# Patient Record
Sex: FEMALE | Race: WHITE | Employment: OTHER
[De-identification: names, ages, dates, MRNs, and addresses within clinical notes are randomized per-mention and may not be internally consistent; named-entity substitution may affect disease eponyms.]

---

## 2017-01-01 DIAGNOSIS — I10 ESSENTIAL HYPERTENSION: ICD-10-CM

## 2017-01-03 RX ORDER — METOPROLOL SUCCINATE 25 MG/1
TABLET, EXTENDED RELEASE ORAL
Qty: 30 TABLET | Refills: 3 | Status: SHIPPED | OUTPATIENT
Start: 2017-01-03 | End: 2017-05-07 | Stop reason: SDUPTHER

## 2017-01-05 ENCOUNTER — TELEPHONE (OUTPATIENT)
Facility: CLINIC | Age: 57
End: 2017-01-05

## 2017-01-14 DIAGNOSIS — M1A.0720 IDIOPATHIC CHRONIC GOUT OF LEFT FOOT WITHOUT TOPHUS: ICD-10-CM

## 2017-01-14 DIAGNOSIS — E79.0 ELEVATED URIC ACID IN BLOOD: ICD-10-CM

## 2017-01-16 RX ORDER — VENLAFAXINE HYDROCHLORIDE 37.5 MG/1
CAPSULE, EXTENDED RELEASE ORAL
Qty: 90 CAPSULE | Refills: 0 | Status: SHIPPED | OUTPATIENT
Start: 2017-01-16 | End: 2017-02-19 | Stop reason: SDUPTHER

## 2017-01-16 RX ORDER — ALLOPURINOL 100 MG/1
TABLET ORAL
Qty: 30 TABLET | Refills: 11 | Status: SHIPPED | OUTPATIENT
Start: 2017-01-16 | End: 2017-12-24 | Stop reason: SDUPTHER

## 2017-01-18 ENCOUNTER — OFFICE VISIT (OUTPATIENT)
Dept: PODIATRY | Facility: CLINIC | Age: 57
End: 2017-01-18

## 2017-01-18 VITALS
WEIGHT: 196 LBS | SYSTOLIC BLOOD PRESSURE: 128 MMHG | HEART RATE: 92 BPM | DIASTOLIC BLOOD PRESSURE: 77 MMHG | HEIGHT: 61 IN | BODY MASS INDEX: 37 KG/M2

## 2017-01-18 DIAGNOSIS — E11.42 TYPE 2 DIABETES MELLITUS WITH DIABETIC POLYNEUROPATHY, WITH LONG-TERM CURRENT USE OF INSULIN (HCC): ICD-10-CM

## 2017-01-18 DIAGNOSIS — M1A.0720 IDIOPATHIC CHRONIC GOUT OF LEFT FOOT WITHOUT TOPHUS: Primary | ICD-10-CM

## 2017-01-18 DIAGNOSIS — M79.2 NEUROPATHIC PAIN: ICD-10-CM

## 2017-01-18 DIAGNOSIS — Z79.4 TYPE 2 DIABETES MELLITUS WITH DIABETIC POLYNEUROPATHY, WITH LONG-TERM CURRENT USE OF INSULIN (HCC): ICD-10-CM

## 2017-01-18 PROCEDURE — 99213 OFFICE O/P EST LOW 20 MIN: CPT | Performed by: PODIATRIST

## 2017-01-18 ASSESSMENT — ENCOUNTER SYMPTOMS
NAUSEA: 0
VOMITING: 0
BACK PAIN: 1
DIARRHEA: 0
COLOR CHANGE: 0
CONSTIPATION: 0

## 2017-01-23 ENCOUNTER — OFFICE VISIT (OUTPATIENT)
Facility: CLINIC | Age: 57
End: 2017-01-23

## 2017-01-23 VITALS
WEIGHT: 214 LBS | BODY MASS INDEX: 40.43 KG/M2 | HEART RATE: 82 BPM | SYSTOLIC BLOOD PRESSURE: 130 MMHG | OXYGEN SATURATION: 98 % | TEMPERATURE: 98.2 F | DIASTOLIC BLOOD PRESSURE: 76 MMHG

## 2017-01-23 DIAGNOSIS — E83.42 HYPOMAGNESEMIA: ICD-10-CM

## 2017-01-23 DIAGNOSIS — D64.9 ANEMIA, UNSPECIFIED TYPE: ICD-10-CM

## 2017-01-23 DIAGNOSIS — R00.2 PALPITATIONS: ICD-10-CM

## 2017-01-23 DIAGNOSIS — R79.89 HIGH PLATELET COUNT: ICD-10-CM

## 2017-01-23 DIAGNOSIS — E79.0 ELEVATED URIC ACID IN BLOOD: ICD-10-CM

## 2017-01-23 DIAGNOSIS — E78.5 HYPERLIPIDEMIA, UNSPECIFIED HYPERLIPIDEMIA TYPE: ICD-10-CM

## 2017-01-23 DIAGNOSIS — R61 EXCESSIVE SWEATING: ICD-10-CM

## 2017-01-23 DIAGNOSIS — I10 ESSENTIAL HYPERTENSION: ICD-10-CM

## 2017-01-23 DIAGNOSIS — E11.42 TYPE 2 DIABETES MELLITUS WITH DIABETIC POLYNEUROPATHY, WITH LONG-TERM CURRENT USE OF INSULIN (HCC): Primary | ICD-10-CM

## 2017-01-23 DIAGNOSIS — Z79.4 TYPE 2 DIABETES MELLITUS WITH DIABETIC POLYNEUROPATHY, WITH LONG-TERM CURRENT USE OF INSULIN (HCC): Primary | ICD-10-CM

## 2017-01-23 DIAGNOSIS — Z12.11 COLON CANCER SCREENING: ICD-10-CM

## 2017-01-23 LAB — GLUCOSE BLD-MCNC: 186 MG/DL

## 2017-01-23 PROCEDURE — 99214 OFFICE O/P EST MOD 30 MIN: CPT | Performed by: NURSE PRACTITIONER

## 2017-01-23 PROCEDURE — 82962 GLUCOSE BLOOD TEST: CPT | Performed by: NURSE PRACTITIONER

## 2017-01-24 ENCOUNTER — TELEPHONE (OUTPATIENT)
Facility: CLINIC | Age: 57
End: 2017-01-24

## 2017-01-24 DIAGNOSIS — E11.42 TYPE 2 DIABETES MELLITUS WITH DIABETIC POLYNEUROPATHY, WITH LONG-TERM CURRENT USE OF INSULIN (HCC): ICD-10-CM

## 2017-01-24 DIAGNOSIS — Z79.4 TYPE 2 DIABETES MELLITUS WITH DIABETIC POLYNEUROPATHY, WITH LONG-TERM CURRENT USE OF INSULIN (HCC): ICD-10-CM

## 2017-01-27 DIAGNOSIS — Z12.11 COLON CANCER SCREENING: ICD-10-CM

## 2017-01-27 LAB
CONTROL: YES
HEMOCCULT STL QL: NEGATIVE

## 2017-01-27 PROCEDURE — 82274 ASSAY TEST FOR BLOOD FECAL: CPT | Performed by: NURSE PRACTITIONER

## 2017-01-30 ENCOUNTER — TELEPHONE (OUTPATIENT)
Facility: CLINIC | Age: 57
End: 2017-01-30

## 2017-01-30 DIAGNOSIS — E03.9 HYPOTHYROIDISM, UNSPECIFIED TYPE: Primary | ICD-10-CM

## 2017-02-02 ENCOUNTER — TELEPHONE (OUTPATIENT)
Facility: CLINIC | Age: 57
End: 2017-02-02

## 2017-02-07 ENCOUNTER — HOSPITAL ENCOUNTER (OUTPATIENT)
Age: 57
Discharge: HOME | End: 2017-02-07
Payer: COMMERCIAL

## 2017-02-07 DIAGNOSIS — N18.2 SECONDARY DIABETES MELLITUS WITH STAGE 2 CHRONIC KIDNEY DISEASE (HCC): ICD-10-CM

## 2017-02-07 DIAGNOSIS — N18.2 CKD (CHRONIC KIDNEY DISEASE) STAGE 2, GFR 60-89 ML/MIN: ICD-10-CM

## 2017-02-07 DIAGNOSIS — E03.9 HYPOTHYROIDISM, UNSPECIFIED TYPE: ICD-10-CM

## 2017-02-07 DIAGNOSIS — I12.9 BENIGN HYPERTENSION WITH CKD (CHRONIC KIDNEY DISEASE), STAGE II: ICD-10-CM

## 2017-02-07 DIAGNOSIS — N18.2 BENIGN HYPERTENSION WITH CKD (CHRONIC KIDNEY DISEASE), STAGE II: ICD-10-CM

## 2017-02-07 DIAGNOSIS — E13.22 SECONDARY DIABETES MELLITUS WITH STAGE 2 CHRONIC KIDNEY DISEASE (HCC): ICD-10-CM

## 2017-02-07 LAB
ALBUMIN SERPL-MCNC: 4.1 G/DL (ref 3.5–5.2)
ALBUMIN/GLOBULIN RATIO: ABNORMAL (ref 1–2.5)
ALP BLD-CCNC: 74 U/L (ref 35–104)
ALT SERPL-CCNC: 10 U/L (ref 5–33)
ANION GAP SERPL CALCULATED.3IONS-SCNC: 13 MMOL/L (ref 9–17)
AST SERPL-CCNC: 13 U/L
BILIRUB SERPL-MCNC: 0.33 MG/DL (ref 0.3–1.2)
BUN BLDV-MCNC: 14 MG/DL (ref 6–20)
BUN/CREAT BLD: ABNORMAL (ref 9–20)
CALCIUM SERPL-MCNC: 9.3 MG/DL (ref 8.6–10.4)
CHLORIDE BLD-SCNC: 99 MMOL/L (ref 98–107)
CHOLESTEROL/HDL RATIO: 2.4
CHOLESTEROL: 172 MG/DL
CO2: 25 MMOL/L (ref 20–31)
CREAT SERPL-MCNC: 0.58 MG/DL (ref 0.5–0.9)
CREATININE URINE: 161.9 MG/DL (ref 28–217)
CREATININE URINE: 164.7 MG/DL (ref 28–217)
ESTIMATED AVERAGE GLUCOSE: 180 MG/DL
ESTRADIOL LEVEL: <5 PG/ML
FOLLICLE STIMULATING HORMONE: 53.5 U/L (ref 25.8–134.8)
GFR AFRICAN AMERICAN: >60 ML/MIN
GFR NON-AFRICAN AMERICAN: >60 ML/MIN
GFR SERPL CREATININE-BSD FRML MDRD: ABNORMAL ML/MIN/{1.73_M2}
GFR SERPL CREATININE-BSD FRML MDRD: ABNORMAL ML/MIN/{1.73_M2}
GLUCOSE BLD-MCNC: 164 MG/DL (ref 70–99)
HBA1C MFR BLD: 7.9 % (ref 4–6)
HDLC SERPL-MCNC: 71 MG/DL
LDL CHOLESTEROL: 63 MG/DL (ref 0–130)
LH: 18 U/L (ref 7.7–58.5)
MAGNESIUM URINE: 30 MG/DL
MICROALBUMIN/CREAT 24H UR: <12 MG/L
MICROALBUMIN/CREAT UR-RTO: 7 MCG/MG CREAT
POTASSIUM SERPL-SCNC: 4.7 MMOL/L (ref 3.7–5.3)
PROLACTIN: 7.82 UG/L (ref 4.79–23.3)
SODIUM BLD-SCNC: 137 MMOL/L (ref 135–144)
THYROXINE, FREE: 1.37 NG/DL (ref 0.93–1.7)
TOTAL PROTEIN: 6.7 G/DL (ref 6.4–8.3)
TRIGL SERPL-MCNC: 188 MG/DL
TSH SERPL DL<=0.05 MIU/L-ACNC: 1.04 MIU/L (ref 0.3–5)
TSH SERPL DL<=0.05 MIU/L-ACNC: 1.06 MIU/L (ref 0.3–5)
VLDLC SERPL CALC-MCNC: ABNORMAL MG/DL (ref 1–30)

## 2017-02-07 PROCEDURE — 83002 ASSAY OF GONADOTROPIN (LH): CPT

## 2017-02-07 PROCEDURE — 84439 ASSAY OF FREE THYROXINE: CPT

## 2017-02-07 PROCEDURE — 82570 ASSAY OF URINE CREATININE: CPT

## 2017-02-07 PROCEDURE — 82043 UR ALBUMIN QUANTITATIVE: CPT

## 2017-02-07 PROCEDURE — 83001 ASSAY OF GONADOTROPIN (FSH): CPT

## 2017-02-07 PROCEDURE — 36415 COLL VENOUS BLD VENIPUNCTURE: CPT

## 2017-02-07 PROCEDURE — 82670 ASSAY OF TOTAL ESTRADIOL: CPT

## 2017-02-07 PROCEDURE — 84146 ASSAY OF PROLACTIN: CPT

## 2017-02-07 PROCEDURE — 80053 COMPREHEN METABOLIC PANEL: CPT

## 2017-02-07 PROCEDURE — 83036 HEMOGLOBIN GLYCOSYLATED A1C: CPT

## 2017-02-07 PROCEDURE — 84443 ASSAY THYROID STIM HORMONE: CPT

## 2017-02-07 PROCEDURE — 80061 LIPID PANEL: CPT

## 2017-02-07 PROCEDURE — 83735 ASSAY OF MAGNESIUM: CPT

## 2017-02-10 ENCOUNTER — TELEPHONE (OUTPATIENT)
Facility: CLINIC | Age: 57
End: 2017-02-10

## 2017-02-10 DIAGNOSIS — Z79.4 TYPE 2 DIABETES MELLITUS WITH DIABETIC POLYNEUROPATHY, WITH LONG-TERM CURRENT USE OF INSULIN (HCC): Primary | ICD-10-CM

## 2017-02-10 DIAGNOSIS — E11.42 TYPE 2 DIABETES MELLITUS WITH DIABETIC POLYNEUROPATHY, WITH LONG-TERM CURRENT USE OF INSULIN (HCC): Primary | ICD-10-CM

## 2017-02-15 ENCOUNTER — HOSPITAL ENCOUNTER (OUTPATIENT)
Dept: PAIN MANAGEMENT | Age: 57
Discharge: HOME | End: 2017-02-15
Attending: NURSE PRACTITIONER | Admitting: NURSE PRACTITIONER
Payer: COMMERCIAL

## 2017-02-15 DIAGNOSIS — M19.011 PRIMARY OSTEOARTHRITIS OF BOTH SHOULDERS: ICD-10-CM

## 2017-02-15 DIAGNOSIS — M51.36 DEGENERATIVE LUMBAR DISC: ICD-10-CM

## 2017-02-15 DIAGNOSIS — G89.29 ENCOUNTER FOR CHRONIC PAIN MANAGEMENT: ICD-10-CM

## 2017-02-15 DIAGNOSIS — M46.1 SACROILIITIS (HCC): ICD-10-CM

## 2017-02-15 DIAGNOSIS — Z51.81 ENCOUNTER FOR MEDICATION MONITORING: ICD-10-CM

## 2017-02-15 DIAGNOSIS — Z79.899 ENCOUNTER FOR MEDICATION MANAGEMENT: ICD-10-CM

## 2017-02-15 DIAGNOSIS — S42.251A CLOSED DISPLACED FRACTURE OF GREATER TUBEROSITY OF RIGHT HUMERUS, INITIAL ENCOUNTER: ICD-10-CM

## 2017-02-15 DIAGNOSIS — E66.01 MORBID OBESITY DUE TO EXCESS CALORIES (HCC): ICD-10-CM

## 2017-02-15 DIAGNOSIS — E11.01 UNCONTROLLED TYPE 2 DIABETES MELLITUS WITH HYPEROSMOLAR COMA, WITHOUT LONG-TERM CURRENT USE OF INSULIN (HCC): ICD-10-CM

## 2017-02-15 DIAGNOSIS — M17.0 PRIMARY OSTEOARTHRITIS OF BOTH KNEES: Primary | ICD-10-CM

## 2017-02-15 DIAGNOSIS — M19.012 PRIMARY OSTEOARTHRITIS OF BOTH SHOULDERS: ICD-10-CM

## 2017-02-15 DIAGNOSIS — M16.0 PRIMARY OSTEOARTHRITIS OF BOTH HIPS: ICD-10-CM

## 2017-02-15 PROCEDURE — 99213 OFFICE O/P EST LOW 20 MIN: CPT

## 2017-02-15 RX ORDER — OXYCODONE AND ACETAMINOPHEN 10; 325 MG/1; MG/1
1 TABLET ORAL EVERY 6 HOURS PRN
Qty: 120 TABLET | Refills: 0 | Status: SHIPPED | OUTPATIENT
Start: 2017-02-21 | End: 2017-04-03 | Stop reason: SDUPTHER

## 2017-02-19 DIAGNOSIS — E83.42 HYPOMAGNESEMIA: ICD-10-CM

## 2017-02-19 DIAGNOSIS — E78.5 HYPERLIPIDEMIA, UNSPECIFIED HYPERLIPIDEMIA TYPE: ICD-10-CM

## 2017-02-20 ENCOUNTER — HOSPITAL ENCOUNTER (OUTPATIENT)
Age: 57
Discharge: HOME | End: 2017-02-20
Payer: COMMERCIAL

## 2017-02-20 DIAGNOSIS — E13.22 SECONDARY DIABETES MELLITUS WITH STAGE 2 CHRONIC KIDNEY DISEASE (HCC): ICD-10-CM

## 2017-02-20 DIAGNOSIS — I12.9 BENIGN HYPERTENSION WITH CKD (CHRONIC KIDNEY DISEASE), STAGE II: ICD-10-CM

## 2017-02-20 DIAGNOSIS — N18.2 SECONDARY DIABETES MELLITUS WITH STAGE 2 CHRONIC KIDNEY DISEASE (HCC): ICD-10-CM

## 2017-02-20 DIAGNOSIS — N18.2 CKD (CHRONIC KIDNEY DISEASE) STAGE 2, GFR 60-89 ML/MIN: ICD-10-CM

## 2017-02-20 DIAGNOSIS — N18.2 BENIGN HYPERTENSION WITH CKD (CHRONIC KIDNEY DISEASE), STAGE II: ICD-10-CM

## 2017-02-20 LAB
ABSOLUTE EOS #: 0.3 K/UL (ref 0–0.4)
ABSOLUTE LYMPH #: 1.9 K/UL (ref 1–4.8)
ABSOLUTE MONO #: 0.6 K/UL (ref 0.1–1.3)
ANION GAP SERPL CALCULATED.3IONS-SCNC: 13 MMOL/L (ref 9–17)
BASOPHILS # BLD: 1 % (ref 0–2)
BASOPHILS ABSOLUTE: 0.1 K/UL (ref 0–0.2)
BUN BLDV-MCNC: 12 MG/DL (ref 6–20)
BUN/CREAT BLD: ABNORMAL (ref 9–20)
CALCIUM SERPL-MCNC: 9.3 MG/DL (ref 8.6–10.4)
CHLORIDE BLD-SCNC: 101 MMOL/L (ref 98–107)
CO2: 26 MMOL/L (ref 20–31)
CREAT SERPL-MCNC: 0.57 MG/DL (ref 0.5–0.9)
CREATININE URINE: 96.9 MG/DL (ref 28–217)
DIFFERENTIAL TYPE: ABNORMAL
EOSINOPHILS RELATIVE PERCENT: 4 % (ref 0–4)
GFR AFRICAN AMERICAN: >60 ML/MIN
GFR NON-AFRICAN AMERICAN: >60 ML/MIN
GFR SERPL CREATININE-BSD FRML MDRD: ABNORMAL ML/MIN/{1.73_M2}
GFR SERPL CREATININE-BSD FRML MDRD: ABNORMAL ML/MIN/{1.73_M2}
GLUCOSE BLD-MCNC: 170 MG/DL (ref 70–99)
HCT VFR BLD CALC: 39.4 % (ref 36–46)
HEMOGLOBIN: 13 G/DL (ref 12–16)
LYMPHOCYTES # BLD: 26 % (ref 24–44)
MAGNESIUM: 1.7 MG/DL (ref 1.6–2.6)
MCH RBC QN AUTO: 28.8 PG (ref 26–34)
MCHC RBC AUTO-ENTMCNC: 33 G/DL (ref 31–37)
MCV RBC AUTO: 87.3 FL (ref 80–100)
MICROALBUMIN/CREAT 24H UR: <12 MG/L
MICROALBUMIN/CREAT UR-RTO: 12 MCG/MG CREAT
MONOCYTES # BLD: 8 % (ref 1–7)
PDW BLD-RTO: 15.6 % (ref 11.5–14.9)
PHOSPHORUS: 4 MG/DL (ref 2.6–4.5)
PLATELET # BLD: 289 K/UL (ref 150–450)
PLATELET ESTIMATE: ABNORMAL
PMV BLD AUTO: 8 FL (ref 6–12)
POTASSIUM SERPL-SCNC: 4.7 MMOL/L (ref 3.7–5.3)
RBC # BLD: 4.51 M/UL (ref 4–5.2)
RBC # BLD: ABNORMAL 10*6/UL
SEG NEUTROPHILS: 61 % (ref 36–66)
SEGMENTED NEUTROPHILS ABSOLUTE COUNT: 4.3 K/UL (ref 1.3–9.1)
SODIUM BLD-SCNC: 140 MMOL/L (ref 135–144)
WBC # BLD: 7.1 K/UL (ref 3.5–11)
WBC # BLD: ABNORMAL 10*3/UL

## 2017-02-20 PROCEDURE — 83735 ASSAY OF MAGNESIUM: CPT

## 2017-02-20 PROCEDURE — 82570 ASSAY OF URINE CREATININE: CPT

## 2017-02-20 PROCEDURE — 80048 BASIC METABOLIC PNL TOTAL CA: CPT

## 2017-02-20 PROCEDURE — 84100 ASSAY OF PHOSPHORUS: CPT

## 2017-02-20 PROCEDURE — 36415 COLL VENOUS BLD VENIPUNCTURE: CPT

## 2017-02-20 PROCEDURE — 82043 UR ALBUMIN QUANTITATIVE: CPT

## 2017-02-20 PROCEDURE — 85025 COMPLETE CBC W/AUTO DIFF WBC: CPT

## 2017-02-20 RX ORDER — VENLAFAXINE HYDROCHLORIDE 37.5 MG/1
CAPSULE, EXTENDED RELEASE ORAL
Qty: 90 CAPSULE | Refills: 0 | Status: SHIPPED | OUTPATIENT
Start: 2017-02-20 | End: 2017-03-19 | Stop reason: SDUPTHER

## 2017-02-20 RX ORDER — ATORVASTATIN CALCIUM 20 MG/1
TABLET, FILM COATED ORAL
Qty: 30 TABLET | Refills: 3 | Status: SHIPPED | OUTPATIENT
Start: 2017-02-20 | End: 2017-06-19 | Stop reason: SDUPTHER

## 2017-02-20 RX ORDER — BACLOFEN 20 MG
TABLET ORAL
Qty: 60 TABLET | Refills: 3 | Status: SHIPPED | OUTPATIENT
Start: 2017-02-20 | End: 2017-06-19 | Stop reason: SDUPTHER

## 2017-02-21 RX ORDER — TIZANIDINE 2 MG/1
2 TABLET ORAL 2 TIMES DAILY PRN
Qty: 60 TABLET | Refills: 1 | Status: SHIPPED | OUTPATIENT
Start: 2017-02-21 | End: 2017-08-21 | Stop reason: ALTCHOICE

## 2017-02-24 RX ORDER — AMMONIUM LACTATE 12 G/100G
CREAM TOPICAL 2 TIMES DAILY PRN
Qty: 140 G | Refills: 6 | OUTPATIENT
Start: 2017-02-24 | End: 2018-06-28 | Stop reason: SDUPTHER

## 2017-03-01 ENCOUNTER — HOSPITAL ENCOUNTER (OUTPATIENT)
Age: 57
Discharge: HOME | End: 2017-03-01
Payer: COMMERCIAL

## 2017-03-01 DIAGNOSIS — N18.2 BENIGN HYPERTENSION WITH CKD (CHRONIC KIDNEY DISEASE), STAGE II: ICD-10-CM

## 2017-03-01 DIAGNOSIS — N18.2 SECONDARY DIABETES MELLITUS WITH STAGE 2 CHRONIC KIDNEY DISEASE (HCC): ICD-10-CM

## 2017-03-01 DIAGNOSIS — I12.9 BENIGN HYPERTENSION WITH CKD (CHRONIC KIDNEY DISEASE), STAGE II: ICD-10-CM

## 2017-03-01 DIAGNOSIS — E13.22 SECONDARY DIABETES MELLITUS WITH STAGE 2 CHRONIC KIDNEY DISEASE (HCC): ICD-10-CM

## 2017-03-01 DIAGNOSIS — N18.2 CKD (CHRONIC KIDNEY DISEASE) STAGE 2, GFR 60-89 ML/MIN: ICD-10-CM

## 2017-03-01 LAB — MAGNESIUM: 1.7 MG/DL (ref 1.6–2.6)

## 2017-03-01 PROCEDURE — 36415 COLL VENOUS BLD VENIPUNCTURE: CPT

## 2017-03-01 PROCEDURE — 83735 ASSAY OF MAGNESIUM: CPT

## 2017-03-02 ENCOUNTER — OFFICE VISIT (OUTPATIENT)
Facility: CLINIC | Age: 57
End: 2017-03-02

## 2017-03-02 VITALS
DIASTOLIC BLOOD PRESSURE: 78 MMHG | WEIGHT: 218 LBS | HEART RATE: 72 BPM | BODY MASS INDEX: 41.19 KG/M2 | OXYGEN SATURATION: 98 % | TEMPERATURE: 99 F | SYSTOLIC BLOOD PRESSURE: 120 MMHG

## 2017-03-02 DIAGNOSIS — J40 BRONCHITIS: ICD-10-CM

## 2017-03-02 DIAGNOSIS — J01.01 ACUTE RECURRENT MAXILLARY SINUSITIS: Primary | ICD-10-CM

## 2017-03-02 PROCEDURE — 99213 OFFICE O/P EST LOW 20 MIN: CPT | Performed by: NURSE PRACTITIONER

## 2017-03-02 RX ORDER — GUAIFENESIN 600 MG/1
600 TABLET, EXTENDED RELEASE ORAL 2 TIMES DAILY PRN
Qty: 60 TABLET | Refills: 0 | Status: ON HOLD | OUTPATIENT
Start: 2017-03-02 | End: 2017-05-09

## 2017-03-02 RX ORDER — DOXYCYCLINE HYCLATE 100 MG/1
100 CAPSULE ORAL 2 TIMES DAILY
Qty: 14 CAPSULE | Refills: 0 | Status: SHIPPED | OUTPATIENT
Start: 2017-03-02 | End: 2017-03-09

## 2017-03-02 RX ORDER — INSULIN ASPART 100 [IU]/ML
INJECTION, SOLUTION INTRAVENOUS; SUBCUTANEOUS
COMMUNITY
Start: 2017-02-20 | End: 2018-12-19

## 2017-03-02 RX ORDER — GUAIFENESIN 600 MG/1
600 TABLET, EXTENDED RELEASE ORAL 2 TIMES DAILY PRN
Qty: 60 TABLET | Refills: 0 | OUTPATIENT
Start: 2017-03-02

## 2017-03-02 RX ORDER — ALBUTEROL SULFATE 90 UG/1
1-2 AEROSOL, METERED RESPIRATORY (INHALATION) EVERY 4 HOURS PRN
Qty: 1 INHALER | Refills: 3 | Status: SHIPPED | OUTPATIENT
Start: 2017-03-02 | End: 2017-04-24

## 2017-03-02 ASSESSMENT — ENCOUNTER SYMPTOMS
RHINORRHEA: 1
COUGH: 1
WHEEZING: 1
SINUS PRESSURE: 1
GASTROINTESTINAL NEGATIVE: 1
SHORTNESS OF BREATH: 0
SORE THROAT: 1

## 2017-03-21 RX ORDER — VENLAFAXINE HYDROCHLORIDE 37.5 MG/1
CAPSULE, EXTENDED RELEASE ORAL
Qty: 90 CAPSULE | Refills: 0 | Status: SHIPPED | OUTPATIENT
Start: 2017-03-21 | End: 2017-04-23 | Stop reason: SDUPTHER

## 2017-03-28 ENCOUNTER — HOSPITAL ENCOUNTER (OUTPATIENT)
Facility: CLINIC | Age: 57
Discharge: HOME | End: 2017-03-28
Payer: COMMERCIAL

## 2017-03-28 ENCOUNTER — HOSPITAL ENCOUNTER (OUTPATIENT)
Age: 57
Setting detail: SPECIMEN
Discharge: HOME | End: 2017-03-28
Payer: COMMERCIAL

## 2017-03-28 ENCOUNTER — HOSPITAL ENCOUNTER (OUTPATIENT)
Dept: GENERAL RADIOLOGY | Facility: CLINIC | Age: 57
Discharge: HOME | End: 2017-03-28
Payer: COMMERCIAL

## 2017-03-28 DIAGNOSIS — Z01.818 PRE-OP TESTING: ICD-10-CM

## 2017-03-28 LAB
BILIRUBIN URINE: NEGATIVE
COLOR: YELLOW
COMMENT UA: ABNORMAL
GLUCOSE URINE: ABNORMAL
KETONES, URINE: NEGATIVE
LEUKOCYTE ESTERASE, URINE: NEGATIVE
NITRITE, URINE: NEGATIVE
PH UA: 8 (ref 5–8)
PROTEIN UA: NEGATIVE
SPECIFIC GRAVITY UA: 1.02 (ref 1–1.03)
TURBIDITY: CLEAR
URINE HGB: NEGATIVE
UROBILINOGEN, URINE: NORMAL

## 2017-03-28 PROCEDURE — 71020 XR CHEST STANDARD TWO VW: CPT

## 2017-03-29 LAB
CULTURE: NORMAL
CULTURE: NORMAL
Lab: NORMAL
SPECIMEN DESCRIPTION: NORMAL
STATUS: NORMAL

## 2017-04-03 ENCOUNTER — TELEPHONE (OUTPATIENT)
Dept: PAIN MANAGEMENT | Age: 57
End: 2017-04-03

## 2017-04-03 RX ORDER — OXYCODONE AND ACETAMINOPHEN 10; 325 MG/1; MG/1
1 TABLET ORAL EVERY 6 HOURS PRN
Qty: 120 TABLET | Refills: 0 | Status: SHIPPED | OUTPATIENT
Start: 2017-04-03 | End: 2017-04-25 | Stop reason: SDUPTHER

## 2017-04-19 ENCOUNTER — HOSPITAL ENCOUNTER (OUTPATIENT)
Dept: PREADMISSION TESTING | Age: 57
Discharge: HOME | End: 2017-04-19
Payer: COMMERCIAL

## 2017-04-19 VITALS
SYSTOLIC BLOOD PRESSURE: 158 MMHG | WEIGHT: 225.75 LBS | HEART RATE: 83 BPM | RESPIRATION RATE: 18 BRPM | OXYGEN SATURATION: 99 % | HEIGHT: 61 IN | BODY MASS INDEX: 42.62 KG/M2 | DIASTOLIC BLOOD PRESSURE: 75 MMHG

## 2017-04-19 LAB
ABO/RH: NORMAL
ANION GAP SERPL CALCULATED.3IONS-SCNC: 12 MMOL/L (ref 9–17)
ANTIBODY SCREEN: NEGATIVE
ARM BAND NUMBER: NORMAL
BILIRUBIN URINE: NEGATIVE
BUN BLDV-MCNC: 16 MG/DL (ref 6–20)
BUN/CREAT BLD: 28 (ref 9–20)
CALCIUM SERPL-MCNC: 8.9 MG/DL (ref 8.6–10.4)
CHLORIDE BLD-SCNC: 98 MMOL/L (ref 98–107)
CO2: 28 MMOL/L (ref 20–31)
COLOR: YELLOW
COMMENT UA: NORMAL
CREAT SERPL-MCNC: 0.58 MG/DL (ref 0.5–0.9)
ESTIMATED AVERAGE GLUCOSE: 166 MG/DL
EXPIRATION DATE: NORMAL
GFR AFRICAN AMERICAN: >60 ML/MIN
GFR NON-AFRICAN AMERICAN: >60 ML/MIN
GFR SERPL CREATININE-BSD FRML MDRD: ABNORMAL ML/MIN/{1.73_M2}
GFR SERPL CREATININE-BSD FRML MDRD: ABNORMAL ML/MIN/{1.73_M2}
GLUCOSE BLD-MCNC: 117 MG/DL (ref 70–99)
GLUCOSE URINE: NEGATIVE
HBA1C MFR BLD: 7.4 % (ref 4–6)
HCT VFR BLD CALC: 39.3 % (ref 36–46)
HEMOGLOBIN: 13.2 G/DL (ref 12–16)
KETONES, URINE: NEGATIVE
LEUKOCYTE ESTERASE, URINE: NEGATIVE
MCH RBC QN AUTO: 29.4 PG (ref 26–34)
MCHC RBC AUTO-ENTMCNC: 33.5 G/DL (ref 31–37)
MCV RBC AUTO: 87.7 FL (ref 80–100)
NITRITE, URINE: NEGATIVE
PDW BLD-RTO: 12.6 % (ref 11.5–14.5)
PH UA: 7.5 (ref 5–8)
PLATELET # BLD: 385 K/UL (ref 130–400)
PMV BLD AUTO: NORMAL FL (ref 6–12)
POTASSIUM SERPL-SCNC: 4.7 MMOL/L (ref 3.7–5.3)
PREALBUMIN: 28.1 MG/DL (ref 20–40)
PROTEIN UA: NEGATIVE
RBC # BLD: 4.48 M/UL (ref 4–5.2)
SODIUM BLD-SCNC: 138 MMOL/L (ref 135–144)
SPECIFIC GRAVITY UA: 1.01 (ref 1–1.03)
TURBIDITY: CLEAR
URINE HGB: NEGATIVE
UROBILINOGEN, URINE: NORMAL
WBC # BLD: 8.8 K/UL (ref 3.5–11)

## 2017-04-19 PROCEDURE — 36415 COLL VENOUS BLD VENIPUNCTURE: CPT

## 2017-04-19 PROCEDURE — 85027 COMPLETE CBC AUTOMATED: CPT

## 2017-04-19 PROCEDURE — 87641 MR-STAPH DNA AMP PROBE: CPT

## 2017-04-19 PROCEDURE — 86901 BLOOD TYPING SEROLOGIC RH(D): CPT

## 2017-04-19 PROCEDURE — 81003 URINALYSIS AUTO W/O SCOPE: CPT

## 2017-04-19 PROCEDURE — 86850 RBC ANTIBODY SCREEN: CPT

## 2017-04-19 PROCEDURE — 86900 BLOOD TYPING SEROLOGIC ABO: CPT

## 2017-04-19 PROCEDURE — 83036 HEMOGLOBIN GLYCOSYLATED A1C: CPT

## 2017-04-19 PROCEDURE — 80048 BASIC METABOLIC PNL TOTAL CA: CPT

## 2017-04-19 PROCEDURE — 84134 ASSAY OF PREALBUMIN: CPT

## 2017-04-19 RX ORDER — PHENTERMINE HYDROCHLORIDE 37.5 MG/1
37.5 CAPSULE ORAL EVERY MORNING
COMMUNITY
End: 2017-08-08

## 2017-04-19 ASSESSMENT — PAIN DESCRIPTION - FREQUENCY: FREQUENCY: CONTINUOUS

## 2017-04-19 ASSESSMENT — PAIN DESCRIPTION - LOCATION: LOCATION: SHOULDER

## 2017-04-19 ASSESSMENT — PAIN DESCRIPTION - PAIN TYPE: TYPE: CHRONIC PAIN

## 2017-04-19 ASSESSMENT — PAIN DESCRIPTION - PROGRESSION: CLINICAL_PROGRESSION: GRADUALLY WORSENING

## 2017-04-19 ASSESSMENT — PAIN DESCRIPTION - DIRECTION: RADIATING_TOWARDS: DOWN THE RIGHT ARM

## 2017-04-19 ASSESSMENT — PAIN DESCRIPTION - ORIENTATION: ORIENTATION: RIGHT

## 2017-04-19 ASSESSMENT — PAIN SCALES - GENERAL: PAINLEVEL_OUTOF10: 9

## 2017-04-19 ASSESSMENT — PAIN DESCRIPTION - DESCRIPTORS: DESCRIPTORS: ACHING;POUNDING

## 2017-04-20 LAB
DIRECT EXAM: NORMAL
Lab: NORMAL
SPECIMEN DESCRIPTION: NORMAL
STATUS: NORMAL

## 2017-04-24 ENCOUNTER — OFFICE VISIT (OUTPATIENT)
Dept: OBGYN CLINIC | Age: 57
End: 2017-04-24
Payer: COMMERCIAL

## 2017-04-24 ENCOUNTER — HOSPITAL ENCOUNTER (OUTPATIENT)
Age: 57
Setting detail: SPECIMEN
Discharge: HOME | End: 2017-04-24
Payer: COMMERCIAL

## 2017-04-24 VITALS
HEART RATE: 82 BPM | RESPIRATION RATE: 18 BRPM | HEIGHT: 61 IN | DIASTOLIC BLOOD PRESSURE: 76 MMHG | SYSTOLIC BLOOD PRESSURE: 118 MMHG | WEIGHT: 224 LBS | BODY MASS INDEX: 42.29 KG/M2

## 2017-04-24 DIAGNOSIS — N95.0 POST-MENOPAUSAL BLEEDING: ICD-10-CM

## 2017-04-24 DIAGNOSIS — Z01.411 ENCOUNTER FOR GYNECOLOGICAL EXAMINATION (GENERAL) (ROUTINE) WITH ABNORMAL FINDINGS: ICD-10-CM

## 2017-04-24 DIAGNOSIS — Z11.51 SPECIAL SCREENING EXAMINATION FOR HUMAN PAPILLOMAVIRUS (HPV): ICD-10-CM

## 2017-04-24 DIAGNOSIS — N95.9 POST MENOPAUSAL PROBLEMS: ICD-10-CM

## 2017-04-24 DIAGNOSIS — N89.8 VAGINAL DISCHARGE: ICD-10-CM

## 2017-04-24 DIAGNOSIS — Z01.419 WELL FEMALE EXAM WITH ROUTINE GYNECOLOGICAL EXAM: Primary | ICD-10-CM

## 2017-04-24 PROBLEM — D50.9 IRON DEFICIENCY ANEMIA: Status: ACTIVE | Noted: 2017-01-13

## 2017-04-24 PROCEDURE — 99214 OFFICE O/P EST MOD 30 MIN: CPT | Performed by: ADVANCED PRACTICE MIDWIFE

## 2017-04-24 PROCEDURE — 87070 CULTURE OTHR SPECIMN AEROBIC: CPT

## 2017-04-24 PROCEDURE — 87624 HPV HI-RISK TYP POOLED RSLT: CPT

## 2017-04-24 PROCEDURE — G0145 SCR C/V CYTO,THINLAYER,RESCR: HCPCS

## 2017-04-24 ASSESSMENT — PATIENT HEALTH QUESTIONNAIRE - PHQ9
SUM OF ALL RESPONSES TO PHQ QUESTIONS 1-9: 0
2. FEELING DOWN, DEPRESSED OR HOPELESS: 0
1. LITTLE INTEREST OR PLEASURE IN DOING THINGS: 0
SUM OF ALL RESPONSES TO PHQ9 QUESTIONS 1 & 2: 0

## 2017-04-25 ENCOUNTER — HOSPITAL ENCOUNTER (OUTPATIENT)
Dept: GENERAL RADIOLOGY | Facility: CLINIC | Age: 57
Discharge: HOME | End: 2017-04-25
Payer: COMMERCIAL

## 2017-04-25 ENCOUNTER — HOSPITAL ENCOUNTER (OUTPATIENT)
Dept: PAIN MANAGEMENT | Age: 57
Discharge: HOME | End: 2017-04-25
Payer: COMMERCIAL

## 2017-04-25 ENCOUNTER — HOSPITAL ENCOUNTER (OUTPATIENT)
Age: 57
Setting detail: SPECIMEN
Discharge: HOME | End: 2017-04-25
Payer: COMMERCIAL

## 2017-04-25 ENCOUNTER — HOSPITAL ENCOUNTER (OUTPATIENT)
Facility: CLINIC | Age: 57
Discharge: HOME | End: 2017-04-25
Payer: COMMERCIAL

## 2017-04-25 DIAGNOSIS — G89.29 ENCOUNTER FOR CHRONIC PAIN MANAGEMENT: ICD-10-CM

## 2017-04-25 DIAGNOSIS — M19.011 PRIMARY OSTEOARTHRITIS OF BOTH SHOULDERS: ICD-10-CM

## 2017-04-25 DIAGNOSIS — M19.012 PRIMARY OSTEOARTHRITIS OF BOTH SHOULDERS: ICD-10-CM

## 2017-04-25 DIAGNOSIS — N95.9 POST MENOPAUSAL PROBLEMS: ICD-10-CM

## 2017-04-25 DIAGNOSIS — Z51.81 ENCOUNTER FOR MEDICATION MONITORING: ICD-10-CM

## 2017-04-25 DIAGNOSIS — M16.0 PRIMARY OSTEOARTHRITIS OF BOTH HIPS: ICD-10-CM

## 2017-04-25 DIAGNOSIS — E66.01 MORBID OBESITY DUE TO EXCESS CALORIES (HCC): ICD-10-CM

## 2017-04-25 DIAGNOSIS — Z01.818 PRE-OPERATIVE EXAM: ICD-10-CM

## 2017-04-25 DIAGNOSIS — M17.0 PRIMARY OSTEOARTHRITIS OF BOTH KNEES: Primary | ICD-10-CM

## 2017-04-25 DIAGNOSIS — N95.0 POST-MENOPAUSAL BLEEDING: ICD-10-CM

## 2017-04-25 DIAGNOSIS — Z79.899 ENCOUNTER FOR MEDICATION MANAGEMENT: ICD-10-CM

## 2017-04-25 DIAGNOSIS — M51.36 DEGENERATIVE LUMBAR DISC: ICD-10-CM

## 2017-04-25 DIAGNOSIS — M46.1 SACROILIITIS (HCC): ICD-10-CM

## 2017-04-25 PROBLEM — Z79.4 TYPE 2 DIABETES MELLITUS WITH DIABETIC NEUROPATHY, WITH LONG-TERM CURRENT USE OF INSULIN (HCC): Status: ACTIVE | Noted: 2017-04-25

## 2017-04-25 PROBLEM — E11.40 TYPE 2 DIABETES MELLITUS WITH DIABETIC NEUROPATHY, WITH LONG-TERM CURRENT USE OF INSULIN (HCC): Status: ACTIVE | Noted: 2017-04-25

## 2017-04-25 PROBLEM — M54.31 SCIATICA, RIGHT SIDE: Status: ACTIVE | Noted: 2017-04-25

## 2017-04-25 LAB
ESTRADIOL LEVEL: <5 PG/ML
FOLLICLE STIMULATING HORMONE: 54.8 U/L (ref 25.8–134.8)

## 2017-04-25 PROCEDURE — 99213 OFFICE O/P EST LOW 20 MIN: CPT

## 2017-04-25 PROCEDURE — 71020 XR CHEST STANDARD TWO VW: CPT

## 2017-04-25 PROCEDURE — 99213 OFFICE O/P EST LOW 20 MIN: CPT | Performed by: NURSE PRACTITIONER

## 2017-04-25 RX ORDER — TOPIRAMATE 50 MG/1
50 TABLET, FILM COATED ORAL 2 TIMES DAILY
Qty: 60 TABLET | Refills: 1 | Status: SHIPPED | OUTPATIENT
Start: 2017-04-25 | End: 2017-05-25 | Stop reason: SINTOL

## 2017-04-25 RX ORDER — VENLAFAXINE HYDROCHLORIDE 37.5 MG/1
CAPSULE, EXTENDED RELEASE ORAL
Qty: 90 CAPSULE | Refills: 0 | Status: SHIPPED | OUTPATIENT
Start: 2017-04-25 | End: 2017-05-29 | Stop reason: SDUPTHER

## 2017-04-25 RX ORDER — OXYCODONE AND ACETAMINOPHEN 10; 325 MG/1; MG/1
1 TABLET ORAL EVERY 6 HOURS PRN
Qty: 120 TABLET | Refills: 0 | Status: ON HOLD | OUTPATIENT
Start: 2017-05-03 | End: 2017-05-09 | Stop reason: HOSPADM

## 2017-04-26 LAB
HPV SAMPLE: ABNORMAL
HPV SOURCE: ABNORMAL
HPV, GENOTYPE 16: NOT DETECTED
HPV, GENOTYPE 18: NOT DETECTED
HPV, HIGH RISK OTHER: DETECTED
HPV, INTERPRETATION: ABNORMAL

## 2017-04-27 LAB
CULTURE: ABNORMAL
Lab: ABNORMAL
SPECIMEN DESCRIPTION: ABNORMAL
SPECIMEN DESCRIPTION: ABNORMAL
STATUS: ABNORMAL

## 2017-04-27 RX ORDER — METRONIDAZOLE 500 MG/1
500 TABLET ORAL 2 TIMES DAILY
Qty: 14 TABLET | Refills: 0 | Status: SHIPPED | OUTPATIENT
Start: 2017-04-27 | End: 2017-05-04

## 2017-04-28 LAB — CYTOLOGY REPORT: NORMAL

## 2017-05-02 ENCOUNTER — TELEPHONE (OUTPATIENT)
Dept: OBGYN CLINIC | Age: 57
End: 2017-05-02

## 2017-05-02 ENCOUNTER — HOSPITAL ENCOUNTER (OUTPATIENT)
Dept: ULTRASOUND IMAGING | Age: 57
Discharge: HOME | End: 2017-05-02
Payer: COMMERCIAL

## 2017-05-02 DIAGNOSIS — N95.9 POST MENOPAUSAL PROBLEMS: ICD-10-CM

## 2017-05-02 DIAGNOSIS — N95.0 POST-MENOPAUSAL BLEEDING: ICD-10-CM

## 2017-05-02 PROCEDURE — 76856 US EXAM PELVIC COMPLETE: CPT

## 2017-05-02 PROCEDURE — 76830 TRANSVAGINAL US NON-OB: CPT

## 2017-05-05 ENCOUNTER — TELEPHONE (OUTPATIENT)
Dept: OBGYN CLINIC | Age: 57
End: 2017-05-05

## 2017-05-08 ENCOUNTER — ANESTHESIA EVENT (OUTPATIENT)
Dept: OPERATING ROOM | Age: 57
DRG: 322 | End: 2017-05-08
Payer: COMMERCIAL

## 2017-05-08 RX ORDER — SODIUM CHLORIDE 9 MG/ML
INJECTION, SOLUTION INTRAVENOUS CONTINUOUS
Status: CANCELLED | OUTPATIENT
Start: 2017-05-09

## 2017-05-09 ENCOUNTER — HOSPITAL ENCOUNTER (INPATIENT)
Age: 57
LOS: 1 days | Discharge: HOME | DRG: 322 | End: 2017-05-10
Attending: ORTHOPAEDIC SURGERY | Admitting: ORTHOPAEDIC SURGERY
Payer: COMMERCIAL

## 2017-05-09 ENCOUNTER — ANESTHESIA (OUTPATIENT)
Dept: OPERATING ROOM | Age: 57
DRG: 322 | End: 2017-05-09
Payer: COMMERCIAL

## 2017-05-09 ENCOUNTER — APPOINTMENT (OUTPATIENT)
Dept: GENERAL RADIOLOGY | Age: 57
DRG: 322 | End: 2017-05-09
Attending: ORTHOPAEDIC SURGERY
Payer: COMMERCIAL

## 2017-05-09 VITALS — TEMPERATURE: 99.1 F | SYSTOLIC BLOOD PRESSURE: 113 MMHG | DIASTOLIC BLOOD PRESSURE: 59 MMHG | OXYGEN SATURATION: 100 %

## 2017-05-09 PROBLEM — Z96.611 STATUS POST TOTAL REPLACEMENT OF RIGHT SHOULDER: Status: ACTIVE | Noted: 2017-05-09

## 2017-05-09 LAB
GLUCOSE BLD-MCNC: 145 MG/DL (ref 65–105)
GLUCOSE BLD-MCNC: 159 MG/DL (ref 65–105)
GLUCOSE BLD-MCNC: 244 MG/DL (ref 65–105)
GLUCOSE BLD-MCNC: 304 MG/DL (ref 65–105)

## 2017-05-09 PROCEDURE — G8987 SELF CARE CURRENT STATUS: HCPCS

## 2017-05-09 PROCEDURE — 2580000003 HC RX 258: Performed by: ORTHOPAEDIC SURGERY

## 2017-05-09 PROCEDURE — 1200000000 HC SEMI PRIVATE

## 2017-05-09 PROCEDURE — 6360000002 HC RX W HCPCS: Performed by: ANESTHESIOLOGY

## 2017-05-09 PROCEDURE — 6370000000 HC RX 637 (ALT 250 FOR IP): Performed by: NURSE PRACTITIONER

## 2017-05-09 PROCEDURE — G8979 MOBILITY GOAL STATUS: HCPCS

## 2017-05-09 PROCEDURE — 2500000003 HC RX 250 WO HCPCS: Performed by: NURSE ANESTHETIST, CERTIFIED REGISTERED

## 2017-05-09 PROCEDURE — 82947 ASSAY GLUCOSE BLOOD QUANT: CPT

## 2017-05-09 PROCEDURE — 6360000002 HC RX W HCPCS: Performed by: ORTHOPAEDIC SURGERY

## 2017-05-09 PROCEDURE — 2720000010 HC SURG SUPPLY STERILE: Performed by: ORTHOPAEDIC SURGERY

## 2017-05-09 PROCEDURE — 7100000001 HC PACU RECOVERY - ADDTL 15 MIN: Performed by: ORTHOPAEDIC SURGERY

## 2017-05-09 PROCEDURE — G8988 SELF CARE GOAL STATUS: HCPCS

## 2017-05-09 PROCEDURE — 6360000002 HC RX W HCPCS: Performed by: NURSE ANESTHETIST, CERTIFIED REGISTERED

## 2017-05-09 PROCEDURE — 3700000000 HC ANESTHESIA ATTENDED CARE: Performed by: ORTHOPAEDIC SURGERY

## 2017-05-09 PROCEDURE — 3600000015 HC SURGERY LEVEL 5 ADDTL 15MIN: Performed by: ORTHOPAEDIC SURGERY

## 2017-05-09 PROCEDURE — 64416 NJX AA&/STRD BRCH PL NFS IMG: CPT | Performed by: ANESTHESIOLOGY

## 2017-05-09 PROCEDURE — 2580000003 HC RX 258: Performed by: NURSE ANESTHETIST, CERTIFIED REGISTERED

## 2017-05-09 PROCEDURE — 97166 OT EVAL MOD COMPLEX 45 MIN: CPT

## 2017-05-09 PROCEDURE — 2580000003 HC RX 258: Performed by: ANESTHESIOLOGY

## 2017-05-09 PROCEDURE — C1713 ANCHOR/SCREW BN/BN,TIS/BN: HCPCS | Performed by: ORTHOPAEDIC SURGERY

## 2017-05-09 PROCEDURE — 97535 SELF CARE MNGMENT TRAINING: CPT

## 2017-05-09 PROCEDURE — 97110 THERAPEUTIC EXERCISES: CPT

## 2017-05-09 PROCEDURE — 2580000003 HC RX 258: Performed by: STUDENT IN AN ORGANIZED HEALTH CARE EDUCATION/TRAINING PROGRAM

## 2017-05-09 PROCEDURE — 6360000002 HC RX W HCPCS: Performed by: STUDENT IN AN ORGANIZED HEALTH CARE EDUCATION/TRAINING PROGRAM

## 2017-05-09 PROCEDURE — 97530 THERAPEUTIC ACTIVITIES: CPT

## 2017-05-09 PROCEDURE — 3600000005 HC SURGERY LEVEL 5 BASE: Performed by: ORTHOPAEDIC SURGERY

## 2017-05-09 PROCEDURE — G8978 MOBILITY CURRENT STATUS: HCPCS

## 2017-05-09 PROCEDURE — C1776 JOINT DEVICE (IMPLANTABLE): HCPCS | Performed by: ORTHOPAEDIC SURGERY

## 2017-05-09 PROCEDURE — 3700000001 HC ADD 15 MINUTES (ANESTHESIA): Performed by: ORTHOPAEDIC SURGERY

## 2017-05-09 PROCEDURE — 2500000003 HC RX 250 WO HCPCS: Performed by: ANESTHESIOLOGY

## 2017-05-09 PROCEDURE — 0RRJ0JZ REPLACEMENT OF RIGHT SHOULDER JOINT WITH SYNTHETIC SUBSTITUTE, OPEN APPROACH: ICD-10-PCS | Performed by: ORTHOPAEDIC SURGERY

## 2017-05-09 PROCEDURE — 73030 X-RAY EXAM OF SHOULDER: CPT

## 2017-05-09 PROCEDURE — 7100000000 HC PACU RECOVERY - FIRST 15 MIN: Performed by: ORTHOPAEDIC SURGERY

## 2017-05-09 PROCEDURE — 97163 PT EVAL HIGH COMPLEX 45 MIN: CPT

## 2017-05-09 PROCEDURE — 6370000000 HC RX 637 (ALT 250 FOR IP): Performed by: STUDENT IN AN ORGANIZED HEALTH CARE EDUCATION/TRAINING PROGRAM

## 2017-05-09 DEVICE — CEMENT BNE 40GM FULL DOSE PMMA W/ GENT HI VISC RADPQ LNG: Type: IMPLANTABLE DEVICE | Site: SHOULDER | Status: FUNCTIONAL

## 2017-05-09 DEVICE — COMPONENT GLEN FIX SM SHLDR UNIVERS VAULTLOCK: Type: IMPLANTABLE DEVICE | Site: SHOULDER | Status: FUNCTIONAL

## 2017-05-09 RX ORDER — SODIUM CHLORIDE, SODIUM LACTATE, POTASSIUM CHLORIDE, CALCIUM CHLORIDE 600; 310; 30; 20 MG/100ML; MG/100ML; MG/100ML; MG/100ML
INJECTION, SOLUTION INTRAVENOUS CONTINUOUS
Status: DISCONTINUED | OUTPATIENT
Start: 2017-05-10 | End: 2017-05-09

## 2017-05-09 RX ORDER — LORATADINE 10 MG/1
10 TABLET ORAL DAILY
Status: DISCONTINUED | OUTPATIENT
Start: 2017-05-09 | End: 2017-05-09 | Stop reason: CLARIF

## 2017-05-09 RX ORDER — MORPHINE SULFATE 2 MG/ML
2 INJECTION, SOLUTION INTRAMUSCULAR; INTRAVENOUS
Status: CANCELLED | OUTPATIENT
Start: 2017-05-09

## 2017-05-09 RX ORDER — NICOTINE POLACRILEX 4 MG
15 LOZENGE BUCCAL PRN
Status: DISCONTINUED | OUTPATIENT
Start: 2017-05-09 | End: 2017-05-09 | Stop reason: SDUPTHER

## 2017-05-09 RX ORDER — FENTANYL CITRATE 50 UG/ML
50 INJECTION, SOLUTION INTRAMUSCULAR; INTRAVENOUS
Status: DISCONTINUED | OUTPATIENT
Start: 2017-05-09 | End: 2017-05-10 | Stop reason: HOSPADM

## 2017-05-09 RX ORDER — NICOTINE POLACRILEX 4 MG
15 LOZENGE BUCCAL PRN
Status: DISCONTINUED | OUTPATIENT
Start: 2017-05-09 | End: 2017-05-10 | Stop reason: HOSPADM

## 2017-05-09 RX ORDER — OXYCODONE HYDROCHLORIDE AND ACETAMINOPHEN 5; 325 MG/1; MG/1
1 TABLET ORAL EVERY 4 HOURS PRN
Status: DISCONTINUED | OUTPATIENT
Start: 2017-05-09 | End: 2017-05-10 | Stop reason: HOSPADM

## 2017-05-09 RX ORDER — SODIUM CHLORIDE 0.9 % (FLUSH) 0.9 %
10 SYRINGE (ML) INJECTION PRN
Status: DISCONTINUED | OUTPATIENT
Start: 2017-05-09 | End: 2017-05-09 | Stop reason: HOSPADM

## 2017-05-09 RX ORDER — PHENTERMINE HYDROCHLORIDE 37.5 MG/1
37.5 CAPSULE ORAL EVERY MORNING
Status: DISCONTINUED | OUTPATIENT
Start: 2017-05-10 | End: 2017-05-10 | Stop reason: HOSPADM

## 2017-05-09 RX ORDER — SODIUM CHLORIDE 9 MG/ML
INJECTION, SOLUTION INTRAVENOUS CONTINUOUS PRN
Status: DISCONTINUED | OUTPATIENT
Start: 2017-05-09 | End: 2017-05-09 | Stop reason: SDUPTHER

## 2017-05-09 RX ORDER — LIDOCAINE HYDROCHLORIDE 20 MG/ML
INJECTION, SOLUTION INFILTRATION; PERINEURAL PRN
Status: DISCONTINUED | OUTPATIENT
Start: 2017-05-09 | End: 2017-05-09 | Stop reason: SDUPTHER

## 2017-05-09 RX ORDER — ROCURONIUM BROMIDE 10 MG/ML
INJECTION, SOLUTION INTRAVENOUS PRN
Status: DISCONTINUED | OUTPATIENT
Start: 2017-05-09 | End: 2017-05-09 | Stop reason: SDUPTHER

## 2017-05-09 RX ORDER — DEXTROSE MONOHYDRATE 50 MG/ML
100 INJECTION, SOLUTION INTRAVENOUS PRN
Status: DISCONTINUED | OUTPATIENT
Start: 2017-05-09 | End: 2017-05-09 | Stop reason: SDUPTHER

## 2017-05-09 RX ORDER — ASPIRIN 81 MG/1
81 TABLET ORAL 2 TIMES DAILY
Status: DISCONTINUED | OUTPATIENT
Start: 2017-05-09 | End: 2017-05-10 | Stop reason: HOSPADM

## 2017-05-09 RX ORDER — TIZANIDINE 2 MG/1
2 TABLET ORAL 2 TIMES DAILY PRN
Status: DISCONTINUED | OUTPATIENT
Start: 2017-05-09 | End: 2017-05-10 | Stop reason: HOSPADM

## 2017-05-09 RX ORDER — ONDANSETRON 2 MG/ML
INJECTION INTRAMUSCULAR; INTRAVENOUS PRN
Status: DISCONTINUED | OUTPATIENT
Start: 2017-05-09 | End: 2017-05-09 | Stop reason: SDUPTHER

## 2017-05-09 RX ORDER — HYDROMORPHONE HCL 110MG/55ML
0.25 PATIENT CONTROLLED ANALGESIA SYRINGE INTRAVENOUS EVERY 5 MIN PRN
Status: DISCONTINUED | OUTPATIENT
Start: 2017-05-09 | End: 2017-05-09 | Stop reason: HOSPADM

## 2017-05-09 RX ORDER — ASPIRIN 81 MG/1
81 TABLET ORAL DAILY
Qty: 30 TABLET | Refills: 0 | Status: SHIPPED | OUTPATIENT
Start: 2017-05-09 | End: 2017-05-10

## 2017-05-09 RX ORDER — PROPOFOL 10 MG/ML
INJECTION, EMULSION INTRAVENOUS PRN
Status: DISCONTINUED | OUTPATIENT
Start: 2017-05-09 | End: 2017-05-09 | Stop reason: SDUPTHER

## 2017-05-09 RX ORDER — FENTANYL CITRATE 50 UG/ML
25 INJECTION, SOLUTION INTRAMUSCULAR; INTRAVENOUS EVERY 5 MIN PRN
Status: DISCONTINUED | OUTPATIENT
Start: 2017-05-09 | End: 2017-05-09 | Stop reason: HOSPADM

## 2017-05-09 RX ORDER — VENLAFAXINE HYDROCHLORIDE 37.5 MG/1
37.5 CAPSULE, EXTENDED RELEASE ORAL
Status: DISCONTINUED | OUTPATIENT
Start: 2017-05-10 | End: 2017-05-10 | Stop reason: HOSPADM

## 2017-05-09 RX ORDER — INSULIN GLARGINE 100 [IU]/ML
45 INJECTION, SOLUTION SUBCUTANEOUS NIGHTLY
Status: DISCONTINUED | OUTPATIENT
Start: 2017-05-09 | End: 2017-05-10 | Stop reason: HOSPADM

## 2017-05-09 RX ORDER — TRANEXAMIC ACID 100 MG/ML
INJECTION, SOLUTION INTRAVENOUS PRN
Status: DISCONTINUED | OUTPATIENT
Start: 2017-05-09 | End: 2017-05-09 | Stop reason: SDUPTHER

## 2017-05-09 RX ORDER — VANCOMYCIN HYDROCHLORIDE 1 G/200ML
1000 INJECTION, SOLUTION INTRAVENOUS EVERY 12 HOURS
Status: COMPLETED | OUTPATIENT
Start: 2017-05-09 | End: 2017-05-10

## 2017-05-09 RX ORDER — ONDANSETRON 4 MG/1
4 TABLET, FILM COATED ORAL EVERY 8 HOURS PRN
Status: DISCONTINUED | OUTPATIENT
Start: 2017-05-09 | End: 2017-05-10 | Stop reason: HOSPADM

## 2017-05-09 RX ORDER — DEXAMETHASONE SODIUM PHOSPHATE 10 MG/ML
INJECTION INTRAMUSCULAR; INTRAVENOUS PRN
Status: DISCONTINUED | OUTPATIENT
Start: 2017-05-09 | End: 2017-05-09 | Stop reason: SDUPTHER

## 2017-05-09 RX ORDER — FENTANYL CITRATE 50 UG/ML
INJECTION, SOLUTION INTRAMUSCULAR; INTRAVENOUS PRN
Status: DISCONTINUED | OUTPATIENT
Start: 2017-05-09 | End: 2017-05-09 | Stop reason: SDUPTHER

## 2017-05-09 RX ORDER — METOPROLOL SUCCINATE 25 MG/1
25 TABLET, EXTENDED RELEASE ORAL DAILY
Status: DISCONTINUED | OUTPATIENT
Start: 2017-05-10 | End: 2017-05-10 | Stop reason: HOSPADM

## 2017-05-09 RX ORDER — SODIUM CHLORIDE 0.9 % (FLUSH) 0.9 %
10 SYRINGE (ML) INJECTION EVERY 12 HOURS SCHEDULED
Status: DISCONTINUED | OUTPATIENT
Start: 2017-05-09 | End: 2017-05-10 | Stop reason: HOSPADM

## 2017-05-09 RX ORDER — DEXTROSE MONOHYDRATE 25 G/50ML
12.5 INJECTION, SOLUTION INTRAVENOUS PRN
Status: DISCONTINUED | OUTPATIENT
Start: 2017-05-09 | End: 2017-05-10 | Stop reason: HOSPADM

## 2017-05-09 RX ORDER — SODIUM CHLORIDE 9 MG/ML
INJECTION, SOLUTION INTRAVENOUS CONTINUOUS
Status: DISCONTINUED | OUTPATIENT
Start: 2017-05-09 | End: 2017-05-10 | Stop reason: HOSPADM

## 2017-05-09 RX ORDER — TOPIRAMATE 25 MG/1
50 TABLET ORAL 2 TIMES DAILY
Status: DISCONTINUED | OUTPATIENT
Start: 2017-05-09 | End: 2017-05-10 | Stop reason: HOSPADM

## 2017-05-09 RX ORDER — LISINOPRIL 10 MG/1
10 TABLET ORAL DAILY
Status: DISCONTINUED | OUTPATIENT
Start: 2017-05-09 | End: 2017-05-10 | Stop reason: HOSPADM

## 2017-05-09 RX ORDER — LIDOCAINE HYDROCHLORIDE 20 MG/ML
INJECTION, SOLUTION EPIDURAL; INFILTRATION; INTRACAUDAL; PERINEURAL PRN
Status: DISCONTINUED | OUTPATIENT
Start: 2017-05-09 | End: 2017-05-09 | Stop reason: SDUPTHER

## 2017-05-09 RX ORDER — MORPHINE SULFATE 4 MG/ML
4 INJECTION, SOLUTION INTRAMUSCULAR; INTRAVENOUS
Status: CANCELLED | OUTPATIENT
Start: 2017-05-09

## 2017-05-09 RX ORDER — ONDANSETRON 2 MG/ML
4 INJECTION INTRAMUSCULAR; INTRAVENOUS EVERY 6 HOURS PRN
Status: DISCONTINUED | OUTPATIENT
Start: 2017-05-09 | End: 2017-05-10 | Stop reason: HOSPADM

## 2017-05-09 RX ORDER — SODIUM CHLORIDE 0.9 % (FLUSH) 0.9 %
10 SYRINGE (ML) INJECTION EVERY 12 HOURS SCHEDULED
Status: DISCONTINUED | OUTPATIENT
Start: 2017-05-09 | End: 2017-05-09 | Stop reason: HOSPADM

## 2017-05-09 RX ORDER — LEVOTHYROXINE SODIUM 0.1 MG/1
100 TABLET ORAL DAILY
Status: DISCONTINUED | OUTPATIENT
Start: 2017-05-09 | End: 2017-05-10 | Stop reason: HOSPADM

## 2017-05-09 RX ORDER — DOCUSATE SODIUM 100 MG/1
100 CAPSULE, LIQUID FILLED ORAL 2 TIMES DAILY
Status: DISCONTINUED | OUTPATIENT
Start: 2017-05-09 | End: 2017-05-10 | Stop reason: HOSPADM

## 2017-05-09 RX ORDER — DEXTROSE MONOHYDRATE 50 MG/ML
100 INJECTION, SOLUTION INTRAVENOUS PRN
Status: DISCONTINUED | OUTPATIENT
Start: 2017-05-09 | End: 2017-05-10 | Stop reason: HOSPADM

## 2017-05-09 RX ORDER — ROPIVACAINE HYDROCHLORIDE 5 MG/ML
INJECTION, SOLUTION EPIDURAL; INFILTRATION; PERINEURAL PRN
Status: DISCONTINUED | OUTPATIENT
Start: 2017-05-09 | End: 2017-05-09 | Stop reason: SDUPTHER

## 2017-05-09 RX ORDER — NITROGLYCERIN 0.4 MG/1
0.4 TABLET SUBLINGUAL EVERY 5 MIN PRN
Status: DISCONTINUED | OUTPATIENT
Start: 2017-05-09 | End: 2017-05-10 | Stop reason: HOSPADM

## 2017-05-09 RX ORDER — DEXTROSE MONOHYDRATE 25 G/50ML
12.5 INJECTION, SOLUTION INTRAVENOUS PRN
Status: DISCONTINUED | OUTPATIENT
Start: 2017-05-09 | End: 2017-05-09 | Stop reason: SDUPTHER

## 2017-05-09 RX ORDER — LIDOCAINE HYDROCHLORIDE 10 MG/ML
1 INJECTION, SOLUTION EPIDURAL; INFILTRATION; INTRACAUDAL; PERINEURAL
Status: DISCONTINUED | OUTPATIENT
Start: 2017-05-10 | End: 2017-05-09 | Stop reason: HOSPADM

## 2017-05-09 RX ORDER — ONDANSETRON 2 MG/ML
4 INJECTION INTRAMUSCULAR; INTRAVENOUS
Status: DISCONTINUED | OUTPATIENT
Start: 2017-05-09 | End: 2017-05-09 | Stop reason: HOSPADM

## 2017-05-09 RX ORDER — ACETAMINOPHEN 325 MG/1
650 TABLET ORAL EVERY 4 HOURS PRN
Status: DISCONTINUED | OUTPATIENT
Start: 2017-05-09 | End: 2017-05-10 | Stop reason: HOSPADM

## 2017-05-09 RX ORDER — MIDAZOLAM HYDROCHLORIDE 1 MG/ML
3 INJECTION INTRAMUSCULAR; INTRAVENOUS ONCE
Status: COMPLETED | OUTPATIENT
Start: 2017-05-09 | End: 2017-05-09

## 2017-05-09 RX ORDER — SODIUM CHLORIDE 0.9 % (FLUSH) 0.9 %
10 SYRINGE (ML) INJECTION PRN
Status: DISCONTINUED | OUTPATIENT
Start: 2017-05-09 | End: 2017-05-10 | Stop reason: HOSPADM

## 2017-05-09 RX ORDER — DEXTROSE MONOHYDRATE 50 MG/ML
INJECTION, SOLUTION INTRAVENOUS CONTINUOUS PRN
Status: DISCONTINUED | OUTPATIENT
Start: 2017-05-09 | End: 2017-05-09 | Stop reason: SDUPTHER

## 2017-05-09 RX ORDER — MEPERIDINE HYDROCHLORIDE 25 MG/ML
12.5 INJECTION INTRAMUSCULAR; INTRAVENOUS; SUBCUTANEOUS EVERY 5 MIN PRN
Status: DISCONTINUED | OUTPATIENT
Start: 2017-05-09 | End: 2017-05-09 | Stop reason: HOSPADM

## 2017-05-09 RX ORDER — OXYCODONE HYDROCHLORIDE AND ACETAMINOPHEN 5; 325 MG/1; MG/1
2 TABLET ORAL EVERY 4 HOURS PRN
Status: DISCONTINUED | OUTPATIENT
Start: 2017-05-09 | End: 2017-05-10 | Stop reason: HOSPADM

## 2017-05-09 RX ORDER — MECLIZINE HCL 12.5 MG/1
25 TABLET ORAL DAILY PRN
Status: DISCONTINUED | OUTPATIENT
Start: 2017-05-09 | End: 2017-05-10 | Stop reason: HOSPADM

## 2017-05-09 RX ORDER — SUCCINYLCHOLINE CHLORIDE 20 MG/ML
INJECTION INTRAMUSCULAR; INTRAVENOUS PRN
Status: DISCONTINUED | OUTPATIENT
Start: 2017-05-09 | End: 2017-05-09 | Stop reason: SDUPTHER

## 2017-05-09 RX ORDER — TROSPIUM CHLORIDE ER 60 MG/1
60 CAPSULE ORAL DAILY
Status: DISCONTINUED | OUTPATIENT
Start: 2017-05-09 | End: 2017-05-09 | Stop reason: CLARIF

## 2017-05-09 RX ORDER — ACETAMINOPHEN 10 MG/ML
INJECTION, SOLUTION INTRAVENOUS PRN
Status: DISCONTINUED | OUTPATIENT
Start: 2017-05-09 | End: 2017-05-09 | Stop reason: SDUPTHER

## 2017-05-09 RX ORDER — BISACODYL 10 MG
10 SUPPOSITORY, RECTAL RECTAL DAILY PRN
Status: DISCONTINUED | OUTPATIENT
Start: 2017-05-09 | End: 2017-05-10 | Stop reason: HOSPADM

## 2017-05-09 RX ORDER — MIDAZOLAM HYDROCHLORIDE 1 MG/ML
1 INJECTION INTRAMUSCULAR; INTRAVENOUS ONCE
Status: COMPLETED | OUTPATIENT
Start: 2017-05-09 | End: 2017-05-09

## 2017-05-09 RX ORDER — INSULIN GLARGINE 100 [IU]/ML
8 INJECTION, SOLUTION SUBCUTANEOUS EVERY MORNING
Status: DISCONTINUED | OUTPATIENT
Start: 2017-05-10 | End: 2017-05-10 | Stop reason: HOSPADM

## 2017-05-09 RX ORDER — ALLOPURINOL 100 MG/1
100 TABLET ORAL DAILY
Status: DISCONTINUED | OUTPATIENT
Start: 2017-05-09 | End: 2017-05-10 | Stop reason: HOSPADM

## 2017-05-09 RX ORDER — ATORVASTATIN CALCIUM 20 MG/1
20 TABLET, FILM COATED ORAL DAILY
Status: DISCONTINUED | OUTPATIENT
Start: 2017-05-10 | End: 2017-05-10 | Stop reason: HOSPADM

## 2017-05-09 RX ORDER — FENTANYL CITRATE 50 UG/ML
25 INJECTION, SOLUTION INTRAMUSCULAR; INTRAVENOUS
Status: DISCONTINUED | OUTPATIENT
Start: 2017-05-09 | End: 2017-05-10 | Stop reason: HOSPADM

## 2017-05-09 RX ORDER — OXYBUTYNIN CHLORIDE 10 MG/1
10 TABLET, EXTENDED RELEASE ORAL NIGHTLY
Status: DISCONTINUED | OUTPATIENT
Start: 2017-05-09 | End: 2017-05-10 | Stop reason: HOSPADM

## 2017-05-09 RX ORDER — HYDROMORPHONE HCL 110MG/55ML
0.5 PATIENT CONTROLLED ANALGESIA SYRINGE INTRAVENOUS EVERY 5 MIN PRN
Status: DISCONTINUED | OUTPATIENT
Start: 2017-05-09 | End: 2017-05-09 | Stop reason: HOSPADM

## 2017-05-09 RX ORDER — FLUTICASONE PROPIONATE 50 MCG
1 SPRAY, SUSPENSION (ML) NASAL DAILY PRN
Status: DISCONTINUED | OUTPATIENT
Start: 2017-05-09 | End: 2017-05-10 | Stop reason: HOSPADM

## 2017-05-09 RX ORDER — CETIRIZINE HYDROCHLORIDE 10 MG/1
10 TABLET ORAL DAILY
Status: DISCONTINUED | OUTPATIENT
Start: 2017-05-09 | End: 2017-05-10 | Stop reason: HOSPADM

## 2017-05-09 RX ORDER — LABETALOL HYDROCHLORIDE 5 MG/ML
5 INJECTION, SOLUTION INTRAVENOUS EVERY 10 MIN PRN
Status: DISCONTINUED | OUTPATIENT
Start: 2017-05-09 | End: 2017-05-09 | Stop reason: HOSPADM

## 2017-05-09 RX ORDER — PROMETHAZINE HYDROCHLORIDE 25 MG/ML
6.25 INJECTION, SOLUTION INTRAMUSCULAR; INTRAVENOUS
Status: DISCONTINUED | OUTPATIENT
Start: 2017-05-09 | End: 2017-05-09 | Stop reason: HOSPADM

## 2017-05-09 RX ADMIN — DOCUSATE SODIUM 100 MG: 100 CAPSULE, LIQUID FILLED ORAL at 21:44

## 2017-05-09 RX ADMIN — ONDANSETRON 4 MG: 2 INJECTION INTRAMUSCULAR; INTRAVENOUS at 08:24

## 2017-05-09 RX ADMIN — DEXAMETHASONE SODIUM PHOSPHATE 20 MG: 10 INJECTION INTRAMUSCULAR; INTRAVENOUS at 08:22

## 2017-05-09 RX ADMIN — FENTANYL CITRATE 50 MCG: 50 INJECTION, SOLUTION INTRAMUSCULAR; INTRAVENOUS at 07:39

## 2017-05-09 RX ADMIN — SODIUM CHLORIDE, POTASSIUM CHLORIDE, SODIUM LACTATE AND CALCIUM CHLORIDE: 600; 310; 30; 20 INJECTION, SOLUTION INTRAVENOUS at 06:50

## 2017-05-09 RX ADMIN — ASPIRIN 81 MG: 81 TABLET, COATED ORAL at 21:44

## 2017-05-09 RX ADMIN — VANCOMYCIN HYDROCHLORIDE 1000 MG: 1 INJECTION, SOLUTION INTRAVENOUS at 21:44

## 2017-05-09 RX ADMIN — INSULIN LISPRO 4 UNITS: 100 INJECTION, SOLUTION INTRAVENOUS; SUBCUTANEOUS at 04:00

## 2017-05-09 RX ADMIN — ROPIVACAINE HYDROCHLORIDE 30 ML: 5 INJECTION, SOLUTION EPIDURAL; INFILTRATION; PERINEURAL at 07:13

## 2017-05-09 RX ADMIN — PHENYLEPHRINE HYDROCHLORIDE 100 MCG: 10 INJECTION INTRAVENOUS at 08:08

## 2017-05-09 RX ADMIN — Medication 400 MG: at 21:44

## 2017-05-09 RX ADMIN — INSULIN GLARGINE 45 UNITS: 100 INJECTION, SOLUTION SUBCUTANEOUS at 21:46

## 2017-05-09 RX ADMIN — LIDOCAINE HYDROCHLORIDE 100 MG: 20 INJECTION, SOLUTION INFILTRATION; PERINEURAL at 07:39

## 2017-05-09 RX ADMIN — ACETAMINOPHEN 1000 MG: 10 INJECTION, SOLUTION INTRAVENOUS at 10:28

## 2017-05-09 RX ADMIN — TRANEXAMIC ACID 1500 MG: 100 INJECTION, SOLUTION INTRAVENOUS at 08:16

## 2017-05-09 RX ADMIN — TOPIRAMATE 50 MG: 25 TABLET, FILM COATED ORAL at 21:44

## 2017-05-09 RX ADMIN — LIDOCAINE HYDROCHLORIDE 5 ML: 20 INJECTION, SOLUTION EPIDURAL; INFILTRATION; INTRACAUDAL; PERINEURAL at 07:13

## 2017-05-09 RX ADMIN — SODIUM CHLORIDE: 9 INJECTION, SOLUTION INTRAVENOUS at 15:15

## 2017-05-09 RX ADMIN — SODIUM CHLORIDE, POTASSIUM CHLORIDE, SODIUM LACTATE AND CALCIUM CHLORIDE: 600; 310; 30; 20 INJECTION, SOLUTION INTRAVENOUS at 09:49

## 2017-05-09 RX ADMIN — PHENYLEPHRINE HYDROCHLORIDE 100 MCG: 10 INJECTION INTRAVENOUS at 08:02

## 2017-05-09 RX ADMIN — ROCURONIUM BROMIDE 20 MG: 10 INJECTION, SOLUTION INTRAVENOUS at 08:53

## 2017-05-09 RX ADMIN — PHENYLEPHRINE HYDROCHLORIDE 100 MCG: 10 INJECTION INTRAVENOUS at 08:04

## 2017-05-09 RX ADMIN — FENTANYL CITRATE 50 MCG: 50 INJECTION, SOLUTION INTRAMUSCULAR; INTRAVENOUS at 10:57

## 2017-05-09 RX ADMIN — ONDANSETRON 4 MG: 2 INJECTION INTRAMUSCULAR; INTRAVENOUS at 15:15

## 2017-05-09 RX ADMIN — ROCURONIUM BROMIDE 30 MG: 10 INJECTION, SOLUTION INTRAVENOUS at 07:51

## 2017-05-09 RX ADMIN — FENTANYL CITRATE 50 MCG: 50 INJECTION, SOLUTION INTRAMUSCULAR; INTRAVENOUS at 11:01

## 2017-05-09 RX ADMIN — SUGAMMADEX 200 MG: 100 INJECTION, SOLUTION INTRAVENOUS at 10:47

## 2017-05-09 RX ADMIN — FENTANYL CITRATE 50 MCG: 50 INJECTION, SOLUTION INTRAMUSCULAR; INTRAVENOUS at 08:42

## 2017-05-09 RX ADMIN — PROPOFOL 150 MG: 10 INJECTION, EMULSION INTRAVENOUS at 07:39

## 2017-05-09 RX ADMIN — OXYBUTYNIN CHLORIDE 10 MG: 10 TABLET, FILM COATED, EXTENDED RELEASE ORAL at 21:44

## 2017-05-09 RX ADMIN — PHENYLEPHRINE HYDROCHLORIDE 100 MCG: 10 INJECTION INTRAVENOUS at 08:15

## 2017-05-09 RX ADMIN — DEXTROSE MONOHYDRATE: 50 INJECTION, SOLUTION INTRAVENOUS at 07:34

## 2017-05-09 RX ADMIN — MIDAZOLAM 3 MG: 1 INJECTION INTRAMUSCULAR; INTRAVENOUS at 06:59

## 2017-05-09 RX ADMIN — PHENYLEPHRINE HYDROCHLORIDE 100 MCG: 10 INJECTION INTRAVENOUS at 08:59

## 2017-05-09 RX ADMIN — OXYCODONE HYDROCHLORIDE AND ACETAMINOPHEN 1 TABLET: 5; 325 TABLET ORAL at 21:45

## 2017-05-09 RX ADMIN — VANCOMYCIN HYDROCHLORIDE 1500 MG: 1 INJECTION, POWDER, LYOPHILIZED, FOR SOLUTION INTRAVENOUS at 07:34

## 2017-05-09 RX ADMIN — FENTANYL CITRATE 50 MCG: 50 INJECTION, SOLUTION INTRAMUSCULAR; INTRAVENOUS at 11:03

## 2017-05-09 RX ADMIN — MIDAZOLAM 1 MG: 1 INJECTION INTRAMUSCULAR; INTRAVENOUS at 07:05

## 2017-05-09 RX ADMIN — SODIUM CHLORIDE: 9 INJECTION, SOLUTION INTRAVENOUS at 10:35

## 2017-05-09 RX ADMIN — Medication 750 ML: at 11:19

## 2017-05-09 RX ADMIN — OXYCODONE HYDROCHLORIDE AND ACETAMINOPHEN 2 TABLET: 5; 325 TABLET ORAL at 15:15

## 2017-05-09 RX ADMIN — SUCCINYLCHOLINE CHLORIDE 140 MG: 20 INJECTION, SOLUTION INTRAMUSCULAR; INTRAVENOUS at 07:39

## 2017-05-09 RX ADMIN — SODIUM CHLORIDE: 9 INJECTION, SOLUTION INTRAVENOUS at 21:54

## 2017-05-09 RX ADMIN — Medication 10 ML: at 21:45

## 2017-05-09 RX ADMIN — METFORMIN HYDROCHLORIDE 1000 MG: 500 TABLET, FILM COATED ORAL at 21:44

## 2017-05-09 ASSESSMENT — PAIN SCALES - GENERAL
PAINLEVEL_OUTOF10: 6
PAINLEVEL_OUTOF10: 4
PAINLEVEL_OUTOF10: 4
PAINLEVEL_OUTOF10: 0
PAINLEVEL_OUTOF10: 1
PAINLEVEL_OUTOF10: 8
PAINLEVEL_OUTOF10: 8

## 2017-05-09 ASSESSMENT — PAIN DESCRIPTION - PROGRESSION
CLINICAL_PROGRESSION: NOT CHANGED
CLINICAL_PROGRESSION: GRADUALLY WORSENING
CLINICAL_PROGRESSION: NOT CHANGED

## 2017-05-09 ASSESSMENT — PAIN DESCRIPTION - FREQUENCY
FREQUENCY: CONTINUOUS

## 2017-05-09 ASSESSMENT — PAIN DESCRIPTION - ORIENTATION
ORIENTATION: RIGHT

## 2017-05-09 ASSESSMENT — PAIN DESCRIPTION - ONSET
ONSET: ON-GOING

## 2017-05-09 ASSESSMENT — PAIN DESCRIPTION - PAIN TYPE
TYPE: SURGICAL PAIN
TYPE: SURGICAL PAIN;ACUTE PAIN

## 2017-05-09 ASSESSMENT — PAIN DESCRIPTION - LOCATION
LOCATION: SHOULDER

## 2017-05-09 ASSESSMENT — PAIN DESCRIPTION - DESCRIPTORS
DESCRIPTORS: ACHING;DISCOMFORT
DESCRIPTORS: ACHING;DISCOMFORT
DESCRIPTORS: ACHING

## 2017-05-09 ASSESSMENT — PAIN DESCRIPTION - DIRECTION: RADIATING_TOWARDS: RT ARM

## 2017-05-09 ASSESSMENT — PAIN - FUNCTIONAL ASSESSMENT: PAIN_FUNCTIONAL_ASSESSMENT: 0-10

## 2017-05-10 VITALS
TEMPERATURE: 97.7 F | RESPIRATION RATE: 20 BRPM | HEIGHT: 61 IN | SYSTOLIC BLOOD PRESSURE: 135 MMHG | OXYGEN SATURATION: 97 % | WEIGHT: 225 LBS | HEART RATE: 84 BPM | DIASTOLIC BLOOD PRESSURE: 43 MMHG | BODY MASS INDEX: 42.48 KG/M2

## 2017-05-10 PROBLEM — M19.011 PRIMARY OSTEOARTHRITIS OF RIGHT SHOULDER: Status: ACTIVE | Noted: 2017-05-10

## 2017-05-10 PROBLEM — Z79.4 TYPE 2 DIABETES MELLITUS WITH DIABETIC NEUROPATHY, WITH LONG-TERM CURRENT USE OF INSULIN (HCC): Chronic | Status: ACTIVE | Noted: 2017-04-25

## 2017-05-10 PROBLEM — R73.9 HYPERGLYCEMIA: Status: ACTIVE | Noted: 2017-05-10

## 2017-05-10 PROBLEM — E11.40 TYPE 2 DIABETES MELLITUS WITH DIABETIC NEUROPATHY, WITH LONG-TERM CURRENT USE OF INSULIN (HCC): Chronic | Status: ACTIVE | Noted: 2017-04-25

## 2017-05-10 PROBLEM — M19.011 PRIMARY OSTEOARTHRITIS OF RIGHT SHOULDER: Chronic | Status: ACTIVE | Noted: 2017-05-10

## 2017-05-10 LAB
ABSOLUTE EOS #: 0 K/UL (ref 0–0.4)
ABSOLUTE LYMPH #: 1.3 K/UL (ref 1–4.8)
ABSOLUTE MONO #: 1.2 K/UL (ref 0.2–0.8)
ANION GAP SERPL CALCULATED.3IONS-SCNC: 13 MMOL/L (ref 9–17)
BASOPHILS # BLD: 0 %
BASOPHILS ABSOLUTE: 0.1 K/UL (ref 0–0.2)
BUN BLDV-MCNC: 15 MG/DL (ref 6–20)
BUN/CREAT BLD: 28 (ref 9–20)
CALCIUM SERPL-MCNC: 8.1 MG/DL (ref 8.6–10.4)
CHLORIDE BLD-SCNC: 101 MMOL/L (ref 98–107)
CO2: 25 MMOL/L (ref 20–31)
CREAT SERPL-MCNC: 0.54 MG/DL (ref 0.5–0.9)
DIFFERENTIAL TYPE: ABNORMAL
EOSINOPHILS RELATIVE PERCENT: 0 %
GFR AFRICAN AMERICAN: >60 ML/MIN
GFR NON-AFRICAN AMERICAN: >60 ML/MIN
GFR SERPL CREATININE-BSD FRML MDRD: ABNORMAL ML/MIN/{1.73_M2}
GFR SERPL CREATININE-BSD FRML MDRD: ABNORMAL ML/MIN/{1.73_M2}
GLUCOSE BLD-MCNC: 272 MG/DL (ref 70–99)
GLUCOSE BLD-MCNC: 286 MG/DL (ref 65–105)
GLUCOSE BLD-MCNC: 320 MG/DL (ref 65–105)
HCT VFR BLD CALC: 32.6 % (ref 36–46)
HEMOGLOBIN: 11 G/DL (ref 12–16)
LYMPHOCYTES # BLD: 8 %
MCH RBC QN AUTO: 30.1 PG (ref 26–34)
MCHC RBC AUTO-ENTMCNC: 33.8 G/DL (ref 31–37)
MCV RBC AUTO: 88.9 FL (ref 80–100)
MONOCYTES # BLD: 8 %
PDW BLD-RTO: 13.1 % (ref 11.5–14.5)
PLATELET # BLD: 304 K/UL (ref 130–400)
PLATELET ESTIMATE: ABNORMAL
PMV BLD AUTO: 7.8 FL (ref 6–12)
POTASSIUM SERPL-SCNC: 4.4 MMOL/L (ref 3.7–5.3)
RBC # BLD: 3.67 M/UL (ref 4–5.2)
RBC # BLD: ABNORMAL 10*6/UL
SEG NEUTROPHILS: 84 %
SEGMENTED NEUTROPHILS ABSOLUTE COUNT: 13.1 K/UL (ref 1.8–7.7)
SODIUM BLD-SCNC: 139 MMOL/L (ref 135–144)
WBC # BLD: 15.7 K/UL (ref 3.5–11)
WBC # BLD: ABNORMAL 10*3/UL

## 2017-05-10 PROCEDURE — 82947 ASSAY GLUCOSE BLOOD QUANT: CPT

## 2017-05-10 PROCEDURE — 97535 SELF CARE MNGMENT TRAINING: CPT

## 2017-05-10 PROCEDURE — 6360000002 HC RX W HCPCS: Performed by: STUDENT IN AN ORGANIZED HEALTH CARE EDUCATION/TRAINING PROGRAM

## 2017-05-10 PROCEDURE — 97116 GAIT TRAINING THERAPY: CPT

## 2017-05-10 PROCEDURE — 85025 COMPLETE CBC W/AUTO DIFF WBC: CPT

## 2017-05-10 PROCEDURE — 97110 THERAPEUTIC EXERCISES: CPT

## 2017-05-10 PROCEDURE — 36415 COLL VENOUS BLD VENIPUNCTURE: CPT

## 2017-05-10 PROCEDURE — 6370000000 HC RX 637 (ALT 250 FOR IP): Performed by: STUDENT IN AN ORGANIZED HEALTH CARE EDUCATION/TRAINING PROGRAM

## 2017-05-10 PROCEDURE — 97530 THERAPEUTIC ACTIVITIES: CPT

## 2017-05-10 PROCEDURE — 6370000000 HC RX 637 (ALT 250 FOR IP): Performed by: NURSE PRACTITIONER

## 2017-05-10 PROCEDURE — 2580000003 HC RX 258: Performed by: STUDENT IN AN ORGANIZED HEALTH CARE EDUCATION/TRAINING PROGRAM

## 2017-05-10 PROCEDURE — 80048 BASIC METABOLIC PNL TOTAL CA: CPT

## 2017-05-10 PROCEDURE — 99253 IP/OBS CNSLTJ NEW/EST LOW 45: CPT | Performed by: INTERNAL MEDICINE

## 2017-05-10 RX ORDER — ASPIRIN 81 MG/1
81 TABLET ORAL 2 TIMES DAILY
Qty: 60 TABLET | Refills: 0 | Status: SHIPPED | OUTPATIENT
Start: 2017-05-10 | End: 2017-05-10

## 2017-05-10 RX ORDER — ASPIRIN 81 MG/1
81 TABLET ORAL 2 TIMES DAILY
Qty: 60 TABLET | Refills: 0 | Status: SHIPPED | OUTPATIENT
Start: 2017-05-10 | End: 2017-08-08 | Stop reason: ALTCHOICE

## 2017-05-10 RX ADMIN — OXYCODONE HYDROCHLORIDE AND ACETAMINOPHEN 2 TABLET: 5; 325 TABLET ORAL at 13:30

## 2017-05-10 RX ADMIN — INSULIN LISPRO 6 UNITS: 100 INJECTION, SOLUTION INTRAVENOUS; SUBCUTANEOUS at 08:54

## 2017-05-10 RX ADMIN — SODIUM CHLORIDE: 9 INJECTION, SOLUTION INTRAVENOUS at 05:36

## 2017-05-10 RX ADMIN — DOCUSATE SODIUM 100 MG: 100 CAPSULE, LIQUID FILLED ORAL at 08:42

## 2017-05-10 RX ADMIN — INSULIN LISPRO 8 UNITS: 100 INJECTION, SOLUTION INTRAVENOUS; SUBCUTANEOUS at 12:39

## 2017-05-10 RX ADMIN — VENLAFAXINE HYDROCHLORIDE 37.5 MG: 37.5 CAPSULE, EXTENDED RELEASE ORAL at 08:44

## 2017-05-10 RX ADMIN — LEVOTHYROXINE SODIUM 100 MCG: 100 TABLET ORAL at 08:41

## 2017-05-10 RX ADMIN — OXYCODONE HYDROCHLORIDE AND ACETAMINOPHEN 2 TABLET: 5; 325 TABLET ORAL at 01:49

## 2017-05-10 RX ADMIN — ATORVASTATIN CALCIUM 20 MG: 20 TABLET, FILM COATED ORAL at 08:42

## 2017-05-10 RX ADMIN — CETIRIZINE HYDROCHLORIDE 10 MG: 10 TABLET, FILM COATED ORAL at 08:42

## 2017-05-10 RX ADMIN — METFORMIN HYDROCHLORIDE 1000 MG: 500 TABLET, FILM COATED ORAL at 08:53

## 2017-05-10 RX ADMIN — ASPIRIN 81 MG: 81 TABLET, COATED ORAL at 08:41

## 2017-05-10 RX ADMIN — Medication 400 MG: at 08:41

## 2017-05-10 RX ADMIN — ALLOPURINOL 100 MG: 100 TABLET ORAL at 08:55

## 2017-05-10 RX ADMIN — METOPROLOL SUCCINATE 25 MG: 25 TABLET, FILM COATED, EXTENDED RELEASE ORAL at 08:41

## 2017-05-10 RX ADMIN — VANCOMYCIN HYDROCHLORIDE 1000 MG: 1 INJECTION, SOLUTION INTRAVENOUS at 09:04

## 2017-05-10 RX ADMIN — INSULIN GLARGINE 8 UNITS: 100 INJECTION, SOLUTION SUBCUTANEOUS at 08:54

## 2017-05-10 RX ADMIN — TOPIRAMATE 50 MG: 25 TABLET, FILM COATED ORAL at 08:44

## 2017-05-10 RX ADMIN — OXYCODONE HYDROCHLORIDE AND ACETAMINOPHEN 2 TABLET: 5; 325 TABLET ORAL at 10:12

## 2017-05-10 RX ADMIN — OXYCODONE HYDROCHLORIDE AND ACETAMINOPHEN 2 TABLET: 5; 325 TABLET ORAL at 05:57

## 2017-05-10 RX ADMIN — LISINOPRIL 10 MG: 10 TABLET ORAL at 08:41

## 2017-05-10 ASSESSMENT — PAIN DESCRIPTION - FREQUENCY
FREQUENCY: CONTINUOUS

## 2017-05-10 ASSESSMENT — PAIN SCALES - GENERAL
PAINLEVEL_OUTOF10: 7
PAINLEVEL_OUTOF10: 6
PAINLEVEL_OUTOF10: 5
PAINLEVEL_OUTOF10: 7
PAINLEVEL_OUTOF10: 5
PAINLEVEL_OUTOF10: 10
PAINLEVEL_OUTOF10: 6
PAINLEVEL_OUTOF10: 5
PAINLEVEL_OUTOF10: 9
PAINLEVEL_OUTOF10: 8
PAINLEVEL_OUTOF10: 7
PAINLEVEL_OUTOF10: 8

## 2017-05-10 ASSESSMENT — PAIN DESCRIPTION - ORIENTATION
ORIENTATION: RIGHT

## 2017-05-10 ASSESSMENT — PAIN DESCRIPTION - ONSET
ONSET: ON-GOING

## 2017-05-10 ASSESSMENT — PAIN DESCRIPTION - PROGRESSION
CLINICAL_PROGRESSION: NOT CHANGED

## 2017-05-10 ASSESSMENT — PAIN DESCRIPTION - DESCRIPTORS
DESCRIPTORS: ACHING
DESCRIPTORS: ACHING
DESCRIPTORS: ACHING;DISCOMFORT

## 2017-05-10 ASSESSMENT — PAIN DESCRIPTION - LOCATION
LOCATION: SHOULDER

## 2017-05-10 ASSESSMENT — PAIN DESCRIPTION - PAIN TYPE
TYPE: SURGICAL PAIN;ACUTE PAIN
TYPE: ACUTE PAIN;SURGICAL PAIN
TYPE: ACUTE PAIN;SURGICAL PAIN
TYPE: SURGICAL PAIN;ACUTE PAIN

## 2017-05-10 ASSESSMENT — PAIN DESCRIPTION - DIRECTION
RADIATING_TOWARDS: RT ARM
RADIATING_TOWARDS: RT ARM

## 2017-05-16 DIAGNOSIS — M1A.0720 IDIOPATHIC CHRONIC GOUT OF LEFT FOOT WITHOUT TOPHUS: ICD-10-CM

## 2017-05-17 RX ORDER — IBUPROFEN 800 MG/1
TABLET ORAL
Qty: 90 TABLET | Refills: 0 | Status: SHIPPED | OUTPATIENT
Start: 2017-05-17 | End: 2017-07-03 | Stop reason: SDUPTHER

## 2017-05-23 ENCOUNTER — HOSPITAL ENCOUNTER (OUTPATIENT)
Dept: GENERAL RADIOLOGY | Age: 57
Discharge: HOME | End: 2017-05-23
Payer: COMMERCIAL

## 2017-05-23 ENCOUNTER — HOSPITAL ENCOUNTER (OUTPATIENT)
Age: 57
Discharge: HOME | End: 2017-05-23
Payer: COMMERCIAL

## 2017-05-23 DIAGNOSIS — R52 PAIN: ICD-10-CM

## 2017-05-23 PROCEDURE — 73030 X-RAY EXAM OF SHOULDER: CPT

## 2017-05-25 ENCOUNTER — HOSPITAL ENCOUNTER (OUTPATIENT)
Dept: PAIN MANAGEMENT | Age: 57
Discharge: HOME | End: 2017-05-25
Payer: COMMERCIAL

## 2017-05-25 PROCEDURE — 99213 OFFICE O/P EST LOW 20 MIN: CPT

## 2017-05-25 PROCEDURE — G0463 HOSPITAL OUTPT CLINIC VISIT: HCPCS

## 2017-05-26 PROBLEM — E83.51 HYPOCALCEMIA: Status: ACTIVE | Noted: 2017-05-26

## 2017-05-26 PROBLEM — Z96.619 STATUS POST SHOULDER REPLACEMENT: Status: ACTIVE | Noted: 2017-05-26

## 2017-05-31 RX ORDER — VENLAFAXINE HYDROCHLORIDE 37.5 MG/1
CAPSULE, EXTENDED RELEASE ORAL
Qty: 90 CAPSULE | Refills: 0 | Status: SHIPPED | OUTPATIENT
Start: 2017-05-31 | End: 2017-06-26 | Stop reason: SDUPTHER

## 2017-06-12 ENCOUNTER — HOSPITAL ENCOUNTER (OUTPATIENT)
Age: 57
Discharge: HOME | End: 2017-06-12
Payer: COMMERCIAL

## 2017-06-12 DIAGNOSIS — E11.42 TYPE 2 DIABETES MELLITUS WITH DIABETIC POLYNEUROPATHY, WITH LONG-TERM CURRENT USE OF INSULIN (HCC): ICD-10-CM

## 2017-06-12 DIAGNOSIS — E83.42 HYPOMAGNESEMIA: ICD-10-CM

## 2017-06-12 DIAGNOSIS — Z79.4 TYPE 2 DIABETES MELLITUS WITH DIABETIC POLYNEUROPATHY, WITH LONG-TERM CURRENT USE OF INSULIN (HCC): ICD-10-CM

## 2017-06-12 DIAGNOSIS — I10 ESSENTIAL HYPERTENSION: ICD-10-CM

## 2017-06-12 DIAGNOSIS — E55.9 VITAMIN D DEFICIENCY: ICD-10-CM

## 2017-06-12 LAB
ABSOLUTE EOS #: 0.2 K/UL (ref 0–0.4)
ABSOLUTE LYMPH #: 2.1 K/UL (ref 1–4.8)
ABSOLUTE MONO #: 0.6 K/UL (ref 0.1–1.3)
ANION GAP SERPL CALCULATED.3IONS-SCNC: 11 MMOL/L (ref 9–17)
BASOPHILS # BLD: 1 %
BASOPHILS ABSOLUTE: 0.1 K/UL (ref 0–0.2)
BUN BLDV-MCNC: 15 MG/DL (ref 6–20)
BUN/CREAT BLD: ABNORMAL (ref 9–20)
CALCIUM SERPL-MCNC: 9.1 MG/DL (ref 8.6–10.4)
CHLORIDE BLD-SCNC: 100 MMOL/L (ref 98–107)
CO2: 27 MMOL/L (ref 20–31)
CREAT SERPL-MCNC: 0.77 MG/DL (ref 0.5–0.9)
DIFFERENTIAL TYPE: NORMAL
EOSINOPHILS RELATIVE PERCENT: 2 %
GFR AFRICAN AMERICAN: >60 ML/MIN
GFR NON-AFRICAN AMERICAN: >60 ML/MIN
GFR SERPL CREATININE-BSD FRML MDRD: ABNORMAL ML/MIN/{1.73_M2}
GFR SERPL CREATININE-BSD FRML MDRD: ABNORMAL ML/MIN/{1.73_M2}
GLUCOSE BLD-MCNC: 184 MG/DL (ref 70–99)
HCT VFR BLD CALC: 36.5 % (ref 36–46)
HEMOGLOBIN: 12.1 G/DL (ref 12–16)
LYMPHOCYTES # BLD: 24 %
MAGNESIUM: 1.8 MG/DL (ref 1.6–2.6)
MCH RBC QN AUTO: 29.7 PG (ref 26–34)
MCHC RBC AUTO-ENTMCNC: 33.2 G/DL (ref 31–37)
MCV RBC AUTO: 89.4 FL (ref 80–100)
MONOCYTES # BLD: 7 %
PDW BLD-RTO: 13.5 % (ref 11.5–14.9)
PHOSPHORUS: 3.7 MG/DL (ref 2.6–4.5)
PLATELET # BLD: 339 K/UL (ref 150–450)
PLATELET ESTIMATE: NORMAL
PMV BLD AUTO: 7.8 FL (ref 6–12)
POTASSIUM SERPL-SCNC: 4.3 MMOL/L (ref 3.7–5.3)
RBC # BLD: 4.08 M/UL (ref 4–5.2)
RBC # BLD: NORMAL 10*6/UL
SEG NEUTROPHILS: 66 %
SEGMENTED NEUTROPHILS ABSOLUTE COUNT: 6 K/UL (ref 1.3–9.1)
SODIUM BLD-SCNC: 138 MMOL/L (ref 135–144)
WBC # BLD: 9.1 K/UL (ref 3.5–11)
WBC # BLD: NORMAL 10*3/UL

## 2017-06-12 PROCEDURE — 36415 COLL VENOUS BLD VENIPUNCTURE: CPT

## 2017-06-12 PROCEDURE — 85025 COMPLETE CBC W/AUTO DIFF WBC: CPT

## 2017-06-12 PROCEDURE — 80048 BASIC METABOLIC PNL TOTAL CA: CPT

## 2017-06-12 PROCEDURE — 84100 ASSAY OF PHOSPHORUS: CPT

## 2017-06-12 PROCEDURE — 83735 ASSAY OF MAGNESIUM: CPT

## 2017-06-26 RX ORDER — VENLAFAXINE HYDROCHLORIDE 37.5 MG/1
CAPSULE, EXTENDED RELEASE ORAL
Qty: 90 CAPSULE | Refills: 0 | Status: SHIPPED | OUTPATIENT
Start: 2017-06-26 | End: 2017-07-29 | Stop reason: SDUPTHER

## 2017-06-27 DIAGNOSIS — E03.9 HYPOTHYROIDISM, UNSPECIFIED TYPE: ICD-10-CM

## 2017-06-27 RX ORDER — LEVOTHYROXINE SODIUM 0.1 MG/1
TABLET ORAL
Qty: 90 TABLET | Refills: 0 | Status: SHIPPED | OUTPATIENT
Start: 2017-06-27 | End: 2017-09-23 | Stop reason: SDUPTHER

## 2017-06-28 ENCOUNTER — HOSPITAL ENCOUNTER (OUTPATIENT)
Dept: PHYSICAL THERAPY | Age: 57
Setting detail: THERAPIES SERIES
Discharge: HOME | End: 2017-06-28
Payer: COMMERCIAL

## 2017-06-28 PROCEDURE — 97162 PT EVAL MOD COMPLEX 30 MIN: CPT

## 2017-06-28 PROCEDURE — 97110 THERAPEUTIC EXERCISES: CPT

## 2017-06-28 ASSESSMENT — PAIN DESCRIPTION - PROGRESSION
CLINICAL_PROGRESSION: GRADUALLY IMPROVING
CLINICAL_PROGRESSION_2: GRADUALLY WORSENING

## 2017-06-28 ASSESSMENT — PAIN DESCRIPTION - DESCRIPTORS
DESCRIPTORS_2: CONSTANT;SHOOTING;ACHING
DESCRIPTORS: ACHING;SHARP;CONSTANT

## 2017-06-28 ASSESSMENT — PAIN SCALES - GENERAL: PAINLEVEL_OUTOF10: 6

## 2017-06-28 ASSESSMENT — PAIN DESCRIPTION - ORIENTATION
ORIENTATION: RIGHT
ORIENTATION_2: LOWER

## 2017-06-28 ASSESSMENT — PAIN DESCRIPTION - ONSET: ONSET_2: ON-GOING

## 2017-06-28 ASSESSMENT — PAIN DESCRIPTION - PAIN TYPE
TYPE_2: CHRONIC PAIN
TYPE: SURGICAL PAIN

## 2017-06-28 ASSESSMENT — PAIN DESCRIPTION - DURATION: DURATION_2: CONTINUOUS

## 2017-06-28 ASSESSMENT — PAIN DESCRIPTION - INTENSITY: RATING_2: 7

## 2017-06-28 ASSESSMENT — PAIN DESCRIPTION - LOCATION
LOCATION: SHOULDER
LOCATION_2: KNEE

## 2017-07-03 DIAGNOSIS — M1A.0720 IDIOPATHIC CHRONIC GOUT OF LEFT FOOT WITHOUT TOPHUS: ICD-10-CM

## 2017-07-06 ENCOUNTER — HOSPITAL ENCOUNTER (OUTPATIENT)
Dept: PHYSICAL THERAPY | Age: 57
Setting detail: THERAPIES SERIES
Discharge: HOME | End: 2017-07-06
Payer: COMMERCIAL

## 2017-07-06 RX ORDER — IBUPROFEN 800 MG/1
TABLET ORAL
Qty: 90 TABLET | Refills: 0 | Status: SHIPPED | OUTPATIENT
Start: 2017-07-06 | End: 2017-09-18 | Stop reason: SDUPTHER

## 2017-07-10 ENCOUNTER — HOSPITAL ENCOUNTER (OUTPATIENT)
Dept: PHYSICAL THERAPY | Age: 57
Setting detail: THERAPIES SERIES
Discharge: HOME | End: 2017-07-10
Payer: COMMERCIAL

## 2017-07-12 ENCOUNTER — HOSPITAL ENCOUNTER (OUTPATIENT)
Dept: PHYSICAL THERAPY | Age: 57
Setting detail: THERAPIES SERIES
Discharge: HOME | End: 2017-07-12
Payer: COMMERCIAL

## 2017-07-12 PROCEDURE — G0283 ELEC STIM OTHER THAN WOUND: HCPCS

## 2017-07-12 PROCEDURE — 97110 THERAPEUTIC EXERCISES: CPT

## 2017-07-12 ASSESSMENT — PAIN DESCRIPTION - LOCATION
LOCATION: SHOULDER
LOCATION_2: BACK

## 2017-07-12 ASSESSMENT — PAIN DESCRIPTION - PAIN TYPE
TYPE: SURGICAL PAIN
TYPE_2: CHRONIC PAIN

## 2017-07-12 ASSESSMENT — PAIN DESCRIPTION - INTENSITY: RATING_2: 8

## 2017-07-12 ASSESSMENT — PAIN DESCRIPTION - ORIENTATION
ORIENTATION: RIGHT
ORIENTATION_2: LOWER

## 2017-07-12 ASSESSMENT — PAIN SCALES - GENERAL: PAINLEVEL_OUTOF10: 6

## 2017-07-12 ASSESSMENT — PAIN DESCRIPTION - PROGRESSION
CLINICAL_PROGRESSION_2: NOT CHANGED
CLINICAL_PROGRESSION: GRADUALLY IMPROVING

## 2017-07-17 ENCOUNTER — HOSPITAL ENCOUNTER (OUTPATIENT)
Dept: PHYSICAL THERAPY | Age: 57
Setting detail: THERAPIES SERIES
Discharge: HOME | End: 2017-07-17
Payer: COMMERCIAL

## 2017-07-17 PROCEDURE — 97110 THERAPEUTIC EXERCISES: CPT

## 2017-07-17 PROCEDURE — G0283 ELEC STIM OTHER THAN WOUND: HCPCS

## 2017-07-17 ASSESSMENT — PAIN DESCRIPTION - INTENSITY
RATING_2: 8
RATING_2: 8

## 2017-07-17 ASSESSMENT — PAIN SCALES - GENERAL
PAINLEVEL_OUTOF10: 3
PAINLEVEL_OUTOF10: 3

## 2017-07-17 ASSESSMENT — PAIN DESCRIPTION - PAIN TYPE
TYPE_2: CHRONIC PAIN
TYPE_2: CHRONIC PAIN
TYPE: SURGICAL PAIN
TYPE: SURGICAL PAIN

## 2017-07-17 ASSESSMENT — PAIN DESCRIPTION - ORIENTATION
ORIENTATION: RIGHT
ORIENTATION: RIGHT
ORIENTATION_2: LOWER
ORIENTATION_2: LOWER

## 2017-07-17 ASSESSMENT — PAIN DESCRIPTION - LOCATION
LOCATION_2: BACK
LOCATION: SHOULDER
LOCATION_2: BACK
LOCATION: SHOULDER

## 2017-07-17 ASSESSMENT — PAIN DESCRIPTION - PROGRESSION: CLINICAL_PROGRESSION: GRADUALLY IMPROVING

## 2017-07-19 ENCOUNTER — HOSPITAL ENCOUNTER (OUTPATIENT)
Dept: PHYSICAL THERAPY | Age: 57
Setting detail: THERAPIES SERIES
Discharge: HOME | End: 2017-07-19
Payer: COMMERCIAL

## 2017-08-01 ENCOUNTER — PROCEDURE VISIT (OUTPATIENT)
Dept: OBGYN CLINIC | Age: 57
End: 2017-08-01
Payer: COMMERCIAL

## 2017-08-01 ENCOUNTER — TELEPHONE (OUTPATIENT)
Dept: PODIATRY | Age: 57
End: 2017-08-01

## 2017-08-01 VITALS
SYSTOLIC BLOOD PRESSURE: 118 MMHG | DIASTOLIC BLOOD PRESSURE: 70 MMHG | BODY MASS INDEX: 42.67 KG/M2 | HEART RATE: 88 BPM | HEIGHT: 61 IN | WEIGHT: 226 LBS

## 2017-08-01 DIAGNOSIS — B97.7 HIGH RISK HPV INFECTION: Primary | ICD-10-CM

## 2017-08-01 DIAGNOSIS — Z32.02 NEGATIVE PREGNANCY TEST: ICD-10-CM

## 2017-08-01 LAB
CONTROL: NORMAL
PREGNANCY TEST URINE, POC: NORMAL

## 2017-08-01 PROCEDURE — 57452 EXAM OF CERVIX W/SCOPE: CPT | Performed by: OBSTETRICS & GYNECOLOGY

## 2017-08-01 RX ORDER — VENLAFAXINE HYDROCHLORIDE 37.5 MG/1
CAPSULE, EXTENDED RELEASE ORAL
Qty: 90 CAPSULE | Refills: 0 | Status: SHIPPED | OUTPATIENT
Start: 2017-08-01 | End: 2017-09-26 | Stop reason: SDUPTHER

## 2017-08-08 ENCOUNTER — APPOINTMENT (OUTPATIENT)
Dept: CT IMAGING | Age: 57
End: 2017-08-08
Payer: COMMERCIAL

## 2017-08-08 ENCOUNTER — HOSPITAL ENCOUNTER (EMERGENCY)
Age: 57
Discharge: HOME | End: 2017-08-08
Attending: EMERGENCY MEDICINE
Payer: COMMERCIAL

## 2017-08-08 VITALS
OXYGEN SATURATION: 98 % | HEIGHT: 61 IN | TEMPERATURE: 97.9 F | HEART RATE: 87 BPM | SYSTOLIC BLOOD PRESSURE: 93 MMHG | BODY MASS INDEX: 42.48 KG/M2 | DIASTOLIC BLOOD PRESSURE: 61 MMHG | WEIGHT: 225 LBS | RESPIRATION RATE: 16 BRPM

## 2017-08-08 DIAGNOSIS — R10.9 ABDOMINAL PAIN, UNSPECIFIED LOCATION: Primary | ICD-10-CM

## 2017-08-08 DIAGNOSIS — R11.0 NAUSEA: ICD-10-CM

## 2017-08-08 DIAGNOSIS — K57.30 DIVERTICULOSIS OF LARGE INTESTINE WITHOUT HEMORRHAGE: ICD-10-CM

## 2017-08-08 DIAGNOSIS — D21.9 LEIOMYOMA: ICD-10-CM

## 2017-08-08 LAB
ABSOLUTE EOS #: 0.2 K/UL (ref 0–0.4)
ABSOLUTE LYMPH #: 1.7 K/UL (ref 1–4.8)
ABSOLUTE MONO #: 0.5 K/UL (ref 0.1–1.3)
ALBUMIN SERPL-MCNC: 3.9 G/DL (ref 3.5–5.2)
ALBUMIN/GLOBULIN RATIO: NORMAL (ref 1–2.5)
ALP BLD-CCNC: 74 U/L (ref 35–104)
ALT SERPL-CCNC: 14 U/L (ref 5–33)
ANION GAP SERPL CALCULATED.3IONS-SCNC: 12 MMOL/L (ref 9–17)
AST SERPL-CCNC: 15 U/L
BASOPHILS # BLD: 1 %
BASOPHILS ABSOLUTE: 0.1 K/UL (ref 0–0.2)
BILIRUB SERPL-MCNC: 0.3 MG/DL (ref 0.3–1.2)
BILIRUBIN DIRECT: 0.08 MG/DL
BILIRUBIN, INDIRECT: 0.22 MG/DL (ref 0–1)
BUN BLDV-MCNC: 16 MG/DL (ref 6–20)
BUN/CREAT BLD: ABNORMAL (ref 9–20)
CALCIUM SERPL-MCNC: 8.9 MG/DL (ref 8.6–10.4)
CHLORIDE BLD-SCNC: 100 MMOL/L (ref 98–107)
CO2: 26 MMOL/L (ref 20–31)
CREAT SERPL-MCNC: 0.54 MG/DL (ref 0.5–0.9)
DIFFERENTIAL TYPE: NORMAL
EOSINOPHILS RELATIVE PERCENT: 2 %
GFR AFRICAN AMERICAN: >60 ML/MIN
GFR NON-AFRICAN AMERICAN: >60 ML/MIN
GFR SERPL CREATININE-BSD FRML MDRD: ABNORMAL ML/MIN/{1.73_M2}
GFR SERPL CREATININE-BSD FRML MDRD: ABNORMAL ML/MIN/{1.73_M2}
GLOBULIN: NORMAL G/DL (ref 1.5–3.8)
GLUCOSE BLD-MCNC: 117 MG/DL (ref 70–99)
HCT VFR BLD CALC: 37.5 % (ref 36–46)
HEMOGLOBIN: 12.5 G/DL (ref 12–16)
LACTIC ACID: 1.5 MMOL/L (ref 0.5–2.2)
LIPASE: 18 U/L (ref 13–60)
LYMPHOCYTES # BLD: 20 %
MCH RBC QN AUTO: 29.9 PG (ref 26–34)
MCHC RBC AUTO-ENTMCNC: 33.4 G/DL (ref 31–37)
MCV RBC AUTO: 89.5 FL (ref 80–100)
MONOCYTES # BLD: 6 %
PDW BLD-RTO: 13.5 % (ref 11.5–14.9)
PLATELET # BLD: 348 K/UL (ref 150–450)
PLATELET ESTIMATE: NORMAL
PMV BLD AUTO: 8.1 FL (ref 6–12)
POTASSIUM SERPL-SCNC: 4.4 MMOL/L (ref 3.7–5.3)
RBC # BLD: 4.19 M/UL (ref 4–5.2)
RBC # BLD: NORMAL 10*6/UL
SEG NEUTROPHILS: 71 %
SEGMENTED NEUTROPHILS ABSOLUTE COUNT: 6 K/UL (ref 1.3–9.1)
SODIUM BLD-SCNC: 138 MMOL/L (ref 135–144)
TOTAL PROTEIN: 6.5 G/DL (ref 6.4–8.3)
TROPONIN INTERP: NORMAL
TROPONIN T: <0.03 NG/ML
WBC # BLD: 8.5 K/UL (ref 3.5–11)
WBC # BLD: NORMAL 10*3/UL

## 2017-08-08 PROCEDURE — 6360000004 HC RX CONTRAST MEDICATION: Performed by: EMERGENCY MEDICINE

## 2017-08-08 PROCEDURE — 6360000002 HC RX W HCPCS: Performed by: EMERGENCY MEDICINE

## 2017-08-08 PROCEDURE — 96374 THER/PROPH/DIAG INJ IV PUSH: CPT

## 2017-08-08 PROCEDURE — 80076 HEPATIC FUNCTION PANEL: CPT

## 2017-08-08 PROCEDURE — 93005 ELECTROCARDIOGRAM TRACING: CPT

## 2017-08-08 PROCEDURE — 85025 COMPLETE CBC W/AUTO DIFF WBC: CPT

## 2017-08-08 PROCEDURE — 80048 BASIC METABOLIC PNL TOTAL CA: CPT

## 2017-08-08 PROCEDURE — 99284 EMERGENCY DEPT VISIT MOD MDM: CPT

## 2017-08-08 PROCEDURE — 36415 COLL VENOUS BLD VENIPUNCTURE: CPT

## 2017-08-08 PROCEDURE — 84484 ASSAY OF TROPONIN QUANT: CPT

## 2017-08-08 PROCEDURE — 74177 CT ABD & PELVIS W/CONTRAST: CPT

## 2017-08-08 PROCEDURE — 2580000003 HC RX 258: Performed by: EMERGENCY MEDICINE

## 2017-08-08 PROCEDURE — 83690 ASSAY OF LIPASE: CPT

## 2017-08-08 PROCEDURE — 83605 ASSAY OF LACTIC ACID: CPT

## 2017-08-08 PROCEDURE — 96375 TX/PRO/DX INJ NEW DRUG ADDON: CPT

## 2017-08-08 RX ORDER — MORPHINE SULFATE 4 MG/ML
4 INJECTION, SOLUTION INTRAMUSCULAR; INTRAVENOUS ONCE
Status: COMPLETED | OUTPATIENT
Start: 2017-08-08 | End: 2017-08-08

## 2017-08-08 RX ORDER — ONDANSETRON 2 MG/ML
4 INJECTION INTRAMUSCULAR; INTRAVENOUS ONCE
Status: COMPLETED | OUTPATIENT
Start: 2017-08-08 | End: 2017-08-08

## 2017-08-08 RX ORDER — FENTANYL CITRATE 50 UG/ML
50 INJECTION, SOLUTION INTRAMUSCULAR; INTRAVENOUS ONCE
Status: DISCONTINUED | OUTPATIENT
Start: 2017-08-08 | End: 2017-08-08

## 2017-08-08 RX ORDER — 0.9 % SODIUM CHLORIDE 0.9 %
1000 INTRAVENOUS SOLUTION INTRAVENOUS ONCE
Status: COMPLETED | OUTPATIENT
Start: 2017-08-08 | End: 2017-08-08

## 2017-08-08 RX ORDER — 0.9 % SODIUM CHLORIDE 0.9 %
100 INTRAVENOUS SOLUTION INTRAVENOUS ONCE
Status: COMPLETED | OUTPATIENT
Start: 2017-08-08 | End: 2017-08-08

## 2017-08-08 RX ORDER — SODIUM CHLORIDE 0.9 % (FLUSH) 0.9 %
10 SYRINGE (ML) INJECTION PRN
Status: DISCONTINUED | OUTPATIENT
Start: 2017-08-08 | End: 2017-08-08 | Stop reason: HOSPADM

## 2017-08-08 RX ADMIN — IOVERSOL 130 ML: 741 INJECTION INTRA-ARTERIAL; INTRAVENOUS at 16:45

## 2017-08-08 RX ADMIN — SODIUM CHLORIDE 1000 ML: 9 INJECTION, SOLUTION INTRAVENOUS at 16:17

## 2017-08-08 RX ADMIN — SODIUM CHLORIDE 100 ML: 9 INJECTION, SOLUTION INTRAVENOUS at 16:45

## 2017-08-08 RX ADMIN — MORPHINE SULFATE 4 MG: 4 INJECTION, SOLUTION INTRAMUSCULAR; INTRAVENOUS at 15:56

## 2017-08-08 RX ADMIN — ONDANSETRON 4 MG: 2 INJECTION INTRAMUSCULAR; INTRAVENOUS at 15:57

## 2017-08-08 RX ADMIN — Medication 10 ML: at 16:45

## 2017-08-08 ASSESSMENT — ENCOUNTER SYMPTOMS
SHORTNESS OF BREATH: 1
DIARRHEA: 1
CHEST TIGHTNESS: 0
ABDOMINAL PAIN: 1
RHINORRHEA: 0
NAUSEA: 1
SORE THROAT: 0
BLOOD IN STOOL: 0
BACK PAIN: 1
VOMITING: 0
CONSTIPATION: 0

## 2017-08-08 ASSESSMENT — PAIN DESCRIPTION - ORIENTATION: ORIENTATION: LEFT

## 2017-08-08 ASSESSMENT — PAIN DESCRIPTION - DIRECTION: RADIATING_TOWARDS: LEFT FLANK

## 2017-08-08 ASSESSMENT — PAIN DESCRIPTION - DESCRIPTORS: DESCRIPTORS: SHARP

## 2017-08-08 ASSESSMENT — PAIN DESCRIPTION - LOCATION: LOCATION: ABDOMEN

## 2017-08-08 ASSESSMENT — PAIN DESCRIPTION - PAIN TYPE: TYPE: ACUTE PAIN

## 2017-08-08 ASSESSMENT — PAIN SCALES - GENERAL
PAINLEVEL_OUTOF10: 5
PAINLEVEL_OUTOF10: 8

## 2017-08-09 PROBLEM — D25.9 LEIOMYOMA OF UTERUS: Status: ACTIVE | Noted: 2017-08-09

## 2017-08-09 PROBLEM — K57.90 DIVERTICULOSIS: Status: ACTIVE | Noted: 2017-08-09

## 2017-08-09 PROBLEM — K42.9 UMBILICAL HERNIA: Status: ACTIVE | Noted: 2017-08-09

## 2017-08-15 ENCOUNTER — HOSPITAL ENCOUNTER (OUTPATIENT)
Dept: PHYSICAL THERAPY | Age: 57
Setting detail: THERAPIES SERIES
Discharge: HOME | End: 2017-08-15
Payer: COMMERCIAL

## 2017-08-15 PROCEDURE — G0283 ELEC STIM OTHER THAN WOUND: HCPCS

## 2017-08-15 PROCEDURE — 97164 PT RE-EVAL EST PLAN CARE: CPT

## 2017-08-15 PROCEDURE — 97110 THERAPEUTIC EXERCISES: CPT

## 2017-08-15 ASSESSMENT — PAIN DESCRIPTION - LOCATION
LOCATION: SHOULDER
LOCATION_2: BACK

## 2017-08-15 ASSESSMENT — PAIN DESCRIPTION - PROGRESSION
CLINICAL_PROGRESSION: GRADUALLY IMPROVING
CLINICAL_PROGRESSION_2: NOT CHANGED

## 2017-08-15 ASSESSMENT — PAIN SCALES - GENERAL: PAINLEVEL_OUTOF10: 3

## 2017-08-15 ASSESSMENT — PAIN DESCRIPTION - PAIN TYPE
TYPE_2: CHRONIC PAIN
TYPE: SURGICAL PAIN

## 2017-08-15 ASSESSMENT — PAIN DESCRIPTION - ORIENTATION
ORIENTATION_2: LOWER
ORIENTATION: RIGHT

## 2017-08-15 ASSESSMENT — PAIN DESCRIPTION - INTENSITY: RATING_2: 8

## 2017-08-21 PROBLEM — E83.51 HYPOCALCEMIA: Status: RESOLVED | Noted: 2017-05-26 | Resolved: 2017-08-21

## 2017-08-21 PROBLEM — R19.8 IRREGULAR BOWEL HABITS: Status: ACTIVE | Noted: 2017-08-21

## 2017-08-21 PROBLEM — R14.0 ABDOMINAL BLOATING: Status: ACTIVE | Noted: 2017-08-21

## 2017-08-21 PROBLEM — L98.9 SKIN LESION OF LEFT LEG: Status: ACTIVE | Noted: 2017-08-21

## 2017-08-23 LAB
EKG ATRIAL RATE: 84 BPM
EKG P AXIS: 37 DEGREES
EKG P-R INTERVAL: 168 MS
EKG Q-T INTERVAL: 374 MS
EKG QRS DURATION: 92 MS
EKG QTC CALCULATION (BAZETT): 441 MS
EKG R AXIS: -15 DEGREES
EKG T AXIS: -11 DEGREES
EKG VENTRICULAR RATE: 84 BPM

## 2017-08-28 ENCOUNTER — HOSPITAL ENCOUNTER (OUTPATIENT)
Dept: PHYSICAL THERAPY | Age: 57
Setting detail: THERAPIES SERIES
Discharge: HOME | End: 2017-08-28
Payer: COMMERCIAL

## 2017-08-28 PROCEDURE — G0283 ELEC STIM OTHER THAN WOUND: HCPCS

## 2017-08-28 PROCEDURE — 97110 THERAPEUTIC EXERCISES: CPT

## 2017-08-28 ASSESSMENT — PAIN DESCRIPTION - LOCATION
LOCATION_2: BACK
LOCATION: SHOULDER

## 2017-08-28 ASSESSMENT — PAIN DESCRIPTION - INTENSITY: RATING_2: 9

## 2017-08-28 ASSESSMENT — PAIN DESCRIPTION - PAIN TYPE
TYPE: SURGICAL PAIN
TYPE_2: CHRONIC PAIN

## 2017-08-28 ASSESSMENT — PAIN DESCRIPTION - PROGRESSION: CLINICAL_PROGRESSION: GRADUALLY WORSENING

## 2017-08-28 ASSESSMENT — PAIN SCALES - GENERAL: PAINLEVEL_OUTOF10: 3

## 2017-08-28 ASSESSMENT — PAIN DESCRIPTION - ORIENTATION
ORIENTATION_2: LOWER
ORIENTATION: RIGHT

## 2017-08-30 ENCOUNTER — TELEPHONE (OUTPATIENT)
Dept: PODIATRY | Age: 57
End: 2017-08-30

## 2017-08-30 RX ORDER — VENLAFAXINE HYDROCHLORIDE 37.5 MG/1
CAPSULE, EXTENDED RELEASE ORAL
Qty: 90 CAPSULE | Refills: 0 | OUTPATIENT
Start: 2017-08-30

## 2017-08-31 ENCOUNTER — INITIAL CONSULT (OUTPATIENT)
Dept: PAIN MANAGEMENT | Age: 57
End: 2017-08-31
Payer: COMMERCIAL

## 2017-08-31 VITALS
HEART RATE: 86 BPM | HEIGHT: 61 IN | SYSTOLIC BLOOD PRESSURE: 123 MMHG | WEIGHT: 226.4 LBS | RESPIRATION RATE: 16 BRPM | DIASTOLIC BLOOD PRESSURE: 70 MMHG | BODY MASS INDEX: 42.74 KG/M2 | OXYGEN SATURATION: 98 %

## 2017-08-31 DIAGNOSIS — Z79.891 CHRONIC PRESCRIPTION OPIATE USE: ICD-10-CM

## 2017-08-31 DIAGNOSIS — G89.29 CHRONIC BILATERAL LOW BACK PAIN WITH RIGHT-SIDED SCIATICA: Primary | ICD-10-CM

## 2017-08-31 DIAGNOSIS — E66.01 MORBID OBESITY WITH BMI OF 40.0-44.9, ADULT (HCC): ICD-10-CM

## 2017-08-31 DIAGNOSIS — M54.41 CHRONIC BILATERAL LOW BACK PAIN WITH RIGHT-SIDED SCIATICA: Primary | ICD-10-CM

## 2017-08-31 DIAGNOSIS — G47.33 OSA (OBSTRUCTIVE SLEEP APNEA): ICD-10-CM

## 2017-08-31 PROCEDURE — 99244 OFF/OP CNSLTJ NEW/EST MOD 40: CPT | Performed by: ANESTHESIOLOGY

## 2017-08-31 ASSESSMENT — ENCOUNTER SYMPTOMS
SHORTNESS OF BREATH: 1
STRIDOR: 0
CHOKING: 0
GASTROINTESTINAL NEGATIVE: 1
WHEEZING: 0
EYES NEGATIVE: 1
APNEA: 0
ALLERGIC/IMMUNOLOGIC NEGATIVE: 1
BACK PAIN: 1
COUGH: 0
CHEST TIGHTNESS: 0

## 2017-09-06 ENCOUNTER — HOSPITAL ENCOUNTER (OUTPATIENT)
Dept: PHYSICAL THERAPY | Age: 57
Setting detail: THERAPIES SERIES
Discharge: HOME | End: 2017-09-06
Payer: COMMERCIAL

## 2017-09-06 ENCOUNTER — OFFICE VISIT (OUTPATIENT)
Dept: FAMILY MEDICINE CLINIC | Age: 57
End: 2017-09-06
Payer: COMMERCIAL

## 2017-09-06 VITALS
OXYGEN SATURATION: 97 % | RESPIRATION RATE: 18 BRPM | BODY MASS INDEX: 41.76 KG/M2 | HEART RATE: 85 BPM | TEMPERATURE: 97.9 F | SYSTOLIC BLOOD PRESSURE: 109 MMHG | DIASTOLIC BLOOD PRESSURE: 63 MMHG | WEIGHT: 221 LBS

## 2017-09-06 DIAGNOSIS — J34.0 CELLULITIS OF NASAL TIP: Primary | ICD-10-CM

## 2017-09-06 PROCEDURE — 99213 OFFICE O/P EST LOW 20 MIN: CPT | Performed by: FAMILY MEDICINE

## 2017-09-06 RX ORDER — DOXYCYCLINE HYCLATE 100 MG/1
100 CAPSULE ORAL 2 TIMES DAILY
Qty: 20 CAPSULE | Refills: 0 | Status: SHIPPED | OUTPATIENT
Start: 2017-09-06 | End: 2017-09-16

## 2017-09-06 RX ORDER — MUPIROCIN CALCIUM 20 MG/G
CREAM TOPICAL
Qty: 15 G | Refills: 1 | Status: SHIPPED | OUTPATIENT
Start: 2017-09-06 | End: 2017-10-05 | Stop reason: ALTCHOICE

## 2017-09-06 ASSESSMENT — ENCOUNTER SYMPTOMS
RESPIRATORY NEGATIVE: 1
EYES NEGATIVE: 1
ALLERGIC/IMMUNOLOGIC NEGATIVE: 1
GASTROINTESTINAL NEGATIVE: 1

## 2017-09-07 ENCOUNTER — TELEPHONE (OUTPATIENT)
Dept: PAIN MANAGEMENT | Age: 57
End: 2017-09-07

## 2017-09-08 ENCOUNTER — HOSPITAL ENCOUNTER (OUTPATIENT)
Dept: PHYSICAL THERAPY | Age: 57
Setting detail: THERAPIES SERIES
Discharge: HOME | End: 2017-09-08
Payer: COMMERCIAL

## 2017-09-11 ENCOUNTER — HOSPITAL ENCOUNTER (OUTPATIENT)
Dept: MRI IMAGING | Age: 57
Discharge: HOME | End: 2017-09-11
Payer: COMMERCIAL

## 2017-09-11 ENCOUNTER — HOSPITAL ENCOUNTER (OUTPATIENT)
Age: 57
Discharge: HOME | End: 2017-09-11
Payer: COMMERCIAL

## 2017-09-11 ENCOUNTER — HOSPITAL ENCOUNTER (OUTPATIENT)
Dept: GENERAL RADIOLOGY | Age: 57
Discharge: HOME | End: 2017-09-11
Payer: COMMERCIAL

## 2017-09-11 DIAGNOSIS — E13.22 SECONDARY DIABETES MELLITUS WITH STAGE 1 CHRONIC KIDNEY DISEASE (HCC): ICD-10-CM

## 2017-09-11 DIAGNOSIS — S22.31XA CLOSED FRACTURE OF ONE RIB OF RIGHT SIDE, INITIAL ENCOUNTER: ICD-10-CM

## 2017-09-11 DIAGNOSIS — K57.90 DIVERTICULOSIS OF INTESTINE WITHOUT BLEEDING, UNSPECIFIED INTESTINAL TRACT LOCATION: ICD-10-CM

## 2017-09-11 DIAGNOSIS — N18.1 BENIGN HYPERTENSION WITH CKD (CHRONIC KIDNEY DISEASE) STAGE I: ICD-10-CM

## 2017-09-11 DIAGNOSIS — N18.1 CKD (CHRONIC KIDNEY DISEASE) STAGE 1, GFR 90 ML/MIN OR GREATER: ICD-10-CM

## 2017-09-11 DIAGNOSIS — R14.0 ABDOMINAL BLOATING: ICD-10-CM

## 2017-09-11 DIAGNOSIS — I12.9 BENIGN HYPERTENSION WITH CKD (CHRONIC KIDNEY DISEASE) STAGE I: ICD-10-CM

## 2017-09-11 DIAGNOSIS — N18.1 SECONDARY DIABETES MELLITUS WITH STAGE 1 CHRONIC KIDNEY DISEASE (HCC): ICD-10-CM

## 2017-09-11 DIAGNOSIS — R19.8 IRREGULAR BOWEL HABITS: ICD-10-CM

## 2017-09-11 DIAGNOSIS — M54.41 CHRONIC BILATERAL LOW BACK PAIN WITH RIGHT-SIDED SCIATICA: ICD-10-CM

## 2017-09-11 DIAGNOSIS — G89.29 CHRONIC BILATERAL LOW BACK PAIN WITH RIGHT-SIDED SCIATICA: ICD-10-CM

## 2017-09-11 PROBLEM — H93.19 TINNITUS: Status: ACTIVE | Noted: 2017-09-11

## 2017-09-11 PROBLEM — R51.9 GENERALIZED HEADACHES: Status: ACTIVE | Noted: 2017-09-11

## 2017-09-11 LAB
ABSOLUTE EOS #: 0.1 K/UL (ref 0–0.4)
ABSOLUTE LYMPH #: 2.5 K/UL (ref 1–4.8)
ABSOLUTE MONO #: 0.5 K/UL (ref 0.1–1.3)
ANION GAP SERPL CALCULATED.3IONS-SCNC: 17 MMOL/L (ref 9–17)
BASOPHILS # BLD: 1 %
BASOPHILS ABSOLUTE: 0.1 K/UL (ref 0–0.2)
BUN BLDV-MCNC: 13 MG/DL (ref 6–20)
BUN/CREAT BLD: ABNORMAL (ref 9–20)
CALCIUM SERPL-MCNC: 9 MG/DL (ref 8.6–10.4)
CHLORIDE BLD-SCNC: 101 MMOL/L (ref 98–107)
CO2: 23 MMOL/L (ref 20–31)
CREAT SERPL-MCNC: 0.63 MG/DL (ref 0.5–0.9)
DIFFERENTIAL TYPE: ABNORMAL
EOSINOPHILS RELATIVE PERCENT: 1 %
GFR AFRICAN AMERICAN: >60 ML/MIN
GFR NON-AFRICAN AMERICAN: >60 ML/MIN
GFR SERPL CREATININE-BSD FRML MDRD: ABNORMAL ML/MIN/{1.73_M2}
GFR SERPL CREATININE-BSD FRML MDRD: ABNORMAL ML/MIN/{1.73_M2}
GLUCOSE BLD-MCNC: 126 MG/DL (ref 70–99)
HCT VFR BLD CALC: 38.1 % (ref 36–46)
HEMOGLOBIN: 12.6 G/DL (ref 12–16)
LYMPHOCYTES # BLD: 22 %
MAGNESIUM: 1.8 MG/DL (ref 1.6–2.6)
MCH RBC QN AUTO: 29.3 PG (ref 26–34)
MCHC RBC AUTO-ENTMCNC: 33 G/DL (ref 31–37)
MCV RBC AUTO: 88.8 FL (ref 80–100)
MONOCYTES # BLD: 5 %
PDW BLD-RTO: 13.1 % (ref 11.5–14.9)
PHOSPHORUS: 3.4 MG/DL (ref 2.6–4.5)
PLATELET # BLD: 410 K/UL (ref 150–450)
PLATELET ESTIMATE: ABNORMAL
PMV BLD AUTO: 8.5 FL (ref 6–12)
POTASSIUM SERPL-SCNC: 4.7 MMOL/L (ref 3.7–5.3)
RBC # BLD: 4.29 M/UL (ref 4–5.2)
RBC # BLD: ABNORMAL 10*6/UL
SEG NEUTROPHILS: 71 %
SEGMENTED NEUTROPHILS ABSOLUTE COUNT: 7.9 K/UL (ref 1.3–9.1)
SODIUM BLD-SCNC: 141 MMOL/L (ref 135–144)
WBC # BLD: 11.2 K/UL (ref 3.5–11)
WBC # BLD: ABNORMAL 10*3/UL

## 2017-09-11 PROCEDURE — 80048 BASIC METABOLIC PNL TOTAL CA: CPT

## 2017-09-11 PROCEDURE — 83735 ASSAY OF MAGNESIUM: CPT

## 2017-09-11 PROCEDURE — 72148 MRI LUMBAR SPINE W/O DYE: CPT

## 2017-09-11 PROCEDURE — 85025 COMPLETE CBC W/AUTO DIFF WBC: CPT

## 2017-09-11 PROCEDURE — 74000 XR ABDOMEN LIMITED (KUB): CPT

## 2017-09-11 PROCEDURE — 84100 ASSAY OF PHOSPHORUS: CPT

## 2017-09-11 PROCEDURE — 36415 COLL VENOUS BLD VENIPUNCTURE: CPT

## 2017-09-11 PROCEDURE — 71110 X-RAY EXAM RIBS BIL 3 VIEWS: CPT

## 2017-09-12 PROBLEM — I87.8 PHLEBOLITH: Status: ACTIVE | Noted: 2017-09-12

## 2017-09-15 ENCOUNTER — APPOINTMENT (OUTPATIENT)
Dept: GENERAL RADIOLOGY | Age: 57
End: 2017-09-15
Payer: COMMERCIAL

## 2017-09-15 ENCOUNTER — HOSPITAL ENCOUNTER (EMERGENCY)
Age: 57
Discharge: HOME | End: 2017-09-15
Attending: EMERGENCY MEDICINE
Payer: COMMERCIAL

## 2017-09-15 VITALS
DIASTOLIC BLOOD PRESSURE: 75 MMHG | HEIGHT: 61 IN | HEART RATE: 91 BPM | BODY MASS INDEX: 41.54 KG/M2 | RESPIRATION RATE: 19 BRPM | OXYGEN SATURATION: 99 % | TEMPERATURE: 98.1 F | SYSTOLIC BLOOD PRESSURE: 134 MMHG | WEIGHT: 220 LBS

## 2017-09-15 DIAGNOSIS — R07.9 CHEST PAIN, UNSPECIFIED TYPE: Primary | ICD-10-CM

## 2017-09-15 LAB
ABSOLUTE EOS #: 0.2 K/UL (ref 0–0.4)
ABSOLUTE LYMPH #: 2 K/UL (ref 1–4.8)
ABSOLUTE MONO #: 0.4 K/UL (ref 0.1–1.3)
ANION GAP SERPL CALCULATED.3IONS-SCNC: 13 MMOL/L (ref 9–17)
BASOPHILS # BLD: 1 %
BASOPHILS ABSOLUTE: 0.1 K/UL (ref 0–0.2)
BNP INTERPRETATION: NORMAL
BUN BLDV-MCNC: 13 MG/DL (ref 6–20)
BUN/CREAT BLD: ABNORMAL (ref 9–20)
CALCIUM SERPL-MCNC: 8.9 MG/DL (ref 8.6–10.4)
CHLORIDE BLD-SCNC: 102 MMOL/L (ref 98–107)
CO2: 25 MMOL/L (ref 20–31)
CREAT SERPL-MCNC: 0.62 MG/DL (ref 0.5–0.9)
DIFFERENTIAL TYPE: NORMAL
EOSINOPHILS RELATIVE PERCENT: 2 %
GFR AFRICAN AMERICAN: >60 ML/MIN
GFR NON-AFRICAN AMERICAN: >60 ML/MIN
GFR SERPL CREATININE-BSD FRML MDRD: ABNORMAL ML/MIN/{1.73_M2}
GFR SERPL CREATININE-BSD FRML MDRD: ABNORMAL ML/MIN/{1.73_M2}
GLUCOSE BLD-MCNC: 127 MG/DL (ref 70–99)
HCT VFR BLD CALC: 36.1 % (ref 36–46)
HEMOGLOBIN: 12.3 G/DL (ref 12–16)
LYMPHOCYTES # BLD: 24 %
MCH RBC QN AUTO: 30 PG (ref 26–34)
MCHC RBC AUTO-ENTMCNC: 34 G/DL (ref 31–37)
MCV RBC AUTO: 88.3 FL (ref 80–100)
MONOCYTES # BLD: 5 %
PDW BLD-RTO: 13.3 % (ref 11.5–14.9)
PLATELET # BLD: 350 K/UL (ref 150–450)
PLATELET ESTIMATE: NORMAL
PMV BLD AUTO: 8.1 FL (ref 6–12)
POTASSIUM SERPL-SCNC: 4.3 MMOL/L (ref 3.7–5.3)
PRO-BNP: 41 PG/ML
RBC # BLD: 4.09 M/UL (ref 4–5.2)
RBC # BLD: NORMAL 10*6/UL
SEG NEUTROPHILS: 68 %
SEGMENTED NEUTROPHILS ABSOLUTE COUNT: 5.5 K/UL (ref 1.3–9.1)
SODIUM BLD-SCNC: 140 MMOL/L (ref 135–144)
TROPONIN INTERP: NORMAL
TROPONIN INTERP: NORMAL
TROPONIN T: <0.03 NG/ML
TROPONIN T: <0.03 NG/ML
WBC # BLD: 8.1 K/UL (ref 3.5–11)
WBC # BLD: NORMAL 10*3/UL

## 2017-09-15 PROCEDURE — 85025 COMPLETE CBC W/AUTO DIFF WBC: CPT

## 2017-09-15 PROCEDURE — 71020 XR CHEST STANDARD TWO VW: CPT

## 2017-09-15 PROCEDURE — 6370000000 HC RX 637 (ALT 250 FOR IP): Performed by: EMERGENCY MEDICINE

## 2017-09-15 PROCEDURE — 36415 COLL VENOUS BLD VENIPUNCTURE: CPT

## 2017-09-15 PROCEDURE — 84484 ASSAY OF TROPONIN QUANT: CPT

## 2017-09-15 PROCEDURE — 80048 BASIC METABOLIC PNL TOTAL CA: CPT

## 2017-09-15 PROCEDURE — 99285 EMERGENCY DEPT VISIT HI MDM: CPT

## 2017-09-15 PROCEDURE — 93005 ELECTROCARDIOGRAM TRACING: CPT

## 2017-09-15 PROCEDURE — 83880 ASSAY OF NATRIURETIC PEPTIDE: CPT

## 2017-09-15 RX ORDER — ACETAMINOPHEN 325 MG/1
650 TABLET ORAL ONCE
Status: COMPLETED | OUTPATIENT
Start: 2017-09-15 | End: 2017-09-15

## 2017-09-15 RX ORDER — NITROGLYCERIN 0.4 MG/1
0.4 TABLET SUBLINGUAL ONCE
Status: DISCONTINUED | OUTPATIENT
Start: 2017-09-15 | End: 2017-09-15 | Stop reason: HOSPADM

## 2017-09-15 RX ADMIN — ACETAMINOPHEN 650 MG: 325 TABLET ORAL at 17:36

## 2017-09-15 ASSESSMENT — PAIN DESCRIPTION - DURATION: DURATION_2: CONTINUOUS

## 2017-09-15 ASSESSMENT — PAIN DESCRIPTION - LOCATION
LOCATION_2: BACK
LOCATION: CHEST
LOCATION_2: BACK
LOCATION: CHEST

## 2017-09-15 ASSESSMENT — PAIN DESCRIPTION - INTENSITY
RATING_2: 8
RATING_2: 6

## 2017-09-15 ASSESSMENT — PAIN DESCRIPTION - DESCRIPTORS
DESCRIPTORS_2: ACHING
DESCRIPTORS: PRESSURE

## 2017-09-15 ASSESSMENT — PAIN SCALES - GENERAL
PAINLEVEL_OUTOF10: 2
PAINLEVEL_OUTOF10: 4

## 2017-09-15 ASSESSMENT — PAIN DESCRIPTION - PAIN TYPE: TYPE: ACUTE PAIN

## 2017-09-15 ASSESSMENT — PAIN DESCRIPTION - ORIENTATION
ORIENTATION_2: LOWER
ORIENTATION: MID

## 2017-09-15 ASSESSMENT — PAIN DESCRIPTION - FREQUENCY: FREQUENCY: INTERMITTENT

## 2017-09-16 ASSESSMENT — ENCOUNTER SYMPTOMS
SHORTNESS OF BREATH: 1
ABDOMINAL PAIN: 0
DIARRHEA: 0
NAUSEA: 1
COUGH: 1
BACK PAIN: 0
VOMITING: 0
CONSTIPATION: 0

## 2017-09-18 ENCOUNTER — HOSPITAL ENCOUNTER (OUTPATIENT)
Dept: VASCULAR LAB | Age: 57
Discharge: HOME | End: 2017-09-18
Payer: COMMERCIAL

## 2017-09-18 ENCOUNTER — TELEPHONE (OUTPATIENT)
Dept: PAIN MANAGEMENT | Age: 57
End: 2017-09-18

## 2017-09-18 DIAGNOSIS — I10 ESSENTIAL HYPERTENSION: Chronic | ICD-10-CM

## 2017-09-18 DIAGNOSIS — M1A.0720 IDIOPATHIC CHRONIC GOUT OF LEFT FOOT WITHOUT TOPHUS: ICD-10-CM

## 2017-09-18 DIAGNOSIS — R42 VERTIGO: ICD-10-CM

## 2017-09-18 PROCEDURE — 93880 EXTRACRANIAL BILAT STUDY: CPT

## 2017-09-19 PROBLEM — I65.23 STENOSIS OF BOTH INTERNAL CAROTID ARTERIES: Status: ACTIVE | Noted: 2017-09-19

## 2017-09-20 LAB
EKG ATRIAL RATE: 88 BPM
EKG P AXIS: 41 DEGREES
EKG P-R INTERVAL: 170 MS
EKG Q-T INTERVAL: 374 MS
EKG QRS DURATION: 96 MS
EKG QTC CALCULATION (BAZETT): 452 MS
EKG R AXIS: -32 DEGREES
EKG T AXIS: 13 DEGREES
EKG VENTRICULAR RATE: 88 BPM

## 2017-09-21 ENCOUNTER — TELEPHONE (OUTPATIENT)
Dept: PAIN MANAGEMENT | Age: 57
End: 2017-09-21

## 2017-09-21 ENCOUNTER — OFFICE VISIT (OUTPATIENT)
Dept: GASTROENTEROLOGY | Age: 57
End: 2017-09-21
Payer: COMMERCIAL

## 2017-09-21 VITALS
OXYGEN SATURATION: 100 % | SYSTOLIC BLOOD PRESSURE: 134 MMHG | BODY MASS INDEX: 42.86 KG/M2 | HEART RATE: 88 BPM | RESPIRATION RATE: 14 BRPM | DIASTOLIC BLOOD PRESSURE: 87 MMHG | WEIGHT: 227 LBS | TEMPERATURE: 97.3 F | HEIGHT: 61 IN

## 2017-09-21 DIAGNOSIS — R14.0 BLOATING: ICD-10-CM

## 2017-09-21 DIAGNOSIS — R19.4 CHANGE IN BOWEL HABITS: ICD-10-CM

## 2017-09-21 DIAGNOSIS — K57.90 DIVERTICULOSIS OF INTESTINE WITHOUT BLEEDING, UNSPECIFIED INTESTINAL TRACT LOCATION: ICD-10-CM

## 2017-09-21 DIAGNOSIS — Z12.11 COLON CANCER SCREENING: ICD-10-CM

## 2017-09-21 DIAGNOSIS — K21.9 GASTROESOPHAGEAL REFLUX DISEASE WITHOUT ESOPHAGITIS: Primary | ICD-10-CM

## 2017-09-21 PROCEDURE — 99244 OFF/OP CNSLTJ NEW/EST MOD 40: CPT | Performed by: INTERNAL MEDICINE

## 2017-09-21 RX ORDER — DICYCLOMINE HYDROCHLORIDE 10 MG/1
10 CAPSULE ORAL 4 TIMES DAILY
Qty: 120 CAPSULE | Refills: 3 | Status: SHIPPED | OUTPATIENT
Start: 2017-09-21 | End: 2018-02-21 | Stop reason: SDUPTHER

## 2017-09-21 ASSESSMENT — ENCOUNTER SYMPTOMS
RECTAL PAIN: 0
NAUSEA: 1
DIARRHEA: 1
RESPIRATORY NEGATIVE: 1
ABDOMINAL PAIN: 1
ANAL BLEEDING: 0
VOMITING: 0
ABDOMINAL DISTENTION: 1
BLOOD IN STOOL: 0
BACK PAIN: 1
CONSTIPATION: 1

## 2017-09-23 DIAGNOSIS — E03.9 HYPOTHYROIDISM, UNSPECIFIED TYPE: ICD-10-CM

## 2017-09-25 RX ORDER — LEVOTHYROXINE SODIUM 0.1 MG/1
TABLET ORAL
Qty: 90 TABLET | Refills: 0 | Status: SHIPPED | OUTPATIENT
Start: 2017-09-25 | End: 2017-12-26 | Stop reason: SDUPTHER

## 2017-09-25 RX ORDER — IBUPROFEN 800 MG/1
TABLET ORAL
Qty: 90 TABLET | Refills: 0 | Status: SHIPPED | OUTPATIENT
Start: 2017-09-25 | End: 2017-09-26 | Stop reason: SDUPTHER

## 2017-09-26 ENCOUNTER — HOSPITAL ENCOUNTER (OUTPATIENT)
Age: 57
Discharge: HOME | End: 2017-09-26
Payer: COMMERCIAL

## 2017-09-26 ENCOUNTER — OFFICE VISIT (OUTPATIENT)
Dept: PODIATRY | Age: 57
End: 2017-09-26
Payer: COMMERCIAL

## 2017-09-26 VITALS
HEIGHT: 61 IN | BODY MASS INDEX: 42.48 KG/M2 | DIASTOLIC BLOOD PRESSURE: 72 MMHG | WEIGHT: 225 LBS | SYSTOLIC BLOOD PRESSURE: 122 MMHG | HEART RATE: 89 BPM

## 2017-09-26 DIAGNOSIS — M21.962 FOOT DEFORMITY, BILATERAL: ICD-10-CM

## 2017-09-26 DIAGNOSIS — Z79.4 TYPE 2 DIABETES MELLITUS WITH DIABETIC POLYNEUROPATHY, WITH LONG-TERM CURRENT USE OF INSULIN (HCC): ICD-10-CM

## 2017-09-26 DIAGNOSIS — E11.42 TYPE 2 DIABETES MELLITUS WITH DIABETIC POLYNEUROPATHY, WITH LONG-TERM CURRENT USE OF INSULIN (HCC): ICD-10-CM

## 2017-09-26 DIAGNOSIS — M79.2 NEUROPATHIC PAIN: Primary | ICD-10-CM

## 2017-09-26 DIAGNOSIS — M21.961 FOOT DEFORMITY, BILATERAL: ICD-10-CM

## 2017-09-26 LAB
ABSOLUTE EOS #: 0.2 K/UL (ref 0–0.4)
ABSOLUTE LYMPH #: 1.9 K/UL (ref 1–4.8)
ABSOLUTE MONO #: 0.4 K/UL (ref 0.1–1.3)
ANION GAP SERPL CALCULATED.3IONS-SCNC: 12 MMOL/L (ref 9–17)
BASOPHILS # BLD: 1 %
BASOPHILS ABSOLUTE: 0.1 K/UL (ref 0–0.2)
BUN BLDV-MCNC: 13 MG/DL (ref 6–20)
BUN/CREAT BLD: ABNORMAL (ref 9–20)
CALCIUM SERPL-MCNC: 9.1 MG/DL (ref 8.6–10.4)
CHLORIDE BLD-SCNC: 103 MMOL/L (ref 98–107)
CO2: 26 MMOL/L (ref 20–31)
CREAT SERPL-MCNC: 0.6 MG/DL (ref 0.5–0.9)
CREATININE URINE: 282.7 MG/DL (ref 28–217)
DIFFERENTIAL TYPE: NORMAL
EOSINOPHILS RELATIVE PERCENT: 3 %
GFR AFRICAN AMERICAN: >60 ML/MIN
GFR NON-AFRICAN AMERICAN: >60 ML/MIN
GFR SERPL CREATININE-BSD FRML MDRD: ABNORMAL ML/MIN/{1.73_M2}
GFR SERPL CREATININE-BSD FRML MDRD: ABNORMAL ML/MIN/{1.73_M2}
GLUCOSE BLD-MCNC: 147 MG/DL (ref 70–99)
HCT VFR BLD CALC: 36.7 % (ref 36–46)
HEMOGLOBIN: 12.4 G/DL (ref 12–16)
LYMPHOCYTES # BLD: 30 %
MAGNESIUM: 1.9 MG/DL (ref 1.6–2.6)
MCH RBC QN AUTO: 29.6 PG (ref 26–34)
MCHC RBC AUTO-ENTMCNC: 33.8 G/DL (ref 31–37)
MCV RBC AUTO: 87.6 FL (ref 80–100)
MICROALBUMIN/CREAT 24H UR: <12 MG/L
MICROALBUMIN/CREAT UR-RTO: 4 MCG/MG CREAT
MONOCYTES # BLD: 6 %
PDW BLD-RTO: 13.4 % (ref 11.5–14.9)
PHOSPHORUS: 3.2 MG/DL (ref 2.6–4.5)
PLATELET # BLD: 344 K/UL (ref 150–450)
PLATELET ESTIMATE: NORMAL
PMV BLD AUTO: 8.2 FL (ref 6–12)
POTASSIUM SERPL-SCNC: 4.2 MMOL/L (ref 3.7–5.3)
RBC # BLD: 4.19 M/UL (ref 4–5.2)
RBC # BLD: NORMAL 10*6/UL
SEG NEUTROPHILS: 60 %
SEGMENTED NEUTROPHILS ABSOLUTE COUNT: 3.8 K/UL (ref 1.3–9.1)
SODIUM BLD-SCNC: 141 MMOL/L (ref 135–144)
WBC # BLD: 6.4 K/UL (ref 3.5–11)
WBC # BLD: NORMAL 10*3/UL

## 2017-09-26 PROCEDURE — 99213 OFFICE O/P EST LOW 20 MIN: CPT | Performed by: PODIATRIST

## 2017-09-26 PROCEDURE — 82570 ASSAY OF URINE CREATININE: CPT

## 2017-09-26 PROCEDURE — 36415 COLL VENOUS BLD VENIPUNCTURE: CPT

## 2017-09-26 PROCEDURE — 83735 ASSAY OF MAGNESIUM: CPT

## 2017-09-26 PROCEDURE — 82043 UR ALBUMIN QUANTITATIVE: CPT

## 2017-09-26 PROCEDURE — 84100 ASSAY OF PHOSPHORUS: CPT

## 2017-09-26 PROCEDURE — 80048 BASIC METABOLIC PNL TOTAL CA: CPT

## 2017-09-26 PROCEDURE — 85025 COMPLETE CBC W/AUTO DIFF WBC: CPT

## 2017-09-26 RX ORDER — VENLAFAXINE HYDROCHLORIDE 37.5 MG/1
CAPSULE, EXTENDED RELEASE ORAL
Qty: 90 CAPSULE | Refills: 5 | Status: SHIPPED | OUTPATIENT
Start: 2017-09-26 | End: 2018-03-15 | Stop reason: SDUPTHER

## 2017-09-26 RX ORDER — IBUPROFEN 800 MG/1
800 TABLET ORAL EVERY 8 HOURS PRN
Qty: 90 TABLET | Refills: 5 | Status: SHIPPED | OUTPATIENT
Start: 2017-09-26 | End: 2018-03-27 | Stop reason: ALTCHOICE

## 2017-09-26 ASSESSMENT — ENCOUNTER SYMPTOMS
DIARRHEA: 0
VOMITING: 0
CONSTIPATION: 0
COLOR CHANGE: 0
BACK PAIN: 1
NAUSEA: 0

## 2017-10-04 ENCOUNTER — NURSE ONLY (OUTPATIENT)
Dept: PODIATRY | Age: 57
End: 2017-10-04

## 2017-10-04 DIAGNOSIS — E11.42 TYPE 2 DIABETES MELLITUS WITH DIABETIC POLYNEUROPATHY, WITH LONG-TERM CURRENT USE OF INSULIN (HCC): Primary | ICD-10-CM

## 2017-10-04 DIAGNOSIS — M21.962 FOOT DEFORMITY, BILATERAL: ICD-10-CM

## 2017-10-04 DIAGNOSIS — M21.961 FOOT DEFORMITY, BILATERAL: ICD-10-CM

## 2017-10-04 DIAGNOSIS — Z79.4 TYPE 2 DIABETES MELLITUS WITH DIABETIC POLYNEUROPATHY, WITH LONG-TERM CURRENT USE OF INSULIN (HCC): Primary | ICD-10-CM

## 2017-10-05 ENCOUNTER — OFFICE VISIT (OUTPATIENT)
Dept: PAIN MANAGEMENT | Age: 57
End: 2017-10-05
Payer: COMMERCIAL

## 2017-10-05 VITALS
RESPIRATION RATE: 16 BRPM | HEIGHT: 61 IN | HEART RATE: 92 BPM | WEIGHT: 223 LBS | OXYGEN SATURATION: 97 % | BODY MASS INDEX: 42.1 KG/M2 | TEMPERATURE: 97.6 F | SYSTOLIC BLOOD PRESSURE: 113 MMHG | DIASTOLIC BLOOD PRESSURE: 74 MMHG

## 2017-10-05 DIAGNOSIS — E66.01 MORBID OBESITY DUE TO EXCESS CALORIES (HCC): Chronic | ICD-10-CM

## 2017-10-05 DIAGNOSIS — Z96.611 STATUS POST TOTAL REPLACEMENT OF RIGHT SHOULDER: ICD-10-CM

## 2017-10-05 DIAGNOSIS — M54.16 RIGHT LUMBAR RADICULITIS: ICD-10-CM

## 2017-10-05 DIAGNOSIS — G47.33 OBSTRUCTIVE SLEEP APNEA SYNDROME: Chronic | ICD-10-CM

## 2017-10-05 DIAGNOSIS — M51.26 LUMBAR DISC HERNIATION: Primary | ICD-10-CM

## 2017-10-05 DIAGNOSIS — Z96.612 STATUS POST REVERSE TOTAL ARTHROPLASTY OF LEFT SHOULDER: ICD-10-CM

## 2017-10-05 DIAGNOSIS — Z79.891 CHRONIC PRESCRIPTION OPIATE USE: ICD-10-CM

## 2017-10-05 PROCEDURE — 99214 OFFICE O/P EST MOD 30 MIN: CPT | Performed by: ANESTHESIOLOGY

## 2017-10-05 RX ORDER — LIDOCAINE 50 MG/G
OINTMENT TOPICAL
COMMUNITY
Start: 2016-01-13 | End: 2018-12-19

## 2017-10-05 RX ORDER — ASPIRIN 81 MG/1
TABLET, CHEWABLE ORAL
COMMUNITY
End: 2017-10-05 | Stop reason: DRUGHIGH

## 2017-10-05 ASSESSMENT — ENCOUNTER SYMPTOMS
BACK PAIN: 1
RESPIRATORY NEGATIVE: 1
DIARRHEA: 1

## 2017-10-05 NOTE — MR AVS SNAPSHOT
After Visit Summary             Leida Minaya   10/5/2017 3:00 PM   Office Visit    Description:  Female : 1960   Provider:  Douglas Ann MD   Department:  [facility]              Your Follow-Up and Future Appointments         Below is a list of your follow-up and future appointments. This may not be a complete list as you may have made appointments directly with providers that we are not aware of or your providers may have made some for you. Please call your providers to confirm appointments. It is important to keep your appointments. Please bring your current insurance card, photo ID, co-pay, and all medication bottles to your appointment. If self-pay, payment is expected at the time of service. Your To-Do List     Future Appointments Provider Department Dept Phone    2017 3:00 PM Douglas Ann MD [facility] [phone]    Please arrive 15 minutes prior to appointment time, bring insurance card and photo ID.     2017 1:00 PM Gulshan Pittman [place] [address] [phone]    2017 3:00 PM Isaak Rod, [address] [facility] [place] [address] [phone]    Please arrive 15 minutes prior to appointment, bring photo ID and insurance card. 2017 2:30 PM Hunter Camargo MD [facility] [phone]    Please arrive 15 minutes prior to appointment, bring photo ID and insurance card. 10/2/2018 10:15 AM Yanci Seo MD [facility] [place] [address]    Please arrive 15 minutes prior to appointment time, bring insurance card and photo ID.           Information from Your Visit        Department     Name Address Phone Fax    [facility] [address] [address] [phone]      You Were Seen for:         Comments    Lumbar disc herniation   [140424]         Vital Signs dicyclomine (BENTYL) 10 MG capsule Take 1 capsule by mouth 4 times daily    Cholecalciferol (VITAMIN D3) 2000 units TABS TAKE ONE TABLET BY MOUTH ONCE DAILY    nystatin (MYCOSTATIN) 253191 UNIT/GM powder APPLY TOPICALLY TO ABDONMINAL FOLDS THREE TIMES DAILY AS NEEDED    meclizine (ANTIVERT) 25 MG tablet Take 1 tablet by mouth daily as needed for Dizziness    Elastic Bandages & Supports (MEDICAL COMPRESSION STOCKINGS) MISC Will need fitted- for vertigo. Tens Unit MISC by Does not apply route    Probiotic Product (ACIDOPHILUS PROBIOTIC) CAPS capsule Take 1 capsule by mouth daily    VICTOZA 18 MG/3ML SOPN SC injection Inject 18 mg into the skin every morning     oxyCODONE-acetaminophen (PERCOCET) 5-325 MG per tablet Take by mouth daily as needed  . metFORMIN (GLUCOPHAGE) 1000 MG tablet TAKE ONE TABLET BY MOUTH TWICE DAILY    atorvastatin (LIPITOR) 20 MG tablet TAKE ONE TABLET BY MOUTH ONCE DAILY AT NIGHT    Magnesium Oxide 500 MG TABS TAKE ONE TABLET BY MOUTH TWICE DAILY    metoprolol succinate (TOPROL XL) 25 MG extended release tablet TAKE ONE TABLET BY MOUTH ONCE DAILY    Insulin Pen Needle (B-D ULTRAFINE III SHORT PEN) 31G X 8 MM MISC Use as directed for insulin administration. insulin detemir (LEVEMIR FLEXPEN) 100 UNIT/ML injection pen Inject 35 Units into the skin nightly     NOVOLOG FLEXPEN 100 UNIT/ML injection pen Inject 10 -20 units SQ units before breakfast and dinner per sliding scale.     ammonium lactate (AMLACTIN) 12 % cream Apply topically 2 times daily as needed for Dry Skin (to feet)    allopurinol (ZYLOPRIM) 100 MG tablet TAKE ONE TABLET BY MOUTH ONCE DAILY    Biotin 5000 MCG CAPS Take 1 capsule by mouth daily    aspirin 81 MG tablet Take 81 mg by mouth daily    Flaxseed MISC Take 1 capsule by mouth daily     trospium chloride (SANCTURA XR) 60 MG CP24 Take 60 mg by mouth daily    Menthol (BIOFREEZE) 10 % AERO Apply 1 applicator topically 4 times daily as needed      Allergies Actos [Pioglitazone]     Amitriptyline Hives    Celebrex [Celecoxib] Hives, Itching, Dermatitis    Burnt red blister on ashlee    Fenofibrate Other (See Comments)    Muscle cramps    Gemfibrozil Other (See Comments)    Muscle pain, spasms, weakness and HA  Muscle pain, spasms, weakness and HA    Lovastatin Other (See Comments)    Muscle cramps  Muscle cramps    Lyrica [Pregabalin]     Prempro [Conj Estrog-medroxyprogest Ace] Other (See Comments)    Breast tenderness, pain in vagina, cramping    Statins Other (See Comments)    Muscle cramps  Lipitor ok    Tricor [Fenofibrate] Other (See Comments)    Muscle cramps    Zetia [Ezetimibe] Other (See Comments)    Does not remember reaction  Does not remember reaction  Does not remember reaction    Zocor [Simvastatin] Other (See Comments)    Muscle cramps    Ampicillin Rash    Gabapentin Rash    Niacin And Related Rash    Novolin [Insulin Isophane & Reg, Human] Rash    Omeprazole Nausea And Vomiting    Pcn [Penicillins] Rash    Pregabalin Nausea And Vomiting, Rash    Vesicare [Solifenacin] Rash      We Ordered/Performed the following           NM INJECT ANES/STEROID FORAMEN LUMBAR/SACRAL W IMG GUIDE ,1 LEVEL     Comments:    Right L5 and S1 NEVIN         Additional Information        Basic Information     Date Of Birth Sex Race Ethnicity Preferred Language Preferred Written Language    1960 Female White Non-/Non  English English      Problem List as of 10/5/2017  Date Reviewed: 9/11/2017                Diverticulosis of intestine without bleeding    Change in bowel habits    Bloating    Stenosis of both internal carotid arteries- Mild 16-49%    Phlebolith- pelvic    Generalized headaches    Tinnitus    Skin lesion of left leg    Abdominal bloating    Irregular bowel habits    Leiomyoma of uterus    Diverticulosis    Umbilical hernia    Status post shoulder replacement    Primary osteoarthritis of right shoulder (Chronic)    Hyperglycemia Status post total replacement of right shoulder    Sciatica, right side    Type 2 diabetes mellitus with diabetic neuropathy, with long-term current use of insulin (HCC) (Chronic)    Excessive sweating    Iron deficiency anemia    Bursitis/tendonitis, shoulder    Closed displaced fracture of greater tuberosity of right humerus    Closed fracture of one rib of right side with routine healing    Elevated uric acid in blood    Seasonal allergic rhinitis    Skipped beats    Low serum low density lipoprotein (LDL)    Closed displaced fracture of lesser tuberosity of right humerus    Status post fall    Blurry vision, bilateral    Persistent headaches    Right shoulder tendinitis    Osteoarthritis of right shoulder    History of rib fracture    Vertigo    Far-sighted    Palpitations    Anemia    Status post reverse total arthroplasty of left shoulder    Calcified granuloma of lung (HCC)    Leukocytosis    Essential hypertension (Chronic)    Hypoactive thyroid    Fatigue    Presbyopia    Nuclear sclerosis    Error, refractive, myopia    Astigmatism    Degenerative lumbar disc (Chronic)    Sacroiliitis (HCC) (Chronic)    Allergic rhinitis    Urinary incontinence    Morbid obesity due to excess calories (HCC) (Chronic)    Vitamin D deficiency    Hypercholesterolemia    Dysmenorrhea    Hypertriglyceridemia    Cataract      Preventive Care        Date Due    Pneumococcal Vaccine - Pneumovax for adults aged 19-64 years with: chronic heart disease, chronic lung disease, diabetes mellitus, alcoholism, chronic liver disease, or cigarette smoking. (1 of 1 - PPSV23) 10/25/2017 (Originally 12/19/1979)    Eye Exam By An Eye Doctor 3/11/2018 (Originally 6/3/2017)    Tetanus Combination Vaccine (1 - Tdap) 9/11/2018 (Originally 12/19/1979)    Yearly Flu Vaccine (1) 9/11/2018 (Originally 9/1/2017)    Colon Cancer Stool Test 1/27/2018    Cholesterol Screening 2/7/2018    Hemoglobin A1C (Test For Long-Term Glucose Control) 4/19/2018 Diabetic Foot Exam 8/21/2018    Mammograms are recommended every 2 years for low/average risk patients aged 48 - 69, and every year for high risk patients per updated national guidelines. However these guidelines can be individualized by your provider. 9/16/2018    Urine Check For Kidney Problems 9/26/2018    Pap Smear 4/24/2022            [employer] expresst Signup           Our records indicate that you have an active [employer] account. You can view your After Visit Summary by going to [email or web address] and logging in with your [employer] username and password. If you don't have a [employer] username and password but a parent or guardian has access to your record, the parent or guardian should login with their own [employer] username and password and access your record to view the After Visit Summary. Additional Information  If you have questions, please contact the physician practice where you receive care. Remember, [employer] is NOT to be used for urgent needs. For medical emergencies, dial 911. For questions regarding your [employer] account call [phone]. If you have a clinical question, please call your doctor's office.

## 2017-10-05 NOTE — PROGRESS NOTES
Subjective:      Patient ID: Thelma Bustos is a 64 y.o. female. HPI    3 year hx of worsening low back pan with radiation down right leg over buttock and posterior thigh, no radiation below the knee, occasional paresthesia, no loss of bladder or bowel control  No previous spine imaging or MG studiesNo previous spine injections or surgeries  She is doing therapy with no relief  Rates her pain at 10/10  despite of using percocet 5 mg tid    Pt is here today to review the MRI she had done. It is in EPIC    EXAMINATION: MRI OF THE LUMBAR SPINE WITHOUT CONTRAST, 9/11/2017 4:41 pm    Impression Mild multilevel degenerative disc disease associated with spondylitic changes, most pronounced changes seen at L5-S1. Centrally extruded disc component is noted which have migrated a couple of millimeters in both directions, accompanied with facet arthrosis producing marked left neural foraminal stenosis. The spinal canal is patent. There is mild right neural foraminal stenosis as well. L1-L2: There is minimal circumferential disc bulging and facet disease without evidence of spinal canal or neural foraminal stenosis. Mildposterior epidural lipomatosis present. L2-L3: Minimal left foraminal disc bulging and facet arthrosis present producing mild left neural foraminal stenosis. The spinal canal and right neural foramen are patent. L3-L4: Minimal circumferential disc bulge and osteophyte noted without evidence of significant spinal canal or neural foraminal stenosis. L4-L5: Mild circumferential disc bulging present associated with moderate facet arthrosis without evidence of significant spinal canal or neural foraminal stenosis. L5-S1: There is an extruded disc centrally at L5-S1 which has migrated a couple of millimeters in both directions indenting anterior thecal sac. The thecal sac measuring approximately 9.6 mm in AP diameter.   No significant spinal canal stenosis suspected however, moderate facet arthrosis with posterior spurring encroaching the left neural foramina, producing marked left and mild right neural foraminal stenosis. Review of Systems   Constitutional: Positive for diaphoresis and fever (feels feverish). HENT: Negative. Respiratory: Negative. Cardiovascular: Positive for palpitations and leg swelling. Gastrointestinal: Positive for diarrhea (for a few days ). Musculoskeletal: Positive for back pain. Neurological: Positive for dizziness and numbness (hands ). Hematological: Bruises/bleeds easily. Psychiatric/Behavioral: Positive for sleep disturbance. The patient is nervous/anxious. Objective:   Physical Exam   Constitutional: She is oriented to person, place, and time. She appears well-developed and well-nourished. No distress. HENT:   Head: Normocephalic and atraumatic. Eyes: Conjunctivae and EOM are normal. Pupils are equal, round, and reactive to light. Cardiovascular: Normal rate, regular rhythm and normal heart sounds. Pulmonary/Chest: Effort normal and breath sounds normal.   Musculoskeletal:        Lumbar back: She exhibits decreased range of motion, tenderness and pain. Neurological: She is alert and oriented to person, place, and time. She displays no atrophy and no tremor. No cranial nerve deficit or sensory deficit. She exhibits normal muscle tone. She displays no seizure activity. Coordination and gait normal.   Skin: Skin is warm and dry. Psychiatric: She has a normal mood and affect. Her behavior is normal. Judgment and thought content normal.   Nursing note and vitals reviewed.       Assessment:        Hx of chronic low back, shoulder / hip and knee pain  she was followed at [facility] pain clinic and was treated with chronic opioids      Was then followed with Dr Lyndia Carrel for shoulder surgeries and had received opioids for her pain form ortho for last several months     S/p bilateral shoulder replacement 02/2016 and 04/2017 by Dr Clem Bee report showed chronic opioid prescription from [facility] and later in last several months form orthopedics     Her main issue is 3 year hx of worsening low back pan with radiation down right leg over buttock and posterior thigh, no radiation below the knee, occasional paresthesia, no loss of bladder or bowel control  No previous spine injections or surgeries  She did therapy with no relief  Rates her pain at 10/10  despite of using percocet 5 mg tid    EXAMINATION: MRI OF THE LUMBAR SPINE WITHOUT CONTRAST, 9/11/2017 4:41 pm    L5-S1: There is an extruded disc centrally at L5-S1 which has migrated a couple of millimeters in both directions indenting anterior thecal sac. The thecal sac measuring approximately 9.6 mm in AP diameter. No significant spinal canal stenosis suspected however, moderate facet arthrosis with posterior spurring encroaching the left neural foramina, producing marked left and mild right neural foraminal stenosis. 1. Lumbar disc herniation  RI INJECT ANES/STEROID FORAMEN LUMBAR/SACRAL W IMG GUIDE ,1 LEVEL   2. Obstructive sleep apnea syndrome     3. Morbid obesity due to excess calories (HCC)     4. Status post reverse total arthroplasty of left shoulder     5. Status post total replacement of right shoulder     6. Chronic prescription opiate use     7. Right lumbar radiculitis  RI INJECT ANES/STEROID FORAMEN LUMBAR/SACRAL W IMG GUIDE ,1 LEVEL           Plan:       Images were independently reviewed and discussed with patient in detail. I think there are 2 possible etiology for her pain issues. Herniated disc at L5-S1 and lumbar facet arthropathy bilaterally at multiple lumbar levels. I will start with a lumbar epidural steroid injection if that do not alleviate her pain then she will be considered for medial branch nerve block. Future plan was explained in detail to the patient.     FUTURE DIRECTIONS:  Will consider for bilateral lumbar medial branch nerve block for back pain    Orders Placed This Encounter   Procedures    MA INJECT ANES/STEROID FORAMEN LUMBAR/SACRAL W IMG GUIDE ,1 LEVEL     Right L5 and S1 NEVIN     No orders of the defined types were placed in this encounter.

## 2017-10-09 PROBLEM — G25.81 RLS (RESTLESS LEGS SYNDROME): Status: ACTIVE | Noted: 2017-10-09

## 2017-10-12 ENCOUNTER — HOSPITAL ENCOUNTER (OUTPATIENT)
Age: 57
Setting detail: OUTPATIENT SURGERY
Discharge: HOME | End: 2017-10-12
Attending: INTERNAL MEDICINE | Admitting: INTERNAL MEDICINE
Payer: COMMERCIAL

## 2017-10-12 VITALS
OXYGEN SATURATION: 95 % | WEIGHT: 224.75 LBS | HEART RATE: 86 BPM | TEMPERATURE: 97 F | HEIGHT: 61 IN | DIASTOLIC BLOOD PRESSURE: 60 MMHG | SYSTOLIC BLOOD PRESSURE: 104 MMHG | RESPIRATION RATE: 18 BRPM | BODY MASS INDEX: 42.43 KG/M2

## 2017-10-12 LAB — GLUCOSE BLD-MCNC: 142 MG/DL (ref 65–105)

## 2017-10-12 PROCEDURE — 3609027000 HC COLONOSCOPY: Performed by: INTERNAL MEDICINE

## 2017-10-12 PROCEDURE — 99153 MOD SED SAME PHYS/QHP EA: CPT | Performed by: INTERNAL MEDICINE

## 2017-10-12 PROCEDURE — 88342 IMHCHEM/IMCYTCHM 1ST ANTB: CPT

## 2017-10-12 PROCEDURE — 82947 ASSAY GLUCOSE BLOOD QUANT: CPT

## 2017-10-12 PROCEDURE — 43239 EGD BIOPSY SINGLE/MULTIPLE: CPT | Performed by: INTERNAL MEDICINE

## 2017-10-12 PROCEDURE — 88305 TISSUE EXAM BY PATHOLOGIST: CPT

## 2017-10-12 PROCEDURE — 6370000000 HC RX 637 (ALT 250 FOR IP): Performed by: INTERNAL MEDICINE

## 2017-10-12 PROCEDURE — 7100000011 HC PHASE II RECOVERY - ADDTL 15 MIN: Performed by: INTERNAL MEDICINE

## 2017-10-12 PROCEDURE — 99152 MOD SED SAME PHYS/QHP 5/>YRS: CPT | Performed by: INTERNAL MEDICINE

## 2017-10-12 PROCEDURE — 6360000002 HC RX W HCPCS: Performed by: INTERNAL MEDICINE

## 2017-10-12 PROCEDURE — 7100000010 HC PHASE II RECOVERY - FIRST 15 MIN: Performed by: INTERNAL MEDICINE

## 2017-10-12 PROCEDURE — 43278 ERCP LESION ABLATE W/DILATE: CPT | Performed by: INTERNAL MEDICINE

## 2017-10-12 PROCEDURE — 3609012400 HC EGD TRANSORAL BIOPSY SINGLE/MULTIPLE: Performed by: INTERNAL MEDICINE

## 2017-10-12 PROCEDURE — 2580000003 HC RX 258: Performed by: INTERNAL MEDICINE

## 2017-10-12 RX ORDER — MIDAZOLAM HYDROCHLORIDE 1 MG/ML
INJECTION INTRAMUSCULAR; INTRAVENOUS PRN
Status: DISCONTINUED | OUTPATIENT
Start: 2017-10-12 | End: 2017-10-12 | Stop reason: HOSPADM

## 2017-10-12 RX ORDER — MEPERIDINE HYDROCHLORIDE 50 MG/ML
INJECTION INTRAMUSCULAR; INTRAVENOUS; SUBCUTANEOUS PRN
Status: DISCONTINUED | OUTPATIENT
Start: 2017-10-12 | End: 2017-10-12 | Stop reason: HOSPADM

## 2017-10-12 RX ORDER — SODIUM CHLORIDE, SODIUM LACTATE, POTASSIUM CHLORIDE, CALCIUM CHLORIDE 600; 310; 30; 20 MG/100ML; MG/100ML; MG/100ML; MG/100ML
INJECTION, SOLUTION INTRAVENOUS CONTINUOUS
Status: DISCONTINUED | OUTPATIENT
Start: 2017-10-12 | End: 2017-10-12 | Stop reason: HOSPADM

## 2017-10-12 RX ADMIN — SODIUM CHLORIDE, POTASSIUM CHLORIDE, SODIUM LACTATE AND CALCIUM CHLORIDE: 600; 310; 30; 20 INJECTION, SOLUTION INTRAVENOUS at 08:56

## 2017-10-12 ASSESSMENT — PAIN - FUNCTIONAL ASSESSMENT: PAIN_FUNCTIONAL_ASSESSMENT: 0-10

## 2017-10-12 ASSESSMENT — PAIN SCALES - GENERAL
PAINLEVEL_OUTOF10: 0
PAINLEVEL_OUTOF10: 0

## 2017-10-12 ASSESSMENT — PAIN DESCRIPTION - DESCRIPTORS: DESCRIPTORS: CRAMPING

## 2017-10-12 NOTE — H&P
HISTORY and [id number]       NAME:  Luis Daniel Feliz  MRN: [id number]   YOB: 1960   Date: 10/12/2017   Age: 64 y.o. Gender: female       COMPLAINT AND PRESENT HISTORY:   Luis Daniel Feliz is 64 y.o.,   female, having a EGD/colonoscopy. Pt has hx of colonoscopy about 5 years ago. No hx of EGD. Hx GERD for many years, takes Omeprazole. Hx diverticulitis, denies rectal bleeding or mucus in stool. Pt denies colon polyps. Patient has a history of Abd. Pain in the LUQ and LLQ area, The pain is crushing in Character. Symptoms started \"few years ago. \" Patient denies any other GI symptoms. No vomiting, she has intermittent nausea. No current constipation, she has some history of constipation. States lately having some diarrhea. No changes in the color, caliber or consistency of the stools. No fever or chills. No Hx of MRSA infections in the past. Hx pancreatitis, no hx of hepatitis. PAST MEDICAL HISTORY     Past Medical History:   Diagnosis Date    Anginal pain ([address]     last used Nitro sl 4 yrs 2013    Arthritis     Asthma     Back pain     radiculopathy left leg    Constipation     Dysmenorrhea     Fatty liver disease, nonalcoholic     GERD (gastroesophageal reflux disease)     Gout     found through bloodwork    Heart palpitations     Hyperlipidemia     Hypertension     Hyperthyroidism     Hypertriglyceridemia     Hypothyroidism 2/17/2016    Neuropathy ([address]     bilateral legs and feet    Obesity     Pancreatitis 2011    no problems    Panic attack 10/30/2014    Sleep apnea     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 diabetes mellitus without mention of complication, not stated as uncontrolled     checks daily     Pt denies any history of stroke, COPD, Cancer, Seizures, Kidney Disease, Hepatitis, TB, or Substance abuse.     SURGICAL HISTORY       Past Surgical History:   Procedure Laterality Date    CARDIAC CATHETERIZATION      Patient states approximately  she had the cardiac catheterization that was negative      SECTION      x 2    COLONOSCOPY      DILATION AND CURETTAGE OF UTERUS      JOINT REPLACEMENT Left 2016    shoulder    REPLACEMENT SHOULDER TOTAL Right 2017    SHOULDER TOTAL ARTHROPLASTY RIGHT WITH ARTHREX, CELLSAVER AND AQUAMANTIS performed by Joana Dunbar DO at [place] [address] Right 2017     SOCIAL HISTORY       Social History     Social History    Marital status:      Spouse name: N/A    Number of children: N/A    Years of education: N/A     Occupational History     Disabled     Social History Main Topics    Smoking status: Former Smoker     Packs/day: 0.50     Types: Cigarettes     Quit date: 1977    Smokeless tobacco: Never Used    Alcohol use 0.0 oz/week      Comment: occasionally    Drug use: No    Sexual activity: Yes     Partners: Male     Other Topics Concern    None     Social History Narrative    None           REVIEW OF SYSTEMS      Allergies   Allergen Reactions    Actos [Pioglitazone]     Amitriptyline Hives    Celebrex [Celecoxib] Hives, Itching and Dermatitis     Burnt red blister on ashlee    Fenofibrate Other (See Comments)     Muscle cramps    Gemfibrozil Other (See Comments)     Muscle pain, spasms, weakness and HA  Muscle pain, spasms, weakness and HA    Lovastatin Other (See Comments)     Muscle cramps  Muscle cramps    Lyrica [Pregabalin]     Prempro [Conj Estrog-Medroxyprogest Ace] Other (See Comments)     Breast tenderness, pain in vagina, cramping    Statins Other (See Comments)     Muscle cramps  Lipitor ok    Tricor [Fenofibrate] Other (See Comments)     Muscle cramps    Zetia [Ezetimibe] Other (See Comments)     Does not remember reaction  Does not remember reaction  Does not remember reaction    Zocor [Simvastatin] Other (See Comments)     Muscle cramps    Ampicillin Rash    History of rib fracture 07/06/2016    Vertigo 06/17/2016    Far-sighted 05/27/2016    Palpitations 05/12/2016    Anemia 04/12/2016    Gastroesophageal reflux disease without esophagitis     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    Calcified granuloma of lung ([address] 03/16/2016    Leukocytosis 03/16/2016    Essential hypertension 02/17/2016    Hypoactive thyroid 02/17/2016    Primary osteoarthritis of both hips     Primary osteoarthritis of both knees     Encounter for chronic pain management     Encounter for medication management     Primary osteoarthritis of both shoulders     Fatigue 10/30/2014    Presbyopia 08/22/2014    Nuclear sclerosis 08/22/2014    Error, refractive, myopia 08/22/2014    Astigmatism 08/22/2014    Degenerative lumbar disc 04/21/2014    Sacroiliitis ([address] 04/21/2014    Allergic rhinitis 07/03/2013    Urinary incontinence 05/28/2013    Morbid obesity due to excess calories ([address] 05/02/2013    Vitamin D deficiency 05/02/2013    Carpal tunnel syndrome of right wrist 11/26/2012    Diabetic neuropathy ([address] 08/06/2012    Sleep apnea 08/06/2012    Hypercholesterolemia     Dysmenorrhea     Hypertriglyceridemia     Cataract            Shruti Galindo NP on 10/12/2017 at 9:05 AM

## 2017-10-12 NOTE — OP NOTE
PROCEDURE NOTE    DATE OF PROCEDURE: 10/12/2017     SURGEON: Mirlande See MD  Facility: [facility]  ASSISTANT: None  Anesthesia: Demerol 100 mg IV, Versed 4 mg IV  PREOPERATIVE DIAGNOSIS:   abd pain   bloating  GERD    Diagnosis:  Gastritis, bxs taken    random small bowel bxs taken     POSTOPERATIVE DIAGNOSIS: As described below    OPERATION: Upper GI endoscopy with Biopsy    ANESTHESIA: Moderate Sedation     ESTIMATED BLOOD LOSS: Less than 50 ml    COMPLICATIONS: None. SPECIMENS:  Was Obtained:     Gastritis, bxs taken    random small bowel bxs taken     HISTORY: The patient is a 64y.o. year old female with history of above preop diagnosis. I recommended esophagogastroduodenoscopy with possible biopsy and I explained the risk, benefits, expected outcome, and alternatives to the procedure. Risks included but are not limited to bleeding, infection, respiratory distress, hypotension, and perforation of the esophagus, stomach, or duodenum. Patient understands and is in agreement. PROCEDURE: The patient was given IV conscious sedation. The patient's SPO2 remained above 90% throughout the procedure. The gastroscope was inserted orally and advanced under direct vision through the esophagus, through the stomach, through the pylorus, and into the descending duodenum. Findings:    Retropharyngeal area was grossly normal appearing    Esophagus: normal    Stomach:    Fundus: abnormal: Gastritis, bxs taken      Body: abnormal: Gastritis, bxs taken    Antrum: abnormal: Gastritis, bxs taken    Duodenum:     Descending: abnormal: random small bowel bxs taken     Bulb: abnormal: random small bowel bxs taken     The scope was removed and the patient tolerated the procedure well. Recommendations/Plan:   1. F/U Biopsies  2. F/U In Office in 3-4 weeks  3.  Discussed with the family    Electronically signed by Mirlande See MD  on 10/12/2017 at 9:54 AM

## 2017-10-12 NOTE — OP NOTE
PROCEDURE NOTE    DATE OF PROCEDURE: 10/12/2017    SURGEON: Kenney Lesches, MD  Facility : [facility]  ASSISTANT: None  Anesthesia: Demerol 100 mg IV, Versed 4 mg IV  PREOPERATIVE DIAGNOSIS:   abd pain   bloating     POSTOPERATIVE DIAGNOSIS: as described below    OPERATION: Total colonoscopy     ANESTHESIA: Moderate Sedation    ESTIMATED BLOOD LOSS: less than 50     COMPLICATIONS: None. SPECIMENS:  Was Not Obtained    HISTORY: The patient is a 64y.o. year old female with history of above preop diagnosis. I recommended colonoscopy with possible biopsy or polypectomy and I explained the risk, benefits, expected outcome, and alternatives to the procedure. Risks included but are not limited to bleeding, infection, respiratory distress, hypotension, and perforation of the colon and possibility of missing a lesion. The patient understands and is in agreement. PROCEDURE: The patient was given IV conscious sedation. The patient's SPO2 remained above 90% throughout the procedure. The colonoscope was inserted per rectum and advanced under direct vision to the cecum without difficulty. The prep was poor. Findings:  Terminal ileum: normal    Cecum/Ascending colon: normal    Transverse colon: abnormal: diverticulosis     Descending/Sigmoid colon: abnormal: diverticulosis     Rectum/Anus: examined in normal and retroflexed positions and was abnormal: prominent anal valves     Withdrawal Time was (minutes): 9    The colon was decompressed and the scope was removed. The patient tolerated the procedure well. Recommendations/Plan:   1. Lifestyle and dietary modifications as discussed  2. F/U Biopsies  3. F/U In OfficeYes  4. Discussed with the family  5.  Repeat colonoscopy [id number]    Electronically signed by Kenney Lesches, MD  on 10/12/2017 at 10:39 AM

## 2017-10-13 LAB — SURGICAL PATHOLOGY REPORT: NORMAL

## 2017-10-23 ENCOUNTER — NURSE ONLY (OUTPATIENT)
Dept: PODIATRY | Age: 57
End: 2017-10-23
Payer: COMMERCIAL

## 2017-10-23 DIAGNOSIS — E11.42 TYPE 2 DIABETES MELLITUS WITH DIABETIC POLYNEUROPATHY, WITH LONG-TERM CURRENT USE OF INSULIN (HCC): Primary | ICD-10-CM

## 2017-10-23 DIAGNOSIS — Z79.4 TYPE 2 DIABETES MELLITUS WITH DIABETIC POLYNEUROPATHY, WITH LONG-TERM CURRENT USE OF INSULIN (HCC): Primary | ICD-10-CM

## 2017-10-23 DIAGNOSIS — M21.961 FOOT DEFORMITY, BILATERAL: ICD-10-CM

## 2017-10-23 DIAGNOSIS — M21.962 FOOT DEFORMITY, BILATERAL: ICD-10-CM

## 2017-10-23 PROCEDURE — A5500 DIAB SHOE FOR DENSITY INSERT: HCPCS | Performed by: PODIATRIST

## 2017-10-23 PROCEDURE — A5513 MULTI DEN INSERT CUSTOM MOLD: HCPCS | Performed by: PODIATRIST

## 2017-10-24 ENCOUNTER — OFFICE VISIT (OUTPATIENT)
Dept: ORTHOPEDIC SURGERY | Age: 57
End: 2017-10-24
Payer: COMMERCIAL

## 2017-10-24 VITALS — HEIGHT: 61 IN | WEIGHT: 223 LBS | BODY MASS INDEX: 42.1 KG/M2

## 2017-10-24 DIAGNOSIS — M54.41 CHRONIC MIDLINE LOW BACK PAIN WITH RIGHT-SIDED SCIATICA: Primary | ICD-10-CM

## 2017-10-24 DIAGNOSIS — G89.29 CHRONIC MIDLINE LOW BACK PAIN WITH RIGHT-SIDED SCIATICA: Primary | ICD-10-CM

## 2017-10-24 DIAGNOSIS — M48.061 SPINAL STENOSIS OF LUMBAR REGION, UNSPECIFIED WHETHER NEUROGENIC CLAUDICATION PRESENT: ICD-10-CM

## 2017-10-24 DIAGNOSIS — M51.36 DDD (DEGENERATIVE DISC DISEASE), LUMBAR: ICD-10-CM

## 2017-10-24 DIAGNOSIS — M43.10 ACQUIRED SPONDYLOLISTHESIS: ICD-10-CM

## 2017-10-24 PROCEDURE — G8598 ASA/ANTIPLAT THER USED: HCPCS | Performed by: ORTHOPAEDIC SURGERY

## 2017-10-24 PROCEDURE — 3017F COLORECTAL CA SCREEN DOC REV: CPT | Performed by: ORTHOPAEDIC SURGERY

## 2017-10-24 PROCEDURE — 1036F TOBACCO NON-USER: CPT | Performed by: ORTHOPAEDIC SURGERY

## 2017-10-24 PROCEDURE — G8427 DOCREV CUR MEDS BY ELIG CLIN: HCPCS | Performed by: ORTHOPAEDIC SURGERY

## 2017-10-24 PROCEDURE — 99203 OFFICE O/P NEW LOW 30 MIN: CPT | Performed by: ORTHOPAEDIC SURGERY

## 2017-10-24 PROCEDURE — 3014F SCREEN MAMMO DOC REV: CPT | Performed by: ORTHOPAEDIC SURGERY

## 2017-10-24 PROCEDURE — G8417 CALC BMI ABV UP PARAM F/U: HCPCS | Performed by: ORTHOPAEDIC SURGERY

## 2017-10-24 PROCEDURE — G8484 FLU IMMUNIZE NO ADMIN: HCPCS | Performed by: ORTHOPAEDIC SURGERY

## 2017-10-24 ASSESSMENT — ENCOUNTER SYMPTOMS
BACK PAIN: 1
BOWEL INCONTINENCE: 0

## 2017-10-24 NOTE — PROGRESS NOTES
reveal that the spondylolisthesis at L4 5 increases to 10 mm  Assessment:      Encounter Diagnoses   Name Primary?     Chronic midline low back pain with right-sided sciatica Yes    DDD (degenerative disc disease), lumbar     Spinal stenosis of lumbar region, unspecified whether neurogenic claudication present     Acquired spondylolisthesis      Symptomatic unstable spondylolisthesis at L4 5 which increases from approximately 3 mm on supine MRI to 10 mm on standing lateral flexion and extension      Plan:      Physical therapy    Patient has declined epidurals    Follow-up 6 weeks

## 2017-11-08 ENCOUNTER — HOSPITAL ENCOUNTER (OUTPATIENT)
Age: 57
Discharge: HOME | End: 2017-11-08
Payer: COMMERCIAL

## 2017-11-08 ENCOUNTER — HOSPITAL ENCOUNTER (OUTPATIENT)
Dept: PHYSICAL THERAPY | Age: 57
Setting detail: THERAPIES SERIES
Discharge: HOME | End: 2017-11-08
Payer: COMMERCIAL

## 2017-11-08 ENCOUNTER — HOSPITAL ENCOUNTER (OUTPATIENT)
Dept: GENERAL RADIOLOGY | Age: 57
Discharge: HOME | End: 2017-11-08
Payer: COMMERCIAL

## 2017-11-08 DIAGNOSIS — M19.011 PRIMARY OSTEOARTHRITIS OF RIGHT SHOULDER: ICD-10-CM

## 2017-11-08 PROCEDURE — 97162 PT EVAL MOD COMPLEX 30 MIN: CPT

## 2017-11-08 PROCEDURE — 73030 X-RAY EXAM OF SHOULDER: CPT

## 2017-11-08 PROCEDURE — 97110 THERAPEUTIC EXERCISES: CPT

## 2017-11-13 ENCOUNTER — HOSPITAL ENCOUNTER (OUTPATIENT)
Dept: PHYSICAL THERAPY | Age: 57
Setting detail: THERAPIES SERIES
Discharge: HOME | End: 2017-11-13
Payer: COMMERCIAL

## 2017-11-13 PROCEDURE — 97113 AQUATIC THERAPY/EXERCISES: CPT

## 2017-11-13 ASSESSMENT — PAIN SCALES - GENERAL: PAINLEVEL_OUTOF10: 6

## 2017-11-13 ASSESSMENT — PAIN DESCRIPTION - ORIENTATION: ORIENTATION: LOWER

## 2017-11-13 ASSESSMENT — PAIN DESCRIPTION - LOCATION: LOCATION: BACK

## 2017-11-13 NOTE — PROGRESS NOTES
Physical Therapy  [address]   Outpatient Physical Therapy  [address] [facility]. [address]  Phone: [phone]  Fax: [phone]  Daily Progress Note    Date: 17    Patient Name: Mere Velásquez        MRN: [id number]  Account: [de-identified] : 1960      General Information:  Chart Reviewed: Yes  Response To Previous Treatment: Not applicable (Initial aquatics visit)  Referring Practitioner: Lawyer Shahab PA-C / Dr. Cindy Duran  Referral Date : 17  Diagnosis: Sciatica R side M54.31    Sciatica Left side M54.32    History of total R shoulder replacement Z96.611  Follows Commands: Within Functional Limits  Onset Date: 17  PT Insurance Information: [facility] ( 19 vs remaining )  Total # of Visits to Date: 2  No Show: 0  Canceled Appointment: 0    Subjective:  Subjective: Patient with complaints of constant pain across her lower back and in (B) knees. Pain:  Patient Currently in Pain: Yes  Pain Assessment: 0-10  Pain Level: 6  Pain Location: Back ((B) knees)  Pain Orientation: Lower     Objective:  [facility] Exercise Log  Aquatic, Hip & DLS Program- Phase 1    Date of Eval:     17                           Primary PT: Mehul Miller  Diagnosis: Things to Focus On (goals):   Surgical Precautions:  Medical Precautions:  [] C-9 dates  [] Occ Med   [] Medicare       Date 17       Visit # 2       Walk F/L/R 2 laps ea       Marching 10x       Squats 10x5\"       Step-Ups F/L Low 10x * lat      Heel-toe raises 10x       SLR F/L/R 10x       Knee/Flex/Ext        F/L Lunges        Kickboard Ex. Iso Abd.         Push-pull        Paddling                Balance                        Stretches        Achllies 2x20\"       Hamstring 2x20\"                               Cool down 2 laps       Pain Rating 6           Comment:  Comments: Patient to talk to primary PT- would like to change ratio/frequency of land and pool sessions    Assessment:  Prognosis: Good  Patient Education: Initial aquatics visit: Educated patient on PN and posture.   Instructed in warm-ups, exercises and stretches per log at patient's tolerance  REQUIRES PT FOLLOW UP: Yes  Activity Tolerance: Patient Tolerated treatment well    Plan:  Plan: Continue with current plan (Assess patient response and progress as able)    Therapy Time:  Time In: 1521  Time Out: 1609  Minutes: 48  Timed Code Treatment Minutes: 25 Minutes    Treatment Charges: Minutes Units   []  Ultrasound     []  Electrical-Stim     []  Iontophoresis     []  Traction     []  Massage       []  Eval     []  Gait     []  Ther Exercise       []  Manual Therapy       []  Ther Activities       [x]  Aquatics 25 2   []  Neuro Re-Ed       []  Other       Total Treatment Time: 25 2     Electronically signed by Benjamin Houston PTA on 11/13/2017 at 6:56 PM

## 2017-11-15 ENCOUNTER — HOSPITAL ENCOUNTER (OUTPATIENT)
Dept: PHYSICAL THERAPY | Age: 57
Setting detail: THERAPIES SERIES
Discharge: HOME | End: 2017-11-15
Payer: COMMERCIAL

## 2017-11-15 PROCEDURE — 97140 MANUAL THERAPY 1/> REGIONS: CPT

## 2017-11-15 PROCEDURE — 97110 THERAPEUTIC EXERCISES: CPT

## 2017-11-16 ASSESSMENT — PAIN DESCRIPTION - INTENSITY: RATING_2: 7

## 2017-11-16 ASSESSMENT — PAIN DESCRIPTION - LOCATION
LOCATION: BACK
LOCATION_2: SHOULDER

## 2017-11-16 ASSESSMENT — PAIN SCALES - GENERAL: PAINLEVEL_OUTOF10: 7

## 2017-11-16 ASSESSMENT — PAIN DESCRIPTION - DIRECTION: RADIATING_TOWARDS_2: (R) ANTERIOR SHOULDER

## 2017-11-16 ASSESSMENT — PAIN DESCRIPTION - ORIENTATION
ORIENTATION: LOWER
ORIENTATION_2: ANTERIOR

## 2017-11-16 NOTE — PROGRESS NOTES
Physical Therapy  Initial Assessment  Date: 2017  Patient Name: Corby Gunn  MRN: [id number]  : 1960     Treatment Diagnosis: Pain, difficulty walking, decreased ROM, strength and function of low back and R shoulder , S/P total shoulder replacement    Restrictions       Subjective   General  Chart Reviewed: Yes  Patient assessed for rehabilitation services?: Yes  Additional Pertinent Hx: Had previous therapy for L shoulder following a shoulder replacement, therapy for (R) shoulder and low back previously but DC secondary to health issues  Referring Practitioner: Gabriella Hickey  Referral Date : 10/24/17  Diagnosis: Sciatica R side M54.31    Sciatica Left side M54.32   add History of total R shoulder replacement Z96.611  Follows Commands: Within Functional Limits  PT Visit Information  PT Insurance Information: [facility]   Total # of Visits Approved: 12  Total # of Visits to Date: 1  No Show: 0  Canceled Appointment: 0  Subjective  Subjective: Patient with complaints of low back pain across low back, complaints of radicular symptoms in toe h(R) LE to the knee. Patient with complaints of (R) shoulder pain s/p shoulder replacement. Cotninues to demonstrate limited tolerance of lifting/carrying more than 5#, reaching overhead secondary to complaints of \"clicking\", complaints of difficulty with laying on her (R) side secondary to shoulder symptoms. Patient with complaints of inability to sit more than 10 minutes, difficulty with ascending and descending stairs secondary to back and leg pain.   Pain Screening  Patient Currently in Pain: Yes  Pain Assessment  Pain Assessment: 0-10  Pain Level: 7  Pain Location: Back  Pain Orientation: Lower  Pain 2  Pain Rating 2: 7  Pain Location 2: Shoulder  Pain Orientation 2: Anterior  Pain Radiating Towards 2: (R) anterior shoulder  Vital Signs  Patient Currently in Pain: Yes    Objective  Observation/Palpation  Palpation: MOD TTP (R) lateral shoulder, (R) anterior necessary rehabilitation services.   [x] Physician Signature    Date:11/8/17     Electronically signed by: Eriberto Moulton, [id number] @ [facility]  [address] [place] [address] [address]  [address]  Phone [phone]  Fax [phone]

## 2017-11-17 ASSESSMENT — PAIN DESCRIPTION - LOCATION
LOCATION_2: SHOULDER
LOCATION: BACK

## 2017-11-17 ASSESSMENT — PAIN SCALES - GENERAL: PAINLEVEL_OUTOF10: 6

## 2017-11-17 ASSESSMENT — PAIN DESCRIPTION - PAIN TYPE: TYPE_2: CHRONIC PAIN

## 2017-11-17 ASSESSMENT — PAIN DESCRIPTION - DIRECTION: RADIATING_TOWARDS_2: (R) ANTERIOR SHOULDER

## 2017-11-17 ASSESSMENT — PAIN DESCRIPTION - ORIENTATION
ORIENTATION_2: ANTERIOR
ORIENTATION: LOWER

## 2017-11-17 ASSESSMENT — PAIN DESCRIPTION - INTENSITY: RATING_2: 7

## 2017-11-17 NOTE — PROGRESS NOTES
Physical Therapy  Daily Treatment Note  Date: 11/15/2017  Patient Name: Atif Guevara  MRN: [id number]     :   1960    Subjective:   General  Chart Reviewed: Yes  Additional Pertinent Hx: Had previous therapy for L shoulder following a shoulder replacement, therapy for (R) shoulder and low back previously but DC secondary to health issues  Referring Practitioner: Azam Graham  PT Visit Information  PT Insurance Information: [facility]   Total # of Visits Approved: 12  Total # of Visits to Date: 3  Subjective  Subjective: Patient noting increased complaints of pain in the (R) anterior shoulder and is worried about pain with use and with overhead elevation of shoulder. Notes continued complaints of pain in the low back but stated that she had better symptoms with doing aquatic therapy. Notes continued complaints of soreness in knees with walking. Pain Screening  Patient Currently in Pain: Yes  Pain Assessment  Pain Assessment: 0-10  Pain Level: 6  Pain Location: Back  Pain Orientation: Lower  Patient's Stated Pain Goal: No pain  Pain 2  Pain Rating 2: 7  Pain Type 2: Chronic Pain  Pain Location 2: Shoulder  Pain Orientation 2: Anterior  Pain Radiating Towards 2: (R) anterior shoulder  Vital Signs  Patient Currently in Pain: Yes       Treatment Activities:   Manual therapy  Joint mobilization: glides, inferior glide, anterior glide     Exercises  Exercise 1: Biodex L1, 2/2'  Exercise 2: 4 way shoulder, R2, R1 for ER 20x  Exercise 3: supine shoulder ABCs 1x 2#  Exercise 4: supine scapular punch 2# 20x  Exercise 5: wand ER 10 x 5\"  Exercise 6: shoulder extension 10 x 5\"     Assessment:   Conditions Requiring Skilled Therapeutic Intervention  Body structures, Functions, Activity limitations: Decreased functional mobility ; Decreased ADL status; Decreased ROM; Decreased strength  Assessment: Added shoulder strengthening/stabilization exercises to help normalize glenohumeral mobility and decrease complaints of popping

## 2017-11-20 ENCOUNTER — HOSPITAL ENCOUNTER (OUTPATIENT)
Dept: PHYSICAL THERAPY | Age: 57
Setting detail: THERAPIES SERIES
Discharge: HOME | End: 2017-11-20
Payer: COMMERCIAL

## 2017-11-20 ENCOUNTER — APPOINTMENT (OUTPATIENT)
Dept: PHYSICAL THERAPY | Age: 57
End: 2017-11-20
Payer: COMMERCIAL

## 2017-11-22 ENCOUNTER — APPOINTMENT (OUTPATIENT)
Dept: PHYSICAL THERAPY | Age: 57
End: 2017-11-22
Payer: COMMERCIAL

## 2017-11-27 ENCOUNTER — APPOINTMENT (OUTPATIENT)
Dept: PHYSICAL THERAPY | Age: 57
End: 2017-11-27
Payer: COMMERCIAL

## 2017-11-27 ENCOUNTER — HOSPITAL ENCOUNTER (OUTPATIENT)
Dept: PHYSICAL THERAPY | Age: 57
Setting detail: THERAPIES SERIES
Discharge: HOME | End: 2017-11-27
Payer: COMMERCIAL

## 2017-11-28 ENCOUNTER — HOSPITAL ENCOUNTER (OUTPATIENT)
Age: 57
Discharge: HOME | End: 2017-11-28
Payer: COMMERCIAL

## 2017-11-28 DIAGNOSIS — R19.7 DIARRHEA, UNSPECIFIED TYPE: ICD-10-CM

## 2017-11-28 DIAGNOSIS — R10.9 LEFT SIDED ABDOMINAL PAIN: ICD-10-CM

## 2017-11-28 LAB
-: ABNORMAL
ABSOLUTE EOS #: 0.1 K/UL (ref 0–0.4)
ABSOLUTE IMMATURE GRANULOCYTE: ABNORMAL K/UL (ref 0–0.3)
ABSOLUTE LYMPH #: 3.2 K/UL (ref 1–4.8)
ABSOLUTE MONO #: 0.8 K/UL (ref 0.1–1.3)
ALBUMIN SERPL-MCNC: 4.6 G/DL (ref 3.5–5.2)
ALBUMIN/GLOBULIN RATIO: ABNORMAL (ref 1–2.5)
ALP BLD-CCNC: 99 U/L (ref 35–104)
ALT SERPL-CCNC: 15 U/L (ref 5–33)
AMORPHOUS: ABNORMAL
ANION GAP SERPL CALCULATED.3IONS-SCNC: 14 MMOL/L (ref 9–17)
AST SERPL-CCNC: 16 U/L
BACTERIA: ABNORMAL
BASOPHILS # BLD: 1 % (ref 0–2)
BASOPHILS ABSOLUTE: 0.1 K/UL (ref 0–0.2)
BILIRUB SERPL-MCNC: 0.27 MG/DL (ref 0.3–1.2)
BILIRUBIN URINE: ABNORMAL
BUN BLDV-MCNC: 17 MG/DL (ref 6–20)
BUN/CREAT BLD: ABNORMAL (ref 9–20)
CALCIUM SERPL-MCNC: 9.5 MG/DL (ref 8.6–10.4)
CASTS UA: ABNORMAL /LPF
CHLORIDE BLD-SCNC: 101 MMOL/L (ref 98–107)
CO2: 25 MMOL/L (ref 20–31)
COLOR: YELLOW
COMMENT UA: ABNORMAL
CREAT SERPL-MCNC: 0.63 MG/DL (ref 0.5–0.9)
CRYSTALS, UA: ABNORMAL /HPF
DIFFERENTIAL TYPE: ABNORMAL
EOSINOPHILS RELATIVE PERCENT: 1 % (ref 0–4)
EPITHELIAL CELLS UA: ABNORMAL /HPF
GFR AFRICAN AMERICAN: >60 ML/MIN
GFR NON-AFRICAN AMERICAN: >60 ML/MIN
GFR SERPL CREATININE-BSD FRML MDRD: ABNORMAL ML/MIN/{1.73_M2}
GFR SERPL CREATININE-BSD FRML MDRD: ABNORMAL ML/MIN/{1.73_M2}
GLUCOSE BLD-MCNC: 100 MG/DL (ref 70–99)
GLUCOSE URINE: NEGATIVE
HCT VFR BLD CALC: 42.4 % (ref 36–46)
HEMOGLOBIN: 14 G/DL (ref 12–16)
IMMATURE GRANULOCYTES: ABNORMAL %
KETONES, URINE: ABNORMAL
LEUKOCYTE ESTERASE, URINE: ABNORMAL
LIPASE: 40 U/L (ref 13–60)
LYMPHOCYTES # BLD: 26 % (ref 24–44)
MCH RBC QN AUTO: 29.5 PG (ref 26–34)
MCHC RBC AUTO-ENTMCNC: 32.9 G/DL (ref 31–37)
MCV RBC AUTO: 89.5 FL (ref 80–100)
MONOCYTES # BLD: 6 % (ref 1–7)
MUCUS: ABNORMAL
NITRITE, URINE: POSITIVE
OTHER OBSERVATIONS UA: ABNORMAL
PDW BLD-RTO: 13.5 % (ref 11.5–14.9)
PH UA: 6 (ref 5–8)
PLATELET # BLD: 420 K/UL (ref 150–450)
PLATELET ESTIMATE: ABNORMAL
PMV BLD AUTO: 8 FL (ref 6–12)
POTASSIUM SERPL-SCNC: 4.5 MMOL/L (ref 3.7–5.3)
PROTEIN UA: NEGATIVE
RBC # BLD: 4.74 M/UL (ref 4–5.2)
RBC # BLD: ABNORMAL 10*6/UL
RBC UA: ABNORMAL /HPF
RENAL EPITHELIAL, UA: ABNORMAL /HPF
SEG NEUTROPHILS: 66 % (ref 36–66)
SEGMENTED NEUTROPHILS ABSOLUTE COUNT: 8.2 K/UL (ref 1.3–9.1)
SODIUM BLD-SCNC: 140 MMOL/L (ref 135–144)
SPECIFIC GRAVITY UA: 1.02 (ref 1–1.03)
TOTAL PROTEIN: 7.1 G/DL (ref 6.4–8.3)
TRICHOMONAS: ABNORMAL
TURBIDITY: ABNORMAL
URINE HGB: NEGATIVE
UROBILINOGEN, URINE: NORMAL
WBC # BLD: 12.4 K/UL (ref 3.5–11)
WBC # BLD: ABNORMAL 10*3/UL
WBC UA: ABNORMAL /HPF
YEAST: ABNORMAL

## 2017-11-28 PROCEDURE — 83690 ASSAY OF LIPASE: CPT

## 2017-11-28 PROCEDURE — 87088 URINE BACTERIA CULTURE: CPT

## 2017-11-28 PROCEDURE — 80053 COMPREHEN METABOLIC PANEL: CPT

## 2017-11-28 PROCEDURE — 81001 URINALYSIS AUTO W/SCOPE: CPT

## 2017-11-28 PROCEDURE — 87186 SC STD MICRODIL/AGAR DIL: CPT

## 2017-11-28 PROCEDURE — 36415 COLL VENOUS BLD VENIPUNCTURE: CPT

## 2017-11-28 PROCEDURE — 85025 COMPLETE CBC W/AUTO DIFF WBC: CPT

## 2017-11-28 PROCEDURE — 87086 URINE CULTURE/COLONY COUNT: CPT

## 2017-11-29 ENCOUNTER — APPOINTMENT (OUTPATIENT)
Dept: PHYSICAL THERAPY | Age: 57
End: 2017-11-29
Payer: COMMERCIAL

## 2017-11-30 ENCOUNTER — HOSPITAL ENCOUNTER (OUTPATIENT)
Dept: PHYSICAL THERAPY | Age: 57
Setting detail: THERAPIES SERIES
Discharge: HOME | End: 2017-11-30
Payer: COMMERCIAL

## 2017-11-30 LAB
CULTURE: ABNORMAL
CULTURE: ABNORMAL
Lab: ABNORMAL
ORGANISM: ABNORMAL
SPECIMEN DESCRIPTION: ABNORMAL
SPECIMEN DESCRIPTION: ABNORMAL
STATUS: ABNORMAL

## 2017-11-30 NOTE — PROGRESS NOTES
Physical Therapy  [address]   Outpatient Physical Therapy  Cancel/No Show Note    Date: 17    Patient Name: Mary Benavidez        MRN: [id number]  Account: [de-identified] : 1960      General Information:  Referring Practitioner: Haritha Delacruz  Referral Date : 10/24/17  Diagnosis: Sciatica R side M54.31    Sciatica Left side M54.32   add History of total R shoulder replacement Z96.611  Onset Date: 17  PT Insurance Information: [facility]   Total # of Visits Approved: 12  Total # of Visits to Date: 3  No Show: 0  Canceled Appointment: 3      Comments: CANCELLED [id number]; HAS APPTS MADE FOR [place], [place]

## 2017-12-01 ENCOUNTER — HOSPITAL ENCOUNTER (OUTPATIENT)
Age: 57
Setting detail: SPECIMEN
Discharge: HOME | End: 2017-12-01
Payer: COMMERCIAL

## 2017-12-01 DIAGNOSIS — R10.9 LEFT SIDED ABDOMINAL PAIN: ICD-10-CM

## 2017-12-01 DIAGNOSIS — R19.7 DIARRHEA, UNSPECIFIED TYPE: ICD-10-CM

## 2017-12-01 LAB
DATE, STOOL #1: NORMAL
DATE, STOOL #2: NORMAL
DATE, STOOL #3: NORMAL
DIRECT EXAM: NORMAL
HEMOCCULT SP1 STL QL: NEGATIVE
HEMOCCULT SP2 STL QL: NORMAL
HEMOCCULT SP3 STL QL: NORMAL
LACTOFERRIN, QUAL: NORMAL
Lab: NORMAL
Lab: NORMAL
SPECIMEN DESCRIPTION: NORMAL
SPECIMEN DESCRIPTION: NORMAL
STATUS: NORMAL
STATUS: NORMAL
TIME, STOOL #1: NORMAL
TIME, STOOL #2: NORMAL
TIME, STOOL #3: NORMAL

## 2017-12-02 LAB
C DIFFICILE TOXINS, PCR: NORMAL
CAMPYLOBACTER PCR: NORMAL
DIRECT EXAM: NEGATIVE
DIRECT EXAM: NORMAL
Lab: NORMAL
SALMONELLA PCR: NORMAL
SHIGATOXIN GENE PCR: NORMAL
SHIGELLA SP PCR: NORMAL
SPECIMEN DESCRIPTION: NORMAL
SPECIMEN DESCRIPTION: NORMAL
SPECIMEN: NORMAL
STATUS: NORMAL

## 2017-12-04 ENCOUNTER — HOSPITAL ENCOUNTER (OUTPATIENT)
Dept: PHYSICAL THERAPY | Age: 57
Setting detail: THERAPIES SERIES
Discharge: HOME | End: 2017-12-04
Payer: COMMERCIAL

## 2017-12-04 DIAGNOSIS — R19.4 CHANGE IN BOWEL HABITS: ICD-10-CM

## 2017-12-04 DIAGNOSIS — R14.0 BLOATING: ICD-10-CM

## 2017-12-04 DIAGNOSIS — Z12.11 COLON CANCER SCREENING: ICD-10-CM

## 2017-12-04 DIAGNOSIS — K57.90 DIVERTICULOSIS OF INTESTINE WITHOUT BLEEDING, UNSPECIFIED INTESTINAL TRACT LOCATION: ICD-10-CM

## 2017-12-04 LAB
DIRECT EXAM: NORMAL
Lab: NORMAL
SPECIMEN DESCRIPTION: NORMAL
STATUS: NORMAL

## 2017-12-04 PROCEDURE — 97110 THERAPEUTIC EXERCISES: CPT

## 2017-12-04 PROCEDURE — 97140 MANUAL THERAPY 1/> REGIONS: CPT

## 2017-12-05 LAB
FAT QUALITATIVE SPLIT STOOL: NORMAL
FECAL NEUTRAL FAT: NORMAL

## 2017-12-05 ASSESSMENT — PAIN DESCRIPTION - LOCATION
LOCATION_2: SHOULDER
LOCATION: BACK

## 2017-12-05 ASSESSMENT — PAIN DESCRIPTION - DIRECTION: RADIATING_TOWARDS_2: (R) ANTERIOR SHOULDER

## 2017-12-05 ASSESSMENT — PAIN SCALES - GENERAL: PAINLEVEL_OUTOF10: 6

## 2017-12-05 ASSESSMENT — PAIN DESCRIPTION - ORIENTATION
ORIENTATION_2: ANTERIOR
ORIENTATION: LOWER

## 2017-12-05 ASSESSMENT — PAIN DESCRIPTION - PAIN TYPE: TYPE: SURGICAL PAIN

## 2017-12-05 ASSESSMENT — PAIN DESCRIPTION - INTENSITY: RATING_2: 7

## 2017-12-07 ENCOUNTER — HOSPITAL ENCOUNTER (OUTPATIENT)
Dept: PHYSICAL THERAPY | Age: 57
Setting detail: THERAPIES SERIES
Discharge: HOME | End: 2017-12-07
Payer: COMMERCIAL

## 2017-12-11 ENCOUNTER — HOSPITAL ENCOUNTER (OUTPATIENT)
Dept: PHYSICAL THERAPY | Age: 57
Setting detail: THERAPIES SERIES
End: 2017-12-11
Payer: COMMERCIAL

## 2017-12-11 NOTE — PROGRESS NOTES
Physical Therapy  [address]   Outpatient Physical Therapy  Cancel/No Show Note    Date: 17    Patient Name: Gil Dey        MRN: [id number]  Account: [de-identified] : 1960      General Information:  Referring Practitioner: Ilana Palomino  Referral Date : 10/24/17  Diagnosis: Sciatica R side M54.31    Sciatica Left side M54.32   add History of total R shoulder replacement Z96.611  Onset Date: 17  PT Insurance Information: [facility]   Total # of Visits Approved: 12  Total # of Visits to Date: 4  No Show: 0  Canceled Appointment: 5      Comments: CANCELLED [address], PTA

## 2017-12-14 ENCOUNTER — HOSPITAL ENCOUNTER (OUTPATIENT)
Dept: PHYSICAL THERAPY | Age: 57
Setting detail: THERAPIES SERIES
Discharge: HOME | End: 2017-12-14
Payer: COMMERCIAL

## 2017-12-14 NOTE — PROGRESS NOTES
Physical Therapy  [address]   Outpatient Physical Therapy  Cancel/No Show Note    Date: 17    Patient Name: Alonzo Ernst        MRN: [id number]  Account: [de-identified] : 1960      General Information:  Onset Date: 17  PT Insurance Information: [employer]   Total # of Visits Approved: 12  Total # of Visits to Date: 4  No Show: 0  Canceled Appointment: 6      Comments: Patient canceled her aquatics session for today's date per the - no reason given    Electronically signed by Digna Guaman PTA on 2017 at 3:02 PM

## 2017-12-18 ENCOUNTER — OFFICE VISIT (OUTPATIENT)
Dept: FAMILY MEDICINE CLINIC | Age: 57
End: 2017-12-18
Payer: COMMERCIAL

## 2017-12-18 ENCOUNTER — HOSPITAL ENCOUNTER (OUTPATIENT)
Dept: PHYSICAL THERAPY | Age: 57
Setting detail: THERAPIES SERIES
Discharge: HOME | End: 2017-12-18
Payer: COMMERCIAL

## 2017-12-18 VITALS
WEIGHT: 235 LBS | TEMPERATURE: 98 F | OXYGEN SATURATION: 95 % | HEART RATE: 104 BPM | HEIGHT: 62 IN | RESPIRATION RATE: 16 BRPM | DIASTOLIC BLOOD PRESSURE: 82 MMHG | SYSTOLIC BLOOD PRESSURE: 139 MMHG | BODY MASS INDEX: 43.24 KG/M2

## 2017-12-18 DIAGNOSIS — K86.1 CHRONIC PANCREATITIS, UNSPECIFIED PANCREATITIS TYPE (HCC): Primary | ICD-10-CM

## 2017-12-18 DIAGNOSIS — R35.0 URINARY FREQUENCY: ICD-10-CM

## 2017-12-18 PROBLEM — E11.9 DIABETES TYPE 2, NO OCULAR INVOLVEMENT (HCC): Chronic | Status: ACTIVE | Noted: 2017-04-25

## 2017-12-18 LAB
BILIRUBIN, POC: ABNORMAL
BLOOD URINE, POC: ABNORMAL
CLARITY, POC: ABNORMAL
COLOR, POC: ABNORMAL
GLUCOSE URINE, POC: NEGATIVE
KETONES, POC: NEGATIVE
LEUKOCYTE EST, POC: ABNORMAL
NITRITE, POC: NEGATIVE
PH, POC: 5
PROTEIN, POC: ABNORMAL
SPECIFIC GRAVITY, POC: 1.02
UROBILINOGEN, POC: NORMAL

## 2017-12-18 PROCEDURE — 1036F TOBACCO NON-USER: CPT | Performed by: FAMILY MEDICINE

## 2017-12-18 PROCEDURE — G8598 ASA/ANTIPLAT THER USED: HCPCS | Performed by: FAMILY MEDICINE

## 2017-12-18 PROCEDURE — 3017F COLORECTAL CA SCREEN DOC REV: CPT | Performed by: FAMILY MEDICINE

## 2017-12-18 PROCEDURE — 81002 URINALYSIS NONAUTO W/O SCOPE: CPT | Performed by: FAMILY MEDICINE

## 2017-12-18 PROCEDURE — G8484 FLU IMMUNIZE NO ADMIN: HCPCS | Performed by: FAMILY MEDICINE

## 2017-12-18 PROCEDURE — 99214 OFFICE O/P EST MOD 30 MIN: CPT | Performed by: FAMILY MEDICINE

## 2017-12-18 PROCEDURE — 97112 NEUROMUSCULAR REEDUCATION: CPT

## 2017-12-18 PROCEDURE — 3014F SCREEN MAMMO DOC REV: CPT | Performed by: FAMILY MEDICINE

## 2017-12-18 PROCEDURE — G8427 DOCREV CUR MEDS BY ELIG CLIN: HCPCS | Performed by: FAMILY MEDICINE

## 2017-12-18 PROCEDURE — G8417 CALC BMI ABV UP PARAM F/U: HCPCS | Performed by: FAMILY MEDICINE

## 2017-12-18 PROCEDURE — 97110 THERAPEUTIC EXERCISES: CPT

## 2017-12-18 RX ORDER — CIPROFLOXACIN 500 MG/1
500 TABLET, FILM COATED ORAL 2 TIMES DAILY
Qty: 20 TABLET | Refills: 0 | Status: SHIPPED | OUTPATIENT
Start: 2017-12-18 | End: 2017-12-28

## 2017-12-18 ASSESSMENT — ENCOUNTER SYMPTOMS
EYES NEGATIVE: 1
ALLERGIC/IMMUNOLOGIC NEGATIVE: 1
ANAL BLEEDING: 1
BLOOD IN STOOL: 1
RESPIRATORY NEGATIVE: 1
ABDOMINAL DISTENTION: 1
NAUSEA: 1
VOMITING: 1
ABDOMINAL PAIN: 1
CONSTIPATION: 0

## 2017-12-18 NOTE — PROGRESS NOTES
Anginal pain ([facility] [address]     last used Nitro sl 4 yrs     Arthritis     Asthma     Back pain     radiculopathy left leg    Constipation     Dysmenorrhea     Fatty liver disease, nonalcoholic     GERD (gastroesophageal reflux disease)     Gout     found through bloodwork    Heart palpitations     Hyperlipidemia     Hypertension     Hyperthyroidism     Hypertriglyceridemia     Hypothyroidism 2016    Neuropathy ([facility] [address]     bilateral legs and feet    Obesity     Pancreatitis     no problems    Panic attack 10/30/2014    Sleep apnea     Stenosis of both internal carotid arteries- Mild 16-49% 2017    Type II or unspecified type diabetes mellitus without mention of complication, not stated as uncontrolled     checks daily      Past Surgical History:   Procedure Laterality Date    CARDIAC CATHETERIZATION      Patient states approximately  she had the cardiac catheterization that was negative      SECTION      x 2    COLONOSCOPY      DILATION AND CURETTAGE OF UTERUS      JOINT REPLACEMENT Left 2016    shoulder    [place] COLON CA SCRN NOT HI RSK IND N/A 10/12/2017    COLONOSCOPY performed by Angela Vasquez MD at [address] EGD TRANSORAL BIOPSY SINGLE/MULTIPLE N/A 10/12/2017    EGD BIOPSY performed by Angela Vasquez MD at [address] Right 2017    SHOULDER TOTAL ARTHROPLASTY RIGHT WITH [address] [facility] AND AQUAMANTIS performed by Zoe Pederson DO at [place] [address] Right 2017       Family History   Problem Relation Age of Onset    Emphysema Mother     Diabetes Father     Heart Disease Father     High Cholesterol Sister     High Blood Pressure Sister     Heart Disease Sister        Social History   Substance Use Topics    Smoking status: Former Smoker     Packs/day: 0.50     Types: Cigarettes     Quit date: 1977    Smokeless tobacco: Never Used    Alcohol use 0.0 oz/week      Comment: occasionally Current Outpatient Prescriptions   Medication Sig Dispense Refill    ciprofloxacin (CIPRO) 500 MG tablet Take 1 tablet by mouth 2 times daily for 10 days 20 tablet 0    Probiotic Product (ALIGN) 4 MG CAPS TAKE ONE CAPSULE BY MOUTH ONCE DAILY 28 capsule 1    rOPINIRole (REQUIP) 0.5 MG tablet Take 3 tablets by mouth nightly 90 tablet 0    metoprolol succinate (TOPROL XL) 25 MG extended release tablet TAKE ONE TABLET BY MOUTH ONCE DAILY 90 tablet 0    B-D ULTRAFINE III SHORT PEN 31G X 8 MM MISC USE AS DIRECTED FOR INSULIN ADMINISTRATION 100 each 3    glucose monitoring kit (FREESTYLE) monitoring kit Check BS ACHS and PRN. 1 kit 0    Glucose Blood (BLOOD GLUCOSE TEST STRIPS) STRP Check BS ACHS and PRN. 200 strip 3    aspirin 81 MG tablet Take 1 tablet by mouth daily 30 tablet 1    Elastic Bandages & Supports (MEDICAL COMPRESSION STOCKINGS) MISC Will need fitted- for vertigo, hx of edema. 1 each 0    lidocaine (XYLOCAINE) 5 % ointment Apply topically as needed.      3 times a dayDispense 1 tube      venlafaxine (EFFEXOR XR) 37.5 MG extended release capsule TAKE ONE CAPSULE BY MOUTH THREE TIMES DAILY (Patient taking differently: 2 times daily TAKE ONE CAPSULE BY MOUTH THREE TIMES DAILY) 90 capsule 5    ibuprofen (ADVIL;MOTRIN) 800 MG tablet Take 1 tablet by mouth every 8 hours as needed for Pain 90 tablet 5    levothyroxine (SYNTHROID) 100 MCG tablet TAKE ONE TABLET BY MOUTH ONCE DAILY 90 tablet 0    lisinopril (PRINIVIL;ZESTRIL) 5 MG tablet Take 1 tablet by mouth daily 30 tablet 3    dicyclomine (BENTYL) 10 MG capsule Take 1 capsule by mouth 4 times daily 120 capsule 3    Cholecalciferol (VITAMIN D3) 2000 units TABS TAKE ONE TABLET BY MOUTH ONCE DAILY 30 tablet 5    nystatin (MYCOSTATIN) 641024 UNIT/GM powder APPLY TOPICALLY TO ABDONMINAL FOLDS THREE TIMES DAILY AS NEEDED 30 g 1    Tens Unit MISC by Does not apply route 1 each 0    VICTOZA 18 MG/3ML SOPN SC injection Inject 18 mg into the skin every [Ezetimibe] Other (See Comments)     Does not remember reaction  Does not remember reaction  Does not remember reaction    Zocor [Simvastatin] Other (See Comments)     Muscle cramps    Ampicillin Rash    Gabapentin Rash    Niacin And Related Rash    Novolin [Insulin Isophane & Reg, Human] Rash    Omeprazole Nausea And Vomiting    Pcn [Penicillins] Rash    Pregabalin Nausea And Vomiting and Rash    Vesicare [Solifenacin] Rash       Health Maintenance   Topic Date Due    Pneumococcal med risk (1 of 1 - PPSV23) 02/28/2018 (Originally 12/19/1979)    Diabetic retinal exam  03/11/2018 (Originally 6/3/2017)    DTaP/Tdap/Td vaccine (1 - Tdap) 09/11/2018 (Originally 12/19/1979)    Flu vaccine (1) 09/11/2018 (Originally 9/1/2017)    Lipid screen  02/07/2018    Diabetic hemoglobin A1C test  04/19/2018    Diabetic foot exam  08/21/2018    Breast cancer screen  09/16/2018    Diabetic microalbuminuria test  09/26/2018    Cervical cancer screen  04/24/2022    Colon cancer screen colonoscopy  10/12/2022    Hepatitis C screen  Addressed    HIV screen  Addressed       Subjective:      Review of Systems   Constitutional: Negative. HENT: Negative. Eyes: Negative. Respiratory: Negative. Cardiovascular: Negative. Gastrointestinal: Positive for abdominal distention, abdominal pain, anal bleeding, blood in stool, nausea and vomiting. Negative for constipation. Endocrine: Negative. Genitourinary: Positive for difficulty urinating, flank pain, frequency and urgency. Negative for vaginal discharge. Skin: Negative. Allergic/Immunologic: Negative. Neurological: Negative. Hematological: Negative. Psychiatric/Behavioral: Negative. Objective:     Physical Exam   Constitutional: She is oriented to person, place, and time. She appears well-developed and well-nourished. HENT:   Head: Normocephalic and atraumatic.    Right Ear: External ear normal.   Left Ear: External ear normal.   Nose: penicillins therefore ampicillin would not be used. Orders Placed This Encounter   Procedures    Lipase     Standing Status:   Future     Standing Expiration Date:   12/19/2018    Amylase     Standing Status:   Future     Standing Expiration Date:   12/19/2018    CBC With Auto Differential     Standing Status:   Future     Standing Expiration Date:   12/18/2018    POCT Urinalysis no Micro     Orders Placed This Encounter   Medications    ciprofloxacin (CIPRO) 500 MG tablet     Sig: Take 1 tablet by mouth 2 times daily for 10 days     Dispense:  20 tablet     Refill:  0       Patient given educational materials - see patient instructions. Discussed use, benefit, and side effects of prescribed medications. All patient questions answered. Pt voiced understanding. Reviewed health maintenance. Instructed to continue current medications, diet and exercise. Patient agreed with treatment plan. Follow up as directed.      Electronically signed by Julissa Cavazos MD on 12/18/2017 at 6:17 PM

## 2017-12-19 ENCOUNTER — OFFICE VISIT (OUTPATIENT)
Dept: PODIATRY | Age: 57
End: 2017-12-19
Payer: COMMERCIAL

## 2017-12-19 ENCOUNTER — HOSPITAL ENCOUNTER (OUTPATIENT)
Age: 57
Discharge: HOME | End: 2017-12-19
Payer: COMMERCIAL

## 2017-12-19 VITALS
HEART RATE: 91 BPM | BODY MASS INDEX: 41.54 KG/M2 | SYSTOLIC BLOOD PRESSURE: 138 MMHG | WEIGHT: 220 LBS | DIASTOLIC BLOOD PRESSURE: 78 MMHG | HEIGHT: 61 IN

## 2017-12-19 DIAGNOSIS — K86.1 CHRONIC PANCREATITIS, UNSPECIFIED PANCREATITIS TYPE (HCC): ICD-10-CM

## 2017-12-19 DIAGNOSIS — M21.961 FOOT DEFORMITY, BILATERAL: ICD-10-CM

## 2017-12-19 DIAGNOSIS — Z79.4 TYPE 2 DIABETES MELLITUS WITH DIABETIC POLYNEUROPATHY, WITH LONG-TERM CURRENT USE OF INSULIN (HCC): Primary | ICD-10-CM

## 2017-12-19 DIAGNOSIS — M21.962 FOOT DEFORMITY, BILATERAL: ICD-10-CM

## 2017-12-19 DIAGNOSIS — E11.42 TYPE 2 DIABETES MELLITUS WITH DIABETIC POLYNEUROPATHY, WITH LONG-TERM CURRENT USE OF INSULIN (HCC): Primary | ICD-10-CM

## 2017-12-19 DIAGNOSIS — B35.1 ONYCHOMYCOSIS: ICD-10-CM

## 2017-12-19 DIAGNOSIS — R25.2 NOCTURNAL FOOT CRAMPS: ICD-10-CM

## 2017-12-19 DIAGNOSIS — M79.2 NEUROPATHIC PAIN: ICD-10-CM

## 2017-12-19 LAB
ABSOLUTE EOS #: 0.2 K/UL (ref 0–0.4)
ABSOLUTE IMMATURE GRANULOCYTE: NORMAL K/UL (ref 0–0.3)
ABSOLUTE LYMPH #: 2.4 K/UL (ref 1–4.8)
ABSOLUTE MONO #: 0.7 K/UL (ref 0.1–1.3)
AMYLASE: 35 U/L (ref 28–100)
BASOPHILS # BLD: 1 % (ref 0–2)
BASOPHILS ABSOLUTE: 0.1 K/UL (ref 0–0.2)
DIFFERENTIAL TYPE: NORMAL
EOSINOPHILS RELATIVE PERCENT: 3 % (ref 0–4)
HCT VFR BLD CALC: 38.9 % (ref 36–46)
HEMOGLOBIN: 12.8 G/DL (ref 12–16)
IMMATURE GRANULOCYTES: NORMAL %
LIPASE: 25 U/L (ref 13–60)
LYMPHOCYTES # BLD: 25 % (ref 24–44)
MCH RBC QN AUTO: 29.6 PG (ref 26–34)
MCHC RBC AUTO-ENTMCNC: 33 G/DL (ref 31–37)
MCV RBC AUTO: 89.7 FL (ref 80–100)
MONOCYTES # BLD: 7 % (ref 1–7)
PDW BLD-RTO: 13.9 % (ref 11.5–14.9)
PLATELET # BLD: 402 K/UL (ref 150–450)
PLATELET ESTIMATE: NORMAL
PMV BLD AUTO: 7.7 FL (ref 6–12)
RBC # BLD: 4.34 M/UL (ref 4–5.2)
RBC # BLD: NORMAL 10*6/UL
SEG NEUTROPHILS: 64 % (ref 36–66)
SEGMENTED NEUTROPHILS ABSOLUTE COUNT: 6.2 K/UL (ref 1.3–9.1)
WBC # BLD: 9.5 K/UL (ref 3.5–11)
WBC # BLD: NORMAL 10*3/UL

## 2017-12-19 PROCEDURE — G8417 CALC BMI ABV UP PARAM F/U: HCPCS | Performed by: PODIATRIST

## 2017-12-19 PROCEDURE — 1036F TOBACCO NON-USER: CPT | Performed by: PODIATRIST

## 2017-12-19 PROCEDURE — 83690 ASSAY OF LIPASE: CPT

## 2017-12-19 PROCEDURE — 3045F PR MOST RECENT HEMOGLOBIN A1C LEVEL 7.0-9.0%: CPT | Performed by: PODIATRIST

## 2017-12-19 PROCEDURE — 3014F SCREEN MAMMO DOC REV: CPT | Performed by: PODIATRIST

## 2017-12-19 PROCEDURE — 82150 ASSAY OF AMYLASE: CPT

## 2017-12-19 PROCEDURE — 11721 DEBRIDE NAIL 6 OR MORE: CPT | Performed by: PODIATRIST

## 2017-12-19 PROCEDURE — G8427 DOCREV CUR MEDS BY ELIG CLIN: HCPCS | Performed by: PODIATRIST

## 2017-12-19 PROCEDURE — 3017F COLORECTAL CA SCREEN DOC REV: CPT | Performed by: PODIATRIST

## 2017-12-19 PROCEDURE — 85025 COMPLETE CBC W/AUTO DIFF WBC: CPT

## 2017-12-19 PROCEDURE — 36415 COLL VENOUS BLD VENIPUNCTURE: CPT

## 2017-12-19 PROCEDURE — G8484 FLU IMMUNIZE NO ADMIN: HCPCS | Performed by: PODIATRIST

## 2017-12-19 PROCEDURE — 99213 OFFICE O/P EST LOW 20 MIN: CPT | Performed by: PODIATRIST

## 2017-12-19 PROCEDURE — G8598 ASA/ANTIPLAT THER USED: HCPCS | Performed by: PODIATRIST

## 2017-12-19 RX ORDER — CYCLOBENZAPRINE HCL 10 MG
10 TABLET ORAL 3 TIMES DAILY PRN
Qty: 60 TABLET | Refills: 1 | Status: SHIPPED | OUTPATIENT
Start: 2017-12-19 | End: 2018-02-12 | Stop reason: SDUPTHER

## 2017-12-19 ASSESSMENT — PAIN DESCRIPTION - LOCATION
LOCATION_2: SHOULDER
LOCATION: BACK

## 2017-12-19 ASSESSMENT — ENCOUNTER SYMPTOMS
COLOR CHANGE: 0
CONSTIPATION: 0
VOMITING: 0
NAUSEA: 0
DIARRHEA: 0
BACK PAIN: 1

## 2017-12-19 ASSESSMENT — PAIN DESCRIPTION - PAIN TYPE: TYPE_2: CHRONIC PAIN

## 2017-12-19 ASSESSMENT — PAIN DESCRIPTION - INTENSITY: RATING_2: 7

## 2017-12-19 ASSESSMENT — PAIN DESCRIPTION - ORIENTATION
ORIENTATION_2: ANTERIOR
ORIENTATION: LOWER

## 2017-12-19 ASSESSMENT — PAIN SCALES - GENERAL: PAINLEVEL_OUTOF10: 6

## 2017-12-19 NOTE — PROGRESS NOTES
Physical Therapy  Daily Treatment Note  Date: 2017  Patient Name: Mick Higgins  MRN: [id number]     :   1960    Subjective:   General  Chart Reviewed: Yes  Additional Pertinent Hx: Had previous therapy for L shoulder following a shoulder replacement, therapy for (R) shoulder and low back previously but DC secondary to health issues  Referring Practitioner: Antonio Davidson  PT Visit Information  Onset Date: 17  PT Insurance Information: [facility]   Total # of Visits Approved: 12  Total # of Visits to Date: 5  No Show: 0  Canceled Appointment: 6  Subjective  Subjective: Patient noting increased complaints of both abdominal and back pain. Has had previous bouts of pancreatitis and states that symptoms feel like pancreatitis irritation. Patient stated she wanted to \"try out\" therapy today and see how she tolerated exercises. May go to ER if symptoms continue. More sore in shoulder today as well, continues to state that she gets significant popping in anterior shoulder with use/movement. Pain Screening  Patient Currently in Pain: Yes  Pain Assessment  Pain Assessment: 0-10  Pain Level: 6  Pain Location: Back  Pain Orientation: Lower  Patient's Stated Pain Goal: No pain  Pain 2  Pain Rating 2: 7  Pain Type 2: Chronic Pain  Pain Location 2: Shoulder  Pain Orientation 2: Anterior  Pain Radiating Towards 2: (R) anterior  shoulder  Vital Signs  Patient Currently in Pain: Yes       Treatment Activities:   Exercises  Exercise 1: Biodex L1, 2/2'  Exercise 5: wand ER 10 x 5\"  Exercise 6: wand shoulder extension 10 x 5\"  Exercise 12: table slides 3 way 30x each      Assessment:   Conditions Requiring Skilled Therapeutic Intervention  Body structures, Functions, Activity limitations: Decreased functional mobility ; Decreased ADL status; Decreased ROM; Decreased strength  Assessment: Patient very sore today in both the back and in the shoulder. Patient noted that she was going to go to the ER for pancreatitis.

## 2017-12-19 NOTE — PROGRESS NOTES
Comments)     Muscle cramps    Gemfibrozil Other (See Comments)     Muscle pain, spasms, weakness and HA  Muscle pain, spasms, weakness and HA    Lovastatin Other (See Comments)     Muscle cramps  Muscle cramps    Prempro [Conj Estrog-Medroxyprogest Ace] Other (See Comments)     Breast tenderness, pain in vagina, cramping    Statins Other (See Comments)     Muscle cramps  Lipitor ok    Tricor [Fenofibrate] Other (See Comments)     Muscle cramps    Zetia [Ezetimibe] Other (See Comments)     Does not remember reaction  Does not remember reaction  Does not remember reaction    Zocor [Simvastatin] Other (See Comments)     Muscle cramps    Ampicillin Rash    Gabapentin Rash    Niacin And Related Rash    Novolin [Insulin Isophane & Reg, Human] Rash    Omeprazole Nausea And Vomiting    Pcn [Penicillins] Rash    Pregabalin Nausea And Vomiting and Rash    Vesicare [Solifenacin] Rash     Current Outpatient Prescriptions on File Prior to Visit   Medication Sig Dispense Refill    ciprofloxacin (CIPRO) 500 MG tablet Take 1 tablet by mouth 2 times daily for 10 days 20 tablet 0    Probiotic Product (ALIGN) 4 MG CAPS TAKE ONE CAPSULE BY MOUTH ONCE DAILY 28 capsule 1    rOPINIRole (REQUIP) 0.5 MG tablet Take 3 tablets by mouth nightly 90 tablet 0    metoprolol succinate (TOPROL XL) 25 MG extended release tablet TAKE ONE TABLET BY MOUTH ONCE DAILY 90 tablet 0    B-D ULTRAFINE III SHORT PEN 31G X 8 MM MISC USE AS DIRECTED FOR INSULIN ADMINISTRATION 100 each 3    glucose monitoring kit (FREESTYLE) monitoring kit Check BS ACHS and PRN. 1 kit 0    Glucose Blood (BLOOD GLUCOSE TEST STRIPS) STRP Check BS ACHS and PRN. 200 strip 3    aspirin 81 MG tablet Take 1 tablet by mouth daily 30 tablet 1    Elastic Bandages & Supports (MEDICAL COMPRESSION STOCKINGS) MISC Will need fitted- for vertigo, hx of edema. 1 each 0    lidocaine (XYLOCAINE) 5 % ointment Apply topically as needed.      3 times a dayDispense 1 tube      venlafaxine (EFFEXOR XR) 37.5 MG extended release capsule TAKE ONE CAPSULE BY MOUTH THREE TIMES DAILY (Patient taking differently: 2 times daily TAKE ONE CAPSULE BY MOUTH THREE TIMES DAILY) 90 capsule 5    ibuprofen (ADVIL;MOTRIN) 800 MG tablet Take 1 tablet by mouth every 8 hours as needed for Pain 90 tablet 5    levothyroxine (SYNTHROID) 100 MCG tablet TAKE ONE TABLET BY MOUTH ONCE DAILY 90 tablet 0    lisinopril (PRINIVIL;ZESTRIL) 5 MG tablet Take 1 tablet by mouth daily 30 tablet 3    dicyclomine (BENTYL) 10 MG capsule Take 1 capsule by mouth 4 times daily 120 capsule 3    Cholecalciferol (VITAMIN D3) 2000 units TABS TAKE ONE TABLET BY MOUTH ONCE DAILY 30 tablet 5    nystatin (MYCOSTATIN) 064265 UNIT/GM powder APPLY TOPICALLY TO ABDONMINAL FOLDS THREE TIMES DAILY AS NEEDED 30 g 1    Tens Unit MISC by Does not apply route 1 each 0    VICTOZA 18 MG/3ML SOPN SC injection Inject 18 mg into the skin every morning       oxyCODONE-acetaminophen (PERCOCET) 5-325 MG per tablet Take by mouth daily as needed  .  metFORMIN (GLUCOPHAGE) 1000 MG tablet TAKE ONE TABLET BY MOUTH TWICE DAILY 60 tablet 5    atorvastatin (LIPITOR) 20 MG tablet TAKE ONE TABLET BY MOUTH ONCE DAILY AT NIGHT 30 tablet 6    Magnesium Oxide 500 MG TABS TAKE ONE TABLET BY MOUTH TWICE DAILY 60 tablet 11    insulin detemir (LEVEMIR FLEXPEN) 100 UNIT/ML injection pen Inject 20 Units into the skin nightly       NOVOLOG FLEXPEN 100 UNIT/ML injection pen Inject 10 -20 units SQ units before breakfast and dinner per sliding scale.       ammonium lactate (AMLACTIN) 12 % cream Apply topically 2 times daily as needed for Dry Skin (to feet) 140 g 6    allopurinol (ZYLOPRIM) 100 MG tablet TAKE ONE TABLET BY MOUTH ONCE DAILY 30 tablet 11    Biotin 5000 MCG CAPS Take 1 capsule by mouth daily      Flaxseed MISC Take 1 capsule by mouth daily       trospium chloride (SANCTURA XR) 60 MG CP24 Take 60 mg by mouth daily      Menthol (BIOFREEZE) 10 % AERO Apply 1 applicator topically 4 times daily as needed (Patient taking differently: Apply 1 applicator topically 4 times daily as needed (to left shoulder) ) 1 Can 3     No current facility-administered medications on file prior to visit. Review of Systems   Constitutional: Negative for chills, diaphoresis, fatigue, fever and unexpected weight change. Cardiovascular: Negative for leg swelling. Gastrointestinal: Negative for constipation, diarrhea, nausea and vomiting. Musculoskeletal: Positive for back pain and gait problem. Negative for arthralgias and joint swelling. Skin: Negative for color change, pallor, rash and wound. Neurological: Positive for numbness. Negative for weakness. Objective:  General: AAO x 3 in NAD. Derm   Skin lesion/ulceration Absent . Skin No rashes or nodules noted. .   Sites of Onychomycosis Involvement (Check affected area)  [x] [x] [x] [x] [x] [x] [x] [x] [x] [x]  5 4 3 2 1 1 2 3 4 5                          Right                                        Left    Thickness  [x] [x] [x] [x] [x] [x] [x] [x] [x] [x]  5 4 3 2 1 1 2 3 4 5                         Right                                        Left    Dystrophic Changes                                                                 [x] [x] [x] [x] [x] [x] [x] [x] [x] [x]  5 4 3 2 1 1 2 3 4 5                         Right                                        Left    Color                                                                  [x] [x] [x] [x] [x] [x] [x] [x] [x] [x]  5 4 3 2 1 1 2 3 4 5                          Right                                        Left    Incurvation/Ingrowin                                                                   [] [] [] [] [] [] [] [] [] []  5 4 3 2 1 1 2 3 4 5                         Right                                        Left    Inflammation/Pain [x] [x] [x] [x] [x] [x] [x] [x] [x] [x]  5 4 3 2 1 1 2 3 4 5                         Right                                        Left      Vascular:  DP/PT pulses palpable 2/4, Bilateral.    CFT <3 seconds to digits 1-5, Bilateral .   Hair growth absent to level of digits, Bilateral.  Edema absent, Bilateral.  Erythema absent, Bilateral.     DM with PVD       []Yes    [x]No    Neurological:  Sensation absent to light touch to level of digits, Bilateral.  Protective sensation absent via 5.07/10g [place] monofilament 6/10 sites, Bilateral.  Vibratory sensation absent to 1st MPJ, Bilateral.     Musculoskeletal: Muscle strength 5/5, Bilateral.  No Pain present upon palpation of dorsal lisfranc's joint, Bilateral. normal medial longitudinal arch, Bilateral.  Palpable osteophytes dorsal lisfranc's     Radiographs: 3 views right Foot:  Severe loss of joint space of tarsometatarsal joints one, two, and three with dorsal osteophytes. No erosive changes or lytic process. No acute pathology. Radiographs: 3 views left Foot:  Severe loss of joint space of tarsometatarsal joints one, two, and three with dorsal osteophytes. No erosive changes or lytic process. No acute pathology. Assessment:  62 y.o. female with:  1. Type 2 diabetes mellitus with diabetic polyneuropathy, with long-term current use of insulin ([address]    2. Neuropathic pain    3. Foot deformity, bilateral    4. Onychomycosis    5. Nocturnal foot cramps       Orders Placed This Encounter   Procedures    HM DIABETES FOOT EXAM    KS DEBRIDEMENT OF NAILS, 6 OR MORE           Q7   []Yes    []No                Q8   [x]Yes    []No                     Q9   []Yes    []No    Plan:   Pt was evaluated and examined. Patient was given personalized discharge instructions. Nails 1-10 were debrided sharply in length and thickness with a nipper and , without incident. Pt will follow up in 3 months or sooner if any problems arise.  Diagnosis was discussed with the pt and all of their questions were answered in detail. Proper foot hygiene and care was discussed with the pt. Informed patient on proper diabetic foot care and importance of tight glycemic control. Patient to check feet daily and contact the office with any questions/problems/concerns. Other comorbidity noted and will be managed by PCP. Diabetic foot examination performed this visit. The exam included neurological sensory exam, a 10-g monofilament and pinprick sensation, vibration using a 128-Hz tuning fork, ankle reflexes, visual skin inspection, vascular exam including assessment of pedal pulses, orthopedic exam for deformities, and shoe inspection. Increased risk factors noted on the diabetic foot exam include decreased sensory exam and peripheral neuropathy. Shoegear inspected and found to be appropriate size and wear. Continue Effexor   Continue Short course of Motrin when needed  Continue Voltaren Gel  Rx Flexeril for foot cramps.         Orders Placed This Encounter   Procedures    HM DIABETES FOOT EXAM    [place] DEBRIDEMENT OF NAILS, 6 OR MORE       Orders Placed This Encounter   Medications    cyclobenzaprine (FLEXERIL) 10 MG tablet     Sig: Take 1 tablet by mouth 3 times daily as needed for Muscle spasms     Dispense:  60 tablet     Refill:  1       12/19/2017    Electronically signed by Isaak Velasco DPM on 12/19/2017 at 4:46 PM

## 2017-12-21 ENCOUNTER — OFFICE VISIT (OUTPATIENT)
Dept: GASTROENTEROLOGY | Age: 57
End: 2017-12-21
Payer: COMMERCIAL

## 2017-12-21 VITALS
HEIGHT: 61 IN | OXYGEN SATURATION: 96 % | RESPIRATION RATE: 14 BRPM | WEIGHT: 234 LBS | HEART RATE: 86 BPM | DIASTOLIC BLOOD PRESSURE: 84 MMHG | TEMPERATURE: 97.3 F | SYSTOLIC BLOOD PRESSURE: 139 MMHG | BODY MASS INDEX: 44.18 KG/M2

## 2017-12-21 DIAGNOSIS — K57.90 DIVERTICULOSIS OF INTESTINE WITHOUT BLEEDING, UNSPECIFIED INTESTINAL TRACT LOCATION: Primary | ICD-10-CM

## 2017-12-21 DIAGNOSIS — K21.9 GASTROESOPHAGEAL REFLUX DISEASE WITHOUT ESOPHAGITIS: ICD-10-CM

## 2017-12-21 PROCEDURE — G8427 DOCREV CUR MEDS BY ELIG CLIN: HCPCS | Performed by: INTERNAL MEDICINE

## 2017-12-21 PROCEDURE — 99214 OFFICE O/P EST MOD 30 MIN: CPT | Performed by: INTERNAL MEDICINE

## 2017-12-21 PROCEDURE — G8417 CALC BMI ABV UP PARAM F/U: HCPCS | Performed by: INTERNAL MEDICINE

## 2017-12-21 PROCEDURE — 3017F COLORECTAL CA SCREEN DOC REV: CPT | Performed by: INTERNAL MEDICINE

## 2017-12-21 PROCEDURE — G8484 FLU IMMUNIZE NO ADMIN: HCPCS | Performed by: INTERNAL MEDICINE

## 2017-12-21 PROCEDURE — 1036F TOBACCO NON-USER: CPT | Performed by: INTERNAL MEDICINE

## 2017-12-21 PROCEDURE — 3014F SCREEN MAMMO DOC REV: CPT | Performed by: INTERNAL MEDICINE

## 2017-12-21 PROCEDURE — G8598 ASA/ANTIPLAT THER USED: HCPCS | Performed by: INTERNAL MEDICINE

## 2017-12-21 ASSESSMENT — ENCOUNTER SYMPTOMS
ABDOMINAL PAIN: 1
RECTAL PAIN: 0
RESPIRATORY NEGATIVE: 1
VOMITING: 0
ABDOMINAL DISTENTION: 1
BLOOD IN STOOL: 0
BACK PAIN: 1
DIARRHEA: 1
ANAL BLEEDING: 0
CONSTIPATION: 0
NAUSEA: 1

## 2017-12-21 NOTE — PROGRESS NOTES
are not limited to bleeding, infection, respiratory distress, hypotension, and perforation of the colon and possibility of missing a lesion. The patient understands and is in agreement. PROCEDURE: The patient was given IV conscious sedation. The patient's SPO2 remained above 90% throughout the procedure.      The colonoscope was inserted per rectum and advanced under direct vision to the cecum without difficulty.       The prep was poor.       Findings:  Terminal ileum: normal     Cecum/Ascending colon: normal     Transverse colon: abnormal: diverticulosis      Descending/Sigmoid colon: abnormal: diverticulosis      Rectum/Anus: examined in normal and retroflexed positions and was abnormal: prominent anal valves      Withdrawal Time was (minutes): 9     The colon was decompressed and the scope was removed. The patient tolerated the procedure well.      Recommendations/Plan:   1. Lifestyle and dietary modifications as discussed  2. F/U Biopsies  3. F/U In OfficeYes  4. Discussed with the family  5. Repeat colonoscopy [id number]     Electronically signed by Jena Mclaughlin MD  on 10/12/2017 at 10:39 AM       PROCEDURE NOTE     DATE OF PROCEDURE: 10/12/2017      SURGEON: Jena Mclaughlin MD  Facility: [facility]  ASSISTANT: None  Anesthesia: Demerol 100 mg IV, Versed 4 mg IV  PREOPERATIVE DIAGNOSIS:   abd pain   bloating  GERD     Diagnosis:  Gastritis, bxs taken    random small bowel bxs taken      POSTOPERATIVE DIAGNOSIS: As described below     OPERATION: Upper GI endoscopy with Biopsy     ANESTHESIA: Moderate Sedation      ESTIMATED BLOOD LOSS: Less than 50 ml     COMPLICATIONS: None.      SPECIMENS:  Was Obtained:      Gastritis, bxs taken    random small bowel bxs taken      HISTORY: The patient is a 64y.o. year old female with history of above preop diagnosis.   I recommended esophagogastroduodenoscopy with possible biopsy and I explained the risk, benefits, expected outcome, and alternatives to the procedure. Risks included but are not limited to bleeding, infection, respiratory distress, hypotension, and perforation of the esophagus, stomach, or duodenum. Patient understands and is in agreement.     PROCEDURE: The patient was given IV conscious sedation. The patient's SPO2 remained above 90% throughout the procedure. The gastroscope was inserted orally and advanced under direct vision through the esophagus, through the stomach, through the pylorus, and into the descending duodenum.       Findings:     Retropharyngeal area was grossly normal appearing     Esophagus: normal     Stomach:    Fundus: abnormal: Gastritis, bxs taken      Body: abnormal: Gastritis, bxs taken    Antrum: abnormal: Gastritis, bxs taken     Duodenum:     Descending: abnormal: random small bowel bxs taken     Bulb: abnormal: random small bowel bxs taken      The scope was removed and the patient tolerated the procedure well.      Recommendations/Plan:   6. F/U Biopsies  7. F/U In Office in 3-4 weeks  8. Discussed with the family     Electronically signed by Kinjal Ramos MD  on 10/12/2017 at 9:54 AM          ADDENDUM ([facility])     Date Ordered:     10/13/2017     Status: Signed Out       Date Complete:     10/13/2017     By: Tacho Harris M.D.       Date Reported:     10/13/2017       ADDENDUM COMMENT   Specimen \"B\":  Sections of the gastric mucosal biopsy were also   submitted for an immunostain for Helicobacter pylori.  None are   identified (control satisfactory).  Rhiannon Sierra M.D.           Final Diagnosis   SPECIMEN \"A\":  SMALL BOWEL, MUCOSA, BIOPSY:         FRAGMENTS OF BENIGN SMALL INTESTINAL MUCOSA NEGATIVE FOR   DYSPLASIA, GRANULOMATA, PARASITES, ANY SIGNIFICANT INFLAMMATION OR   VILLOUS ABNORMALITY     SPECIMEN \"B\":  STOMACH, MUCOSA, BIOPSY:         FRAGMENTS OF BENIGN GASTRIC MUCOSA SHOWING FOCI OF MILD CHRONIC   INFLAMMATION     EVALUATION OF IMMUNOSTAIN FOR HELICOBACTER PYLORI TO FOLLOW IN AN   ADDENDUM       Marco ONTIVEROS diaphoretic   Psychiatric: She has a normal mood and affect. Her behavior is normal. Judgment and thought content normal.   Nursing note and vitals reviewed. Assessment:      1. Diverticulosis of intestine without bleeding, unspecified intestinal tract location     2. Gastroesophageal reflux disease without esophagitis            Plan:      She just started the antibiotics, too early    I told her to give another 1-2 days and see.  If continue to have phu to [facility] will get ct abd at that level   Pain also could be musculoskeletal as it does worsen with movement in  canceration poitions

## 2017-12-22 ENCOUNTER — APPOINTMENT (OUTPATIENT)
Dept: PHYSICAL THERAPY | Age: 57
End: 2017-12-22
Payer: COMMERCIAL

## 2017-12-26 DIAGNOSIS — E03.9 HYPOTHYROIDISM, UNSPECIFIED TYPE: ICD-10-CM

## 2017-12-26 RX ORDER — LEVOTHYROXINE SODIUM 0.1 MG/1
TABLET ORAL
Qty: 90 TABLET | Refills: 0 | Status: SHIPPED | OUTPATIENT
Start: 2017-12-26 | End: 2018-03-27 | Stop reason: SDUPTHER

## 2017-12-29 NOTE — PROGRESS NOTES
[] [facility] ([facility]) @ [facility]  [address], [address]  Phone [phone]  Fax [phone]    Physical Therapy Discharge Note    Date: 2017      Patient: Sirisha Looney  : 1960  MRN: [id number]    Chart Reviewed: Yes  Referring Practitioner: Tor Galindo  PT Visit Information  Onset Date: 17  PT Insurance Information: [employer]   Total # of Visits Approved: 12  Total # of Visits to Date: 5  No Show: 0  Canceled Appointment: 6                     [] Patient recovered from conditions. Treatment goals were met. [] Patient received maximum benefit. No further therapy indicated at this time. [] Patient demonstrated improvement from condition with  ** Of  ** Short term goals met. []Patient demonstrated improvement from condition with **   Of **  Long term goals met. [] Patient to continue exercise/home instructions independently. [x] Therapy interrupted due to: Patient cancelled today secondary to other medical issues- pancreas issue. [] Patient has 2 or more no shows/cancels, is discontinued per our policy. [] Patient has completed prescribed number of treatment sessions. [] Other:      Pain level at evaluation was       /10 and at discharge was       /10    It Is My Understanding That The:  [] Patient returned to work. [] Patient demonstrated improved level of function. [] Patient returned to previous functional level. [] Patient's current functional status is unknown due to no-shows  [x] Other: Prognosis unclear secondary to outcome of other medical issues     Recommendations/Comments:   Discharge at this time secondary to recent illness and trip to the urgent care. Patient may follow up and continue therapy at a later date. Minimal improvement of symptoms in the shoulder, stated that shoulder felt like it was \"getting worse\" and \"coming apart\" per the patient, frequent complaints of popping of the shoulder.   Did

## 2018-01-18 ENCOUNTER — OFFICE VISIT (OUTPATIENT)
Dept: ORTHOPEDIC SURGERY | Age: 58
End: 2018-01-18
Payer: COMMERCIAL

## 2018-01-18 DIAGNOSIS — M51.36 DDD (DEGENERATIVE DISC DISEASE), LUMBAR: ICD-10-CM

## 2018-01-18 DIAGNOSIS — G89.29 CHRONIC MIDLINE LOW BACK PAIN WITH RIGHT-SIDED SCIATICA: Primary | ICD-10-CM

## 2018-01-18 DIAGNOSIS — M54.41 CHRONIC MIDLINE LOW BACK PAIN WITH RIGHT-SIDED SCIATICA: Primary | ICD-10-CM

## 2018-01-18 DIAGNOSIS — M43.10 ACQUIRED SPONDYLOLISTHESIS: ICD-10-CM

## 2018-01-18 DIAGNOSIS — M48.061 SPINAL STENOSIS OF LUMBAR REGION WITHOUT NEUROGENIC CLAUDICATION: ICD-10-CM

## 2018-01-18 PROCEDURE — 1036F TOBACCO NON-USER: CPT | Performed by: ORTHOPAEDIC SURGERY

## 2018-01-18 PROCEDURE — 99213 OFFICE O/P EST LOW 20 MIN: CPT | Performed by: ORTHOPAEDIC SURGERY

## 2018-01-18 PROCEDURE — G8484 FLU IMMUNIZE NO ADMIN: HCPCS | Performed by: ORTHOPAEDIC SURGERY

## 2018-01-18 PROCEDURE — 3017F COLORECTAL CA SCREEN DOC REV: CPT | Performed by: ORTHOPAEDIC SURGERY

## 2018-01-18 PROCEDURE — G8427 DOCREV CUR MEDS BY ELIG CLIN: HCPCS | Performed by: ORTHOPAEDIC SURGERY

## 2018-01-18 PROCEDURE — G8599 NO ASA/ANTIPLAT THER USE RNG: HCPCS | Performed by: ORTHOPAEDIC SURGERY

## 2018-01-18 PROCEDURE — G8417 CALC BMI ABV UP PARAM F/U: HCPCS | Performed by: ORTHOPAEDIC SURGERY

## 2018-01-18 PROCEDURE — 3014F SCREEN MAMMO DOC REV: CPT | Performed by: ORTHOPAEDIC SURGERY

## 2018-01-21 DIAGNOSIS — Z79.4 TYPE 2 DIABETES MELLITUS WITH DIABETIC NEUROPATHY, WITH LONG-TERM CURRENT USE OF INSULIN (HCC): Chronic | ICD-10-CM

## 2018-01-21 DIAGNOSIS — E11.40 TYPE 2 DIABETES MELLITUS WITH DIABETIC NEUROPATHY, WITH LONG-TERM CURRENT USE OF INSULIN (HCC): Chronic | ICD-10-CM

## 2018-02-12 RX ORDER — CYCLOBENZAPRINE HCL 10 MG
TABLET ORAL
Qty: 60 TABLET | Refills: 1 | Status: SHIPPED | OUTPATIENT
Start: 2018-02-12 | End: 2018-12-19

## 2018-02-18 DIAGNOSIS — E78.5 HYPERLIPIDEMIA, UNSPECIFIED HYPERLIPIDEMIA TYPE: ICD-10-CM

## 2018-02-19 RX ORDER — ATORVASTATIN CALCIUM 20 MG/1
TABLET, FILM COATED ORAL
Qty: 30 TABLET | Refills: 6 | Status: SHIPPED | OUTPATIENT
Start: 2018-02-19 | End: 2018-09-18 | Stop reason: SDUPTHER

## 2018-02-19 NOTE — TELEPHONE ENCOUNTER
Cataract     Diabetic neuropathy (HCC)     Sleep apnea     Carpal tunnel syndrome of right wrist     Morbid obesity due to excess calories (HCC)     Vitamin D deficiency     Urinary incontinence     Allergic rhinitis     Degenerative lumbar disc     Sacroiliitis (HCC)     Fatigue     Encounter for chronic pain management     Encounter for medication management     Primary osteoarthritis of both shoulders     Primary osteoarthritis of both knees     Primary osteoarthritis of both hips     Essential hypertension     Hypoactive thyroid     Calcified granuloma of lung (HCC)     Leukocytosis     Status post reverse total arthroplasty of left shoulder     Gastroesophageal reflux disease without esophagitis     Anemia     Palpitations     Vertigo     History of rib fracture     Status post fall     Blurry vision, bilateral     Persistent headaches     Right shoulder tendinitis     Osteoarthritis of right shoulder     Closed displaced fracture of lesser tuberosity of right humerus     Low serum low density lipoprotein (LDL)     Elevated uric acid in blood     Seasonal allergic rhinitis     Skipped beats     Closed fracture of one rib of right side with routine healing     Bursitis/tendonitis, shoulder     Closed displaced fracture of greater tuberosity of right humerus     Excessive sweating     Presbyopia     Nuclear sclerosis     Myopia with astigmatism and presbyopia     Iron deficiency anemia     Far-sighted     Astigmatism     Sciatica, right side     Diabetes type 2, no ocular involvement ([address]     Status post total replacement of right shoulder     Primary osteoarthritis of right shoulder     Hyperglycemia     Status post shoulder replacement     Leiomyoma of uterus     Diverticulosis     Umbilical hernia     Skin lesion of left leg     Abdominal bloating     Irregular bowel habits     Generalized headaches     Tinnitus     Phlebolith- pelvic     Stenosis of both internal carotid arteries- Mild 16-49% Diverticulosis of intestine without bleeding     Change in bowel habits     Bloating     RLS (restless legs syndrome)     Chronic midline low back pain with right-sided sciatica     Acquired spondylolisthesis

## 2018-02-27 RX ORDER — DICYCLOMINE HYDROCHLORIDE 10 MG/1
CAPSULE ORAL
Qty: 120 CAPSULE | Refills: 3 | Status: SHIPPED | OUTPATIENT
Start: 2018-02-27 | End: 2018-08-12 | Stop reason: SDUPTHER

## 2018-03-22 ENCOUNTER — OFFICE VISIT (OUTPATIENT)
Dept: GASTROENTEROLOGY | Age: 58
End: 2018-03-22
Payer: COMMERCIAL

## 2018-03-22 VITALS
OXYGEN SATURATION: 100 % | DIASTOLIC BLOOD PRESSURE: 81 MMHG | HEART RATE: 96 BPM | HEIGHT: 61 IN | WEIGHT: 241 LBS | RESPIRATION RATE: 14 BRPM | BODY MASS INDEX: 45.5 KG/M2 | SYSTOLIC BLOOD PRESSURE: 134 MMHG

## 2018-03-22 DIAGNOSIS — K57.90 DIVERTICULOSIS OF INTESTINE WITHOUT BLEEDING, UNSPECIFIED INTESTINAL TRACT LOCATION: ICD-10-CM

## 2018-03-22 DIAGNOSIS — K21.9 GASTROESOPHAGEAL REFLUX DISEASE WITHOUT ESOPHAGITIS: Primary | ICD-10-CM

## 2018-03-22 DIAGNOSIS — K57.50 DIVERTICULOSIS OF BOTH SMALL AND LARGE INTESTINE WITHOUT BLEEDING: ICD-10-CM

## 2018-03-22 PROCEDURE — G8427 DOCREV CUR MEDS BY ELIG CLIN: HCPCS | Performed by: INTERNAL MEDICINE

## 2018-03-22 PROCEDURE — G8484 FLU IMMUNIZE NO ADMIN: HCPCS | Performed by: INTERNAL MEDICINE

## 2018-03-22 PROCEDURE — G8598 ASA/ANTIPLAT THER USED: HCPCS | Performed by: INTERNAL MEDICINE

## 2018-03-22 PROCEDURE — G8417 CALC BMI ABV UP PARAM F/U: HCPCS | Performed by: INTERNAL MEDICINE

## 2018-03-22 PROCEDURE — 3017F COLORECTAL CA SCREEN DOC REV: CPT | Performed by: INTERNAL MEDICINE

## 2018-03-22 PROCEDURE — 3014F SCREEN MAMMO DOC REV: CPT | Performed by: INTERNAL MEDICINE

## 2018-03-22 PROCEDURE — 99214 OFFICE O/P EST MOD 30 MIN: CPT | Performed by: INTERNAL MEDICINE

## 2018-03-22 PROCEDURE — 1036F TOBACCO NON-USER: CPT | Performed by: INTERNAL MEDICINE

## 2018-03-22 RX ORDER — PANTOPRAZOLE SODIUM 40 MG/1
40 TABLET, DELAYED RELEASE ORAL DAILY
Qty: 30 TABLET | Refills: 3 | Status: SHIPPED | OUTPATIENT
Start: 2018-03-22 | End: 2018-07-15 | Stop reason: SDUPTHER

## 2018-03-22 ASSESSMENT — ENCOUNTER SYMPTOMS
ABDOMINAL PAIN: 1
VOMITING: 1
DIARRHEA: 1
CONSTIPATION: 0
RECTAL PAIN: 0
ANAL BLEEDING: 0
BLOOD IN STOOL: 0
BACK PAIN: 1
NAUSEA: 1
ABDOMINAL DISTENTION: 1
RESPIRATORY NEGATIVE: 1

## 2018-03-22 NOTE — PROGRESS NOTES
Subjective:      Patient ID: Alexys Jarrett is a 62 y.o. female. HPI  Dr. Damion Hernandez, CNP our mutual patient Alexys Jarrett was seen  for   1. Gastroesophageal reflux disease without esophagitis    2. Diverticulosis of intestine without bleeding, unspecified intestinal tract location    3. Diverticulosis of both small and large intestine without bleeding     . Patient is here for follow up ,Gastroesophageal Reflux (still having issues with acid reflux and vomiting a couple of times a week ) and Diverticulosis    Here for follow-up visit 1st to the previous no  Still having some issues with acid reflux and some nausea sometimes but better. She is on ibuprofen and aspirin  She is not taking any PPI at this time  Denied any bleeding  No weight loss , no vomiting no fever no chills    Ct 8/17 :     No definite acute pathology is appreciated.  Leiomyomatous disease of the   uterus and diverticulosis of the sigmoid colon are again identified.  The   appendix looks unremarkable, although there is a right lower quadrant   possible fecalith.             Past Medical, Family, and Social History reviewed and does contribute to the patient presenting condition. patient\"s PMH/PSH,SH,PSYCH hx, MEDs, ALLERGIES, and ROS was all reviewed and updated ion the appropriate sections    Review of Systems   Constitutional: Positive for fatigue. HENT: Negative. Eyes: Positive for visual disturbance (wears glasses). Respiratory: Negative. Cardiovascular: Negative for palpitations and leg swelling. Gastrointestinal: Positive for abdominal distention, abdominal pain, diarrhea (occasional), nausea and vomiting. Negative for anal bleeding, blood in stool, constipation and rectal pain. Endocrine: Negative. Genitourinary: Negative. Negative for difficulty urinating. Musculoskeletal: Positive for arthralgias, back pain, gait problem and myalgias. Skin: Negative.     Allergic/Immunologic: Positive for environmental allergies. Neurological: Positive for weakness and headaches. Negative for dizziness, tremors and light-headedness. Hematological: Bruises/bleeds easily. Psychiatric/Behavioral: Positive for sleep disturbance. The patient is nervous/anxious. Objective:   Physical Exam   Constitutional: She is oriented to person, place, and time. She appears well-developed and well-nourished. No distress. HENT:   Head: Normocephalic. Mouth/Throat: No oropharyngeal exudate. Eyes: Pupils are equal, round, and reactive to light. No scleral icterus. Neck: Normal range of motion. Neck supple. No JVD present. No tracheal deviation present. No thyromegaly present. Cardiovascular: Normal rate, regular rhythm, normal heart sounds and intact distal pulses. No murmur heard. Pulmonary/Chest: Effort normal and breath sounds normal. No respiratory distress. She has no wheezes. Abdominal: Soft. Bowel sounds are normal. She exhibits no distension. There is no tenderness. There is no rebound and no guarding. No ascites   Musculoskeletal: Normal range of motion. She exhibits no edema. Neurological: She is alert and oriented to person, place, and time. She has normal reflexes. Skin: Skin is warm. No rash noted. She is not diaphoretic. No erythema. No pallor. She is not diaphoretic   Psychiatric: She has a normal mood and affect. Her behavior is normal. Judgment and thought content normal.   Nursing note and vitals reviewed. Assessment:      1. Gastroesophageal reflux disease without esophagitis     2. Diverticulosis of intestine without bleeding, unspecified intestinal tract location     3.  Diverticulosis of both small and large intestine without bleeding             Plan:      Start pantoprazole  Risk and benefits and alternative were all explained  Diet/life style/natural hx /complication of the dx were all explained in details

## 2018-03-27 PROBLEM — E11.9 DIABETES TYPE 2, NO OCULAR INVOLVEMENT (HCC): Chronic | Status: RESOLVED | Noted: 2017-04-25 | Resolved: 2018-03-27

## 2018-04-01 RX ORDER — VENLAFAXINE HYDROCHLORIDE 37.5 MG/1
CAPSULE, EXTENDED RELEASE ORAL
Qty: 90 CAPSULE | Refills: 5 | Status: SHIPPED | OUTPATIENT
Start: 2018-04-01 | End: 2018-04-05 | Stop reason: SDUPTHER

## 2018-04-02 ENCOUNTER — TELEPHONE (OUTPATIENT)
Dept: GASTROENTEROLOGY | Age: 58
End: 2018-04-02

## 2018-04-04 DIAGNOSIS — E55.9 VITAMIN D DEFICIENCY: ICD-10-CM

## 2018-04-04 RX ORDER — CHOLECALCIFEROL (VITAMIN D3) 125 MCG
CAPSULE ORAL
Qty: 90 TABLET | Refills: 3 | Status: SHIPPED | OUTPATIENT
Start: 2018-04-04 | End: 2018-12-19

## 2018-04-05 ENCOUNTER — PROCEDURE VISIT (OUTPATIENT)
Dept: PODIATRY | Age: 58
End: 2018-04-05
Payer: COMMERCIAL

## 2018-04-05 VITALS
HEIGHT: 61 IN | SYSTOLIC BLOOD PRESSURE: 127 MMHG | WEIGHT: 235 LBS | HEART RATE: 101 BPM | DIASTOLIC BLOOD PRESSURE: 70 MMHG | BODY MASS INDEX: 44.37 KG/M2

## 2018-04-05 DIAGNOSIS — G25.81 RESTLESS LEG SYNDROME, UNCONTROLLED: ICD-10-CM

## 2018-04-05 DIAGNOSIS — M21.962 FOOT DEFORMITY, BILATERAL: ICD-10-CM

## 2018-04-05 DIAGNOSIS — R25.2 NOCTURNAL FOOT CRAMPS: ICD-10-CM

## 2018-04-05 DIAGNOSIS — E11.42 TYPE 2 DIABETES MELLITUS WITH DIABETIC POLYNEUROPATHY, WITH LONG-TERM CURRENT USE OF INSULIN (HCC): Primary | ICD-10-CM

## 2018-04-05 DIAGNOSIS — Z79.4 TYPE 2 DIABETES MELLITUS WITH DIABETIC POLYNEUROPATHY, WITH LONG-TERM CURRENT USE OF INSULIN (HCC): Primary | ICD-10-CM

## 2018-04-05 DIAGNOSIS — M21.961 FOOT DEFORMITY, BILATERAL: ICD-10-CM

## 2018-04-05 DIAGNOSIS — M79.2 NEUROPATHIC PAIN: ICD-10-CM

## 2018-04-05 PROCEDURE — 3017F COLORECTAL CA SCREEN DOC REV: CPT | Performed by: PODIATRIST

## 2018-04-05 PROCEDURE — G8417 CALC BMI ABV UP PARAM F/U: HCPCS | Performed by: PODIATRIST

## 2018-04-05 PROCEDURE — G8598 ASA/ANTIPLAT THER USED: HCPCS | Performed by: PODIATRIST

## 2018-04-05 PROCEDURE — 99213 OFFICE O/P EST LOW 20 MIN: CPT | Performed by: PODIATRIST

## 2018-04-05 PROCEDURE — 3046F HEMOGLOBIN A1C LEVEL >9.0%: CPT | Performed by: PODIATRIST

## 2018-04-05 PROCEDURE — 1036F TOBACCO NON-USER: CPT | Performed by: PODIATRIST

## 2018-04-05 PROCEDURE — 3014F SCREEN MAMMO DOC REV: CPT | Performed by: PODIATRIST

## 2018-04-05 PROCEDURE — G8427 DOCREV CUR MEDS BY ELIG CLIN: HCPCS | Performed by: PODIATRIST

## 2018-04-05 RX ORDER — ROPINIROLE 2 MG/1
2 TABLET, FILM COATED ORAL 2 TIMES DAILY
Qty: 60 TABLET | Refills: 2 | Status: SHIPPED | OUTPATIENT
Start: 2018-04-05 | End: 2018-09-10 | Stop reason: DRUGHIGH

## 2018-04-05 RX ORDER — VENLAFAXINE HYDROCHLORIDE 37.5 MG/1
37.5 CAPSULE, EXTENDED RELEASE ORAL DAILY
Qty: 90 CAPSULE | Refills: 5 | Status: SHIPPED | OUTPATIENT
Start: 2018-04-05 | End: 2018-11-20 | Stop reason: SDUPTHER

## 2018-04-05 ASSESSMENT — ENCOUNTER SYMPTOMS
CONSTIPATION: 0
COLOR CHANGE: 0
BACK PAIN: 1
DIARRHEA: 0
NAUSEA: 0
VOMITING: 0

## 2018-04-18 DIAGNOSIS — E79.0 ELEVATED URIC ACID IN BLOOD: ICD-10-CM

## 2018-04-18 DIAGNOSIS — M1A.0720 IDIOPATHIC CHRONIC GOUT OF LEFT FOOT WITHOUT TOPHUS: ICD-10-CM

## 2018-04-18 RX ORDER — ALLOPURINOL 100 MG/1
TABLET ORAL
Qty: 90 TABLET | Refills: 0 | Status: SHIPPED | OUTPATIENT
Start: 2018-04-18 | End: 2018-07-30 | Stop reason: SDUPTHER

## 2018-04-26 ENCOUNTER — TELEPHONE (OUTPATIENT)
Dept: PODIATRY | Age: 58
End: 2018-04-26

## 2018-04-26 RX ORDER — ROPINIROLE 2 MG/1
2 TABLET, FILM COATED ORAL 3 TIMES DAILY
Qty: 90 TABLET | Refills: 3 | Status: SHIPPED | OUTPATIENT
Start: 2018-04-26 | End: 2018-09-10 | Stop reason: DRUGHIGH

## 2018-05-10 DIAGNOSIS — B35.4 TINEA CORPORIS: ICD-10-CM

## 2018-05-10 RX ORDER — NYSTATIN 100000 [USP'U]/G
POWDER TOPICAL
Qty: 3 BOTTLE | Refills: 1 | Status: SHIPPED | OUTPATIENT
Start: 2018-05-10 | End: 2018-06-19 | Stop reason: ALTCHOICE

## 2018-05-15 ENCOUNTER — HOSPITAL ENCOUNTER (OUTPATIENT)
Dept: GENERAL RADIOLOGY | Age: 58
Discharge: HOME | End: 2018-05-17
Payer: COMMERCIAL

## 2018-05-15 ENCOUNTER — HOSPITAL ENCOUNTER (OUTPATIENT)
Age: 58
Discharge: HOME | End: 2018-05-15
Payer: COMMERCIAL

## 2018-05-15 ENCOUNTER — HOSPITAL ENCOUNTER (OUTPATIENT)
Age: 58
Discharge: HOME | End: 2018-05-17
Payer: COMMERCIAL

## 2018-05-15 DIAGNOSIS — E11.40 TYPE 2 DIABETES MELLITUS WITH DIABETIC NEUROPATHY, WITH LONG-TERM CURRENT USE OF INSULIN (HCC): Chronic | ICD-10-CM

## 2018-05-15 DIAGNOSIS — Z96.611 PRESENCE OF ARTIFICIAL SHOULDER JOINT, RIGHT: ICD-10-CM

## 2018-05-15 DIAGNOSIS — E66.01 MORBID OBESITY DUE TO EXCESS CALORIES (HCC): Chronic | ICD-10-CM

## 2018-05-15 DIAGNOSIS — Z79.4 TYPE 2 DIABETES MELLITUS WITH DIABETIC NEUROPATHY, WITH LONG-TERM CURRENT USE OF INSULIN (HCC): Chronic | ICD-10-CM

## 2018-05-15 LAB
ABSOLUTE EOS #: 0.3 K/UL (ref 0–0.4)
ABSOLUTE IMMATURE GRANULOCYTE: ABNORMAL K/UL (ref 0–0.3)
ABSOLUTE LYMPH #: 2 K/UL (ref 1–4.8)
ABSOLUTE MONO #: 0.5 K/UL (ref 0.1–1.3)
ALBUMIN SERPL-MCNC: 4.1 G/DL (ref 3.5–5.2)
ALBUMIN SERPL-MCNC: 4.1 G/DL (ref 3.5–5.2)
ALBUMIN/GLOBULIN RATIO: ABNORMAL (ref 1–2.5)
ALBUMIN/GLOBULIN RATIO: ABNORMAL (ref 1–2.5)
ALP BLD-CCNC: 93 U/L (ref 35–104)
ALP BLD-CCNC: 93 U/L (ref 35–104)
ALT SERPL-CCNC: 16 U/L (ref 5–33)
ALT SERPL-CCNC: 16 U/L (ref 5–33)
ANION GAP SERPL CALCULATED.3IONS-SCNC: 14 MMOL/L
ANION GAP SERPL CALCULATED.3IONS-SCNC: 14 MMOL/L (ref 9–17)
AST SERPL-CCNC: 17 U/L
AST SERPL-CCNC: 17 U/L
BASOPHILS # BLD: 1 % (ref 0–2)
BASOPHILS ABSOLUTE: 0.1 K/UL (ref 0–0.2)
BILIRUB SERPL-MCNC: 0.42 MG/DL (ref 0.3–1.2)
BILIRUB SERPL-MCNC: 0.42 MG/DL (ref 0.3–1.2)
BUN BLDV-MCNC: 11 MG/DL (ref 6–20)
BUN BLDV-MCNC: 11 MG/DL (ref 6–20)
BUN/CREAT BLD: ABNORMAL (ref 9–20)
BUN/CREAT BLD: ABNORMAL (ref 9–20)
CALCIUM SERPL-MCNC: 9 MG/DL (ref 8.6–10.4)
CALCIUM SERPL-MCNC: 9 MG/DL (ref 8.6–10.4)
CHLORIDE BLD-SCNC: 99 MMOL/L (ref 98–107)
CHLORIDE BLD-SCNC: 99 MMOL/L (ref 98–107)
CHOLESTEROL/HDL RATIO: 2.2
CHOLESTEROL/HDL RATIO: 2.2
CHOLESTEROL: 150 MG/DL
CHOLESTEROL: 150 MG/DL
CO2: 26 MMOL/L (ref 20–31)
CO2: 26 MMOL/L (ref 20–31)
CREAT SERPL-MCNC: 0.67 MG/DL (ref 0.5–0.9)
CREAT SERPL-MCNC: 0.67 MG/DL (ref 0.5–0.9)
CREATININE URINE: 174.4 MG/DL (ref 28–217)
DIFFERENTIAL TYPE: ABNORMAL
EOSINOPHILS RELATIVE PERCENT: 3 % (ref 0–4)
ESTIMATED AVERAGE GLUCOSE: 154 MG/DL
GFR AFRICAN AMERICAN: >60 ML/MIN
GFR AFRICAN AMERICAN: >60 ML/MIN
GFR NON-AFRICAN AMERICAN: >60 ML/MIN
GFR NON-AFRICAN AMERICAN: >60 ML/MIN
GFR SERPL CREATININE-BSD FRML MDRD: ABNORMAL ML/MIN/{1.73_M2}
GLUCOSE BLD-MCNC: 157 MG/DL (ref 70–99)
GLUCOSE BLD-MCNC: 157 MG/DL (ref 70–99)
HBA1C MFR BLD: 7 % (ref 4–6)
HCT VFR BLD CALC: 38.9 % (ref 36–46)
HDLC SERPL-MCNC: 67 MG/DL
HDLC SERPL-MCNC: 67 MG/DL
HEMOGLOBIN: 13 G/DL (ref 12–16)
IMMATURE GRANULOCYTES: ABNORMAL %
LDL CHOLESTEROL: 40 MG/DL (ref 0–130)
LDL CHOLESTEROL: 40 MG/DL (ref 0–130)
LYMPHOCYTES # BLD: 21 % (ref 24–44)
MAGNESIUM: 1.6 MG/DL (ref 1.6–2.6)
MCH RBC QN AUTO: 30 PG (ref 26–34)
MCHC RBC AUTO-ENTMCNC: 33.3 G/DL (ref 31–37)
MCV RBC AUTO: 90 FL (ref 80–100)
MICROALBUMIN/CREAT 24H UR: 19 MG/L
MICROALBUMIN/CREAT UR-RTO: 11 MCG/MG CREAT
MONOCYTES # BLD: 5 % (ref 1–7)
NRBC AUTOMATED: ABNORMAL PER 100 WBC
PDW BLD-RTO: 13.8 % (ref 11.5–14.9)
PLATELET # BLD: 410 K/UL (ref 150–450)
PLATELET ESTIMATE: ABNORMAL
PMV BLD AUTO: 8.3 FL (ref 6–12)
POTASSIUM SERPL-SCNC: 4.4 MMOL/L (ref 3.7–5.3)
POTASSIUM SERPL-SCNC: 4.4 MMOL/L (ref 3.7–5.3)
RBC # BLD: 4.33 M/UL (ref 4–5.2)
RBC # BLD: ABNORMAL 10*6/UL
SEG NEUTROPHILS: 70 % (ref 36–66)
SEGMENTED NEUTROPHILS ABSOLUTE COUNT: 6.8 K/UL (ref 1.3–9.1)
SODIUM BLD-SCNC: 139 MMOL/L (ref 135–144)
SODIUM BLD-SCNC: 139 MMOL/L (ref 135–144)
TOTAL PROTEIN: 6.8 G/DL (ref 6.4–8.3)
TOTAL PROTEIN: 6.8 G/DL (ref 6.4–8.3)
TRIGL SERPL-MCNC: 215 MG/DL
TRIGL SERPL-MCNC: 215 MG/DL
TSH SERPL DL<=0.05 MIU/L-ACNC: 1.95 MIU/L (ref 0.3–5)
VLDLC SERPL CALC-MCNC: ABNORMAL MG/DL (ref 1–30)
VLDLC SERPL CALC-MCNC: ABNORMAL MG/DL (ref 1–30)
WBC # BLD: 9.6 K/UL (ref 3.5–11)
WBC # BLD: ABNORMAL 10*3/UL

## 2018-05-15 PROCEDURE — 84443 ASSAY THYROID STIM HORMONE: CPT

## 2018-05-15 PROCEDURE — 73030 X-RAY EXAM OF SHOULDER: CPT

## 2018-05-15 PROCEDURE — 36415 COLL VENOUS BLD VENIPUNCTURE: CPT

## 2018-05-15 PROCEDURE — 83036 HEMOGLOBIN GLYCOSYLATED A1C: CPT

## 2018-05-15 PROCEDURE — 82570 ASSAY OF URINE CREATININE: CPT

## 2018-05-15 PROCEDURE — 85025 COMPLETE CBC W/AUTO DIFF WBC: CPT

## 2018-05-15 PROCEDURE — 80061 LIPID PANEL: CPT

## 2018-05-15 PROCEDURE — 83735 ASSAY OF MAGNESIUM: CPT

## 2018-05-15 PROCEDURE — 82043 UR ALBUMIN QUANTITATIVE: CPT

## 2018-05-15 PROCEDURE — 80053 COMPREHEN METABOLIC PANEL: CPT

## 2018-05-16 LAB
ESTIMATED AVERAGE GLUCOSE: 154 MG/DL
HBA1C MFR BLD: 7 % (ref 4–6)

## 2018-06-19 ENCOUNTER — OFFICE VISIT (OUTPATIENT)
Dept: NEUROLOGY | Age: 58
End: 2018-06-19
Payer: COMMERCIAL

## 2018-06-19 VITALS
SYSTOLIC BLOOD PRESSURE: 123 MMHG | HEART RATE: 86 BPM | WEIGHT: 236.8 LBS | BODY MASS INDEX: 44.71 KG/M2 | HEIGHT: 61 IN | DIASTOLIC BLOOD PRESSURE: 83 MMHG

## 2018-06-19 DIAGNOSIS — G47.33 OBSTRUCTIVE SLEEP APNEA SYNDROME: Primary | Chronic | ICD-10-CM

## 2018-06-19 DIAGNOSIS — R51.9 CHRONIC DAILY HEADACHE: ICD-10-CM

## 2018-06-19 DIAGNOSIS — G44.40 MEDICATION OVERUSE HEADACHE: ICD-10-CM

## 2018-06-19 PROCEDURE — G8417 CALC BMI ABV UP PARAM F/U: HCPCS | Performed by: PSYCHIATRY & NEUROLOGY

## 2018-06-19 PROCEDURE — G8427 DOCREV CUR MEDS BY ELIG CLIN: HCPCS | Performed by: PSYCHIATRY & NEUROLOGY

## 2018-06-19 PROCEDURE — 3017F COLORECTAL CA SCREEN DOC REV: CPT | Performed by: PSYCHIATRY & NEUROLOGY

## 2018-06-19 PROCEDURE — 99244 OFF/OP CNSLTJ NEW/EST MOD 40: CPT | Performed by: PSYCHIATRY & NEUROLOGY

## 2018-06-19 RX ORDER — AMITRIPTYLINE HYDROCHLORIDE 25 MG/1
TABLET, FILM COATED ORAL
Qty: 90 TABLET | Refills: 2 | Status: SHIPPED | OUTPATIENT
Start: 2018-06-19 | End: 2018-09-10 | Stop reason: ALTCHOICE

## 2018-06-28 RX ORDER — AMMONIUM LACTATE 12 G/100G
CREAM TOPICAL
Qty: 140 G | Refills: 5 | Status: SHIPPED | OUTPATIENT
Start: 2018-06-28 | End: 2018-12-19

## 2018-07-06 PROBLEM — G25.81 RLS (RESTLESS LEGS SYNDROME): Chronic | Status: ACTIVE | Noted: 2017-10-09

## 2018-07-17 RX ORDER — PANTOPRAZOLE SODIUM 40 MG/1
TABLET, DELAYED RELEASE ORAL
Qty: 30 TABLET | Refills: 3 | Status: SHIPPED | OUTPATIENT
Start: 2018-07-17 | End: 2018-09-10 | Stop reason: ALTCHOICE

## 2018-07-30 DIAGNOSIS — E79.0 ELEVATED URIC ACID IN BLOOD: ICD-10-CM

## 2018-07-30 DIAGNOSIS — M1A.0720 IDIOPATHIC CHRONIC GOUT OF LEFT FOOT WITHOUT TOPHUS: ICD-10-CM

## 2018-07-30 RX ORDER — ALLOPURINOL 100 MG/1
TABLET ORAL
Qty: 90 TABLET | Refills: 1 | Status: SHIPPED | OUTPATIENT
Start: 2018-07-30 | End: 2018-12-19

## 2018-08-07 ENCOUNTER — TELEPHONE (OUTPATIENT)
Dept: FAMILY MEDICINE CLINIC | Age: 58
End: 2018-08-07

## 2018-08-07 DIAGNOSIS — M25.561 RIGHT KNEE PAIN, UNSPECIFIED CHRONICITY: Primary | ICD-10-CM

## 2018-08-14 DIAGNOSIS — R14.0 ABDOMINAL BLOATING: ICD-10-CM

## 2018-08-14 DIAGNOSIS — Z79.4 TYPE 2 DIABETES MELLITUS WITH DIABETIC NEUROPATHY, WITH LONG-TERM CURRENT USE OF INSULIN (HCC): Chronic | ICD-10-CM

## 2018-08-14 DIAGNOSIS — E11.40 TYPE 2 DIABETES MELLITUS WITH DIABETIC NEUROPATHY, WITH LONG-TERM CURRENT USE OF INSULIN (HCC): Chronic | ICD-10-CM

## 2018-08-14 DIAGNOSIS — K57.90 DIVERTICULOSIS OF INTESTINE WITHOUT BLEEDING, UNSPECIFIED INTESTINAL TRACT LOCATION: ICD-10-CM

## 2018-08-14 DIAGNOSIS — R19.8 IRREGULAR BOWEL HABITS: ICD-10-CM

## 2018-08-14 RX ORDER — OCTISALATE, AVOBENZONE, HOMOSALATE, AND OCTOCRYLENE 29.4; 29.4; 49; 25.48 MG/ML; MG/ML; MG/ML; MG/ML
LOTION TOPICAL
Qty: 90 CAPSULE | Refills: 1 | Status: SHIPPED | OUTPATIENT
Start: 2018-08-14 | End: 2018-12-19

## 2018-08-15 ENCOUNTER — HOSPITAL ENCOUNTER (OUTPATIENT)
Dept: GENERAL RADIOLOGY | Age: 58
Discharge: HOME | End: 2018-08-17
Payer: COMMERCIAL

## 2018-08-15 ENCOUNTER — HOSPITAL ENCOUNTER (OUTPATIENT)
Age: 58
Discharge: HOME | End: 2018-08-17
Payer: COMMERCIAL

## 2018-08-15 DIAGNOSIS — M25.561 RIGHT KNEE PAIN, UNSPECIFIED CHRONICITY: ICD-10-CM

## 2018-08-15 PROBLEM — M17.11 ARTHRITIS OF RIGHT KNEE: Status: ACTIVE | Noted: 2018-08-15

## 2018-08-15 PROCEDURE — 73562 X-RAY EXAM OF KNEE 3: CPT

## 2018-08-20 RX ORDER — DICYCLOMINE HYDROCHLORIDE 10 MG/1
CAPSULE ORAL
Qty: 120 CAPSULE | Refills: 3 | Status: SHIPPED | OUTPATIENT
Start: 2018-08-20 | End: 2018-11-20 | Stop reason: ALTCHOICE

## 2018-08-21 RX ORDER — ASPIRIN 81 MG/1
TABLET, CHEWABLE ORAL
Qty: 30 TABLET | Refills: 11 | Status: SHIPPED | OUTPATIENT
Start: 2018-08-21 | End: 2018-12-19

## 2018-08-22 DIAGNOSIS — B35.4 TINEA CORPORIS: ICD-10-CM

## 2018-08-22 RX ORDER — NYSTATIN 100000 [USP'U]/G
POWDER TOPICAL
Qty: 1 BOTTLE | Refills: 1 | Status: SHIPPED | OUTPATIENT
Start: 2018-08-22 | End: 2018-09-10 | Stop reason: ALTCHOICE

## 2018-08-27 ENCOUNTER — TELEPHONE (OUTPATIENT)
Dept: FAMILY MEDICINE CLINIC | Age: 58
End: 2018-08-27

## 2018-08-27 DIAGNOSIS — M25.561 RIGHT KNEE PAIN, UNSPECIFIED CHRONICITY: Primary | ICD-10-CM

## 2018-09-10 ENCOUNTER — OFFICE VISIT (OUTPATIENT)
Dept: FAMILY MEDICINE CLINIC | Age: 58
End: 2018-09-10
Payer: COMMERCIAL

## 2018-09-10 VITALS
BODY MASS INDEX: 44.97 KG/M2 | DIASTOLIC BLOOD PRESSURE: 80 MMHG | SYSTOLIC BLOOD PRESSURE: 120 MMHG | TEMPERATURE: 98 F | HEART RATE: 93 BPM | RESPIRATION RATE: 16 BRPM | OXYGEN SATURATION: 98 % | WEIGHT: 238 LBS

## 2018-09-10 DIAGNOSIS — B35.4 TINEA CORPORIS: ICD-10-CM

## 2018-09-10 DIAGNOSIS — M25.561 ACUTE PAIN OF BOTH KNEES: ICD-10-CM

## 2018-09-10 DIAGNOSIS — G25.81 RLS (RESTLESS LEGS SYNDROME): Primary | Chronic | ICD-10-CM

## 2018-09-10 DIAGNOSIS — R63.5 WEIGHT GAIN: ICD-10-CM

## 2018-09-10 DIAGNOSIS — M25.562 ACUTE PAIN OF BOTH KNEES: ICD-10-CM

## 2018-09-10 DIAGNOSIS — E66.01 CLASS 3 SEVERE OBESITY DUE TO EXCESS CALORIES WITH SERIOUS COMORBIDITY AND BODY MASS INDEX (BMI) OF 40.0 TO 44.9 IN ADULT (HCC): ICD-10-CM

## 2018-09-10 DIAGNOSIS — Z79.4 TYPE 2 DIABETES MELLITUS WITH DIABETIC NEUROPATHY, WITH LONG-TERM CURRENT USE OF INSULIN (HCC): ICD-10-CM

## 2018-09-10 DIAGNOSIS — E11.40 TYPE 2 DIABETES MELLITUS WITH DIABETIC NEUROPATHY, WITH LONG-TERM CURRENT USE OF INSULIN (HCC): ICD-10-CM

## 2018-09-10 PROCEDURE — G8427 DOCREV CUR MEDS BY ELIG CLIN: HCPCS | Performed by: NURSE PRACTITIONER

## 2018-09-10 PROCEDURE — 1036F TOBACCO NON-USER: CPT | Performed by: NURSE PRACTITIONER

## 2018-09-10 PROCEDURE — 2022F DILAT RTA XM EVC RTNOPTHY: CPT | Performed by: NURSE PRACTITIONER

## 2018-09-10 PROCEDURE — 3017F COLORECTAL CA SCREEN DOC REV: CPT | Performed by: NURSE PRACTITIONER

## 2018-09-10 PROCEDURE — 3045F PR MOST RECENT HEMOGLOBIN A1C LEVEL 7.0-9.0%: CPT | Performed by: NURSE PRACTITIONER

## 2018-09-10 PROCEDURE — G8417 CALC BMI ABV UP PARAM F/U: HCPCS | Performed by: NURSE PRACTITIONER

## 2018-09-10 PROCEDURE — G8598 ASA/ANTIPLAT THER USED: HCPCS | Performed by: NURSE PRACTITIONER

## 2018-09-10 PROCEDURE — 99214 OFFICE O/P EST MOD 30 MIN: CPT | Performed by: NURSE PRACTITIONER

## 2018-09-10 RX ORDER — GABAPENTIN 600 MG/1
600 TABLET ORAL 2 TIMES DAILY
Qty: 60 TABLET | Refills: 0
Start: 2018-09-10 | End: 2018-10-08 | Stop reason: SDUPTHER

## 2018-09-10 RX ORDER — ROPINIROLE 2 MG/1
4 TABLET, FILM COATED ORAL NIGHTLY
Qty: 90 TABLET | Refills: 3 | Status: SHIPPED
Start: 2018-09-10 | End: 2018-11-20 | Stop reason: ALTCHOICE

## 2018-09-10 RX ORDER — NYSTATIN 100000 U/G
CREAM TOPICAL
Qty: 15 G | Refills: 0 | Status: SHIPPED | OUTPATIENT
Start: 2018-09-10 | End: 2018-10-08 | Stop reason: SDUPTHER

## 2018-09-10 RX ORDER — EMPAGLIFLOZIN 10 MG/1
10 TABLET, FILM COATED ORAL DAILY
COMMUNITY
Start: 2018-06-08 | End: 2018-11-20 | Stop reason: ALTCHOICE

## 2018-09-10 ASSESSMENT — ENCOUNTER SYMPTOMS: RESPIRATORY NEGATIVE: 1

## 2018-09-10 ASSESSMENT — PATIENT HEALTH QUESTIONNAIRE - PHQ9
2. FEELING DOWN, DEPRESSED OR HOPELESS: 0
SUM OF ALL RESPONSES TO PHQ9 QUESTIONS 1 & 2: 0
1. LITTLE INTEREST OR PLEASURE IN DOING THINGS: 0
SUM OF ALL RESPONSES TO PHQ QUESTIONS 1-9: 0
SUM OF ALL RESPONSES TO PHQ QUESTIONS 1-9: 0

## 2018-09-10 NOTE — PROGRESS NOTES
Visit Information    Have you changed or started any medications since your last visit including any over-the-counter medicines, vitamins, or herbal medicines? no   Have you stopped taking any of your medications? Is so, why? -  no  Are you having any side effects from any of your medications? - no    Have you seen any other physician or provider since your last visit? yes -    Have you had any other diagnostic tests since your last visit?  no   Have you been seen in the emergency room and/or had an admission in a hospital since we last saw you?  no   Have you had your routine dental cleaning in the past 6 months?  no     Do you have an active MyChart account? If no, what is the barrier?   Yes    Patient Care Team:  YOLANDA Chavez CNP as PCP - [address] [place] [address] Karan [address], APRN - CNP as PCP - [facility] Attributed Provider  Odette Mitchell MD as Orthopedic Surgeon (Orthopedic Surgery)  Alonso Burk MD as Surgeon (Cardiology)  YOLANDA Chavez CNP (Family Medicine)    Medical History Review  Past Medical, Family, and Social History reviewed and does not contribute to the patient presenting condition    Health Maintenance   Topic Date Due    Pneumococcal med risk (1 of 1 - PPSV23) 12/19/1979    Shingles Vaccine (1 of 2 - 2 Dose Series) 12/19/2010    Diabetic retinal exam  06/03/2017    Breast cancer screen  09/16/2018    DTaP/Tdap/Td vaccine (1 - Tdap) 09/11/2018 (Originally 12/19/1979)    Flu vaccine (1) 09/11/2018 (Originally 9/1/2018)    Diabetic foot exam  04/05/2019    A1C test (Diabetic or Prediabetic)  05/15/2019    Diabetic microalbuminuria test  05/15/2019    Lipid screen  05/15/2019    TSH testing  05/15/2019    Potassium monitoring  05/15/2019    Creatinine monitoring  05/15/2019    Cervical cancer screen  04/24/2022    Colon cancer screen colonoscopy  10/12/2022    Hepatitis C screen  Addressed    HIV screen  Addressed

## 2018-09-10 NOTE — PROGRESS NOTES
Meryl Minaya. Maria R Reed, APRN-CNP  [address] [facility]  [address] [facility] [address]  Dept: [phone]  Dept Fax: [phone]      HPI:     Martha Hartman is a 62 y.o. female who presents today for her medical conditions/complaints as noted below. Martha Hartman is c/o of 3 Month Follow-Up; Diabetes Mellitus; Knee Pain; and Other (RLS, weight gain patient wanting a referral to weight managment)    Knee Pain    The incident occurred more than 1 week ago (B/l knee pain started last week, 2 weeks prior was just right knee). There was no injury mechanism. The pain is present in the right knee and left knee. The pain is at a severity of 9/10. The pain is severe. Associated symptoms comments: States that knees go get swollen, denies erythema/warmth, sometimes feels warmth to thighs. Treatments tried: Biofreeze, hot pad. Narrative   EXAMINATION:   3 XRAY VIEWS OF THE RIGHT KNEE       8/15/2018 3:31 pm       COMPARISON:   Bilateral knee series September 28, 2012.       HISTORY:   ORDERING SYSTEM PROVIDED HISTORY: Right knee pain, unspecified chronicity   TECHNOLOGIST PROVIDED HISTORY:   Reason for exam:->Right knee pain   Ordering Physician Provided Reason for Exam: right knee pain x 2 weeks   Acuity: Unknown   Type of Exam: Unknown       FINDINGS:   No knee joint effusion.  There are moderate tricompartmental degenerative   changes with associated medial weight-bearing and patellofemoral joint space   narrowing.  No acute bony process.           Impression   Moderate tricompartmental degenerative changes with associated medial   weight-bearing and patellofemoral joint space narrowing, similar the prior   study.         DM II/weight gain  Following w/Dr. Adri Maguire- last HgbA1c was around 6.9 about 2 weeks ago. She is maintained on Victoza, Levemir, Novolog, Jardiance, and metformin. She has noted weight gain since May 2018- about 30 lbs per patient.  She does try to walk a little bit, but does c/o b/l knee pain. She walks twice/week. She is trying to get a brace for her right knee, would also like a brace to her left knee. Would not like to go to Ortho at this time. Wt Readings from Last 3 Encounters:   09/10/18 238 lb (108 kg)   18 236 lb 12.8 oz (107.4 kg)   18 235 lb 0.2 oz (106.6 kg)     Lab Results   Component Value Date    LABA1C 7.0 (H) 05/15/2018    LABA1C 7.0 (H) 05/15/2018     Lab Results   Component Value Date     05/15/2018     05/15/2018     RLS  States she cannot sleep at night because her legs hurt so bad and she moves her legs around. She is taking Requip 2 mg TID, as well as gabapentin 600 mg BID. Rash  C/o rash underneath breasts- started in - using Nystatin powder and trying to keep skin dry, which has not helped.   Past Medical History:   Diagnosis Date    Anginal pain ([address]     last used Nitro sl 4 yrs     Arthritis     Asthma     Back pain     radiculopathy left leg    Constipation     Dysmenorrhea     Fatty liver disease, nonalcoholic     GERD (gastroesophageal reflux disease)     Gout     found through bloodwork    Headache     Heart palpitations     Hyperlipidemia     Hypertension     Hyperthyroidism     Hypertriglyceridemia     Hypothyroidism 2016    Neuropathy     bilateral legs and feet    Obesity     Osteoporosis     Pancreatitis     no problems    Panic attack 10/30/2014    Sleep apnea     Stenosis of both internal carotid arteries- Mild 16-49% 2017    Type II or unspecified type diabetes mellitus without mention of complication, not stated as uncontrolled     checks daily      Past Surgical History:   Procedure Laterality Date    CARDIAC CATHETERIZATION      Patient states approximately  she had the cardiac catheterization that was negative      SECTION      x 2    COLONOSCOPY      DILATION AND CURETTAGE OF UTERUS      JOINT REPLACEMENT Left [Celecoxib] Hives, Itching and Dermatitis     Burnt red blister on ashlee    Fenofibrate Other (See Comments)     Muscle cramps    Gemfibrozil Other (See Comments)     Muscle pain, spasms, weakness and HA  Muscle pain, spasms, weakness and HA    Lovastatin Other (See Comments)     Muscle cramps  Muscle cramps    Prempro [Conj Estrog-Medroxyprogest Ace] Other (See Comments)     Breast tenderness, pain in vagina, cramping    Statins Other (See Comments)     Muscle cramps  Lipitor ok    Tricor [Fenofibrate] Other (See Comments)     Muscle cramps    Zetia [Ezetimibe] Other (See Comments)     Does not remember reaction  Does not remember reaction  Does not remember reaction    Zocor [Simvastatin] Other (See Comments)     Muscle cramps    Ampicillin Rash    Niacin And Related Rash    Novolin [Insulin Isophane & Reg, Human] Rash    Omeprazole Nausea And Vomiting    Pcn [Penicillins] Rash    Pregabalin Nausea And Vomiting and Rash    Vesicare [Solifenacin] Rash       Health Maintenance   Topic Date Due    Diabetic retinal exam  06/03/2017    Breast cancer screen  09/16/2018    DTaP/Tdap/Td vaccine (1 - Tdap) 09/11/2018 (Originally 12/19/1979)    Flu vaccine (1) 09/11/2018 (Originally 9/1/2018)    Pneumococcal med risk (1 of 1 - PPSV23) 09/10/2019 (Originally 12/19/1979)    Shingles Vaccine (1 of 2 - 2 Dose Series) 09/10/2019 (Originally 12/19/2010)    Diabetic foot exam  04/05/2019    A1C test (Diabetic or Prediabetic)  05/15/2019    Diabetic microalbuminuria test  05/15/2019    Lipid screen  05/15/2019    TSH testing  05/15/2019    Potassium monitoring  05/15/2019    Creatinine monitoring  05/15/2019    Cervical cancer screen  04/24/2022    Colon cancer screen colonoscopy  10/12/2022    Hepatitis C screen  Addressed    HIV screen  Addressed       Subjective:      Review of Systems   Constitutional: Positive for fever (Thighs feel warm). Negative for chills. Respiratory: Negative. note and vitals reviewed. Data:     Lab Results   Component Value Date     05/15/2018     05/15/2018    K 4.4 05/15/2018    K 4.4 05/15/2018    CL 99 05/15/2018    CL 99 05/15/2018    CO2 26 05/15/2018    CO2 26 05/15/2018    BUN 11 05/15/2018    BUN 11 05/15/2018    CREATININE 0.67 05/15/2018    CREATININE 0.67 05/15/2018    GLUCOSE 157 05/15/2018    GLUCOSE 157 05/15/2018    GLUCOSE 163 03/06/2012    PROT 6.8 05/15/2018    PROT 6.8 05/15/2018    LABALBU 4.1 05/15/2018    LABALBU 4.1 05/15/2018    LABALBU 4.8 09/15/2011    BILITOT 0.42 05/15/2018    BILITOT 0.42 05/15/2018    ALKPHOS 93 05/15/2018    ALKPHOS 93 05/15/2018    AST 17 05/15/2018    AST 17 05/15/2018    ALT 16 05/15/2018    ALT 16 05/15/2018     Lab Results   Component Value Date    WBC 9.6 05/15/2018    RBC 4.33 05/15/2018    RBC 4.34 03/06/2012    HGB 13.0 05/15/2018    HCT 38.9 05/15/2018    MCV 90.0 05/15/2018    MCH 30.0 05/15/2018    MCHC 33.3 05/15/2018    RDW 13.8 05/15/2018     05/15/2018     03/06/2012    MPV 8.3 05/15/2018     Lab Results   Component Value Date    TSH 1.95 05/15/2018     Lab Results   Component Value Date    CHOL 150 05/15/2018    CHOL 150 05/15/2018    HDL 67 05/15/2018    HDL 67 05/15/2018    LABA1C 7.0 05/15/2018    LABA1C 7.0 05/15/2018       Assessment & Plan:      1. RLS (restless legs syndrome)  -No improvement: Patient was unsure of medication dosages- advised to take Requip 4 mg nightly and discuss further with upcoming specialists- neurology and podiatry   -Con't gabapentin as ordered:  - gabapentin (NEURONTIN) 600 MG tablet; Take 1 tablet by mouth 2 times daily for 30 days. .  Dispense: 60 tablet; Refill: 0  - [facility]  [place]  - Vitamin D 25 Hydroxy; Future  - Vitamin B12; Future    2. Acute pain of both knees  -Will refer for further evaluation and possible treatment:  -Does not want specialist referral at this time  - [address]    3.  Type 2 diabetes mellitus with diabetic neuropathy, with long-term current use of insulin ([address]  -Stable: Con't all medications as ordered, con't f/u with endocrinology as scheduled, con't current tx plan  -Lifestyle changes discussed and encouraged: Decrease fats, sugars, carbohydrates, and increase routine exercise  - [id number] [facility]- [facility]  -Lifestyle changes discussed and encouraged: Decrease fats, sugars, carbohydrates, and increase routine exercise  -Will obtain last eye exam from eye doctor    4. Tinea corporis  -D/c Nystatin powder, start cream as ordered, keep clean and dry  -If no improvement in 1 week, consider x1 Diflucan  - nystatin (MYCOSTATIN) 414236 UNIT/GM cream; Apply topically 2 times daily. Dispense: 15 g; Refill: 0    5. Weight gain  -Healthy diet and routine exercise encouraged  -Labs as follows:  - TSH with Reflex; Future  - Lipid Panel; Future  - Comprehensive Metabolic Panel; Future  - Vitamin D 25 Hydroxy; Future  - Vitamin B12; Future  - Gus Gagnon MD, Weight Management and [facility]    6. Class 3 severe obesity due to excess calories with serious comorbidity and body mass index (BMI) of 40.0 to 44.9 in adult [facility])  -See instructions above  -Labs as follows:  - TSH with Reflex; Future  - Lipid Panel; Future  - Comprehensive Metabolic Panel; Future  - Vitamin D 25 Hydroxy; Future  - Vitamin B12; Future  - Gus Gagnon MD, Weight Management and [facility]    Return in about 2 weeks (around 9/24/2018) for Lab Results, go over meds. Advised to bring in medications to next visit to update medication list.    Patient given educational materials on Nutrition. Discussed use, benefit, and side effects of prescribed medications. Barriers to medication compliance addressed. All patient questions answered.  Patient voiced

## 2018-09-10 NOTE — PATIENT INSTRUCTIONS
good, quick way to make sure that you have a balanced meal. It also helps you spread carbs throughout the day. ¨ Divide your plate by types of foods. Put non-starchy vegetables on half the plate, meat or other protein food on one-quarter of the plate, and a grain or starchy vegetable in the final quarter of the plate. To this you can add a small piece of fruit and 1 cup of milk or yogurt, depending on how many carbs you are supposed to eat at a meal.  · Try to eat about the same amount of carbs at each meal. Do not \"save up\" your daily allowance of carbs to eat at one meal.  · Proteins have very little or no carbs per serving. Examples of proteins are beef, chicken, turkey, fish, eggs, tofu, cheese, cottage cheese, and peanut butter. A serving size of meat is 3 ounces, which is about the size of a deck of cards. Examples of meat substitute serving sizes (equal to 1 ounce of meat) are 1/4 cup of cottage cheese, 1 egg, 1 tablespoon of peanut butter, and ½ cup of tofu. How can you eat out and still eat healthy? · Learn to estimate the serving sizes of foods that have carbohydrate. If you measure food at home, it will be easier to estimate the amount in a serving of restaurant food. · If the meal you order has too much carbohydrate (such as potatoes, corn, or baked beans), ask to have a low-carbohydrate food instead. Ask for a salad or green vegetables. · If you use insulin, check your blood sugar before and after eating out to help you plan how much to eat in the future. · If you eat more carbohydrate at a meal than you had planned, take a walk or do other exercise. This will help lower your blood sugar. What else should you know? · Limit saturated fat, such as the fat from meat and dairy products. This is a healthy choice because people who have diabetes are at higher risk of heart disease. So choose lean cuts of meat and nonfat or low-fat dairy products.  Use olive or canola oil instead of butter or shortening when cooking. · Don't skip meals. Your blood sugar may drop too low if you skip meals and take insulin or certain medicines for diabetes. · Check with your doctor before you drink alcohol. Alcohol can cause your blood sugar to drop too low. Alcohol can also cause a bad reaction if you take certain diabetes medicines. Follow-up care is a key part of your treatment and safety. Be sure to make and go to all appointments, and call your doctor if you are having problems. It's also a good idea to know your test results and keep a list of the medicines you take. Where can you learn more? Go to https://[email or web address].[email or web address]. org and sign in to your [employer] account. Enter N003 in the [employer] box to learn more about \"Learning About Diabetes Food Guidelines. \"     If you do not have an account, please click on the \"Sign Up Now\" link. Current as of: December 7, 2017  Content Version: 11.7  © 1967-2363 [employer]. Care instructions adapted under license by [facility] ([facility]). If you have questions about a medical condition or this instruction, always ask your healthcare professional. [address] any warranty or liability for your use of this information.

## 2018-09-18 DIAGNOSIS — E78.5 HYPERLIPIDEMIA, UNSPECIFIED HYPERLIPIDEMIA TYPE: ICD-10-CM

## 2018-09-18 RX ORDER — ATORVASTATIN CALCIUM 20 MG/1
TABLET, FILM COATED ORAL
Qty: 90 TABLET | Refills: 1 | Status: SHIPPED | OUTPATIENT
Start: 2018-09-18 | End: 2018-12-19

## 2018-09-24 ENCOUNTER — HOSPITAL ENCOUNTER (OUTPATIENT)
Dept: PHYSICAL THERAPY | Age: 58
Setting detail: THERAPIES SERIES
Discharge: HOME | End: 2018-09-24
Payer: COMMERCIAL

## 2018-09-24 PROCEDURE — 97162 PT EVAL MOD COMPLEX 30 MIN: CPT

## 2018-09-24 PROCEDURE — 97110 THERAPEUTIC EXERCISES: CPT

## 2018-09-24 NOTE — PROGRESS NOTES
[address]   Outpatient Physical Therapy  Physical Therapy Upper Extremity Evaluation    Date:  2018  Patient: Cynthia Aiken  : 1960  MRN: [id number]  Physician: Breanne Jon MD   Insurance: [facility] 30/yr  Medical Diagnosis: (B) knee pain M25.561. M25.562, restless leg G25.81  Rehab Codes: (B) knee pain, weakness  Onset Date: ~6 years ago, worsening and referral dated 9/10/18   Next 's appt: PRN    Subjective:   Patient with complaints of insidious onset of pain in the (B) knees, typically worse in medial and lateral joint line, ~ (B) per the patient. Patient noted start of symptoms in the last 6 years, but increased symptoms more recently prompted referral to therapy. She has been referred by her family doctor, may see orthopedic Solomon Harden in the future. Patient with increased symptoms with attempting to walk less than 5 minutes, with attempting stairs, notes increased pain with transferring in/out of chair. Patient with limited knee strength with clinical testing, limited with knee ROM grossly especially with extension of the knee. CC: complaints of generalized knee pain, more specifically in (B) medial and lateral joint line  HPI: referral 9/10/18    PMHx: [] Unremarkable [] Diabetes [] HTN  [] Pacemaker   [] MI/Heart Problems [] Cancer [] Arthritis [] Other:              [x] Refer to full medical chart  In EPIC   Tests: [x] X-Ray: see EPIC [] MRI:  [] Other:    No knee joint effusion.  There are moderate tricompartmental degenerative   changes with associated medial weight-bearing and patellofemoral joint space   narrowing.  No acute bony process.    18 compare to  study    Medications: [x] Refer to full medical record [] None [] Other:  Allergies:      [x] Refer to full medical record [] None [] Other:    Pain:  [x] Yes  [] No Location: (B) medial and lateral knee joint pain, ~(B) Pain Rating: (0-10 scale) 8/10  Pain altered Tx:  [] Yes  [] No Function: Patient with improved tolerance of ambulation up to 15 minutes, improved tolerance of stairs and able to do 6\" stairs with minimal to no pain, improved tolerance of transferring in/out of chair by 50% grossly. 5. Independent with Home Exercise Programs  LTG: (to be met in 12 treatments)  6. ? Pain: Improved pain levels to 3-4/10 in (B) knees. 7. ? ROM: Improved knee ROM to 110 for (B) flexion, lacks 5 degrees for extension. 8. ? Strength: Improved strength testing by one manual muscle testing grade. 9. ? Function: Patient with improved tolerance of ambulation up to 15 minutes, improved tolerance of stairs and able to do 6\" stairs with minimal to no pain, improved tolerance of transferring in/out of chair by 50% grossly. 10. Independent with Home Exercise Programs                 Patient goals: just feel better    Rehab Potential:  [x] Good  [] Fair  [] Poor   Suggested Professional Referral:  [x] No  [] Yes:  Barriers to Goal Achievement[de-identified]  [x] No  [] Yes:  Domestic Concerns:  [x] No  [] Yes:    Pt. Education:  [x] Plans/Goals, Risks/Benefits discussed  [x] Home exercise program    Method of Education: [x] Verbal  [] Demo  [x] Written  Comprehension of Education:  [x] Verbalizes understanding. [] Demonstrates understanding. [] Needs Review. [] Demonstrates/verbalizes understanding of HEP/Ed previously given. Treatment Plan:  [x] Therapeutic Exercise    [] Modalities:  [] Therapeutic Activity    [] Ultrasound  [] Electrical Stimulation  [] Gait Training     [] Massage       [] Lumbar/Cervical Traction  [x] Neuromuscular Re-education [] Cold/hotpack [] Iontophoresis: 4 mg/mL  [x] Instruction in HEP             Dexamethasone Sodium  [x] Manual Therapy             Phosphate 40-80 mAmin  [] Aquatic Therapy  [] Vasocompression/    [] Other:       Game Ready    []  Medication allergies reviewed for use of    Dexamethasone Sodium Phosphate 4mg/ml     with iontophoresis treatments.    Pt is not allergic. Frequency:  2-3 x/week for 12 visits        Todays Treatment:  Modalities:   Precautions:  Exercises:  Exercise Reps/ Time Weight/ Level Comments   Supine hamstring stretch 5 15\"    Heel slides 15     Standing hamstring stretch 5 15\"                Other:    Specific Instructions for next treatment:      Treatment Charges: Mins Units   [x] Evaluation       []  Low       [x]  Moderate       []  High 30 1   []  Modalities     [x]  Ther Exercise 10 1   []  Manual Therapy     []  Ther Activities     []  Aquatics     []  Neuromuscular     [x]  Other 40 2     TOTAL TREATMENT TIME: 40    Time in:1600   Time Out:1640    Electronically signed by: Manuel Arce PT        Physician Signature:________________________________Date:__________________  By signing above or cosigning this note, I have reviewed this plan of care and certify a need for medically necessary rehabilitation services.      *PLEASE SIGN ABOVE AND FAX BACK ALL PAGES*

## 2018-10-03 ENCOUNTER — HOSPITAL ENCOUNTER (OUTPATIENT)
Dept: PHYSICAL THERAPY | Age: 58
Setting detail: THERAPIES SERIES
Discharge: HOME | End: 2018-10-03
Payer: COMMERCIAL

## 2018-10-03 PROCEDURE — 97113 AQUATIC THERAPY/EXERCISES: CPT

## 2018-10-03 NOTE — PROGRESS NOTES
Rating 8         Specific Instructions for next treatment:  Assess patient response and progress as able    Treatment Charges: Mins Units   []  Modalities     []  Ther Exercise     []  Manual Therapy     []  Ther Activities     [x]  Aquatics 30 2   []  Neuromuscular     []  Other     Total Treatment time 30 2       Assessment: [x] Progressing toward goals. Patient slow moving and guarded throughout treatment due to pain     [] No change. [] Other:    STG: (to be met in 6 treatments)  1. ? Pain: Improved pain levels to 3-4/10 in (B) knees. 2. ? ROM: Improved knee ROM to 110 for (B) flexion, lacks 5 degrees for extension. 3. ? Strength: Improved strength testing by one manual muscle testing grade. 4. ? Function: Patient with improved tolerance of ambulation up to 15 minutes, improved tolerance of stairs and able to do 6\" stairs with minimal to no pain, improved tolerance of transferring in/out of chair by 50% grossly. 5. Independent with Home Exercise Programs  LTG: (to be met in 12 treatments)  6. ? Pain: Improved pain levels to 3-4/10 in (B) knees. 7. ? ROM: Improved knee ROM to 110 for (B) flexion, lacks 5 degrees for extension. 8. ? Strength: Improved strength testing by one manual muscle testing grade. 9. ? Function: Patient with improved tolerance of ambulation up to 15 minutes, improved tolerance of stairs and able to do 6\" stairs with minimal to no pain, improved tolerance of transferring in/out of chair by 50% grossly. 10. Independent with Home Exercise Programs                 Patient goals: just feel better    Pt. Education:  [x] Yes  [] No  [] Reviewed Prior HEP/Ed  Method of Education: [x] Verbal  [x] Demo  [] Written  Comprehension of Education:  [x] Verbalizes understanding. [x] Demonstrates understanding. [x] Needs review. [] Demonstrates/verbalizes HEP/Ed previously given. Plan: [x] Continue per plan of care.       [] Other:      Time In: 1400            Time Out: 76 [phone]    Electronically signed by Liss Greenberg PTA on 10/3/2018 at 4:23 PM

## 2018-10-08 DIAGNOSIS — G25.81 RLS (RESTLESS LEGS SYNDROME): Primary | Chronic | ICD-10-CM

## 2018-10-08 DIAGNOSIS — B35.4 TINEA CORPORIS: ICD-10-CM

## 2018-10-08 RX ORDER — NYSTATIN 100000 U/G
CREAM TOPICAL
Qty: 15 G | Refills: 0 | Status: SHIPPED | OUTPATIENT
Start: 2018-10-08 | End: 2018-11-23 | Stop reason: SDUPTHER

## 2018-10-08 RX ORDER — GABAPENTIN 600 MG/1
600 TABLET ORAL 2 TIMES DAILY
Qty: 60 TABLET | Refills: 0 | Status: SHIPPED | OUTPATIENT
Start: 2018-10-08 | End: 2018-11-04 | Stop reason: SDUPTHER

## 2018-10-10 ENCOUNTER — HOSPITAL ENCOUNTER (OUTPATIENT)
Dept: PHYSICAL THERAPY | Age: 58
Setting detail: THERAPIES SERIES
Discharge: HOME | End: 2018-10-10
Payer: COMMERCIAL

## 2018-10-10 ENCOUNTER — OFFICE VISIT (OUTPATIENT)
Dept: FAMILY MEDICINE CLINIC | Age: 58
End: 2018-10-10
Payer: COMMERCIAL

## 2018-10-10 VITALS
DIASTOLIC BLOOD PRESSURE: 72 MMHG | SYSTOLIC BLOOD PRESSURE: 123 MMHG | BODY MASS INDEX: 43.99 KG/M2 | WEIGHT: 233 LBS | OXYGEN SATURATION: 96 % | HEART RATE: 98 BPM | HEIGHT: 61 IN | TEMPERATURE: 98.1 F

## 2018-10-10 DIAGNOSIS — H61.23 CERUMEN DEBRIS ON TYMPANIC MEMBRANE OF BOTH EARS: Primary | ICD-10-CM

## 2018-10-10 DIAGNOSIS — J01.00 ACUTE NON-RECURRENT MAXILLARY SINUSITIS: ICD-10-CM

## 2018-10-10 DIAGNOSIS — R42 DIZZINESS: ICD-10-CM

## 2018-10-10 PROCEDURE — 1036F TOBACCO NON-USER: CPT | Performed by: FAMILY MEDICINE

## 2018-10-10 PROCEDURE — G8484 FLU IMMUNIZE NO ADMIN: HCPCS | Performed by: FAMILY MEDICINE

## 2018-10-10 PROCEDURE — 99214 OFFICE O/P EST MOD 30 MIN: CPT | Performed by: FAMILY MEDICINE

## 2018-10-10 PROCEDURE — 3017F COLORECTAL CA SCREEN DOC REV: CPT | Performed by: FAMILY MEDICINE

## 2018-10-10 PROCEDURE — G8417 CALC BMI ABV UP PARAM F/U: HCPCS | Performed by: FAMILY MEDICINE

## 2018-10-10 PROCEDURE — G8598 ASA/ANTIPLAT THER USED: HCPCS | Performed by: FAMILY MEDICINE

## 2018-10-10 PROCEDURE — 69210 REMOVE IMPACTED EAR WAX UNI: CPT | Performed by: FAMILY MEDICINE

## 2018-10-10 PROCEDURE — G8427 DOCREV CUR MEDS BY ELIG CLIN: HCPCS | Performed by: FAMILY MEDICINE

## 2018-10-10 RX ORDER — FLUTICASONE PROPIONATE 50 MCG
1 SPRAY, SUSPENSION (ML) NASAL DAILY
Qty: 1 BOTTLE | Refills: 3 | Status: SHIPPED | OUTPATIENT
Start: 2018-10-10 | End: 2018-12-11 | Stop reason: ALTCHOICE

## 2018-10-10 RX ORDER — MECLIZINE HYDROCHLORIDE 25 MG/1
25 TABLET ORAL 3 TIMES DAILY PRN
Qty: 30 TABLET | Refills: 1 | Status: SHIPPED | OUTPATIENT
Start: 2018-10-10 | End: 2018-10-20

## 2018-10-10 RX ORDER — AZITHROMYCIN 250 MG/1
TABLET, FILM COATED ORAL
Qty: 1 PACKET | Refills: 0 | Status: SHIPPED | OUTPATIENT
Start: 2018-10-10 | End: 2018-10-29

## 2018-10-10 ASSESSMENT — ENCOUNTER SYMPTOMS
SINUS PRESSURE: 1
GASTROINTESTINAL NEGATIVE: 1
SINUS PAIN: 1
EYES NEGATIVE: 1
ALLERGIC/IMMUNOLOGIC NEGATIVE: 1
SWOLLEN GLANDS: 1
RESPIRATORY NEGATIVE: 1

## 2018-10-15 ENCOUNTER — HOSPITAL ENCOUNTER (OUTPATIENT)
Age: 58
Discharge: HOME | End: 2018-10-15
Payer: COMMERCIAL

## 2018-10-15 DIAGNOSIS — G25.81 RLS (RESTLESS LEGS SYNDROME): Chronic | ICD-10-CM

## 2018-10-15 DIAGNOSIS — E66.01 CLASS 3 SEVERE OBESITY DUE TO EXCESS CALORIES WITH SERIOUS COMORBIDITY AND BODY MASS INDEX (BMI) OF 40.0 TO 44.9 IN ADULT (HCC): ICD-10-CM

## 2018-10-15 DIAGNOSIS — R63.5 WEIGHT GAIN: ICD-10-CM

## 2018-10-15 LAB
ALBUMIN SERPL-MCNC: 4.2 G/DL (ref 3.5–5.2)
ALBUMIN/GLOBULIN RATIO: ABNORMAL (ref 1–2.5)
ALP BLD-CCNC: 112 U/L (ref 35–104)
ALT SERPL-CCNC: 16 U/L (ref 5–33)
ANION GAP SERPL CALCULATED.3IONS-SCNC: 12 MMOL/L (ref 9–17)
AST SERPL-CCNC: 18 U/L
BILIRUB SERPL-MCNC: 0.36 MG/DL (ref 0.3–1.2)
BUN BLDV-MCNC: 10 MG/DL (ref 6–20)
BUN/CREAT BLD: ABNORMAL (ref 9–20)
CALCIUM SERPL-MCNC: 9.3 MG/DL (ref 8.6–10.4)
CHLORIDE BLD-SCNC: 101 MMOL/L (ref 98–107)
CHOLESTEROL/HDL RATIO: 2.3
CHOLESTEROL: 162 MG/DL
CO2: 26 MMOL/L (ref 20–31)
CREAT SERPL-MCNC: 0.69 MG/DL (ref 0.5–0.9)
GFR AFRICAN AMERICAN: >60 ML/MIN
GFR NON-AFRICAN AMERICAN: >60 ML/MIN
GFR SERPL CREATININE-BSD FRML MDRD: ABNORMAL ML/MIN/{1.73_M2}
GFR SERPL CREATININE-BSD FRML MDRD: ABNORMAL ML/MIN/{1.73_M2}
GLUCOSE BLD-MCNC: 166 MG/DL (ref 70–99)
HDLC SERPL-MCNC: 69 MG/DL
LDL CHOLESTEROL: 55 MG/DL (ref 0–130)
POTASSIUM SERPL-SCNC: 4.3 MMOL/L (ref 3.7–5.3)
SODIUM BLD-SCNC: 139 MMOL/L (ref 135–144)
TOTAL PROTEIN: 7.5 G/DL (ref 6.4–8.3)
TRIGL SERPL-MCNC: 189 MG/DL
TSH SERPL DL<=0.05 MIU/L-ACNC: 2.03 MIU/L (ref 0.3–5)
VITAMIN B-12: 886 PG/ML (ref 232–1245)
VITAMIN D 25-HYDROXY: 33.4 NG/ML (ref 30–100)
VLDLC SERPL CALC-MCNC: ABNORMAL MG/DL (ref 1–30)

## 2018-10-15 PROCEDURE — 80053 COMPREHEN METABOLIC PANEL: CPT

## 2018-10-15 PROCEDURE — 80061 LIPID PANEL: CPT

## 2018-10-15 PROCEDURE — 84443 ASSAY THYROID STIM HORMONE: CPT

## 2018-10-15 PROCEDURE — 82607 VITAMIN B-12: CPT

## 2018-10-15 PROCEDURE — 36415 COLL VENOUS BLD VENIPUNCTURE: CPT

## 2018-10-15 PROCEDURE — 82306 VITAMIN D 25 HYDROXY: CPT

## 2018-10-17 ENCOUNTER — HOSPITAL ENCOUNTER (OUTPATIENT)
Dept: PHYSICAL THERAPY | Age: 58
Setting detail: THERAPIES SERIES
End: 2018-10-17
Payer: COMMERCIAL

## 2018-10-17 ENCOUNTER — OFFICE VISIT (OUTPATIENT)
Dept: FAMILY MEDICINE CLINIC | Age: 58
End: 2018-10-17
Payer: COMMERCIAL

## 2018-10-17 VITALS
DIASTOLIC BLOOD PRESSURE: 80 MMHG | OXYGEN SATURATION: 97 % | HEIGHT: 60 IN | BODY MASS INDEX: 45.75 KG/M2 | SYSTOLIC BLOOD PRESSURE: 118 MMHG | WEIGHT: 233 LBS | RESPIRATION RATE: 20 BRPM | HEART RATE: 77 BPM

## 2018-10-17 DIAGNOSIS — E83.42 HYPOMAGNESEMIA: ICD-10-CM

## 2018-10-17 DIAGNOSIS — M17.11 ARTHRITIS OF RIGHT KNEE: ICD-10-CM

## 2018-10-17 DIAGNOSIS — E66.01 MORBID OBESITY WITH BMI OF 45.0-49.9, ADULT (HCC): Primary | ICD-10-CM

## 2018-10-17 DIAGNOSIS — Z79.4 TYPE 2 DIABETES MELLITUS WITH DIABETIC NEUROPATHY, WITH LONG-TERM CURRENT USE OF INSULIN (HCC): ICD-10-CM

## 2018-10-17 DIAGNOSIS — M79.89 SWELLING OF BOTH LOWER EXTREMITIES: ICD-10-CM

## 2018-10-17 DIAGNOSIS — E11.40 TYPE 2 DIABETES MELLITUS WITH DIABETIC NEUROPATHY, WITH LONG-TERM CURRENT USE OF INSULIN (HCC): ICD-10-CM

## 2018-10-17 DIAGNOSIS — Z12.39 SCREENING FOR BREAST CANCER: ICD-10-CM

## 2018-10-17 DIAGNOSIS — R63.8 UNABLE TO LOSE WEIGHT: ICD-10-CM

## 2018-10-17 DIAGNOSIS — G89.29 CHRONIC PAIN OF BOTH KNEES: ICD-10-CM

## 2018-10-17 DIAGNOSIS — R42 VERTIGO: ICD-10-CM

## 2018-10-17 DIAGNOSIS — M25.561 CHRONIC PAIN OF BOTH KNEES: ICD-10-CM

## 2018-10-17 DIAGNOSIS — G25.81 RLS (RESTLESS LEGS SYNDROME): Chronic | ICD-10-CM

## 2018-10-17 DIAGNOSIS — M25.562 CHRONIC PAIN OF BOTH KNEES: ICD-10-CM

## 2018-10-17 PROCEDURE — G8427 DOCREV CUR MEDS BY ELIG CLIN: HCPCS | Performed by: NURSE PRACTITIONER

## 2018-10-17 PROCEDURE — 3045F PR MOST RECENT HEMOGLOBIN A1C LEVEL 7.0-9.0%: CPT | Performed by: NURSE PRACTITIONER

## 2018-10-17 PROCEDURE — 1036F TOBACCO NON-USER: CPT | Performed by: NURSE PRACTITIONER

## 2018-10-17 PROCEDURE — G8598 ASA/ANTIPLAT THER USED: HCPCS | Performed by: NURSE PRACTITIONER

## 2018-10-17 PROCEDURE — 2022F DILAT RTA XM EVC RTNOPTHY: CPT | Performed by: NURSE PRACTITIONER

## 2018-10-17 PROCEDURE — G8484 FLU IMMUNIZE NO ADMIN: HCPCS | Performed by: NURSE PRACTITIONER

## 2018-10-17 PROCEDURE — G8417 CALC BMI ABV UP PARAM F/U: HCPCS | Performed by: NURSE PRACTITIONER

## 2018-10-17 PROCEDURE — 3017F COLORECTAL CA SCREEN DOC REV: CPT | Performed by: NURSE PRACTITIONER

## 2018-10-17 PROCEDURE — 99214 OFFICE O/P EST MOD 30 MIN: CPT | Performed by: NURSE PRACTITIONER

## 2018-10-17 RX ORDER — VENLAFAXINE HYDROCHLORIDE 37.5 MG/1
CAPSULE, EXTENDED RELEASE ORAL
Qty: 90 CAPSULE | Refills: 5 | Status: SHIPPED | OUTPATIENT
Start: 2018-10-17 | End: 2018-12-19

## 2018-10-17 RX ORDER — BACLOFEN 20 MG
TABLET ORAL
Qty: 60 TABLET | Refills: 3 | Status: SHIPPED | OUTPATIENT
Start: 2018-10-17 | End: 2018-10-24 | Stop reason: SDUPTHER

## 2018-10-17 ASSESSMENT — ENCOUNTER SYMPTOMS
SORE THROAT: 1
GASTROINTESTINAL NEGATIVE: 1

## 2018-10-17 NOTE — PROGRESS NOTES
Chronic Disease Visit Information    BP Readings from Last 3 Encounters:   10/10/18 123/72   09/10/18 120/80   06/19/18 123/83          Hemoglobin A1C (%)   Date Value   05/15/2018 7.0 (H)   05/15/2018 7.0 (H)   04/19/2017 7.4 (H)     Microalb/Crt. Ratio (mcg/mg creat)   Date Value   05/15/2018 11     LDL Cholesterol (mg/dL)   Date Value   10/15/2018 55     HDL (mg/dL)   Date Value   10/15/2018 69     BUN (mg/dL)   Date Value   10/15/2018 10     CREATININE (mg/dL)   Date Value   10/15/2018 0.69     Glucose (mg/dL)   Date Value   10/15/2018 166 (H)   03/06/2012 163 (H)            Have you changed or started any medications since your last visit including any over-the-counter medicines, vitamins, or herbal medicines? no   Are you having any side effects from any of your medications? -  no  Have you stopped taking any of your medications? Is so, why? -  no    Have you seen any other physician or provider since your last visit? No  Have you had any other diagnostic tests since your last visit? No  Have you been seen in the emergency room and/or had an admission to a hospital since we last saw you? No  Have you had your annual diabetic retinal (eye) exam? No  Have you had your routine dental cleaning in the past 6 months? yes -     Have you activated your [employer] account? If not, what are your barriers?  Yes     Patient Care Team:  YOLANDA Venegas CNP as PCP - [address] [place], [place] [address] Way, APRN - CNP as PCP - [facility] Attributed Provider  Mable Olivarez MD as Orthopedic Surgeon (Orthopedic Surgery)  Rosaline Wren MD as Surgeon (Cardiology)  YOLANDA Venegas CNP (Family Medicine)         Medical History Review  Past Medical, Family, and Social History reviewed and does contribute to the patient presenting condition    Health Maintenance   Topic Date Due    DTaP/Tdap/Td vaccine (1 - Tdap) 12/19/1979    Diabetic retinal exam  06/03/2017    Flu vaccine (1) 09/01/2018    Breast cancer screen  09/16/2018    Pneumococcal med risk (1 of 1 - PPSV23) 09/10/2019 (Originally 12/19/1979)    Shingles Vaccine (1 of 2 - 2 Dose Series) 09/10/2019 (Originally 12/19/2010)    Diabetic foot exam  04/05/2019    A1C test (Diabetic or Prediabetic)  05/15/2019    Diabetic microalbuminuria test  05/15/2019    Lipid screen  10/15/2019    TSH testing  10/15/2019    Potassium monitoring  10/15/2019    Creatinine monitoring  10/15/2019    Cervical cancer screen  04/24/2022    Colon cancer screen colonoscopy  10/12/2022    Hepatitis C screen  Addressed    HIV screen  Addressed

## 2018-10-17 NOTE — FLOWSHEET NOTE
[] [address]  Nadine Abbot: [phone]   F: [phone]      Physical Therapy Cancel/No Show note    Date: 10/17/2018  Patient: Sam Amos  : 1960  MRN: [id number]    Cancels/No Shows to date:     For today's appointment patient:  [x]  Cancelled  []  Rescheduled appointment  []  No-show     Reason given by patient:  []  Patient ill  []  Conflicting appointment  []  No transportation    []  Conflict with work  []  No reason given  []  Weather related  []  Other:      Comments:  CANCELLED AQUATICS [address]; NO REASON GIVEN      []  Next appointment was confirmed    Electronically signed by: Giovanna Casas PTA

## 2018-10-24 ENCOUNTER — HOSPITAL ENCOUNTER (OUTPATIENT)
Dept: PHYSICAL THERAPY | Age: 58
Setting detail: THERAPIES SERIES
Discharge: HOME | End: 2018-10-24
Payer: COMMERCIAL

## 2018-10-24 ENCOUNTER — HOSPITAL ENCOUNTER (OUTPATIENT)
Age: 58
Discharge: HOME | End: 2018-10-24
Payer: COMMERCIAL

## 2018-10-24 DIAGNOSIS — E83.42 HYPOMAGNESEMIA: ICD-10-CM

## 2018-10-24 DIAGNOSIS — E83.42 HYPOMAGNESEMIA: Primary | ICD-10-CM

## 2018-10-24 LAB — MAGNESIUM: 1.4 MG/DL (ref 1.6–2.6)

## 2018-10-24 PROCEDURE — 83735 ASSAY OF MAGNESIUM: CPT

## 2018-10-24 PROCEDURE — 36415 COLL VENOUS BLD VENIPUNCTURE: CPT

## 2018-10-24 PROCEDURE — 97113 AQUATIC THERAPY/EXERCISES: CPT

## 2018-10-24 RX ORDER — BACLOFEN 20 MG
TABLET ORAL
Qty: 60 TABLET | Refills: 1 | Status: SHIPPED | OUTPATIENT
Start: 2018-10-24 | End: 2018-12-19

## 2018-10-24 NOTE — PROGRESS NOTES
[x]  Aquatics 26 2   []  Neuromuscular     []  Other     Total Treatment time 26 2       Assessment: [x] Progressing toward goals. Added exercises to challenge stability and balance using a small kick board without issue    [] No change. [] Other:    STG: (to be met in 6 treatments)  1. ? Pain: Improved pain levels to 3-4/10 in (B) knees. 2. ? ROM: Improved knee ROM to 110 for (B) flexion, lacks 5 degrees for extension. 3. ? Strength: Improved strength testing by one manual muscle testing grade. 4. ? Function: Patient with improved tolerance of ambulation up to 15 minutes, improved tolerance of stairs and able to do 6\" stairs with minimal to no pain, improved tolerance of transferring in/out of chair by 50% grossly. 5. Independent with Home Exercise Programs  LTG: (to be met in 12 treatments)  6. ? Pain: Improved pain levels to 3-4/10 in (B) knees. 7. ? ROM: Improved knee ROM to 110 for (B) flexion, lacks 5 degrees for extension. 8. ? Strength: Improved strength testing by one manual muscle testing grade. 9. ? Function: Patient with improved tolerance of ambulation up to 15 minutes, improved tolerance of stairs and able to do 6\" stairs with minimal to no pain, improved tolerance of transferring in/out of chair by 50% grossly. 10. Independent with Home Exercise Programs                 Patient goals: just feel better    Pt. Education:  [x] Yes  [] No  [] Reviewed Prior HEP/Ed  Method of Education: [x] Verbal  [x] Demo  [] Written  Comprehension of Education:  [x] Verbalizes understanding. [x] Demonstrates understanding. [x] Needs review. [] Demonstrates/verbalizes HEP/Ed previously given. Plan: [x] Continue per plan of care.       [] Other:      Time In: 3080           Time Out: 5662    Electronically signed by Leeroy Taylor PTA on 10/24/2018 at 2:04 PM

## 2018-10-29 ENCOUNTER — OFFICE VISIT (OUTPATIENT)
Dept: GASTROENTEROLOGY | Age: 58
End: 2018-10-29
Payer: COMMERCIAL

## 2018-10-29 VITALS
SYSTOLIC BLOOD PRESSURE: 138 MMHG | WEIGHT: 234 LBS | OXYGEN SATURATION: 98 % | BODY MASS INDEX: 44.18 KG/M2 | HEART RATE: 85 BPM | DIASTOLIC BLOOD PRESSURE: 84 MMHG | HEIGHT: 61 IN

## 2018-10-29 DIAGNOSIS — K21.9 GASTROESOPHAGEAL REFLUX DISEASE WITHOUT ESOPHAGITIS: Primary | ICD-10-CM

## 2018-10-29 DIAGNOSIS — K57.90 DIVERTICULOSIS OF INTESTINE WITHOUT BLEEDING, UNSPECIFIED INTESTINAL TRACT LOCATION: ICD-10-CM

## 2018-10-29 PROCEDURE — G8598 ASA/ANTIPLAT THER USED: HCPCS | Performed by: INTERNAL MEDICINE

## 2018-10-29 PROCEDURE — 3017F COLORECTAL CA SCREEN DOC REV: CPT | Performed by: INTERNAL MEDICINE

## 2018-10-29 PROCEDURE — 99214 OFFICE O/P EST MOD 30 MIN: CPT | Performed by: INTERNAL MEDICINE

## 2018-10-29 PROCEDURE — 1036F TOBACCO NON-USER: CPT | Performed by: INTERNAL MEDICINE

## 2018-10-29 PROCEDURE — G8427 DOCREV CUR MEDS BY ELIG CLIN: HCPCS | Performed by: INTERNAL MEDICINE

## 2018-10-29 PROCEDURE — G8417 CALC BMI ABV UP PARAM F/U: HCPCS | Performed by: INTERNAL MEDICINE

## 2018-10-29 PROCEDURE — G8484 FLU IMMUNIZE NO ADMIN: HCPCS | Performed by: INTERNAL MEDICINE

## 2018-10-29 RX ORDER — RABEPRAZOLE SODIUM 20 MG/1
20 TABLET, DELAYED RELEASE ORAL DAILY
Qty: 30 TABLET | Refills: 3 | Status: SHIPPED | OUTPATIENT
Start: 2018-10-29 | End: 2018-12-19

## 2018-10-29 RX ORDER — PANTOPRAZOLE SODIUM 40 MG/1
40 TABLET, DELAYED RELEASE ORAL DAILY
Qty: 90 TABLET | Refills: 1 | Status: CANCELLED | OUTPATIENT
Start: 2018-10-29 | End: 2019-01-27

## 2018-10-29 ASSESSMENT — ENCOUNTER SYMPTOMS
NAUSEA: 0
CONSTIPATION: 1
DIARRHEA: 0
ABDOMINAL DISTENTION: 0
ANAL BLEEDING: 0
RECTAL PAIN: 0
BACK PAIN: 1
VOMITING: 0
BLOOD IN STOOL: 0
RESPIRATORY NEGATIVE: 1
ALLERGIC/IMMUNOLOGIC NEGATIVE: 1
ABDOMINAL PAIN: 1
SINUS PRESSURE: 1

## 2018-10-30 DIAGNOSIS — M25.561 RIGHT KNEE PAIN, UNSPECIFIED CHRONICITY: ICD-10-CM

## 2018-10-30 DIAGNOSIS — M17.11 ARTHRITIS OF RIGHT KNEE: Primary | ICD-10-CM

## 2018-10-31 ENCOUNTER — HOSPITAL ENCOUNTER (OUTPATIENT)
Dept: PHYSICAL THERAPY | Age: 58
Setting detail: THERAPIES SERIES
End: 2018-10-31
Payer: COMMERCIAL

## 2018-11-01 ENCOUNTER — TELEPHONE (OUTPATIENT)
Dept: GASTROENTEROLOGY | Age: 58
End: 2018-11-01

## 2018-11-15 PROBLEM — R19.8 IRREGULAR BOWEL HABITS: Status: RESOLVED | Noted: 2017-08-21 | Resolved: 2018-11-15

## 2018-11-15 PROBLEM — R73.9 HYPERGLYCEMIA: Status: RESOLVED | Noted: 2017-05-10 | Resolved: 2018-11-15

## 2018-11-15 PROBLEM — R14.0 ABDOMINAL BLOATING: Status: RESOLVED | Noted: 2017-08-21 | Resolved: 2018-11-15

## 2018-11-15 PROBLEM — I87.8 PHLEBOLITH: Status: RESOLVED | Noted: 2017-09-12 | Resolved: 2018-11-15

## 2018-11-15 PROBLEM — R63.5 WEIGHT GAIN: Status: RESOLVED | Noted: 2018-09-10 | Resolved: 2018-11-15

## 2018-11-15 PROBLEM — Z96.611 STATUS POST TOTAL REPLACEMENT OF RIGHT SHOULDER: Status: RESOLVED | Noted: 2017-05-09 | Resolved: 2018-11-15

## 2018-11-15 PROBLEM — Z96.619 STATUS POST SHOULDER REPLACEMENT: Status: RESOLVED | Noted: 2017-05-26 | Resolved: 2018-11-15

## 2018-11-20 ENCOUNTER — OFFICE VISIT (OUTPATIENT)
Dept: FAMILY MEDICINE CLINIC | Age: 58
End: 2018-11-20
Payer: COMMERCIAL

## 2018-11-20 ENCOUNTER — HOSPITAL ENCOUNTER (OUTPATIENT)
Age: 58
Setting detail: SPECIMEN
Discharge: HOME | End: 2018-11-20
Payer: COMMERCIAL

## 2018-11-20 VITALS
OXYGEN SATURATION: 98 % | HEART RATE: 88 BPM | HEIGHT: 61 IN | BODY MASS INDEX: 43.58 KG/M2 | TEMPERATURE: 97.5 F | DIASTOLIC BLOOD PRESSURE: 84 MMHG | WEIGHT: 230.8 LBS | SYSTOLIC BLOOD PRESSURE: 132 MMHG

## 2018-11-20 DIAGNOSIS — E83.42 HYPOMAGNESEMIA: ICD-10-CM

## 2018-11-20 DIAGNOSIS — R07.9 CHEST PAIN, UNSPECIFIED TYPE: ICD-10-CM

## 2018-11-20 DIAGNOSIS — R42 DIZZINESS: ICD-10-CM

## 2018-11-20 DIAGNOSIS — N30.00 ACUTE CYSTITIS WITHOUT HEMATURIA: ICD-10-CM

## 2018-11-20 DIAGNOSIS — E79.0 ELEVATED URIC ACID IN BLOOD: ICD-10-CM

## 2018-11-20 DIAGNOSIS — R10.10 UPPER ABDOMINAL PAIN: Primary | ICD-10-CM

## 2018-11-20 DIAGNOSIS — R51.9 FREQUENT HEADACHES: ICD-10-CM

## 2018-11-20 DIAGNOSIS — R10.10 UPPER ABDOMINAL PAIN: ICD-10-CM

## 2018-11-20 LAB
BILIRUBIN, POC: NORMAL
BLOOD URINE, POC: NORMAL
CLARITY, POC: CLEAR
COLOR, POC: YELLOW
GLUCOSE URINE, POC: NORMAL
KETONES, POC: 1.02
LEUKOCYTE EST, POC: NORMAL
NITRITE, POC: POSITIVE
PH, POC: 7.5
PROTEIN, POC: NORMAL
SPECIFIC GRAVITY, POC: 1.02
UROBILINOGEN, POC: 0.2

## 2018-11-20 PROCEDURE — 81002 URINALYSIS NONAUTO W/O SCOPE: CPT | Performed by: NURSE PRACTITIONER

## 2018-11-20 PROCEDURE — G8417 CALC BMI ABV UP PARAM F/U: HCPCS | Performed by: NURSE PRACTITIONER

## 2018-11-20 PROCEDURE — 1036F TOBACCO NON-USER: CPT | Performed by: NURSE PRACTITIONER

## 2018-11-20 PROCEDURE — G8427 DOCREV CUR MEDS BY ELIG CLIN: HCPCS | Performed by: NURSE PRACTITIONER

## 2018-11-20 PROCEDURE — 3017F COLORECTAL CA SCREEN DOC REV: CPT | Performed by: NURSE PRACTITIONER

## 2018-11-20 PROCEDURE — 99214 OFFICE O/P EST MOD 30 MIN: CPT | Performed by: NURSE PRACTITIONER

## 2018-11-20 PROCEDURE — G8484 FLU IMMUNIZE NO ADMIN: HCPCS | Performed by: NURSE PRACTITIONER

## 2018-11-20 PROCEDURE — G8598 ASA/ANTIPLAT THER USED: HCPCS | Performed by: NURSE PRACTITIONER

## 2018-11-20 RX ORDER — NITROFURANTOIN 25; 75 MG/1; MG/1
100 CAPSULE ORAL 2 TIMES DAILY
Qty: 14 CAPSULE | Refills: 0 | Status: SHIPPED | OUTPATIENT
Start: 2018-11-20 | End: 2018-11-27

## 2018-11-20 RX ORDER — ACETAMINOPHEN, ASPIRIN AND CAFFEINE 250; 250; 65 MG/1; MG/1; MG/1
1 TABLET, FILM COATED ORAL EVERY 6 HOURS PRN
COMMUNITY
End: 2018-12-19

## 2018-11-20 RX ORDER — OXYCODONE HYDROCHLORIDE AND ACETAMINOPHEN 5; 325 MG/1; MG/1
1 TABLET ORAL
COMMUNITY
End: 2018-12-19

## 2018-11-20 RX ORDER — CHOLECALCIFEROL (VITAMIN D3) 25 MCG
1 TABLET,CHEWABLE ORAL DAILY
COMMUNITY
End: 2018-12-19

## 2018-11-20 RX ORDER — DARIFENACIN HYDROBROMIDE 15 MG/1
15 TABLET, EXTENDED RELEASE ORAL 2 TIMES DAILY
COMMUNITY
Start: 2018-11-01 | End: 2018-12-19

## 2018-11-20 RX ORDER — MECLIZINE HYDROCHLORIDE 25 MG/1
25 TABLET ORAL 3 TIMES DAILY PRN
COMMUNITY
End: 2018-12-19

## 2018-11-20 ASSESSMENT — ENCOUNTER SYMPTOMS
VOMITING: 1
NAUSEA: 1
ABDOMINAL PAIN: 1
BLOOD IN STOOL: 0
CONSTIPATION: 1
DIARRHEA: 0
SHORTNESS OF BREATH: 1

## 2018-11-20 NOTE — PROGRESS NOTES
Visit Information    Have you changed or started any medications since your last visit including any over-the-counter medicines, vitamins, or herbal medicines? yes - see med list   Are you having any side effects from any of your medications? -  yes - pt states she may be having headache from new medications  Have you stopped taking any of your medications? Is so, why? -  yes - see med list. Advised to follow up with GI    Have you seen any other physician or provider since your last visit? Yes - Records Obtained  Have you had any other diagnostic tests since your last visit? Yes - Records Obtained  Have you been seen in the emergency room and/or had an admission to a hospital since we last saw you? No  Have you had your routine dental cleaning in the past 6 months? no    Have you activated your [employer] account? If not, what are your barriers?  Yes     Patient Care Team:  YOLANDA Shukla CNP as PCP - [address] [place] [address] [address], APRN - CNP as PCP - S Attributed Provider  Balta Marino MD as Orthopedic Surgeon (Orthopedic Surgery)  Nora Elliott MD as Surgeon (Cardiology)  YOLANDA Shukla CNP (Family Medicine)    Medical History Review  Past Medical, Family, and Social History reviewed and does contribute to the patient presenting condition    Health Maintenance   Topic Date Due    Diabetic retinal exam  06/03/2017    Breast cancer screen  09/16/2018    DTaP/Tdap/Td vaccine (1 - Tdap) 12/17/2018 (Originally [birth year]19/1979)    Flu vaccine (1) 01/17/2019 (Originally 9/1/2018)    Pneumococcal med risk (1 of 1 - PPSV23) 09/10/2019 (Originally 12/19/1979)    Shingles Vaccine (1 of 2 - 2 Dose Series) 09/10/2019 (Originally 12/19/2010)    Diabetic foot exam  04/05/2019    A1C test (Diabetic or Prediabetic)  05/15/2019    Diabetic microalbuminuria test  05/15/2019    Lipid screen  10/15/2019    TSH testing  10/15/2019    Potassium monitoring  10/15/2019    Creatinine monitoring  10/15/2019    Cervical

## 2018-11-20 NOTE — PROGRESS NOTES
Pat Chan. Marylen Beach, APRN-[facility]  [address] MEDICINE  [address]  [facility] [address]  Dept: [phone]  Dept Fax: [phone]      HPI:     Cecily Escamilla is a 62 y.o. female who presents today for her medical conditions/complaintsas noted below. Cceily Escamilla is c/o of Medication Check; Headache (Pt states for the last 3 weeks having an off and on again headaches); and Abdominal Pain (Pt states she woke up with severe abd pain)    HPI   Patient presents for a medication check, but does have several additional issues. Patient states that she is having severe pain to RUQ of ABD today. Also states that last week she had severe pain to left side of ABD that traveled to her back. She is also c/o chest pain, started about 2 weeks ago, noted 2-3 days out of the week- states pain was to right side of breast. Currently, she does deny chest pain. She does c/o nausea and has vomited twice 2 days ago. She does feel constipated- last BM was a small amount before she came, last regular BM was 2 days ago. Denies blood in stool. +fever per patient (did not take temperature, was \"burning up\"). Denies dysuria, frequency. She is c/o dizziness intermittently, feels SOB w/exertion. Also c/o chronic pain throughout. Also c/o frequent headaches- has a f/u with neurology in a month. She has had headaches daily intermittently for the past 3 days. Denies this being \"worst h/a of her life\"- states pain is a 4 currently. Notes mild vision changes- has appointment with eye doctor next week.   Past Medical History:   Diagnosis Date    Anginal pain ([address]     last used Nitro sl 4 yrs 2013    Arthritis     Asthma     Back pain     radiculopathy left leg    Closed displaced fracture of lesser tuberosity of right humerus 7/11/2016    Constipation     Dysmenorrhea     Fatty liver disease, nonalcoholic     GERD (gastroesophageal reflux disease)     Gout     found (PRINIVIL;ZESTRIL) 5 MG tablet TAKE ONE TABLET BY MOUTH ONCE DAILY 30 tablet 3    PROAIR  (90 Base) MCG/ACT inhaler Inhale 2 puffs into the lungs every 6 hours as needed for Wheezing or Shortness of Breath 1 Inhaler 1    Cholecalciferol (VITAMIN D3) 2000 units TABS TAKE ONE TABLET BY MOUTH ONCE DAILY 90 tablet 3    Lancets MISC 1 each by Does not apply route daily Please dispense according to patients insurance/device 50 each 2    cyclobenzaprine (FLEXERIL) 10 MG tablet TAKE ONE TABLET BY MOUTH THREE TIMES DAILY AS NEEDED FOR MUSCLE SPASMS 60 tablet 1    glucose monitoring kit (FREESTYLE) monitoring kit Check BS ACHS and PRN. 1 kit 0    lidocaine (XYLOCAINE) 5 % ointment Apply topically as needed. 3 times a dayDispense 1 tube      Tens Unit MISC by Does not apply route 1 each 0    VICTOZA 18 MG/3ML SOPN SC injection Inject 1.8 mg into the skin every morning       insulin detemir (LEVEMIR FLEXPEN) 100 UNIT/ML injection pen Inject 20 Units into the skin nightly       NOVOLOG FLEXPEN 100 UNIT/ML injection pen Inject 10 -20 units SQ units before breakfast and dinner per sliding scale.  Flaxseed MISC Take 1 capsule by mouth daily       Menthol (BIOFREEZE) 10 % AERO Apply 1 applicator topically 4 times daily as needed (Patient taking differently: Apply 1 applicator topically 4 times daily as needed (to left shoulder) ) 1 Can 3    Probiotic Product (ALIGN) 4 MG CAPS TAKE ONE CAPSULE BY MOUTH ONCE DAILY 90 capsule 1     No current facility-administered medications for this visit.       Allergies   Allergen Reactions    Actos [Pioglitazone]     Amitriptyline Hives    Celebrex [Celecoxib] Hives, Itching and Dermatitis     Burnt red blister on ashlee    Fenofibrate Other (See Comments)     Muscle cramps    Gemfibrozil Other (See Comments)     Muscle pain, spasms, weakness and HA  Muscle pain, spasms, weakness and HA    Lovastatin Other (See Comments)     Muscle cramps  Muscle cramps    Prempro instructions above    5. Hypomagnesemia  -Stable, still decreased: Con't all medications as ordered, con't current tx plan  -Will re-check labs:  - Magnesium; Future    6. Elevated uric acid in blood  -Stable: Con't all medications as ordered, con't current tx plan  -Will re-check labs:  - Uric Acid; Future    7. Frequent headaches  -Referred to ED  -Neuro exam WNL  -Con't medications as ordered, f/u with neurology as scheduled    Return in about 2 weeks (around 12/4/2018) for ED follow up, Go over testing. Discussed use, benefit, and side effects of prescribed medications. Barriers to medication compliance addressed. All patient questions answered. Patient voiced understanding.

## 2018-11-22 LAB
CULTURE: ABNORMAL
Lab: ABNORMAL
ORGANISM: ABNORMAL
SPECIMEN DESCRIPTION: ABNORMAL
STATUS: ABNORMAL

## 2018-11-23 DIAGNOSIS — B35.4 TINEA CORPORIS: ICD-10-CM

## 2018-11-26 ENCOUNTER — TELEPHONE (OUTPATIENT)
Dept: ORTHOPEDIC SURGERY | Age: 58
End: 2018-11-26

## 2018-11-26 DIAGNOSIS — E11.40 TYPE 2 DIABETES MELLITUS WITH DIABETIC NEUROPATHY, WITH LONG-TERM CURRENT USE OF INSULIN (HCC): Chronic | ICD-10-CM

## 2018-11-26 DIAGNOSIS — Z79.4 TYPE 2 DIABETES MELLITUS WITH DIABETIC NEUROPATHY, WITH LONG-TERM CURRENT USE OF INSULIN (HCC): Chronic | ICD-10-CM

## 2018-11-26 RX ORDER — ALBUTEROL SULFATE 90 UG/1
2 AEROSOL, METERED RESPIRATORY (INHALATION) EVERY 6 HOURS PRN
Qty: 3 INHALER | Refills: 3 | Status: SHIPPED | OUTPATIENT
Start: 2018-11-26 | End: 2018-12-19

## 2018-11-26 RX ORDER — NYSTATIN 100000 U/G
CREAM TOPICAL
Qty: 30 G | Refills: 3 | Status: SHIPPED | OUTPATIENT
Start: 2018-11-26 | End: 2018-12-19

## 2018-11-28 ENCOUNTER — TELEPHONE (OUTPATIENT)
Dept: FAMILY MEDICINE CLINIC | Age: 58
End: 2018-11-28

## 2018-11-29 ENCOUNTER — OFFICE VISIT (OUTPATIENT)
Dept: PODIATRY | Age: 58
End: 2018-11-29
Payer: COMMERCIAL

## 2018-11-29 VITALS — HEIGHT: 61 IN | WEIGHT: 230 LBS | BODY MASS INDEX: 43.43 KG/M2

## 2018-11-29 DIAGNOSIS — M21.961 FOOT DEFORMITY, BILATERAL: ICD-10-CM

## 2018-11-29 DIAGNOSIS — G25.81 RESTLESS LEG SYNDROME, UNCONTROLLED: ICD-10-CM

## 2018-11-29 DIAGNOSIS — R25.2 NOCTURNAL FOOT CRAMPS: ICD-10-CM

## 2018-11-29 DIAGNOSIS — Z79.4 TYPE 2 DIABETES MELLITUS WITH DIABETIC POLYNEUROPATHY, WITH LONG-TERM CURRENT USE OF INSULIN (HCC): Primary | ICD-10-CM

## 2018-11-29 DIAGNOSIS — E11.42 TYPE 2 DIABETES MELLITUS WITH DIABETIC POLYNEUROPATHY, WITH LONG-TERM CURRENT USE OF INSULIN (HCC): Primary | ICD-10-CM

## 2018-11-29 DIAGNOSIS — G25.81 RLS (RESTLESS LEGS SYNDROME): Chronic | ICD-10-CM

## 2018-11-29 DIAGNOSIS — M21.962 FOOT DEFORMITY, BILATERAL: ICD-10-CM

## 2018-11-29 DIAGNOSIS — M79.2 NEUROPATHIC PAIN: ICD-10-CM

## 2018-11-29 PROCEDURE — 3045F PR MOST RECENT HEMOGLOBIN A1C LEVEL 7.0-9.0%: CPT | Performed by: PODIATRIST

## 2018-11-29 PROCEDURE — G8417 CALC BMI ABV UP PARAM F/U: HCPCS | Performed by: PODIATRIST

## 2018-11-29 PROCEDURE — G8484 FLU IMMUNIZE NO ADMIN: HCPCS | Performed by: PODIATRIST

## 2018-11-29 PROCEDURE — G8598 ASA/ANTIPLAT THER USED: HCPCS | Performed by: PODIATRIST

## 2018-11-29 PROCEDURE — 1036F TOBACCO NON-USER: CPT | Performed by: PODIATRIST

## 2018-11-29 PROCEDURE — 99213 OFFICE O/P EST LOW 20 MIN: CPT | Performed by: PODIATRIST

## 2018-11-29 PROCEDURE — G8427 DOCREV CUR MEDS BY ELIG CLIN: HCPCS | Performed by: PODIATRIST

## 2018-11-29 PROCEDURE — 3017F COLORECTAL CA SCREEN DOC REV: CPT | Performed by: PODIATRIST

## 2018-11-29 PROCEDURE — 2022F DILAT RTA XM EVC RTNOPTHY: CPT | Performed by: PODIATRIST

## 2018-11-29 RX ORDER — GABAPENTIN 600 MG/1
600 TABLET ORAL 3 TIMES DAILY
Qty: 270 TABLET | Refills: 0 | Status: SHIPPED | OUTPATIENT
Start: 2018-11-29 | End: 2018-12-19

## 2018-11-29 ASSESSMENT — ENCOUNTER SYMPTOMS
NAUSEA: 0
VOMITING: 0
CONSTIPATION: 0
DIARRHEA: 0
BACK PAIN: 1
COLOR CHANGE: 0

## 2018-11-29 NOTE — PROGRESS NOTES
arch, Bilateral.  Palpable osteophytes dorsal lisfranc's     Radiographs: 3 views right Foot:  Severe loss of joint space of tarsometatarsal joints one, two, and three with dorsal osteophytes. No erosive changes or lytic process. No acute pathology. Radiographs: 3 views left Foot:  Severe loss of joint space of tarsometatarsal joints one, two, and three with dorsal osteophytes. No erosive changes or lytic process. No acute pathology. Assessment:  62 y.o. female with:  1. Type 2 diabetes mellitus with diabetic polyneuropathy, with long-term current use of insulin ([address]    2. RLS (restless legs syndrome)    3. Foot deformity, bilateral    4. Neuropathic pain    5. Nocturnal foot cramps    6. Restless leg syndrome, uncontrolled       Orders Placed This Encounter   Procedures    Amb External Referral For Orthotics     Referral Priority:   Routine     Referral Type:   Consult for Advice and Opinion     Requested Specialty:   Durable Medical Equipment     Number of Visits Requested:   1    Nerve conduction test     Standing Status:   Future     Standing Expiration Date:   11/30/2019           Q7   []Yes    []No                Q8   [x]Yes    []No                     Q9   []Yes    []No    Plan:   Pt was evaluated and examined. Patient was given personalized discharge instructions. Pt will follow up in 3 months or sooner if any problems arise. Diagnosis was discussed with the pt and all of their questions were answered in detail. Proper foot hygiene and care was discussed with the pt. Informed patient on proper diabetic foot care and importance of tight glycemic control. Patient to check feet daily and contact the office with any questions/problems/concerns. Other comorbidity noted and will be managed by PCP. Diabetic foot examination performed this visit.   The exam included neurological sensory exam, a 10-g monofilament and pinprick sensation, vibration using a 128-Hz tuning fork, ankle reflexes, visual skin inspection, vascular exam including assessment of pedal pulses, orthopedic exam for deformities, and shoe inspection. Increased risk factors noted on the diabetic foot exam include decreased sensory exam and peripheral neuropathy. Shoegear inspected and found to be appropriate size and wear. Continue Effexor   Continue Motrin when needed  Will increase Gabapentin to 600 mg TID  EMG/NCV studies       Orders Placed This Encounter   Procedures    Amb External Referral For Orthotics     Referral Priority:   Routine     Referral Type:   Consult for Advice and Opinion     Requested Specialty:   Durable Medical Equipment     Number of Visits Requested:   1    Nerve conduction test     Standing Status:   Future     Standing Expiration Date:   11/30/2019       Orders Placed This Encounter   Medications    gabapentin (NEURONTIN) 600 MG tablet     Sig: Take 1 tablet by mouth 3 times daily for 90 days. .     Dispense:  270 tablet     Refill:  0     Please consider 90 day supplies to promote better adherence       11/29/2018    Electronically signed by Aminta Lemus DPM on 11/30/2018 at 8:14 AM

## 2018-12-02 RX ORDER — PANTOPRAZOLE SODIUM 40 MG/1
TABLET, DELAYED RELEASE ORAL
Qty: 30 TABLET | Refills: 3 | Status: SHIPPED | OUTPATIENT
Start: 2018-12-02 | End: 2018-12-19

## 2018-12-03 DIAGNOSIS — M25.512 ACUTE PAIN OF BOTH SHOULDERS: Primary | ICD-10-CM

## 2018-12-03 DIAGNOSIS — Z96.611 STATUS POST REVERSE TOTAL REPLACEMENT OF RIGHT SHOULDER: ICD-10-CM

## 2018-12-03 DIAGNOSIS — M25.511 ACUTE PAIN OF BOTH SHOULDERS: Primary | ICD-10-CM

## 2018-12-04 RX ORDER — ROPINIROLE 2 MG/1
TABLET, FILM COATED ORAL
Qty: 90 TABLET | Refills: 3 | Status: SHIPPED | OUTPATIENT
Start: 2018-12-04 | End: 2018-12-19

## 2018-12-10 DIAGNOSIS — M25.511 ACUTE PAIN OF BOTH SHOULDERS: ICD-10-CM

## 2018-12-10 DIAGNOSIS — M25.512 ACUTE PAIN OF BOTH SHOULDERS: ICD-10-CM

## 2018-12-11 ENCOUNTER — HOSPITAL ENCOUNTER (OUTPATIENT)
Age: 58
Discharge: HOME | End: 2018-12-11
Payer: COMMERCIAL

## 2018-12-11 ENCOUNTER — OFFICE VISIT (OUTPATIENT)
Dept: FAMILY MEDICINE CLINIC | Age: 58
End: 2018-12-11
Payer: COMMERCIAL

## 2018-12-11 VITALS
WEIGHT: 237 LBS | BODY MASS INDEX: 44.75 KG/M2 | HEIGHT: 61 IN | OXYGEN SATURATION: 96 % | DIASTOLIC BLOOD PRESSURE: 74 MMHG | HEART RATE: 106 BPM | SYSTOLIC BLOOD PRESSURE: 129 MMHG | TEMPERATURE: 98.4 F | RESPIRATION RATE: 16 BRPM

## 2018-12-11 DIAGNOSIS — E79.0 ELEVATED URIC ACID IN BLOOD: ICD-10-CM

## 2018-12-11 DIAGNOSIS — E83.42 HYPOMAGNESEMIA: ICD-10-CM

## 2018-12-11 DIAGNOSIS — H66.90 ACUTE OTITIS MEDIA, UNSPECIFIED OTITIS MEDIA TYPE: ICD-10-CM

## 2018-12-11 DIAGNOSIS — J01.90 ACUTE SINUSITIS, RECURRENCE NOT SPECIFIED, UNSPECIFIED LOCATION: Primary | ICD-10-CM

## 2018-12-11 DIAGNOSIS — J02.9 ACUTE PHARYNGITIS, UNSPECIFIED ETIOLOGY: ICD-10-CM

## 2018-12-11 LAB
MAGNESIUM: 1.6 MG/DL (ref 1.6–2.6)
S PYO AG THROAT QL: NORMAL
URIC ACID: 4.7 MG/DL (ref 2.4–5.7)

## 2018-12-11 PROCEDURE — 99213 OFFICE O/P EST LOW 20 MIN: CPT | Performed by: FAMILY MEDICINE

## 2018-12-11 PROCEDURE — 87880 STREP A ASSAY W/OPTIC: CPT | Performed by: FAMILY MEDICINE

## 2018-12-11 PROCEDURE — G8427 DOCREV CUR MEDS BY ELIG CLIN: HCPCS | Performed by: FAMILY MEDICINE

## 2018-12-11 PROCEDURE — 1036F TOBACCO NON-USER: CPT | Performed by: FAMILY MEDICINE

## 2018-12-11 PROCEDURE — 36415 COLL VENOUS BLD VENIPUNCTURE: CPT

## 2018-12-11 PROCEDURE — G8598 ASA/ANTIPLAT THER USED: HCPCS | Performed by: FAMILY MEDICINE

## 2018-12-11 PROCEDURE — 84550 ASSAY OF BLOOD/URIC ACID: CPT

## 2018-12-11 PROCEDURE — 3017F COLORECTAL CA SCREEN DOC REV: CPT | Performed by: FAMILY MEDICINE

## 2018-12-11 PROCEDURE — G8484 FLU IMMUNIZE NO ADMIN: HCPCS | Performed by: FAMILY MEDICINE

## 2018-12-11 PROCEDURE — G8417 CALC BMI ABV UP PARAM F/U: HCPCS | Performed by: FAMILY MEDICINE

## 2018-12-11 PROCEDURE — 83735 ASSAY OF MAGNESIUM: CPT

## 2018-12-11 RX ORDER — FLUTICASONE PROPIONATE 50 MCG
1 SPRAY, SUSPENSION (ML) NASAL 2 TIMES DAILY
Qty: 1 BOTTLE | Refills: 0 | Status: SHIPPED | OUTPATIENT
Start: 2018-12-11 | End: 2018-12-19

## 2018-12-11 RX ORDER — AZITHROMYCIN 250 MG/1
TABLET, FILM COATED ORAL
Qty: 1 PACKET | Refills: 0 | Status: SHIPPED | OUTPATIENT
Start: 2018-12-11 | End: 2018-12-19

## 2018-12-11 ASSESSMENT — ENCOUNTER SYMPTOMS
COUGH: 1
SHORTNESS OF BREATH: 0
CHEST TIGHTNESS: 0
EYES NEGATIVE: 1
CHANGE IN BOWEL HABIT: 0
VOMITING: 0
DIARRHEA: 0
SINUS PRESSURE: 1
VISUAL CHANGE: 0
RHINORRHEA: 1
NAUSEA: 1
WHEEZING: 1
ABDOMINAL PAIN: 1
BACK PAIN: 0
SWOLLEN GLANDS: 1
SINUS PAIN: 1
SORE THROAT: 1

## 2018-12-11 NOTE — PROGRESS NOTES
[address] [facility]   [address] [address] [address] [facility] [address]  Dept: [phone]  Dept Fax: [phone]    Charles Bradley is a 62 y.o. female who presents today for her medicalconditions/complaints as noted below. Charles Bradley is c/o of Pharyngitis (left ear pain, sinus congestion, cough x two days )        HPI:     Pharyngitis   This is a new problem. The current episode started in the past 7 days (2 days). The problem occurs intermittently. The problem has been gradually worsening. Associated symptoms include abdominal pain, chest pain, chills, congestion, coughing, diaphoresis, fatigue, a fever, headaches, nausea, neck pain, a sore throat and swollen glands. Pertinent negatives include no change in bowel habit, rash, urinary symptoms, visual change or vomiting. Associated symptoms comments: Patient states that she gets occasional chest pain and has seen a cardiologist.. The symptoms are aggravated by swallowing. She has tried nothing for the symptoms.        Past Medical History:   Diagnosis Date    Anginal pain ([address]     last used Nitro sl 4 yrs 2013    Arthritis     Asthma     Back pain     radiculopathy left leg    Closed displaced fracture of lesser tuberosity of right humerus 7/11/2016    Constipation     Dysmenorrhea     Fatty liver disease, nonalcoholic     GERD (gastroesophageal reflux disease)     Gout     found through bloodwork    Headache     Heart palpitations     History of rib fracture 7/6/2016    Right    Hyperlipidemia     Hypertension     Hyperthyroidism     Hypertriglyceridemia     Hypothyroidism 2/17/2016    Neuropathy     bilateral legs and feet    Obesity     Osteoporosis     Pancreatitis 2011    no problems    Panic attack 10/30/2014    Sleep apnea     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

## 2018-12-18 ENCOUNTER — HOSPITAL ENCOUNTER (OUTPATIENT)
Dept: GENERAL RADIOLOGY | Age: 58
Discharge: HOME | End: 2018-12-20
Payer: COMMERCIAL

## 2018-12-18 ENCOUNTER — HOSPITAL ENCOUNTER (OUTPATIENT)
Age: 58
Discharge: HOME | End: 2018-12-20
Payer: COMMERCIAL

## 2018-12-18 DIAGNOSIS — M25.512 ACUTE PAIN OF BOTH SHOULDERS: ICD-10-CM

## 2018-12-18 DIAGNOSIS — M25.511 ACUTE PAIN OF BOTH SHOULDERS: ICD-10-CM

## 2018-12-18 PROCEDURE — 73030 X-RAY EXAM OF SHOULDER: CPT

## 2018-12-19 ENCOUNTER — OFFICE VISIT (OUTPATIENT)
Dept: ORTHOPEDIC SURGERY | Age: 58
End: 2018-12-19
Payer: COMMERCIAL

## 2018-12-19 VITALS
WEIGHT: 230 LBS | HEIGHT: 61 IN | BODY MASS INDEX: 43.43 KG/M2 | DIASTOLIC BLOOD PRESSURE: 93 MMHG | SYSTOLIC BLOOD PRESSURE: 182 MMHG | HEART RATE: 92 BPM

## 2018-12-19 DIAGNOSIS — Z96.612 HISTORY OF TOTAL REPLACEMENT OF LEFT SHOULDER JOINT: ICD-10-CM

## 2018-12-19 DIAGNOSIS — Z96.611 HISTORY OF TOTAL REPLACEMENT OF RIGHT SHOULDER JOINT: Primary | ICD-10-CM

## 2018-12-19 PROCEDURE — 99213 OFFICE O/P EST LOW 20 MIN: CPT | Performed by: PHYSICIAN ASSISTANT

## 2018-12-19 RX ORDER — ROPINIROLE 0.25 MG/1
0.25 TABLET, FILM COATED ORAL 3 TIMES DAILY
COMMUNITY
End: 2019-03-05

## 2018-12-19 RX ORDER — AMMONIUM LACTATE 12 G/100G
CREAM TOPICAL PRN
COMMUNITY
End: 2020-02-05 | Stop reason: SDUPTHER

## 2018-12-19 RX ORDER — MECLIZINE HYDROCHLORIDE 25 MG/1
25 TABLET ORAL 3 TIMES DAILY PRN
COMMUNITY
End: 2019-06-12

## 2018-12-19 RX ORDER — MAGNESIUM GLUCONATE 27 MG(500)
500 TABLET ORAL 2 TIMES DAILY
COMMUNITY
End: 2019-03-05

## 2018-12-19 RX ORDER — DIMENHYDRINATE 50 MG
1000 TABLET ORAL DAILY
COMMUNITY
End: 2021-06-10

## 2018-12-19 RX ORDER — PANTOPRAZOLE SODIUM 40 MG/1
40 TABLET, DELAYED RELEASE ORAL DAILY
COMMUNITY
End: 2019-06-12 | Stop reason: SDUPTHER

## 2018-12-19 RX ORDER — NYSTATIN 10B UNIT
POWDER (EA) MISCELLANEOUS 2 TIMES DAILY
COMMUNITY
End: 2019-06-12

## 2018-12-19 RX ORDER — GABAPENTIN 600 MG/1
600 TABLET ORAL 2 TIMES DAILY
COMMUNITY
End: 2019-03-04 | Stop reason: SDUPTHER

## 2018-12-19 RX ORDER — LEVOTHYROXINE SODIUM 0.1 MG/1
100 TABLET ORAL DAILY
COMMUNITY
End: 2018-12-26 | Stop reason: SDUPTHER

## 2018-12-19 RX ORDER — CYCLOBENZAPRINE HCL 10 MG
10 TABLET ORAL 3 TIMES DAILY PRN
COMMUNITY
End: 2020-01-08

## 2018-12-19 RX ORDER — LISINOPRIL 5 MG/1
5 TABLET ORAL DAILY
COMMUNITY
End: 2020-04-03 | Stop reason: SDUPTHER

## 2018-12-19 RX ORDER — OCTISALATE, AVOBENZONE, HOMOSALATE, AND OCTOCRYLENE 29.4; 29.4; 49; 25.48 MG/ML; MG/ML; MG/ML; MG/ML
1 LOTION TOPICAL DAILY
COMMUNITY
End: 2019-02-20

## 2018-12-19 RX ORDER — ATORVASTATIN CALCIUM 20 MG/1
20 TABLET, FILM COATED ORAL DAILY
COMMUNITY
End: 2019-06-12

## 2018-12-19 RX ORDER — VENLAFAXINE HYDROCHLORIDE 37.5 MG/1
37.5 CAPSULE, EXTENDED RELEASE ORAL DAILY
COMMUNITY
End: 2019-06-12 | Stop reason: SDUPTHER

## 2018-12-19 RX ORDER — ALLOPURINOL 100 MG/1
100 TABLET ORAL DAILY
COMMUNITY
End: 2020-02-07

## 2018-12-19 ASSESSMENT — ENCOUNTER SYMPTOMS
CONSTIPATION: 0
ABDOMINAL PAIN: 0
APNEA: 0
ABDOMINAL DISTENTION: 0
DIARRHEA: 0
SHORTNESS OF BREATH: 0
CHEST TIGHTNESS: 0
VOMITING: 0
COUGH: 0
NAUSEA: 0
COLOR CHANGE: 0

## 2018-12-19 NOTE — PROGRESS NOTES
reviewed in the patient's chart. I agree with the documentation provided by other staff and have reviewed their documentation prior to providing my signature indicating agreement. Vitals:   BP (!) 182/93 (Site: Right Upper Arm, Position: Sitting, Cuff Size: Large Adult)   Pulse 92   Ht 5' 1\" (1.549 m)   Wt 230 lb (104.3 kg)   BMI 43.46 kg/m²  Body mass index is 43.46 kg/m². Physical Examination:   General: Janee Jones is a 62 y.o. female who is alert and oriented and sitting comfortably in our office. Orthopedics:    GENERAL: Alert and oriented X3 in no acute distress. SKIN: Intact without lesions or ulcerations. NEURO: Musculoskeletal and axillary nerves intact to sensory and motor testing. VASC: Capillary refill is less than 3 seconds. Post Op Exam:    LOCATION: Bilateral shoulder. SITE: Distal neurocirculatory status intact. Cap refill is less than 2 seconds. Sensation is intact to light touch. There is full motor function of the extremity. SKIN:  Intact without rashes, lesions, or ulcerations. Incisions are well healed. ROM: Right: Forward elevation 150 degrees, external rotation in neutral 40 degrees, external rotation in abduction 90 degrees, internal rotation to L1. Left: Forward elevation 150 degrees, external rotation in neutral 50 degrees, external rotation in abduction 90 degrees, internal rotation to L4. STRENGTH: Supraspinatus 4+/5 , external rotators 4+/5. Assessment:     1. History of total replacement of right shoulder joint    2. History of total replacement of left shoulder joint        Plan:   Post Op Treatment : Patient had the treatment regimen reviewed today 1.5 years s/p Right TSA and 2.5 years s/p Left TSA. I reviewed the films with the patient. We discussed some of the etiologies and natural histories of Bilateral Total shoulder replacement.  We also discussed the various treatment alternatives including anti-inflammatory medications, physical therapy,

## 2018-12-21 ENCOUNTER — OFFICE VISIT (OUTPATIENT)
Dept: NEUROLOGY | Age: 58
End: 2018-12-21
Payer: COMMERCIAL

## 2018-12-21 VITALS
BODY MASS INDEX: 45.5 KG/M2 | DIASTOLIC BLOOD PRESSURE: 93 MMHG | HEART RATE: 87 BPM | HEIGHT: 61 IN | WEIGHT: 241 LBS | SYSTOLIC BLOOD PRESSURE: 143 MMHG

## 2018-12-21 DIAGNOSIS — R51.9 CHRONIC DAILY HEADACHE: Primary | ICD-10-CM

## 2018-12-21 DIAGNOSIS — E83.42 HYPOMAGNESEMIA: ICD-10-CM

## 2018-12-21 DIAGNOSIS — G47.33 OSA (OBSTRUCTIVE SLEEP APNEA): ICD-10-CM

## 2018-12-21 PROCEDURE — 99214 OFFICE O/P EST MOD 30 MIN: CPT | Performed by: PSYCHIATRY & NEUROLOGY

## 2018-12-21 PROCEDURE — G8484 FLU IMMUNIZE NO ADMIN: HCPCS | Performed by: PSYCHIATRY & NEUROLOGY

## 2018-12-21 PROCEDURE — G8427 DOCREV CUR MEDS BY ELIG CLIN: HCPCS | Performed by: PSYCHIATRY & NEUROLOGY

## 2018-12-21 PROCEDURE — G8417 CALC BMI ABV UP PARAM F/U: HCPCS | Performed by: PSYCHIATRY & NEUROLOGY

## 2018-12-21 PROCEDURE — 3017F COLORECTAL CA SCREEN DOC REV: CPT | Performed by: PSYCHIATRY & NEUROLOGY

## 2018-12-21 PROCEDURE — G8598 ASA/ANTIPLAT THER USED: HCPCS | Performed by: PSYCHIATRY & NEUROLOGY

## 2018-12-21 PROCEDURE — 1036F TOBACCO NON-USER: CPT | Performed by: PSYCHIATRY & NEUROLOGY

## 2018-12-21 RX ORDER — AMITRIPTYLINE HYDROCHLORIDE 50 MG/1
TABLET, FILM COATED ORAL
Qty: 90 TABLET | Refills: 1 | Status: SHIPPED | OUTPATIENT
Start: 2018-12-21 | End: 2019-03-04

## 2018-12-24 RX ORDER — BACLOFEN 20 MG
TABLET ORAL
Qty: 180 TABLET | Refills: 1 | Status: SHIPPED | OUTPATIENT
Start: 2018-12-24 | End: 2020-05-26 | Stop reason: SDUPTHER

## 2018-12-26 ENCOUNTER — TELEPHONE (OUTPATIENT)
Dept: NEUROLOGY | Age: 58
End: 2018-12-26

## 2018-12-26 DIAGNOSIS — E03.9 HYPOTHYROIDISM, UNSPECIFIED TYPE: Primary | ICD-10-CM

## 2018-12-26 RX ORDER — LEVOTHYROXINE SODIUM 0.1 MG/1
100 TABLET ORAL DAILY
Qty: 90 TABLET | Refills: 1 | Status: SHIPPED | OUTPATIENT
Start: 2018-12-26 | End: 2019-06-26 | Stop reason: SDUPTHER

## 2019-01-08 ENCOUNTER — OFFICE VISIT (OUTPATIENT)
Dept: ORTHOPEDIC SURGERY | Age: 59
End: 2019-01-08
Payer: COMMERCIAL

## 2019-01-08 ENCOUNTER — HOSPITAL ENCOUNTER (OUTPATIENT)
Dept: GENERAL RADIOLOGY | Facility: CLINIC | Age: 59
Discharge: HOME | End: 2019-01-10
Payer: COMMERCIAL

## 2019-01-08 VITALS
BODY MASS INDEX: 45.69 KG/M2 | SYSTOLIC BLOOD PRESSURE: 153 MMHG | DIASTOLIC BLOOD PRESSURE: 89 MMHG | RESPIRATION RATE: 20 BRPM | WEIGHT: 242 LBS | HEIGHT: 61 IN | HEART RATE: 92 BPM

## 2019-01-08 DIAGNOSIS — M17.11 PRIMARY OSTEOARTHRITIS OF RIGHT KNEE: ICD-10-CM

## 2019-01-08 DIAGNOSIS — M17.0 PRIMARY OSTEOARTHRITIS OF BOTH KNEES: ICD-10-CM

## 2019-01-08 DIAGNOSIS — M25.562 PAIN IN BOTH KNEES, UNSPECIFIED CHRONICITY: ICD-10-CM

## 2019-01-08 DIAGNOSIS — M25.561 PAIN IN BOTH KNEES, UNSPECIFIED CHRONICITY: ICD-10-CM

## 2019-01-08 DIAGNOSIS — M17.12 PRIMARY OSTEOARTHRITIS OF LEFT KNEE: Primary | ICD-10-CM

## 2019-01-08 PROCEDURE — 73564 X-RAY EXAM KNEE 4 OR MORE: CPT

## 2019-01-08 PROCEDURE — 99213 OFFICE O/P EST LOW 20 MIN: CPT | Performed by: PHYSICIAN ASSISTANT

## 2019-01-08 PROCEDURE — 73560 X-RAY EXAM OF KNEE 1 OR 2: CPT

## 2019-01-08 ASSESSMENT — ENCOUNTER SYMPTOMS
DIARRHEA: 0
CONSTIPATION: 0
VOMITING: 0
CHEST TIGHTNESS: 0
SHORTNESS OF BREATH: 0
COLOR CHANGE: 0
NAUSEA: 0
APNEA: 0
ABDOMINAL PAIN: 0
ABDOMINAL DISTENTION: 0
COUGH: 0

## 2019-01-15 RX ORDER — AMMONIUM LACTATE 12 G/100G
CREAM TOPICAL
Qty: 140 G | Refills: 6 | Status: SHIPPED | OUTPATIENT
Start: 2019-01-15 | End: 2019-03-04 | Stop reason: SDUPTHER

## 2019-01-31 ENCOUNTER — TELEPHONE (OUTPATIENT)
Dept: FAMILY MEDICINE CLINIC | Age: 59
End: 2019-01-31

## 2019-01-31 DIAGNOSIS — K64.9 HEMORRHOIDS, UNSPECIFIED HEMORRHOID TYPE: Primary | ICD-10-CM

## 2019-01-31 RX ORDER — DIAPER,BRIEF,INFANT-TODD,DISP
EACH MISCELLANEOUS
Qty: 1 TUBE | Refills: 1 | Status: SHIPPED | OUTPATIENT
Start: 2019-01-31 | End: 2019-02-07

## 2019-02-01 DIAGNOSIS — G25.81 RLS (RESTLESS LEGS SYNDROME): Chronic | ICD-10-CM

## 2019-02-01 RX ORDER — GABAPENTIN 600 MG/1
TABLET ORAL
Qty: 180 TABLET | Refills: 0 | OUTPATIENT
Start: 2019-02-01 | End: 2019-05-02

## 2019-02-18 PROBLEM — M17.11 ARTHRITIS OF RIGHT KNEE: Status: RESOLVED | Noted: 2018-08-15 | Resolved: 2019-02-18

## 2019-02-18 PROBLEM — I65.23 STENOSIS OF BOTH INTERNAL CAROTID ARTERIES: Status: RESOLVED | Noted: 2017-09-19 | Resolved: 2019-02-18

## 2019-02-18 PROBLEM — M54.31 SCIATICA, RIGHT SIDE: Status: RESOLVED | Noted: 2017-04-25 | Resolved: 2019-02-18

## 2019-02-18 PROBLEM — R63.8 UNABLE TO LOSE WEIGHT: Status: RESOLVED | Noted: 2018-10-17 | Resolved: 2019-02-18

## 2019-02-18 PROBLEM — L98.9 SKIN LESION OF LEFT LEG: Status: RESOLVED | Noted: 2017-08-21 | Resolved: 2019-02-18

## 2019-02-18 PROBLEM — M25.562 ACUTE PAIN OF BOTH KNEES: Status: RESOLVED | Noted: 2018-09-10 | Resolved: 2019-02-18

## 2019-02-18 PROBLEM — R61 EXCESSIVE SWEATING: Status: RESOLVED | Noted: 2017-01-23 | Resolved: 2019-02-18

## 2019-02-18 PROBLEM — R19.4 CHANGE IN BOWEL HABITS: Status: RESOLVED | Noted: 2017-09-21 | Resolved: 2019-02-18

## 2019-02-18 PROBLEM — K57.90 DIVERTICULOSIS: Status: RESOLVED | Noted: 2017-08-09 | Resolved: 2019-02-18

## 2019-02-18 PROBLEM — M25.561 ACUTE PAIN OF BOTH KNEES: Status: RESOLVED | Noted: 2018-09-10 | Resolved: 2019-02-18

## 2019-02-18 PROBLEM — R14.0 BLOATING: Status: RESOLVED | Noted: 2017-09-21 | Resolved: 2019-02-18

## 2019-02-20 ENCOUNTER — ANTI-COAG VISIT (OUTPATIENT)
Dept: FAMILY MEDICINE CLINIC | Age: 59
End: 2019-02-20

## 2019-02-20 DIAGNOSIS — K57.90 DIVERTICULOSIS OF INTESTINE WITHOUT BLEEDING, UNSPECIFIED INTESTINAL TRACT LOCATION: ICD-10-CM

## 2019-02-20 DIAGNOSIS — R14.0 ABDOMINAL BLOATING: ICD-10-CM

## 2019-02-20 DIAGNOSIS — R19.8 IRREGULAR BOWEL HABITS: ICD-10-CM

## 2019-02-20 RX ORDER — OCTISALATE, AVOBENZONE, HOMOSALATE, AND OCTOCRYLENE 29.4; 29.4; 49; 25.48 MG/ML; MG/ML; MG/ML; MG/ML
LOTION TOPICAL
Qty: 90 CAPSULE | Refills: 1 | Status: SHIPPED | OUTPATIENT
Start: 2019-02-20 | End: 2019-08-31 | Stop reason: SDUPTHER

## 2019-02-26 RX ORDER — DICYCLOMINE HYDROCHLORIDE 10 MG/1
CAPSULE ORAL
Qty: 120 CAPSULE | Refills: 0 | Status: SHIPPED | OUTPATIENT
Start: 2019-02-26 | End: 2019-04-02 | Stop reason: SDUPTHER

## 2019-03-04 ENCOUNTER — OFFICE VISIT (OUTPATIENT)
Dept: PODIATRY | Age: 59
End: 2019-03-04
Payer: COMMERCIAL

## 2019-03-04 VITALS — HEIGHT: 61 IN | WEIGHT: 230 LBS | BODY MASS INDEX: 43.43 KG/M2

## 2019-03-04 DIAGNOSIS — G25.81 RLS (RESTLESS LEGS SYNDROME): Primary | ICD-10-CM

## 2019-03-04 DIAGNOSIS — M21.961 FOOT DEFORMITY, BILATERAL: ICD-10-CM

## 2019-03-04 DIAGNOSIS — B35.1 ONYCHOMYCOSIS: ICD-10-CM

## 2019-03-04 DIAGNOSIS — M79.2 NEUROPATHIC PAIN: ICD-10-CM

## 2019-03-04 DIAGNOSIS — E11.42 TYPE 2 DIABETES MELLITUS WITH DIABETIC POLYNEUROPATHY, WITH LONG-TERM CURRENT USE OF INSULIN (HCC): ICD-10-CM

## 2019-03-04 DIAGNOSIS — R25.2 NOCTURNAL FOOT CRAMPS: ICD-10-CM

## 2019-03-04 DIAGNOSIS — M21.962 FOOT DEFORMITY, BILATERAL: ICD-10-CM

## 2019-03-04 DIAGNOSIS — Z79.4 TYPE 2 DIABETES MELLITUS WITH DIABETIC POLYNEUROPATHY, WITH LONG-TERM CURRENT USE OF INSULIN (HCC): ICD-10-CM

## 2019-03-04 PROCEDURE — 99213 OFFICE O/P EST LOW 20 MIN: CPT | Performed by: PODIATRIST

## 2019-03-04 PROCEDURE — 3017F COLORECTAL CA SCREEN DOC REV: CPT | Performed by: PODIATRIST

## 2019-03-04 PROCEDURE — 11721 DEBRIDE NAIL 6 OR MORE: CPT | Performed by: PODIATRIST

## 2019-03-04 PROCEDURE — G8427 DOCREV CUR MEDS BY ELIG CLIN: HCPCS | Performed by: PODIATRIST

## 2019-03-04 PROCEDURE — G8598 ASA/ANTIPLAT THER USED: HCPCS | Performed by: PODIATRIST

## 2019-03-04 PROCEDURE — 1036F TOBACCO NON-USER: CPT | Performed by: PODIATRIST

## 2019-03-04 PROCEDURE — G8417 CALC BMI ABV UP PARAM F/U: HCPCS | Performed by: PODIATRIST

## 2019-03-04 PROCEDURE — G8484 FLU IMMUNIZE NO ADMIN: HCPCS | Performed by: PODIATRIST

## 2019-03-04 PROCEDURE — 2022F DILAT RTA XM EVC RTNOPTHY: CPT | Performed by: PODIATRIST

## 2019-03-04 PROCEDURE — 3046F HEMOGLOBIN A1C LEVEL >9.0%: CPT | Performed by: PODIATRIST

## 2019-03-04 RX ORDER — RABEPRAZOLE SODIUM 20 MG/1
TABLET, DELAYED RELEASE ORAL
COMMUNITY
Start: 2019-02-11 | End: 2019-03-05

## 2019-03-04 RX ORDER — GABAPENTIN 600 MG/1
600 TABLET ORAL 2 TIMES DAILY
Qty: 60 TABLET | Refills: 2 | Status: SHIPPED | OUTPATIENT
Start: 2019-03-04 | End: 2019-07-09 | Stop reason: ALTCHOICE

## 2019-03-04 ASSESSMENT — ENCOUNTER SYMPTOMS
BACK PAIN: 1
VOMITING: 0
DIARRHEA: 0
CONSTIPATION: 0
NAUSEA: 0
COLOR CHANGE: 0

## 2019-03-05 ENCOUNTER — OFFICE VISIT (OUTPATIENT)
Dept: NEUROLOGY | Age: 59
End: 2019-03-05
Payer: COMMERCIAL

## 2019-03-05 VITALS
BODY MASS INDEX: 45.73 KG/M2 | SYSTOLIC BLOOD PRESSURE: 170 MMHG | HEIGHT: 61 IN | WEIGHT: 242.2 LBS | DIASTOLIC BLOOD PRESSURE: 91 MMHG | HEART RATE: 93 BPM

## 2019-03-05 DIAGNOSIS — R51.9 CHRONIC DAILY HEADACHE: Primary | ICD-10-CM

## 2019-03-05 PROCEDURE — 3017F COLORECTAL CA SCREEN DOC REV: CPT | Performed by: PSYCHIATRY & NEUROLOGY

## 2019-03-05 PROCEDURE — G8598 ASA/ANTIPLAT THER USED: HCPCS | Performed by: PSYCHIATRY & NEUROLOGY

## 2019-03-05 PROCEDURE — 99214 OFFICE O/P EST MOD 30 MIN: CPT | Performed by: PSYCHIATRY & NEUROLOGY

## 2019-03-05 PROCEDURE — G8484 FLU IMMUNIZE NO ADMIN: HCPCS | Performed by: PSYCHIATRY & NEUROLOGY

## 2019-03-05 PROCEDURE — 1036F TOBACCO NON-USER: CPT | Performed by: PSYCHIATRY & NEUROLOGY

## 2019-03-05 PROCEDURE — G8427 DOCREV CUR MEDS BY ELIG CLIN: HCPCS | Performed by: PSYCHIATRY & NEUROLOGY

## 2019-03-05 PROCEDURE — G8417 CALC BMI ABV UP PARAM F/U: HCPCS | Performed by: PSYCHIATRY & NEUROLOGY

## 2019-03-05 RX ORDER — TOPIRAMATE 25 MG/1
TABLET ORAL
Qty: 180 TABLET | Refills: 1 | Status: SHIPPED | OUTPATIENT
Start: 2019-03-05 | End: 2019-06-12

## 2019-03-12 ENCOUNTER — TELEPHONE (OUTPATIENT)
Dept: NEUROLOGY | Age: 59
End: 2019-03-12

## 2019-03-19 RX ORDER — RABEPRAZOLE SODIUM 20 MG/1
TABLET, DELAYED RELEASE ORAL
Qty: 30 TABLET | Refills: 3 | OUTPATIENT
Start: 2019-03-19

## 2019-03-27 ENCOUNTER — OFFICE VISIT (OUTPATIENT)
Dept: FAMILY MEDICINE CLINIC | Age: 59
End: 2019-03-27
Payer: COMMERCIAL

## 2019-03-27 VITALS
SYSTOLIC BLOOD PRESSURE: 126 MMHG | DIASTOLIC BLOOD PRESSURE: 86 MMHG | OXYGEN SATURATION: 95 % | BODY MASS INDEX: 45.91 KG/M2 | HEART RATE: 88 BPM | TEMPERATURE: 99 F | RESPIRATION RATE: 16 BRPM | WEIGHT: 243 LBS

## 2019-03-27 DIAGNOSIS — J02.9 ACUTE VIRAL PHARYNGITIS: ICD-10-CM

## 2019-03-27 DIAGNOSIS — M79.604 BILATERAL LEG PAIN: ICD-10-CM

## 2019-03-27 DIAGNOSIS — E83.42 HYPOMAGNESEMIA: ICD-10-CM

## 2019-03-27 DIAGNOSIS — M79.605 BILATERAL LEG PAIN: ICD-10-CM

## 2019-03-27 DIAGNOSIS — R22.43 LOCALIZED SWELLING OF BOTH LOWER LEGS: Primary | ICD-10-CM

## 2019-03-27 DIAGNOSIS — R07.9 CHEST PAIN, UNSPECIFIED TYPE: ICD-10-CM

## 2019-03-27 LAB — S PYO AG THROAT QL: NORMAL

## 2019-03-27 PROCEDURE — G8484 FLU IMMUNIZE NO ADMIN: HCPCS | Performed by: NURSE PRACTITIONER

## 2019-03-27 PROCEDURE — G8427 DOCREV CUR MEDS BY ELIG CLIN: HCPCS | Performed by: NURSE PRACTITIONER

## 2019-03-27 PROCEDURE — 99214 OFFICE O/P EST MOD 30 MIN: CPT | Performed by: NURSE PRACTITIONER

## 2019-03-27 PROCEDURE — 87880 STREP A ASSAY W/OPTIC: CPT | Performed by: NURSE PRACTITIONER

## 2019-03-27 PROCEDURE — 3017F COLORECTAL CA SCREEN DOC REV: CPT | Performed by: NURSE PRACTITIONER

## 2019-03-27 PROCEDURE — G8417 CALC BMI ABV UP PARAM F/U: HCPCS | Performed by: NURSE PRACTITIONER

## 2019-03-27 PROCEDURE — 1036F TOBACCO NON-USER: CPT | Performed by: NURSE PRACTITIONER

## 2019-03-27 PROCEDURE — G8598 ASA/ANTIPLAT THER USED: HCPCS | Performed by: NURSE PRACTITIONER

## 2019-03-27 ASSESSMENT — PATIENT HEALTH QUESTIONNAIRE - PHQ9
5. POOR APPETITE OR OVEREATING: 2
SUM OF ALL RESPONSES TO PHQ QUESTIONS 1-9: 14
9. THOUGHTS THAT YOU WOULD BE BETTER OFF DEAD, OR OF HURTING YOURSELF: 0
4. FEELING TIRED OR HAVING LITTLE ENERGY: 2
7. TROUBLE CONCENTRATING ON THINGS, SUCH AS READING THE NEWSPAPER OR WATCHING TELEVISION: 2
2. FEELING DOWN, DEPRESSED OR HOPELESS: 1
8. MOVING OR SPEAKING SO SLOWLY THAT OTHER PEOPLE COULD HAVE NOTICED. OR THE OPPOSITE, BEING SO FIGETY OR RESTLESS THAT YOU HAVE BEEN MOVING AROUND A LOT MORE THAN USUAL: 0
SUM OF ALL RESPONSES TO PHQ QUESTIONS 1-9: 14
SUM OF ALL RESPONSES TO PHQ9 QUESTIONS 1 & 2: 3
10. IF YOU CHECKED OFF ANY PROBLEMS, HOW DIFFICULT HAVE THESE PROBLEMS MADE IT FOR YOU TO DO YOUR WORK, TAKE CARE OF THINGS AT HOME, OR GET ALONG WITH OTHER PEOPLE: 2
6. FEELING BAD ABOUT YOURSELF - OR THAT YOU ARE A FAILURE OR HAVE LET YOURSELF OR YOUR FAMILY DOWN: 2
3. TROUBLE FALLING OR STAYING ASLEEP: 3
1. LITTLE INTEREST OR PLEASURE IN DOING THINGS: 2

## 2019-03-27 ASSESSMENT — ENCOUNTER SYMPTOMS
SHORTNESS OF BREATH: 1
SORE THROAT: 1
TROUBLE SWALLOWING: 0
RHINORRHEA: 1

## 2019-04-02 ENCOUNTER — OFFICE VISIT (OUTPATIENT)
Dept: ORTHOPEDIC SURGERY | Age: 59
End: 2019-04-02
Payer: COMMERCIAL

## 2019-04-02 VITALS
WEIGHT: 243.8 LBS | HEIGHT: 61 IN | BODY MASS INDEX: 46.03 KG/M2 | HEART RATE: 89 BPM | DIASTOLIC BLOOD PRESSURE: 94 MMHG | SYSTOLIC BLOOD PRESSURE: 163 MMHG

## 2019-04-02 DIAGNOSIS — M17.0 PRIMARY OSTEOARTHRITIS OF BOTH KNEES: Primary | ICD-10-CM

## 2019-04-02 DIAGNOSIS — E66.01 MORBID OBESITY WITH BMI OF 45.0-49.9, ADULT (HCC): ICD-10-CM

## 2019-04-02 PROCEDURE — 20611 DRAIN/INJ JOINT/BURSA W/US: CPT | Performed by: PHYSICIAN ASSISTANT

## 2019-04-02 PROCEDURE — 99024 POSTOP FOLLOW-UP VISIT: CPT | Performed by: PHYSICIAN ASSISTANT

## 2019-04-02 ASSESSMENT — ENCOUNTER SYMPTOMS
SHORTNESS OF BREATH: 0
ABDOMINAL PAIN: 0
CHEST TIGHTNESS: 0
NAUSEA: 0
CONSTIPATION: 0
COLOR CHANGE: 0
APNEA: 0
VOMITING: 0
DIARRHEA: 0
COUGH: 0
ABDOMINAL DISTENTION: 0

## 2019-04-02 NOTE — PROGRESS NOTES
REPLACEMENT SHOULDER TOTAL Right 5/9/2017    SHOULDER TOTAL ARTHROPLASTY RIGHT WITH ARTHREX, CELLSAVER AND AQUAMANTIS performed by Dennys Mireles DO at [facility] Right 05/09/2017     Current Medications:   Current Outpatient Medications   Medication Sig Dispense Refill    atorvastatin (LIPITOR) 20 MG tablet TAKE 1 TABLET BY MOUTH NIGHTLY 90 tablet 1    topiramate (TOPAMAX) 25 MG tablet 1 tab (25 mg) BID for 1 week, then 2 tabs (50 mg) BID for 1 week, then 3 tabs (75 mg) BID for 1 week, then 4 tabs (100 mg ) BID thereafter 180 tablet 1    VENTOLIN  (90 Base) MCG/ACT inhaler       gabapentin (NEURONTIN) 600 MG tablet Take 1 tablet by mouth 2 times daily for 90 days.  (Patient taking differently: Take 600 mg by mouth 3 times daily. ) 60 tablet 2    dicyclomine (BENTYL) 10 MG capsule TAKE 1 CAPSULE BY MOUTH 4 TIMES DAILY 120 capsule 0    Probiotic Product (ALIGN) 4 MG CAPS TAKE 1 CAPSULE BY MOUTH ONCE DAILY 90 capsule 1    metFORMIN (GLUCOPHAGE) 1000 MG tablet TAKE 1 TABLET BY MOUTH TWICE DAILY 180 tablet 0    levothyroxine (SYNTHROID) 100 MCG tablet Take 1 tablet by mouth Daily 90 tablet 1    Magnesium Oxide 500 MG TABS TAKE 1 TABLET BY MOUTH TWICE DAILY 180 tablet 1    allopurinol (ZYLOPRIM) 100 MG tablet Take 100 mg by mouth daily      aspirin 81 MG tablet Take 81 mg by mouth daily      cyclobenzaprine (FLEXERIL) 10 MG tablet Take 10 mg by mouth 3 times daily as needed for Muscle spasms      atorvastatin (LIPITOR) 20 MG tablet Take 20 mg by mouth daily      lisinopril (PRINIVIL;ZESTRIL) 5 MG tablet Take 5 mg by mouth daily      Insulin Aspart (NOVOLOG FLEXPEN SC) Inject into the skin      pantoprazole (PROTONIX) 40 MG tablet Take 40 mg by mouth daily      Cyanocobalamin (VITAMIN B 12 PO) Take by mouth      vitamin D (CHOLECALCIFEROL) 1000 UNIT TABS tablet Take 1,000 Units by mouth daily      ammonium lactate (AMLACTIN) 12 % cream Apply topically as needed for Dry Skin Apply topically as needed.  Flaxseed, Linseed, (FLAX SEED OIL) 1000 MG CAPS Take 1,000 mg by mouth daily      meclizine (ANTIVERT) 25 MG tablet Take 25 mg by mouth 3 times daily as needed      nystatin (MYCOSTATIN) POWD powder Apply topically 2 times daily      venlafaxine (EFFEXOR XR) 37.5 MG extended release capsule Take 37.5 mg by mouth daily      Compression Stockings MISC by Does not apply route Wear daily for swelling, pain, and support. 20-30 mmHg. 2 each 0     No current facility-administered medications for this visit. Allergies:    Actos [pioglitazone]; Amitriptyline; Celebrex [celecoxib]; Fenofibrate; Gemfibrozil; Lovastatin; Prempro [conj estrog-medroxyprogest ace]; Statins; Tricor [fenofibrate]; Zetia [ezetimibe]; Zocor [simvastatin]; Ampicillin; Niacin and related; Novolin [insulin isophane & reg, human]; Omeprazole; Pcn [penicillins];  Pregabalin; and Vesicare [solifenacin]    Social History:   Social History     Socioeconomic History    Marital status:      Spouse name: None    Number of children: None    Years of education: None    Highest education level: None   Occupational History     Employer: DISABLED   Social Needs    Financial resource strain: None    Food insecurity:     Worry: None     Inability: None    Transportation needs:     Medical: None     Non-medical: None   Tobacco Use    Smoking status: Former Smoker     Packs/day: 0.50     Years: 20.00     Pack years: 10.00     Types: Cigarettes     Last attempt to quit: 1977     Years since quittin.3    Smokeless tobacco: Never Used   Substance and Sexual Activity    Alcohol use: No     Alcohol/week: 0.0 oz     Comment: occasionally    Drug use: No    Sexual activity: Yes     Partners: Male   Lifestyle    Physical activity:     Days per week: None     Minutes per session: None    Stress: None   Relationships    Social connections:     Talks on phone: None     Gets together: None     Attends Restoration service: None     Active member of club or organization: None     Attends meetings of clubs or organizations: None     Relationship status: None    Intimate partner violence:     Fear of current or ex partner: None     Emotionally abused: None     Physically abused: None     Forced sexual activity: None   Other Topics Concern    None   Social History Narrative    None     Family History:  Family History   Problem Relation Age of Onset    Emphysema Mother     Diabetes Father     Heart Disease Father     High Cholesterol Sister     High Blood Pressure Sister     Heart Disease Sister      I have reviewed the CC, HPI, ROS, PMH, FHX, Social History, and if not present in this note, I have reviewed in the patient's chart. I agree with the documentation provided by other staff and have reviewed their documentation prior to providing my signature indicating agreement. Vitals:   BP (!) 163/94 (Site: Left Upper Arm, Position: Sitting, Cuff Size: Medium Adult)   Pulse 89   Ht 5' 1\" (1.549 m)   Wt 243 lb 12.8 oz (110.6 kg)   BMI 46.07 kg/m²  Body mass index is 46.07 kg/m². Physical Examination:     Orthopedics:    GENERAL: Alert and oriented X3 in no acute distress. SKIN: Intact without lesions or ulcerations. NEURO: Musculoskeletal and axillary nerves intact to sensory and motor testing. VASC: Capillary refill is less than 3 seconds. Bilateral Knee     GEN:  Alert and oriented X 3, in no acute distress. GAIT:  The patient's gait was observed while entering the exam room and was noted to be antalgic. The extremity is in anatomic alignment. SKIN:  Intact without rashes, lesions, or ulcerations. No obvious deformity or swelling. NEURO:  The patient responds to light touch throughout bilateral LE. Patellar and Achilles reflexes are 2/4. VASC:  The bilateral LE is neurovascularly intact with  2/4 DP and 2/4 PT pulses. Brisk capillary refill. ROM: Right: 5/97 degrees.   mild knee effusion    Left: 0/108 degrees. No knee effusion  MUSC: decreased quad tone  LIGAMENT: There is  No varus instability at 0 degrees and No varus instability at 30 degrees. There is No valgus instability at 0 degrees and No valgus instability at 30 degrees. PALP: There is Medial and Lateral joint line pain bilaterally    Assessment:     1. Primary osteoarthritis of both knees    2. Morbid obesity with BMI of 45.0-49.9, adult ([address]        Procedures:    Procedure: yes    Regular Knee Injection    Location: Bilateral Knee  Procedure: Corticosteroid injection into the knee. the patient was placed in the supine position on the exam table. The superior lateral portal was identified and marked. the skin was prepped with betadine in a sterile fashion. Utilizing ultrasound for precise placement and clean technique with sterile gloves a 5cc solution containing 4cc of 1.0% Lidocaine with 1cc containing 40mg of Depo-medrol was injected. There was no resistance to the injection. The wound was cleansed and a band-aid was placed. the patient tolerated the procedure without difficulty. Adverse reactions to the injection were discussed with the patient including signs of infection (increasing pain, redness, swelling) and the patient was instructed to call immediately if experiencing any of these symptoms. Radiology:   X-ray of both knees was reviewed from 01/08/2019. Plan:   Treatment : I reviewed the X-ray with the patient and I informed her that the knees have bone on bone arthritis on the medial aspect of the knee that is causing her to have pain in both of her knees. We discussed the etiologies and natural histories of Primary osteoarthritis of the bilateral knee. We discussed the various treatment alternatives including anti-inflammatory medications, physical therapy, injections, further imaging studies and as a last result surgery.  During today's visit, I explained to the patient that she can try doing [facility] weight loss program or weight watchers so she may lose some weight to take pressure off of the knees and to reduce her complications if she wants to do a total knee replacement surgery. I also told her that we can inject the knees today to help with her knee pain but eventually she will need to have her knees replaced because her bones are touching. The patient then stated that she would like to have her right knee replaced in September this year because it is worse than the left. So at this time, the patient has opted for a total knee replacement information booklet and a cortisone injection into the bilateral knee to help reduce inflammation and pain. The injection site should never get red, hot, or swollen and if it does the patient will contact our office right away. The patient may experience a increase in soreness the first 24-48 hours due to a cortisone flair and can take anti-inflammatories for a short period of time to reduce that soreness. The patient should not submerge the injection site in water for a minimum of 24 hours to avoid infection. This means no lakes, pools, ponds, or hot tubs for 24 hours. If the patient is diabetic the injection may increase their blood sugar for up to one week. The patient can do this cortisone injection once every 3 months as needed. If the injections stop working and do not give the patient relief the patient should consider surgical interventions to produce long term relief. The patient would like a referral to weight management. The patient needs to try to get her BMI under 40 before surgery. Patient should return to the clinic in 5 months to follow up with Carline Cabot D. O. to go over the the total knee replacement surgery in more detail. The patient will call the office immediately with any problems.       Orders Placed This Encounter   Medications    lidocaine 1 % injection 4 mL    lidocaine 1 % injection 4 mL    methylPREDNISolone acetate (DEPO-MEDROL) injection 40 mg    methylPREDNISolone acetate (DEPO-MEDROL) injection 40 mg     Orders Placed This Encounter   Procedures   [address] [facility], [place]     Referral Priority:   Routine     Referral Type:   Eval and Treat     Referral Reason:   Specialty Services Required     Requested Specialty:   Bariatric Surgery     Number of Visits Requested:   1    External Referral To Physical Therapy     Referral Priority:   Routine     Referral Type:   Eval and Treat     Referral Reason:   Specialty Services Required     Requested Specialty:   Physical Therapy     Number of Visits Requested:   1     Katheryn BONE, marin scribing for and in the presence of  Ben Evans ATC. 4/3/2019  4:20 PM    Judie BONE PA-C, ATC ,  have personally seen this patient, reviewed the chart including history, and imaging if done. I personally  performed the physical exam and obtained any needed additional history. I placed orders, performed or supervised procedures and developed the treatment plan.     Electronically signed by Andrez Corona PA-C, on 4/3/2019 at 4:20 PM

## 2019-04-03 ENCOUNTER — TELEPHONE (OUTPATIENT)
Dept: FAMILY MEDICINE CLINIC | Age: 59
End: 2019-04-03

## 2019-04-03 DIAGNOSIS — M79.605 BILATERAL LEG PAIN: ICD-10-CM

## 2019-04-03 DIAGNOSIS — M79.604 BILATERAL LEG PAIN: ICD-10-CM

## 2019-04-03 DIAGNOSIS — R22.43 LOCALIZED SWELLING OF BOTH LOWER LEGS: ICD-10-CM

## 2019-04-03 RX ORDER — LIDOCAINE HYDROCHLORIDE 10 MG/ML
4 INJECTION, SOLUTION INFILTRATION; PERINEURAL ONCE
Status: COMPLETED | OUTPATIENT
Start: 2019-04-03 | End: 2019-04-03

## 2019-04-03 RX ORDER — METHYLPREDNISOLONE ACETATE 40 MG/ML
40 INJECTION, SUSPENSION INTRA-ARTICULAR; INTRALESIONAL; INTRAMUSCULAR; SOFT TISSUE ONCE
Status: COMPLETED | OUTPATIENT
Start: 2019-04-03 | End: 2019-04-03

## 2019-04-03 RX ADMIN — METHYLPREDNISOLONE ACETATE 40 MG: 40 INJECTION, SUSPENSION INTRA-ARTICULAR; INTRALESIONAL; INTRAMUSCULAR; SOFT TISSUE at 15:21

## 2019-04-03 RX ADMIN — LIDOCAINE HYDROCHLORIDE 4 ML: 10 INJECTION, SOLUTION INFILTRATION; PERINEURAL at 15:21

## 2019-04-03 RX ADMIN — LIDOCAINE HYDROCHLORIDE 4 ML: 10 INJECTION, SOLUTION INFILTRATION; PERINEURAL at 15:20

## 2019-04-03 NOTE — TELEPHONE ENCOUNTER
Patient called states they need a new order for 20 -30 compression Knee hi  it needs to faxed to [facility]. #[phone]

## 2019-04-04 RX ORDER — PANTOPRAZOLE SODIUM 40 MG/1
TABLET, DELAYED RELEASE ORAL
Qty: 30 TABLET | Refills: 3 | Status: SHIPPED | OUTPATIENT
Start: 2019-04-04 | End: 2019-07-31 | Stop reason: SDUPTHER

## 2019-04-04 RX ORDER — DICYCLOMINE HYDROCHLORIDE 10 MG/1
CAPSULE ORAL
Qty: 120 CAPSULE | Refills: 0 | Status: SHIPPED | OUTPATIENT
Start: 2019-04-04 | End: 2019-05-08 | Stop reason: SDUPTHER

## 2019-04-08 ENCOUNTER — HOSPITAL ENCOUNTER (OUTPATIENT)
Age: 59
Discharge: HOME | End: 2019-04-08
Payer: COMMERCIAL

## 2019-04-08 DIAGNOSIS — R22.43 LOCALIZED SWELLING OF BOTH LOWER LEGS: ICD-10-CM

## 2019-04-08 DIAGNOSIS — E83.42 HYPOMAGNESEMIA: ICD-10-CM

## 2019-04-08 LAB
ALBUMIN SERPL-MCNC: 4.1 G/DL (ref 3.5–5.2)
ALBUMIN/GLOBULIN RATIO: ABNORMAL (ref 1–2.5)
ALP BLD-CCNC: 117 U/L (ref 35–104)
ALT SERPL-CCNC: 15 U/L (ref 5–33)
ANION GAP SERPL CALCULATED.3IONS-SCNC: 12 MMOL/L (ref 9–17)
AST SERPL-CCNC: 15 U/L
BILIRUB SERPL-MCNC: 0.19 MG/DL (ref 0.3–1.2)
BNP INTERPRETATION: NORMAL
BUN BLDV-MCNC: 14 MG/DL (ref 6–20)
BUN/CREAT BLD: ABNORMAL (ref 9–20)
CALCIUM SERPL-MCNC: 9.2 MG/DL (ref 8.6–10.4)
CHLORIDE BLD-SCNC: 102 MMOL/L (ref 98–107)
CO2: 27 MMOL/L (ref 20–31)
CREAT SERPL-MCNC: 0.58 MG/DL (ref 0.5–0.9)
GFR AFRICAN AMERICAN: >60 ML/MIN
GFR NON-AFRICAN AMERICAN: >60 ML/MIN
GFR SERPL CREATININE-BSD FRML MDRD: ABNORMAL ML/MIN/{1.73_M2}
GFR SERPL CREATININE-BSD FRML MDRD: ABNORMAL ML/MIN/{1.73_M2}
GLUCOSE BLD-MCNC: 146 MG/DL (ref 70–99)
MAGNESIUM: 1.7 MG/DL (ref 1.6–2.6)
POTASSIUM SERPL-SCNC: 4.4 MMOL/L (ref 3.7–5.3)
PRO-BNP: 31 PG/ML
SODIUM BLD-SCNC: 141 MMOL/L (ref 135–144)
TOTAL PROTEIN: 7.2 G/DL (ref 6.4–8.3)

## 2019-04-08 PROCEDURE — 83735 ASSAY OF MAGNESIUM: CPT

## 2019-04-08 PROCEDURE — 36415 COLL VENOUS BLD VENIPUNCTURE: CPT

## 2019-04-08 PROCEDURE — 83880 ASSAY OF NATRIURETIC PEPTIDE: CPT

## 2019-04-08 PROCEDURE — 80053 COMPREHEN METABOLIC PANEL: CPT

## 2019-04-08 PROCEDURE — 86038 ANTINUCLEAR ANTIBODIES: CPT

## 2019-04-09 LAB — ANTI-NUCLEAR ANTIBODY (ANA): NEGATIVE

## 2019-04-17 NOTE — TELEPHONE ENCOUNTER
Please Approve or Refuse.   Send to Pharmacy per Pt's Request:      Next Visit Date:  6/3/2019   Last Visit Date: 3/4/2019    Hemoglobin A1C (%)   Date Value   05/15/2018 7.0 (H)   05/15/2018 7.0 (H)   04/19/2017 7.4 (H)             ( goal A1C is < 7)   BP Readings from Last 3 Encounters:   04/02/19 (!) 163/94   03/27/19 126/86   03/05/19 (!) 170/91          (goal 120/80)  BUN   Date Value Ref Range Status   04/08/2019 14 6 - 20 mg/dL Final     CREATININE   Date Value Ref Range Status   04/08/2019 0.58 0.50 - 0.90 mg/dL Final     Potassium   Date Value Ref Range Status   04/08/2019 4.4 3.7 - 5.3 mmol/L Final

## 2019-04-19 RX ORDER — ROPINIROLE 2 MG/1
TABLET, FILM COATED ORAL
Qty: 90 TABLET | Refills: 3 | Status: SHIPPED | OUTPATIENT
Start: 2019-04-19 | End: 2019-08-17 | Stop reason: SDUPTHER

## 2019-04-19 RX ORDER — VENLAFAXINE HYDROCHLORIDE 37.5 MG/1
CAPSULE, EXTENDED RELEASE ORAL
Qty: 90 CAPSULE | Refills: 5 | Status: SHIPPED | OUTPATIENT
Start: 2019-04-19 | End: 2019-07-26 | Stop reason: SDUPTHER

## 2019-04-26 ENCOUNTER — TELEPHONE (OUTPATIENT)
Dept: GASTROENTEROLOGY | Age: 59
End: 2019-04-26

## 2019-04-26 NOTE — TELEPHONE ENCOUNTER
Writer called patient to confirm appointment. The patient was unavailable. A message was left for the patient to contact the office to confirm appointment. Writer states the location of the office, to bring a copy of insurance card and ID to appointment and that if the patient has a co-pay, it would be due at the time of visit as well.      Appointment 4/29/19

## 2019-05-08 DIAGNOSIS — E11.40 TYPE 2 DIABETES MELLITUS WITH DIABETIC NEUROPATHY, WITH LONG-TERM CURRENT USE OF INSULIN (HCC): Chronic | ICD-10-CM

## 2019-05-08 DIAGNOSIS — Z79.4 TYPE 2 DIABETES MELLITUS WITH DIABETIC NEUROPATHY, WITH LONG-TERM CURRENT USE OF INSULIN (HCC): Chronic | ICD-10-CM

## 2019-05-09 RX ORDER — DICYCLOMINE HYDROCHLORIDE 10 MG/1
CAPSULE ORAL
Qty: 360 CAPSULE | Refills: 1 | Status: SHIPPED | OUTPATIENT
Start: 2019-05-09 | End: 2019-11-19 | Stop reason: SDUPTHER

## 2019-05-14 ENCOUNTER — HOSPITAL ENCOUNTER (OUTPATIENT)
Dept: NEUROLOGY | Age: 59
Discharge: HOME | End: 2019-05-14
Payer: COMMERCIAL

## 2019-05-14 ENCOUNTER — CLINICAL DOCUMENTATION (OUTPATIENT)
Dept: PHYSICAL MEDICINE AND REHAB | Age: 59
End: 2019-05-14

## 2019-05-14 ENCOUNTER — HOSPITAL ENCOUNTER (OUTPATIENT)
Dept: GENERAL RADIOLOGY | Age: 59
Discharge: HOME | End: 2019-05-16
Payer: COMMERCIAL

## 2019-05-14 ENCOUNTER — HOSPITAL ENCOUNTER (OUTPATIENT)
Age: 59
Discharge: HOME | End: 2019-05-16
Payer: COMMERCIAL

## 2019-05-14 DIAGNOSIS — M79.605 BILATERAL LEG PAIN: ICD-10-CM

## 2019-05-14 DIAGNOSIS — M79.604 BILATERAL LEG PAIN: ICD-10-CM

## 2019-05-14 DIAGNOSIS — R22.43 LOCALIZED SWELLING OF BOTH LOWER LEGS: ICD-10-CM

## 2019-05-14 DIAGNOSIS — R07.9 CHEST PAIN, UNSPECIFIED TYPE: ICD-10-CM

## 2019-05-14 PROCEDURE — 71046 X-RAY EXAM CHEST 2 VIEWS: CPT

## 2019-05-14 PROCEDURE — 95909 NRV CNDJ TST 5-6 STUDIES: CPT | Performed by: PHYSICAL MEDICINE & REHABILITATION

## 2019-05-14 PROCEDURE — 95886 MUSC TEST DONE W/N TEST COMP: CPT | Performed by: PHYSICAL MEDICINE & REHABILITATION

## 2019-05-23 ENCOUNTER — TELEPHONE (OUTPATIENT)
Dept: GASTROENTEROLOGY | Age: 59
End: 2019-05-23

## 2019-05-23 NOTE — TELEPHONE ENCOUNTER
5/23/19 Appt r/s with the patient and she was only able to do Mondays or Thursday's. New appointment is 7/22/19.     Previously scheduled for 6/4/19

## 2019-05-28 DIAGNOSIS — B35.4 TINEA CORPORIS: ICD-10-CM

## 2019-05-28 RX ORDER — NYSTATIN 100000 [USP'U]/G
POWDER TOPICAL
Qty: 30 G | Refills: 1 | Status: SHIPPED | OUTPATIENT
Start: 2019-05-28 | End: 2019-08-06 | Stop reason: SDUPTHER

## 2019-06-10 ENCOUNTER — HOSPITAL ENCOUNTER (OUTPATIENT)
Age: 59
Discharge: HOME | End: 2019-06-10
Payer: COMMERCIAL

## 2019-06-10 LAB
CREATININE URINE: 86.8 MG/DL (ref 28–217)
MICROALBUMIN/CREAT 24H UR: <12 MG/L
MICROALBUMIN/CREAT UR-RTO: NORMAL MCG/MG CREAT
THYROXINE, FREE: 1.32 NG/DL (ref 0.93–1.7)
TSH SERPL DL<=0.05 MIU/L-ACNC: 2.66 MIU/L (ref 0.3–5)

## 2019-06-10 PROCEDURE — 84439 ASSAY OF FREE THYROXINE: CPT

## 2019-06-10 PROCEDURE — 82570 ASSAY OF URINE CREATININE: CPT

## 2019-06-10 PROCEDURE — 86800 THYROGLOBULIN ANTIBODY: CPT

## 2019-06-10 PROCEDURE — 82043 UR ALBUMIN QUANTITATIVE: CPT

## 2019-06-10 PROCEDURE — 36415 COLL VENOUS BLD VENIPUNCTURE: CPT

## 2019-06-10 PROCEDURE — 84443 ASSAY THYROID STIM HORMONE: CPT

## 2019-06-10 PROCEDURE — 86376 MICROSOMAL ANTIBODY EACH: CPT

## 2019-06-11 ENCOUNTER — HOSPITAL ENCOUNTER (OUTPATIENT)
Age: 59
Setting detail: SPECIMEN
Discharge: HOME | End: 2019-06-11
Payer: COMMERCIAL

## 2019-06-11 LAB
THYROGLOBULIN AB: 38.7 IU/ML (ref 0–40)
THYROID PEROXIDASE (TPO) AB: 55.8 IU/ML (ref 0–35)

## 2019-06-11 PROCEDURE — 82533 TOTAL CORTISOL: CPT

## 2019-06-12 ENCOUNTER — OFFICE VISIT (OUTPATIENT)
Dept: FAMILY MEDICINE CLINIC | Age: 59
End: 2019-06-12
Payer: COMMERCIAL

## 2019-06-12 VITALS
OXYGEN SATURATION: 98 % | BODY MASS INDEX: 47.8 KG/M2 | HEART RATE: 91 BPM | TEMPERATURE: 99.1 F | SYSTOLIC BLOOD PRESSURE: 142 MMHG | RESPIRATION RATE: 16 BRPM | DIASTOLIC BLOOD PRESSURE: 86 MMHG | WEIGHT: 253 LBS

## 2019-06-12 DIAGNOSIS — J84.10 CALCIFIED GRANULOMA OF LUNG (HCC): ICD-10-CM

## 2019-06-12 DIAGNOSIS — M79.604 BILATERAL LEG PAIN: ICD-10-CM

## 2019-06-12 DIAGNOSIS — E11.40 TYPE 2 DIABETES MELLITUS WITH DIABETIC NEUROPATHY, WITH LONG-TERM CURRENT USE OF INSULIN (HCC): ICD-10-CM

## 2019-06-12 DIAGNOSIS — M79.605 BILATERAL LEG PAIN: ICD-10-CM

## 2019-06-12 DIAGNOSIS — J02.9 PHARYNGITIS, UNSPECIFIED ETIOLOGY: ICD-10-CM

## 2019-06-12 DIAGNOSIS — E11.42 DIABETIC POLYNEUROPATHY ASSOCIATED WITH TYPE 2 DIABETES MELLITUS (HCC): ICD-10-CM

## 2019-06-12 DIAGNOSIS — Z79.4 TYPE 2 DIABETES MELLITUS WITH DIABETIC NEUROPATHY, WITH LONG-TERM CURRENT USE OF INSULIN (HCC): ICD-10-CM

## 2019-06-12 DIAGNOSIS — I83.893 VARICOSE VEINS OF LEG WITH EDEMA, BILATERAL: ICD-10-CM

## 2019-06-12 DIAGNOSIS — R53.83 FATIGUE, UNSPECIFIED TYPE: ICD-10-CM

## 2019-06-12 DIAGNOSIS — J30.9 ALLERGIC RHINITIS, UNSPECIFIED SEASONALITY, UNSPECIFIED TRIGGER: ICD-10-CM

## 2019-06-12 DIAGNOSIS — R22.43 LOCALIZED SWELLING OF BOTH LOWER LEGS: Primary | ICD-10-CM

## 2019-06-12 LAB — S PYO AG THROAT QL: NORMAL

## 2019-06-12 PROCEDURE — 3046F HEMOGLOBIN A1C LEVEL >9.0%: CPT | Performed by: NURSE PRACTITIONER

## 2019-06-12 PROCEDURE — 87880 STREP A ASSAY W/OPTIC: CPT | Performed by: NURSE PRACTITIONER

## 2019-06-12 PROCEDURE — 2022F DILAT RTA XM EVC RTNOPTHY: CPT | Performed by: NURSE PRACTITIONER

## 2019-06-12 PROCEDURE — 99214 OFFICE O/P EST MOD 30 MIN: CPT | Performed by: NURSE PRACTITIONER

## 2019-06-12 PROCEDURE — G8427 DOCREV CUR MEDS BY ELIG CLIN: HCPCS | Performed by: NURSE PRACTITIONER

## 2019-06-12 PROCEDURE — 3017F COLORECTAL CA SCREEN DOC REV: CPT | Performed by: NURSE PRACTITIONER

## 2019-06-12 PROCEDURE — 1036F TOBACCO NON-USER: CPT | Performed by: NURSE PRACTITIONER

## 2019-06-12 PROCEDURE — G8598 ASA/ANTIPLAT THER USED: HCPCS | Performed by: NURSE PRACTITIONER

## 2019-06-12 PROCEDURE — G8417 CALC BMI ABV UP PARAM F/U: HCPCS | Performed by: NURSE PRACTITIONER

## 2019-06-12 RX ORDER — LORATADINE 10 MG/1
10 TABLET ORAL DAILY
Qty: 30 TABLET | Refills: 0 | Status: SHIPPED | OUTPATIENT
Start: 2019-06-12 | End: 2019-07-10 | Stop reason: SDUPTHER

## 2019-06-12 RX ORDER — AZITHROMYCIN 250 MG/1
TABLET, FILM COATED ORAL
Qty: 6 TABLET | Refills: 0 | Status: SHIPPED | OUTPATIENT
Start: 2019-06-12 | End: 2019-06-16

## 2019-06-12 RX ORDER — FLUTICASONE PROPIONATE 50 MCG
2 SPRAY, SUSPENSION (ML) NASAL DAILY
Qty: 1 BOTTLE | Refills: 0 | Status: SHIPPED | OUTPATIENT
Start: 2019-06-12 | End: 2019-10-21 | Stop reason: SDUPTHER

## 2019-06-12 RX ORDER — SUB-Q INSULIN DEVICE, 40 UNIT
EACH MISCELLANEOUS
COMMUNITY

## 2019-06-12 ASSESSMENT — ENCOUNTER SYMPTOMS
SINUS PAIN: 0
COUGH: 0
SINUS PRESSURE: 0
TROUBLE SWALLOWING: 1
SORE THROAT: 1
WHEEZING: 0
RHINORRHEA: 0
VOICE CHANGE: 0
SHORTNESS OF BREATH: 0

## 2019-06-12 NOTE — PROGRESS NOTES
Visit Information    Have you changed or started any medications since your last visit including any over-the-counter medicines, vitamins, or herbal medicines? yes - see med list   Have you stopped taking any of your medications? Is so, why? -  no  Are you having any side effects from any of your medications? - no    Have you seen any other physician or provider since your last visit?  no   Have you had any other diagnostic tests since your last visit?  no   Have you been seen in the emergency room and/or had an admission in a hospital since we last saw you?  no   Have you had your routine dental cleaning in the past 6 months?  no     Do you have an active MyChart account? If no, what is the barrier?   Yes    Patient Care Team:  YOLANDA De Los Santos CNP as PCP - [address] [place] [address] [address], APRN - CNP as PCP - [facility] Provider  Morgan Quevedo MD as Orthopedic Surgeon (Orthopedic Surgery)  Ephraim Gonzalez MD as Surgeon (Cardiology)  YOLANDA De Los Santos CNP (Family Medicine)  Maria Antonia Barraza MD as Consulting Physician (Gastroenterology)    Medical History Review  Past Medical, Family, and Social History reviewed and does contribute to the patient presenting condition    Health Maintenance   Topic Date Due    Breast cancer screen  09/16/2018    A1C test (Diabetic or Prediabetic)  05/15/2019    Shingles Vaccine (1 of 2) 09/10/2019 (Originally 12/19/2010)    Hepatitis B Vaccine (1 of 3 - Risk 3-dose series) 03/27/2020 (Originally 12/19/1979)    DTaP/Tdap/Td vaccine (1 - Tdap) 03/27/2020 (Originally 12/19/1979)    Pneumococcal 0-64 years Vaccine (1 of 1 - PPSV23) 03/27/2029 (Originally 12/19/1966)    Flu vaccine (Season Ended) 09/01/2019    Lipid screen  10/15/2019    Diabetic foot exam  03/04/2020    Diabetic retinal exam  03/18/2020    Potassium monitoring  04/08/2020    Creatinine monitoring  04/08/2020    Diabetic microalbuminuria test  06/10/2020    TSH testing  06/10/2020    Cervical cancer screen 04/24/2022    Colon cancer screen colonoscopy  10/12/2022    Hepatitis C screen  Addressed    HIV screen  Addressed

## 2019-06-12 NOTE — LETTER
[facility]  [address], [place] [address]  Phone: [phone]  Fax: YOLANDA Heath CNP        June 12, 2019     Patient: Lesley Lopez   YOB: 1960   Date of Visit: 6/12/2019       To Whom It May Concern: It is my medical opinion that Aislinn Cavazos requires a disability parking placard for the following reasons:  She has limited walking ability due to an orthopedic condition. Duration of need: permanent    If you have any questions or concerns, please don't hesitate to call.     Sincerely,        Madalynn Alpers, APRN - CNP

## 2019-06-12 NOTE — PROGRESS NOTES
[place] El. Mike Kitchen, APRN-CNP  [address] [facility]  [address] [place]  [address]  Dept: [phone]  Dept Fax: [phone]    HPI:   Brenda Marin is a 62 y.o. female who presents today for her medical conditions/complaintsas noted below. Brenda Marin is c/o of Results and Pharyngitis    HPI  F/u leg swelling/chest pain from 3-27-19:  States that both LE's remain swollen- was not able to obtain compression stockings. EKG, ECHO, venous doppler not yet completed. States chest pain resolved. Plan is to have b/l knee replacements in the Fall w/Dr. Marcia Oneal, but was told that she needs to lose weight before they will do surgery. Wt Readings from Last 3 Encounters:   06/12/19 253 lb (114.8 kg)   04/02/19 243 lb 12.8 oz (110.6 kg)   03/27/19 243 lb (110.2 kg)     DM II: She does con't to follow with endocrinology (Dr. Isaac Keane). Current medication regimen to control condition includes: Insulin pump (V-Go). States BS's are running in the 200's. She states that she eats a lot of fruit because this is the only food that she is tolerating, also eats vegetables. Pharyngitis: Started yesterday- denies rhinorrhea, denies ear pain, denies fever/chills- felt warm a couple of times- does not some tinnitus.    Past Medical History:   Diagnosis Date    Anginal pain ([address]     last used Nitro sl 4 yrs 2013    Arthritis     Arthritis of right knee 8/15/2018    Asthma     Astigmatism 8/22/2014    Back pain     radiculopathy left leg    Blurry vision, bilateral 7/8/2016    Carpal tunnel syndrome of right wrist 11/26/2012    Cataract     Closed displaced fracture of lesser tuberosity of right humerus 7/11/2016    Constipation     Dysmenorrhea     Fatty liver disease, nonalcoholic     GERD (gastroesophageal reflux disease)     Gout     found through bloodwork    Headache     Heart palpitations     History of rib fracture 7/6/2016    Right    Comment: occasionally      Current Outpatient Medications   Medication Sig Dispense Refill    Compression Stockings MISC by Does not apply route Wear daily for swelling, pain, and support. 20-30 mmHg. 2 each 0    Insulin Disposable Pump (V-GO 40) KIT Use as directed per Dr. Ozzy Rolle.  azithromycin (ZITHROMAX Z-MANISH) 250 MG tablet Take 2 tablets by mouth on day 1, then 1 tablet by mouth daily on days 2-5. 6 tablet 0    loratadine (CLARITIN) 10 MG tablet Take 1 tablet by mouth daily 30 tablet 0    fluticasone (FLONASE) 50 MCG/ACT nasal spray 2 sprays by Nasal route daily 1 Bottle 0    dicyclomine (BENTYL) 10 MG capsule TAKE 1 CAPSULE BY MOUTH 4 TIMES DAILY 360 capsule 1    metFORMIN (GLUCOPHAGE) 1000 MG tablet TAKE 1 TABLET BY MOUTH TWICE DAILY 180 tablet 0    Cholecalciferol (VITAMIN D3) 2000 units TABS TAKE 1 TABLET BY MOUTH ONCE DAILY 30 tablet 11    venlafaxine (EFFEXOR XR) 37.5 MG extended release capsule TAKE 1 CAPSULE BY MOUTH THREE TIMES DAILY 90 capsule 5    rOPINIRole (REQUIP) 2 MG tablet TAKE 1 TABLET BY MOUTH THREE TIMES DAILY 90 tablet 3    pantoprazole (PROTONIX) 40 MG tablet TAKE 1 TABLET BY MOUTH ONCE DAILY 30 tablet 3    atorvastatin (LIPITOR) 20 MG tablet TAKE 1 TABLET BY MOUTH NIGHTLY 90 tablet 1    VENTOLIN  (90 Base) MCG/ACT inhaler       gabapentin (NEURONTIN) 600 MG tablet Take 1 tablet by mouth 2 times daily for 90 days.  (Patient taking differently: Take 600 mg by mouth 3 times daily. ) 60 tablet 2    Probiotic Product (ALIGN) 4 MG CAPS TAKE 1 CAPSULE BY MOUTH ONCE DAILY 90 capsule 1    levothyroxine (SYNTHROID) 100 MCG tablet Take 1 tablet by mouth Daily 90 tablet 1    Magnesium Oxide 500 MG TABS TAKE 1 TABLET BY MOUTH TWICE DAILY 180 tablet 1    allopurinol (ZYLOPRIM) 100 MG tablet Take 100 mg by mouth daily      aspirin 81 MG tablet Take 81 mg by mouth daily      cyclobenzaprine (FLEXERIL) 10 MG tablet Take 10 mg by mouth 3 times daily as needed for Muscle spasms 5/15/2019)    Pneumococcal 0-64 years Vaccine (1 of 1 - PPSV23) 03/27/2029 (Originally 12/19/1966)    Flu vaccine (Season Ended) 09/01/2019    Lipid screen  10/15/2019    Diabetic foot exam  03/04/2020    Diabetic retinal exam  03/18/2020    Potassium monitoring  04/08/2020    Creatinine monitoring  04/08/2020    Diabetic microalbuminuria test  06/10/2020    TSH testing  06/10/2020    Cervical cancer screen  04/24/2022    Colon cancer screen colonoscopy  10/12/2022    Hepatitis C screen  Addressed    HIV screen  Addressed     Subjective:   Review of Systems   Constitutional: Positive for fatigue. Negative for chills and fever. HENT: Positive for sore throat, tinnitus and trouble swallowing. Negative for congestion, postnasal drip, rhinorrhea, sinus pressure, sinus pain, sneezing and voice change. Respiratory: Negative for cough, shortness of breath and wheezing. Cardiovascular: Positive for leg swelling. Negative for chest pain and palpitations. Genitourinary: Negative for dysuria and frequency. Musculoskeletal: Positive for arthralgias. Allergic/Immunologic: Positive for environmental allergies. Objective:   BP (!) 142/86   Pulse 91   Temp 99.1 °F (37.3 °C)   Resp 16   Wt 253 lb (114.8 kg)   SpO2 98%   BMI 47.80 kg/m²   Physical Exam   Constitutional: She is oriented to person, place, and time. She appears well-developed and well-nourished. Non-toxic appearance. She does not have a sickly appearance. HENT:   Head: Normocephalic and atraumatic. Right Ear: Tympanic membrane, external ear and ear canal normal.   Left Ear: Tympanic membrane, external ear and ear canal normal.   Nose: Nose normal. Right sinus exhibits no maxillary sinus tenderness and no frontal sinus tenderness. Left sinus exhibits no maxillary sinus tenderness and no frontal sinus tenderness.    Mouth/Throat: Mucous membranes are normal. Posterior oropharyngeal erythema (A few scattered clear, blister like papular lesions to tonsils and uvula) present. No oropharyngeal exudate or tonsillar abscesses. Eyes: Conjunctivae are normal.   Neck: Normal range of motion. Cardiovascular: Normal rate, regular rhythm and normal heart sounds. Exam reveals no gallop and no friction rub. No murmur heard. Pulmonary/Chest: Effort normal and breath sounds normal. No accessory muscle usage. No respiratory distress. She has no decreased breath sounds. She has no wheezes. She has no rhonchi. She has no rales. Musculoskeletal: Normal range of motion. She exhibits edema (Trace pitting edema to b/l LE's excluding ankles/feet, no erythema, no warmth, a few scattered varicose veins b/l). Lymphadenopathy:     She has no cervical adenopathy. Neurological: She is alert and oriented to person, place, and time. Skin: Skin is warm and dry. No rash noted. No erythema. Psychiatric: She has a normal mood and affect. Her behavior is normal.   Nursing note and vitals reviewed. Data:     Lab Results   Component Value Date     04/08/2019    K 4.4 04/08/2019     04/08/2019    CO2 27 04/08/2019    BUN 14 04/08/2019    CREATININE 0.58 04/08/2019    GLUCOSE 146 04/08/2019    GLUCOSE 163 03/06/2012    PROT 7.2 04/08/2019    LABALBU 4.1 04/08/2019    LABALBU 4.8 09/15/2011    BILITOT 0.19 04/08/2019    ALKPHOS 117 04/08/2019    AST 15 04/08/2019    ALT 15 04/08/2019     Lab Results   Component Value Date    WBC 9.6 05/15/2018    RBC 4.33 05/15/2018    RBC 4.34 03/06/2012    HGB 13.0 05/15/2018    HCT 38.9 05/15/2018    MCV 90.0 05/15/2018    MCH 30.0 05/15/2018    MCHC 33.3 05/15/2018    RDW 13.8 05/15/2018     05/15/2018     03/06/2012    MPV 8.3 05/15/2018     Lab Results   Component Value Date    TSH 2.66 06/10/2019     Lab Results   Component Value Date    CHOL 162 10/15/2018    HDL 69 10/15/2018    LABA1C 7.0 05/15/2018    LABA1C 7.0 05/15/2018     Assessment & Plan:      1.  Localized swelling of both lower legs  -Unchanged:  -Complete EKG, ECHO, venous doppler as previously ordered  -Start compression stockings as ordered  -Keep legs elevated above the heart  -Avoid high sodium foods  - Compression Stockings MISC; by Does not apply route Wear daily for swelling, pain, and support. 20-30 mmHg. Dispense: 2 each; Refill: 0  -Will refer for further evaluation and possible treatment:  - Raul Lozada MD, [facility], [place]    2. Bilateral leg pain  -See instructions above  - Compression Stockings MISC; by Does not apply route Wear daily for swelling, pain, and support. 20-30 mmHg. Dispense: 2 each; Refill: 0  - Raul Lozada MD, [facility], [place]    3. Varicose veins of leg with edema, bilateral  -See instructions above  -Will refer for further evaluation and possible treatment:  - Raul Lozada MD, V[facility] Surgery, [place]    4. Pharyngitis, unspecified etiology  -POC strep negative, will tx w/Z-Manish due to possible infectious pharyngitis, low grade fever noted today  -Salt water gargles PRN, increase fluids, wash hands thoroughly, call office if s/s worsen or do not improve  - azithromycin (ZITHROMAX Z-MANISH) 250 MG tablet; Take 2 tablets by mouth on day 1, then 1 tablet by mouth daily on days 2-5. Dispense: 6 tablet; Refill: 0  -Add Claritin and Flonase:  - loratadine (CLARITIN) 10 MG tablet; Take 1 tablet by mouth daily  Dispense: 30 tablet; Refill: 0  - fluticasone (FLONASE) 50 MCG/ACT nasal spray; 2 sprays by Nasal route daily  Dispense: 1 Bottle; Refill: 0    5. Allergic rhinitis, unspecified seasonality, unspecified trigger  -See instructions above  - azithromycin (ZITHROMAX Z-MANISH) 250 MG tablet; Take 2 tablets by mouth on day 1, then 1 tablet by mouth daily on days 2-5. Dispense: 6 tablet; Refill: 0  - loratadine (CLARITIN) 10 MG tablet; Take 1 tablet by mouth daily  Dispense: 30 tablet;  Refill: 0  - fluticasone (FLONASE) 50 MCG/ACT nasal spray; 2 sprays by Nasal route daily  Dispense: 1 Bottle; Refill: 0    6. Fatigue, unspecified type  -Will consider adding additional lab work at next appointment- f/u with endocrine as scheduled- has upcoming appointment    7. Type 2 diabetes mellitus with diabetic neuropathy, with long-term current use of insulin (HCC)  -Stable: Con't all medications as ordered, con't f/u with endocrinology as scheduled, con't current tx plan    8. Diabetic polyneuropathy associated with type 2 diabetes mellitus ([facility]  -Stable: Con't all medications as ordered, con't f/u with endocrinology as scheduled, con't current tx plan    9. Calcified granuloma of lung ([facility]  -Stable: Will monitor- CXR stable on 5-14-19    Return in about 1 month (around 7/12/2019) for follow up on issues discussed at this appointment. Discussed use, benefit, and side effects of prescribed medications. Barriers to medication compliance addressed. All patient questions answered, patient voiced understanding. Lencho Ramires.  Concepcion Zamora, APRN-CNP

## 2019-06-13 ENCOUNTER — TELEPHONE (OUTPATIENT)
Dept: FAMILY MEDICINE CLINIC | Age: 59
End: 2019-06-13

## 2019-06-13 DIAGNOSIS — R22.43 LOCALIZED SWELLING OF BOTH LOWER LEGS: Primary | ICD-10-CM

## 2019-06-13 DIAGNOSIS — M79.605 BILATERAL LEG PAIN: ICD-10-CM

## 2019-06-13 DIAGNOSIS — I83.893 VARICOSE VEINS OF LEG WITH EDEMA, BILATERAL: ICD-10-CM

## 2019-06-13 DIAGNOSIS — M79.604 BILATERAL LEG PAIN: ICD-10-CM

## 2019-06-13 LAB — CORTISOL SALIVARY: 0.03 UG/DL

## 2019-06-13 NOTE — TELEPHONE ENCOUNTER
Call from Vascular Surgery. They would like you to order a PVR and have the patient complete prior to being seen by Dr. Álvaro Granger. Please sign order. Also, Dr. Álvaro Granger is not seeing patients for varicose veins, so the referral would need to be changed to Dr. Clara Thorpe at the [facility]. Please sign order    Thanks!

## 2019-06-26 DIAGNOSIS — E03.9 HYPOTHYROIDISM, UNSPECIFIED TYPE: ICD-10-CM

## 2019-06-26 RX ORDER — LEVOTHYROXINE SODIUM 0.1 MG/1
TABLET ORAL
Qty: 90 TABLET | Refills: 3 | Status: SHIPPED | OUTPATIENT
Start: 2019-06-26 | End: 2021-06-10

## 2019-07-08 ENCOUNTER — HOSPITAL ENCOUNTER (OUTPATIENT)
Dept: VASCULAR LAB | Age: 59
Discharge: HOME | End: 2019-07-08
Payer: COMMERCIAL

## 2019-07-08 DIAGNOSIS — R22.43 LOCALIZED SWELLING OF BOTH LOWER LEGS: ICD-10-CM

## 2019-07-08 DIAGNOSIS — M79.604 BILATERAL LEG PAIN: ICD-10-CM

## 2019-07-08 DIAGNOSIS — M79.605 BILATERAL LEG PAIN: ICD-10-CM

## 2019-07-08 PROCEDURE — 93924 LWR XTR VASC STDY BILAT: CPT

## 2019-07-09 ENCOUNTER — OFFICE VISIT (OUTPATIENT)
Dept: NEUROLOGY | Age: 59
End: 2019-07-09
Payer: COMMERCIAL

## 2019-07-09 VITALS
SYSTOLIC BLOOD PRESSURE: 136 MMHG | WEIGHT: 257.8 LBS | HEIGHT: 61 IN | HEART RATE: 88 BPM | BODY MASS INDEX: 48.67 KG/M2 | DIASTOLIC BLOOD PRESSURE: 91 MMHG

## 2019-07-09 DIAGNOSIS — G47.33 OBSTRUCTIVE SLEEP APNEA SYNDROME: ICD-10-CM

## 2019-07-09 DIAGNOSIS — G44.40 MEDICATION OVERUSE HEADACHE: ICD-10-CM

## 2019-07-09 DIAGNOSIS — R51.9 CHRONIC DAILY HEADACHE: Primary | ICD-10-CM

## 2019-07-09 PROCEDURE — 99214 OFFICE O/P EST MOD 30 MIN: CPT | Performed by: PSYCHIATRY & NEUROLOGY

## 2019-07-09 PROCEDURE — 3017F COLORECTAL CA SCREEN DOC REV: CPT | Performed by: PSYCHIATRY & NEUROLOGY

## 2019-07-09 PROCEDURE — G8427 DOCREV CUR MEDS BY ELIG CLIN: HCPCS | Performed by: PSYCHIATRY & NEUROLOGY

## 2019-07-09 PROCEDURE — G8598 ASA/ANTIPLAT THER USED: HCPCS | Performed by: PSYCHIATRY & NEUROLOGY

## 2019-07-09 PROCEDURE — 1036F TOBACCO NON-USER: CPT | Performed by: PSYCHIATRY & NEUROLOGY

## 2019-07-09 PROCEDURE — G8417 CALC BMI ABV UP PARAM F/U: HCPCS | Performed by: PSYCHIATRY & NEUROLOGY

## 2019-07-09 RX ORDER — GABAPENTIN 600 MG/1
600 TABLET ORAL 3 TIMES DAILY
COMMUNITY
End: 2019-09-28 | Stop reason: SDUPTHER

## 2019-07-09 NOTE — PROGRESS NOTES
lisinopril (PRINIVIL;ZESTRIL) 5 MG tablet Take 5 mg by mouth daily      Cyanocobalamin (VITAMIN B 12 PO) Take by mouth      ammonium lactate (AMLACTIN) 12 % cream Apply topically as needed for Dry Skin Apply topically as needed.  Flaxseed, Linseed, (FLAX SEED OIL) 1000 MG CAPS Take 1,000 mg by mouth daily       No current facility-administered medications for this visit.          ALLERGIES:     Allergies   Allergen Reactions    Actos [Pioglitazone]     Amitriptyline Hives    Celebrex [Celecoxib] Hives, Itching and Dermatitis     Burnt red blister on ashlee    Fenofibrate Other (See Comments)     Muscle cramps    Gemfibrozil Other (See Comments)     Muscle pain, spasms, weakness and HA  Muscle pain, spasms, weakness and HA    Lovastatin Other (See Comments)     Muscle cramps  Muscle cramps    Prempro [Conj Estrog-Medroxyprogest Ace] Other (See Comments)     Breast tenderness, pain in vagina, cramping    Statins Other (See Comments)     Muscle cramps  Lipitor ok    Tricor [Fenofibrate] Other (See Comments)     Muscle cramps    Zetia [Ezetimibe] Other (See Comments)     Does not remember reaction  Does not remember reaction  Does not remember reaction    Zocor [Simvastatin] Other (See Comments)     Muscle cramps    Ampicillin Rash    Niacin And Related Rash    Novolin [Insulin Isophane & Reg, Human] Rash    Omeprazole Nausea And Vomiting    Pcn [Penicillins] Rash    Pregabalin Nausea And Vomiting and Rash    Vesicare [Solifenacin] Rash                             REVIEW OF SYSTEMS    CONSTITUTIONAL Weight: present, Appetite: present, Fatigue: present      HEENT Ears: diminished, tinnitus Visual disturbance: absent   RESPIRATORY Shortness of breath: present, Cough: absent   CARDIOVASCULAR Chest pain: absent, Leg swelling :present      GI Constipation: present, Diarrhea: absent, Swallowing change: absent       Urinary frequency: present, Urinary urgency: present, Urinary incontinence: absent dictation software. Although every effort was made to ensure the accuracy of this automated transcription, some errors in transcription may have occurred.

## 2019-07-24 ENCOUNTER — TELEPHONE (OUTPATIENT)
Dept: PODIATRY | Age: 59
End: 2019-07-24

## 2019-07-26 RX ORDER — VENLAFAXINE HYDROCHLORIDE 37.5 MG/1
CAPSULE, EXTENDED RELEASE ORAL
Qty: 90 CAPSULE | Refills: 5 | Status: SHIPPED | OUTPATIENT
Start: 2019-07-26 | End: 2019-10-21 | Stop reason: SDUPTHER

## 2019-07-29 ENCOUNTER — OFFICE VISIT (OUTPATIENT)
Dept: PODIATRY | Age: 59
End: 2019-07-29
Payer: COMMERCIAL

## 2019-07-29 VITALS — HEIGHT: 61 IN | BODY MASS INDEX: 48.71 KG/M2

## 2019-07-29 DIAGNOSIS — M21.962 FOOT DEFORMITY, BILATERAL: ICD-10-CM

## 2019-07-29 DIAGNOSIS — E11.42 TYPE 2 DIABETES MELLITUS WITH DIABETIC POLYNEUROPATHY, WITH LONG-TERM CURRENT USE OF INSULIN (HCC): ICD-10-CM

## 2019-07-29 DIAGNOSIS — Z79.4 TYPE 2 DIABETES MELLITUS WITH DIABETIC POLYNEUROPATHY, WITH LONG-TERM CURRENT USE OF INSULIN (HCC): ICD-10-CM

## 2019-07-29 DIAGNOSIS — M21.961 FOOT DEFORMITY, BILATERAL: ICD-10-CM

## 2019-07-29 DIAGNOSIS — G25.81 RLS (RESTLESS LEGS SYNDROME): ICD-10-CM

## 2019-07-29 DIAGNOSIS — B35.1 ONYCHOMYCOSIS: Primary | ICD-10-CM

## 2019-07-29 DIAGNOSIS — M79.2 NEUROPATHIC PAIN: ICD-10-CM

## 2019-07-29 DIAGNOSIS — R25.2 NOCTURNAL FOOT CRAMPS: ICD-10-CM

## 2019-07-29 PROCEDURE — 1036F TOBACCO NON-USER: CPT | Performed by: PODIATRIST

## 2019-07-29 PROCEDURE — G8417 CALC BMI ABV UP PARAM F/U: HCPCS | Performed by: PODIATRIST

## 2019-07-29 PROCEDURE — 3046F HEMOGLOBIN A1C LEVEL >9.0%: CPT | Performed by: PODIATRIST

## 2019-07-29 PROCEDURE — 2022F DILAT RTA XM EVC RTNOPTHY: CPT | Performed by: PODIATRIST

## 2019-07-29 PROCEDURE — 3017F COLORECTAL CA SCREEN DOC REV: CPT | Performed by: PODIATRIST

## 2019-07-29 PROCEDURE — G8598 ASA/ANTIPLAT THER USED: HCPCS | Performed by: PODIATRIST

## 2019-07-29 PROCEDURE — 11721 DEBRIDE NAIL 6 OR MORE: CPT | Performed by: PODIATRIST

## 2019-07-29 PROCEDURE — G8427 DOCREV CUR MEDS BY ELIG CLIN: HCPCS | Performed by: PODIATRIST

## 2019-07-29 PROCEDURE — 99213 OFFICE O/P EST LOW 20 MIN: CPT | Performed by: PODIATRIST

## 2019-07-29 ASSESSMENT — ENCOUNTER SYMPTOMS
NAUSEA: 0
COLOR CHANGE: 0
CONSTIPATION: 0
DIARRHEA: 0
VOMITING: 0
BACK PAIN: 1

## 2019-07-31 DIAGNOSIS — E11.40 TYPE 2 DIABETES MELLITUS WITH DIABETIC NEUROPATHY, WITH LONG-TERM CURRENT USE OF INSULIN (HCC): Chronic | ICD-10-CM

## 2019-07-31 DIAGNOSIS — Z79.4 TYPE 2 DIABETES MELLITUS WITH DIABETIC NEUROPATHY, WITH LONG-TERM CURRENT USE OF INSULIN (HCC): Chronic | ICD-10-CM

## 2019-07-31 RX ORDER — PANTOPRAZOLE SODIUM 40 MG/1
TABLET, DELAYED RELEASE ORAL
Qty: 30 TABLET | Refills: 3 | Status: SHIPPED | OUTPATIENT
Start: 2019-07-31 | End: 2019-11-19 | Stop reason: SDUPTHER

## 2019-07-31 NOTE — TELEPHONE ENCOUNTER
1:40 PM Carlos Medeiros DO [facility]   10/1/2019  1:00 PM Evelia Mobley PA-C [facility]   10/21/2019  1:30 PM Sofía Kothari DPM [facility]   1/7/2020  1:00 PM Tracy Heaton MD [address]            Patient Active Problem List:     Hypercholesterolemia     Hypertriglyceridemia     Diabetic neuropathy ([address]     DESTINY (obstructive sleep apnea)     Vitamin D deficiency     Urinary incontinence     Allergic rhinitis     Degenerative lumbar disc     Sacroiliitis (HCC)     Fatigue     Encounter for chronic pain management     Primary osteoarthritis of both shoulders     Primary osteoarthritis of both knees     Primary osteoarthritis of both hips     Essential hypertension     Hypoactive thyroid     Calcified granuloma of lung (HCC)     Gastroesophageal reflux disease without esophagitis     Anemia     Hypomagnesemia     Palpitations     Vertigo     Persistent headaches     Osteoarthritis of right shoulder     Low serum low density lipoprotein (LDL)     Elevated uric acid in blood     Seasonal allergic rhinitis     Bursitis/tendonitis, shoulder     Nuclear sclerosis     Iron deficiency anemia     Primary osteoarthritis of right shoulder     Leiomyoma of uterus     Umbilical hernia     Generalized headaches     Tinnitus     Diverticulosis of intestine without bleeding     RLS (restless legs syndrome)     Chronic midline low back pain with right-sided sciatica     Acquired spondylolisthesis     Type 2 diabetes mellitus with diabetic neuropathy, with long-term current use of insulin ([address]     Morbid obesity with BMI of 45.0-49.9, adult (HCC)     Localized swelling of both lower legs

## 2019-08-06 DIAGNOSIS — B35.4 TINEA CORPORIS: Primary | ICD-10-CM

## 2019-08-06 RX ORDER — NYSTATIN 100000 [USP'U]/G
POWDER TOPICAL
Qty: 1 BOTTLE | Refills: 1 | Status: SHIPPED | OUTPATIENT
Start: 2019-08-06 | End: 2019-09-09 | Stop reason: SDUPTHER

## 2019-08-06 NOTE — TELEPHONE ENCOUNTER
Last visit:7/10/19  Last Med refill: 5/28/19  Does patient have enough medication for 72 hours:    Health Maintenance   Topic Date Due    Breast cancer screen  09/16/2018    Shingles Vaccine (1 of 2) 09/10/2019 (Originally 12/19/2010)    Hepatitis B Vaccine (1 of 3 - Risk 3-dose series) 03/27/2020 (Originally 12/19/1979)    DTaP/Tdap/Td vaccine (1 - Tdap) 03/27/2020 (Originally 12/19/1979)    A1C test (Diabetic or Prediabetic)  06/12/2020 (Originally 5/15/2019)    Pneumococcal 0-64 years Vaccine (1 of 1 - PPSV23) 03/27/2029 (Originally [birth year]19/1966)    Flu vaccine (1) 09/01/2019    Lipid screen  10/15/2019    Diabetic retinal exam  03/18/2020    Potassium monitoring  04/08/2020    Creatinine monitoring  04/08/2020    Diabetic microalbuminuria test  06/10/2020    TSH testing  06/10/2020    Diabetic foot exam  07/29/2020    Cervical cancer screen  04/24/2022    Colon cancer screen colonoscopy  10/12/2022    Hepatitis C screen  Addressed    HIV screen  Addressed       Hemoglobin A1C (%)   Date Value   05/15/2018 7.0 (H)   05/15/2018 7.0 (H)   04/19/2017 7.4 (H)             ( goal A1C is < 7)   Microalb/Crt.  Ratio (mcg/mg creat)   Date Value   06/10/2019 CANNOT BE CALCULATED     LDL Cholesterol (mg/dL)   Date Value   10/15/2018 55   05/15/2018 40   05/15/2018 40       (goal LDL is <100)   AST (U/L)   Date Value   04/08/2019 15     ALT (U/L)   Date Value   04/08/2019 15     BUN (mg/dL)   Date Value   04/08/2019 14     BP Readings from Last 3 Encounters:   07/09/19 (!) 136/91   06/12/19 (!) 142/86   04/02/19 (!) 163/94          (goal 120/80)    All Future Testing planned in [facility]  Lab Frequency Next Occurrence   Echo Complete Once 10/17/2018   EMILEE DIGITAL SCREEN W OR WO CAD BILATERAL Once 10/01/2019   EKG 12 Lead Once 03/26/2020               Patient Active Problem List:     Hypercholesterolemia     Hypertriglyceridemia     Diabetic neuropathy (HCC)     DESTIYN (obstructive sleep apnea)     Vitamin D

## 2019-08-07 ENCOUNTER — OFFICE VISIT (OUTPATIENT)
Dept: FAMILY MEDICINE CLINIC | Age: 59
End: 2019-08-07
Payer: COMMERCIAL

## 2019-08-07 VITALS
SYSTOLIC BLOOD PRESSURE: 135 MMHG | TEMPERATURE: 98.9 F | DIASTOLIC BLOOD PRESSURE: 84 MMHG | OXYGEN SATURATION: 93 % | BODY MASS INDEX: 47.99 KG/M2 | HEART RATE: 95 BPM | WEIGHT: 254 LBS

## 2019-08-07 DIAGNOSIS — H66.002 NON-RECURRENT ACUTE SUPPURATIVE OTITIS MEDIA OF LEFT EAR WITHOUT SPONTANEOUS RUPTURE OF TYMPANIC MEMBRANE: Primary | ICD-10-CM

## 2019-08-07 PROCEDURE — 3017F COLORECTAL CA SCREEN DOC REV: CPT | Performed by: NURSE PRACTITIONER

## 2019-08-07 PROCEDURE — 99213 OFFICE O/P EST LOW 20 MIN: CPT | Performed by: NURSE PRACTITIONER

## 2019-08-07 PROCEDURE — G8598 ASA/ANTIPLAT THER USED: HCPCS | Performed by: NURSE PRACTITIONER

## 2019-08-07 PROCEDURE — G8427 DOCREV CUR MEDS BY ELIG CLIN: HCPCS | Performed by: NURSE PRACTITIONER

## 2019-08-07 PROCEDURE — 1036F TOBACCO NON-USER: CPT | Performed by: NURSE PRACTITIONER

## 2019-08-07 PROCEDURE — G8417 CALC BMI ABV UP PARAM F/U: HCPCS | Performed by: NURSE PRACTITIONER

## 2019-08-07 RX ORDER — AZITHROMYCIN 250 MG/1
TABLET, FILM COATED ORAL
Qty: 1 PACKET | Refills: 0 | Status: SHIPPED | OUTPATIENT
Start: 2019-08-07 | End: 2019-08-19 | Stop reason: ALTCHOICE

## 2019-08-07 RX ORDER — FLUTICASONE PROPIONATE 50 MCG
1 SPRAY, SUSPENSION (ML) NASAL DAILY
Qty: 1 BOTTLE | Refills: 0 | Status: SHIPPED | OUTPATIENT
Start: 2019-08-07 | End: 2021-06-10

## 2019-08-07 ASSESSMENT — ENCOUNTER SYMPTOMS
RHINORRHEA: 0
NAUSEA: 0
VOMITING: 0
EYE DISCHARGE: 0
DIARRHEA: 0
EYE ITCHING: 0
COUGH: 1
EYES NEGATIVE: 1
SORE THROAT: 1
ALLERGIC/IMMUNOLOGIC NEGATIVE: 1
SHORTNESS OF BREATH: 0
CHEST TIGHTNESS: 0
ABDOMINAL PAIN: 0

## 2019-08-07 NOTE — PROGRESS NOTES
[address] [facility]   [place]   Dept: [phone]  Dept Fax: [phone]    Ras Montero is a 62 y.o. female who presents today forher medical conditions/complaints as noted below. Ras Montero is c/o of   Chief Complaint   Patient presents with    Otalgia     sore throat x 5 days    Dizziness     Started this morning     HPI:     Otalgia    There is pain in the left ear. This is a new problem. The current episode started in the past 7 days (x's 4 days ). The problem occurs constantly. The problem has been gradually worsening. Maximum temperature: Has felt feverish but has not checked temperature. The pain is at a severity of 8/10. The pain is moderate. Associated symptoms include coughing, headaches, neck pain and a sore throat. Pertinent negatives include no abdominal pain, diarrhea, ear discharge, hearing loss, rash, rhinorrhea or vomiting. Associated symptoms comments: Sore throat x's 2 days. Same pain throughout the day . She has tried acetaminophen (Excederin ) for the symptoms. The treatment provided mild relief.          Past Medical History:   Diagnosis Date    Anginal pain ([address]     last used Nitro sl 4 yrs 2013    Arthritis     Arthritis of right knee 8/15/2018    Asthma     Astigmatism 8/22/2014    Back pain     radiculopathy left leg    Blurry vision, bilateral 7/8/2016    Carpal tunnel syndrome of right wrist 11/26/2012    Cataract     Closed displaced fracture of lesser tuberosity of right humerus 7/11/2016    Constipation     Dysmenorrhea     Fatty liver disease, nonalcoholic     GERD (gastroesophageal reflux disease)     Gout     found through bloodwork    Headache     Heart palpitations     History of rib fracture 7/6/2016    Right    Hyperlipidemia     Hypertension     Hyperthyroidism     Hypertriglyceridemia     Hypothyroidism 2/17/2016    Myopia with astigmatism and presbyopia and dry. No rash noted. Psychiatric: She has a normal mood and affect. Thought content normal.   Vitals reviewed. /84 (Site: Left Upper Arm, Position: Sitting, Cuff Size: Large Adult)   Pulse 95   Temp 98.9 °F (37.2 °C)   Wt 254 lb (115.2 kg)   SpO2 93%   BMI 47.99 kg/m²     Assessment:       Diagnosis Orders   1. Non-recurrent acute suppurative otitis media of left ear without spontaneous rupture of tympanic membrane  fluticasone (FLONASE) 50 MCG/ACT nasal spray    azithromycin (ZITHROMAX) 250 MG tablet             Plan:     1.) Flonase PRN   2.) Continue with allergy medication   3.) Start Zpack today   4.) RTO PRN   5.) Follow-up with PCP     Problem List     None           Patient given educationalmaterials - see patient instructions. Discussed use, benefit, and side effectsof prescribed medications. All patient questions answered. Pt verbalized understanding. Instructed to continue current medications, diet and exercise. Patient agreedwith treatment plan. Follow up as directed.      Electronically signed by Johnson Spatz, APRN - CNP on 8/7/2019 at 4:18 PM

## 2019-08-10 DIAGNOSIS — B35.4 TINEA CORPORIS: ICD-10-CM

## 2019-08-13 RX ORDER — NYSTATIN 100000 [USP'U]/G
POWDER TOPICAL
Qty: 60 G | Refills: 1 | Status: SHIPPED | OUTPATIENT
Start: 2019-08-13 | End: 2019-09-09 | Stop reason: SDUPTHER

## 2019-08-19 ENCOUNTER — OFFICE VISIT (OUTPATIENT)
Dept: FAMILY MEDICINE CLINIC | Age: 59
End: 2019-08-19
Payer: COMMERCIAL

## 2019-08-19 VITALS
WEIGHT: 258 LBS | HEART RATE: 92 BPM | DIASTOLIC BLOOD PRESSURE: 79 MMHG | OXYGEN SATURATION: 94 % | TEMPERATURE: 98.4 F | BODY MASS INDEX: 48.75 KG/M2 | SYSTOLIC BLOOD PRESSURE: 139 MMHG | RESPIRATION RATE: 18 BRPM

## 2019-08-19 DIAGNOSIS — H66.005 RECURRENT ACUTE SUPPURATIVE OTITIS MEDIA WITHOUT SPONTANEOUS RUPTURE OF LEFT TYMPANIC MEMBRANE: Primary | ICD-10-CM

## 2019-08-19 DIAGNOSIS — R09.82 PND (POST-NASAL DRIP): ICD-10-CM

## 2019-08-19 DIAGNOSIS — J02.9 SORE THROAT: ICD-10-CM

## 2019-08-19 PROBLEM — H25.033 ANTERIOR SUBCAPSULAR AGE-RELATED CATARACT OF BOTH EYES: Status: ACTIVE | Noted: 2019-03-20

## 2019-08-19 PROBLEM — H35.54 RPE (RETINAL PIGMENT EPITHELIUM) ATROPHY: Status: ACTIVE | Noted: 2019-03-20

## 2019-08-19 PROCEDURE — G8417 CALC BMI ABV UP PARAM F/U: HCPCS | Performed by: NURSE PRACTITIONER

## 2019-08-19 PROCEDURE — G8427 DOCREV CUR MEDS BY ELIG CLIN: HCPCS | Performed by: NURSE PRACTITIONER

## 2019-08-19 PROCEDURE — 69210 REMOVE IMPACTED EAR WAX UNI: CPT | Performed by: NURSE PRACTITIONER

## 2019-08-19 PROCEDURE — 3017F COLORECTAL CA SCREEN DOC REV: CPT | Performed by: NURSE PRACTITIONER

## 2019-08-19 PROCEDURE — 99214 OFFICE O/P EST MOD 30 MIN: CPT | Performed by: NURSE PRACTITIONER

## 2019-08-19 PROCEDURE — G8598 ASA/ANTIPLAT THER USED: HCPCS | Performed by: NURSE PRACTITIONER

## 2019-08-19 PROCEDURE — 1036F TOBACCO NON-USER: CPT | Performed by: NURSE PRACTITIONER

## 2019-08-19 RX ORDER — CLINDAMYCIN HYDROCHLORIDE 300 MG/1
300 CAPSULE ORAL 3 TIMES DAILY
Qty: 21 CAPSULE | Refills: 0 | Status: SHIPPED | OUTPATIENT
Start: 2019-08-19 | End: 2019-08-26

## 2019-08-19 RX ORDER — ROPINIROLE 2 MG/1
TABLET, FILM COATED ORAL
Qty: 90 TABLET | Refills: 3 | Status: SHIPPED | OUTPATIENT
Start: 2019-08-19 | End: 2019-12-27

## 2019-08-19 RX ORDER — LORATADINE 10 MG/1
10 TABLET ORAL DAILY
Qty: 30 TABLET | Refills: 0 | Status: SHIPPED | OUTPATIENT
Start: 2019-08-19 | End: 2020-05-26 | Stop reason: DRUGHIGH

## 2019-08-19 NOTE — TELEPHONE ENCOUNTER
Please Approve or Refuse.      Next Visit Date:  10/21/2019   Last Visit Date: 7/29/2019    Hemoglobin A1C (%)   Date Value   05/15/2018 7.0 (H)   05/15/2018 7.0 (H)   04/19/2017 7.4 (H)             ( goal A1C is < 7)   BP Readings from Last 3 Encounters:   08/07/19 135/84   07/09/19 (!) 136/91   06/12/19 (!) 142/86          (goal 120/80)  BUN   Date Value Ref Range Status   04/08/2019 14 6 - 20 mg/dL Final     CREATININE   Date Value Ref Range Status   04/08/2019 0.58 0.50 - 0.90 mg/dL Final     Potassium   Date Value Ref Range Status   04/08/2019 4.4 3.7 - 5.3 mmol/L Final

## 2019-08-19 NOTE — PROGRESS NOTES
2014    Neuropathy     bilateral legs and feet    Obesity     Osteoporosis     Pancreatitis     no problems    Panic attack 10/30/2014    Presbyopia 2014    Sleep apnea     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    Status post total replacement of right shoulder 2017    Stenosis of both internal carotid arteries- Mild 16-49% 2017    Type II or unspecified type diabetes mellitus without mention of complication, not stated as uncontrolled     checks daily        Past Surgical History:   Procedure Laterality Date    CARDIAC CATHETERIZATION      Patient states approximately  she had the cardiac catheterization that was negative      SECTION      x 2    COLONOSCOPY      DILATION AND CURETTAGE OF UTERUS      JOINT REPLACEMENT Left 2016    shoulder    AL COLON CA SCRN NOT HI RSK IND N/A 10/12/2017    COLONOSCOPY performed by Sascha Cates MD at [address] EGD TRANSORAL BIOPSY SINGLE/MULTIPLE N/A 10/12/2017    EGD BIOPSY performed by Sascha Caets MD at [address] Right 2017    SHOULDER TOTAL ARTHROPLASTY RIGHT WITH [address], [address] AND AQUAMANTIS performed by Jeff Kwon DO at [address] Right 2017       Family History   Problem Relation Age of Onset    Emphysema Mother     Diabetes Father     Heart Disease Father     High Cholesterol Sister     High Blood Pressure Sister     Heart Disease Sister        Social History     Tobacco Use    Smoking status: Former Smoker     Packs/day: 0.50     Years: 20.00     Pack years: 10.00     Types: Cigarettes     Last attempt to quit: 1977     Years since quittin.7    Smokeless tobacco: Never Used   Substance Use Topics    Alcohol use: No     Alcohol/week: 0.0 standard drinks     Comment: occasionally        Prior to Visit Medications    Medication Sig Taking?  Authorizing Provider   rOPINIRole (REQUIP) 2 MG tablet TAKE 1 TABLET BY MOUTH THREE TIMES DAILY Yes Kyle Mitchell DPM   insulin glargine (BASAGLAR KWIKPEN) 100 UNIT/ML injection pen Inject into the skin nightly Yes Historical Provider, MD   clindamycin (CLEOCIN) 300 MG capsule Take 1 capsule by mouth 3 times daily for 7 days Yes YOLANDA Patrick CNP   loratadine (CLARITIN) 10 MG tablet Take 1 tablet by mouth daily Yes YOLANDA Patrick CNP   fluticasone (FLONASE) 50 MCG/ACT nasal spray 1 spray by Nasal route daily Yes YOLANDA Gallo CNP   nystatin (NYSTATIN) 361990 UNIT/GM powder APPLY POWDER TOPICALLY TO ABDOMINAL FOLDS THREE TIMES DAILY AS NEEDED FOR IRRITATION Yes Caryle Peper, APRN - CNP   metFORMIN (GLUCOPHAGE) 1000 MG tablet TAKE 1 TABLET BY MOUTH TWICE DAILY Yes Caryle Peper, APRN - CNP   pantoprazole (PROTONIX) 40 MG tablet TAKE 1 TABLET BY MOUTH ONCE DAILY Yes Hunter Palencia MD   venlafaxine (EFFEXOR XR) 37.5 MG extended release capsule TAKE 1 CAPSULE BY MOUTH THREE TIMES DAILY Yes Kyle Mitchell DPM   gabapentin (NEURONTIN) 600 MG tablet Take 600 mg by mouth 3 times daily. Yes Historical Provider, MD   levothyroxine (SYNTHROID) 100 MCG tablet TAKE 1 TABLET BY MOUTH ONCE DAILY Yes Caryle Peper, APRN - CNP   Compression Stockings MISC by Does not apply route Wear daily for swelling, pain, and support. 20-30 mmHg. Yes Caryle Peper, APRN - CNP   Insulin Disposable Pump (V-GO 40) KIT Use as directed per Dr. Juanita Travis.  Yes Historical Provider, MD   dicyclomine (BENTYL) 10 MG capsule TAKE 1 CAPSULE BY MOUTH 4 TIMES DAILY Yes Hunter Palencia MD   Cholecalciferol (VITAMIN D3) 2000 units TABS TAKE 1 TABLET BY MOUTH ONCE DAILY Yes Caryle Peper, APRN - CNP   atorvastatin (LIPITOR) 20 MG tablet TAKE 1 TABLET BY MOUTH NIGHTLY Yes Caryle Peper, APRN - CNP   VENTOLIN  (90 Base) MCG/ACT inhaler  Yes Historical Provider, MD   Probiotic Product (ALIGN) 4 MG CAPS TAKE 1 CAPSULE BY MOUTH ONCE DAILY Yes Caryle Peper, APRN - CNP   Magnesium Oxide 500

## 2019-08-20 ASSESSMENT — ENCOUNTER SYMPTOMS
ABDOMINAL PAIN: 0
EYE ITCHING: 0
VOMITING: 0
TROUBLE SWALLOWING: 0
EYE DISCHARGE: 0
SINUS PRESSURE: 0
CHEST TIGHTNESS: 0
COUGH: 0
NAUSEA: 0
DIARRHEA: 0
ABDOMINAL DISTENTION: 0
SHORTNESS OF BREATH: 0
SORE THROAT: 1
RHINORRHEA: 1
WHEEZING: 0

## 2019-08-31 DIAGNOSIS — E78.00 HYPERCHOLESTEROLEMIA: Primary | ICD-10-CM

## 2019-08-31 DIAGNOSIS — K57.90 DIVERTICULOSIS OF INTESTINE WITHOUT BLEEDING, UNSPECIFIED INTESTINAL TRACT LOCATION: ICD-10-CM

## 2019-08-31 DIAGNOSIS — R19.8 IRREGULAR BOWEL HABITS: ICD-10-CM

## 2019-08-31 DIAGNOSIS — R14.0 ABDOMINAL BLOATING: ICD-10-CM

## 2019-09-03 RX ORDER — ASPIRIN 81 MG/1
81 TABLET ORAL DAILY
Qty: 90 TABLET | Refills: 1 | Status: SHIPPED | OUTPATIENT
Start: 2019-09-03 | End: 2019-09-04

## 2019-09-03 RX ORDER — ASPIRIN 81 MG/1
TABLET, CHEWABLE ORAL
Qty: 90 TABLET | Refills: 3 | OUTPATIENT
Start: 2019-09-03

## 2019-09-03 RX ORDER — OCTISALATE, AVOBENZONE, HOMOSALATE, AND OCTOCRYLENE 29.4; 29.4; 49; 25.48 MG/ML; MG/ML; MG/ML; MG/ML
LOTION TOPICAL
Qty: 90 CAPSULE | Refills: 3 | Status: SHIPPED | OUTPATIENT
Start: 2019-09-03 | End: 2020-04-03 | Stop reason: ALTCHOICE

## 2019-09-04 DIAGNOSIS — E78.00 HYPERCHOLESTEROLEMIA: Primary | ICD-10-CM

## 2019-09-04 RX ORDER — ASPIRIN 81 MG/1
TABLET, CHEWABLE ORAL
Qty: 90 TABLET | Refills: 3 | Status: SHIPPED | OUTPATIENT
Start: 2019-09-04 | End: 2020-10-05

## 2019-09-09 DIAGNOSIS — B35.4 TINEA CORPORIS: ICD-10-CM

## 2019-09-09 RX ORDER — NYSTATIN 100000 [USP'U]/G
POWDER TOPICAL
Qty: 60 G | Refills: 1
Start: 2019-09-09 | End: 2020-01-02

## 2019-09-28 DIAGNOSIS — E78.5 HYPERLIPIDEMIA, UNSPECIFIED HYPERLIPIDEMIA TYPE: ICD-10-CM

## 2019-09-30 RX ORDER — GABAPENTIN 600 MG/1
TABLET ORAL
Qty: 270 TABLET | Refills: 0 | Status: SHIPPED | OUTPATIENT
Start: 2019-09-30 | End: 2019-12-27

## 2019-09-30 RX ORDER — ATORVASTATIN CALCIUM 20 MG/1
TABLET, FILM COATED ORAL
Qty: 90 TABLET | Refills: 3 | Status: SHIPPED | OUTPATIENT
Start: 2019-09-30 | End: 2020-10-26

## 2019-10-21 ENCOUNTER — OFFICE VISIT (OUTPATIENT)
Dept: PODIATRY | Age: 59
End: 2019-10-21
Payer: COMMERCIAL

## 2019-10-21 VITALS — BODY MASS INDEX: 44.37 KG/M2 | WEIGHT: 235 LBS | HEIGHT: 61 IN

## 2019-10-21 DIAGNOSIS — E11.42 TYPE 2 DIABETES MELLITUS WITH DIABETIC POLYNEUROPATHY, WITH LONG-TERM CURRENT USE OF INSULIN (HCC): ICD-10-CM

## 2019-10-21 DIAGNOSIS — Z79.4 TYPE 2 DIABETES MELLITUS WITH DIABETIC POLYNEUROPATHY, WITH LONG-TERM CURRENT USE OF INSULIN (HCC): ICD-10-CM

## 2019-10-21 DIAGNOSIS — M21.962 FOOT DEFORMITY, BILATERAL: ICD-10-CM

## 2019-10-21 DIAGNOSIS — B35.1 ONYCHOMYCOSIS: Primary | ICD-10-CM

## 2019-10-21 DIAGNOSIS — M21.961 FOOT DEFORMITY, BILATERAL: ICD-10-CM

## 2019-10-21 DIAGNOSIS — G25.81 RLS (RESTLESS LEGS SYNDROME): ICD-10-CM

## 2019-10-21 DIAGNOSIS — M79.2 NEUROPATHIC PAIN: ICD-10-CM

## 2019-10-21 DIAGNOSIS — R25.2 NOCTURNAL FOOT CRAMPS: ICD-10-CM

## 2019-10-21 PROCEDURE — 3046F HEMOGLOBIN A1C LEVEL >9.0%: CPT | Performed by: PODIATRIST

## 2019-10-21 PROCEDURE — 3017F COLORECTAL CA SCREEN DOC REV: CPT | Performed by: PODIATRIST

## 2019-10-21 PROCEDURE — G8484 FLU IMMUNIZE NO ADMIN: HCPCS | Performed by: PODIATRIST

## 2019-10-21 PROCEDURE — 99213 OFFICE O/P EST LOW 20 MIN: CPT | Performed by: PODIATRIST

## 2019-10-21 PROCEDURE — 2022F DILAT RTA XM EVC RTNOPTHY: CPT | Performed by: PODIATRIST

## 2019-10-21 PROCEDURE — G8417 CALC BMI ABV UP PARAM F/U: HCPCS | Performed by: PODIATRIST

## 2019-10-21 PROCEDURE — G8427 DOCREV CUR MEDS BY ELIG CLIN: HCPCS | Performed by: PODIATRIST

## 2019-10-21 PROCEDURE — 1036F TOBACCO NON-USER: CPT | Performed by: PODIATRIST

## 2019-10-21 PROCEDURE — G8598 ASA/ANTIPLAT THER USED: HCPCS | Performed by: PODIATRIST

## 2019-10-21 RX ORDER — BLOOD-GLUCOSE METER
KIT MISCELLANEOUS
COMMUNITY
Start: 2019-10-20 | End: 2020-04-15 | Stop reason: SDUPTHER

## 2019-10-21 RX ORDER — VENLAFAXINE HYDROCHLORIDE 37.5 MG/1
CAPSULE, EXTENDED RELEASE ORAL
Qty: 90 CAPSULE | Refills: 5 | Status: SHIPPED | OUTPATIENT
Start: 2019-10-21 | End: 2020-08-10

## 2019-10-21 RX ORDER — LIRAGLUTIDE 6 MG/ML
INJECTION SUBCUTANEOUS
COMMUNITY
Start: 2019-10-10 | End: 2019-10-24

## 2019-10-21 RX ORDER — DARIFENACIN HYDROBROMIDE 15 MG/1
15 TABLET, EXTENDED RELEASE ORAL 2 TIMES DAILY
COMMUNITY
Start: 2019-09-21 | End: 2020-04-03 | Stop reason: ALTCHOICE

## 2019-10-21 ASSESSMENT — ENCOUNTER SYMPTOMS
DIARRHEA: 0
BACK PAIN: 1
VOMITING: 0
COLOR CHANGE: 0
NAUSEA: 0
CONSTIPATION: 0

## 2019-10-23 ENCOUNTER — HOSPITAL ENCOUNTER (OUTPATIENT)
Dept: PREADMISSION TESTING | Age: 59
Discharge: HOME | End: 2019-10-27
Payer: COMMERCIAL

## 2019-10-23 ENCOUNTER — TELEPHONE (OUTPATIENT)
Dept: FAMILY MEDICINE CLINIC | Age: 59
End: 2019-10-23

## 2019-10-23 VITALS
HEART RATE: 93 BPM | SYSTOLIC BLOOD PRESSURE: 164 MMHG | DIASTOLIC BLOOD PRESSURE: 90 MMHG | RESPIRATION RATE: 20 BRPM | WEIGHT: 265.43 LBS | BODY MASS INDEX: 50.11 KG/M2 | HEIGHT: 61 IN | OXYGEN SATURATION: 96 %

## 2019-10-23 LAB
-: ABNORMAL
ABO/RH: NORMAL
ABSOLUTE EOS #: 0.37 K/UL (ref 0–0.44)
ABSOLUTE IMMATURE GRANULOCYTE: 0.05 K/UL (ref 0–0.3)
ABSOLUTE LYMPH #: 2.12 K/UL (ref 1.1–3.7)
ABSOLUTE MONO #: 0.6 K/UL (ref 0.1–1.2)
AMORPHOUS: ABNORMAL
ANION GAP SERPL CALCULATED.3IONS-SCNC: 14 MMOL/L (ref 9–17)
ANTIBODY SCREEN: NEGATIVE
ARM BAND NUMBER: NORMAL
BACTERIA: ABNORMAL
BASOPHILS # BLD: 1 % (ref 0–2)
BASOPHILS ABSOLUTE: 0.05 K/UL (ref 0–0.2)
BILIRUBIN URINE: NEGATIVE
BUN BLDV-MCNC: 14 MG/DL (ref 6–20)
BUN/CREAT BLD: 22 (ref 9–20)
CALCIUM SERPL-MCNC: 8.7 MG/DL (ref 8.6–10.4)
CASTS UA: ABNORMAL /LPF
CHLORIDE BLD-SCNC: 100 MMOL/L (ref 98–107)
CO2: 25 MMOL/L (ref 20–31)
COLOR: YELLOW
COMMENT UA: ABNORMAL
CREAT SERPL-MCNC: 0.65 MG/DL (ref 0.5–0.9)
CRYSTALS, UA: ABNORMAL /HPF
DIFFERENTIAL TYPE: ABNORMAL
EOSINOPHILS RELATIVE PERCENT: 4 % (ref 1–4)
EPITHELIAL CELLS UA: ABNORMAL /HPF (ref 0–5)
ESTIMATED AVERAGE GLUCOSE: 189 MG/DL
EXPIRATION DATE: NORMAL
GFR AFRICAN AMERICAN: >60 ML/MIN
GFR NON-AFRICAN AMERICAN: >60 ML/MIN
GFR SERPL CREATININE-BSD FRML MDRD: ABNORMAL ML/MIN/{1.73_M2}
GFR SERPL CREATININE-BSD FRML MDRD: ABNORMAL ML/MIN/{1.73_M2}
GLUCOSE BLD-MCNC: 220 MG/DL (ref 70–99)
GLUCOSE URINE: ABNORMAL
HBA1C MFR BLD: 8.2 % (ref 4–6)
HCT VFR BLD CALC: 36.9 % (ref 36.3–47.1)
HEMOGLOBIN: 11 G/DL (ref 11.9–15.1)
IMMATURE GRANULOCYTES: 1 %
KETONES, URINE: ABNORMAL
LEUKOCYTE ESTERASE, URINE: NEGATIVE
LYMPHOCYTES # BLD: 20 % (ref 24–43)
MCH RBC QN AUTO: 25.1 PG (ref 25.2–33.5)
MCHC RBC AUTO-ENTMCNC: 29.8 G/DL (ref 28.4–34.8)
MCV RBC AUTO: 84.2 FL (ref 82.6–102.9)
MONOCYTES # BLD: 6 % (ref 3–12)
MUCUS: ABNORMAL
NITRITE, URINE: NEGATIVE
NRBC AUTOMATED: 0 PER 100 WBC
OTHER OBSERVATIONS UA: ABNORMAL
PDW BLD-RTO: 14.9 % (ref 11.8–14.4)
PH UA: 6 (ref 5–8)
PLATELET # BLD: 424 K/UL (ref 138–453)
PLATELET ESTIMATE: ABNORMAL
PMV BLD AUTO: 9.9 FL (ref 8.1–13.5)
POTASSIUM SERPL-SCNC: 4.8 MMOL/L (ref 3.7–5.3)
PREALBUMIN: 20.9 MG/DL (ref 20–40)
PROTEIN UA: ABNORMAL
RBC # BLD: 4.38 M/UL (ref 3.95–5.11)
RBC # BLD: ABNORMAL 10*6/UL
RBC UA: ABNORMAL /HPF (ref 0–2)
RENAL EPITHELIAL, UA: ABNORMAL /HPF
SEG NEUTROPHILS: 68 % (ref 36–65)
SEGMENTED NEUTROPHILS ABSOLUTE COUNT: 7.2 K/UL (ref 1.5–8.1)
SODIUM BLD-SCNC: 139 MMOL/L (ref 135–144)
SPECIFIC GRAVITY UA: >1.03 (ref 1–1.03)
TRICHOMONAS: ABNORMAL
TURBIDITY: ABNORMAL
URINE HGB: NEGATIVE
UROBILINOGEN, URINE: NORMAL
WBC # BLD: 10.4 K/UL (ref 3.5–11.3)
WBC # BLD: ABNORMAL 10*3/UL
WBC UA: ABNORMAL /HPF (ref 0–5)
YEAST: ABNORMAL

## 2019-10-23 PROCEDURE — 87086 URINE CULTURE/COLONY COUNT: CPT

## 2019-10-23 PROCEDURE — 86901 BLOOD TYPING SEROLOGIC RH(D): CPT

## 2019-10-23 PROCEDURE — 93005 ELECTROCARDIOGRAM TRACING: CPT | Performed by: ORTHOPAEDIC SURGERY

## 2019-10-23 PROCEDURE — 86850 RBC ANTIBODY SCREEN: CPT

## 2019-10-23 PROCEDURE — 36415 COLL VENOUS BLD VENIPUNCTURE: CPT

## 2019-10-23 PROCEDURE — 80048 BASIC METABOLIC PNL TOTAL CA: CPT

## 2019-10-23 PROCEDURE — 83036 HEMOGLOBIN GLYCOSYLATED A1C: CPT

## 2019-10-23 PROCEDURE — 86900 BLOOD TYPING SEROLOGIC ABO: CPT

## 2019-10-23 PROCEDURE — 85025 COMPLETE CBC W/AUTO DIFF WBC: CPT

## 2019-10-23 PROCEDURE — 87641 MR-STAPH DNA AMP PROBE: CPT

## 2019-10-23 PROCEDURE — 81001 URINALYSIS AUTO W/SCOPE: CPT

## 2019-10-23 PROCEDURE — 84134 ASSAY OF PREALBUMIN: CPT

## 2019-10-23 RX ORDER — ACETAMINOPHEN 500 MG
1000 TABLET ORAL ONCE
Status: CANCELLED | OUTPATIENT
Start: 2019-11-13

## 2019-10-23 ASSESSMENT — PROMIS GLOBAL HEALTH SCALE
HOW IS THE PROMIS V1.1 BEING ADMINISTERED?: 0
SUM OF RESPONSES TO QUESTIONS 3, 6, 7, & 8: 11
IN THE PAST 7 DAYS, HOW OFTEN HAVE YOU BEEN BOTHERED BY EMOTIONAL PROBLEMS, SUCH AS FEELING ANXIOUS, DEPRESSED, OR IRRITABLE [ON A SCALE FROM 1 (NEVER) TO 5 (ALWAYS)]?: 4
WHO IS THE PERSON COMPLETING THE PROMIS V1.1 SURVEY?: 0
IN GENERAL, WOULD YOU SAY YOUR QUALITY OF LIFE IS...[ON A SCALE OF 1 (POOR) TO 5 (EXCELLENT)]: 1
IN GENERAL, PLEASE RATE HOW WELL YOU CARRY OUT YOUR USUAL SOCIAL ACTIVITIES (INCLUDES ACTIVITIES AT HOME, AT WORK, AND IN YOUR COMMUNITY, AND RESPONSIBILITIES AS A PARENT, CHILD, SPOUSE, EMPLOYEE, FRIEND, ETC) [ON A SCALE OF 1 (POOR) TO 5 (EXCELLENT)]?: 2
IN GENERAL, HOW WOULD YOU RATE YOUR SATISFACTION WITH YOUR SOCIAL ACTIVITIES AND RELATIONSHIPS [ON A SCALE OF 1 (POOR) TO 5 (EXCELLENT)]?: 2
IN GENERAL, HOW WOULD YOU RATE YOUR PHYSICAL HEALTH [ON A SCALE OF 1 (POOR) TO 5 (EXCELLENT)]?: 1
IN THE PAST 7 DAYS, HOW WOULD YOU RATE YOUR FATIGUE ON AVERAGE [ON A SCALE FROM 1 (NONE) TO 5 (VERY SEVERE)]?: 1
TO WHAT EXTENT ARE YOU ABLE TO CARRY OUT YOUR EVERYDAY PHYSICAL ACTIVITIES SUCH AS WALKING, CLIMBING STAIRS, CARRYING GROCERIES, OR MOVING A CHAIR [ON A SCALE OF 1 (NOT AT ALL) TO 5 (COMPLETELY)]?: 1
IN GENERAL, WOULD YOU SAY YOUR HEALTH IS...[ON A SCALE OF 1 (POOR) TO 5 (EXCELLENT)]: 1
SUM OF RESPONSES TO QUESTIONS 2, 4, 5, & 10: 9
IN THE PAST 7 DAYS, HOW WOULD YOU RATE YOUR PAIN ON AVERAGE [ON A SCALE FROM 0 (NO PAIN) TO 10 (WORST IMAGINABLE PAIN)]?: 8
IN GENERAL, HOW WOULD YOU RATE YOUR MENTAL HEALTH, INCLUDING YOUR MOOD AND YOUR ABILITY TO THINK [ON A SCALE OF 1 (POOR) TO 5 (EXCELLENT)]?: 2

## 2019-10-23 ASSESSMENT — KOOS JR
STRAIGHTENING KNEE FULLY: 2
BENDING TO THE FLOOR TO PICK UP OBJECT: 3
STANDING UPRIGHT: 4
HOW SEVERE IS YOUR KNEE STIFFNESS AFTER FIRST WAKING IN MORNING: 4
TWISING OR PIVOTING ON KNEE: 1
GOING UP OR DOWN STAIRS: 4
RISING FROM SITTING: 3

## 2019-10-23 ASSESSMENT — PAIN DESCRIPTION - PAIN TYPE: TYPE: CHRONIC PAIN

## 2019-10-23 ASSESSMENT — PAIN DESCRIPTION - LOCATION: LOCATION: KNEE

## 2019-10-23 ASSESSMENT — PAIN SCALES - GENERAL: PAINLEVEL_OUTOF10: 7

## 2019-10-23 ASSESSMENT — PAIN DESCRIPTION - FREQUENCY: FREQUENCY: CONTINUOUS

## 2019-10-23 ASSESSMENT — PAIN - FUNCTIONAL ASSESSMENT: PAIN_FUNCTIONAL_ASSESSMENT: PREVENTS OR INTERFERES SOME ACTIVE ACTIVITIES AND ADLS

## 2019-10-23 ASSESSMENT — PAIN DESCRIPTION - DESCRIPTORS: DESCRIPTORS: RADIATING;THROBBING

## 2019-10-23 ASSESSMENT — PAIN DESCRIPTION - ONSET: ONSET: AWAKENED FROM SLEEP

## 2019-10-23 ASSESSMENT — PAIN DESCRIPTION - ORIENTATION: ORIENTATION: RIGHT

## 2019-10-24 LAB
CULTURE: NORMAL
DIRECT EXAM: NORMAL
EKG ATRIAL RATE: 85 BPM
EKG P AXIS: 44 DEGREES
EKG P-R INTERVAL: 168 MS
EKG Q-T INTERVAL: 384 MS
EKG QRS DURATION: 88 MS
EKG QTC CALCULATION (BAZETT): 456 MS
EKG R AXIS: -33 DEGREES
EKG T AXIS: 41 DEGREES
EKG VENTRICULAR RATE: 85 BPM
Lab: NORMAL
Lab: NORMAL
SPECIMEN DESCRIPTION: NORMAL
SPECIMEN DESCRIPTION: NORMAL

## 2019-10-24 PROCEDURE — 93010 ELECTROCARDIOGRAM REPORT: CPT | Performed by: INTERNAL MEDICINE

## 2019-10-29 ENCOUNTER — HOSPITAL ENCOUNTER (EMERGENCY)
Age: 59
Discharge: HOME | End: 2019-10-29
Attending: EMERGENCY MEDICINE
Payer: COMMERCIAL

## 2019-10-29 VITALS
HEART RATE: 86 BPM | OXYGEN SATURATION: 96 % | DIASTOLIC BLOOD PRESSURE: 60 MMHG | SYSTOLIC BLOOD PRESSURE: 123 MMHG | RESPIRATION RATE: 16 BRPM | WEIGHT: 250 LBS | BODY MASS INDEX: 47.2 KG/M2 | TEMPERATURE: 98.2 F | HEIGHT: 61 IN

## 2019-10-29 DIAGNOSIS — M79.661 BILATERAL CALF PAIN: Primary | ICD-10-CM

## 2019-10-29 DIAGNOSIS — M79.662 BILATERAL CALF PAIN: Primary | ICD-10-CM

## 2019-10-29 PROCEDURE — 93970 EXTREMITY STUDY: CPT

## 2019-10-29 PROCEDURE — 99283 EMERGENCY DEPT VISIT LOW MDM: CPT

## 2019-10-29 ASSESSMENT — PAIN DESCRIPTION - PAIN TYPE: TYPE: ACUTE PAIN

## 2019-10-29 ASSESSMENT — PAIN DESCRIPTION - LOCATION: LOCATION: LEG

## 2019-10-29 ASSESSMENT — PAIN DESCRIPTION - DESCRIPTORS: DESCRIPTORS: ACHING

## 2019-10-29 ASSESSMENT — PAIN SCALES - GENERAL: PAINLEVEL_OUTOF10: 6

## 2019-11-02 DIAGNOSIS — E11.40 TYPE 2 DIABETES MELLITUS WITH DIABETIC NEUROPATHY, WITH LONG-TERM CURRENT USE OF INSULIN (HCC): Chronic | ICD-10-CM

## 2019-11-02 DIAGNOSIS — Z79.4 TYPE 2 DIABETES MELLITUS WITH DIABETIC NEUROPATHY, WITH LONG-TERM CURRENT USE OF INSULIN (HCC): Chronic | ICD-10-CM

## 2019-11-06 ENCOUNTER — OFFICE VISIT (OUTPATIENT)
Dept: FAMILY MEDICINE CLINIC | Age: 59
End: 2019-11-06
Payer: COMMERCIAL

## 2019-11-06 ENCOUNTER — TELEPHONE (OUTPATIENT)
Dept: ORTHOPEDIC SURGERY | Age: 59
End: 2019-11-06

## 2019-11-06 VITALS
BODY MASS INDEX: 49.88 KG/M2 | DIASTOLIC BLOOD PRESSURE: 80 MMHG | OXYGEN SATURATION: 95 % | RESPIRATION RATE: 20 BRPM | HEART RATE: 85 BPM | WEIGHT: 264 LBS | TEMPERATURE: 97.8 F | SYSTOLIC BLOOD PRESSURE: 124 MMHG

## 2019-11-06 DIAGNOSIS — Z01.818 PRE-OP EVALUATION: Primary | ICD-10-CM

## 2019-11-06 DIAGNOSIS — Z12.39 SCREENING FOR BREAST CANCER: ICD-10-CM

## 2019-11-06 DIAGNOSIS — R94.31 ABNORMAL EKG: ICD-10-CM

## 2019-11-06 DIAGNOSIS — R22.43 LOCALIZED SWELLING OF BOTH LOWER LEGS: ICD-10-CM

## 2019-11-06 DIAGNOSIS — R00.2 PALPITATIONS: ICD-10-CM

## 2019-11-06 DIAGNOSIS — D50.9 IRON DEFICIENCY ANEMIA, UNSPECIFIED IRON DEFICIENCY ANEMIA TYPE: ICD-10-CM

## 2019-11-06 DIAGNOSIS — R63.5 WEIGHT GAIN: ICD-10-CM

## 2019-11-06 DIAGNOSIS — E66.01 CLASS 3 SEVERE OBESITY DUE TO EXCESS CALORIES WITH SERIOUS COMORBIDITY AND BODY MASS INDEX (BMI) OF 45.0 TO 49.9 IN ADULT (HCC): ICD-10-CM

## 2019-11-06 DIAGNOSIS — F43.21 SITUATIONAL DEPRESSION: ICD-10-CM

## 2019-11-06 DIAGNOSIS — E83.42 HYPOMAGNESEMIA: ICD-10-CM

## 2019-11-06 PROCEDURE — 1036F TOBACCO NON-USER: CPT | Performed by: NURSE PRACTITIONER

## 2019-11-06 PROCEDURE — G8417 CALC BMI ABV UP PARAM F/U: HCPCS | Performed by: NURSE PRACTITIONER

## 2019-11-06 PROCEDURE — G8427 DOCREV CUR MEDS BY ELIG CLIN: HCPCS | Performed by: NURSE PRACTITIONER

## 2019-11-06 PROCEDURE — 99214 OFFICE O/P EST MOD 30 MIN: CPT | Performed by: NURSE PRACTITIONER

## 2019-11-06 PROCEDURE — G8484 FLU IMMUNIZE NO ADMIN: HCPCS | Performed by: NURSE PRACTITIONER

## 2019-11-06 PROCEDURE — G8598 ASA/ANTIPLAT THER USED: HCPCS | Performed by: NURSE PRACTITIONER

## 2019-11-06 PROCEDURE — 3017F COLORECTAL CA SCREEN DOC REV: CPT | Performed by: NURSE PRACTITIONER

## 2019-11-06 ASSESSMENT — ENCOUNTER SYMPTOMS
RHINORRHEA: 0
VOMITING: 0
COUGH: 0
NAUSEA: 0
CONSTIPATION: 1
SORE THROAT: 0
SINUS PAIN: 0
WHEEZING: 0
BLOOD IN STOOL: 0
SHORTNESS OF BREATH: 0
ABDOMINAL PAIN: 0
EYES NEGATIVE: 1

## 2019-11-07 ENCOUNTER — OFFICE VISIT (OUTPATIENT)
Dept: ORTHOPEDIC SURGERY | Age: 59
End: 2019-11-07
Payer: COMMERCIAL

## 2019-11-07 VITALS
BODY MASS INDEX: 44.37 KG/M2 | SYSTOLIC BLOOD PRESSURE: 166 MMHG | DIASTOLIC BLOOD PRESSURE: 85 MMHG | WEIGHT: 235 LBS | HEIGHT: 61 IN | HEART RATE: 86 BPM

## 2019-11-07 DIAGNOSIS — E66.01 MORBID OBESITY WITH BMI OF 45.0-49.9, ADULT (HCC): ICD-10-CM

## 2019-11-07 DIAGNOSIS — M17.11 PRIMARY OSTEOARTHRITIS OF RIGHT KNEE: ICD-10-CM

## 2019-11-07 DIAGNOSIS — E11.40 TYPE 2 DIABETES MELLITUS WITH DIABETIC NEUROPATHY, WITH LONG-TERM CURRENT USE OF INSULIN (HCC): Primary | ICD-10-CM

## 2019-11-07 DIAGNOSIS — Z79.4 TYPE 2 DIABETES MELLITUS WITH DIABETIC NEUROPATHY, WITH LONG-TERM CURRENT USE OF INSULIN (HCC): Primary | ICD-10-CM

## 2019-11-07 PROCEDURE — 1036F TOBACCO NON-USER: CPT | Performed by: ORTHOPAEDIC SURGERY

## 2019-11-07 PROCEDURE — 99213 OFFICE O/P EST LOW 20 MIN: CPT | Performed by: ORTHOPAEDIC SURGERY

## 2019-11-07 PROCEDURE — 3017F COLORECTAL CA SCREEN DOC REV: CPT | Performed by: ORTHOPAEDIC SURGERY

## 2019-11-07 PROCEDURE — G8484 FLU IMMUNIZE NO ADMIN: HCPCS | Performed by: ORTHOPAEDIC SURGERY

## 2019-11-07 PROCEDURE — G8428 CUR MEDS NOT DOCUMENT: HCPCS | Performed by: ORTHOPAEDIC SURGERY

## 2019-11-07 PROCEDURE — 2022F DILAT RTA XM EVC RTNOPTHY: CPT | Performed by: ORTHOPAEDIC SURGERY

## 2019-11-07 PROCEDURE — G8598 ASA/ANTIPLAT THER USED: HCPCS | Performed by: ORTHOPAEDIC SURGERY

## 2019-11-07 PROCEDURE — G8417 CALC BMI ABV UP PARAM F/U: HCPCS | Performed by: ORTHOPAEDIC SURGERY

## 2019-11-07 PROCEDURE — 3051F HG A1C>EQUAL 7.0%<8.0%: CPT | Performed by: ORTHOPAEDIC SURGERY

## 2019-11-07 ASSESSMENT — ENCOUNTER SYMPTOMS
ABDOMINAL PAIN: 0
CONSTIPATION: 0
COLOR CHANGE: 0
NAUSEA: 0
CHEST TIGHTNESS: 0
SHORTNESS OF BREATH: 0
APNEA: 0
VOMITING: 0
COUGH: 0
DIARRHEA: 0
ABDOMINAL DISTENTION: 0

## 2019-11-08 RX ORDER — DICYCLOMINE HYDROCHLORIDE 10 MG/1
CAPSULE ORAL
Qty: 360 CAPSULE | Refills: 1 | OUTPATIENT
Start: 2019-11-08

## 2019-11-19 RX ORDER — CYCLOBENZAPRINE HCL 10 MG
TABLET ORAL
Qty: 60 TABLET | Refills: 1 | Status: SHIPPED | OUTPATIENT
Start: 2019-11-19 | End: 2020-01-08

## 2019-11-19 RX ORDER — PANTOPRAZOLE SODIUM 40 MG/1
TABLET, DELAYED RELEASE ORAL
Qty: 30 TABLET | Refills: 3 | Status: SHIPPED | OUTPATIENT
Start: 2019-11-19 | End: 2020-03-31

## 2019-11-19 RX ORDER — DICYCLOMINE HYDROCHLORIDE 10 MG/1
CAPSULE ORAL
Qty: 360 CAPSULE | Refills: 1 | Status: SHIPPED | OUTPATIENT
Start: 2019-11-19 | End: 2020-05-26

## 2019-12-10 ENCOUNTER — NURSE TRIAGE (OUTPATIENT)
Dept: OTHER | Facility: CLINIC | Age: 59
End: 2019-12-10

## 2019-12-10 ENCOUNTER — TELEPHONE (OUTPATIENT)
Dept: FAMILY MEDICINE CLINIC | Age: 59
End: 2019-12-10

## 2019-12-20 RX ORDER — ALBUTEROL SULFATE 90 UG/1
AEROSOL, METERED RESPIRATORY (INHALATION)
Qty: 18 G | Refills: 3 | Status: SHIPPED | OUTPATIENT
Start: 2019-12-20 | End: 2020-05-08

## 2019-12-27 RX ORDER — GABAPENTIN 600 MG/1
TABLET ORAL
Qty: 270 TABLET | Refills: 0 | Status: SHIPPED | OUTPATIENT
Start: 2019-12-27 | End: 2020-04-01

## 2019-12-27 RX ORDER — ROPINIROLE 2 MG/1
TABLET, FILM COATED ORAL
Qty: 90 TABLET | Refills: 0 | Status: SHIPPED | OUTPATIENT
Start: 2019-12-27 | End: 2020-01-27

## 2020-01-08 RX ORDER — CYCLOBENZAPRINE HCL 10 MG
TABLET ORAL
Qty: 60 TABLET | Refills: 0 | Status: SHIPPED | OUTPATIENT
Start: 2020-01-08 | End: 2020-01-27

## 2020-01-13 ENCOUNTER — OFFICE VISIT (OUTPATIENT)
Dept: PODIATRY | Age: 60
End: 2020-01-13
Payer: COMMERCIAL

## 2020-01-13 VITALS — WEIGHT: 250 LBS | HEIGHT: 61 IN | BODY MASS INDEX: 47.2 KG/M2

## 2020-01-13 PROCEDURE — G8427 DOCREV CUR MEDS BY ELIG CLIN: HCPCS | Performed by: PODIATRIST

## 2020-01-13 PROCEDURE — 99213 OFFICE O/P EST LOW 20 MIN: CPT | Performed by: PODIATRIST

## 2020-01-13 PROCEDURE — 1036F TOBACCO NON-USER: CPT | Performed by: PODIATRIST

## 2020-01-13 PROCEDURE — G8417 CALC BMI ABV UP PARAM F/U: HCPCS | Performed by: PODIATRIST

## 2020-01-13 PROCEDURE — G8484 FLU IMMUNIZE NO ADMIN: HCPCS | Performed by: PODIATRIST

## 2020-01-13 PROCEDURE — 3017F COLORECTAL CA SCREEN DOC REV: CPT | Performed by: PODIATRIST

## 2020-01-13 PROCEDURE — 2022F DILAT RTA XM EVC RTNOPTHY: CPT | Performed by: PODIATRIST

## 2020-01-13 PROCEDURE — 3046F HEMOGLOBIN A1C LEVEL >9.0%: CPT | Performed by: PODIATRIST

## 2020-01-13 ASSESSMENT — ENCOUNTER SYMPTOMS
DIARRHEA: 0
CONSTIPATION: 0
COLOR CHANGE: 0
BACK PAIN: 1
VOMITING: 0
NAUSEA: 0

## 2020-01-13 NOTE — PROGRESS NOTES
[] [] [] [] [] [] [] [] [] []  5 4 3 2 1 1 2 3 4 5                         Right                                        Left    Inflammation/Pain                                                                   [x] [x] [x] [x] [x] [x] [x] [x] [x] [x]  5 4 3 2 1 1 2 3 4 5                         Right                                        Left    Vascular:  DP/PT pulses palpable 2/4, Bilateral.    CFT <3 seconds to digits 1-5, Bilateral .   Hair growth absent to level of digits, Bilateral.  Edema absent, Bilateral.  Erythema absent, Bilateral.     DM with PVD       []Yes    [x]No    Neurological:  Sensation absent to light touch to level of digits, Bilateral.  Protective sensation absent via 5.07/10g [place] monofilament 6/10 sites, Bilateral.  Vibratory sensation absent to 1st MPJ, Bilateral.     Musculoskeletal: Muscle strength 5/5, Bilateral.  No Pain present upon palpation of dorsal lisfranc's joint, Bilateral. normal medial longitudinal arch, Bilateral.  Palpable osteophytes dorsal lisfranc's     Radiographs: 3 views right Foot:  Severe loss of joint space of tarsometatarsal joints one, two, and three with dorsal osteophytes. No erosive changes or lytic process. No acute pathology. Radiographs: 3 views left Foot:  Severe loss of joint space of tarsometatarsal joints one, two, and three with dorsal osteophytes. No erosive changes or lytic process. No acute pathology. Assessment:  61 y.o. female with:  1. Onychomycosis    2. Type 2 diabetes mellitus with diabetic polyneuropathy, with long-term current use of insulin ([address]    3. RLS (restless legs syndrome)    4. Foot deformity, bilateral    5. Neuropathic pain    6. Nocturnal foot cramps       Orders Placed This Encounter   Procedures     DIABETES FOOT EXAM           Q7   []Yes    []No                Q8   [x]Yes    []No                     Q9   []Yes    []No    Plan:   Pt was evaluated and examined.  Patient was given personalized discharge

## 2020-01-15 ENCOUNTER — OFFICE VISIT (OUTPATIENT)
Dept: FAMILY MEDICINE CLINIC | Age: 60
End: 2020-01-15
Payer: COMMERCIAL

## 2020-01-15 VITALS
SYSTOLIC BLOOD PRESSURE: 128 MMHG | WEIGHT: 265 LBS | DIASTOLIC BLOOD PRESSURE: 78 MMHG | RESPIRATION RATE: 16 BRPM | HEART RATE: 96 BPM | TEMPERATURE: 98.8 F | BODY MASS INDEX: 50.07 KG/M2 | OXYGEN SATURATION: 95 %

## 2020-01-15 PROCEDURE — 99214 OFFICE O/P EST MOD 30 MIN: CPT | Performed by: NURSE PRACTITIONER

## 2020-01-15 PROCEDURE — 3017F COLORECTAL CA SCREEN DOC REV: CPT | Performed by: NURSE PRACTITIONER

## 2020-01-15 PROCEDURE — 1036F TOBACCO NON-USER: CPT | Performed by: NURSE PRACTITIONER

## 2020-01-15 PROCEDURE — 3046F HEMOGLOBIN A1C LEVEL >9.0%: CPT | Performed by: NURSE PRACTITIONER

## 2020-01-15 PROCEDURE — G8484 FLU IMMUNIZE NO ADMIN: HCPCS | Performed by: NURSE PRACTITIONER

## 2020-01-15 PROCEDURE — 2022F DILAT RTA XM EVC RTNOPTHY: CPT | Performed by: NURSE PRACTITIONER

## 2020-01-15 PROCEDURE — G8417 CALC BMI ABV UP PARAM F/U: HCPCS | Performed by: NURSE PRACTITIONER

## 2020-01-15 PROCEDURE — G8427 DOCREV CUR MEDS BY ELIG CLIN: HCPCS | Performed by: NURSE PRACTITIONER

## 2020-01-15 RX ORDER — BENZONATATE 100 MG/1
100 CAPSULE ORAL 3 TIMES DAILY PRN
Qty: 30 CAPSULE | Refills: 0 | Status: SHIPPED | OUTPATIENT
Start: 2020-01-15 | End: 2020-01-25

## 2020-01-15 RX ORDER — DOXYCYCLINE HYCLATE 100 MG/1
100 CAPSULE ORAL 2 TIMES DAILY
Qty: 20 CAPSULE | Refills: 0 | Status: SHIPPED | OUTPATIENT
Start: 2020-01-15 | End: 2020-01-25

## 2020-01-15 ASSESSMENT — ENCOUNTER SYMPTOMS
COUGH: 1
SORE THROAT: 0
VOICE CHANGE: 1
SINUS PRESSURE: 0
SHORTNESS OF BREATH: 1
SINUS PAIN: 0
RHINORRHEA: 0
WHEEZING: 1

## 2020-01-15 ASSESSMENT — PATIENT HEALTH QUESTIONNAIRE - PHQ9
SUM OF ALL RESPONSES TO PHQ QUESTIONS 1-9: 2
1. LITTLE INTEREST OR PLEASURE IN DOING THINGS: 1
2. FEELING DOWN, DEPRESSED OR HOPELESS: 1
SUM OF ALL RESPONSES TO PHQ QUESTIONS 1-9: 2
SUM OF ALL RESPONSES TO PHQ9 QUESTIONS 1 & 2: 2

## 2020-01-15 NOTE — PROGRESS NOTES
Roney Quinteros. Cassandra Mitchell, APRN-CNP  [address] [facility]  [address] [place], [address]  [address] [address]  Dept: [phone]  Dept Fax: [phone]    HPI:   /78   Pulse 96   Temp 98.8 °F (37.1 °C)   Resp 16   Wt 265 lb (120.2 kg)   SpO2 95%   BMI 50.07 kg/m²      Tarsha Tapia is a 61 y.o. female who presents today for her medical conditions/complaintsas noted below. Tarsha Tapia is c/o of Other (Retaining water) and URI    HPI  Patient presents today due to possibly retaining water- states that Dr. Ha Vazquez suggested possibly starting a diuretic, as well as her endocrinologist. She notices the swelling in her legs, sometimes in her hands as well. This is occurring every day for the past 3 weeks. She did try compression stockings- tries to wear them every day- she does not believe that this helps, hard to get them off. The swelling is the worse later in the evening. C/o mild SOB w/exertion, denies chest pain, + palpitations. She has had a cough, feels like there's phlegm but cannot produce it for 3 weeks. + laryngitis, no throat pain. + tinnitus. + wheezing, denies SOB. + ear pain, denies rhinorrhea- has been trying Flonase. She does believe that s/s are slowly improving, but cough is lingering.   Past Medical History:   Diagnosis Date    Anginal pain ([address]     last used Nitro sl 4 yrs 2013  (currently off this medication written: 10/23/2019)    Arthritis     Arthritis of right knee 8/15/2018    Asthma     Astigmatism 8/22/2014    Back pain     radiculopathy left leg    Blurry vision, bilateral 7/8/2016    Carpal tunnel syndrome of right wrist 11/26/2012    Cataract     Closed displaced fracture of lesser tuberosity of right humerus 7/11/2016    Constipation     Depression     Dysmenorrhea     Fatty liver disease, nonalcoholic     GERD (gastroesophageal reflux disease)     Gout     found through bloodwork    Headache     Heart murmur  Heart palpitations     Heart palpitations     History of rib fracture 2016    Right    Hyperlipidemia     Hypertension     Hyperthyroidism     Hypertriglyceridemia     Hypothyroidism 2016    Myopia with astigmatism and presbyopia 2014    Neuropathy     bilateral legs and feet    Obesity     Osteoporosis     Pancreatitis     no problems    Panic attack 10/30/2014    Presbyopia 2014    RLS (restless legs syndrome)     Sleep apnea     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    Status post total replacement of right shoulder 2017    Stenosis of both internal carotid arteries- Mild 16-49% 2017    Type II or unspecified type diabetes mellitus without mention of complication, not stated as uncontrolled     checks daily      Past Surgical History:   Procedure Laterality Date    CARDIAC CATHETERIZATION      Patient states approximately  she had the cardiac catheterization that was negative      SECTION      x 2    COLONOSCOPY      DILATION AND CURETTAGE OF UTERUS      JOINT REPLACEMENT Left 2016    shoulder    OH COLON CA SCRN NOT HI RSK IND N/A 10/12/2017    COLONOSCOPY performed by Samia Krishnamurthy MD at [address] [facility] EGD TRANSORAL BIOPSY SINGLE/MULTIPLE N/A 10/12/2017    EGD BIOPSY performed by Samia Krishnamurthy MD at [address] Right 2017    SHOULDER TOTAL ARTHROPLASTY RIGHT WITH [address], [address] [facility] AND AQUAMANTIS performed by Vasyl Navarrete DO at [place] [address] Right 2017       Family History   Problem Relation Age of Onset    Emphysema Mother     Diabetes Father     Heart Disease Father     High Cholesterol Sister     High Blood Pressure Sister     Heart Disease Sister        Social History     Tobacco Use    Smoking status: Former Smoker     Packs/day: 0.50     Years: 20.00     Pack years: 10.00     Types: Cigarettes     Last attempt to quit: 1977 Years since quittin.1    Smokeless tobacco: Never Used   Substance Use Topics    Alcohol use: No     Alcohol/week: 0.0 standard drinks      Current Outpatient Medications   Medication Sig Dispense Refill    doxycycline hyclate (VIBRAMYCIN) 100 MG capsule Take 1 capsule by mouth 2 times daily for 10 days 20 capsule 0    benzonatate (TESSALON PERLES) 100 MG capsule Take 1 capsule by mouth 3 times daily as needed for Cough 30 capsule 0    cyclobenzaprine (FLEXERIL) 10 MG tablet TAKE 1 TABLET BY MOUTH THREE TIMES DAILY AS NEEDED FOR MUSCLE SPASM 60 tablet 0    nystatin (NYSTATIN) 775392 UNIT/GM powder APPLY POWDER TOPICALLY TO ABDOMINAL FOLDS THREE TIMES DAILY AS NEEDED FOR IRRITATION.  60 g 0    gabapentin (NEURONTIN) 600 MG tablet TAKE 1 TABLET BY MOUTH THREE TIMES DAILY FOR 90 DAYS 270 tablet 0    rOPINIRole (REQUIP) 2 MG tablet TAKE 1 TABLET BY MOUTH THREE TIMES DAILY 90 tablet 0    albuterol sulfate  (90 Base) MCG/ACT inhaler INHALE 2 PUFFS BY MOUTH EVERY 6 HOURS AS NEEDED FOR WHEEZING 18 g 3    pantoprazole (PROTONIX) 40 MG tablet TAKE 1 TABLET BY MOUTH ONCE DAILY 30 tablet 3    dicyclomine (BENTYL) 10 MG capsule TAKE 1 CAPSULE BY MOUTH 4 TIMES DAILY 360 capsule 1    Liraglutide (VICTOZA) 18 MG/3ML SOPN SC injection Inject 1.2 mg into the skin daily      metFORMIN (GLUCOPHAGE) 1000 MG tablet TAKE 1 TABLET BY MOUTH TWICE DAILY 180 tablet 3    NOVOLOG 100 UNIT/ML injection vial USE 76 UNITS VIA V GO DAILY  2    darifenacin (ENABLEX) 15 MG extended release tablet Take 15 mg by mouth 2 times daily       FREESTYLE LITE strip       venlafaxine (EFFEXOR XR) 37.5 MG extended release capsule TAKE 1 CAPSULE BY MOUTH THREE TIMES DAILY 90 capsule 5    atorvastatin (LIPITOR) 20 MG tablet TAKE 1 TABLET BY MOUTH NIGHTLY 90 tablet 3    aspirin 81 MG chewable tablet Take 81 mg by mouth daily 90 tablet 3    insulin glargine (BASAGLAR KWIKPEN) 100 UNIT/ML injection pen Inject 100 Units into the skin foot/ankle or thigh) present. Left lower leg: Edema (+1 pitting to shins, no erythema, no warmth, negative Dominick's sign, no swelling to foot/ankle or thigh) present. Lymphadenopathy:      Cervical: No cervical adenopathy. Skin:     General: Skin is warm and dry. Findings: No erythema or rash. Neurological:      General: No focal deficit present. Mental Status: She is alert and oriented to person, place, and time. GCS: GCS eye subscore is 4. GCS verbal subscore is 5. GCS motor subscore is 6. Gait: Gait is intact. Psychiatric:         Attention and Perception: Attention normal.         Mood and Affect: Mood normal.         Speech: Speech normal.         Behavior: Behavior normal.       Data:     Lab Results   Component Value Date     10/23/2019    K 4.8 10/23/2019     10/23/2019    CO2 25 10/23/2019    BUN 14 10/23/2019    CREATININE 0.65 10/23/2019    GLUCOSE 220 10/23/2019    GLUCOSE 163 03/06/2012    PROT 7.2 04/08/2019    LABALBU 4.1 04/08/2019    LABALBU 4.8 09/15/2011    BILITOT 0.19 04/08/2019    ALKPHOS 117 04/08/2019    AST 15 04/08/2019    ALT 15 04/08/2019     Lab Results   Component Value Date    WBC 10.4 10/23/2019    RBC 4.38 10/23/2019    RBC 4.34 03/06/2012    HGB 11.0 10/23/2019    HCT 36.9 10/23/2019    MCV 84.2 10/23/2019    MCH 25.1 10/23/2019    MCHC 29.8 10/23/2019    RDW 14.9 10/23/2019     10/23/2019     03/06/2012    MPV 9.9 10/23/2019     Lab Results   Component Value Date    TSH 2.66 06/10/2019     Lab Results   Component Value Date    CHOL 162 10/15/2018    HDL 69 10/15/2018    LABA1C 8.2 10/23/2019     Assessment & Plan:      1.  Lower extremity edema  -Advised patient to con't compression stocking during the day, avoid high salt foods, keep legs elevated above heart while at rest  -Low suspicion for DVT- had negative venous doppler Oct. 2019  -Hx of hypokalemia and hypomagnesemia- advised she is not the best candidate for diuretic taking 15 U nightly, whereas medication list advises she should be taking 100 U nightly- she will call her endocrinologist immedietly after appointment to clarify what dose she should be taking- states BS's have been averaging 100-200's  - Hemoglobin A1C; Future    7. Preventative health care  -Labs as follows:  - CBC; Future  - Comprehensive Metabolic Panel; Future  - Hemoglobin A1C; Future  - Lipid Panel; Future  - TSH with Reflex; Future  - Vitamin D 25 Hydroxy; Future    8. Screening for breast cancer  -Breast CA screening highly recommended, encouraged mammogram testing as soon as possible with yearly follow ups  - EMILE[employer] DIGITAL SCREEN W OR WO CAD BILATERAL; Future    9. Vitamin D deficiency  -Stable: Con't all medications as ordered, con't current tx plan  -Will re-check labs:  - Vitamin D 25 Hydroxy; Future    Return in about 1 month (around 2/15/2020) for swelling. Discussed use, benefit, and side effects of prescribed medications. Barriers to medication compliance addressed. All patient questions answered, patient voiced understanding. Geovanny Valadez.  YOLANDA Boss-CNP

## 2020-01-15 NOTE — PROGRESS NOTES
04/24/2022    Colon cancer screen colonoscopy  10/12/2022    Hepatitis C screen  Addressed    HIV screen  Addressed

## 2020-01-23 ENCOUNTER — OFFICE VISIT (OUTPATIENT)
Dept: NEUROLOGY | Age: 60
End: 2020-01-23
Payer: COMMERCIAL

## 2020-01-23 VITALS
BODY MASS INDEX: 49.11 KG/M2 | HEIGHT: 61 IN | DIASTOLIC BLOOD PRESSURE: 93 MMHG | SYSTOLIC BLOOD PRESSURE: 178 MMHG | HEART RATE: 96 BPM | WEIGHT: 260.1 LBS

## 2020-01-23 PROCEDURE — G8417 CALC BMI ABV UP PARAM F/U: HCPCS | Performed by: PSYCHIATRY & NEUROLOGY

## 2020-01-23 PROCEDURE — G8427 DOCREV CUR MEDS BY ELIG CLIN: HCPCS | Performed by: PSYCHIATRY & NEUROLOGY

## 2020-01-23 PROCEDURE — G8484 FLU IMMUNIZE NO ADMIN: HCPCS | Performed by: PSYCHIATRY & NEUROLOGY

## 2020-01-23 PROCEDURE — 1036F TOBACCO NON-USER: CPT | Performed by: PSYCHIATRY & NEUROLOGY

## 2020-01-23 PROCEDURE — 3017F COLORECTAL CA SCREEN DOC REV: CPT | Performed by: PSYCHIATRY & NEUROLOGY

## 2020-01-23 PROCEDURE — 99214 OFFICE O/P EST MOD 30 MIN: CPT | Performed by: PSYCHIATRY & NEUROLOGY

## 2020-01-23 PROCEDURE — 2022F DILAT RTA XM EVC RTNOPTHY: CPT | Performed by: PSYCHIATRY & NEUROLOGY

## 2020-01-23 PROCEDURE — 3046F HEMOGLOBIN A1C LEVEL >9.0%: CPT | Performed by: PSYCHIATRY & NEUROLOGY

## 2020-01-23 RX ORDER — LIDOCAINE 50 MG/G
OINTMENT TOPICAL
Qty: 1 TUBE | Refills: 3 | Status: SHIPPED | OUTPATIENT
Start: 2020-01-23

## 2020-01-23 NOTE — PROGRESS NOTES
[facility]  Offices: [address], [place], [address] [facility]  [address] [facility], [address], [place], [address] [place] [address]  [address] [facility] [address]  Phone: [phone]  Fax: [phone]    E. Derry Schultz, MD Elveria Caroli, MD Ahmed B. Amanda Cargo, MD Frutoso Kling, MD Cranston Edwards, MD Kirby Angelucci, CNP    1/23/2020      HISTORY OF PRESENT ILLNESS:       I had the pleasure of seeing Valentín Ruelas, who returns for continuing neurologic care for chronic daily headache as well as medication overuse headaches from daily Excedrin use.  She also has untreated sleep apnea, unwilling to pursue CPAP therapy. On her last visit, she was doing well on no particular prophylactic treatment other than magnesium oxide. Today, patient reports she continues to do well. She only has some very mild headache about 2 to 3 days a week, when she first wakes up that goes away within 30 minutes. Today, she is most concerned about worsening neuropathic pain. She has a history of diabetic polyneuropathy for the past 8 years. She reports symptoms of tingling, pins-and-needles and jabbing pain that is most pronounced at the bottom of both her feet, symmetric. It occasionally will involve both her ankles as well as her hands, worse at night. Pain severity ranges from 5-7 over 10, aggravated by weightbearing and prolonged standing, partially relieved by rest.  She is on gabapentin 600 mg 3 times daily for at least the past year, which she says has given her some partial relief. She has not tried anything else for her neuropathic pain in the past.    She denies any recent falls, typically ambulates with a cane. In the past few months, she has also noticed symmetric bilateral leg swelling. She also has a history of osteoarthritis and needs bilateral knee replacements. She denies any other new symptoms, no changes to medication.     Prior testing reviewed:  Noncontrast Procedure Laterality Date    CARDIAC CATHETERIZATION      Patient states approximately  she had the cardiac catheterization that was negative      SECTION      x 2    COLONOSCOPY      DILATION AND CURETTAGE OF UTERUS      JOINT REPLACEMENT Left 2016    shoulder    SD COLON CA SCRN NOT HI RSK IND N/A 10/12/2017    COLONOSCOPY performed by Joaquin Younger MD at [address] EGD TRANSORAL BIOPSY SINGLE/MULTIPLE N/A 10/12/2017    EGD BIOPSY performed by Joaquin Younger MD at [address] Right 2017    SHOULDER TOTAL ARTHROPLASTY RIGHT WITH [address], [address] [facility] AND AQUAMANTIS performed by Brooklyn Rodriguez DO at [facility] 19 Right 2017        SOCIAL HISTORY:     Social History     Socioeconomic History    Marital status:      Spouse name: Not on file    Number of children: Not on file    Years of education: Not on file    Highest education level: Not on file   Occupational History     Employer: DISABLED   Social Needs    Financial resource strain: Not on file    Food insecurity:     Worry: Not on file     Inability: Not on file    Transportation needs:     Medical: Not on file     Non-medical: Not on file   Tobacco Use    Smoking status: Former Smoker     Packs/day: 0.50     Years: 20.00     Pack years: 10.00     Types: Cigarettes     Last attempt to quit: 1977     Years since quittin.1    Smokeless tobacco: Never Used   Substance and Sexual Activity    Alcohol use: No     Alcohol/week: 0.0 standard drinks    Drug use: No    Sexual activity: Yes     Partners: Male   Lifestyle    Physical activity:     Days per week: Not on file     Minutes per session: Not on file    Stress: Not on file   Relationships    Social connections:     Talks on phone: Not on file     Gets together: Not on file     Attends Rastafarian service: Not on file     Active member of club or organization: Not on file     Attends meetings of clubs or BY MOUTH THREE TIMES DAILY 90 capsule 5    atorvastatin (LIPITOR) 20 MG tablet TAKE 1 TABLET BY MOUTH NIGHTLY 90 tablet 3    aspirin 81 MG chewable tablet Take 81 mg by mouth daily 90 tablet 3    Probiotic Product (ALIGN) 4 MG CAPS TAKE 1 CAPSULE BY MOUTH ONCE DAILY (Patient not taking: Reported on 1/15/2020) 90 capsule 3    insulin glargine (BASAGLAR KWIKPEN) 100 UNIT/ML injection pen Inject 100 Units into the skin nightly       loratadine (CLARITIN) 10 MG tablet Take 1 tablet by mouth daily 30 tablet 0    fluticasone (FLONASE) 50 MCG/ACT nasal spray 1 spray by Nasal route daily 1 Bottle 0    levothyroxine (SYNTHROID) 100 MCG tablet TAKE 1 TABLET BY MOUTH ONCE DAILY 90 tablet 3    Compression Stockings MISC by Does not apply route Wear daily for swelling, pain, and support. 20-30 mmHg. 2 each 0    Insulin Disposable Pump (V-GO 40) KIT Use as directed per Dr. Allan Crowder.  Cholecalciferol (VITAMIN D3) 2000 units TABS TAKE 1 TABLET BY MOUTH ONCE DAILY 30 tablet 11    Magnesium Oxide 500 MG TABS TAKE 1 TABLET BY MOUTH TWICE DAILY 180 tablet 1    allopurinol (ZYLOPRIM) 100 MG tablet Take 100 mg by mouth daily      lisinopril (PRINIVIL;ZESTRIL) 5 MG tablet Take 5 mg by mouth daily      Cyanocobalamin (VITAMIN B 12 PO) Take by mouth daily       ammonium lactate (AMLACTIN) 12 % cream Apply topically as needed for Dry Skin Apply topically as needed.  Flaxseed, Linseed, (FLAX SEED OIL) 1000 MG CAPS Take 1,000 mg by mouth daily       No current facility-administered medications for this visit.          ALLERGIES:     Allergies   Allergen Reactions    Ozempic (0.25 Or 0.5 Mg-Dose) [Semaglutide(0.25 Or 0.5mg-Dos)] Other (See Comments)     Severe stomach pain    Actos [Pioglitazone]      Patient unsure of reaction    Amitriptyline Hives    Celebrex [Celecoxib] Hives, Itching and Dermatitis     Burnt red blister on ashlee    Fenofibrate Other (See Comments)     Muscle cramps    Gemfibrozil Other (See PHYSICAL EXAMINATION:       Vitals:    01/23/20 1604   BP: (!) 178/93   Pulse: 96                                              .                                                                                                    General Appearance:  Alert, cooperative, no signs of distress, appears stated age   Head:  Normocephalic, no signs of trauma   Eyes:  Conjunctiva/corneas clear;  eyelids intact   Ears:  Normal external ear and canals   Nose: Nares normal, mucosa normal, no drainage    Throat: Lips and tongue normal; teeth normal;  gums normal   Neck: Supple, intact flexion, extension and rotation;   trachea midline;  no adenopathy;   thyroid: not enlarged;   no carotid pulse abnormality   Back:   Symmetric, no curvature, ROM adequate   Lungs:   Respirations unlabored   Heart:  Regular rate and rhythm           Extremities: Extremities normal, no cyanosis, no edema   Pulses: Symmetric over head and neck   Skin: Skin color, texture normal, no rashes, no lesions                                     NEUROLOGIC EXAMINATION      Mental status    Alert and oriented x 3; intact memory with no confusion, speech or language problems; no hallucinations or delusions  Fund of information appropriate for level of education    Cranial nerves    II - visual fields intact to confrontation , fundoscopy showed no  papiledema                                                III, IV, VI - extra-ocular muscles full: no pupillary defect; no MILAGROS, no nystagmus, no ptosis   V - normal facial sensation                                                               VII - normal facial symmetry                                                             VIII - intact hearing                                                                             IX, X - symmetrical palate                                                                  XI - symmetrical shoulder shrug                                                       XII - tongue midline without atrophy or fasciculation      Motor function  Normal muscle bulk and tone; strength 5/5 on all 4 extremities, no pronator drift      Sensory function  decreased to light touch and pinprick in the distal lower extremities from the upper calf down      Cerebellar Intact fine motor movement. No involuntary movements or tremors. No ataxia or dysmetria on finger to nose or heel to shin testing      Reflex function DTR 2+ on bilateral UE and 1+ in b/l LE, symmetric. Negative Babinski      Gait                   normal base, slow to ambulate, walks with a cane              ASSESSMENT AND PLAN:       This is a 61 y.o. female who comes in for follow up for chronic daily headache and a history of medication overuse headache from Excedrin use. Thankfully, her headaches have been doing well recently, on magnesium oxide 500 mg daily. Today, she is most concerned about worsening symptoms of diabetic polyneuropathy despite being on gabapentin 600 mg 3 times daily. Given that she is already on multiple medications, will start lidocaine 5% cream 2-3 times daily to minimize polypharmacy. Advised patient gabapentin can also be contributing to her recent bilateral lower extremity swelling. We will reassess her again in 3 months. Jose Brothers MD  Neurology and Sleep Medicine  [facility]    Please note that this chart was generated using voice recognition Dragon dictation software. Although every effort was made to ensure the accuracy of this automated transcription, some errors in transcription may have occurred.

## 2020-01-27 RX ORDER — CYCLOBENZAPRINE HCL 10 MG
TABLET ORAL
Qty: 60 TABLET | Refills: 0 | Status: SHIPPED | OUTPATIENT
Start: 2020-01-27 | End: 2020-02-24

## 2020-01-27 RX ORDER — ROPINIROLE 2 MG/1
TABLET, FILM COATED ORAL
Qty: 90 TABLET | Refills: 0 | Status: SHIPPED | OUTPATIENT
Start: 2020-01-27 | End: 2020-03-03

## 2020-01-29 ENCOUNTER — TELEPHONE (OUTPATIENT)
Dept: FAMILY MEDICINE CLINIC | Age: 60
End: 2020-01-29

## 2020-01-29 ENCOUNTER — HOSPITAL ENCOUNTER (OUTPATIENT)
Age: 60
Discharge: HOME | End: 2020-01-29
Payer: COMMERCIAL

## 2020-01-29 LAB
ALBUMIN SERPL-MCNC: 4.1 G/DL (ref 3.5–5.2)
ALBUMIN/GLOBULIN RATIO: ABNORMAL (ref 1–2.5)
ALP BLD-CCNC: 122 U/L (ref 35–104)
ALT SERPL-CCNC: 15 U/L (ref 5–33)
ANION GAP SERPL CALCULATED.3IONS-SCNC: 13 MMOL/L (ref 9–17)
AST SERPL-CCNC: 22 U/L
BILIRUB SERPL-MCNC: 0.26 MG/DL (ref 0.3–1.2)
BNP INTERPRETATION: NORMAL
BUN BLDV-MCNC: 13 MG/DL (ref 6–20)
BUN/CREAT BLD: ABNORMAL (ref 9–20)
CALCIUM SERPL-MCNC: 8.9 MG/DL (ref 8.6–10.4)
CHLORIDE BLD-SCNC: 100 MMOL/L (ref 98–107)
CHOLESTEROL/HDL RATIO: 1.9
CHOLESTEROL: 143 MG/DL
CO2: 26 MMOL/L (ref 20–31)
CREAT SERPL-MCNC: 0.62 MG/DL (ref 0.5–0.9)
GFR AFRICAN AMERICAN: >60 ML/MIN
GFR NON-AFRICAN AMERICAN: >60 ML/MIN
GFR SERPL CREATININE-BSD FRML MDRD: ABNORMAL ML/MIN/{1.73_M2}
GFR SERPL CREATININE-BSD FRML MDRD: ABNORMAL ML/MIN/{1.73_M2}
GLUCOSE BLD-MCNC: 208 MG/DL (ref 70–99)
HCT VFR BLD CALC: 34.2 % (ref 36–46)
HDLC SERPL-MCNC: 76 MG/DL
HEMOGLOBIN: 10.7 G/DL (ref 12–16)
IRON SATURATION: 9 % (ref 20–55)
IRON: 30 UG/DL (ref 37–145)
LDL CHOLESTEROL: 39 MG/DL (ref 0–130)
MAGNESIUM: 1.4 MG/DL (ref 1.6–2.6)
MCH RBC QN AUTO: 24.4 PG (ref 26–34)
MCHC RBC AUTO-ENTMCNC: 31.2 G/DL (ref 31–37)
MCV RBC AUTO: 78.2 FL (ref 80–100)
NRBC AUTOMATED: ABNORMAL PER 100 WBC
PDW BLD-RTO: 16.9 % (ref 11.5–14.9)
PLATELET # BLD: 404 K/UL (ref 150–450)
PMV BLD AUTO: 7.8 FL (ref 6–12)
POTASSIUM SERPL-SCNC: 4.7 MMOL/L (ref 3.7–5.3)
PRO-BNP: 23 PG/ML
RBC # BLD: 4.38 M/UL (ref 4–5.2)
SODIUM BLD-SCNC: 139 MMOL/L (ref 135–144)
TOTAL IRON BINDING CAPACITY: 340 UG/DL (ref 250–450)
TOTAL PROTEIN: 7.4 G/DL (ref 6.4–8.3)
TRIGL SERPL-MCNC: 142 MG/DL
TROPONIN INTERP: NORMAL
TROPONIN T: NORMAL NG/ML
TROPONIN, HIGH SENSITIVITY: 14 NG/L (ref 0–14)
TSH SERPL DL<=0.05 MIU/L-ACNC: 2.96 MIU/L (ref 0.3–5)
UNSATURATED IRON BINDING CAPACITY: 310 UG/DL (ref 112–347)
VITAMIN D 25-HYDROXY: 34.5 NG/ML (ref 30–100)
VLDLC SERPL CALC-MCNC: NORMAL MG/DL (ref 1–30)
WBC # BLD: 10.7 K/UL (ref 3.5–11)

## 2020-01-29 PROCEDURE — 82306 VITAMIN D 25 HYDROXY: CPT

## 2020-01-29 PROCEDURE — 80053 COMPREHEN METABOLIC PANEL: CPT

## 2020-01-29 PROCEDURE — 84443 ASSAY THYROID STIM HORMONE: CPT

## 2020-01-29 PROCEDURE — 83036 HEMOGLOBIN GLYCOSYLATED A1C: CPT

## 2020-01-29 PROCEDURE — 83880 ASSAY OF NATRIURETIC PEPTIDE: CPT

## 2020-01-29 PROCEDURE — 83550 IRON BINDING TEST: CPT

## 2020-01-29 PROCEDURE — 85027 COMPLETE CBC AUTOMATED: CPT

## 2020-01-29 PROCEDURE — 83540 ASSAY OF IRON: CPT

## 2020-01-29 PROCEDURE — 80061 LIPID PANEL: CPT

## 2020-01-29 PROCEDURE — 83735 ASSAY OF MAGNESIUM: CPT

## 2020-01-29 PROCEDURE — 84484 ASSAY OF TROPONIN QUANT: CPT

## 2020-01-29 PROCEDURE — 36415 COLL VENOUS BLD VENIPUNCTURE: CPT

## 2020-01-29 NOTE — TELEPHONE ENCOUNTER
Called patient back to see if she was symptomatic with low Hgb, patient not available- spoke to , Ada Hawley, again did discuss my concerns- he states she is very fatigued and just wants to go to bed, again I did advise to go to ER tonight for further evaluation, he stated he would discuss with her.

## 2020-01-30 ENCOUNTER — HOSPITAL ENCOUNTER (EMERGENCY)
Age: 60
Discharge: HOME | End: 2020-01-30
Attending: EMERGENCY MEDICINE
Payer: COMMERCIAL

## 2020-01-30 VITALS
RESPIRATION RATE: 15 BRPM | TEMPERATURE: 98.3 F | SYSTOLIC BLOOD PRESSURE: 153 MMHG | DIASTOLIC BLOOD PRESSURE: 88 MMHG | HEART RATE: 97 BPM | WEIGHT: 260 LBS | BODY MASS INDEX: 49.09 KG/M2 | OXYGEN SATURATION: 97 % | HEIGHT: 61 IN

## 2020-01-30 LAB
ESTIMATED AVERAGE GLUCOSE: 189 MG/DL
HBA1C MFR BLD: 8.2 % (ref 4–6)
TROPONIN INTERP: ABNORMAL
TROPONIN T: ABNORMAL NG/ML
TROPONIN, HIGH SENSITIVITY: 15 NG/L (ref 0–14)

## 2020-01-30 PROCEDURE — 36415 COLL VENOUS BLD VENIPUNCTURE: CPT

## 2020-01-30 PROCEDURE — 84484 ASSAY OF TROPONIN QUANT: CPT

## 2020-01-30 PROCEDURE — 99283 EMERGENCY DEPT VISIT LOW MDM: CPT

## 2020-01-30 PROCEDURE — 93005 ELECTROCARDIOGRAM TRACING: CPT | Performed by: EMERGENCY MEDICINE

## 2020-01-30 RX ORDER — FERROUS SULFATE 325(65) MG
325 TABLET ORAL
Qty: 90 TABLET | Refills: 1 | Status: SHIPPED | OUTPATIENT
Start: 2020-01-30 | End: 2020-08-10

## 2020-01-30 ASSESSMENT — PAIN DESCRIPTION - LOCATION: LOCATION: KNEE

## 2020-01-30 ASSESSMENT — PAIN SCALES - GENERAL: PAINLEVEL_OUTOF10: 5

## 2020-01-30 ASSESSMENT — ENCOUNTER SYMPTOMS
EYES NEGATIVE: 1
GASTROINTESTINAL NEGATIVE: 1
BACK PAIN: 0
SHORTNESS OF BREATH: 0
ABDOMINAL PAIN: 0
RESPIRATORY NEGATIVE: 1
COUGH: 0

## 2020-01-30 ASSESSMENT — PAIN DESCRIPTION - FREQUENCY: FREQUENCY: CONTINUOUS

## 2020-01-30 ASSESSMENT — PAIN DESCRIPTION - ORIENTATION: ORIENTATION: LEFT;RIGHT

## 2020-01-30 ASSESSMENT — PAIN DESCRIPTION - PAIN TYPE: TYPE: CHRONIC PAIN

## 2020-01-30 ASSESSMENT — PAIN DESCRIPTION - DESCRIPTORS: DESCRIPTORS: ACHING

## 2020-01-30 NOTE — ED PROVIDER NOTES
EMERGENCY DEPARTMENT ENCOUNTER    Pt Name: Lola Anna  MRN: [id number]  [place] 1960  Date of evaluation: 1/30/20  CHIEF COMPLAINT       Chief Complaint   Patient presents with    Abnormal Lab     Denies chest pain and SOB. HISTORY OF PRESENT ILLNESS   The pt presents for evaluation of abnormal blood work. Pt states that she was told one of her cardiac labs was elevated. Troponin came back at 14. Pt's blood work also demonstrated a chronic anemia, mild hyperglycemia, chronic hypomag, elevated hgba1c. Pt denies any chest pain, shortness of breath, nausea, or other symptoms. Pt states that she does not know why she is here and feels fine. The history is provided by the patient. REVIEW OF SYSTEMS     Review of Systems   Constitutional: Negative. Negative for chills and fever. HENT: Negative. Negative for congestion. Eyes: Negative. Respiratory: Negative. Negative for cough and shortness of breath. Cardiovascular: Negative. Negative for chest pain. Gastrointestinal: Negative. Negative for abdominal pain. Genitourinary: Negative. Musculoskeletal: Negative. Negative for back pain. Skin: Negative. Negative for rash. Neurological: Negative. Negative for headaches. All other systems reviewed and are negative.     PASTMEDICAL HISTORY     Past Medical History:   Diagnosis Date    Anginal pain ([address]     last used Nitro sl 4 yrs 2013  (currently off this medication written: 10/23/2019)    Arthritis     Arthritis of right knee 8/15/2018    Asthma     Astigmatism 8/22/2014    Back pain     radiculopathy left leg    Blurry vision, bilateral 7/8/2016    Carpal tunnel syndrome of right wrist 11/26/2012    Cataract     Closed displaced fracture of lesser tuberosity of right humerus 7/11/2016    Constipation     Depression     Dysmenorrhea     Fatty liver disease, nonalcoholic     GERD (gastroesophageal reflux disease)     Gout     found through bloodwork    ASPIRIN 81 MG CHEWABLE TABLET    Take 81 mg by mouth daily    ATORVASTATIN (LIPITOR) 20 MG TABLET    TAKE 1 TABLET BY MOUTH NIGHTLY    CHOLECALCIFEROL (VITAMIN D3) 2000 UNITS TABS    TAKE 1 TABLET BY MOUTH ONCE DAILY    COMPRESSION STOCKINGS MISC    by Does not apply route Wear daily for swelling, pain, and support. 20-30 mmHg. CYANOCOBALAMIN (VITAMIN B 12 PO)    Take by mouth daily     CYCLOBENZAPRINE (FLEXERIL) 10 MG TABLET    TAKE 1 TABLET BY MOUTH THREE TIMES DAILY AS NEEDED FOR MUSCLE SPASM    DARIFENACIN (ENABLEX) 15 MG EXTENDED RELEASE TABLET    Take 15 mg by mouth 2 times daily     DICYCLOMINE (BENTYL) 10 MG CAPSULE    TAKE 1 CAPSULE BY MOUTH 4 TIMES DAILY    FERROUS SULFATE 325 (65 FE) MG TABLET    Take 1 tablet by mouth daily (with breakfast)    FLAXSEED, LINSEED, (FLAX SEED OIL) 1000 MG CAPS    Take 1,000 mg by mouth daily    FLUTICASONE (FLONASE) 50 MCG/ACT NASAL SPRAY    1 spray by Nasal route daily    FREESTYLE LITE STRIP        GABAPENTIN (NEURONTIN) 600 MG TABLET    TAKE 1 TABLET BY MOUTH THREE TIMES DAILY FOR 90 DAYS    INSULIN DISPOSABLE PUMP (V-GO 40) KIT    Use as directed per Dr. Lien Fox. INSULIN GLARGINE (BASAGLAR KWIKPEN) 100 UNIT/ML INJECTION PEN    Inject 100 Units into the skin nightly     LEVOTHYROXINE (SYNTHROID) 100 MCG TABLET    TAKE 1 TABLET BY MOUTH ONCE DAILY    LIDOCAINE (XYLOCAINE) 5 % OINTMENT    Apply topically as needed.     LIRAGLUTIDE (VICTOZA) 18 MG/3ML SOPN SC INJECTION    Inject 1.2 mg into the skin daily    LISINOPRIL (PRINIVIL;ZESTRIL) 5 MG TABLET    Take 5 mg by mouth daily    LORATADINE (CLARITIN) 10 MG TABLET    Take 1 tablet by mouth daily    MAGNESIUM OXIDE 500 MG TABS    TAKE 1 TABLET BY MOUTH TWICE DAILY    METFORMIN (GLUCOPHAGE) 1000 MG TABLET    TAKE 1 TABLET BY MOUTH TWICE DAILY    NOVOLOG 100 UNIT/ML INJECTION VIAL    USE 76 UNITS VIA V GO DAILY    NYSTATIN (NYSTATIN) 546755 UNIT/GM POWDER    APPLY POWDER TOPICALLY TO ABDOMINAL FOLDS THREE TIMES DAILY AS

## 2020-01-31 ENCOUNTER — TELEPHONE (OUTPATIENT)
Dept: FAMILY MEDICINE CLINIC | Age: 60
End: 2020-01-31

## 2020-01-31 LAB
EKG ATRIAL RATE: 90 BPM
EKG P AXIS: 64 DEGREES
EKG P-R INTERVAL: 168 MS
EKG Q-T INTERVAL: 368 MS
EKG QRS DURATION: 110 MS
EKG QTC CALCULATION (BAZETT): 450 MS
EKG R AXIS: -40 DEGREES
EKG T AXIS: 47 DEGREES
EKG VENTRICULAR RATE: 90 BPM

## 2020-01-31 PROCEDURE — 93010 ELECTROCARDIOGRAM REPORT: CPT | Performed by: INTERNAL MEDICINE

## 2020-01-31 NOTE — ED NOTES
Report given to [facility] from [address], [address] [place] [address]. Report method in person   The following was reviewed with receiving RN:   Current vital signs:  BP (!) 153/88   Pulse 97   Temp 98.3 °F (36.8 °C)   Resp 15   Ht 5' 1\" (1.549 m)   Wt 260 lb (117.9 kg)   SpO2 97%   BMI 49.13 kg/m²                MEWS Score: 1     Any medication or safety alerts were reviewed. Any pending diagnostics and notifications were also reviewed, as well as any safety concerns or issues, abnormal labs, abnormal imaging, and abnormal assessment findings. Questions were answered.             Leah Green RN  01/30/20 1995

## 2020-02-05 RX ORDER — AMMONIUM LACTATE 12 G/100G
CREAM TOPICAL
Qty: 1 BOTTLE | Refills: 3 | OUTPATIENT
Start: 2020-02-05 | End: 2021-06-28 | Stop reason: SDUPTHER

## 2020-02-07 ENCOUNTER — TELEPHONE (OUTPATIENT)
Dept: GASTROENTEROLOGY | Age: 60
End: 2020-02-07

## 2020-02-07 RX ORDER — ALLOPURINOL 100 MG/1
TABLET ORAL
Qty: 90 TABLET | Refills: 0 | Status: SHIPPED | OUTPATIENT
Start: 2020-02-07 | End: 2020-06-08

## 2020-02-07 NOTE — TELEPHONE ENCOUNTER
Tried calling patient to inform that we had to r/s appointment that was for 3/16/20 and someone answered phone and hung up. Mailing letter.

## 2020-02-24 RX ORDER — CYCLOBENZAPRINE HCL 10 MG
TABLET ORAL
Qty: 60 TABLET | Refills: 0 | Status: SHIPPED | OUTPATIENT
Start: 2020-02-24 | End: 2020-02-25

## 2020-02-24 NOTE — TELEPHONE ENCOUNTER
Please Approve or Refuse.      Next Visit Date:  4/7/2020   Last Visit Date: 1/13/2020    Hemoglobin A1C (%)   Date Value   01/29/2020 8.2 (H)   10/23/2019 8.2 (H)   05/15/2018 7.0 (H)   05/15/2018 7.0 (H)             ( goal A1C is < 7)   BP Readings from Last 3 Encounters:   01/30/20 (!) 153/88   01/23/20 (!) 178/93   01/15/20 128/78          (goal 120/80)  BUN   Date Value Ref Range Status   01/29/2020 13 6 - 20 mg/dL Final     CREATININE   Date Value Ref Range Status   01/29/2020 0.62 0.50 - 0.90 mg/dL Final     Potassium   Date Value Ref Range Status   01/29/2020 4.7 3.7 - 5.3 mmol/L Final

## 2020-02-25 RX ORDER — CYCLOBENZAPRINE HCL 10 MG
TABLET ORAL
Qty: 60 TABLET | Refills: 0 | Status: SHIPPED | OUTPATIENT
Start: 2020-02-25 | End: 2020-03-12

## 2020-03-03 RX ORDER — ROPINIROLE 2 MG/1
TABLET, FILM COATED ORAL
Qty: 90 TABLET | Refills: 0 | Status: SHIPPED | OUTPATIENT
Start: 2020-03-03 | End: 2020-04-01

## 2020-03-12 RX ORDER — CYCLOBENZAPRINE HCL 10 MG
TABLET ORAL
Qty: 60 TABLET | Refills: 0 | Status: SHIPPED | OUTPATIENT
Start: 2020-03-12 | End: 2020-04-09

## 2020-03-19 ENCOUNTER — TELEPHONE (OUTPATIENT)
Dept: ORTHOPEDIC SURGERY | Age: 60
End: 2020-03-19

## 2020-03-31 RX ORDER — PANTOPRAZOLE SODIUM 40 MG/1
TABLET, DELAYED RELEASE ORAL
Qty: 30 TABLET | Refills: 1 | Status: SHIPPED | OUTPATIENT
Start: 2020-03-31 | End: 2020-06-18

## 2020-04-01 RX ORDER — GABAPENTIN 600 MG/1
TABLET ORAL
Qty: 270 TABLET | Refills: 0 | Status: SHIPPED | OUTPATIENT
Start: 2020-04-01 | End: 2020-07-06

## 2020-04-01 RX ORDER — ROPINIROLE 2 MG/1
TABLET, FILM COATED ORAL
Qty: 90 TABLET | Refills: 0 | Status: SHIPPED | OUTPATIENT
Start: 2020-04-01 | End: 2020-04-09

## 2020-04-03 ENCOUNTER — TELEPHONE (OUTPATIENT)
Dept: FAMILY MEDICINE CLINIC | Age: 60
End: 2020-04-03

## 2020-04-03 RX ORDER — INSULIN GLARGINE 100 [IU]/ML
35 INJECTION, SOLUTION SUBCUTANEOUS NIGHTLY
COMMUNITY
End: 2021-02-25 | Stop reason: ALTCHOICE

## 2020-04-03 RX ORDER — LISINOPRIL 5 MG/1
5 TABLET ORAL DAILY
Qty: 90 TABLET | Refills: 1 | Status: SHIPPED | OUTPATIENT
Start: 2020-04-03 | End: 2020-09-11

## 2020-04-03 RX ORDER — CHOLECALCIFEROL (VITAMIN D3) 25 MCG
1 TABLET,CHEWABLE ORAL DAILY
COMMUNITY

## 2020-04-03 RX ORDER — OXYBUTYNIN CHLORIDE 10 MG/1
10 TABLET, EXTENDED RELEASE ORAL DAILY
COMMUNITY
End: 2021-06-10 | Stop reason: DRUGHIGH

## 2020-04-03 NOTE — TELEPHONE ENCOUNTER
I spoke with pharmacist- she was given Cecilia Flick on 3-31-20 (written by Dr. Dudley Lewis NP- Musa Yen) and Lantus on 4-1-20 (written by Dr. Shy Long)- these were each for 35 U daily. I am guessing there was an issue with insurance coverage. Please advise her these are the same insulins and she should only be taking the Lantus 35 U daily. I did try to reach Dr. Dudley Lewis office to clarify, but they are out until Monday. I am going to recommend she take prescription as last rx'd per Dr. Shy Long and set Cecilia Flick to the side in case it is needed later. She should call Dr. Dudley Lewis office on Monday to clarify.

## 2020-04-06 ENCOUNTER — TELEPHONE (OUTPATIENT)
Dept: FAMILY MEDICINE CLINIC | Age: 60
End: 2020-04-06

## 2020-04-07 ENCOUNTER — VIRTUAL VISIT (OUTPATIENT)
Dept: FAMILY MEDICINE CLINIC | Age: 60
End: 2020-04-07
Payer: COMMERCIAL

## 2020-04-07 PROCEDURE — 99214 OFFICE O/P EST MOD 30 MIN: CPT | Performed by: NURSE PRACTITIONER

## 2020-04-07 RX ORDER — MECLIZINE HYDROCHLORIDE 25 MG/1
25 TABLET ORAL 3 TIMES DAILY PRN
Qty: 60 TABLET | Refills: 0 | Status: SHIPPED | OUTPATIENT
Start: 2020-04-07 | End: 2020-10-06

## 2020-04-07 ASSESSMENT — ENCOUNTER SYMPTOMS
NAUSEA: 0
CHEST TIGHTNESS: 0
ABDOMINAL PAIN: 0
DIARRHEA: 0
BACK PAIN: 0
SORE THROAT: 0
SHORTNESS OF BREATH: 0
CONSTIPATION: 0
VOMITING: 0
ABDOMINAL DISTENTION: 0
COUGH: 0
RHINORRHEA: 0

## 2020-04-07 NOTE — TELEPHONE ENCOUNTER
Please Approve or Refuse.        Next Visit Date:  Visit date not found   Last Visit Date: 1/13/2020    Hemoglobin A1C (%)   Date Value   01/29/2020 8.2 (H)   10/23/2019 8.2 (H)   05/15/2018 7.0 (H)   05/15/2018 7.0 (H)             ( goal A1C is < 7)   BP Readings from Last 3 Encounters:   01/30/20 (!) 153/88   01/23/20 (!) 178/93   01/15/20 128/78          (goal 120/80)  BUN   Date Value Ref Range Status   01/29/2020 13 6 - 20 mg/dL Final     CREATININE   Date Value Ref Range Status   01/29/2020 0.62 0.50 - 0.90 mg/dL Final     Potassium   Date Value Ref Range Status   01/29/2020 4.7 3.7 - 5.3 mmol/L Final

## 2020-04-07 NOTE — PROGRESS NOTES
Visit Information    Have you changed or started any medications since your last visit including any over-the-counter medicines, vitamins, or herbal medicines? no   Are you having any side effects from any of your medications? -  no  Have you stopped taking any of your medications? Is so, why? -  no    Have you seen any other physician or provider since your last visit? No  Have you had any other diagnostic tests since your last visit? No  Have you been seen in the emergency room and/or had an admission to a hospital since we last saw you? No  Have you had your routine dental cleaning in the past 6 months? yes - 01/2020    Have you activated your [employer] account? If not, what are your barriers?  Yes     Patient Care Team:  YOLANDA Keller CNP as PCP - [address] [place] [address] [address], APRN - CNP as PCP - [facility]  Herb Torre MD as Orthopedic Surgeon (Orthopedic Surgery)  Grace Monk MD as Surgeon (Cardiology)  YOLANDA Keller CNP (Family Medicine)  Shady Cline MD as Consulting Physician (Gastroenterology)    Medical History Review  Past Medical, Family, and Social History reviewed and does not contribute to the patient presenting condition    Health Maintenance   Topic Date Due    Hepatitis B vaccine (1 of 3 - Risk 3-dose series) 12/19/1979    DTaP/Tdap/Td vaccine (1 - Tdap) 12/19/1979    Breast cancer screen  09/16/2018    Diabetic retinal exam  03/18/2020    Flu vaccine (Season Ended) 11/06/2020 (Originally 9/1/2020)    Shingles Vaccine (1 of 2) 11/06/2020 (Originally 12/19/2010)    Pneumococcal 0-64 years Vaccine (1 of 1 - PPSV23) 03/27/2029 (Originally 12/19/1966)    Diabetic microalbuminuria test  06/10/2020    Diabetic foot exam  01/13/2021    A1C test (Diabetic or Prediabetic)  01/29/2021    Lipid screen  01/29/2021    TSH testing  01/29/2021    Potassium monitoring  01/29/2021    Creatinine monitoring  01/29/2021    Cervical cancer screen  04/24/2022    Colon cancer screen colonoscopy  10/12/2022    Hepatitis C screen  Addressed    HIV screen  Addressed    Hepatitis A vaccine  Aged Out    Hib vaccine  Aged Out    Meningococcal (ACWY) vaccine  Aged Out

## 2020-04-07 NOTE — PATIENT INSTRUCTIONS
Patient Education        Epley Maneuver at Home for Vertigo: Exercises  Introduction  Vertigo is a spinning or whirling sensation when you move your head. Your doctor may have moved you in different positions to help your vertigo get better faster. This is called the Epley maneuver. Your doctor also may have asked you to do these exercises at home. Do the exercises as often as your doctor recommends. If your vertigo is getting worse, your doctor may have you change the exercise or stop it. Step 1  Step 1   1. Sit on the edge of a bed or sofa. Step 2   1. Turn your head 45 degrees in the direction your doctor told you to. This should be toward the ear that causes the most vertigo for you. In this picture, the woman is turning toward her left ear. Step 3   1. Tilt yourself backward until you are lying on your back. Your head should still be at a 45-degree turn. Your head should be about midway between looking straight ahead and looking out to your side. Hold for 30 seconds. If you have vertigo, stay in this position until it stops. Step 4   1. Turn your head 90 degrees toward the ear that has the least vertigo. In this picture, the woman is turning to the right because she has vertigo on her left side. The point of your chin should be raised and over your shoulder. Hold for 30 seconds. Step 5   1. Roll onto the side with the least vertigo. You should now be looking at the floor. Hold for 30 seconds. Follow-up care is a key part of your treatment and safety. Be sure to make and go to all appointments, and call your doctor if you are having problems. It's also a good idea to know your test results and keep a list of the medicines you take. Where can you learn more? Go to https://cherineb.[employer]. org and sign in to your [employer] account. Enter R012 in the Study Edge box to learn more about \"Epley Maneuver at Home for Vertigo: Exercises. \"     If you do not have an account, stop-smoking programs and medicines. These can increase your chances of quitting for good. · Talk to your doctor about whether to stop taking aspirin and similar products such as ibuprofen or naproxen. · Get exercise often. It can improve blood flow to the ear. Ways to cope with noise  Some tinnitus may last a long time. To cope with noise, try to:  · Avoid noises that you think caused your tinnitus. If you can't avoid loud noises, wear earplugs or earmuffs. · Ignore the sound by paying attention to other things. · Relax using biofeedback, meditation, or yoga. Feeling stressed and being tired can make tinnitus worse. · Play music or white noise to help you sleep. Background noise may cover up the noise that you hear in your ears. You can buy a machine that makes soothing sounds, such as ocean waves. When should you call for help? Call 911 anytime you think you may need emergency care. For example, call if:    · You have symptoms of a stroke. These may include:  ? Sudden numbness, tingling, weakness, or loss of movement in your face, arm, or leg, especially on only one side of your body. ? Sudden vision changes. ? Sudden trouble speaking. ? Sudden confusion or trouble understanding simple statements. ? Sudden problems with walking or balance. ? A sudden, severe headache that is different from past headaches.    Call your doctor now or seek immediate medical care if:    · You develop other symptoms. These may include hearing loss (or worse hearing loss), balance problems, dizziness, nausea, or vomiting.    Watch closely for changes in your health, and be sure to contact your doctor if:    · Your tinnitus moves from both ears to one ear.     · Your hearing loss gets worse within 1 day after an ear injury.     · Your tinnitus or hearing loss does not get better within 1 week after an ear injury.     · Your tinnitus bothers you enough that you want to take medicines to help you cope with it.    Where can you

## 2020-04-07 NOTE — PROGRESS NOTES
Roselia Pederson, APRN-CNP  [facility]  [id number] [address], [address]  [address] [address]  Dept: [phone]  Dept Fax: [phone]     Patient ID: Jeovanny Mandujano is a 61 y.o. female. HPI    Virtual visit today for an acute visit secondary to dizziness. She relates that it has been going on for the last two days, but relates that today not as bad. She did mention that she had been without her lisinopril for a while and did not know it. She was wondering if this was the cause. She denies the room spinning but describes it as \"got real weak like I was going to fall on the floor. I laid down and I felt better. \"  She does have a history of DM and follows with Dr. Licha Zuniga. She has an insulin pump but relates that she has been checking her BS during these visits and they are running in the 100'sPt denies any fever or chills. Pt today denies any HA, chest pain, or SOB. Pt denies any N/V/D/C or abdominal pain. She does have a history of headaches and follows with Dr. Chris Alab. Her last appointment was January 23rd. She does have a follow up with her in July. Otherwise pt doing well on current tx and no other concerns today. The patient's past medical, surgical, social, and family history as well as his current medications and allergies were reviewed as documented in today's encounter.       Current Outpatient Medications on File Prior to Visit   Medication Sig Dispense Refill    oxybutynin (DITROPAN-XL) 10 MG extended release tablet Take 10 mg by mouth daily      insulin glargine (BASAGLAR KWIKPEN) 100 UNIT/ML injection pen Inject 35 Units into the skin nightly      lisinopril (PRINIVIL;ZESTRIL) 5 MG tablet Take 1 tablet by mouth daily 90 tablet 1    Cyanocobalamin (B-12) 2500 MCG TABS Take 1 tablet by mouth daily      gabapentin (NEURONTIN) 600 MG tablet TAKE 1 TABLET BY MOUTH THREE TIMES DAILY FOR 90 DAYS 270 tablet 0    rOPINIRole (REQUIP) 2 by Does not apply route Wear daily for swelling, pain, and support. 20-30 mmHg. 2 each 0    Insulin Disposable Pump (V-GO 40) KIT Use as directed per Dr. Rory Hess.  Cholecalciferol (VITAMIN D3) 2000 units TABS TAKE 1 TABLET BY MOUTH ONCE DAILY 30 tablet 11    Magnesium Oxide 500 MG TABS TAKE 1 TABLET BY MOUTH TWICE DAILY 180 tablet 1    Flaxseed, Linseed, (FLAX SEED OIL) 1000 MG CAPS Take 1,000 mg by mouth daily       No current facility-administered medications on file prior to visit. Subjective:     Review of Systems   Constitutional: Negative for activity change, fatigue and fever. HENT: Negative for congestion, ear pain, rhinorrhea and sore throat. Respiratory: Negative for cough, chest tightness and shortness of breath. Cardiovascular: Negative for chest pain and palpitations. Gastrointestinal: Negative for abdominal distention, abdominal pain, constipation, diarrhea, nausea and vomiting. Endocrine: Negative for polydipsia, polyphagia and polyuria. Genitourinary: Negative for difficulty urinating and dysuria. Musculoskeletal: Negative for arthralgias, back pain and myalgias. Skin: Negative for rash. Neurological: Positive for dizziness. Negative for syncope, weakness, light-headedness and headaches. Hematological: Negative for adenopathy. Psychiatric/Behavioral: Positive for dysphoric mood (stable). Negative for agitation and behavioral problems. The patient is not nervous/anxious. Objective:     Physical Exam  Vitals reviewed: Vital signs unavailable, as this is a virtual visit. Constitutional:       General: She is not in acute distress. Appearance: Normal appearance. She is not ill-appearing or toxic-appearing. Pulmonary:      Effort: No tachypnea or accessory muscle usage. Comments: Patient able to talk in full sentences without difficulty   Neurological:      General: No focal deficit present.       Mental Status: She is alert and oriented to person, emergency), evaluation of the following organ systems was limited: Vitals/Constitutional/EENT/Resp/CV/GI//MS/Neuro/Skin/Heme-Lymph-Imm. Pursuant to the emergency declaration under the [facility], [address] [place] [address] authority and the [employer] Act, this Virtual Visit was conducted with patient's (and/or legal guardian's) consent, to reduce the patient's risk of exposure to COVID-19 and provide necessary medical care. The patient (and/or legal guardian) has also been advised to contact this office for worsening conditions or problems, and seek emergency medical treatment and/or call 911 if deemed necessary. Services were provided through a video synchronous discussion virtually to substitute for in-person clinic visit. Patient and provider were located at their individual homes. --YOLANDA Rico - NP on 4/7/2020 at 3:08 PM    An electronic signature was used to authenticate this note.     YOLANDA Venegas-CNP

## 2020-04-09 RX ORDER — ROPINIROLE 2 MG/1
TABLET, FILM COATED ORAL
Qty: 90 TABLET | Refills: 0 | Status: SHIPPED | OUTPATIENT
Start: 2020-04-09 | End: 2020-05-05

## 2020-04-09 RX ORDER — CYCLOBENZAPRINE HCL 10 MG
TABLET ORAL
Qty: 60 TABLET | Refills: 0 | Status: SHIPPED | OUTPATIENT
Start: 2020-04-09 | End: 2020-04-27

## 2020-04-15 ENCOUNTER — TELEPHONE (OUTPATIENT)
Dept: ADMINISTRATIVE | Age: 60
End: 2020-04-15

## 2020-04-15 RX ORDER — BLOOD-GLUCOSE METER
1 KIT MISCELLANEOUS DAILY
Qty: 1 KIT | Refills: 0 | Status: SHIPPED | OUTPATIENT
Start: 2020-04-15

## 2020-04-15 RX ORDER — BLOOD-GLUCOSE METER
1 KIT MISCELLANEOUS
Qty: 200 EACH | Refills: 5 | Status: SHIPPED | OUTPATIENT
Start: 2020-04-15 | End: 2020-05-22 | Stop reason: SDUPTHER

## 2020-04-20 ENCOUNTER — TELEPHONE (OUTPATIENT)
Dept: FAMILY MEDICINE CLINIC | Age: 60
End: 2020-04-20

## 2020-04-20 NOTE — TELEPHONE ENCOUNTER
Patient c/ o of legs are swollen and also hands and in pain ongiong over four months. Patient wants to know if she can be place on lasix.

## 2020-04-27 RX ORDER — CYCLOBENZAPRINE HCL 10 MG
TABLET ORAL
Qty: 60 TABLET | Refills: 0 | Status: SHIPPED | OUTPATIENT
Start: 2020-04-27 | End: 2020-05-26

## 2020-05-05 RX ORDER — ROPINIROLE 2 MG/1
TABLET, FILM COATED ORAL
Qty: 90 TABLET | Refills: 0 | Status: SHIPPED | OUTPATIENT
Start: 2020-05-05 | End: 2020-06-08

## 2020-05-05 RX ORDER — CHOLECALCIFEROL (VITAMIN D3) 125 MCG
CAPSULE ORAL
Qty: 30 TABLET | Refills: 11 | Status: SHIPPED | OUTPATIENT
Start: 2020-05-05 | End: 2020-11-24 | Stop reason: SDUPTHER

## 2020-05-05 NOTE — TELEPHONE ENCOUNTER
lv
SC injection  --  --     Associated Diagnoses:   --     lisinopril (PRINIVIL;ZESTRIL) 5 MG tablet  04/03/20  --     Take 1 tablet by mouth daily     Associated Diagnoses:   Essential hypertension     loratadine (CLARITIN) 10 MG tablet  08/19/19  --     Take 1 tablet by mouth daily     Patient taking differently:  Take 10 mg by mouth daily as needed (Seasonal allergies)      Associated Diagnoses:   Recurrent acute suppurative otitis media without spontaneous rupture of left tympanic membrane     Magnesium Oxide 500 MG TABS  12/24/18  --     TAKE 1 TABLET BY MOUTH TWICE DAILY     Associated Diagnoses:   Hypomagnesemia     meclizine (ANTIVERT) 25 MG tablet  04/07/20 05/07/20     Take 1 tablet by mouth 3 times daily as needed for Dizziness     Associated Diagnoses:   Dizziness     metFORMIN (GLUCOPHAGE) 1000 MG tablet  11/04/19  --     TAKE 1 TABLET BY MOUTH TWICE DAILY     Associated Diagnoses:   Type 2 diabetes mellitus with diabetic neuropathy, with long-term current use of insulin (HCC)     NOVOLOG 100 UNIT/ML injection vial  09/27/19  --     Associated Diagnoses:   --     nystatin (NYSTATIN) 964621 UNIT/GM powder  03/03/20  --     APPLY POWDER TOPICALLY TO ABDOMINAL FOLDS THREE TIMES DAILY AS NEEDED FOR IRRITATION.      Associated Diagnoses:   Tinea corporis     oxybutynin (DITROPAN-XL) 10 MG extended release tablet  --  --     Associated Diagnoses:   --     pantoprazole (PROTONIX) 40 MG tablet  03/31/20  --     Take 1 tablet by mouth once daily     Associated Diagnoses:   --     rOPINIRole (REQUIP) 2 MG tablet  05/05/20  --     TAKE 1 TABLET BY MOUTH THREE TIMES DAILY     Associated Diagnoses:   --     venlafaxine (EFFEXOR XR) 37.5 MG extended release capsule  10/21/19  --     TAKE 1 CAPSULE BY MOUTH THREE TIMES DAILY     Associated Diagnoses:   --          Evelyn Erp, APRN-CNP

## 2020-05-14 ENCOUNTER — TELEPHONE (OUTPATIENT)
Dept: GASTROENTEROLOGY | Age: 60
End: 2020-05-14

## 2020-05-22 ENCOUNTER — TELEPHONE (OUTPATIENT)
Dept: FAMILY MEDICINE CLINIC | Age: 60
End: 2020-05-22

## 2020-05-22 RX ORDER — BLOOD-GLUCOSE METER
1 KIT MISCELLANEOUS
Qty: 1 DEVICE | Refills: 0 | Status: SHIPPED | OUTPATIENT
Start: 2020-05-22

## 2020-05-22 RX ORDER — BLOOD-GLUCOSE METER
1 KIT MISCELLANEOUS
Qty: 200 EACH | Refills: 5 | Status: SHIPPED | OUTPATIENT
Start: 2020-05-22 | End: 2020-11-24 | Stop reason: SDUPTHER

## 2020-05-26 ENCOUNTER — TELEPHONE (OUTPATIENT)
Dept: FAMILY MEDICINE CLINIC | Age: 60
End: 2020-05-26

## 2020-05-26 ENCOUNTER — HOSPITAL ENCOUNTER (OUTPATIENT)
Age: 60
Discharge: HOME | End: 2020-05-26
Payer: COMMERCIAL

## 2020-05-26 ENCOUNTER — HOSPITAL ENCOUNTER (EMERGENCY)
Age: 60
Discharge: HOME | End: 2020-05-26
Attending: EMERGENCY MEDICINE
Payer: COMMERCIAL

## 2020-05-26 VITALS
RESPIRATION RATE: 18 BRPM | HEIGHT: 61 IN | WEIGHT: 250 LBS | DIASTOLIC BLOOD PRESSURE: 79 MMHG | OXYGEN SATURATION: 96 % | BODY MASS INDEX: 47.2 KG/M2 | SYSTOLIC BLOOD PRESSURE: 144 MMHG | HEART RATE: 92 BPM | TEMPERATURE: 98 F

## 2020-05-26 LAB
ABSOLUTE EOS #: 0.2 K/UL (ref 0–0.4)
ABSOLUTE EOS #: 0.2 K/UL (ref 0–0.4)
ABSOLUTE IMMATURE GRANULOCYTE: ABNORMAL K/UL (ref 0–0.3)
ABSOLUTE IMMATURE GRANULOCYTE: ABNORMAL K/UL (ref 0–0.3)
ABSOLUTE LYMPH #: 2.3 K/UL (ref 1–4.8)
ABSOLUTE LYMPH #: 2.8 K/UL (ref 1–4.8)
ABSOLUTE MONO #: 0.6 K/UL (ref 0.1–1.3)
ABSOLUTE MONO #: 0.7 K/UL (ref 0.1–1.3)
ALBUMIN SERPL-MCNC: 4 G/DL (ref 3.5–5.2)
ALBUMIN SERPL-MCNC: 4.2 G/DL (ref 3.5–5.2)
ALBUMIN SERPL-MCNC: 4.3 G/DL (ref 3.5–5.2)
ALBUMIN/GLOBULIN RATIO: ABNORMAL (ref 1–2.5)
ALP BLD-CCNC: 104 U/L (ref 35–104)
ALP BLD-CCNC: 114 U/L (ref 35–104)
ALP BLD-CCNC: 115 U/L (ref 35–104)
ALT SERPL-CCNC: 12 U/L (ref 5–33)
ALT SERPL-CCNC: 13 U/L (ref 5–33)
ALT SERPL-CCNC: 13 U/L (ref 5–33)
ANION GAP SERPL CALCULATED.3IONS-SCNC: 12 MMOL/L (ref 9–17)
ANION GAP SERPL CALCULATED.3IONS-SCNC: 15 MMOL/L (ref 9–17)
ANION GAP SERPL CALCULATED.3IONS-SCNC: 15 MMOL/L (ref 9–17)
AST SERPL-CCNC: 16 U/L
AST SERPL-CCNC: 17 U/L
AST SERPL-CCNC: 17 U/L
BASOPHILS # BLD: 1 % (ref 0–2)
BASOPHILS # BLD: 1 % (ref 0–2)
BASOPHILS ABSOLUTE: 0.1 K/UL (ref 0–0.2)
BASOPHILS ABSOLUTE: 0.1 K/UL (ref 0–0.2)
BILIRUB SERPL-MCNC: 0.3 MG/DL (ref 0.3–1.2)
BILIRUB SERPL-MCNC: 0.35 MG/DL (ref 0.3–1.2)
BILIRUB SERPL-MCNC: 0.36 MG/DL (ref 0.3–1.2)
BUN BLDV-MCNC: 15 MG/DL (ref 6–20)
BUN BLDV-MCNC: 15 MG/DL (ref 6–20)
BUN BLDV-MCNC: 16 MG/DL (ref 6–20)
BUN/CREAT BLD: ABNORMAL (ref 9–20)
CALCIUM SERPL-MCNC: 9.3 MG/DL (ref 8.6–10.4)
CALCIUM SERPL-MCNC: 9.4 MG/DL (ref 8.6–10.4)
CALCIUM SERPL-MCNC: 9.5 MG/DL (ref 8.6–10.4)
CHLORIDE BLD-SCNC: 100 MMOL/L (ref 98–107)
CHLORIDE BLD-SCNC: 98 MMOL/L (ref 98–107)
CHLORIDE BLD-SCNC: 99 MMOL/L (ref 98–107)
CO2: 25 MMOL/L (ref 20–31)
CO2: 25 MMOL/L (ref 20–31)
CO2: 27 MMOL/L (ref 20–31)
CREAT SERPL-MCNC: 0.75 MG/DL (ref 0.5–0.9)
CREAT SERPL-MCNC: 0.78 MG/DL (ref 0.5–0.9)
CREAT SERPL-MCNC: 0.78 MG/DL (ref 0.5–0.9)
CREATININE URINE: 118.5 MG/DL (ref 28–217)
DIFFERENTIAL TYPE: ABNORMAL
DIFFERENTIAL TYPE: ABNORMAL
EOSINOPHILS RELATIVE PERCENT: 2 % (ref 0–4)
EOSINOPHILS RELATIVE PERCENT: 2 % (ref 0–4)
GFR AFRICAN AMERICAN: >60 ML/MIN
GFR NON-AFRICAN AMERICAN: >60 ML/MIN
GFR SERPL CREATININE-BSD FRML MDRD: ABNORMAL ML/MIN/{1.73_M2}
GLUCOSE BLD-MCNC: 201 MG/DL (ref 70–99)
GLUCOSE BLD-MCNC: 272 MG/DL (ref 70–99)
GLUCOSE BLD-MCNC: 281 MG/DL (ref 70–99)
HCT VFR BLD CALC: 37.7 % (ref 36–46)
HCT VFR BLD CALC: 39 % (ref 36–46)
HEMOGLOBIN: 12 G/DL (ref 12–16)
HEMOGLOBIN: 12.1 G/DL (ref 12–16)
IMMATURE GRANULOCYTES: ABNORMAL %
IMMATURE GRANULOCYTES: ABNORMAL %
LYMPHOCYTES # BLD: 22 % (ref 24–44)
LYMPHOCYTES # BLD: 25 % (ref 24–44)
MAGNESIUM: 1.3 MG/DL (ref 1.6–2.6)
MAGNESIUM: 1.3 MG/DL (ref 1.6–2.6)
MCH RBC QN AUTO: 26.4 PG (ref 26–34)
MCH RBC QN AUTO: 26.9 PG (ref 26–34)
MCHC RBC AUTO-ENTMCNC: 31.1 G/DL (ref 31–37)
MCHC RBC AUTO-ENTMCNC: 31.7 G/DL (ref 31–37)
MCV RBC AUTO: 84.7 FL (ref 80–100)
MCV RBC AUTO: 84.7 FL (ref 80–100)
MICROALBUMIN/CREAT 24H UR: 27 MG/L
MICROALBUMIN/CREAT UR-RTO: 23 MCG/MG CREAT
MONOCYTES # BLD: 5 % (ref 1–7)
MONOCYTES # BLD: 7 % (ref 1–7)
MYOGLOBIN: 30 NG/ML (ref 25–58)
NRBC AUTOMATED: ABNORMAL PER 100 WBC
NRBC AUTOMATED: ABNORMAL PER 100 WBC
PDW BLD-RTO: 16.8 % (ref 11.5–14.9)
PDW BLD-RTO: 16.8 % (ref 11.5–14.9)
PLATELET # BLD: 375 K/UL (ref 150–450)
PLATELET # BLD: 402 K/UL (ref 150–450)
PLATELET ESTIMATE: ABNORMAL
PLATELET ESTIMATE: ABNORMAL
PMV BLD AUTO: 8.7 FL (ref 6–12)
PMV BLD AUTO: 9 FL (ref 6–12)
POTASSIUM SERPL-SCNC: 4.6 MMOL/L (ref 3.7–5.3)
POTASSIUM SERPL-SCNC: 4.7 MMOL/L (ref 3.7–5.3)
POTASSIUM SERPL-SCNC: 4.7 MMOL/L (ref 3.7–5.3)
RBC # BLD: 4.45 M/UL (ref 4–5.2)
RBC # BLD: 4.6 M/UL (ref 4–5.2)
RBC # BLD: ABNORMAL 10*6/UL
RBC # BLD: ABNORMAL 10*6/UL
SEG NEUTROPHILS: 67 % (ref 36–66)
SEG NEUTROPHILS: 68 % (ref 36–66)
SEGMENTED NEUTROPHILS ABSOLUTE COUNT: 7.5 K/UL (ref 1.3–9.1)
SEGMENTED NEUTROPHILS ABSOLUTE COUNT: 7.5 K/UL (ref 1.3–9.1)
SODIUM BLD-SCNC: 137 MMOL/L (ref 135–144)
SODIUM BLD-SCNC: 139 MMOL/L (ref 135–144)
SODIUM BLD-SCNC: 140 MMOL/L (ref 135–144)
T3 FREE: 2.62 PG/ML (ref 2.02–4.43)
THYROXINE, FREE: 0.8 NG/DL (ref 0.93–1.7)
TOTAL CK: 132 U/L (ref 26–192)
TOTAL PROTEIN: 7 G/DL (ref 6.4–8.3)
TOTAL PROTEIN: 7.2 G/DL (ref 6.4–8.3)
TOTAL PROTEIN: 7.4 G/DL (ref 6.4–8.3)
TROPONIN INTERP: ABNORMAL
TROPONIN INTERP: ABNORMAL
TROPONIN T: ABNORMAL NG/ML
TROPONIN T: ABNORMAL NG/ML
TROPONIN, HIGH SENSITIVITY: 15 NG/L (ref 0–14)
TROPONIN, HIGH SENSITIVITY: 17 NG/L (ref 0–14)
TSH SERPL DL<=0.05 MIU/L-ACNC: 14.41 MIU/L (ref 0.3–5)
WBC # BLD: 10.8 K/UL (ref 3.5–11)
WBC # BLD: 11.1 K/UL (ref 3.5–11)
WBC # BLD: ABNORMAL 10*3/UL
WBC # BLD: ABNORMAL 10*3/UL

## 2020-05-26 PROCEDURE — 82043 UR ALBUMIN QUANTITATIVE: CPT

## 2020-05-26 PROCEDURE — 36415 COLL VENOUS BLD VENIPUNCTURE: CPT

## 2020-05-26 PROCEDURE — 84439 ASSAY OF FREE THYROXINE: CPT

## 2020-05-26 PROCEDURE — 83874 ASSAY OF MYOGLOBIN: CPT

## 2020-05-26 PROCEDURE — 83735 ASSAY OF MAGNESIUM: CPT

## 2020-05-26 PROCEDURE — 82570 ASSAY OF URINE CREATININE: CPT

## 2020-05-26 PROCEDURE — 84484 ASSAY OF TROPONIN QUANT: CPT

## 2020-05-26 PROCEDURE — 84481 FREE ASSAY (FT-3): CPT

## 2020-05-26 PROCEDURE — 99283 EMERGENCY DEPT VISIT LOW MDM: CPT

## 2020-05-26 PROCEDURE — 85025 COMPLETE CBC W/AUTO DIFF WBC: CPT

## 2020-05-26 PROCEDURE — 85027 COMPLETE CBC AUTOMATED: CPT

## 2020-05-26 PROCEDURE — 80053 COMPREHEN METABOLIC PANEL: CPT

## 2020-05-26 PROCEDURE — 84443 ASSAY THYROID STIM HORMONE: CPT

## 2020-05-26 PROCEDURE — 82550 ASSAY OF CK (CPK): CPT

## 2020-05-26 RX ORDER — DICYCLOMINE HYDROCHLORIDE 10 MG/1
10 CAPSULE ORAL 4 TIMES DAILY PRN
Qty: 120 CAPSULE | Refills: 0 | Status: SHIPPED | OUTPATIENT
Start: 2020-05-26 | End: 2020-06-29

## 2020-05-26 RX ORDER — ALBUTEROL SULFATE 90 UG/1
2 AEROSOL, METERED RESPIRATORY (INHALATION) EVERY 6 HOURS PRN
Qty: 1 INHALER | Refills: 3 | Status: SHIPPED | OUTPATIENT
Start: 2020-05-26 | End: 2021-04-16 | Stop reason: SDUPTHER

## 2020-05-26 RX ORDER — FUROSEMIDE 20 MG/1
20 TABLET ORAL DAILY
Qty: 30 TABLET | Refills: 0 | Status: SHIPPED | OUTPATIENT
Start: 2020-05-26 | End: 2020-06-23 | Stop reason: SDUPTHER

## 2020-05-26 RX ORDER — LORATADINE 10 MG/1
10 TABLET ORAL DAILY PRN
COMMUNITY
End: 2020-11-24 | Stop reason: SDUPTHER

## 2020-05-26 RX ORDER — BACLOFEN 20 MG
TABLET ORAL
Qty: 180 TABLET | Refills: 1 | Status: SHIPPED | OUTPATIENT
Start: 2020-05-26 | End: 2021-02-25 | Stop reason: SDUPTHER

## 2020-05-26 RX ORDER — CYCLOBENZAPRINE HCL 10 MG
TABLET ORAL
Qty: 60 TABLET | Refills: 0 | Status: SHIPPED | OUTPATIENT
Start: 2020-05-26 | End: 2020-06-08

## 2020-05-26 ASSESSMENT — ENCOUNTER SYMPTOMS
ABDOMINAL PAIN: 0
RESPIRATORY NEGATIVE: 1
BACK PAIN: 0
EYES NEGATIVE: 1
SHORTNESS OF BREATH: 0
GASTROINTESTINAL NEGATIVE: 1

## 2020-05-26 ASSESSMENT — PAIN SCALES - GENERAL: PAINLEVEL_OUTOF10: 7

## 2020-05-26 NOTE — ED PROVIDER NOTES
EMERGENCY DEPARTMENT ENCOUNTER    Pt Name: Chau Felton  MRN: [id number]  [place] 1960  Date of evaluation: 5/26/20  CHIEF COMPLAINT       Chief Complaint   Patient presents with    Leg Swelling    Other     abnormal labs     HISTORY OF PRESENT ILLNESS   The pt presents for evaluation of abnormal labs. She was called by her doctor and told to come in. Pt states she feels at her baseline. Per chart review, pt had mild low mag, she has been off her supplement for at least a month. Pt also had mild elevation to trop, but it was similar to priors. Glucose was elevated into the 200's but similar to prior rechecks. Pt denies active chest pain or shortness of breath. No cough or fever. Pt does state that she was supposed to get this blood work about a month ago but failed to follow up. Pt has an extensive history of noncompliance. Pt did state that she was previously on lasix and is interested in being restarted. The history is provided by the patient. REVIEW OF SYSTEMS     Review of Systems   Constitutional: Negative. Negative for chills and fever. HENT: Negative. Negative for congestion. Eyes: Negative. Respiratory: Negative. Negative for shortness of breath. Cardiovascular: Negative. Negative for chest pain. Gastrointestinal: Negative. Negative for abdominal pain. Genitourinary: Negative. Musculoskeletal: Negative. Negative for back pain. Skin: Negative. Negative for rash. Neurological: Negative. Negative for headaches. All other systems reviewed and are negative.     PASTMEDICAL HISTORY     Past Medical History:   Diagnosis Date    Anginal pain ([address]     last used Nitro sl 4 yrs 2013  (currently off this medication written: 10/23/2019)    Arthritis     Arthritis of right knee 8/15/2018    Asthma     Astigmatism 8/22/2014    Back pain     radiculopathy left leg    Blurry vision, bilateral 7/8/2016    Carpal tunnel syndrome of right wrist 11/26/2012    Cataract     Closed displaced fracture of lesser tuberosity of right humerus 7/11/2016    Constipation     Depression     Dysmenorrhea     Fatty liver disease, nonalcoholic     GERD (gastroesophageal reflux disease)     Gout     found through bloodwork    Headache     Heart murmur     Heart palpitations     Heart palpitations     History of rib fracture 7/6/2016    Right    Hyperlipidemia     Hypertension     Hyperthyroidism     Hypertriglyceridemia     Hypothyroidism 2/17/2016    Myopia with astigmatism and presbyopia 8/22/2014    Neuropathy     bilateral legs and feet    Obesity     Osteoporosis     Pancreatitis 2011    no problems    Panic attack 10/30/2014    Presbyopia 8/22/2014    RLS (restless legs syndrome)     Sleep apnea     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    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 diabetes mellitus without mention of complication, not stated as uncontrolled     checks daily     Past Problem List  Patient Active Problem List   Diagnosis Code    Hypercholesterolemia E78.00    Hypertriglyceridemia E78.1    Diabetic neuropathy ([address] E11.40    DESTINY (obstructive sleep apnea) G47.33    Vitamin D deficiency E55.9    Urinary incontinence R32    Allergic rhinitis J30.9    Degenerative lumbar disc M51.36    Sacroiliitis ([address] M46.1    Fatigue R53.83    Encounter for chronic pain management G89.29    Primary osteoarthritis of both shoulders M19.011, M19.012    Primary osteoarthritis of both knees M17.0    Primary osteoarthritis of both hips M16.0    Essential hypertension I10    Hypoactive thyroid E03.9    Calcified granuloma of lung (HCC) J84.10    Gastroesophageal reflux disease without esophagitis K21.9    Anemia D64.9    Hypomagnesemia E83.42    Palpitations R00.2    Vertigo R42    Persistent headaches R51    Osteoarthritis of right shoulder M19.011    Low serum low density lipoprotein (LDL) R79.89    Elevated uric acid in blood E79.0    Seasonal allergic rhinitis J30.2    Bursitis/tendonitis, shoulder M71.9, M67.919    Nuclear sclerosis H25.10    Iron deficiency anemia D50.9    Primary osteoarthritis of right shoulder M19.011    Leiomyoma of uterus M51.5    Umbilical hernia M29.0    Generalized headaches R51    Tinnitus H93.19    Diverticulosis of intestine without bleeding K57.90    RLS (restless legs syndrome) G25.81    Chronic midline low back pain with right-sided sciatica M54.41, G89.29    Acquired spondylolisthesis M43.10    Type 2 diabetes mellitus with diabetic neuropathy, with long-term current use of insulin ([facility]) E11.40, Z79.4    Morbid obesity with BMI of 45.0-49.9, adult ([facility]) E66.01, Z68.42    Localized swelling of both lower legs R22.43    Anterior subcapsular age-related cataract of both eyes H25.033    RPE (retinal pigment epithelium) atrophy H35.54     SURGICAL HISTORY       Past Surgical History:   Procedure Laterality Date    CARDIAC CATHETERIZATION      Patient states approximately  she had the cardiac catheterization that was negative      SECTION      x 2    COLONOSCOPY      DILATION AND CURETTAGE OF UTERUS      JOINT REPLACEMENT Left 2016    shoulder    [place] COLON CA SCRN NOT HI RSK IND N/A 10/12/2017    COLONOSCOPY performed by Kayla [facility]ona MD at [facility] EGD TRANSORAL BIOPSY SINGLE/MULTIPLE N/A 10/12/2017    EGD BIOPSY performed by Kayla Corona MD at [address] Right 2017    SHOULDER TOTAL ARTHROPLASTY RIGHT WITH ARTHREX, CELLSAVER AND AQUAMANTIS performed by Cinda Rooney DO at [place] [address] Right 2017     CURRENT MEDICATIONS       Discharge Medication List as of 2020  5:55 PM      CONTINUE these medications which have NOT CHANGED    Details   cyclobenzaprine statins; tricor [fenofibrate]; zetia [ezetimibe]; zocor [simvastatin]; ampicillin; niacin and related; novolin [insulin isophane & reg, human]; omeprazole; pcn [penicillins]; pregabalin; and vesicare [solifenacin]. FAMILY HISTORY     She indicated that her mother is . She indicated that her father is . She indicated that the status of her sister is unknown. She indicated that her brother is . SOCIAL HISTORY       Social History     Tobacco Use    Smoking status: Former Smoker     Packs/day: 0.50     Years: 20.00     Pack years: 10.00     Types: Cigarettes     Last attempt to quit: 1977     Years since quittin.5    Smokeless tobacco: Never Used   Substance Use Topics    Alcohol use: No     Alcohol/week: 0.0 standard drinks    Drug use: No     PHYSICAL EXAM     INITIAL VITALS: BP (!) 144/79   Pulse 92   Temp 98 °F (36.7 °C) (Oral)   Resp 18   Ht 5' 1\" (1.549 m)   Wt 250 lb (113.4 kg)   SpO2 96%   BMI 47.24 kg/m²    Physical Exam  Vitals signs and nursing note reviewed. Constitutional:       Appearance: Normal appearance. HENT:      Head: Normocephalic and atraumatic. Nose: No congestion. Mouth/Throat:      Mouth: Mucous membranes are moist.   Eyes:      Conjunctiva/sclera: Conjunctivae normal.   Neck:      Musculoskeletal: Normal range of motion and neck supple. Cardiovascular:      Rate and Rhythm: Normal rate and regular rhythm. Heart sounds: No murmur. Pulmonary:      Effort: Pulmonary effort is normal.      Breath sounds: Normal breath sounds. Abdominal:      Palpations: Abdomen is soft. Tenderness: There is no abdominal tenderness. Musculoskeletal:         General: No signs of injury. Comments: Trace peripheral edema. No calf tenderness. Skin:     General: Skin is warm and dry. Capillary Refill: Capillary refill takes less than 2 seconds. Neurological:      General: No focal deficit present.       Mental Status: She is

## 2020-05-27 NOTE — TELEPHONE ENCOUNTER
Noted, thanks- I do advise she get EKG done ASAP, preferably sooner than Friday. Make sure she knows to repeat lab work next week to re-check magnesium, make sure she starts taking the supplement twice/day as ordered. I also added lab work to check potassium next week w/magnesium since she is now on Lasix again- make sure she increases foods high in potassium in diet. Complete 2 lab orders at [facility] lab next week.

## 2020-05-27 NOTE — TELEPHONE ENCOUNTER
Reviewed ED report. EKG was not completed- order placed- please complete today if possible- may go back to [facility] through registration, let them know order is in her chart. She needs to call cardiology today for a follow up- [phone]. Please schedule ED follow up with me within the next week. Return to ED for any chest pain, SOB, palpitations, severe swelling at any time.

## 2020-06-08 PROBLEM — E79.0 HYPERURICEMIA: Status: ACTIVE | Noted: 2020-06-08

## 2020-06-08 RX ORDER — CYCLOBENZAPRINE HCL 10 MG
TABLET ORAL
Qty: 60 TABLET | Refills: 0 | Status: SHIPPED | OUTPATIENT
Start: 2020-06-08 | End: 2020-07-06

## 2020-06-08 RX ORDER — ROPINIROLE 2 MG/1
TABLET, FILM COATED ORAL
Qty: 90 TABLET | Refills: 0 | Status: SHIPPED | OUTPATIENT
Start: 2020-06-08 | End: 2020-07-13

## 2020-06-09 RX ORDER — PANTOPRAZOLE SODIUM 40 MG/1
TABLET, DELAYED RELEASE ORAL
Qty: 30 TABLET | Refills: 0 | OUTPATIENT
Start: 2020-06-09

## 2020-06-09 NOTE — TELEPHONE ENCOUNTER
Refill refused. Patient has not been seen in over 1 year. Last OV 10/29/2018. Canceled last appt.   Last exception 3/30/2020

## 2020-06-18 ENCOUNTER — TELEPHONE (OUTPATIENT)
Dept: FAMILY MEDICINE CLINIC | Age: 60
End: 2020-06-18

## 2020-06-18 ENCOUNTER — TELEPHONE (OUTPATIENT)
Dept: GASTROENTEROLOGY | Age: 60
End: 2020-06-18

## 2020-06-18 RX ORDER — PANTOPRAZOLE SODIUM 40 MG/1
TABLET, DELAYED RELEASE ORAL
Qty: 30 TABLET | Refills: 0 | Status: SHIPPED | OUTPATIENT
Start: 2020-06-18 | End: 2020-07-20

## 2020-06-18 NOTE — TELEPHONE ENCOUNTER
LVM for patient that she does not need to come into the office for an appointment on 6/22/20 we have changed to a phone visit. Any questions to call the office. PER Theresa.

## 2020-06-22 ENCOUNTER — OFFICE VISIT (OUTPATIENT)
Dept: GASTROENTEROLOGY | Age: 60
End: 2020-06-22
Payer: COMMERCIAL

## 2020-06-22 VITALS — WEIGHT: 267 LBS | RESPIRATION RATE: 22 BRPM | TEMPERATURE: 97.2 F | HEIGHT: 61 IN | BODY MASS INDEX: 50.41 KG/M2

## 2020-06-22 PROCEDURE — 3017F COLORECTAL CA SCREEN DOC REV: CPT | Performed by: INTERNAL MEDICINE

## 2020-06-22 PROCEDURE — G8417 CALC BMI ABV UP PARAM F/U: HCPCS | Performed by: INTERNAL MEDICINE

## 2020-06-22 PROCEDURE — G8427 DOCREV CUR MEDS BY ELIG CLIN: HCPCS | Performed by: INTERNAL MEDICINE

## 2020-06-22 PROCEDURE — 99214 OFFICE O/P EST MOD 30 MIN: CPT | Performed by: INTERNAL MEDICINE

## 2020-06-22 PROCEDURE — 1036F TOBACCO NON-USER: CPT | Performed by: INTERNAL MEDICINE

## 2020-06-22 RX ORDER — LIOTHYRONINE SODIUM 5 UG/1
5 TABLET ORAL DAILY
COMMUNITY

## 2020-06-22 RX ORDER — RABEPRAZOLE SODIUM 20 MG/1
40 TABLET, DELAYED RELEASE ORAL DAILY
Qty: 30 TABLET | Refills: 3 | Status: SHIPPED | OUTPATIENT
Start: 2020-06-22 | End: 2021-02-25 | Stop reason: ALTCHOICE

## 2020-06-22 ASSESSMENT — ENCOUNTER SYMPTOMS
DIARRHEA: 0
NAUSEA: 1
SINUS PRESSURE: 1
RECTAL PAIN: 0
SINUS PAIN: 1
CHOKING: 0
ABDOMINAL DISTENTION: 0
TROUBLE SWALLOWING: 0
COUGH: 0
WHEEZING: 0
BLOOD IN STOOL: 0
VOMITING: 0
ABDOMINAL PAIN: 1
ANAL BLEEDING: 0
CONSTIPATION: 1

## 2020-06-22 NOTE — PROGRESS NOTES
rib fracture 2016    Right    Hyperlipidemia     Hypertension     Hyperthyroidism     Hypertriglyceridemia     Hypothyroidism 2016    Myopia with astigmatism and presbyopia 2014    Neuropathy     bilateral legs and feet    Obesity     Osteoporosis     Pancreatitis     no problems    Panic attack 10/30/2014    Presbyopia 2014    RLS (restless legs syndrome)     Sleep apnea     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    Status post total replacement of right shoulder 2017    Stenosis of both internal carotid arteries- Mild 16-49% 2017    Type II or unspecified type diabetes mellitus without mention of complication, not stated as uncontrolled     checks daily       Past Surgical History:   Procedure Laterality Date   [address]      Patient states approximately  she had the cardiac catheterization that was negative      SECTION      x 2    COLONOSCOPY      DILATION AND CURETTAGE OF UTERUS      JOINT REPLACEMENT Left 2016    shoulder    [place] COLON CA SCRN NOT HI RSK IND N/A 10/12/2017    COLONOSCOPY performed by Eloisa Velasquez MD at [address] [facility] EGD TRANSORAL BIOPSY SINGLE/MULTIPLE N/A 10/12/2017    EGD BIOPSY performed by Eloisa Velasquez MD at [address] [facility] Right 2017    SHOULDER TOTAL ARTHROPLASTY RIGHT WITH [address], [address] performed by Joseph Brown DO at [address] [facility] Right 2017       CURRENT MEDICATIONS:    Current Outpatient Medications:     liothyronine (CYTOMEL) 5 MCG tablet, Take 5 mcg by mouth daily, Disp: , Rfl:     RABEprazole (ACIPHEX) 20 MG tablet, Take 2 tablets by mouth daily, Disp: 30 tablet, Rfl: 3    pantoprazole (PROTONIX) 40 MG tablet, Take 1 tablet by mouth once daily, Disp: 30 tablet, Rfl: 0    rOPINIRole (REQUIP) 2 MG tablet, TAKE 1 TABLET BY MOUTH THREE TIMES DAILY, Disp: 90 tablet, Rfl: 0    allopurinol

## 2020-06-23 RX ORDER — FUROSEMIDE 20 MG/1
20 TABLET ORAL DAILY
Qty: 90 TABLET | Refills: 3 | Status: SHIPPED | OUTPATIENT
Start: 2020-06-23 | End: 2021-06-21

## 2020-06-29 RX ORDER — DICYCLOMINE HYDROCHLORIDE 10 MG/1
10 CAPSULE ORAL 4 TIMES DAILY PRN
Qty: 120 CAPSULE | Refills: 0 | Status: SHIPPED | OUTPATIENT
Start: 2020-06-29 | End: 2020-07-27

## 2020-07-06 RX ORDER — GABAPENTIN 600 MG/1
TABLET ORAL
Qty: 270 TABLET | Refills: 0 | Status: SHIPPED | OUTPATIENT
Start: 2020-07-06 | End: 2021-01-28 | Stop reason: ALTCHOICE

## 2020-07-06 RX ORDER — CYCLOBENZAPRINE HCL 10 MG
TABLET ORAL
Qty: 60 TABLET | Refills: 0 | Status: SHIPPED | OUTPATIENT
Start: 2020-07-06 | End: 2020-09-28

## 2020-07-13 RX ORDER — ROPINIROLE 2 MG/1
TABLET, FILM COATED ORAL
Qty: 90 TABLET | Refills: 0 | Status: SHIPPED | OUTPATIENT
Start: 2020-07-13 | End: 2020-08-24

## 2020-07-13 NOTE — TELEPHONE ENCOUNTER
Please Approve or Refuse.        Next Visit Date:  Visit date not found   Last Visit Date: 1/13/2020    Hemoglobin A1C (%)   Date Value   01/29/2020 8.2 (H)   10/23/2019 8.2 (H)   05/15/2018 7.0 (H)   05/15/2018 7.0 (H)             ( goal A1C is < 7)   BP Readings from Last 3 Encounters:   05/26/20 (!) 144/79   01/30/20 (!) 153/88   01/23/20 (!) 178/93          (goal 120/80)  BUN   Date Value Ref Range Status   05/26/2020 15 6 - 20 mg/dL Final     CREATININE   Date Value Ref Range Status   05/26/2020 0.75 0.50 - 0.90 mg/dL Final     Potassium   Date Value Ref Range Status   05/26/2020 4.7 3.7 - 5.3 mmol/L Final

## 2020-07-20 RX ORDER — PANTOPRAZOLE SODIUM 40 MG/1
TABLET, DELAYED RELEASE ORAL
Qty: 90 TABLET | Refills: 3 | Status: SHIPPED | OUTPATIENT
Start: 2020-07-20 | End: 2021-08-05 | Stop reason: SDUPTHER

## 2020-07-27 RX ORDER — DICYCLOMINE HYDROCHLORIDE 10 MG/1
CAPSULE ORAL
Qty: 120 CAPSULE | Refills: 0 | Status: SHIPPED | OUTPATIENT
Start: 2020-07-27 | End: 2020-08-31

## 2020-07-28 ENCOUNTER — OFFICE VISIT (OUTPATIENT)
Dept: NEUROLOGY | Age: 60
End: 2020-07-28
Payer: COMMERCIAL

## 2020-07-28 VITALS
HEIGHT: 61 IN | TEMPERATURE: 97.9 F | SYSTOLIC BLOOD PRESSURE: 116 MMHG | HEART RATE: 93 BPM | DIASTOLIC BLOOD PRESSURE: 70 MMHG | WEIGHT: 256 LBS | BODY MASS INDEX: 48.33 KG/M2

## 2020-07-28 PROCEDURE — 99214 OFFICE O/P EST MOD 30 MIN: CPT | Performed by: PSYCHIATRY & NEUROLOGY

## 2020-07-28 PROCEDURE — 2022F DILAT RTA XM EVC RTNOPTHY: CPT | Performed by: PSYCHIATRY & NEUROLOGY

## 2020-07-28 PROCEDURE — 3017F COLORECTAL CA SCREEN DOC REV: CPT | Performed by: PSYCHIATRY & NEUROLOGY

## 2020-07-28 PROCEDURE — G8427 DOCREV CUR MEDS BY ELIG CLIN: HCPCS | Performed by: PSYCHIATRY & NEUROLOGY

## 2020-07-28 PROCEDURE — 3052F HG A1C>EQUAL 8.0%<EQUAL 9.0%: CPT | Performed by: PSYCHIATRY & NEUROLOGY

## 2020-07-28 PROCEDURE — 1036F TOBACCO NON-USER: CPT | Performed by: PSYCHIATRY & NEUROLOGY

## 2020-07-28 PROCEDURE — G8417 CALC BMI ABV UP PARAM F/U: HCPCS | Performed by: PSYCHIATRY & NEUROLOGY

## 2020-07-28 RX ORDER — DULOXETIN HYDROCHLORIDE 60 MG/1
60 CAPSULE, DELAYED RELEASE ORAL NIGHTLY
Qty: 30 CAPSULE | Refills: 5 | Status: SHIPPED | OUTPATIENT
Start: 2020-07-28 | End: 2021-02-01

## 2020-07-28 NOTE — PROGRESS NOTES
[facility]  Offices: [address], [place], [address] [facility]  [address] [facility], [address], [place], [address] [facility]  [address] [place] [address] Milvia Mendez, [address]  Phone: [phone]  Fax: [phone]    SOHANJorge MD Jigar Barker MD Ahmed B. Garnett Karst, MD Princeton Jan, MD Jerolyn Reader, MD Aden Lipoma, CNP    7/28/2020      HISTORY OF PRESENT ILLNESS:       I had the pleasure of seeing Juan Jose Bosch, who returns for continuing neurologic care for chronic daily headache as well as medication overuse headaches from daily Excedrin use.  She also has untreated sleep apnea, unwilling to pursue CPAP therapy. On her last visit, we started lidocaine cream to help with her diabetic polyneuropathy. Unfortunately, patient reports her nerve pain continues to be uncontrolled, despite being on gabapentin 600 mg 3 times daily. She uses the lidocaine cream once or twice a week and does not really feel much benefit from it. She continues to have constant jabbing and pins-and-needles pain on the bottom of her feet which is worse at night, and will disrupt her sleep. Pain severity ranges from 5-7 over 10, aggravated by weightbearing and prolonged standing, partially relieved by rest.  She also reports spasms and aching in both her legs to the point that her toes will turn in. Of note, she is scheduled to have knee replacement bilaterally. Thankfully, her headaches have done really well. She is not on any prophylactic treatment and currently reports a headache frequency of twice a week. They are also mild, she occasionally will take an Excedrin Migraine and gets relief within 30 minutes. She continues to ambulate with a cane, denies any recent falls. She denies any new symptoms, no new medications.       Prior testing reviewed:  Noncontrast head CT July 2016: Unremarkable     Current abortive meds: excedrin (relief within 30 minutes)  Current prophylactic meds: Magnesium oxide 500 mg twice a day, gabapentin 600 mg 3 times a day (taken for diabetes), Effexor XR 37.5 mg 3 times daily  Previous abortive medications tried: none  Previous prophylactic medications tried: Amitriptyline (no relief, itching and dysesthesias), Topamax (dizziness and tremors) Topamax        PAST MEDICAL HISTORY:         Diagnosis Date    Anginal pain ([address]     last used Nitro sl 4 yrs 2013  (currently off this medication written: 10/23/2019)    Arthritis     Arthritis of right knee 8/15/2018    Asthma     Astigmatism 8/22/2014    Back pain     radiculopathy left leg    Blurry vision, bilateral 7/8/2016    Carpal tunnel syndrome of right wrist 11/26/2012    Cataract     Closed displaced fracture of lesser tuberosity of right humerus 7/11/2016    Constipation     Depression     Dysmenorrhea     Fatty liver disease, nonalcoholic     GERD (gastroesophageal reflux disease)     Gout     found through bloodwork    Headache     Heart murmur     Heart palpitations     Heart palpitations     History of rib fracture 7/6/2016    Right    Hyperlipidemia     Hypertension     Hyperthyroidism     Hypertriglyceridemia     Hypothyroidism 2/17/2016    Myopia with astigmatism and presbyopia 8/22/2014    Neuropathy     bilateral legs and feet    Obesity     Osteoporosis     Pancreatitis 2011    no problems    Panic attack 10/30/2014    Presbyopia 8/22/2014    RLS (restless legs syndrome)     Sleep apnea     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    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 diabetes mellitus without mention of complication, not stated as uncontrolled     checks daily        PAST SURGICAL HISTORY:         Procedure Laterality Date    CARDIAC CATHETERIZATION      Patient states approximately 2007 she had the cardiac catheterization that was negative      SECTION      x 2    COLONOSCOPY      DILATION AND CURETTAGE OF UTERUS      JOINT REPLACEMENT Left 2016    shoulder    KS COLON CA SCRN NOT HI RSK IND N/A 10/12/2017    COLONOSCOPY performed by Lizett Zambrano MD at [facility] EGD TRANSORAL BIOPSY SINGLE/MULTIPLE N/A 10/12/2017    EGD BIOPSY performed by Lizett Zambrano MD at [address] Right 2017    SHOULDER TOTAL ARTHROPLASTY RIGHT WITH [address], [address] [facility] AND [place]QUAMANTIS performed by Steve Serna DO at [facility] Right 2017        SOCIAL HISTORY:     Social History     Socioeconomic History    Marital status:      Spouse name: Not on file    Number of children: Not on file    Years of education: Not on file    Highest education level: Not on file   Occupational History     Employer: DISABLED   Social Needs    Financial resource strain: Not on file    Food insecurity     Worry: Not on file     Inability: Not on file    Transportation needs     Medical: Not on file     Non-medical: Not on file   Tobacco Use    Smoking status: Former Smoker     Packs/day: 0.50     Years: 20.00     Pack years: 10.00     Types: Cigarettes     Last attempt to quit: 1977     Years since quittin.6    Smokeless tobacco: Never Used   Substance and Sexual Activity    Alcohol use: No     Alcohol/week: 0.0 standard drinks    Drug use: No    Sexual activity: Yes     Partners: Male   Lifestyle    Physical activity     Days per week: Not on file     Minutes per session: Not on file    Stress: Not on file   Relationships    Social connections     Talks on phone: Not on file     Gets together: Not on file     Attends Voodoo service: Not on file     Active member of club or organization: Not on file     Attends meetings of clubs or organizations: Not on file     Relationship status: Not on file    Intimate partner violence     Fear of current or ex Ozempic (0.25 Or 0.5 Mg-Dose) [Semaglutide(0.25 Or 0.5mg-Dos)] Other (See Comments)     Severe stomach pain    Actos [Pioglitazone]      Patient unsure of reaction    Amitriptyline Hives    Celebrex [Celecoxib] Hives, Itching and Dermatitis     Burnt red blister on ashlee    Fenofibrate Other (See Comments)     Muscle cramps    Gemfibrozil Other (See Comments)     Muscle pain, spasms, weakness and HA  Muscle pain, spasms, weakness and HA    Lovastatin Other (See Comments)     Muscle cramps  Muscle cramps    Prempro [Conj Estrog-Medroxyprogest Ace] Other (See Comments)     Breast tenderness, pain in vagina, cramping    Statins Other (See Comments)     Muscle cramps  Lipitor ok    Tricor [Fenofibrate] Other (See Comments)     Muscle cramps    Zetia [Ezetimibe] Other (See Comments)     Does not remember reaction  Does not remember reaction  Does not remember reaction    Zocor [Simvastatin] Other (See Comments)     Muscle cramps    Ampicillin Rash    Niacin And Related Rash    Novolin [Insulin Isophane & Reg, Human] Rash    Omeprazole Nausea And Vomiting    Pcn [Penicillins] Rash    Pregabalin Nausea And Vomiting and Rash    Vesicare [Solifenacin] Rash                             REVIEW OF SYSTEMS    CONSTITUTIONAL Weight: present, Appetite: present, Fatigue: present      HEENT Ears: normal, Visual disturbance: absent   RESPIRATORY Shortness of breath: absent, Cough: absent   CARDIOVASCULAR Chest pain: absent, Leg swelling :present      GI Constipation: present, Diarrhea: absent, Swallowing change: absent       Urinary frequency: absent, Urinary urgency: absent, Urinary incontinence: absent   MUSCULOSKELETAL Neck pain: present, Back pain: present, Stiffness: absent, Muscle pain: present, Joint pain: present Restless legs: present   DERMATOLOGIC Hair loss: present, Skin changes: absent   NEUROLOGIC Memory loss: absent, Confusion: present, Seizures: absent Trouble walking or imbalance: present, Dizziness: present, Weakness: present, Numbness: absent Tremor: absent, Spasm: absent, Speech difficulty: absent, Headache: absent, Light sensitivity: absent   PSYCHIATRIC Anxiety: absent, Hallucination: absent, Mood disorder: absent   HEMATOLOGIC Abnormal bleeding: absent, Anemia: absent, Clotting disorder: absent, Lymph gland changes: absent           PHYSICAL EXAMINATION:       Vitals:    07/28/20 1538   BP: 116/70   Pulse: 93   Temp: 97.9 °F (36.6 °C)                                              .                                                                                                     General Appearance:  Alert, cooperative, no signs of distress, appears stated age   Head:  Normocephalic, no signs of trauma   Eyes:  Conjunctiva/corneas clear;  eyelids intact   Ears:  Normal external ear and canals   Nose: Nares normal, mucosa normal, no drainage    Throat: Lips and tongue normal; teeth normal;  gums normal   Neck: Supple, intact flexion, extension and rotation;   trachea midline;  no adenopathy;   thyroid: not enlarged;   no carotid pulse abnormality   Back:   Symmetric, no curvature, ROM adequate   Lungs:   Respirations unlabored   Heart:  Regular rate and rhythm           Extremities: Extremities normal, no cyanosis, no edema   Pulses: Symmetric over head and neck   Skin: Skin color, texture normal, no rashes, no lesions                                     NEUROLOGIC EXAMINATION    Mental status    Alert and oriented x 3; intact memory with no confusion, speech or language problems; no hallucinations or delusions  Fund of information appropriate for level of education    Cranial nerves    II - visual fields intact to confrontation , fundoscopy showed no  papiledema                                                III, IV, VI - extra-ocular muscles full: no pupillary defect; no MILAGROS, no nystagmus, no ptosis   V - normal facial sensation                                                               VII - normal

## 2020-08-10 RX ORDER — VENLAFAXINE HYDROCHLORIDE 37.5 MG/1
CAPSULE, EXTENDED RELEASE ORAL
Qty: 90 CAPSULE | Refills: 0 | Status: SHIPPED | OUTPATIENT
Start: 2020-08-10 | End: 2020-09-08

## 2020-08-18 ENCOUNTER — OFFICE VISIT (OUTPATIENT)
Dept: ORTHOPEDIC SURGERY | Age: 60
End: 2020-08-18
Payer: COMMERCIAL

## 2020-08-18 VITALS
WEIGHT: 260.4 LBS | HEART RATE: 93 BPM | BODY MASS INDEX: 49.17 KG/M2 | TEMPERATURE: 98.2 F | DIASTOLIC BLOOD PRESSURE: 89 MMHG | SYSTOLIC BLOOD PRESSURE: 141 MMHG | HEIGHT: 61 IN

## 2020-08-18 PROCEDURE — 99213 OFFICE O/P EST LOW 20 MIN: CPT | Performed by: PHYSICIAN ASSISTANT

## 2020-08-18 PROCEDURE — 3017F COLORECTAL CA SCREEN DOC REV: CPT | Performed by: PHYSICIAN ASSISTANT

## 2020-08-18 PROCEDURE — G8427 DOCREV CUR MEDS BY ELIG CLIN: HCPCS | Performed by: PHYSICIAN ASSISTANT

## 2020-08-18 PROCEDURE — G8417 CALC BMI ABV UP PARAM F/U: HCPCS | Performed by: PHYSICIAN ASSISTANT

## 2020-08-18 PROCEDURE — 1036F TOBACCO NON-USER: CPT | Performed by: PHYSICIAN ASSISTANT

## 2020-08-18 ASSESSMENT — ENCOUNTER SYMPTOMS
CONSTIPATION: 0
APNEA: 0
CHEST TIGHTNESS: 0
NAUSEA: 0
VOMITING: 0
COUGH: 0
SHORTNESS OF BREATH: 0
ABDOMINAL PAIN: 0
COLOR CHANGE: 0
ABDOMINAL DISTENTION: 0
DIARRHEA: 0

## 2020-08-18 NOTE — PROGRESS NOTES
[address] [facility]  [address] [place] [id number]  Dept: [phone]  Dept Fax: [phone]          Right Knee - Follow Up     Subjective:     Chief Complaint   Patient presents with    Knee Pain     right knee pain  last injection april of 2019     HPI:     Varghese Garcia presents today for Right knee pain. The pain has been present for years. The patient recalls no specific injury. The patient has tried rest and a cane with minimal improvement. The pain is now described as Achy and Dull. There is pain on weight bearing. The knee has not swelled. There is not painful popping and clicking. The knee has not caught or locked up. The knee has not given out. It is stiff upon arising from sitting. It is painful to go up and down stairs and sit for a prolonged time. The patient has had a cortisone injection on 04/02/2019 with good pain relief for 8 weeks. The patient has not tried a lubrication injection. The patient has not tried physical therapy. The patient has not had surgery. Patient states that she had lost weight but her PCP placed her on a different insulin and she feels the insulin caused her to gain more weight since the last time we saw her. The patient has gained 25 pounds since her last office visit on 11/07/2019. ROS:   Review of Systems   Constitutional: Positive for activity change. Negative for appetite change, fatigue and fever. Respiratory: Negative for apnea, cough, chest tightness and shortness of breath. Cardiovascular: Negative for chest pain, palpitations and leg swelling. Gastrointestinal: Negative for abdominal distention, abdominal pain, constipation, diarrhea, nausea and vomiting. Genitourinary: Negative for difficulty urinating, dysuria and hematuria. Musculoskeletal: Positive for arthralgias. Negative for gait problem, joint swelling and myalgias. Skin: Negative for color change and rash. Neurological: Negative for dizziness, weakness, numbness and headaches. Psychiatric/Behavioral: Negative for sleep disturbance.      Past Medical History:    Past Medical History:   Diagnosis Date    Anginal pain ([address]     last used Nitro sl 4 yrs   (currently off this medication written: 10/23/2019)    Arthritis     Arthritis of right knee 8/15/2018    Asthma     Astigmatism 2014    Back pain     radiculopathy left leg    Blurry vision, bilateral 2016    Carpal tunnel syndrome of right wrist 2012    Cataract     Closed displaced fracture of lesser tuberosity of right humerus 2016    Constipation     Depression     Dysmenorrhea     Fatty liver disease, nonalcoholic     GERD (gastroesophageal reflux disease)     Gout     found through bloodwork    Headache     Heart 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    Status post total replacement of right shoulder 2017    Stenosis of both internal carotid arteries- Mild 16-49% 2017    Type II or unspecified type diabetes mellitus without mention of complication, not stated as uncontrolled     checks daily     Past Surgical History:    Past Surgical History:   Procedure Laterality Date    CARDIAC CATHETERIZATION      Patient states approximately  she had the cardiac catheterization that was negative      SECTION      x 2    COLONOSCOPY      DILATION AND CURETTAGE OF UTERUS      JOINT REPLACEMENT Left 03/22/2016    shoulder    VT COLON CA SCRN NOT HI RSK IND N/A 10/12/2017    COLONOSCOPY performed by Roxanne Peck MD at [facility] EGD TRANSORAL BIOPSY SINGLE/MULTIPLE N/A 10/12/2017    EGD BIOPSY performed by Roxanne Peck MD at [address] Right 5/9/2017    SHOULDER TOTAL ARTHROPLASTY RIGHT WITH ARTHREX, CELLSAVER AND AQUAMANTIS performed by Sarah Walter DO at [facility] Right 05/09/2017     Current Medications:   Current Outpatient Medications   Medication Sig Dispense Refill    venlafaxine (EFFEXOR XR) 37.5 MG extended release capsule TAKE 1 CAPSULE BY MOUTH THREE TIMES DAILY 90 capsule 0    Ferrous Sulfate (IRON) 325 (65 Fe) MG TABS Take 1 tablet by mouth once daily with breakfast 90 tablet 0    DULoxetine (CYMBALTA) 60 MG extended release capsule Take 1 capsule by mouth nightly 30 capsule 5    dicyclomine (BENTYL) 10 MG capsule TAKE 1 CAPSULE BY MOUTH 4 TIMES DAILY AS NEEDED FOR CRAMPING 120 capsule 0    pantoprazole (PROTONIX) 40 MG tablet Take 1 tablet by mouth once daily 90 tablet 3    rOPINIRole (REQUIP) 2 MG tablet TAKE 1 TABLET BY MOUTH THREE TIMES DAILY 90 tablet 0    cyclobenzaprine (FLEXERIL) 10 MG tablet Take 1 tablet by mouth three times daily as needed for muscle spasm 60 tablet 0    gabapentin (NEURONTIN) 600 MG tablet TAKE 1 TABLET BY MOUTH THREE TIMES DAILY FOR 90 DAYS 270 tablet 0    nystatin (NYSTATIN) 801159 UNIT/GM powder APPLY POWDER TOPICALLY TO ABDOMINAL FOLDS THREE TIMES DAILY AS NEEDED FOR SKIN IRRITATION.  60 g 0    furosemide (LASIX) 20 MG tablet Take 1 tablet by mouth daily 90 tablet 3    liothyronine (CYTOMEL) 5 MCG tablet Take 5 mcg by mouth daily      RABEprazole (ACIPHEX) 20 MG tablet Take 2 tablets by mouth daily 30 tablet 3    allopurinol (ZYLOPRIM) 100 MG tablet Take 1 tablet by mouth once daily 90 tablet 1    Magnesium Oxide 500 MG TABS TAKE 1 TABLET BY MOUTH TWICE DAILY 180 tablet 1    albuterol sulfate HFA 108 (90 Base) MCG/ACT inhaler Inhale 2 puffs into the lungs every 6 hours as needed for Wheezing or Shortness of Breath 1 Inhaler 3    loratadine (CLARITIN) 10 MG tablet Take 10 mg by mouth daily as needed (allergies)      blood glucose test strips (FREESTYLE LITE) strip 1 each by Does not apply route 6 times daily 200 each 5    Blood Glucose Monitoring Suppl (FREESTYLE LITE) KYLAH 1 Device by Does not apply route 6 times daily 1 Device 0    Cholecalciferol (VITAMIN D3) 50 MCG (2000 UT) TABS Take 1 tablet by mouth once daily 30 tablet 11    glucose monitoring kit (FREESTYLE) monitoring kit 1 kit by Does not apply route daily 1 kit 0    oxybutynin (DITROPAN-XL) 10 MG extended release tablet Take 10 mg by mouth daily      insulin glargine (BASAGLAR KWIKPEN) 100 UNIT/ML injection pen Inject 35 Units into the skin nightly      lisinopril (PRINIVIL;ZESTRIL) 5 MG tablet Take 1 tablet by mouth daily 90 tablet 1    Cyanocobalamin (B-12) 2500 MCG TABS Take 1 tablet by mouth daily      ammonium lactate (LAC-HYDRIN) 12 % cream Apply topically as needed. 1 Bottle 3    lidocaine (XYLOCAINE) 5 % ointment Apply topically as needed. 1 Tube 3    Liraglutide (VICTOZA) 18 MG/3ML SOPN SC injection Inject 1.8 mg into the skin daily       metFORMIN (GLUCOPHAGE) 1000 MG tablet TAKE 1 TABLET BY MOUTH TWICE DAILY 180 tablet 3    NOVOLOG 100 UNIT/ML injection vial Per V GO. Max 76 units daily  2    atorvastatin (LIPITOR) 20 MG tablet TAKE 1 TABLET BY MOUTH NIGHTLY 90 tablet 3    aspirin 81 MG chewable tablet Take 81 mg by mouth daily 90 tablet 3    fluticasone (FLONASE) 50 MCG/ACT nasal spray 1 spray by Nasal route daily 1 Bottle 0    levothyroxine (SYNTHROID) 100 MCG tablet TAKE 1 TABLET BY MOUTH ONCE DAILY (Patient taking differently: 112 mcg ) 90 tablet 3    Compression Stockings MISC by Does not apply route Wear daily for swelling, pain, and support. 20-30 mmHg.  2 each 0    Insulin Disposable Pump (V-GO 40) KIT Use as directed per Dr. Jaida Khan.  Flaxseed, Linseed, (FLAX SEED OIL) 1000 MG CAPS Take 1,000 mg by mouth daily       No current facility-administered medications for this visit. Allergies:    Ozempic (0.25 or 0.5 mg-dose) [semaglutide(0.25 or 0.5mg-dos)]; Actos [pioglitazone]; Amitriptyline; Celebrex [celecoxib]; Fenofibrate; Gemfibrozil; Lovastatin; Prempro [conj estrog-medroxyprogest ace]; Statins; Tricor [fenofibrate]; Zetia [ezetimibe]; Zocor [simvastatin]; Ampicillin; Niacin and related; Novolin [insulin isophane & reg, human]; Omeprazole; Pcn [penicillins];  Pregabalin; and Vesicare [solifenacin]    Social History:   Social History     Socioeconomic History    Marital status:      Spouse name: Not on file    Number of children: Not on file    Years of education: Not on file    Highest education level: Not on file   Occupational History     Employer: DISABLED   Social Needs    Financial resource strain: Not on file    Food insecurity     Worry: Not on file     Inability: Not on file    Transportation needs     Medical: Not on file     Non-medical: Not on file   Tobacco Use    Smoking status: Former Smoker     Packs/day: 0.50     Years: 20.00     Pack years: 10.00     Types: Cigarettes     Last attempt to quit: 1977     Years since quittin.7    Smokeless tobacco: Never Used   Substance and Sexual Activity    Alcohol use: No     Alcohol/week: 0.0 standard drinks    Drug use: No    Sexual activity: Yes     Partners: Male   Lifestyle    Physical activity     Days per week: Not on file     Minutes per session: Not on file    Stress: Not on file   Relationships    Social connections     Talks on phone: Not on file     Gets together: Not on file     Attends Spiritism service: Not on file     Active member of club or organization: Not on file     Attends meetings of clubs or organizations: Not on file     Relationship status: Not on file    Intimate partner violence     Fear of current bilateral tunnel, and lateral in the upright position, and skyline views reveal varus alignment with no fracture or dislocation. Kellgren grade IV changes of osteoarthritis (joint space narrowing, osteophyte, subchondral sclerosis, bony deformity/cyst) of the tricompartment(s). No osseous loose bodies. No bony erosion or periosteal reaction. No soft tissue masses. Significant spurring of the superior pole of the patella on the left. Impression: Severe osteoarthritis of the bilateral knees. ---------------------------------------------------------------------------------------------------------------------  Xr Knee Right (min 4 Views)    Result Date: 8/18/2020  KNEE X-RAY 4 views of the bilateral knees including AP, bilateral tunnel, and lateral in the upright position, and skyline views reveal varus alignment with no fracture or dislocation. Kellgren grade IV changes of osteoarthritis (joint space narrowing, osteophyte, subchondral sclerosis, bony deformity/cyst) of the tricompartment(s). No osseous loose bodies. No bony erosion or periosteal reaction. No soft tissue masses. Significant spurring of the superior pole of the patella on the left. Impression: Severe osteoarthritis of the bilateral knees. Plan:   Treatment : I reviewed the X-ray with the patient and I informed her that the knees have reached end stage osteoarthritis which means they are now bone on bone at a kellgren grade IV. We discussed the etiologies and natural histories of primary osteoarthritis of the bilateral knee. We discussed the various treatment alternatives including anti-inflammatory medications, physical therapy, injections, further imaging studies and as a last result surgery. During today's visit, I explained to the patient that she needs to get her weight down to a BMI that is closer 40 and she also needs to get her A1C down as well to under 7.4 before we can carry out the surgery to fix her knee osteoarthritis.  The patient then asked me what she can do to lose weight and I told her that she should adjust her diet to help reduce her caloric intake to help her reduce her weight. I also told her that she should adjust her diet as well to help lower her A1C as well. In terms of exercise, I instructed her to find a pool where she can do her knee exercises in the pool to help strengthen her knees and to help decrease her weight. The patient then stated that she cannot exercise due to her knee pain. I then told her that she should be able to do it in the pool because the water decreases the amount of force she is placing on her knees. I also informed the patient that she may also try [facility] weight loss or she can also try to doing a gastric bypass to help get her weight down. The patient then stated that she needs a few months to work on things and then once she has gotten the weight and A1C down, she will come back to see us to schedule the surgery. I then informed her that I understand but if she would like some help with scheduling the [facility] weight loss, she can call us and we will help her with that. So at this time, the patient has opted for a note with her goals of weight loss and the number she needs to get to for her A1C. Patient was also given a prescription for a walker to help take pressure off of her knee when ambulating. Patient should return to the clinic in 8 weeks to follow up with Pritesh Jacques D.O. The patient will call the office immediately with any problems. No orders of the defined types were placed in this encounter.     Orders Placed This Encounter   Procedures    XR KNEE RIGHT (MIN 4 VIEWS)     Standing Status:   Future     Number of Occurrences:   1     Standing Expiration Date:   8/18/2021     Order Specific Question:   Reason for exam:     Answer:   pain    XR KNEE LEFT (1-2 VIEWS)     Standing Status:   Future     Number of Occurrences:   1     Standing Expiration Date:   8/18/2021     Order Specific Question:   Reason for exam:     Answer:   knee pain    DME Order for Walker as OP     You must complete the order parameters below and add the medical necessity documentation for this DME in a separate note. Folding Walker with Wheels    Current patient weight: Weight: 260 lb 6.4 oz (118.1 kg)  Current patient height: Height: 5' 1\" (154.9 cm)  Diagnosis: Bilateral knee OA  Duration: Purchase     I, Edward Day V, am scribing for and in the presence of Kyle Crawford PA-C. 8/18/2020  3:59 PM    I, Kyle Crawford PA-C, have personally seen this patient, reviewed the chart including history, and imaging if done. I personally  performed the physical exam and obtained any needed additional history. I placed orders, performed or supervised procedures and developed the treatment plan.     Electronically signed by Carmelita Godwin PA-C, on 8/18/2020 at 4:00 PM      Electronically signed by Carmelita Godwin PA-C, on 8/18/2020 at 3:59 PM

## 2020-08-24 RX ORDER — ROPINIROLE 2 MG/1
TABLET, FILM COATED ORAL
Qty: 90 TABLET | Refills: 0 | Status: SHIPPED | OUTPATIENT
Start: 2020-08-24 | End: 2020-09-21

## 2020-09-08 RX ORDER — VENLAFAXINE HYDROCHLORIDE 37.5 MG/1
CAPSULE, EXTENDED RELEASE ORAL
Qty: 90 CAPSULE | Refills: 0 | Status: SHIPPED | OUTPATIENT
Start: 2020-09-08 | End: 2020-10-06

## 2020-09-11 RX ORDER — LISINOPRIL 5 MG/1
TABLET ORAL
Qty: 90 TABLET | Refills: 1 | Status: SHIPPED | OUTPATIENT
Start: 2020-09-11 | End: 2021-04-01

## 2020-09-21 RX ORDER — ROPINIROLE 2 MG/1
TABLET, FILM COATED ORAL
Qty: 90 TABLET | Refills: 0 | Status: SHIPPED | OUTPATIENT
Start: 2020-09-21 | End: 2020-10-26

## 2020-09-28 RX ORDER — CYCLOBENZAPRINE HCL 10 MG
TABLET ORAL
Qty: 60 TABLET | Refills: 0 | Status: SHIPPED | OUTPATIENT
Start: 2020-09-28 | End: 2020-10-26

## 2020-10-05 RX ORDER — ASPIRIN 81 MG/1
TABLET, CHEWABLE ORAL
Qty: 30 TABLET | Refills: 0 | Status: SHIPPED | OUTPATIENT
Start: 2020-10-05 | End: 2020-11-20 | Stop reason: SDUPTHER

## 2020-10-06 RX ORDER — VENLAFAXINE HYDROCHLORIDE 37.5 MG/1
CAPSULE, EXTENDED RELEASE ORAL
Qty: 90 CAPSULE | Refills: 0 | Status: SHIPPED | OUTPATIENT
Start: 2020-10-06 | End: 2020-11-16

## 2020-10-06 RX ORDER — MECLIZINE HYDROCHLORIDE 25 MG/1
TABLET ORAL
Qty: 60 TABLET | Refills: 0 | Status: SHIPPED | OUTPATIENT
Start: 2020-10-06 | End: 2022-03-04

## 2020-10-26 ENCOUNTER — TELEPHONE (OUTPATIENT)
Dept: ORTHOPEDIC SURGERY | Age: 60
End: 2020-10-26

## 2020-10-26 RX ORDER — CYCLOBENZAPRINE HCL 10 MG
TABLET ORAL
Qty: 60 TABLET | Refills: 0 | Status: SHIPPED | OUTPATIENT
Start: 2020-10-26 | End: 2020-11-16

## 2020-10-26 RX ORDER — ROPINIROLE 2 MG/1
TABLET, FILM COATED ORAL
Qty: 90 TABLET | Refills: 0 | Status: SHIPPED | OUTPATIENT
Start: 2020-10-26 | End: 2020-12-01

## 2020-10-26 RX ORDER — ATORVASTATIN CALCIUM 20 MG/1
TABLET, FILM COATED ORAL
Qty: 30 TABLET | Refills: 0 | Status: SHIPPED | OUTPATIENT
Start: 2020-10-26 | End: 2020-11-20 | Stop reason: SDUPTHER

## 2020-10-26 NOTE — TELEPHONE ENCOUNTER
Patient says she left a voicemail on the office phone last Friday to cancel todays appt. She says she has only lost 8lbs so not enough to follow through with the surgery.       She is requesting GOPI Roger call her to discuss the situation    Thank you

## 2020-10-26 NOTE — TELEPHONE ENCOUNTER
Please Approve or Refuse.      Next Visit Date:  Visit date not found   Last Visit Date: 1/13/2020    Hemoglobin A1C (%)   Date Value   01/29/2020 8.2 (H)   10/23/2019 8.2 (H)   05/15/2018 7.0 (H)   05/15/2018 7.0 (H)             ( goal A1C is < 7)   BP Readings from Last 3 Encounters:   08/18/20 (!) 141/89   07/28/20 116/70   05/26/20 (!) 144/79          (goal 120/80)  BUN   Date Value Ref Range Status   05/26/2020 15 6 - 20 mg/dL Final     CREATININE   Date Value Ref Range Status   05/26/2020 0.75 0.50 - 0.90 mg/dL Final     Potassium   Date Value Ref Range Status   05/26/2020 4.7 3.7 - 5.3 mmol/L Final

## 2020-11-02 NOTE — TELEPHONE ENCOUNTER
Patient left a message over the weekend that her legs have been very restless and her medication is not working. Patient is asking for something stronger for her legs. She has not been able to sleep in two days. Please advise.

## 2020-11-16 RX ORDER — VENLAFAXINE HYDROCHLORIDE 37.5 MG/1
CAPSULE, EXTENDED RELEASE ORAL
Qty: 90 CAPSULE | Refills: 0 | Status: SHIPPED | OUTPATIENT
Start: 2020-11-16 | End: 2020-12-15

## 2020-11-16 RX ORDER — CYCLOBENZAPRINE HCL 10 MG
TABLET ORAL
Qty: 60 TABLET | Refills: 0 | Status: SHIPPED | OUTPATIENT
Start: 2020-11-16 | End: 2020-12-15

## 2020-11-20 RX ORDER — ATORVASTATIN CALCIUM 20 MG/1
20 TABLET, FILM COATED ORAL DAILY
Qty: 30 TABLET | Refills: 0 | Status: SHIPPED | OUTPATIENT
Start: 2020-11-20 | End: 2020-12-23 | Stop reason: SDUPTHER

## 2020-11-20 RX ORDER — CYCLOBENZAPRINE HCL 10 MG
TABLET ORAL
Qty: 60 TABLET | Refills: 0 | Status: CANCELLED | OUTPATIENT
Start: 2020-11-20

## 2020-11-20 RX ORDER — PNV NO.95/FERROUS FUM/FOLIC AC 28MG-0.8MG
TABLET ORAL
Qty: 90 TABLET | Refills: 0 | Status: SHIPPED | OUTPATIENT
Start: 2020-11-20 | End: 2020-11-24 | Stop reason: SDUPTHER

## 2020-11-20 RX ORDER — ASPIRIN 81 MG/1
TABLET, CHEWABLE ORAL
Qty: 30 TABLET | Refills: 0 | Status: SHIPPED | OUTPATIENT
Start: 2020-11-20 | End: 2020-11-24 | Stop reason: SDUPTHER

## 2020-11-20 RX ORDER — ROPINIROLE 2 MG/1
TABLET, FILM COATED ORAL
Qty: 90 TABLET | Refills: 0 | Status: CANCELLED | OUTPATIENT
Start: 2020-11-20

## 2020-11-24 ENCOUNTER — OFFICE VISIT (OUTPATIENT)
Dept: FAMILY MEDICINE CLINIC | Age: 60
End: 2020-11-24
Payer: COMMERCIAL

## 2020-11-24 VITALS
OXYGEN SATURATION: 95 % | BODY MASS INDEX: 49.47 KG/M2 | WEIGHT: 262 LBS | HEIGHT: 61 IN | SYSTOLIC BLOOD PRESSURE: 120 MMHG | HEART RATE: 84 BPM | DIASTOLIC BLOOD PRESSURE: 70 MMHG | TEMPERATURE: 98.6 F

## 2020-11-24 PROBLEM — K86.1 CHRONIC PANCREATITIS (HCC): Status: ACTIVE | Noted: 2020-11-24

## 2020-11-24 LAB — HBA1C MFR BLD: 7.9 %

## 2020-11-24 PROCEDURE — 3017F COLORECTAL CA SCREEN DOC REV: CPT | Performed by: NURSE PRACTITIONER

## 2020-11-24 PROCEDURE — G8427 DOCREV CUR MEDS BY ELIG CLIN: HCPCS | Performed by: NURSE PRACTITIONER

## 2020-11-24 PROCEDURE — 2022F DILAT RTA XM EVC RTNOPTHY: CPT | Performed by: NURSE PRACTITIONER

## 2020-11-24 PROCEDURE — G8417 CALC BMI ABV UP PARAM F/U: HCPCS | Performed by: NURSE PRACTITIONER

## 2020-11-24 PROCEDURE — G8484 FLU IMMUNIZE NO ADMIN: HCPCS | Performed by: NURSE PRACTITIONER

## 2020-11-24 PROCEDURE — 99214 OFFICE O/P EST MOD 30 MIN: CPT | Performed by: NURSE PRACTITIONER

## 2020-11-24 PROCEDURE — 1036F TOBACCO NON-USER: CPT | Performed by: NURSE PRACTITIONER

## 2020-11-24 PROCEDURE — 3051F HG A1C>EQUAL 7.0%<8.0%: CPT | Performed by: NURSE PRACTITIONER

## 2020-11-24 PROCEDURE — 83036 HEMOGLOBIN GLYCOSYLATED A1C: CPT | Performed by: NURSE PRACTITIONER

## 2020-11-24 RX ORDER — CHOLECALCIFEROL (VITAMIN D3) 125 MCG
CAPSULE ORAL
Qty: 90 TABLET | Refills: 1 | Status: SHIPPED | OUTPATIENT
Start: 2020-11-24 | End: 2021-05-28

## 2020-11-24 RX ORDER — BLOOD-GLUCOSE METER
1 KIT MISCELLANEOUS
Qty: 200 EACH | Refills: 5 | Status: SHIPPED | OUTPATIENT
Start: 2020-11-24

## 2020-11-24 RX ORDER — ZOSTER VACCINE RECOMBINANT, ADJUVANTED 50 MCG/0.5
0.5 KIT INTRAMUSCULAR SEE ADMIN INSTRUCTIONS
Qty: 0.5 ML | Refills: 1 | Status: SHIPPED | OUTPATIENT
Start: 2020-11-24 | End: 2021-02-25 | Stop reason: ALTCHOICE

## 2020-11-24 RX ORDER — PNV NO.95/FERROUS FUM/FOLIC AC 28MG-0.8MG
TABLET ORAL
Qty: 90 TABLET | Refills: 1 | Status: SHIPPED | OUTPATIENT
Start: 2020-11-24 | End: 2021-11-08

## 2020-11-24 RX ORDER — ASPIRIN 81 MG/1
TABLET, CHEWABLE ORAL
Qty: 90 TABLET | Refills: 1 | Status: SHIPPED | OUTPATIENT
Start: 2020-11-24 | End: 2021-05-19 | Stop reason: SDUPTHER

## 2020-11-24 RX ORDER — LORATADINE 10 MG/1
10 TABLET ORAL DAILY
Qty: 90 TABLET | Refills: 1 | Status: SHIPPED | OUTPATIENT
Start: 2020-11-24 | End: 2021-06-21

## 2020-11-24 ASSESSMENT — ENCOUNTER SYMPTOMS
RHINORRHEA: 0
VOMITING: 0
NAUSEA: 0
BACK PAIN: 0
COUGH: 0
SORE THROAT: 0
CHEST TIGHTNESS: 0
ABDOMINAL DISTENTION: 0
SHORTNESS OF BREATH: 0
ABDOMINAL PAIN: 0
CONSTIPATION: 0
DIARRHEA: 0

## 2020-11-24 ASSESSMENT — PATIENT HEALTH QUESTIONNAIRE - PHQ9
SUM OF ALL RESPONSES TO PHQ QUESTIONS 1-9: 1
1. LITTLE INTEREST OR PLEASURE IN DOING THINGS: 1
SUM OF ALL RESPONSES TO PHQ9 QUESTIONS 1 & 2: 1
SUM OF ALL RESPONSES TO PHQ QUESTIONS 1-9: 1
SUM OF ALL RESPONSES TO PHQ QUESTIONS 1-9: 1
2. FEELING DOWN, DEPRESSED OR HOPELESS: 0

## 2020-11-24 NOTE — PROGRESS NOTES
Visit Information    Have you changed or started any medications since your last visit including any over-the-counter medicines, vitamins, or herbal medicines? no   Are you having any side effects from any of your medications? -  no  Have you stopped taking any of your medications? Is so, why? -  no    Have you seen any other physician or provider since your last visit? Yes - Records Requested  Have you had any other diagnostic tests since your last visit? No  Have you been seen in the emergency room and/or had an admission to a hospital since we last saw you? No  Have you had your routine dental cleaning in the past 6 months? yes -     Have you activated your [employer] account? If not, what are your barriers?  Yes     Patient Care Team:  YOLANDA Richardson NP as PCP - General (Nurse Practitioner)  YOLANDA Penny CNP as PCP - [facility]  Siegfried Severs, MD as Orthopedic Surgeon (Orthopedic Surgery)  Anna Sunshine MD as Surgeon (Cardiology)  YOLANDA Penny CNP (Family Medicine)  Taryn Martell MD as Consulting Physician (Gastroenterology)    Medical History Review  Past Medical, Family, and Social History reviewed and does not contribute to the patient presenting condition    Health Maintenance   Topic Date Due    Hepatitis B vaccine (1 of 3 - Risk 3-dose series) 12/19/1979    DTaP/Tdap/Td vaccine (1 - Tdap) 12/19/1979    Shingles Vaccine (1 of 2) 12/19/2010    Breast cancer screen  09/16/2018    Diabetic retinal exam  03/18/2020    Flu vaccine (1) 09/01/2020    Diabetic foot exam  01/13/2021    A1C test (Diabetic or Prediabetic)  01/29/2021    Lipid screen  01/29/2021    Diabetic microalbuminuria test  05/26/2021    TSH testing  05/26/2021    Potassium monitoring  05/26/2021    Creatinine monitoring  05/26/2021    Cervical cancer screen  04/24/2022    Colon cancer screen colonoscopy  10/12/2022    Hepatitis C screen  Addressed    HIV screen  Addressed    Hepatitis A vaccine  Aged Out    Hib vaccine  Aged Out    Meningococcal (ACWY) vaccine  Aged Out    Pneumococcal 0-64 years Vaccine  Aged Out

## 2020-12-01 RX ORDER — ROPINIROLE 2 MG/1
TABLET, FILM COATED ORAL
Qty: 90 TABLET | Refills: 0 | Status: SHIPPED | OUTPATIENT
Start: 2020-12-01 | End: 2021-01-13

## 2020-12-15 RX ORDER — CYCLOBENZAPRINE HCL 10 MG
TABLET ORAL
Qty: 60 TABLET | Refills: 0 | Status: SHIPPED | OUTPATIENT
Start: 2020-12-15 | End: 2021-01-06

## 2020-12-15 RX ORDER — VENLAFAXINE HYDROCHLORIDE 37.5 MG/1
CAPSULE, EXTENDED RELEASE ORAL
Qty: 90 CAPSULE | Refills: 0 | Status: SHIPPED | OUTPATIENT
Start: 2020-12-15 | End: 2021-01-19

## 2020-12-17 ENCOUNTER — TELEPHONE (OUTPATIENT)
Dept: GASTROENTEROLOGY | Age: 60
End: 2020-12-17

## 2020-12-21 ENCOUNTER — OFFICE VISIT (OUTPATIENT)
Dept: PODIATRY | Age: 60
End: 2020-12-21
Payer: COMMERCIAL

## 2020-12-21 VITALS — HEIGHT: 61 IN | BODY MASS INDEX: 49.47 KG/M2 | WEIGHT: 262 LBS

## 2020-12-21 PROCEDURE — 99213 OFFICE O/P EST LOW 20 MIN: CPT | Performed by: PODIATRIST

## 2020-12-21 PROCEDURE — 1036F TOBACCO NON-USER: CPT | Performed by: PODIATRIST

## 2020-12-21 PROCEDURE — G8427 DOCREV CUR MEDS BY ELIG CLIN: HCPCS | Performed by: PODIATRIST

## 2020-12-21 PROCEDURE — 2022F DILAT RTA XM EVC RTNOPTHY: CPT | Performed by: PODIATRIST

## 2020-12-21 PROCEDURE — G8417 CALC BMI ABV UP PARAM F/U: HCPCS | Performed by: PODIATRIST

## 2020-12-21 PROCEDURE — 3017F COLORECTAL CA SCREEN DOC REV: CPT | Performed by: PODIATRIST

## 2020-12-21 PROCEDURE — G8484 FLU IMMUNIZE NO ADMIN: HCPCS | Performed by: PODIATRIST

## 2020-12-21 PROCEDURE — 11721 DEBRIDE NAIL 6 OR MORE: CPT | Performed by: PODIATRIST

## 2020-12-21 PROCEDURE — 3051F HG A1C>EQUAL 7.0%<8.0%: CPT | Performed by: PODIATRIST

## 2020-12-21 NOTE — PROGRESS NOTES
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    Status post total replacement of right shoulder 2017    Stenosis of both internal carotid arteries- Mild 16-49% 2017    Type II or unspecified type diabetes mellitus without mention of complication, not stated as uncontrolled     checks daily       Surgical History:   Past Surgical History:   Procedure Laterality Date    CARDIAC CATHETERIZATION      Patient states approximately  she had the cardiac catheterization that was negative      SECTION      x 2    COLONOSCOPY      DILATION AND CURETTAGE OF UTERUS      JOINT REPLACEMENT Left 2016    shoulder    MA COLON CA SCRN NOT HI RSK IND N/A 10/12/2017    COLONOSCOPY performed by Yoselin Almonte MD at [address] [facility] EGD TRANSORAL BIOPSY SINGLE/MULTIPLE N/A 10/12/2017    EGD BIOPSY performed by Yoselin Almonte MD at [address] Right 2017    SHOULDER TOTAL ARTHROPLASTY RIGHT WITH [address], [address] [facility] AND AQUAMANTIS performed by Michel Meek DO at [place] [id number] Right 2017       Social History:  Social History     Tobacco Use    Smoking status: Former Smoker     Packs/day: 0.50     Years: 20.00     Pack years: 10.00     Types: Cigarettes     Quit date: 1977     Years since quittin.0    Smokeless tobacco: Never Used   Substance Use Topics    Alcohol use: No     Alcohol/week: 0.0 standard drinks    Drug use:  No Medications:  Prior to Admission medications    Medication Sig Start Date End Date Taking?  Authorizing Provider   cyclobenzaprine (FLEXERIL) 10 MG tablet Take 1 tablet by mouth three times daily as needed for muscle spasm 12/15/20  Yes Rod Russ, CAROL   venlafaxine (EFFEXOR XR) 37.5 MG extended release capsule TAKE 1 CAPSULE BY MOUTH THREE TIMES DAILY 12/15/20  Yes Rod Russ DPM   rOPINIRole (REQUIP) 2 MG tablet TAKE 1 TABLET BY MOUTH THREE TIMES DAILY 12/1/20  Yes Rod Russ DPM   zoster recombinant adjuvanted vaccine [facility]) 50 MCG/0.5ML SUSR injection Inject 0.5 mLs into the muscle See Admin Instructions 1 dose now and repeat in 2-6 months 11/24/20 5/23/21 Yes YOLANDA Neil NP   Cholecalciferol (VITAMIN D3) 50 MCG (2000 UT) TABS Take 1 tablet by mouth once daily 11/24/20  Yes YOLANDA Neil NP   blood glucose test strips (FREESTYLE LITE) strip 1 each by Does not apply route 6 times daily 11/24/20  Yes YOLANDA Neil NP   Ferrous Sulfate (IRON) 325 (65 Fe) MG TABS Take 1 tablet by mouth once daily with breakfast 11/24/20  Yes YOLANDA Neil NP   aspirin 81 MG chewable tablet CHEW AND SWALLOW 1 TABLET BY MOUTH ONCE DAILY 11/24/20  Yes YOLANDA Neil NP   loratadine (CLARITIN) 10 MG tablet Take 1 tablet by mouth daily 11/24/20 2/22/21 Yes YOLANDA Neil NP   metFORMIN (GLUCOPHAGE) 1000 MG tablet TAKE 1 TABLET BY MOUTH TWICE DAILY 11/20/20  Yes YOLANDA Walls CNP   meclizine (ANTIVERT) 25 MG tablet TAKE 1 TABLET BY MOUTH THREE TIMES DAILY AS NEEDED FOR DIZZINESS 10/6/20  Yes YOLANDA Walls CNP   lisinopril (PRINIVIL;ZESTRIL) 5 MG tablet Take 1 tablet by mouth once daily 9/11/20  Yes YOLANDA Walls CNP   dicyclomine (BENTYL) 10 MG capsule TAKE 1 CAPSULE BY MOUTH 4 TIMES DAILY AS NEEDED FOR CRAMPS 8/31/20  Yes Jacobo Baxter MD   nystatin (NYSTATIN) 734115 UNIT/GM powder APPLY POWDER TOPIALLY TO ABDOMINAL FOLDS THREE TIMES DAILY AS NEEDED FOR SKIN IRRITATION 8/28/20  Yes YOLANDA Harman CNP   DULoxetine (CYMBALTA) 60 MG extended release capsule Take 1 capsule by mouth nightly 7/28/20  Yes Saintclair Hopper, MD   pantoprazole (PROTONIX) 40 MG tablet Take 1 tablet by mouth once daily 7/20/20  Yes Sandie Garcia MD   furosemide (LASIX) 20 MG tablet Take 1 tablet by mouth daily 6/23/20  Yes Vicky Lanza MD   liothyronine (CYTOMEL) 5 MCG tablet Take 5 mcg by mouth daily   Yes Historical Provider, MD   RABEprazole (ACIPHEX) 20 MG tablet Take 2 tablets by mouth daily 6/22/20  Yes Sandie Garcia MD   allopurinol (ZYLOPRIM) 100 MG tablet Take 1 tablet by mouth once daily 6/8/20  Yes YOLANDA Harman CNP   Magnesium Oxide 500 MG TABS TAKE 1 TABLET BY MOUTH TWICE DAILY 5/26/20  Yes YOLANDA Harman CNP   albuterol sulfate  (90 Base) MCG/ACT inhaler Inhale 2 puffs into the lungs every 6 hours as needed for Wheezing or Shortness of Breath 5/26/20  Yes YOLANDA Harman CNP   Blood Glucose Monitoring Suppl (FREESTYLE LITE) KYLAH 1 Device by Does not apply route 6 times daily 5/22/20  Yes YOLANDA Harman CNP   glucose monitoring kit (FREESTYLE) monitoring kit 1 kit by Does not apply route daily 4/15/20  Yes YOLANDA Harman CNP   oxybutynin (DITROPAN-XL) 10 MG extended release tablet Take 10 mg by mouth daily   Yes Historical Provider, MD   insulin glargine (BASAGLAR KWIKPEN) 100 UNIT/ML injection pen Inject 35 Units into the skin nightly   Yes Historical Provider, MD   Cyanocobalamin (B-12) 2500 MCG TABS Take 1 tablet by mouth daily   Yes Historical Provider, MD   ammonium lactate (LAC-HYDRIN) 12 % cream Apply topically as needed. 2/5/20  Yes Teodoro Da Silva DPM   lidocaine (XYLOCAINE) 5 % ointment Apply topically as needed.  1/23/20  Yes Saintclair Hopper, MD   Liraglutide (VICTOZA) 18 MG/3ML SOPN SC injection Inject 1.8 mg into the skin daily    Yes Historical Provider, MD   NOVOLOG 100 UNIT/ML injection vial Per V GO. Max 76 units daily 9/27/19  Yes Historical Provider, MD   fluticasone (FLONASE) 50 MCG/ACT nasal spray 1 spray by Nasal route daily 8/7/19  Yes YOLANDA Dobson CNP   levothyroxine (SYNTHROID) 100 MCG tablet TAKE 1 TABLET BY MOUTH ONCE DAILY  Patient taking differently: 100 mcg  6/26/19  Yes YOLANDA Collazo CNP   Compression Stockings MISC by Does not apply route Wear daily for swelling, pain, and support. 20-30 mmHg. 6/12/19  Yes YOLANDA Collazo CNP   Insulin Disposable Pump (V-GO 40) KIT Use as directed per Dr. Rosa Mcdowell. Yes Historical Provider, MD   Flaxseed Linseed, (FLAX SEED OIL) 1000 MG CAPS Take 1,000 mg by mouth daily   Yes Historical Provider, MD   atorvastatin (LIPITOR) 20 MG tablet Take 1 tablet by mouth daily 11/20/20 12/20/20  YOLANDA Sharp CNP   gabapentin (NEURONTIN) 600 MG tablet TAKE 1 TABLET BY MOUTH THREE TIMES DAILY FOR 90 DAYS 7/6/20 8/5/20  Hawk Plater, DPM       Objective     There were no vitals filed for this visit. Lab Results   Component Value Date    LABA1C 7.9 11/24/2020       Physical Exam:  General:  Alert and oriented x3. In no acute distress. Lower Extremity Physical Exam:    Vascular: DP and PT pulses are palpable, Bilateral. CFT <3 seconds to all digits, Bilateral.  No edema, Bilateral.  Hair growth is absent to the level of the digits, Bilateral.     Neuro: Saph/sural/SP/DP/plantar sensation intact to light touch. Protective sensation is intact to 9/10 sites as tested with a 5.07g SWMF, Bilateral.     Musculoskeletal: EHL/FHL/GS/TA gross motor intact. TTP to distal digits b/l. Gross deformity is absent, Bilateral.     Dermatologic: No open lesions, Bilateral.  Interdigital maceration absent, Bilateral. Nails 1-5 bilateral thickened and elongated with subungual debris noted. Assessment   Brice Knight is a 61 y.o. female with     Diagnosis Orders   1.  Type 2 diabetes mellitus with diabetic polyneuropathy, with long-term current use of insulin ([address]  Amb External Referral For Orthotics   2. Foot deformity, bilateral  Amb External Referral For Orthotics   3. Onychomycosis          Plan   · Patient examined and evaluated. · Diagnosis and treatment options discussed in detail. · Mycotic nails 1-10 debrided sharply with sterile nail nippers without incident. · Discussed titration of gabapentin, she is currently at 600mg TID, continue current prescription and can re-evaluate efficacy at her next visit and potentially increase dose should her symptoms continue to remain recalcitrant. · Patient was educated on the utmost importance of tight glycemic control. · Patient was advised to continue daily foot checks, in addition to not ambulating barefoot. · Patient to RTC in 3 months. · Please, call the office with any questions or concerns. I evaluated the patient today and they meet the requirement for diabetic shoes with custom insoles. We will fit the patient in the office once we have received the appropriate paperwork. No orders of the defined types were placed in this encounter.     Orders Placed This Encounter   Procedures    Amb External Referral For Orthotics     Referral Priority:   Routine     Referral Type:   Consult for Advice and Opinion     Requested Specialty:   Durable Medical Equipment     Number of Visits Requested:   100 Danny Saldana DPM   Podiatric Medicine & Surgery   12/21/2020 at 4:27 PM

## 2020-12-23 RX ORDER — ATORVASTATIN CALCIUM 20 MG/1
20 TABLET, FILM COATED ORAL DAILY
Qty: 30 TABLET | Refills: 0 | Status: SHIPPED | OUTPATIENT
Start: 2020-12-23 | End: 2020-12-28

## 2020-12-23 RX ORDER — ALLOPURINOL 100 MG/1
TABLET ORAL
Qty: 90 TABLET | Refills: 1 | Status: SHIPPED | OUTPATIENT
Start: 2020-12-23 | End: 2021-07-08

## 2020-12-28 RX ORDER — ATORVASTATIN CALCIUM 20 MG/1
TABLET, FILM COATED ORAL
Qty: 30 TABLET | Refills: 0 | Status: SHIPPED | OUTPATIENT
Start: 2020-12-28 | End: 2021-01-25

## 2021-01-06 RX ORDER — CYCLOBENZAPRINE HCL 10 MG
TABLET ORAL
Qty: 60 TABLET | Refills: 0 | Status: SHIPPED | OUTPATIENT
Start: 2021-01-06 | End: 2021-02-01

## 2021-01-13 RX ORDER — ROPINIROLE 2 MG/1
TABLET, FILM COATED ORAL
Qty: 90 TABLET | Refills: 0 | Status: SHIPPED | OUTPATIENT
Start: 2021-01-13 | End: 2021-02-15

## 2021-01-13 NOTE — TELEPHONE ENCOUNTER
Please Approve or Refuse.        Next Visit Date:  3/16/2021   Last Visit Date: 12/21/2020    Hemoglobin A1C (%)   Date Value   11/24/2020 7.9   01/29/2020 8.2 (H)   10/23/2019 8.2 (H)             ( goal A1C is < 7)   BP Readings from Last 3 Encounters:   11/24/20 120/70   08/18/20 (!) 141/89   07/28/20 116/70          (goal 120/80)  BUN   Date Value Ref Range Status   05/26/2020 15 6 - 20 mg/dL Final     CREATININE   Date Value Ref Range Status   05/26/2020 0.75 0.50 - 0.90 mg/dL Final     Potassium   Date Value Ref Range Status   05/26/2020 4.7 3.7 - 5.3 mmol/L Final

## 2021-01-19 RX ORDER — VENLAFAXINE HYDROCHLORIDE 37.5 MG/1
CAPSULE, EXTENDED RELEASE ORAL
Qty: 90 CAPSULE | Refills: 0 | Status: SHIPPED | OUTPATIENT
Start: 2021-01-19 | End: 2021-02-22

## 2021-01-24 DIAGNOSIS — E78.5 HYPERLIPIDEMIA, UNSPECIFIED HYPERLIPIDEMIA TYPE: ICD-10-CM

## 2021-01-25 RX ORDER — ATORVASTATIN CALCIUM 20 MG/1
TABLET, FILM COATED ORAL
Qty: 90 TABLET | Refills: 3 | Status: SHIPPED | OUTPATIENT
Start: 2021-01-25 | End: 2021-08-05 | Stop reason: ALTCHOICE

## 2021-01-28 ENCOUNTER — OFFICE VISIT (OUTPATIENT)
Dept: NEUROLOGY | Age: 61
End: 2021-01-28
Payer: COMMERCIAL

## 2021-01-28 ENCOUNTER — TELEPHONE (OUTPATIENT)
Dept: NEUROLOGY | Age: 61
End: 2021-01-28

## 2021-01-28 VITALS
DIASTOLIC BLOOD PRESSURE: 75 MMHG | HEIGHT: 61 IN | BODY MASS INDEX: 47.2 KG/M2 | TEMPERATURE: 97.3 F | SYSTOLIC BLOOD PRESSURE: 116 MMHG | HEART RATE: 88 BPM | WEIGHT: 250 LBS

## 2021-01-28 DIAGNOSIS — G47.33 OSA (OBSTRUCTIVE SLEEP APNEA): ICD-10-CM

## 2021-01-28 DIAGNOSIS — G44.40 MEDICATION OVERUSE HEADACHE: ICD-10-CM

## 2021-01-28 DIAGNOSIS — R51.9 CHRONIC DAILY HEADACHE: ICD-10-CM

## 2021-01-28 DIAGNOSIS — E11.42 DIABETIC POLYNEUROPATHY ASSOCIATED WITH TYPE 2 DIABETES MELLITUS (HCC): Primary | ICD-10-CM

## 2021-01-28 PROCEDURE — G8427 DOCREV CUR MEDS BY ELIG CLIN: HCPCS | Performed by: PSYCHIATRY & NEUROLOGY

## 2021-01-28 PROCEDURE — 3017F COLORECTAL CA SCREEN DOC REV: CPT | Performed by: PSYCHIATRY & NEUROLOGY

## 2021-01-28 PROCEDURE — 2022F DILAT RTA XM EVC RTNOPTHY: CPT | Performed by: PSYCHIATRY & NEUROLOGY

## 2021-01-28 PROCEDURE — G8417 CALC BMI ABV UP PARAM F/U: HCPCS | Performed by: PSYCHIATRY & NEUROLOGY

## 2021-01-28 PROCEDURE — 1036F TOBACCO NON-USER: CPT | Performed by: PSYCHIATRY & NEUROLOGY

## 2021-01-28 PROCEDURE — 3046F HEMOGLOBIN A1C LEVEL >9.0%: CPT | Performed by: PSYCHIATRY & NEUROLOGY

## 2021-01-28 PROCEDURE — 99214 OFFICE O/P EST MOD 30 MIN: CPT | Performed by: PSYCHIATRY & NEUROLOGY

## 2021-01-28 PROCEDURE — G8484 FLU IMMUNIZE NO ADMIN: HCPCS | Performed by: PSYCHIATRY & NEUROLOGY

## 2021-01-28 RX ORDER — PREGABALIN 50 MG/1
50 CAPSULE ORAL 3 TIMES DAILY
Qty: 21 CAPSULE | Refills: 0 | Status: SHIPPED | OUTPATIENT
Start: 2021-01-28 | End: 2021-08-05 | Stop reason: ALTCHOICE

## 2021-01-28 RX ORDER — PREGABALIN 50 MG/1
150 CAPSULE ORAL 3 TIMES DAILY
Qty: 270 CAPSULE | Refills: 5 | Status: SHIPPED | OUTPATIENT
Start: 2021-01-28 | End: 2021-02-12 | Stop reason: SINTOL

## 2021-01-28 RX ORDER — PREGABALIN 50 MG/1
100 CAPSULE ORAL 3 TIMES DAILY
Qty: 42 CAPSULE | Refills: 0 | Status: SHIPPED | OUTPATIENT
Start: 2021-01-28 | End: 2021-02-25 | Stop reason: ALTCHOICE

## 2021-01-28 RX ORDER — PREGABALIN 50 MG/1
50 CAPSULE ORAL 3 TIMES DAILY
Qty: 90 CAPSULE | Refills: 5 | Status: SHIPPED | OUTPATIENT
Start: 2021-01-28 | End: 2021-02-12 | Stop reason: SINTOL

## 2021-01-28 RX ORDER — PREGABALIN 50 MG/1
50 CAPSULE ORAL 3 TIMES DAILY
Qty: 21 CAPSULE | Refills: 3 | Status: CANCELLED | OUTPATIENT
Start: 2021-01-28 | End: 2021-02-04

## 2021-01-28 NOTE — TELEPHONE ENCOUNTER
[facility] called. They said for the pregabalin they will need three separate prescriptions. Since it is a controlled substance that can't have more than 1 set of directions. Can you please resend 3 separate prescriptions?

## 2021-01-28 NOTE — TELEPHONE ENCOUNTER
I sent 3 separate prescriptions for 50 mg 3 times daily 100 mg 3 times daily and 150 mg 3 times daily. I would have her stay at 150 mg 3 times daily, and call if we need to go up any higher than that.

## 2021-01-28 NOTE — PROGRESS NOTES
[facility]  Offices: [address], [place], [address] [facility]  [address] [facility], [address], [place], [address] [place] [address]  [address] [facility] [address], [address]  Phone: [phone]  Fax: [phone]    MD Cb Cancino MD Ahmed B. Barnetta Milian, MD Cosme Hams, MD King Craw, MD Lizabeth Porter, CNP    1/28/2021      HISTORY OF PRESENT ILLNESS:       I had the pleasure of seeing Chasity Hayes, who returns for continuing neurologic care for chronic daily headache, h/o medication overuse headaches from daily Excedrin use and diabetic polyneuropathy.  She also has untreated sleep apnea, unwilling to pursue CPAP therapy. On her last visit, I started Cymbalta 60 mg at bedtime to help with her diabetic polyneuropathy. She is also on gabapentin 600 mg 3 times daily. Unfortunately, patient reports significant residual pain, has tried lidocaine cream in the past as well with no success. She continues to have shooting and stabbing pains on top of a constant burning and tingling pain in both her legs, up to her knees bilaterally, worse at night. Pain severity ranges between 5-8 over 10, worsened by weightbearing and prolonged standing and partially relieved by rest.  The pain disrupt her ability to  fall asleep and stay asleep. She ambulates with a wheelchair for long distances and a cane at home. She  also reports balance problems, had a fall recently after tripping on a rug. She has not had any gait training in the past.    Thankfully, her headaches have been doing really well. She is not on any prophylactic treatment and reports headache frequency of about once a month. She takes Excedrin for rescue with good relief. She denies any other complaints and no new medications.       Prior testing reviewed:  Noncontrast head CT July 2016: Unremarkable     Current abortive meds: excedrin (relief within 30 minutes) Current prophylactic meds: Magnesium oxide 500 mg twice a day, gabapentin 600 mg 3 times a day (taken for diabetes), Effexor XR 37.5 mg 3 times daily  Previous abortive medications tried: none  Previous prophylactic medications tried: Amitriptyline (no relief, itching and dysesthesias), Topamax (dizziness and tremors) Topamax      PAST MEDICAL HISTORY:         Diagnosis Date    Anginal pain ([address]     last used Nitro sl 4 yrs 2013  (currently off this medication written: 10/23/2019)    Arthritis     Arthritis of right knee 8/15/2018    Asthma     Astigmatism 8/22/2014    Back pain     radiculopathy left leg    Blurry vision, bilateral 7/8/2016    Carpal tunnel syndrome of right wrist 11/26/2012    Cataract     Closed displaced fracture of lesser tuberosity of right humerus 7/11/2016    Constipation     Depression     Dysmenorrhea     Fatty liver disease, nonalcoholic     GERD (gastroesophageal reflux disease)     Gout     found through bloodwork    Headache     Heart murmur     Heart palpitations     Heart palpitations     History of rib fracture 7/6/2016    Right    Hyperlipidemia     Hypertension     Hyperthyroidism     Hypertriglyceridemia     Hypothyroidism 2/17/2016    Myopia with astigmatism and presbyopia 8/22/2014    Neuropathy     bilateral legs and feet    Obesity     Osteoporosis     Pancreatitis 2011    no problems    Panic attack 10/30/2014    Presbyopia 8/22/2014    RLS (restless legs syndrome)     Sleep apnea     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    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 diabetes mellitus without mention of complication, not stated as uncontrolled     checks daily        PAST SURGICAL HISTORY:         Procedure Laterality Date    CARDIAC CATHETERIZATION Patient states approximately  she had the cardiac catheterization that was negative      SECTION      x 2    COLONOSCOPY      DILATION AND CURETTAGE OF UTERUS      JOINT REPLACEMENT Left 2016    shoulder    IA COLON CA SCRN NOT HI RSK IND N/A 10/12/2017    COLONOSCOPY performed by Rachid Clark MD at [address] EGD TRANSORAL BIOPSY SINGLE/MULTIPLE N/A 10/12/2017    EGD BIOPSY performed by Rachid Clark MD at [address] Right 2017    SHOULDER TOTAL ARTHROPLASTY RIGHT WITH [address], [address] [facility] AND AQUAMANTIS performed by Lana Humphreys DO at [facility] [address] Right 2017        SOCIAL HISTORY:     Social History     Socioeconomic History    Marital status:      Spouse name: Not on file    Number of children: Not on file    Years of education: Not on file    Highest education level: Not on file   Occupational History     Employer: DISABLED   Social Needs    Financial resource strain: Not on file    Food insecurity     Worry: Not on file     Inability: Not on file    Transportation needs     Medical: Not on file     Non-medical: Not on file   Tobacco Use    Smoking status: Former Smoker     Packs/day: 0.50     Years: 20.00     Pack years: 10.00     Types: Cigarettes     Quit date: 1977     Years since quittin.1    Smokeless tobacco: Never Used   Substance and Sexual Activity    Alcohol use: No     Alcohol/week: 0.0 standard drinks    Drug use: No    Sexual activity: Yes     Partners: Male   Lifestyle    Physical activity     Days per week: Not on file     Minutes per session: Not on file    Stress: Not on file   Relationships    Social connections     Talks on phone: Not on file     Gets together: Not on file     Attends Buddhist service: Not on file     Active member of club or organization: Not on file     Attends meetings of clubs or organizations: Not on file     Relationship status: Not on file  Intimate partner violence     Fear of current or ex partner: Not on file     Emotionally abused: Not on file     Physically abused: Not on file     Forced sexual activity: Not on file   Other Topics Concern    Not on file   Social History Narrative    Not on file       FAMILY MEDICAL HISTORY:     Family History   Problem Relation Age of Onset    Emphysema Mother     Diabetes Father     Heart Disease Father     High Cholesterol Sister     High Blood Pressure Sister     Heart Disease Sister         CURRENT MEDICATIONS:     Current Outpatient Medications   Medication Sig Dispense Refill    pregabalin (LYRICA) 50 MG capsule Take 1 capsule by mouth 3 times daily.  Increase to 100 mg TID after 1 week, then 150 mg TID for 1 week, max dose 200 mg TID 90 capsule 5    atorvastatin (LIPITOR) 20 MG tablet Take 1 tablet by mouth once daily 90 tablet 3    venlafaxine (EFFEXOR XR) 37.5 MG extended release capsule TAKE 1 CAPSULE BY MOUTH THREE TIMES DAILY 90 capsule 0    rOPINIRole (REQUIP) 2 MG tablet TAKE 1 TABLET BY MOUTH THREE TIMES DAILY 90 tablet 0    dicyclomine (BENTYL) 10 MG capsule TAKE 1 CAPSULE BY MOUTH 4 TIMES DAILY AS NEEDED FOR CRAMPS 120 capsule 2    cyclobenzaprine (FLEXERIL) 10 MG tablet Take 1 tablet by mouth three times daily as needed for muscle spasm 60 tablet 0    allopurinol (ZYLOPRIM) 100 MG tablet Take 1 tablet by mouth once daily 90 tablet 1    zoster recombinant adjuvanted vaccine (SHINGRIX) 50 MCG/0.5ML SUSR injection Inject 0.5 mLs into the muscle See Admin Instructions 1 dose now and repeat in 2-6 months 0.5 mL 1    Cholecalciferol (VITAMIN D3) 50 MCG (2000 UT) TABS Take 1 tablet by mouth once daily 90 tablet 1    blood glucose test strips (FREESTYLE LITE) strip 1 each by Does not apply route 6 times daily 200 each 5    Ferrous Sulfate (IRON) 325 (65 Fe) MG TABS Take 1 tablet by mouth once daily with breakfast 90 tablet 1  loratadine (CLARITIN) 10 MG tablet Take 1 tablet by mouth daily 90 tablet 1    metFORMIN (GLUCOPHAGE) 1000 MG tablet TAKE 1 TABLET BY MOUTH TWICE DAILY 60 tablet 0    meclizine (ANTIVERT) 25 MG tablet TAKE 1 TABLET BY MOUTH THREE TIMES DAILY AS NEEDED FOR DIZZINESS 60 tablet 0    lisinopril (PRINIVIL;ZESTRIL) 5 MG tablet Take 1 tablet by mouth once daily 90 tablet 1    nystatin (NYSTATIN) 794488 UNIT/GM powder APPLY POWDER TOPIALLY TO ABDOMINAL FOLDS THREE TIMES DAILY AS NEEDED FOR SKIN IRRITATION 60 g 0    DULoxetine (CYMBALTA) 60 MG extended release capsule Take 1 capsule by mouth nightly 30 capsule 5    pantoprazole (PROTONIX) 40 MG tablet Take 1 tablet by mouth once daily 90 tablet 3    furosemide (LASIX) 20 MG tablet Take 1 tablet by mouth daily 90 tablet 3    liothyronine (CYTOMEL) 5 MCG tablet Take 5 mcg by mouth daily      RABEprazole (ACIPHEX) 20 MG tablet Take 2 tablets by mouth daily 30 tablet 3    Magnesium Oxide 500 MG TABS TAKE 1 TABLET BY MOUTH TWICE DAILY 180 tablet 1    albuterol sulfate  (90 Base) MCG/ACT inhaler Inhale 2 puffs into the lungs every 6 hours as needed for Wheezing or Shortness of Breath 1 Inhaler 3    Blood Glucose Monitoring Suppl (FREESTYLE LITE) KYLAH 1 Device by Does not apply route 6 times daily 1 Device 0    glucose monitoring kit (FREESTYLE) monitoring kit 1 kit by Does not apply route daily 1 kit 0    oxybutynin (DITROPAN-XL) 10 MG extended release tablet Take 10 mg by mouth daily      insulin glargine (BASAGLAR KWIKPEN) 100 UNIT/ML injection pen Inject 35 Units into the skin nightly      Cyanocobalamin (B-12) 2500 MCG TABS Take 1 tablet by mouth daily      ammonium lactate (LAC-HYDRIN) 12 % cream Apply topically as needed. 1 Bottle 3    lidocaine (XYLOCAINE) 5 % ointment Apply topically as needed.  1 Tube 3    Liraglutide (VICTOZA) 18 MG/3ML SOPN SC injection Inject 1.8 mg into the skin daily  NOVOLOG 100 UNIT/ML injection vial Per V GO. Max 76 units daily  2    fluticasone (FLONASE) 50 MCG/ACT nasal spray 1 spray by Nasal route daily 1 Bottle 0    levothyroxine (SYNTHROID) 100 MCG tablet TAKE 1 TABLET BY MOUTH ONCE DAILY (Patient taking differently: 100 mcg ) 90 tablet 3    Compression Stockings MISC by Does not apply route Wear daily for swelling, pain, and support. 20-30 mmHg. 2 each 0    Insulin Disposable Pump (V-GO 40) KIT Use as directed per Dr. Bess Giron.  Flaxseed, Linseed, (FLAX SEED OIL) 1000 MG CAPS Take 1,000 mg by mouth daily      aspirin 81 MG chewable tablet CHEW AND SWALLOW 1 TABLET BY MOUTH ONCE DAILY 90 tablet 1     No current facility-administered medications for this visit.          ALLERGIES:     Allergies   Allergen Reactions    Ozempic (0.25 Or 0.5 Mg-Dose) [Semaglutide(0.25 Or 0.5mg-Dos)] Other (See Comments)     Severe stomach pain    Actos [Pioglitazone]      Patient unsure of reaction    Amitriptyline Hives    Celebrex [Celecoxib] Hives, Itching and Dermatitis     Burnt red blister on ashlee    Fenofibrate Other (See Comments)     Muscle cramps    Gemfibrozil Other (See Comments)     Muscle pain, spasms, weakness and HA  Muscle pain, spasms, weakness and HA    Lovastatin Other (See Comments)     Muscle cramps  Muscle cramps    Prempro [Conj Estrog-Medroxyprogest Ace] Other (See Comments)     Breast tenderness, pain in vagina, cramping    Statins Other (See Comments)     Muscle cramps  Lipitor ok    Tricor [Fenofibrate] Other (See Comments)     Muscle cramps    Zetia [Ezetimibe] Other (See Comments)     Does not remember reaction  Does not remember reaction  Does not remember reaction    Zocor [Simvastatin] Other (See Comments)     Muscle cramps    Ampicillin Rash    Niacin And Related Rash    Novolin [Insulin Isophane & Reg, Human] Rash    Omeprazole Nausea And Vomiting    Pcn [Penicillins] Rash    Pregabalin Nausea And Vomiting and Rash  Vesicare [Solifenacin] Rash                             REVIEW OF SYSTEMS  12 point review of systems unremarkable other than for those mentioned above    PHYSICAL EXAMINATION:       Vitals:    01/28/21 1306   BP: 116/75   Pulse: 88   Temp: 97.3 °F (36.3 °C)                                              .                                                                                                     General Appearance:  Alert, cooperative, no signs of distress, appears stated age   Head:  Normocephalic, no signs of trauma   Eyes:  Conjunctiva/corneas clear;  eyelids intact   Ears:  Normal external ear and canals   Nose: Nares normal, mucosa normal, no drainage    Throat: Lips and tongue normal; teeth normal;  gums normal   Neck: Supple, intact flexion, extension and rotation;   trachea midline;  no adenopathy;   thyroid: not enlarged;   no carotid pulse abnormality   Back:   Symmetric, no curvature, ROM adequate   Lungs:   Respirations unlabored   Heart:  Regular rate and rhythm           Extremities: Extremities normal, no cyanosis, no edema   Pulses: Symmetric over head and neck   Skin: Skin color, texture normal, no rashes, no lesions                                     NEUROLOGIC EXAMINATION      Mental status    Alert and oriented x 3; intact memory with no confusion, speech or language problems; no hallucinations or delusions  Fund of information appropriate for level of education    Cranial nerves    II - visual fields intact to confrontation , fundoscopy showed no  papiledema                                                III, IV, VI  extra-ocular muscles full: no pupillary defect; no MILAGROS, no nystagmus, no ptosis   V - normal facial sensation                                                               VII - normal facial symmetry                                                             VIII - intact hearing IX, X - symmetrical palate                                                                  XI - symmetrical shoulder shrug                                                       XII - tongue midline without atrophy or fasciculation      Motor function  Normal muscle bulk and tone; strength 5/5 on b/l UE, 4/5 on b/l LE, no pronator drift      Sensory function decreased to light touch and pinprick in the distal lower extremities from the upper calf down      Cerebellar Intact fine motor movement. No involuntary movements or tremors. No ataxia or dysmetria on finger to nose or heel to shin testing      Reflex function DTR 2+ on bilateral UE and 1+ in b/l LE, symmetric. Negative Babinski      Gait                   ambulates w/ a wheelchair              ASSESSMENT AND PLAN:       This is a 61 y.o. female who comes in for follow up for diabetic polyneuropathy as well as chronic daily headache. Her headaches thankfully have done really well recently despite being on no prophylactic medication, takes Excedrin with relief. However, she continues to have significant residual pain from her diabetic polyneuropathy despite being on gabapentin 600 mg 3 times daily and Cymbalta 60 mg daily. 1. Discussed treatment options with the patient. Will stop gabapentin and switch her to Lyrica 50 mg 3 times daily. May uptitrate to a maximum dose of 200 mg 3 times daily in the next 4 weeks, if needed. Side effects, risks and benefits discussed in detail with the patient. 2. Continue Cymbalta 60 mg at bedtime   3. Discussed potentially doing gait training to help with her balance thru home health. However, patient reports she is currently watching her grandson and does not have the time doing therapy right now.     Follow-up in 3 months      Judge Eve MD  Neurology and Sleep Medicine  [facility] Please note that this chart was generated using voice recognition Dragon dictation software. Although every effort was made to ensure the accuracy of this automated transcription, some errors in transcription may have occurred.

## 2021-02-01 RX ORDER — CYCLOBENZAPRINE HCL 10 MG
TABLET ORAL
Qty: 60 TABLET | Refills: 0 | Status: SHIPPED | OUTPATIENT
Start: 2021-02-01 | End: 2021-02-22

## 2021-02-01 NOTE — TELEPHONE ENCOUNTER
Pharmacy requesting refill of Cymbalta 60 mg.      Medication active on med list yes      Date of last Rx: 7/28/2020  with 5 refills verified on 2/1/21   verified by CHLOE LIU      Date of last appointment 1/28/21    Next Visit Date:  4/29/2021

## 2021-02-02 RX ORDER — DULOXETIN HYDROCHLORIDE 60 MG/1
60 CAPSULE, DELAYED RELEASE ORAL NIGHTLY
Qty: 30 CAPSULE | Refills: 2 | Status: SHIPPED | OUTPATIENT
Start: 2021-02-02 | End: 2021-05-19 | Stop reason: SDUPTHER

## 2021-02-10 ENCOUNTER — TELEPHONE (OUTPATIENT)
Dept: NEUROLOGY | Age: 61
End: 2021-02-10

## 2021-02-10 NOTE — TELEPHONE ENCOUNTER
Pt called in stating that she can not take the Lyrica. She took a couple of capsules and started having severe stomach issues, itching, like something was under her skin. She stopped taking it. She is wondering if you wanted her to start taking the Gabapentin again?

## 2021-02-12 RX ORDER — GABAPENTIN 300 MG/1
900 CAPSULE ORAL 3 TIMES DAILY
Qty: 270 CAPSULE | Refills: 5 | Status: SHIPPED | OUTPATIENT
Start: 2021-02-12 | End: 2021-10-06

## 2021-02-12 NOTE — TELEPHONE ENCOUNTER
Yes, she can. Let's inc her dose to 900 mg TID since she wasn.t having much luck with 600 mg TID.  Script sent

## 2021-02-12 NOTE — TELEPHONE ENCOUNTER
Loree Briceño called the office this afternoon and this information was given. Patient verbally stated her understanding.

## 2021-02-15 RX ORDER — ROPINIROLE 2 MG/1
TABLET, FILM COATED ORAL
Qty: 90 TABLET | Refills: 0 | Status: SHIPPED | OUTPATIENT
Start: 2021-02-15 | End: 2021-03-28

## 2021-02-22 RX ORDER — CYCLOBENZAPRINE HCL 10 MG
TABLET ORAL
Qty: 60 TABLET | Refills: 0 | Status: SHIPPED | OUTPATIENT
Start: 2021-02-22 | End: 2021-04-01 | Stop reason: SDUPTHER

## 2021-02-22 RX ORDER — VENLAFAXINE HYDROCHLORIDE 37.5 MG/1
CAPSULE, EXTENDED RELEASE ORAL
Qty: 90 CAPSULE | Refills: 0 | Status: SHIPPED | OUTPATIENT
Start: 2021-02-22 | End: 2021-03-29

## 2021-02-22 NOTE — TELEPHONE ENCOUNTER
Please Approve or Refuse.      Next Visit Date:  3/16/2021   Last Visit Date: 12/21/2020    Hemoglobin A1C (%)   Date Value   11/24/2020 7.9   01/29/2020 8.2 (H)   10/23/2019 8.2 (H)             ( goal A1C is < 7)   BP Readings from Last 3 Encounters:   01/28/21 116/75   11/24/20 120/70   08/18/20 (!) 141/89          (goal 120/80)  BUN   Date Value Ref Range Status   05/26/2020 15 6 - 20 mg/dL Final     CREATININE   Date Value Ref Range Status   05/26/2020 0.75 0.50 - 0.90 mg/dL Final     Potassium   Date Value Ref Range Status   05/26/2020 4.7 3.7 - 5.3 mmol/L Final

## 2021-02-25 ENCOUNTER — OFFICE VISIT (OUTPATIENT)
Dept: FAMILY MEDICINE CLINIC | Age: 61
End: 2021-02-25
Payer: COMMERCIAL

## 2021-02-25 VITALS
WEIGHT: 263 LBS | DIASTOLIC BLOOD PRESSURE: 60 MMHG | SYSTOLIC BLOOD PRESSURE: 116 MMHG | BODY MASS INDEX: 49.65 KG/M2 | OXYGEN SATURATION: 96 % | HEIGHT: 61 IN | HEART RATE: 88 BPM | TEMPERATURE: 99.1 F

## 2021-02-25 DIAGNOSIS — E03.9 HYPOTHYROIDISM, UNSPECIFIED TYPE: ICD-10-CM

## 2021-02-25 DIAGNOSIS — E78.1 HYPERTRIGLYCERIDEMIA: ICD-10-CM

## 2021-02-25 DIAGNOSIS — E55.9 VITAMIN D DEFICIENCY: ICD-10-CM

## 2021-02-25 DIAGNOSIS — R42 VERTIGO: ICD-10-CM

## 2021-02-25 DIAGNOSIS — G47.33 OSA (OBSTRUCTIVE SLEEP APNEA): Chronic | ICD-10-CM

## 2021-02-25 DIAGNOSIS — I10 ESSENTIAL HYPERTENSION: Chronic | ICD-10-CM

## 2021-02-25 DIAGNOSIS — D50.9 IRON DEFICIENCY ANEMIA, UNSPECIFIED IRON DEFICIENCY ANEMIA TYPE: ICD-10-CM

## 2021-02-25 DIAGNOSIS — E66.01 MORBID OBESITY WITH BMI OF 45.0-49.9, ADULT (HCC): ICD-10-CM

## 2021-02-25 DIAGNOSIS — R32 URINARY INCONTINENCE, UNSPECIFIED TYPE: ICD-10-CM

## 2021-02-25 DIAGNOSIS — K21.9 GASTROESOPHAGEAL REFLUX DISEASE WITHOUT ESOPHAGITIS: ICD-10-CM

## 2021-02-25 DIAGNOSIS — E83.42 HYPOMAGNESEMIA: ICD-10-CM

## 2021-02-25 DIAGNOSIS — G25.81 RLS (RESTLESS LEGS SYNDROME): Chronic | ICD-10-CM

## 2021-02-25 DIAGNOSIS — E11.40 TYPE 2 DIABETES MELLITUS WITH DIABETIC NEUROPATHY, WITH LONG-TERM CURRENT USE OF INSULIN (HCC): Primary | ICD-10-CM

## 2021-02-25 DIAGNOSIS — J30.9 ALLERGIC RHINITIS, UNSPECIFIED SEASONALITY, UNSPECIFIED TRIGGER: ICD-10-CM

## 2021-02-25 DIAGNOSIS — M17.0 PRIMARY OSTEOARTHRITIS OF BOTH KNEES: ICD-10-CM

## 2021-02-25 DIAGNOSIS — Z79.4 TYPE 2 DIABETES MELLITUS WITH DIABETIC NEUROPATHY, WITH LONG-TERM CURRENT USE OF INSULIN (HCC): Primary | ICD-10-CM

## 2021-02-25 DIAGNOSIS — H61.21 IMPACTED CERUMEN OF RIGHT EAR: ICD-10-CM

## 2021-02-25 DIAGNOSIS — E78.00 HYPERCHOLESTEROLEMIA: ICD-10-CM

## 2021-02-25 LAB — HBA1C MFR BLD: 7.7 %

## 2021-02-25 PROCEDURE — 99215 OFFICE O/P EST HI 40 MIN: CPT | Performed by: NURSE PRACTITIONER

## 2021-02-25 PROCEDURE — 83036 HEMOGLOBIN GLYCOSYLATED A1C: CPT | Performed by: NURSE PRACTITIONER

## 2021-02-25 PROCEDURE — G8484 FLU IMMUNIZE NO ADMIN: HCPCS | Performed by: NURSE PRACTITIONER

## 2021-02-25 PROCEDURE — G8417 CALC BMI ABV UP PARAM F/U: HCPCS | Performed by: NURSE PRACTITIONER

## 2021-02-25 PROCEDURE — 1036F TOBACCO NON-USER: CPT | Performed by: NURSE PRACTITIONER

## 2021-02-25 PROCEDURE — 3017F COLORECTAL CA SCREEN DOC REV: CPT | Performed by: NURSE PRACTITIONER

## 2021-02-25 PROCEDURE — 69210 REMOVE IMPACTED EAR WAX UNI: CPT | Performed by: NURSE PRACTITIONER

## 2021-02-25 PROCEDURE — G8427 DOCREV CUR MEDS BY ELIG CLIN: HCPCS | Performed by: NURSE PRACTITIONER

## 2021-02-25 PROCEDURE — 3051F HG A1C>EQUAL 7.0%<8.0%: CPT | Performed by: NURSE PRACTITIONER

## 2021-02-25 PROCEDURE — 2022F DILAT RTA XM EVC RTNOPTHY: CPT | Performed by: NURSE PRACTITIONER

## 2021-02-25 RX ORDER — INSULIN GLARGINE 100 [IU]/ML
50 INJECTION, SOLUTION SUBCUTANEOUS NIGHTLY
COMMUNITY
End: 2022-01-03

## 2021-02-25 RX ORDER — BACLOFEN 20 MG
TABLET ORAL
Qty: 180 TABLET | Refills: 1 | Status: SHIPPED | OUTPATIENT
Start: 2021-02-25

## 2021-02-25 ASSESSMENT — ENCOUNTER SYMPTOMS
CONSTIPATION: 0
RHINORRHEA: 0
VOMITING: 0
DIARRHEA: 0
NAUSEA: 0
ABDOMINAL PAIN: 0
BACK PAIN: 0
ABDOMINAL DISTENTION: 0
SORE THROAT: 0
CHEST TIGHTNESS: 0
APNEA: 1
SHORTNESS OF BREATH: 0
COUGH: 0

## 2021-02-25 ASSESSMENT — PATIENT HEALTH QUESTIONNAIRE - PHQ9: 2. FEELING DOWN, DEPRESSED OR HOPELESS: 0

## 2021-02-25 NOTE — PROGRESS NOTES
Visit Information    Have you changed or started any medications since your last visit including any over-the-counter medicines, vitamins, or herbal medicines? no   Are you having any side effects from any of your medications? -  no  Have you stopped taking any of your medications? Is so, why? -  no    Have you seen any other physician or provider since your last visit? No  Have you had any other diagnostic tests since your last visit? No  Have you been seen in the emergency room and/or had an admission to a hospital since we last saw you? No  Have you had your routine dental cleaning in the past 6 months? no    Have you activated your [email or web address] account? If not, what are your barriers?  Yes     Patient Care Team:  YOLANDA Foster NP as PCP - General (Nurse Practitioner)  YOLANDA Foster NP as PCP - [facility] Provider  Sohail Vyas MD as Orthopedic Surgeon (Orthopedic Surgery)  Sean Cameron MD as Surgeon (Cardiology)  YOLANDA Robert - CNP (Family Medicine)  Rachid Clark MD as Consulting Physician (Gastroenterology)    Medical History Review  Past Medical, Family, and Social History reviewed and does not contribute to the patient presenting condition    Health Maintenance   Topic Date Due    Breast cancer screen  09/16/2018    Diabetic foot exam  01/13/2021    Lipid screen  01/29/2021    Diabetic retinal exam  08/31/2021 (Originally 3/18/2020)    DTaP/Tdap/Td vaccine (1 - Tdap) 11/24/2021 (Originally 12/19/1979)    Flu vaccine (1) 11/24/2021 (Originally 9/1/2020)    Shingles Vaccine (1 of 2) 11/24/2021 (Originally 12/19/2010)    Pneumococcal 0-64 years Vaccine (1 of 1 - PPSV23) 03/27/2029 (Originally [birth year]19/1966)    Diabetic microalbuminuria test  05/26/2021    TSH testing  05/26/2021    Potassium monitoring  05/26/2021    Creatinine monitoring  05/26/2021    A1C test (Diabetic or Prediabetic)  11/24/2021    Cervical cancer screen  04/24/2022  Colon cancer screen colonoscopy  10/12/2022    Hepatitis C screen  Addressed    HIV screen  Addressed    Hepatitis A vaccine  Aged Out    Hib vaccine  Aged Out    Meningococcal (ACWY) vaccine  Aged Out

## 2021-02-25 NOTE — PROGRESS NOTES
Zena Sanchez, APRN-CNP  [facility]  [id number] [address], [address]  [address] [address]  Dept: [phone]  Dept Fax: [phone]     Patient ID: Isabelle Roger is a 61 y.o. female. HPI    Established pt here today for f/u on chronic medical problems; HTN, HLD, elevated triglycerides, DM, vitamin d def, low magnesium, obesity, vertigo, RLS, allergies, hypothyroidism, DESTINY, go over labs and/or diagnostic studies, and medication refills. Pt denies any fever or chills. Pt today denies any HA, chest pain, or SOB. Pt denies any N/V/D/C or abdominal pain. Today she complains of \"something being in her right ear\" She relates that she has tried to use a Q-tip, but nothing comes out. She is also scheduled to have total knee replacements with Dr. Espinoza Tam at the end of March. She relates that she will have one of them done, go to rehab, then have the second one done. Otherwise patient is doing well on current tx and voices no other concerns today. The patient's past medical, surgical, social, and family history as well as current medications and allergies were reviewed as documented in today's encounter by CHLOE Cr. My previous office notes, labs and diagnostic studies were reviewed prior to and during encounter. Current Outpatient Medications on File Prior to Visit   Medication Sig Dispense Refill    insulin glargine (LANTUS) 100 UNIT/ML injection vial Inject 45 Units into the skin nightly      cyclobenzaprine (FLEXERIL) 10 MG tablet Take 1 tablet by mouth three times daily as needed for muscle spasm 60 tablet 0    venlafaxine (EFFEXOR XR) 37.5 MG extended release capsule TAKE 1 CAPSULE BY MOUTH THREE TIMES DAILY 90 capsule 0    rOPINIRole (REQUIP) 2 MG tablet TAKE 1 TABLET BY MOUTH THREE TIMES DAILY 90 tablet 0    gabapentin (NEURONTIN) 300 MG capsule Take 3 capsules by mouth 3 times daily for 90 days.  270 capsule 5  DULoxetine (CYMBALTA) 60 MG extended release capsule TAKE 1 CAPSULE BY MOUTH NIGHTLY 30 capsule 2    pregabalin (LYRICA) 50 MG capsule Take 1 capsule by mouth 3 times daily for 7 days.  21 capsule 0    atorvastatin (LIPITOR) 20 MG tablet Take 1 tablet by mouth once daily 90 tablet 3    dicyclomine (BENTYL) 10 MG capsule TAKE 1 CAPSULE BY MOUTH 4 TIMES DAILY AS NEEDED FOR CRAMPS 120 capsule 2    allopurinol (ZYLOPRIM) 100 MG tablet Take 1 tablet by mouth once daily 90 tablet 1    Cholecalciferol (VITAMIN D3) 50 MCG (2000 UT) TABS Take 1 tablet by mouth once daily 90 tablet 1    blood glucose test strips (FREESTYLE LITE) strip 1 each by Does not apply route 6 times daily 200 each 5    Ferrous Sulfate (IRON) 325 (65 Fe) MG TABS Take 1 tablet by mouth once daily with breakfast 90 tablet 1    aspirin 81 MG chewable tablet CHEW AND SWALLOW 1 TABLET BY MOUTH ONCE DAILY 90 tablet 1    metFORMIN (GLUCOPHAGE) 1000 MG tablet TAKE 1 TABLET BY MOUTH TWICE DAILY 60 tablet 0    meclizine (ANTIVERT) 25 MG tablet TAKE 1 TABLET BY MOUTH THREE TIMES DAILY AS NEEDED FOR DIZZINESS 60 tablet 0    lisinopril (PRINIVIL;ZESTRIL) 5 MG tablet Take 1 tablet by mouth once daily 90 tablet 1    nystatin (NYSTATIN) 312868 UNIT/GM powder APPLY POWDER TOPIALLY TO ABDOMINAL FOLDS THREE TIMES DAILY AS NEEDED FOR SKIN IRRITATION 60 g 0    pantoprazole (PROTONIX) 40 MG tablet Take 1 tablet by mouth once daily 90 tablet 3    furosemide (LASIX) 20 MG tablet Take 1 tablet by mouth daily 90 tablet 3    liothyronine (CYTOMEL) 5 MCG tablet Take 5 mcg by mouth daily      albuterol sulfate  (90 Base) MCG/ACT inhaler Inhale 2 puffs into the lungs every 6 hours as needed for Wheezing or Shortness of Breath 1 Inhaler 3    Blood Glucose Monitoring Suppl (FREESTYLE LITE) KYLAH 1 Device by Does not apply route 6 times daily 1 Device 0    glucose monitoring kit (FREESTYLE) monitoring kit 1 kit by Does not apply route daily 1 kit 0  oxybutynin (DITROPAN-XL) 10 MG extended release tablet Take 10 mg by mouth daily      Cyanocobalamin (B-12) 2500 MCG TABS Take 1 tablet by mouth daily      ammonium lactate (LAC-HYDRIN) 12 % cream Apply topically as needed. 1 Bottle 3    lidocaine (XYLOCAINE) 5 % ointment Apply topically as needed. 1 Tube 3    Liraglutide (VICTOZA) 18 MG/3ML SOPN SC injection Inject 1.8 mg into the skin daily       NOVOLOG 100 UNIT/ML injection vial Per [employer]. Max 76 units daily  2    fluticasone (FLONASE) 50 MCG/ACT nasal spray 1 spray by Nasal route daily 1 Bottle 0    levothyroxine (SYNTHROID) 100 MCG tablet TAKE 1 TABLET BY MOUTH ONCE DAILY (Patient taking differently: 100 mcg ) 90 tablet 3    Compression Stockings MISC by Does not apply route Wear daily for swelling, pain, and support. 20-30 mmHg. 2 each 0    Insulin Disposable Pump (V-GO 40) KIT Use as directed per Dr. Sandra Lopez.  Flaxseed, Linseed, (FLAX SEED OIL) 1000 MG CAPS Take 1,000 mg by mouth daily       No current facility-administered medications on file prior to visit. Subjective:     Review of Systems   Constitutional: Negative for activity change, fatigue and fever. HENT: Negative for congestion, ear pain, rhinorrhea and sore throat. Respiratory: Positive for apnea (stable, not on CPAP). Negative for cough, chest tightness and shortness of breath. Cardiovascular: Negative for chest pain and palpitations. Gastrointestinal: Negative for abdominal distention, abdominal pain, constipation, diarrhea, nausea and vomiting. Heartburn (stable)   Endocrine: Negative for polydipsia, polyphagia and polyuria. Genitourinary: Negative for difficulty urinating and dysuria. Musculoskeletal: Positive for arthralgias (stable), gait problem (using wheelchair) and myalgias (stable). Negative for back pain. Restless legs (stable)   Skin: Negative for rash. Allergic/Immunologic: Positive for environmental allergies (stable). Neurological: Negative for dizziness, weakness, light-headedness and headaches. Hematological: Negative for adenopathy. Psychiatric/Behavioral: Negative for agitation and behavioral problems. The patient is not nervous/anxious. Objective:     Physical Exam  Vitals signs reviewed. Constitutional:       General: She is not in acute distress. Appearance: Normal appearance. She is well-developed. She is obese. HENT:      Head: Normocephalic and atraumatic. Right Ear: Hearing and external ear normal. There is impacted cerumen. Left Ear: Hearing and external ear normal.      Nose: Nose normal. No congestion. Right Sinus: No maxillary sinus tenderness or frontal sinus tenderness. Left Sinus: No maxillary sinus tenderness or frontal sinus tenderness. Mouth/Throat:      Lips: Pink. No lesions. Mouth: Mucous membranes are moist. No oral lesions. Tongue: No lesions. Pharynx: Oropharynx is clear. No oropharyngeal exudate or posterior oropharyngeal erythema. Eyes:      Extraocular Movements: Extraocular movements intact. Conjunctiva/sclera: Conjunctivae normal.      Pupils: Pupils are equal, round, and reactive to light. Neck:      Musculoskeletal: Full passive range of motion without pain and normal range of motion. Thyroid: No thyroid mass. Cardiovascular:      Rate and Rhythm: Normal rate and regular rhythm. Pulses: Normal pulses. Heart sounds: Normal heart sounds. No murmur. Pulmonary:      Effort: Pulmonary effort is normal. No respiratory distress. Breath sounds: Normal breath sounds and air entry. No wheezing, rhonchi or rales. Abdominal:      General: Bowel sounds are normal. There is no distension. Palpations: Abdomen is soft. Tenderness: There is no abdominal tenderness. Musculoskeletal: Normal range of motion. Right lower leg: No edema. Left lower leg: No edema.    Lymphadenopathy: Cervical: No cervical adenopathy. Skin:     General: Skin is warm and dry. Capillary Refill: Capillary refill takes less than 2 seconds. Findings: No rash. Neurological:      General: No focal deficit present. Mental Status: She is alert and oriented to person, place, and time. Deep Tendon Reflexes: Reflexes normal.   Psychiatric:         Attention and Perception: Attention and perception normal.         Mood and Affect: Mood and affect normal.         Speech: Speech normal.         Behavior: Behavior normal. Behavior is cooperative. Cognition and Memory: Memory normal.       Assessment:      Diagnosis Orders   1. Type 2 diabetes mellitus with diabetic neuropathy, with long-term current use of insulin ([facility])  POCT glycosylated hemoglobin (Hb A1C)   2. Essential hypertension  CBC    Comprehensive Metabolic Panel   3. Hypercholesterolemia  Lipid Panel   4. Hypertriglyceridemia  Lipid Panel   5. Vitamin D deficiency  Vitamin D 25 Hydroxy   6. Gastroesophageal reflux disease without esophagitis     7. Iron deficiency anemia, unspecified iron deficiency anemia type     8. Hypomagnesemia  Magnesium Oxide 500 MG TABS    Magnesium   9. Morbid obesity with BMI of 45.0-49.9, adult ([facility] [address]     10. Vertigo     11. RLS (restless legs syndrome)     12. Allergic rhinitis, unspecified seasonality, unspecified trigger     13. Hypothyroidism, unspecified type  TSH with Reflex   14. DESTINY (obstructive sleep apnea)     15. Impacted cerumen of right ear     16. Primary osteoarthritis of both knees     17. Urinary incontinence, unspecified type       Plan:     1. Essential hypertension  - Stable: Medication re-filled as needed, con't medications as prescribed, con't current tx plan  - Continue Lisinopril as previously prescribed. - Low sodium diet and routine exercise encouraged. - Advised to seek emergent tx if SBP>180 and/or DBP>110, any chest pain, h/a or vision changes. - CBC;  Future - Comprehensive Metabolic Panel; Future    2. Hypercholesterolemia  - Stable: Medication re-filled as needed, con't medications as prescribed, con't current tx plan  - Lifestyle modifications discussed and encouraged. - Decrease fats, sugars, carbohydrates, and increase routine exercise (approx. 150 minutes of aerobic activity a week). - Lipid Panel; Future    3. Hypertriglyceridemia  - Stable: Medication re-filled as needed, con't medications as prescribed, con't current tx plan  - Eat more fiber (such as fruits, vegetables, whole grains, beans), bake, broil, or grill your meat or fish. - Limit your servings to six (6) ounces per day. - Lifestyle changes discussed and encouraged: Decrease fats, sugars, carbohydrates, and increase routine exercise (150 minutes of aerobic activity/week). - Will have her start Fish Oil, 1000mg BID, and watch carbs and recheck in 3 months   - Lipid Panel; Future    4. Type 2 diabetes mellitus with diabetic neuropathy, with long-term current use of insulin ([facility])  - Stable: Medication re-filled as needed, con't medications as prescribed, con't current tx plan  - Continue with Metformin as previously prescribed. - Continue with Lantus 45 units nightly as previously prescribed. - Continue with Victoza as previously prescribed. - Continue with Insulin pump as previously prescribed. - Will cont with Lisinopril for renal protection  - Will cont to follow with Dr. Sola Dolan as instructed   - Cont with daily foot exams and yearly eye exams  - Diabetes education discussed and material provided. - Last HGBA1C was 7.7  - POCT glycosylated hemoglobin (Hb A1C)    5.  Vitamin D deficiency  - Stable: Medication re-filled as needed, con't medications as prescribed, con't current tx plan  - Continue Vitamin D supplements as ordered, will re-check Vitamin D level - Please increase foods with Vitamin D, which include cheese, eggs, cereals, yogurt, milk, tofu, any type of beef, salmon or tuna,  spinach, and mushroom. - Vitamin D 25 Hydroxy; Future    6. Gastroesophageal reflux disease without esophagitis  - Stable: Medication re-filled as needed, con't medications as prescribed, con't current tx plan  - Continue Protonix as previously prescribed  - Patient educated on avoiding trigger food/drinks such as caffeine, soda, chocolate, greasy/fatty, spicy foods.  - Smoking cessation.  - Sleep with head of bed elevated, avoid lying flat after eating and avoid restrictive clothing around waist.     7. Iron deficiency anemia, unspecified iron deficiency anemia type  - Stable: Medication re-filled as needed, con't medications as prescribed, con't current tx plan  - Continue with Iron supplementation as previously prescribed. - Please increase foods with Iron (red meat, pork, poultry, seafood, beans, dark green leafy vegetables) and Vitamin C (Broccoli, grapefruit, kiwi, Leafy greens, melons, oranges, peppers, strawberries, tangerines and tomatoes)  - Discussion had regarding side effects of iron, such as cramping, constipation, black stools. 8. Hypomagnesemia  - Stable: Medication re-filled as needed, con't medications as prescribed, con't current tx plan   - Continue with MagOx as previously prescribed. - Will order updated labs  - Magnesium Oxide 500 MG TABS; TAKE 1 TABLET BY MOUTH TWICE DAILY  Dispense: 180 tablet; Refill: 1  - Magnesium; Future    9. Morbid obesity with BMI of 45.0-49.9, adult ([facility] [address]  - Advised to decrease, fats, carbohydrates, and engage in a healthier diet overall, as well as routine exercise. - Encouraged patient to eat healthy meals throughout the day. - Encouraged 40 minutes of aerobic exercise- moderate to vigorous intensity three to four times a week.      10. Vertigo - Stable: Medication re-filled as needed, con't medications as prescribed, con't current tx plan  - Continue with Meclizine as previously prescribed. 11. RLS (restless legs syndrome)  - Stable: Medication re-filled as needed, con't medications as prescribed, con't current tx plan  - Continue with Requip as previously prescribed. 12. Allergic rhinitis, unspecified seasonality, unspecified trigger  - Stable: Medication re-filled as needed, con't medications as prescribed, con't current tx plan  - Continue Flonase as previously prescribed     13. Hypothyroidism, unspecified type  - Stable: Medication re-filled as needed, con't medications as prescribed, con't current tx plan  - Continue with Synthroid 100 mcg daily as previously prescribed. - Continue with Cytomel as previously prescribed. - Last TSH 14.41 on 5/2020 (she did not get labs as ordered prior to today's appointment). - Will continue to follow.   - TSH with Reflex; Future    14. DESTINY (obstructive sleep apnea)  - Stable:   - Does not use CPAP    15. Impacted cerumen of right ear  - Verbal consent was obtained after discussion of procedure, risks and benefits. At this time, patient wishes to continue with procedure discussed  - Right wash done today with a water and hand pic with good results. - Pt tolerated well. Hearing/muffling has returned. - Patient instructed to get OTC Debrox  - Use Debrox drops as ordered for 4 days, avoid Q-tips, wash ear canals out with warm water in shower. 16. Primary osteoarthritis of both knees  - Stable: Medication re-filled as needed, con't medications as prescribed, con't current tx plan   - Per patient, surgery scheduled for late March  - Will cont to follow with Dr. Rosa Mendes as instructed       17. Urinary incontinence, unspecified type  - Stable: Medication re-filled as needed, con't medications as prescribed, con't current tx plan   - Continue with Ditropan as previously prescribed. - Will cont to follow with Dr. Hayden Lujan as instructed     - On this date February 25, 2021,  I have spent greater than 50% of this 40 minute visit reviewing previous notes, test results and face to face with the patient discussing the diagnoses, importance of compliance with the treatment plan, counseling, coordinating care as well as documenting on the day of the visit.      Nilam Shaw, YOLANDA-CNP

## 2021-03-18 ENCOUNTER — TELEPHONE (OUTPATIENT)
Dept: FAMILY MEDICINE CLINIC | Age: 61
End: 2021-03-18

## 2021-03-28 RX ORDER — ROPINIROLE 2 MG/1
TABLET, FILM COATED ORAL
Qty: 90 TABLET | Refills: 0 | Status: SHIPPED | OUTPATIENT
Start: 2021-03-28 | End: 2021-04-26

## 2021-03-29 RX ORDER — VENLAFAXINE HYDROCHLORIDE 37.5 MG/1
CAPSULE, EXTENDED RELEASE ORAL
Qty: 90 CAPSULE | Refills: 0 | Status: SHIPPED | OUTPATIENT
Start: 2021-03-29 | End: 2021-04-26

## 2021-03-29 NOTE — TELEPHONE ENCOUNTER
Please Approve or Refuse.        Next Visit Date:  4/21/2021   Last Visit Date: 12/21/2020    Hemoglobin A1C (%)   Date Value   02/25/2021 7.7   11/24/2020 7.9   01/29/2020 8.2 (H)             ( goal A1C is < 7)   BP Readings from Last 3 Encounters:   02/25/21 116/60   01/28/21 116/75   11/24/20 120/70          (goal 120/80)  BUN   Date Value Ref Range Status   05/26/2020 15 6 - 20 mg/dL Final     CREATININE   Date Value Ref Range Status   05/26/2020 0.75 0.50 - 0.90 mg/dL Final     Potassium   Date Value Ref Range Status   05/26/2020 4.7 3.7 - 5.3 mmol/L Final

## 2021-04-01 DIAGNOSIS — I10 ESSENTIAL HYPERTENSION: Chronic | ICD-10-CM

## 2021-04-01 RX ORDER — CYCLOBENZAPRINE HCL 10 MG
TABLET ORAL
Qty: 60 TABLET | Refills: 0 | Status: SHIPPED | OUTPATIENT
Start: 2021-04-01 | End: 2021-04-26

## 2021-04-01 RX ORDER — LISINOPRIL 5 MG/1
TABLET ORAL
Qty: 90 TABLET | Refills: 1 | Status: SHIPPED | OUTPATIENT
Start: 2021-04-01 | End: 2021-10-08

## 2021-04-02 ENCOUNTER — TELEPHONE (OUTPATIENT)
Dept: ORTHOPEDIC SURGERY | Age: 61
End: 2021-04-02

## 2021-04-02 NOTE — TELEPHONE ENCOUNTER
patient called regarding electric wheelchair prescription. Patient left voicemail saying her insurance company said could get an electric wheelchair with a prescription. Patient looking to see if she can get prescription.

## 2021-04-05 DIAGNOSIS — M21.961 FOOT DEFORMITY, BILATERAL: Primary | ICD-10-CM

## 2021-04-05 DIAGNOSIS — B35.4 TINEA CORPORIS: ICD-10-CM

## 2021-04-05 DIAGNOSIS — M21.962 FOOT DEFORMITY, BILATERAL: Primary | ICD-10-CM

## 2021-04-05 RX ORDER — NYSTATIN 100000 [USP'U]/G
POWDER TOPICAL
Qty: 60 G | Refills: 1 | Status: SHIPPED | OUTPATIENT
Start: 2021-04-05 | End: 2021-10-14

## 2021-04-12 ENCOUNTER — OFFICE VISIT (OUTPATIENT)
Dept: ORTHOPEDIC SURGERY | Age: 61
End: 2021-04-12
Payer: COMMERCIAL

## 2021-04-12 VITALS
DIASTOLIC BLOOD PRESSURE: 77 MMHG | HEIGHT: 61 IN | SYSTOLIC BLOOD PRESSURE: 122 MMHG | BODY MASS INDEX: 49.65 KG/M2 | WEIGHT: 263 LBS | HEART RATE: 93 BPM

## 2021-04-12 DIAGNOSIS — M17.0 BILATERAL PRIMARY OSTEOARTHRITIS OF KNEE: Primary | ICD-10-CM

## 2021-04-12 DIAGNOSIS — M25.561 RIGHT KNEE PAIN, UNSPECIFIED CHRONICITY: Primary | ICD-10-CM

## 2021-04-12 PROCEDURE — 3017F COLORECTAL CA SCREEN DOC REV: CPT | Performed by: ORTHOPAEDIC SURGERY

## 2021-04-12 PROCEDURE — G8427 DOCREV CUR MEDS BY ELIG CLIN: HCPCS | Performed by: ORTHOPAEDIC SURGERY

## 2021-04-12 PROCEDURE — 99212 OFFICE O/P EST SF 10 MIN: CPT | Performed by: ORTHOPAEDIC SURGERY

## 2021-04-12 PROCEDURE — G8417 CALC BMI ABV UP PARAM F/U: HCPCS | Performed by: ORTHOPAEDIC SURGERY

## 2021-04-12 PROCEDURE — 1036F TOBACCO NON-USER: CPT | Performed by: ORTHOPAEDIC SURGERY

## 2021-04-12 ASSESSMENT — ENCOUNTER SYMPTOMS
APNEA: 0
CONSTIPATION: 0
NAUSEA: 0
ABDOMINAL DISTENTION: 0
DIARRHEA: 0
SHORTNESS OF BREATH: 0
COUGH: 0
ABDOMINAL PAIN: 0
VOMITING: 0
CHEST TIGHTNESS: 0
COLOR CHANGE: 0

## 2021-04-12 NOTE — PROGRESS NOTES
[facility] [address] [facility]  [address] [place] [id number]  Dept: [phone]  Dept Fax: [phone]          Bilateral Knee - Follow Up     Subjective:     Chief Complaint   Patient presents with    Knee Pain     Bilateral knee pain, R>L     HPI:     Tyrus Kehr presents today for Bilateral knee pain where the right knee is worse than the left. The pain has been present for years. The patient recalls no specific injury. The patient has tried rest, a walker and a cane with no improvement. The pain is now described as Achy and Dull. There is pain on weight bearing. The knee has swelled. There is painful popping and clicking. The knee has not caught or locked up. The knee has not given out. It is stiff upon arising from sitting. It is painful to go up and down stairs and sit for a prolonged time. The patient has had a cortisone injection on 04/02/2019 with good pain relief for only 8 weeks. The patient has not tried a lubrication injection. The patient has tried physical therapy a year ago with no improvement. The patient has not had surgery. Patient states that her knee is not getting better and she was placed on a weight loss pill by her PCP to help her manage her weight loss. However, she states that she is not sure if it is doing much for her weight loss. Patient has gained 3 pounds since her last office visit on 08/18/2020. ROS:   Review of Systems   Constitutional: Positive for activity change. Negative for appetite change, fatigue and fever. Respiratory: Negative for apnea, cough, chest tightness and shortness of breath. Cardiovascular: Negative for chest pain, palpitations and leg swelling. Gastrointestinal: Negative for abdominal distention, abdominal pain, constipation, diarrhea, nausea and vomiting. Genitourinary: Negative for difficulty urinating, dysuria and hematuria.    Musculoskeletal: Positive for arthralgias and joint swelling. Negative for gait problem and myalgias. Skin: Negative for color change and rash. Neurological: Negative for dizziness, weakness, numbness and headaches. Psychiatric/Behavioral: Positive for sleep disturbance.      Past Medical History:    Past Medical History:   Diagnosis Date    Anginal pain ([address]     last used Nitro sl 4 yrs   (currently off this medication written: 10/23/2019)    Arthritis     Arthritis of right knee 8/15/2018    Asthma     Astigmatism 2014    Back pain     radiculopathy left leg    Blurry vision, bilateral 2016    Carpal tunnel syndrome of right wrist 2012    Cataract     Closed displaced fracture of lesser tuberosity of right humerus 2016    Constipation     Depression     Dysmenorrhea     Fatty liver disease, nonalcoholic     GERD (gastroesophageal reflux disease)     Gout     found through bloodwork    Headache     Heart 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    Status post total replacement of right shoulder 2017    Stenosis of both internal carotid arteries- Mild 16-49% 2017    Type II or unspecified type diabetes mellitus without mention of complication, not stated as uncontrolled     checks daily     Past Surgical History:    Past Surgical History:   Procedure Laterality Date    CARDIAC CATHETERIZATION      Patient states approximately  she had the cardiac catheterization that was negative      SECTION      x 2    COLONOSCOPY      DILATION AND CURETTAGE OF UTERUS      JOINT REPLACEMENT Left 03/22/2016    shoulder    KS COLON CA SCRN NOT HI RSK IND N/A 10/12/2017    COLONOSCOPY performed by Juliette Flores MD at [address] EGD TRANSORAL BIOPSY SINGLE/MULTIPLE N/A 10/12/2017    EGD BIOPSY performed by Juliette Flores MD at [address] Right 5/9/2017    SHOULDER TOTAL ARTHROPLASTY RIGHT WITH ARTHREX, CELLSAVER AND AQUAMANTIS performed by Thelma Sanders DO at [place] [address] Right 05/09/2017     Current Medications:   Current Outpatient Medications   Medication Sig Dispense Refill    nystatin (NYSTATIN) 126546 UNIT/GM powder APPLY POWDER TOPIALLY TO ABDOMINAL FOLDS THREE TIMES DAILY AS NEEDED FOR SKIN IRRITATION 60 g 1    dicyclomine (BENTYL) 10 MG capsule TAKE 1 CAPSULE BY MOUTH 4 TIMES DAILY AS NEEDED FOR CRAMPS 120 capsule 2    lisinopril (PRINIVIL;ZESTRIL) 5 MG tablet Take 1 tablet by mouth once daily 90 tablet 1    cyclobenzaprine (FLEXERIL) 10 MG tablet Take 1 tablet by mouth three times daily as needed for muscle spasm 60 tablet 0    venlafaxine (EFFEXOR XR) 37.5 MG extended release capsule TAKE 1 CAPSULE BY MOUTH THREE TIMES DAILY 90 capsule 0    rOPINIRole (REQUIP) 2 MG tablet TAKE 1 TABLET BY MOUTH THREE TIMES DAILY 90 tablet 0    insulin glargine (LANTUS) 100 UNIT/ML injection vial Inject 45 Units into the skin nightly      Magnesium Oxide 500 MG TABS TAKE 1 TABLET BY MOUTH TWICE DAILY 180 tablet 1    gabapentin (NEURONTIN) 300 MG capsule Take 3 capsules by mouth 3 times daily for 90 days.  270 capsule 5    DULoxetine (CYMBALTA) 60 MG extended release capsule TAKE 1 CAPSULE BY MOUTH NIGHTLY 30 capsule 2    atorvastatin (LIPITOR) 20 MG tablet Take 1 tablet by mouth once daily 90 tablet 3    allopurinol (ZYLOPRIM) 100 MG tablet Take 1 tablet by mouth once daily 90 tablet 1    Cholecalciferol (VITAMIN D3) 50 MCG (2000 UT) TABS Take 1 tablet by mouth once daily 90 tablet 1    blood glucose test strips (FREESTYLE LITE) strip 1 each by Does not apply route 6 times daily 200 each 5    Ferrous Sulfate (IRON) 325 (65 Fe) MG TABS Take 1 tablet by mouth once daily with breakfast 90 tablet 1    aspirin 81 MG chewable tablet CHEW AND SWALLOW 1 TABLET BY MOUTH ONCE DAILY 90 tablet 1    metFORMIN (GLUCOPHAGE) 1000 MG tablet TAKE 1 TABLET BY MOUTH TWICE DAILY 60 tablet 0    meclizine (ANTIVERT) 25 MG tablet TAKE 1 TABLET BY MOUTH THREE TIMES DAILY AS NEEDED FOR DIZZINESS 60 tablet 0    pantoprazole (PROTONIX) 40 MG tablet Take 1 tablet by mouth once daily 90 tablet 3    furosemide (LASIX) 20 MG tablet Take 1 tablet by mouth daily 90 tablet 3    liothyronine (CYTOMEL) 5 MCG tablet Take 5 mcg by mouth daily      albuterol sulfate  (90 Base) MCG/ACT inhaler Inhale 2 puffs into the lungs every 6 hours as needed for Wheezing or Shortness of Breath 1 Inhaler 3    Blood Glucose Monitoring Suppl (FREESTYLE LITE) KYLAH 1 Device by Does not apply route 6 times daily 1 Device 0    glucose monitoring kit (FREESTYLE) monitoring kit 1 kit by Does not apply route daily 1 kit 0    oxybutynin (DITROPAN-XL) 10 MG extended release tablet Take 10 mg by mouth daily      Cyanocobalamin (B-12) 2500 MCG TABS Take 1 tablet by mouth daily      ammonium lactate (LAC-HYDRIN) 12 % cream Apply topically as needed. 1 Bottle 3    lidocaine (XYLOCAINE) 5 % ointment Apply topically as needed. 1 Tube 3    Liraglutide (VICTOZA) 18 MG/3ML SOPN SC injection Inject 1.8 mg into the skin daily       NOVOLOG 100 UNIT/ML injection vial Per V GO. Max 76 units daily  2    fluticasone (FLONASE) 50 MCG/ACT nasal spray 1 spray by Nasal route daily 1 Bottle 0    levothyroxine (SYNTHROID) 100 MCG tablet TAKE 1 TABLET BY MOUTH ONCE DAILY (Patient taking differently: 100 mcg ) 90 tablet 3    Compression Stockings MISC by Does not apply route Wear daily for swelling, pain, and support.  20-30 and she stated that she needs a month. I then explained to her that we will give her a month. the patient then stated that she would like to do her surgery in June. I informed her that I understand. So at this time, the patient has opted for a prescription for a motorized wheelchair and she has elected to proceed with a right total knee replacement surgery. The risks/benefits/alternatives and potential complications have been discussed with the patient. We also had a long discussion regarding the procedure itself and the expectation of the recovery. All questions and concerns were addressed. A consent was not signed today. Patient was instructed to call our office with any question or concerns prior to the procedure occurs. Patient should return to the clinic 1 week before surgery to follow up with Alex Rodriguez PA-C for a pre-operative discussion. The patient will call the office immediately with any problems. No orders of the defined types were placed in this encounter. Orders Placed This Encounter   Procedures    DME Order for (Specify) as OP     - DME device ordered - electric wheelchair  - Diagnosis: Severe osteoarthritis bilateral knees  - Length of Need: 1 year     Santy BONE V, am scribing for and in the presence of Baldemar Soares D.O. 4/13/2021  5:54 PM    I spent 10 minutes of face to face time with this patient. Anup Rogers DO, have personally seen this patient and I have reviewed the CC, PMH, FHX and Social History as provided by other clinical staff. I reassessed the HPI and ROS as scribed by Nikolai Aminibbeto in my presence and it is both accurate and complete. Thereafter, I personally performed the PE, reviewed the imaging and established the DX and POC. I agree with the documentation provided by the Medical Scribe. I have reviewed all documentation in its entirety prior to providing my signature indicating agreement.  Any areas of disagreement are noted on the chart.    Electronically signed by Jesus Marquez DO on 4/13/2021 at 5:58 PM        Electronically signed by Jesus Marquez DO, on 4/13/2021 at 5:54 PM

## 2021-04-16 ENCOUNTER — HOSPITAL ENCOUNTER (OUTPATIENT)
Age: 61
Discharge: HOME | End: 2021-04-16
Payer: COMMERCIAL

## 2021-04-16 ENCOUNTER — OFFICE VISIT (OUTPATIENT)
Dept: FAMILY MEDICINE CLINIC | Age: 61
End: 2021-04-16
Payer: COMMERCIAL

## 2021-04-16 VITALS
HEART RATE: 99 BPM | WEIGHT: 264 LBS | SYSTOLIC BLOOD PRESSURE: 116 MMHG | BODY MASS INDEX: 49.88 KG/M2 | DIASTOLIC BLOOD PRESSURE: 66 MMHG | OXYGEN SATURATION: 96 % | TEMPERATURE: 98.1 F

## 2021-04-16 DIAGNOSIS — E03.9 HYPOTHYROIDISM, UNSPECIFIED TYPE: ICD-10-CM

## 2021-04-16 DIAGNOSIS — G89.29 CHRONIC PAIN OF BOTH KNEES: ICD-10-CM

## 2021-04-16 DIAGNOSIS — M25.561 CHRONIC PAIN OF BOTH KNEES: ICD-10-CM

## 2021-04-16 DIAGNOSIS — Z12.31 ENCOUNTER FOR SCREENING MAMMOGRAM FOR MALIGNANT NEOPLASM OF BREAST: ICD-10-CM

## 2021-04-16 DIAGNOSIS — M17.0 PRIMARY OSTEOARTHRITIS OF BOTH KNEES: Primary | ICD-10-CM

## 2021-04-16 DIAGNOSIS — E83.42 HYPOMAGNESEMIA: ICD-10-CM

## 2021-04-16 DIAGNOSIS — E55.9 VITAMIN D DEFICIENCY: ICD-10-CM

## 2021-04-16 DIAGNOSIS — E78.00 HYPERCHOLESTEROLEMIA: ICD-10-CM

## 2021-04-16 DIAGNOSIS — E78.1 HYPERTRIGLYCERIDEMIA: ICD-10-CM

## 2021-04-16 DIAGNOSIS — Z01.818 PRE-OP TESTING: ICD-10-CM

## 2021-04-16 DIAGNOSIS — M25.562 CHRONIC PAIN OF BOTH KNEES: ICD-10-CM

## 2021-04-16 DIAGNOSIS — I10 ESSENTIAL HYPERTENSION: Chronic | ICD-10-CM

## 2021-04-16 LAB
ALBUMIN SERPL-MCNC: 3.9 G/DL (ref 3.5–5.2)
ALBUMIN/GLOBULIN RATIO: ABNORMAL (ref 1–2.5)
ALP BLD-CCNC: 135 U/L (ref 35–104)
ALT SERPL-CCNC: 11 U/L (ref 5–33)
ANION GAP SERPL CALCULATED.3IONS-SCNC: 10 MMOL/L (ref 9–17)
AST SERPL-CCNC: 14 U/L
BILIRUB SERPL-MCNC: 0.27 MG/DL (ref 0.3–1.2)
BUN BLDV-MCNC: 14 MG/DL (ref 8–23)
BUN/CREAT BLD: ABNORMAL (ref 9–20)
CALCIUM SERPL-MCNC: 9.1 MG/DL (ref 8.6–10.4)
CHLORIDE BLD-SCNC: 100 MMOL/L (ref 98–107)
CHOLESTEROL/HDL RATIO: 2.1
CHOLESTEROL: 141 MG/DL
CO2: 28 MMOL/L (ref 20–31)
CREAT SERPL-MCNC: 0.61 MG/DL (ref 0.5–0.9)
GFR AFRICAN AMERICAN: >60 ML/MIN
GFR NON-AFRICAN AMERICAN: >60 ML/MIN
GFR SERPL CREATININE-BSD FRML MDRD: ABNORMAL ML/MIN/{1.73_M2}
GFR SERPL CREATININE-BSD FRML MDRD: ABNORMAL ML/MIN/{1.73_M2}
GLUCOSE BLD-MCNC: 241 MG/DL (ref 70–99)
HCT VFR BLD CALC: 38.1 % (ref 36–46)
HDLC SERPL-MCNC: 67 MG/DL
HEMOGLOBIN: 12.3 G/DL (ref 12–16)
LDL CHOLESTEROL: 44 MG/DL (ref 0–130)
MAGNESIUM: 1.4 MG/DL (ref 1.6–2.6)
MCH RBC QN AUTO: 28.1 PG (ref 26–34)
MCHC RBC AUTO-ENTMCNC: 32.2 G/DL (ref 31–37)
MCV RBC AUTO: 87.3 FL (ref 80–100)
NRBC AUTOMATED: ABNORMAL PER 100 WBC
PDW BLD-RTO: 15.3 % (ref 11.5–14.9)
PLATELET # BLD: 350 K/UL (ref 150–450)
PMV BLD AUTO: 8.8 FL (ref 6–12)
POTASSIUM SERPL-SCNC: 4.9 MMOL/L (ref 3.7–5.3)
RBC # BLD: 4.37 M/UL (ref 4–5.2)
SODIUM BLD-SCNC: 138 MMOL/L (ref 135–144)
TOTAL PROTEIN: 6.5 G/DL (ref 6.4–8.3)
TRIGL SERPL-MCNC: 149 MG/DL
TSH SERPL DL<=0.05 MIU/L-ACNC: 1.96 MIU/L (ref 0.3–5)
VITAMIN D 25-HYDROXY: 33.7 NG/ML (ref 30–100)
VLDLC SERPL CALC-MCNC: NORMAL MG/DL (ref 1–30)
WBC # BLD: 11.1 K/UL (ref 3.5–11)

## 2021-04-16 PROCEDURE — 84443 ASSAY THYROID STIM HORMONE: CPT

## 2021-04-16 PROCEDURE — 80061 LIPID PANEL: CPT

## 2021-04-16 PROCEDURE — 99214 OFFICE O/P EST MOD 30 MIN: CPT | Performed by: NURSE PRACTITIONER

## 2021-04-16 PROCEDURE — 83735 ASSAY OF MAGNESIUM: CPT

## 2021-04-16 PROCEDURE — 80053 COMPREHEN METABOLIC PANEL: CPT

## 2021-04-16 PROCEDURE — G8417 CALC BMI ABV UP PARAM F/U: HCPCS | Performed by: NURSE PRACTITIONER

## 2021-04-16 PROCEDURE — 85027 COMPLETE CBC AUTOMATED: CPT

## 2021-04-16 PROCEDURE — G8427 DOCREV CUR MEDS BY ELIG CLIN: HCPCS | Performed by: NURSE PRACTITIONER

## 2021-04-16 PROCEDURE — 1036F TOBACCO NON-USER: CPT | Performed by: NURSE PRACTITIONER

## 2021-04-16 PROCEDURE — 36415 COLL VENOUS BLD VENIPUNCTURE: CPT

## 2021-04-16 PROCEDURE — 82306 VITAMIN D 25 HYDROXY: CPT

## 2021-04-16 PROCEDURE — 3017F COLORECTAL CA SCREEN DOC REV: CPT | Performed by: NURSE PRACTITIONER

## 2021-04-16 RX ORDER — ALBUTEROL SULFATE 90 UG/1
2 AEROSOL, METERED RESPIRATORY (INHALATION) EVERY 6 HOURS PRN
Qty: 1 INHALER | Refills: 3 | Status: SHIPPED | OUTPATIENT
Start: 2021-04-16 | End: 2021-06-28 | Stop reason: SDUPTHER

## 2021-04-16 ASSESSMENT — ENCOUNTER SYMPTOMS
CONSTIPATION: 0
VOMITING: 0
SHORTNESS OF BREATH: 0
DIARRHEA: 0
BACK PAIN: 1
SORE THROAT: 0
COUGH: 0
NAUSEA: 0
ABDOMINAL PAIN: 0
RHINORRHEA: 0
CHEST TIGHTNESS: 0
ABDOMINAL DISTENTION: 0

## 2021-04-16 NOTE — PROGRESS NOTES
Faizan Smalls, APRN-CNP  [address] [facility]  [id number], [facility]  [address] [address]  Dept: [phone]  Dept Fax: [phone]     Patient ID: Alma Beasley is a 61 y.o. female. HPI    Established pt here today for f/u on bilateral knee arthritis, go over labs and/or diagnostic studies, and medication refills. Pt denies any fever or chills. Pt today denies any HA, chest pain, or SOB. Pt denies any N/V/D/C or abdominal pain. Today, patient is inquiring about an electric scooter. Her bilateral knee pain is inhibiting her ambulation. She is in need of bilateral knee replacements and is hoping to have the right one done in June and the left one replaced by the end of the year by Dr. Vera Smith. She relates that her pain is progressively getting worse. She is unable to walk far distances. She is using a cane but continues to have problems. My previous office notes, labs and diagnostic studies were reviewed prior to and during encounter. The patient's past medical, surgical, social, and family history as well as current medications and allergies were reviewed as documented in today's encounter by CHLOE Jo.        Current Outpatient Medications on File Prior to Visit   Medication Sig Dispense Refill    nystatin (NYSTATIN) 108449 UNIT/GM powder APPLY POWDER TOPIALLY TO ABDOMINAL FOLDS THREE TIMES DAILY AS NEEDED FOR SKIN IRRITATION 60 g 1    dicyclomine (BENTYL) 10 MG capsule TAKE 1 CAPSULE BY MOUTH 4 TIMES DAILY AS NEEDED FOR CRAMPS 120 capsule 2    lisinopril (PRINIVIL;ZESTRIL) 5 MG tablet Take 1 tablet by mouth once daily 90 tablet 1    cyclobenzaprine (FLEXERIL) 10 MG tablet Take 1 tablet by mouth three times daily as needed for muscle spasm 60 tablet 0    venlafaxine (EFFEXOR XR) 37.5 MG extended release capsule TAKE 1 CAPSULE BY MOUTH THREE TIMES DAILY 90 capsule 0    rOPINIRole (REQUIP) 2 MG tablet TAKE 1 TABLET BY MOUTH THREE TIMES DAILY 90 tablet 0    insulin glargine (LANTUS) 100 UNIT/ML injection vial Inject 45 Units into the skin nightly      Magnesium Oxide 500 MG TABS TAKE 1 TABLET BY MOUTH TWICE DAILY 180 tablet 1    gabapentin (NEURONTIN) 300 MG capsule Take 3 capsules by mouth 3 times daily for 90 days. 270 capsule 5    DULoxetine (CYMBALTA) 60 MG extended release capsule TAKE 1 CAPSULE BY MOUTH NIGHTLY 30 capsule 2    pregabalin (LYRICA) 50 MG capsule Take 1 capsule by mouth 3 times daily for 7 days.  21 capsule 0    atorvastatin (LIPITOR) 20 MG tablet Take 1 tablet by mouth once daily 90 tablet 3    allopurinol (ZYLOPRIM) 100 MG tablet Take 1 tablet by mouth once daily 90 tablet 1    Cholecalciferol (VITAMIN D3) 50 MCG (2000 UT) TABS Take 1 tablet by mouth once daily 90 tablet 1    blood glucose test strips (FREESTYLE LITE) strip 1 each by Does not apply route 6 times daily 200 each 5    Ferrous Sulfate (IRON) 325 (65 Fe) MG TABS Take 1 tablet by mouth once daily with breakfast 90 tablet 1    aspirin 81 MG chewable tablet CHEW AND SWALLOW 1 TABLET BY MOUTH ONCE DAILY 90 tablet 1    metFORMIN (GLUCOPHAGE) 1000 MG tablet TAKE 1 TABLET BY MOUTH TWICE DAILY 60 tablet 0    meclizine (ANTIVERT) 25 MG tablet TAKE 1 TABLET BY MOUTH THREE TIMES DAILY AS NEEDED FOR DIZZINESS 60 tablet 0    pantoprazole (PROTONIX) 40 MG tablet Take 1 tablet by mouth once daily 90 tablet 3    furosemide (LASIX) 20 MG tablet Take 1 tablet by mouth daily 90 tablet 3    liothyronine (CYTOMEL) 5 MCG tablet Take 5 mcg by mouth daily      Blood Glucose Monitoring Suppl (FREESTYLE LITE) KYLAH 1 Device by Does not apply route 6 times daily 1 Device 0    glucose monitoring kit (FREESTYLE) monitoring kit 1 kit by Does not apply route daily 1 kit 0    oxybutynin (DITROPAN-XL) 10 MG extended release tablet Take 10 mg by mouth daily      Cyanocobalamin (B-12) 2500 MCG TABS Take 1 tablet by mouth daily      ammonium lactate (LAC-HYDRIN) 12 % cream Apply topically as needed. 1 Bottle 3    lidocaine (XYLOCAINE) 5 % ointment Apply topically as needed. 1 Tube 3    Liraglutide (VICTOZA) 18 MG/3ML SOPN SC injection Inject 1.8 mg into the skin daily       NOVOLOG 100 UNIT/ML injection vial Per V GO. Max 76 units daily  2    fluticasone (FLONASE) 50 MCG/ACT nasal spray 1 spray by Nasal route daily 1 Bottle 0    levothyroxine (SYNTHROID) 100 MCG tablet TAKE 1 TABLET BY MOUTH ONCE DAILY (Patient taking differently: 100 mcg ) 90 tablet 3    Compression Stockings MISC by Does not apply route Wear daily for swelling, pain, and support. 20-30 mmHg. 2 each 0    Insulin Disposable Pump (V-GO 40) KIT Use as directed per Dr. Rosalba Lo.  Flaxseed, Linseed, (FLAX SEED OIL) 1000 MG CAPS Take 1,000 mg by mouth daily       No current facility-administered medications on file prior to visit. Subjective:     Review of Systems   Constitutional: Negative for activity change, fatigue and fever. HENT: Negative for congestion, ear pain, rhinorrhea and sore throat. Respiratory: Negative for cough, chest tightness and shortness of breath. Cardiovascular: Negative for chest pain and palpitations. Gastrointestinal: Negative for abdominal distention, abdominal pain, constipation, diarrhea, nausea and vomiting. Endocrine: Negative for polydipsia, polyphagia and polyuria. Genitourinary: Negative for difficulty urinating and dysuria. Musculoskeletal: Positive for back pain (stable), gait problem (due to bilateral knee pain) and joint swelling (bilateral knees, right greater than left). Negative for arthralgias and myalgias. Skin: Negative for rash. Neurological: Positive for weakness (generalized). Negative for dizziness, light-headedness and headaches. Hematological: Negative for adenopathy. Psychiatric/Behavioral: Negative for agitation and behavioral problems. The patient is not nervous/anxious. Objective:     Physical Exam  Vitals signs reviewed. Constitutional:       General: She is not in acute distress. Appearance: Normal appearance. She is well-developed. She is obese. HENT:      Head: Normocephalic and atraumatic. Right Ear: Hearing and external ear normal.      Left Ear: Hearing and external ear normal.      Nose: Nose normal. No congestion. Right Sinus: No maxillary sinus tenderness or frontal sinus tenderness. Left Sinus: No maxillary sinus tenderness or frontal sinus tenderness. Mouth/Throat:      Lips: Pink. No lesions. Mouth: Mucous membranes are moist. No oral lesions. Tongue: No lesions. Pharynx: Oropharynx is clear. No oropharyngeal exudate or posterior oropharyngeal erythema. Eyes:      Extraocular Movements: Extraocular movements intact. Conjunctiva/sclera: Conjunctivae normal.      Pupils: Pupils are equal, round, and reactive to light. Neck:      Musculoskeletal: Full passive range of motion without pain and normal range of motion. Thyroid: No thyroid mass. Cardiovascular:      Rate and Rhythm: Normal rate and regular rhythm. Pulses: Normal pulses. Heart sounds: Normal heart sounds. No murmur. Pulmonary:      Effort: Pulmonary effort is normal. No respiratory distress. Breath sounds: Normal breath sounds and air entry. No wheezing, rhonchi or rales. Abdominal:      General: Bowel sounds are normal. There is no distension. Palpations: Abdomen is soft. Tenderness: There is no abdominal tenderness. Musculoskeletal:         General: Swelling (bilateral knees, right worse than left) and tenderness (bilateral knees) present. Right knee: She exhibits decreased range of motion, swelling and bony tenderness. Left knee: She exhibits decreased range of motion, swelling and bony tenderness. Right lower leg: No edema. Left lower leg: No edema. Lymphadenopathy:      Cervical: No cervical adenopathy. Skin:     General: Skin is warm and dry. diagnoses, importance of compliance with the treatment plan, counseling, coordinating care as well as documenting on the day of the visit.      YOLANDA Toth-CNP

## 2021-04-21 ENCOUNTER — OFFICE VISIT (OUTPATIENT)
Dept: PODIATRY | Age: 61
End: 2021-04-21
Payer: COMMERCIAL

## 2021-04-21 VITALS — BODY MASS INDEX: 49.84 KG/M2 | HEIGHT: 61 IN | WEIGHT: 264 LBS

## 2021-04-21 DIAGNOSIS — L85.3 XEROSIS CUTIS: ICD-10-CM

## 2021-04-21 DIAGNOSIS — M79.2 NEUROPATHIC PAIN: ICD-10-CM

## 2021-04-21 DIAGNOSIS — E11.42 TYPE 2 DIABETES MELLITUS WITH DIABETIC POLYNEUROPATHY, WITH LONG-TERM CURRENT USE OF INSULIN (HCC): Primary | ICD-10-CM

## 2021-04-21 DIAGNOSIS — Z79.4 TYPE 2 DIABETES MELLITUS WITH DIABETIC POLYNEUROPATHY, WITH LONG-TERM CURRENT USE OF INSULIN (HCC): Primary | ICD-10-CM

## 2021-04-21 DIAGNOSIS — B35.1 ONYCHOMYCOSIS: ICD-10-CM

## 2021-04-21 DIAGNOSIS — M21.962 FOOT DEFORMITY, BILATERAL: ICD-10-CM

## 2021-04-21 DIAGNOSIS — M21.961 FOOT DEFORMITY, BILATERAL: ICD-10-CM

## 2021-04-21 PROCEDURE — 3017F COLORECTAL CA SCREEN DOC REV: CPT | Performed by: PODIATRIST

## 2021-04-21 PROCEDURE — 99213 OFFICE O/P EST LOW 20 MIN: CPT | Performed by: PODIATRIST

## 2021-04-21 PROCEDURE — G8427 DOCREV CUR MEDS BY ELIG CLIN: HCPCS | Performed by: PODIATRIST

## 2021-04-21 PROCEDURE — 3051F HG A1C>EQUAL 7.0%<8.0%: CPT | Performed by: PODIATRIST

## 2021-04-21 PROCEDURE — 1036F TOBACCO NON-USER: CPT | Performed by: PODIATRIST

## 2021-04-21 PROCEDURE — 2022F DILAT RTA XM EVC RTNOPTHY: CPT | Performed by: PODIATRIST

## 2021-04-21 PROCEDURE — G8417 CALC BMI ABV UP PARAM F/U: HCPCS | Performed by: PODIATRIST

## 2021-04-21 RX ORDER — UREA 40 %
CREAM (GRAM) TOPICAL
Qty: 1 TUBE | Refills: 3 | Status: SHIPPED | OUTPATIENT
Start: 2021-04-21 | End: 2021-08-05

## 2021-04-21 ASSESSMENT — ENCOUNTER SYMPTOMS
DIARRHEA: 0
BACK PAIN: 1
CONSTIPATION: 0
NAUSEA: 0
COLOR CHANGE: 0
VOMITING: 0

## 2021-04-21 NOTE — PROGRESS NOTES
[facility]  Return Patient    Chief Complaint   Patient presents with    Nail Problem     nail trim/last saw Abby Posey NP 4/16/21    Diabetes     last a1c 7.7       Subjective: Cristobal Null comes to clinic for Nail Problem (nail trim/last saw bAby Posey NP 4/16/21) and Diabetes (last a1c 7.7)    she is a diabetic-89. Neuropathy under control with current medication. Having knee surgery in June. Would like new cream for dry skin. History: was experiencing night cramps, burning pain, keeping her up at night. She is on Gabapentin 600 mg BID, Requip 3 mg, Effexor. Has been on Flexeril. The Effexor which has helped considerable with her neuropathy. Pt's primary care physician is YOLANDA Molina NP        Lab Results   Component Value Date    LABA1C 7.7 02/25/2021      Complains of numbness in the feet bilat.   Past Medical History:   Diagnosis Date    Anginal pain ([address]     last used Nitro sl 4 yrs 2013  (currently off this medication written: 10/23/2019)    Arthritis     Arthritis of right knee 8/15/2018    Asthma     Astigmatism 8/22/2014    Back pain     radiculopathy left leg    Blurry vision, bilateral 7/8/2016    Carpal tunnel syndrome of right wrist 11/26/2012    Cataract     Closed displaced fracture of lesser tuberosity of right humerus 7/11/2016    Constipation     Depression     Dysmenorrhea     Fatty liver disease, nonalcoholic     GERD (gastroesophageal reflux disease)     Gout     found through bloodwork    Headache     Heart murmur     Heart palpitations     Heart palpitations     History of rib fracture 7/6/2016    Right    Hyperlipidemia     Hypertension     Hyperthyroidism     Hypertriglyceridemia     Hypothyroidism 2/17/2016    Myopia with astigmatism and presbyopia 8/22/2014    Neuropathy     bilateral legs and feet    Obesity     Osteoporosis     Pancreatitis 2011    no problems    Panic attack 10/30/2014    Presbyopia 8/22/2014    RLS (restless legs syndrome)     Sleep apnea     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    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 diabetes mellitus without mention of complication, not stated as uncontrolled     checks daily       Allergies   Allergen Reactions    Ozempic (0.25 Or 0.5 Mg-Dose) [Semaglutide(0.25 Or 0.5mg-Dos)] Other (See Comments)     Severe stomach pain    Actos [Pioglitazone]      Patient unsure of reaction    Amitriptyline Hives    Celebrex [Celecoxib] Hives, Itching and Dermatitis     Burnt red blister on ashlee    Fenofibrate Other (See Comments)     Muscle cramps    Gemfibrozil Other (See Comments)     Muscle pain, spasms, weakness and HA  Muscle pain, spasms, weakness and HA    Lovastatin Other (See Comments)     Muscle cramps  Muscle cramps    Prempro [Conj Estrog-Medroxyprogest Ace] Other (See Comments)     Breast tenderness, pain in vagina, cramping    Statins Other (See Comments)     Muscle cramps  Lipitor ok    Tricor [Fenofibrate] Other (See Comments)     Muscle cramps    Zetia [Ezetimibe] Other (See Comments)     Does not remember reaction  Does not remember reaction  Does not remember reaction    Zocor [Simvastatin] Other (See Comments)     Muscle cramps    Ampicillin Rash    Niacin And Related Rash    Novolin [Insulin Isophane & Reg, Human] Rash    Omeprazole Nausea And Vomiting    Pcn [Penicillins] Rash    Pregabalin Nausea And Vomiting and Rash    Vesicare [Solifenacin] Rash     Current Outpatient Medications on File Prior to Visit   Medication Sig Dispense Refill    albuterol sulfate  (90 Base) MCG/ACT inhaler Inhale 2 puffs into the lungs every 6 hours as needed for Wheezing or Shortness of Breath 1 Inhaler 3    nystatin (NYSTATIN) 317390 UNIT/GM powder APPLY POWDER TOPIALLY TO ABDOMINAL FOLDS THREE TIMES DAILY AS NEEDED FOR SKIN IRRITATION 60 g 1    dicyclomine (BENTYL) 10 MG capsule TAKE 1 CAPSULE BY MOUTH 4 TIMES DAILY AS NEEDED FOR CRAMPS 120 capsule 2    lisinopril (PRINIVIL;ZESTRIL) 5 MG tablet Take 1 tablet by mouth once daily 90 tablet 1    cyclobenzaprine (FLEXERIL) 10 MG tablet Take 1 tablet by mouth three times daily as needed for muscle spasm 60 tablet 0    venlafaxine (EFFEXOR XR) 37.5 MG extended release capsule TAKE 1 CAPSULE BY MOUTH THREE TIMES DAILY 90 capsule 0    rOPINIRole (REQUIP) 2 MG tablet TAKE 1 TABLET BY MOUTH THREE TIMES DAILY 90 tablet 0    insulin glargine (LANTUS) 100 UNIT/ML injection vial Inject 45 Units into the skin nightly      Magnesium Oxide 500 MG TABS TAKE 1 TABLET BY MOUTH TWICE DAILY 180 tablet 1    gabapentin (NEURONTIN) 300 MG capsule Take 3 capsules by mouth 3 times daily for 90 days.  270 capsule 5    DULoxetine (CYMBALTA) 60 MG extended release capsule TAKE 1 CAPSULE BY MOUTH NIGHTLY 30 capsule 2    atorvastatin (LIPITOR) 20 MG tablet Take 1 tablet by mouth once daily 90 tablet 3    allopurinol (ZYLOPRIM) 100 MG tablet Take 1 tablet by mouth once daily 90 tablet 1    Cholecalciferol (VITAMIN D3) 50 MCG (2000 UT) TABS Take 1 tablet by mouth once daily 90 tablet 1    blood glucose test strips (FREESTYLE LITE) strip 1 each by Does not apply route 6 times daily 200 each 5    Ferrous Sulfate (IRON) 325 (65 Fe) MG TABS Take 1 tablet by mouth once daily with breakfast 90 tablet 1    aspirin 81 MG chewable tablet CHEW AND SWALLOW 1 TABLET BY MOUTH ONCE DAILY 90 tablet 1    metFORMIN (GLUCOPHAGE) 1000 MG tablet TAKE 1 TABLET BY MOUTH TWICE DAILY 60 tablet 0    meclizine (ANTIVERT) 25 MG tablet TAKE 1 TABLET BY MOUTH THREE TIMES DAILY AS NEEDED FOR DIZZINESS 60 tablet 0    pantoprazole (PROTONIX) 40 MG tablet Take 1 tablet by mouth once daily 90 tablet 3    furosemide (LASIX) 20 MG tablet Take 1 tablet by mouth daily 90 tablet 3    liothyronine (CYTOMEL) 5 MCG tablet Take 5 mcg by mouth daily      Blood Glucose Monitoring Suppl (FREESTYLE LITE) KYLAH 1 Device by Does not apply route 6 times daily 1 Device 0    glucose monitoring kit (FREESTYLE) monitoring kit 1 kit by Does not apply route daily 1 kit 0    oxybutynin (DITROPAN-XL) 10 MG extended release tablet Take 10 mg by mouth daily      Cyanocobalamin (B-12) 2500 MCG TABS Take 1 tablet by mouth daily      ammonium lactate (LAC-HYDRIN) 12 % cream Apply topically as needed. 1 Bottle 3    lidocaine (XYLOCAINE) 5 % ointment Apply topically as needed. 1 Tube 3    Liraglutide (VICTOZA) 18 MG/3ML SOPN SC injection Inject 1.8 mg into the skin daily       NOVOLOG 100 UNIT/ML injection vial Per [place]. Max 76 units daily  2    fluticasone (FLONASE) 50 MCG/ACT nasal spray 1 spray by Nasal route daily 1 Bottle 0    levothyroxine (SYNTHROID) 100 MCG tablet TAKE 1 TABLET BY MOUTH ONCE DAILY (Patient taking differently: 100 mcg ) 90 tablet 3    Compression Stockings MISC by Does not apply route Wear daily for swelling, pain, and support. 20-30 mmHg. 2 each 0    Insulin Disposable Pump (V-GO 40) KIT Use as directed per Dr. Cecile Bartlett.  Flaxseed, Linseed, (FLAX SEED OIL) 1000 MG CAPS Take 1,000 mg by mouth daily      pregabalin (LYRICA) 50 MG capsule Take 1 capsule by mouth 3 times daily for 7 days. 21 capsule 0     No current facility-administered medications on file prior to visit. Review of Systems   Constitutional: Negative for chills, diaphoresis, fatigue, fever and unexpected weight change. Cardiovascular: Negative for leg swelling. Gastrointestinal: Negative for constipation, diarrhea, nausea and vomiting. Musculoskeletal: Positive for back pain and gait problem. Negative for arthralgias and joint swelling. Skin: Negative for color change, pallor, rash and wound. Neurological: Positive for numbness. Negative for weakness. Objective:  General: AAO x 3 in NAD.     Derm   Skin lesion/ulceration Absent . Skin No rashes or nodules noted. .   Sites of Onychomycosis Involvement (Check affected area)  [x] [x] [x] [x] [x] [x] [x] [x] [x] [x]  5 4 3 2 1 1 2 3 4 5                          Right                                        Left    Thickness  [x] [x] [x] [x] [x] [x] [x] [x] [x] [x]  5 4 3 2 1 1 2 3 4 5                         Right                                        Left    Dystrophic Changes                                                                 [x] [x] [x] [x] [x] [x] [x] [x] [x] [x]  5 4 3 2 1 1 2 3 4 5                         Right                                        Left    Color                                                                  [x] [x] [x] [x] [x] [x] [x] [x] [x] [x]  5 4 3 2 1 1 2 3 4 5                          Right                                        Left    Incurvation/Ingrowin                                                                   [] [] [] [] [] [] [] [] [] []  5 4 3 2 1 1 2 3 4 5                         Right                                        Left    Inflammation/Pain                                                                   [x] [x] [x] [x] [x] [x] [x] [x] [x] [x]  5 4 3 2 1 1 2 3 4 5                         Right                                        Left    Vascular:  DP/PT pulses palpable 2/4, Bilateral.    CFT <3 seconds to digits 1-5, Bilateral .   Hair growth absent to level of digits, Bilateral.  Edema absent, Bilateral.  Erythema absent, Bilateral.     DM with PVD       []Yes    [x]No    Neurological:  Sensation absent to light touch to level of digits, Bilateral.  Protective sensation absent via 5.07/10g [place] monofilament 6/10 sites, Bilateral.  Vibratory sensation absent to 1st MPJ, Bilateral.     Musculoskeletal: Muscle strength 5/5, Bilateral.  No Pain present upon palpation of dorsal lisfranc's joint, Bilateral. normal medial longitudinal arch, Bilateral.  Palpable osteophytes dorsal lisfranc's     Radiographs: 3 views right Foot:  Severe loss of joint space of tarsometatarsal joints one, two, and three with dorsal osteophytes. No erosive changes or lytic process. No acute pathology. Radiographs: 3 views left Foot:  Severe loss of joint space of tarsometatarsal joints one, two, and three with dorsal osteophytes. No erosive changes or lytic process. No acute pathology. Assessment:  61 y.o. female with:  1. Type 2 diabetes mellitus with diabetic polyneuropathy, with long-term current use of insulin ([address]    2. Onychomycosis    3. Foot deformity, bilateral    4. Neuropathic pain    5. Xerosis cutis       No orders of the defined types were placed in this encounter. Q7   []Yes    []No                Q8   [x]Yes    []No                     Q9   []Yes    []No    Plan:   Pt was evaluated and examined. Patient was given personalized discharge instructions. Nails 1-10 were debrided sharply in length and thickness with a nipper and , without incident. Pt will follow up in 3 months or sooner if any problems arise. Diagnosis was discussed with the pt and all of their questions were answered in detail. Proper foot hygiene and care was discussed with the pt. Informed patient on proper diabetic foot care and importance of tight glycemic control. Patient to check feet daily and contact the office with any questions/problems/concerns. Other comorbidity noted and will be managed by PCP. Diabetic foot examination performed this visit. The exam included neurological sensory exam, a 10-g monofilament and pinprick sensation, vibration using a 128-Hz tuning fork, ankle reflexes, visual skin inspection, vascular exam including assessment of pedal pulses, orthopedic exam for deformities, and shoe inspection. Increased risk factors noted on the diabetic foot exam include decreased sensory exam and peripheral neuropathy.   Shoegear inspected and found to be appropriate size and

## 2021-04-26 RX ORDER — ROPINIROLE 2 MG/1
TABLET, FILM COATED ORAL
Qty: 90 TABLET | Refills: 0 | Status: SHIPPED | OUTPATIENT
Start: 2021-04-26 | End: 2021-06-01

## 2021-04-26 RX ORDER — CYCLOBENZAPRINE HCL 10 MG
TABLET ORAL
Qty: 60 TABLET | Refills: 0 | Status: SHIPPED | OUTPATIENT
Start: 2021-04-26 | End: 2021-05-14

## 2021-04-26 RX ORDER — VENLAFAXINE HYDROCHLORIDE 37.5 MG/1
CAPSULE, EXTENDED RELEASE ORAL
Qty: 90 CAPSULE | Refills: 0 | Status: SHIPPED | OUTPATIENT
Start: 2021-04-26 | End: 2021-06-01

## 2021-05-14 RX ORDER — CYCLOBENZAPRINE HCL 10 MG
TABLET ORAL
Qty: 60 TABLET | Refills: 0 | Status: SHIPPED | OUTPATIENT
Start: 2021-05-14 | End: 2021-05-19 | Stop reason: SDUPTHER

## 2021-05-17 ENCOUNTER — TELEPHONE (OUTPATIENT)
Dept: FAMILY MEDICINE CLINIC | Age: 61
End: 2021-05-17

## 2021-05-19 ENCOUNTER — NURSE TRIAGE (OUTPATIENT)
Dept: OTHER | Facility: CLINIC | Age: 61
End: 2021-05-19

## 2021-05-19 ENCOUNTER — VIRTUAL VISIT (OUTPATIENT)
Dept: FAMILY MEDICINE CLINIC | Age: 61
End: 2021-05-19
Payer: COMMERCIAL

## 2021-05-19 DIAGNOSIS — R10.84 GENERALIZED ABDOMINAL PAIN: Primary | ICD-10-CM

## 2021-05-19 DIAGNOSIS — R11.2 INTRACTABLE VOMITING WITH NAUSEA, UNSPECIFIED VOMITING TYPE: ICD-10-CM

## 2021-05-19 PROCEDURE — 99214 OFFICE O/P EST MOD 30 MIN: CPT | Performed by: NURSE PRACTITIONER

## 2021-05-19 PROCEDURE — 3017F COLORECTAL CA SCREEN DOC REV: CPT | Performed by: NURSE PRACTITIONER

## 2021-05-19 PROCEDURE — G8427 DOCREV CUR MEDS BY ELIG CLIN: HCPCS | Performed by: NURSE PRACTITIONER

## 2021-05-19 RX ORDER — ONDANSETRON 4 MG/1
4 TABLET, FILM COATED ORAL EVERY 8 HOURS PRN
Qty: 20 TABLET | Refills: 0 | Status: SHIPPED | OUTPATIENT
Start: 2021-05-19 | End: 2022-02-15

## 2021-05-19 RX ORDER — CYCLOBENZAPRINE HCL 10 MG
TABLET ORAL
Qty: 60 TABLET | Refills: 0 | Status: SHIPPED | OUTPATIENT
Start: 2021-05-19 | End: 2021-11-18 | Stop reason: SDUPTHER

## 2021-05-19 RX ORDER — ASPIRIN 81 MG/1
TABLET, CHEWABLE ORAL
Qty: 90 TABLET | Refills: 1 | Status: SHIPPED | OUTPATIENT
Start: 2021-05-19 | End: 2022-03-23

## 2021-05-19 RX ORDER — DULOXETIN HYDROCHLORIDE 60 MG/1
60 CAPSULE, DELAYED RELEASE ORAL NIGHTLY
Qty: 90 CAPSULE | Refills: 1 | Status: SHIPPED | OUTPATIENT
Start: 2021-05-19 | End: 2021-08-05 | Stop reason: ALTCHOICE

## 2021-05-19 ASSESSMENT — ENCOUNTER SYMPTOMS
DIARRHEA: 1
RHINORRHEA: 0
ABDOMINAL DISTENTION: 0
CONSTIPATION: 0
ABDOMINAL PAIN: 1
VOMITING: 1
SORE THROAT: 0
BACK PAIN: 0
COUGH: 0
NAUSEA: 1
CHEST TIGHTNESS: 0
SHORTNESS OF BREATH: 0

## 2021-05-19 ASSESSMENT — SOCIAL DETERMINANTS OF HEALTH (SDOH): HOW HARD IS IT FOR YOU TO PAY FOR THE VERY BASICS LIKE FOOD, HOUSING, MEDICAL CARE, AND HEATING?: NOT HARD AT ALL

## 2021-05-19 NOTE — TELEPHONE ENCOUNTER
Received call from Judge Rasmussen at [facility]) [place] with [employer] Complaint. Brief description of triage: Haley Larry has intermittnet abdominal cramping and nausea x 1 week. Denies fever or vomiting. Triage indicates for patient to be seen by PCP today. Writer discussed that if unable to get an appointment, another option is to go to an [facility] or ER. Care advice provided, patient verbalizes understanding; denies any other questions or concerns; instructed to call back for any new or worsening symptoms. Writer provided warm transfer to [facility] for appointment scheduling. Attention Provider: Thank you for allowing me to participate in the care of your patient. The patient was connected to triage in response to information provided to the [facility]. Please do not respond through this encounter as the response is not directed to a shared pool. Reason for Disposition   MODERATE OR MILD pain that comes and goes (cramps) lasts > 24 hours    Answer Assessment - Initial Assessment Questions  1. LOCATION: \"Where does it hurt? \"       Above her belly button in the center    2. RADIATION: \"Does the pain shoot anywhere else? \" (e.g., chest, back)      Denies radiating pain     3. ONSET: \"When did the pain begin? \" (e.g., minutes, hours or days ago)       About a week ago     4. SUDDEN: \"Gradual or sudden onset? \"      Sudden onset     5. PATTERN \"Does the pain come and go, or is it constant? \"     - If constant: \"Is it getting better, staying the same, or worsening? \"       (Note: Constant means the pain never goes away completely; most serious pain is constant and it progresses)      - If intermittent: \"How long does it last?\" \"Do you have pain now? \"      (Note: Intermittent means the pain goes away completely between bouts)      Comes and goes and lasts approx all day and is a \"cramping\" feeling     6. SEVERITY: \"How bad is the pain? \"  (e.g., Scale 1-10; mild, moderate, or severe)    - MILD (1-3):

## 2021-05-19 NOTE — PROGRESS NOTES
Emily Ruffin, APRN-CNP  [address] [facility]  [address] [address]  [address] [address]  Dept: [phone]  Dept Fax: [phone]     PATIENT ID: Cristina Moraes is a 61 y.o. female. HPI:  Established patient presenting via virtual visit today for an acute visit secondary to epigastric and right upper quadrant abdominal pain. She relates that the pain has been ongoing for almost 2 weeks. She is nauseated. She relates that she has been only tolerating slushies. She rates her pain as a 5 and when it exacerbates, its an 8 or 9. She has been taking Pepto Bismol without relief. She is diabetic and her sugars have been being monitored closely by her. They have been ranging anywhere from . She denies any urinary complaints. Pt denies any fever or chills. Pt today denies any HA, chest pain, or SOB. Pt denies any N/V/D/C or abdominal pain. Otherwise pt doing well on current tx and voices no other concerns. My previous office notes, labs and diagnostic studies were reviewed prior to and during encounter. The patient's past medical, surgical, social, and family history as well as her current medications and allergies were reviewed as documented in today's encounter by CHLOE Best.        Current Outpatient Medications on File Prior to Visit   Medication Sig Dispense Refill    venlafaxine (EFFEXOR XR) 37.5 MG extended release capsule TAKE 1 CAPSULE BY MOUTH THREE TIMES DAILY 90 capsule 0    rOPINIRole (REQUIP) 2 MG tablet TAKE 1 TABLET BY MOUTH THREE TIMES DAILY 90 tablet 0    Urea (CARMOL) 40 % cream Apply 2g to both feet daily 1 Tube 3    albuterol sulfate  (90 Base) MCG/ACT inhaler Inhale 2 puffs into the lungs every 6 hours as needed for Wheezing or Shortness of Breath 1 Inhaler 3    nystatin (NYSTATIN) 342574 UNIT/GM powder APPLY POWDER TOPIALLY TO ABDOMINAL FOLDS THREE TIMES DAILY AS NEEDED FOR SKIN IRRITATION 60 g 1    dicyclomine (BENTYL) 10 MG capsule TAKE 1 CAPSULE BY MOUTH 4 TIMES DAILY AS NEEDED FOR CRAMPS 120 capsule 2    lisinopril (PRINIVIL;ZESTRIL) 5 MG tablet Take 1 tablet by mouth once daily 90 tablet 1    insulin glargine (LANTUS) 100 UNIT/ML injection vial Inject 45 Units into the skin nightly      Magnesium Oxide 500 MG TABS TAKE 1 TABLET BY MOUTH TWICE DAILY 180 tablet 1    pregabalin (LYRICA) 50 MG capsule Take 1 capsule by mouth 3 times daily for 7 days. 21 capsule 0    atorvastatin (LIPITOR) 20 MG tablet Take 1 tablet by mouth once daily 90 tablet 3    allopurinol (ZYLOPRIM) 100 MG tablet Take 1 tablet by mouth once daily 90 tablet 1    Cholecalciferol (VITAMIN D3) 50 MCG (2000 UT) TABS Take 1 tablet by mouth once daily 90 tablet 1    blood glucose test strips (FREESTYLE LITE) strip 1 each by Does not apply route 6 times daily 200 each 5    Ferrous Sulfate (IRON) 325 (65 Fe) MG TABS Take 1 tablet by mouth once daily with breakfast 90 tablet 1    metFORMIN (GLUCOPHAGE) 1000 MG tablet TAKE 1 TABLET BY MOUTH TWICE DAILY 60 tablet 0    meclizine (ANTIVERT) 25 MG tablet TAKE 1 TABLET BY MOUTH THREE TIMES DAILY AS NEEDED FOR DIZZINESS 60 tablet 0    pantoprazole (PROTONIX) 40 MG tablet Take 1 tablet by mouth once daily 90 tablet 3    furosemide (LASIX) 20 MG tablet Take 1 tablet by mouth daily 90 tablet 3    liothyronine (CYTOMEL) 5 MCG tablet Take 5 mcg by mouth daily      Blood Glucose Monitoring Suppl (FREESTYLE LITE) KYLAH 1 Device by Does not apply route 6 times daily 1 Device 0    glucose monitoring kit (FREESTYLE) monitoring kit 1 kit by Does not apply route daily 1 kit 0    oxybutynin (DITROPAN-XL) 10 MG extended release tablet Take 10 mg by mouth daily      Cyanocobalamin (B-12) 2500 MCG TABS Take 1 tablet by mouth daily      ammonium lactate (LAC-HYDRIN) 12 % cream Apply topically as needed. 1 Bottle 3    lidocaine (XYLOCAINE) 5 % ointment Apply topically as needed.  1 Tube 3    Liraglutide (VICTOZA) 18 MG/3ML SOPN SC injection Inject 1.8 mg into the skin daily       NOVOLOG 100 UNIT/ML injection vial Per V GO. Max 76 units daily  2    fluticasone (FLONASE) 50 MCG/ACT nasal spray 1 spray by Nasal route daily 1 Bottle 0    levothyroxine (SYNTHROID) 100 MCG tablet TAKE 1 TABLET BY MOUTH ONCE DAILY (Patient taking differently: 100 mcg ) 90 tablet 3    Compression Stockings MISC by Does not apply route Wear daily for swelling, pain, and support. 20-30 mmHg. 2 each 0    Insulin Disposable Pump (V-GO 40) KIT Use as directed per Dr. Rancho Hinojosa.  Flaxseed, Linseed, (FLAX SEED OIL) 1000 MG CAPS Take 1,000 mg by mouth daily      gabapentin (NEURONTIN) 300 MG capsule Take 3 capsules by mouth 3 times daily for 90 days. 270 capsule 5     No current facility-administered medications on file prior to visit. SUBJECTIVE:    Review of Systems   Constitutional: Negative for activity change, fatigue and fever. HENT: Negative for congestion, ear pain, rhinorrhea and sore throat. Respiratory: Negative for cough, chest tightness and shortness of breath. Cardiovascular: Negative for chest pain and palpitations. Gastrointestinal: Positive for abdominal pain, diarrhea (3 days ago, but now improved), nausea and vomiting. Negative for abdominal distention and constipation. Endocrine: Negative for polydipsia, polyphagia and polyuria. Genitourinary: Negative for difficulty urinating and dysuria. Musculoskeletal: Negative for arthralgias, back pain and myalgias. Skin: Negative for rash. Neurological: Negative for dizziness, weakness, light-headedness and headaches. Hematological: Negative for adenopathy. Psychiatric/Behavioral: Negative for agitation and behavioral problems. The patient is not nervous/anxious. OBJECTIVE:  There were no vitals taken, as this is a virtual visit  Physical Exam  Vitals reviewed: Vital signs unavailable, as this is a virtual visit.    Constitutional:       General: She is not in acute distress. Appearance: Normal appearance. She is not ill-appearing or toxic-appearing. Pulmonary:      Effort: No tachypnea or accessory muscle usage. Comments: Patient able to talk in full sentences without difficulty   Neurological:      General: No focal deficit present. Mental Status: She is alert and oriented to person, place, and time. Psychiatric:         Mood and Affect: Mood normal.         Speech: Speech normal.         Behavior: Behavior normal. Behavior is cooperative. ASSESSMENT:   Diagnosis Orders   1. Generalized abdominal pain  US ABDOMEN LIMITED    CBC    Comprehensive Metabolic Panel    Amylase    Lipase   2. Intractable vomiting with nausea, unspecified vomiting type  US ABDOMEN LIMITED     PLAN:  1. Generalized abdominal pain  2. Intractable vomiting with nausea, unspecified vomiting type  - Patient unable to do any testing today, she has some other things to do  - Will have her call and get US scheduled for tomorrow  - Lab work as ordered  - [address] [place] [id number]; Future  - CBC; Future  - Comprehensive Metabolic Panel; Future  - Amylase; Future  - Lipase; Future    - Rest of systems unchanged, continue current treatments. - On this date May 19, 2021,  I have spent greater than 50% of this visit reviewing previous notes, test results and/or face to face with the patient discussing the diagnoses, importance of compliance with the treatment plan, counseling, coordinating care as well as documenting on the day of the visit. Maci Olguin, was evaluated through a synchronous (real-time) audio-video encounter. The patient (or guardian if applicable) is aware that this is a billable service. Verbal consent to proceed has been obtained within the past 12 months.  The visit was conducted pursuant to the emergency declaration under the [facility] waiver authority and the [employer] Supplemental Appropriations Act. Patient identification was verified, and a caregiver was present when appropriate. The patient was located in a state where the provider was credentialed to provide care. Total time spent for this encounter: Not billed by time    --YOLANDA Dykes NP on 5/19/2021 at 4:36 PM    An electronic signature was used to authenticate this note.

## 2021-05-20 ENCOUNTER — HOSPITAL ENCOUNTER (OUTPATIENT)
Age: 61
Discharge: HOME | End: 2021-05-20
Payer: COMMERCIAL

## 2021-05-20 DIAGNOSIS — R10.84 GENERALIZED ABDOMINAL PAIN: ICD-10-CM

## 2021-05-20 LAB
ALBUMIN SERPL-MCNC: 4.1 G/DL (ref 3.5–5.2)
ALBUMIN/GLOBULIN RATIO: ABNORMAL (ref 1–2.5)
ALP BLD-CCNC: 99 U/L (ref 35–104)
ALT SERPL-CCNC: 17 U/L (ref 5–33)
AMYLASE: 36 U/L (ref 28–100)
ANION GAP SERPL CALCULATED.3IONS-SCNC: 13 MMOL/L (ref 9–17)
AST SERPL-CCNC: 25 U/L
BILIRUB SERPL-MCNC: 0.38 MG/DL (ref 0.3–1.2)
BUN BLDV-MCNC: 20 MG/DL (ref 8–23)
BUN/CREAT BLD: ABNORMAL (ref 9–20)
CALCIUM SERPL-MCNC: 9.1 MG/DL (ref 8.6–10.4)
CHLORIDE BLD-SCNC: 100 MMOL/L (ref 98–107)
CO2: 24 MMOL/L (ref 20–31)
CREAT SERPL-MCNC: 0.77 MG/DL (ref 0.5–0.9)
GFR AFRICAN AMERICAN: >60 ML/MIN
GFR NON-AFRICAN AMERICAN: >60 ML/MIN
GFR SERPL CREATININE-BSD FRML MDRD: ABNORMAL ML/MIN/{1.73_M2}
GFR SERPL CREATININE-BSD FRML MDRD: ABNORMAL ML/MIN/{1.73_M2}
GLUCOSE BLD-MCNC: 217 MG/DL (ref 70–99)
HCT VFR BLD CALC: 40.2 % (ref 36–46)
HEMOGLOBIN: 13 G/DL (ref 12–16)
LIPASE: 23 U/L (ref 13–60)
MCH RBC QN AUTO: 28.3 PG (ref 26–34)
MCHC RBC AUTO-ENTMCNC: 32.5 G/DL (ref 31–37)
MCV RBC AUTO: 87.3 FL (ref 80–100)
NRBC AUTOMATED: ABNORMAL PER 100 WBC
PDW BLD-RTO: 15 % (ref 11.5–14.9)
PLATELET # BLD: 407 K/UL (ref 150–450)
PMV BLD AUTO: 7.6 FL (ref 6–12)
POTASSIUM SERPL-SCNC: 4.8 MMOL/L (ref 3.7–5.3)
RBC # BLD: 4.6 M/UL (ref 4–5.2)
SODIUM BLD-SCNC: 137 MMOL/L (ref 135–144)
TOTAL PROTEIN: 7.1 G/DL (ref 6.4–8.3)
WBC # BLD: 10.9 K/UL (ref 3.5–11)

## 2021-05-20 PROCEDURE — 82150 ASSAY OF AMYLASE: CPT

## 2021-05-20 PROCEDURE — 36415 COLL VENOUS BLD VENIPUNCTURE: CPT

## 2021-05-20 PROCEDURE — 85027 COMPLETE CBC AUTOMATED: CPT

## 2021-05-20 PROCEDURE — 83690 ASSAY OF LIPASE: CPT

## 2021-05-20 PROCEDURE — 80053 COMPREHEN METABOLIC PANEL: CPT

## 2021-05-27 ENCOUNTER — HOSPITAL ENCOUNTER (OUTPATIENT)
Dept: ULTRASOUND IMAGING | Age: 61
Discharge: HOME | End: 2021-05-29
Payer: COMMERCIAL

## 2021-05-27 DIAGNOSIS — R11.2 INTRACTABLE VOMITING WITH NAUSEA, UNSPECIFIED VOMITING TYPE: ICD-10-CM

## 2021-05-27 DIAGNOSIS — R10.84 GENERALIZED ABDOMINAL PAIN: ICD-10-CM

## 2021-05-27 PROCEDURE — 76705 ECHO EXAM OF ABDOMEN: CPT

## 2021-05-28 DIAGNOSIS — E55.9 VITAMIN D DEFICIENCY: ICD-10-CM

## 2021-05-28 RX ORDER — CHOLECALCIFEROL (VITAMIN D3) 125 MCG
CAPSULE ORAL
Qty: 30 TABLET | Refills: 0 | Status: SHIPPED | OUTPATIENT
Start: 2021-05-28

## 2021-05-28 NOTE — TELEPHONE ENCOUNTER
LAST VISIT:   Visit date not found     Future Appointments   Date Time Provider Indira Vikki   6/10/2021  8:00 AM STA PAT RM 2 [facility]AZ PAT [facility]   6/10/2021  5:30 PM STC DIGITAL RM STCZ MAMMO [facility]   6/17/2021 11:00 AM Tom Toledo, PA-C [facility]   7/2/2021 10:30 AM SCHEDULE, STAZ COVID SCREENING STAZ COV [facility]   7/13/2021  1:15 PM Ashley Díaz PA-C [facility]   8/10/2021  3:00 PM Alea Fabian,  [address] Maintenance   Topic Date Due    Breast cancer screen  09/16/2018    Diabetic microalbuminuria test  05/26/2021    Diabetic retinal exam  08/31/2021 (Originally 3/18/2020)    COVID-19 Vaccine (1) 10/01/2021 (Originally 12/19/1972)    DTaP/Tdap/Td vaccine (1 - Tdap) 11/24/2021 (Originally 12/19/1979)    Flu vaccine (Season Ended) 11/24/2021 (Originally 9/1/2021)    Shingles Vaccine (1 of 2) 11/24/2021 (Originally 12/19/2010)    Diabetic foot exam  04/16/2022 (Originally 1/13/2021)    Pneumococcal 0-64 years Vaccine (1 of 2 - PPSV23) 03/27/2029 (Originally 12/19/1966)    A1C test (Diabetic or Prediabetic)  02/25/2022    Lipid screen  04/16/2022    TSH testing  04/16/2022    Cervical cancer screen  04/24/2022    Potassium monitoring  05/20/2022    Creatinine monitoring  05/20/2022    Colon cancer screen colonoscopy  10/12/2022    Hepatitis C screen  Addressed    HIV screen  Addressed    Hepatitis A vaccine  Aged Out    Hib vaccine  Aged Out    Meningococcal (ACWY) vaccine  Aged Out       Hemoglobin A1C (%)   Date Value   02/25/2021 7.7   11/24/2020 7.9   01/29/2020 8.2 (H)             ( goal A1C is < 7)   Microalb/Crt.  Ratio (mcg/mg creat)   Date Value   05/26/2020 23     LDL Cholesterol (mg/dL)   Date Value   04/16/2021 44       (goal LDL is <100)   AST (U/L)   Date Value   05/20/2021 25     ALT (U/L)   Date Value   05/20/2021 17     BUN (mg/dL)   Date Value   05/20/2021 20     BP Readings from Last 3 Encounters: 04/16/21 116/66   04/12/21 122/77   02/25/21 116/60          (goal 120/80)    All Future Testing planned in [facility]  Lab Frequency Next Occurrence   EMILEE DIGITAL SCREEN W OR WO CAD BILATERAL Once 06/16/2022   ECHO Complete 2D W Doppler W Color Once 05/17/2021   EKG 12 lead Once 06/15/2021               Patient Active Problem List:     Hypercholesterolemia     Hypertriglyceridemia     Diabetic neuropathy (HCC)     DESTINY (obstructive sleep apnea)     Vitamin D deficiency     Urinary incontinence     Allergic rhinitis     Degenerative lumbar disc     Sacroiliitis (HCC)     Fatigue     Encounter for chronic pain management     Primary osteoarthritis of both shoulders     Primary osteoarthritis of both knees     Primary osteoarthritis of both hips     Essential hypertension     Hypoactive thyroid     Calcified granuloma of lung (HCC)     Gastroesophageal reflux disease without esophagitis     Anemia     Hypomagnesemia     Palpitations     Vertigo     Persistent headaches     Osteoarthritis of right shoulder     Low serum low density lipoprotein (LDL)     Elevated uric acid in blood     Seasonal allergic rhinitis     Bursitis/tendonitis, shoulder     Nuclear sclerosis     Iron deficiency anemia     Primary osteoarthritis of right shoulder     Leiomyoma of uterus     Umbilical hernia     Generalized headaches     Tinnitus     Diverticulosis of intestine without bleeding     RLS (restless legs syndrome)     Chronic midline low back pain with right-sided sciatica     Acquired spondylolisthesis     Type 2 diabetes mellitus with diabetic neuropathy, with long-term current use of insulin (HCC)     Morbid obesity with BMI of 45.0-49.9, adult (HCC)     Localized swelling of both lower legs     Anterior subcapsular age-related cataract of both eyes     RPE (retinal pigment epithelium) atrophy     Hyperuricemia     Chronic pancreatitis ([facility] [address]

## 2021-06-01 RX ORDER — VENLAFAXINE HYDROCHLORIDE 37.5 MG/1
CAPSULE, EXTENDED RELEASE ORAL
Qty: 90 CAPSULE | Refills: 0 | Status: SHIPPED | OUTPATIENT
Start: 2021-06-01 | End: 2021-07-06

## 2021-06-01 RX ORDER — ROPINIROLE 2 MG/1
TABLET, FILM COATED ORAL
Qty: 90 TABLET | Refills: 0 | Status: SHIPPED | OUTPATIENT
Start: 2021-06-01 | End: 2021-07-06

## 2021-06-01 NOTE — TELEPHONE ENCOUNTER
Please Approve or Refuse.        Next Visit Date:  8/10/2021   Last Visit Date: 4/21/2021    Hemoglobin A1C (%)   Date Value   02/25/2021 7.7   11/24/2020 7.9   01/29/2020 8.2 (H)             ( goal A1C is < 7)   BP Readings from Last 3 Encounters:   04/16/21 116/66   04/12/21 122/77   02/25/21 116/60          (goal 120/80)  BUN   Date Value Ref Range Status   05/20/2021 20 8 - 23 mg/dL Final     CREATININE   Date Value Ref Range Status   05/20/2021 0.77 0.50 - 0.90 mg/dL Final     Potassium   Date Value Ref Range Status   05/20/2021 4.8 3.7 - 5.3 mmol/L Final

## 2021-06-10 ENCOUNTER — HOSPITAL ENCOUNTER (OUTPATIENT)
Dept: GENERAL RADIOLOGY | Age: 61
Discharge: HOME | End: 2021-06-12
Payer: COMMERCIAL

## 2021-06-10 ENCOUNTER — HOSPITAL ENCOUNTER (OUTPATIENT)
Dept: PREADMISSION TESTING | Age: 61
Discharge: HOME | End: 2021-06-14
Payer: COMMERCIAL

## 2021-06-10 VITALS
RESPIRATION RATE: 16 BRPM | TEMPERATURE: 97 F | HEIGHT: 61 IN | SYSTOLIC BLOOD PRESSURE: 138 MMHG | HEART RATE: 90 BPM | BODY MASS INDEX: 50.03 KG/M2 | DIASTOLIC BLOOD PRESSURE: 55 MMHG | WEIGHT: 265 LBS | OXYGEN SATURATION: 96 %

## 2021-06-10 LAB
ABO/RH: NORMAL
ANION GAP SERPL CALCULATED.3IONS-SCNC: 12 MMOL/L (ref 9–17)
ANTIBODY SCREEN: NEGATIVE
ARM BAND NUMBER: NORMAL
BILIRUBIN URINE: NEGATIVE
BUN BLDV-MCNC: 18 MG/DL (ref 8–23)
BUN/CREAT BLD: 23 (ref 9–20)
CALCIUM SERPL-MCNC: 8.8 MG/DL (ref 8.6–10.4)
CHLORIDE BLD-SCNC: 101 MMOL/L (ref 98–107)
CO2: 23 MMOL/L (ref 20–31)
COLOR: YELLOW
COMMENT UA: ABNORMAL
CREAT SERPL-MCNC: 0.77 MG/DL (ref 0.5–0.9)
ESTIMATED AVERAGE GLUCOSE: 186 MG/DL
EXPIRATION DATE: NORMAL
GFR AFRICAN AMERICAN: >60 ML/MIN
GFR NON-AFRICAN AMERICAN: >60 ML/MIN
GFR SERPL CREATININE-BSD FRML MDRD: ABNORMAL ML/MIN/{1.73_M2}
GFR SERPL CREATININE-BSD FRML MDRD: ABNORMAL ML/MIN/{1.73_M2}
GLUCOSE BLD-MCNC: 155 MG/DL (ref 70–99)
GLUCOSE URINE: NEGATIVE
HBA1C MFR BLD: 8.1 % (ref 4–6)
HCT VFR BLD CALC: 38.7 % (ref 36.3–47.1)
HEMOGLOBIN: 11.5 G/DL (ref 11.9–15.1)
KETONES, URINE: NEGATIVE
LEUKOCYTE ESTERASE, URINE: NEGATIVE
MCH RBC QN AUTO: 27.8 PG (ref 25.2–33.5)
MCHC RBC AUTO-ENTMCNC: 29.7 G/DL (ref 28.4–34.8)
MCV RBC AUTO: 93.7 FL (ref 82.6–102.9)
NITRITE, URINE: NEGATIVE
NRBC AUTOMATED: ABNORMAL PER 100 WBC
PDW BLD-RTO: 14.4 % (ref 11.8–14.4)
PH UA: 5.5 (ref 5–8)
PLATELET # BLD: 399 K/UL (ref 138–453)
PMV BLD AUTO: 9.6 FL (ref 8.1–13.5)
POTASSIUM SERPL-SCNC: 4.1 MMOL/L (ref 3.7–5.3)
PREALBUMIN: 20.5 MG/DL (ref 20–40)
PROTEIN UA: NEGATIVE
RBC # BLD: 4.13 M/UL (ref 3.95–5.11)
SODIUM BLD-SCNC: 136 MMOL/L (ref 135–144)
SPECIFIC GRAVITY UA: 1.04 (ref 1–1.03)
TURBIDITY: CLEAR
URINE HGB: NEGATIVE
UROBILINOGEN, URINE: NORMAL
WBC # BLD: 10.8 K/UL (ref 3.5–11.3)

## 2021-06-10 PROCEDURE — 86901 BLOOD TYPING SEROLOGIC RH(D): CPT

## 2021-06-10 PROCEDURE — 81003 URINALYSIS AUTO W/O SCOPE: CPT

## 2021-06-10 PROCEDURE — 93005 ELECTROCARDIOGRAM TRACING: CPT | Performed by: ORTHOPAEDIC SURGERY

## 2021-06-10 PROCEDURE — 36415 COLL VENOUS BLD VENIPUNCTURE: CPT

## 2021-06-10 PROCEDURE — 86900 BLOOD TYPING SEROLOGIC ABO: CPT

## 2021-06-10 PROCEDURE — 84134 ASSAY OF PREALBUMIN: CPT

## 2021-06-10 PROCEDURE — 83036 HEMOGLOBIN GLYCOSYLATED A1C: CPT

## 2021-06-10 PROCEDURE — 77073 BONE LENGTH STUDIES: CPT

## 2021-06-10 PROCEDURE — 71046 X-RAY EXAM CHEST 2 VIEWS: CPT

## 2021-06-10 PROCEDURE — 87641 MR-STAPH DNA AMP PROBE: CPT

## 2021-06-10 PROCEDURE — 85027 COMPLETE CBC AUTOMATED: CPT

## 2021-06-10 PROCEDURE — 80048 BASIC METABOLIC PNL TOTAL CA: CPT

## 2021-06-10 PROCEDURE — 86850 RBC ANTIBODY SCREEN: CPT

## 2021-06-10 RX ORDER — AMITRIPTYLINE HYDROCHLORIDE 50 MG/1
50 TABLET, FILM COATED ORAL NIGHTLY
COMMUNITY

## 2021-06-10 RX ORDER — CHLORAL HYDRATE 500 MG
3000 CAPSULE ORAL DAILY
COMMUNITY

## 2021-06-10 RX ORDER — DARIFENACIN HYDROBROMIDE 15 MG/1
15 TABLET, EXTENDED RELEASE ORAL 2 TIMES DAILY
COMMUNITY

## 2021-06-10 RX ORDER — LORATADINE 10 MG/1
10 CAPSULE, LIQUID FILLED ORAL DAILY PRN
COMMUNITY
End: 2021-06-21

## 2021-06-10 RX ORDER — ACETAMINOPHEN 500 MG
1000 TABLET ORAL ONCE
Status: CANCELLED | OUTPATIENT
Start: 2021-07-06

## 2021-06-10 RX ORDER — OXYBUTYNIN CHLORIDE 15 MG/1
15 TABLET, EXTENDED RELEASE ORAL DAILY
COMMUNITY

## 2021-06-10 RX ORDER — GABAPENTIN 300 MG/1
300 CAPSULE ORAL ONCE
Status: CANCELLED | OUTPATIENT
Start: 2021-07-06

## 2021-06-10 RX ORDER — PHENTERMINE HYDROCHLORIDE 37.5 MG/1
37.5 CAPSULE ORAL EVERY MORNING
COMMUNITY
End: 2022-01-03 | Stop reason: ALTCHOICE

## 2021-06-10 RX ORDER — LEVOTHYROXINE SODIUM 112 UG/1
112 TABLET ORAL DAILY
COMMUNITY

## 2021-06-10 ASSESSMENT — KOOS JR
STRAIGHTENING KNEE FULLY: 1
HOW SEVERE IS YOUR KNEE STIFFNESS AFTER FIRST WAKING IN MORNING: 3
BENDING TO THE FLOOR TO PICK UP OBJECT: 3
TWISING OR PIVOTING ON KNEE: 2
GOING UP OR DOWN STAIRS: 4
RISING FROM SITTING: 3
STANDING UPRIGHT: 3

## 2021-06-10 ASSESSMENT — PROMIS GLOBAL HEALTH SCALE
IN GENERAL, PLEASE RATE HOW WELL YOU CARRY OUT YOUR USUAL SOCIAL ACTIVITIES (INCLUDES ACTIVITIES AT HOME, AT WORK, AND IN YOUR COMMUNITY, AND RESPONSIBILITIES AS A PARENT, CHILD, SPOUSE, EMPLOYEE, FRIEND, ETC) [ON A SCALE OF 1 (POOR) TO 5 (EXCELLENT)]?: 3
SUM OF RESPONSES TO QUESTIONS 3, 6, 7, & 8: 16
IN THE PAST 7 DAYS, HOW WOULD YOU RATE YOUR FATIGUE ON AVERAGE [ON A SCALE FROM 1 (NONE) TO 5 (VERY SEVERE)]?: 3
IN GENERAL, WOULD YOU SAY YOUR QUALITY OF LIFE IS...[ON A SCALE OF 1 (POOR) TO 5 (EXCELLENT)]: 2
IN GENERAL, HOW WOULD YOU RATE YOUR PHYSICAL HEALTH [ON A SCALE OF 1 (POOR) TO 5 (EXCELLENT)]?: 2
SUM OF RESPONSES TO QUESTIONS 2, 4, 5, & 10: 12
IN THE PAST 7 DAYS, HOW WOULD YOU RATE YOUR PAIN ON AVERAGE [ON A SCALE FROM 0 (NO PAIN) TO 10 (WORST IMAGINABLE PAIN)]?: 8
IN GENERAL, WOULD YOU SAY YOUR HEALTH IS...[ON A SCALE OF 1 (POOR) TO 5 (EXCELLENT)]: 2
HOW IS THE PROMIS V1.1 BEING ADMINISTERED?: 0
IN GENERAL, HOW WOULD YOU RATE YOUR MENTAL HEALTH, INCLUDING YOUR MOOD AND YOUR ABILITY TO THINK [ON A SCALE OF 1 (POOR) TO 5 (EXCELLENT)]?: 3
IN GENERAL, HOW WOULD YOU RATE YOUR SATISFACTION WITH YOUR SOCIAL ACTIVITIES AND RELATIONSHIPS [ON A SCALE OF 1 (POOR) TO 5 (EXCELLENT)]?: 3
IN THE PAST 7 DAYS, HOW OFTEN HAVE YOU BEEN BOTHERED BY EMOTIONAL PROBLEMS, SUCH AS FEELING ANXIOUS, DEPRESSED, OR IRRITABLE [ON A SCALE FROM 1 (NEVER) TO 5 (ALWAYS)]?: 4
TO WHAT EXTENT ARE YOU ABLE TO CARRY OUT YOUR EVERYDAY PHYSICAL ACTIVITIES SUCH AS WALKING, CLIMBING STAIRS, CARRYING GROCERIES, OR MOVING A CHAIR [ON A SCALE OF 1 (NOT AT ALL) TO 5 (COMPLETELY)]?: 3
WHO IS THE PERSON COMPLETING THE PROMIS V1.1 SURVEY?: 0

## 2021-06-10 ASSESSMENT — PAIN SCALES - GENERAL: PAINLEVEL_OUTOF10: 8

## 2021-06-10 ASSESSMENT — PAIN DESCRIPTION - ORIENTATION: ORIENTATION: RIGHT

## 2021-06-10 ASSESSMENT — PAIN DESCRIPTION - LOCATION: LOCATION: KNEE

## 2021-06-10 ASSESSMENT — PAIN - FUNCTIONAL ASSESSMENT: PAIN_FUNCTIONAL_ASSESSMENT: PREVENTS OR INTERFERES WITH ALL ACTIVE AND SOME PASSIVE ACTIVITIES

## 2021-06-10 ASSESSMENT — PAIN DESCRIPTION - PAIN TYPE: TYPE: CHRONIC PAIN

## 2021-06-10 ASSESSMENT — PAIN DESCRIPTION - DESCRIPTORS: DESCRIPTORS: PRESSURE

## 2021-06-10 NOTE — H&P
History and Physical Service   [place] [address]    HISTORY AND PHYSICAL EXAMINATION            Date of Evaluation: 6/10/2021  Patient name:  Kofi Sanders  MRN:   [id number]  YOB: 1960  PCP:    Suzie Cogan, APRN - NP    History Obtained From:     Patient    History of Present Illness: This is Kofi Sanders a 61 y.o. female who presents for a pre-admission testing appointment for an upcoming right total knee arthroplasty by Dr. Almas Marin scheduled on 7/6/21 at 56 due to Right Osteoarthritis. The patient's chief complaint is 7/10 right knee pain which has progressively worsened over the past year. Right knee pain is aggravated by walking and is minimally relieved with elevating leg. Prior treatment includes none. Denies recent falls and injuries. Patient is diabetic has an insulin pump present and gives insulin injections to herself. Have a history of PONV from previous surgeries. Denies chest pain, history of heart of murmur and palpitation. States asthmatic takes inhalers only as needed. Sleep apnea questionnaire (Patient diagnosed with Sleep Apnea 6 years ago, but did not follow through)  1) Do you snore loudly? YES  2) Do you often feel tired, fatigued, or sleepy? YES  3) Has anyone observed you stop breathing or choking/gasping during your sleep? NO  4) Do you have hypertension? YES  5) BMI >35 kg/m2? YES  6) Age > 50? YES  7) Pt is a male? NO    Functional Capacity:   1) Pt NOT able to walk 2 city blocks on level ground without SOB. 2) Pt NOT able to climb 2 flights of stairs without SOB. 3) Pt NOT able to walk up a hill for 1-2 city blocks without SOB.     Past Medical History:     Past Medical History:   Diagnosis Date    Anginal pain ([address]     last used Nitro sl 4 yrs 2013  (currently off this medication written: 10/23/2019); no episodes for about a year (written 6/10/21)    Arthritis     Arthritis of right knee 8/15/2018    Asthma     uses inhaler daily, managed by Shy Alexander NP    Astigmatism 2014    Back pain     radiculopathy left leg    Blurry vision, bilateral 2016    Carpal tunnel syndrome of right wrist 2012    Cataract     patient denies    Closed displaced fracture of lesser tuberosity of right humerus 2016    Constipation     Depression     Dysmenorrhea     Fatty liver disease, nonalcoholic     GERD (gastroesophageal reflux disease)     Gout     found through bloodwork    Headache     Heart murmur     Heart palpitations     Heart palpitations     History of rib fracture 2016    Right    Hyperlipidemia     Hypertension     Hypertriglyceridemia     Hypothyroidism 2016    Kidney stones     Myopia with astigmatism and presbyopia 2014    Neuropathy     bilateral legs and feet    Obesity     Osteoporosis     Palpitations     about once a month    Pancreatitis     no problems    Panic attack 10/30/2014    PONV (postoperative nausea and vomiting)     mild    Presbyopia 2014    RLS (restless legs syndrome)     Sleep apnea     does not use Cpap    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    Status post total replacement of right shoulder 2017    Stenosis of both internal carotid arteries- Mild 16-49% 2017    Type II or unspecified type diabetes mellitus without mention of complication, not stated as uncontrolled     checks daily, has Insulin Pump, has a wearable glucose monitor        Past Surgical History:     Past Surgical History:   Procedure Laterality Date    CARDIAC CATHETERIZATION      Patient states approximately  she had the cardiac catheterization that was negative      SECTION      x 2    COLONOSCOPY      DILATION AND CURETTAGE OF UTERUS      JOINT REPLACEMENT Left 2016    shoulder    [place] COLON CA SCRN NOT HI RSK IND N/A 10/12/2017    COLONOSCOPY performed by Elie Moody MD at [address] [place] [address] EGD TRANSORAL BIOPSY liothyronine (CYTOMEL) 5 MCG tablet Take 5 mcg by mouth daily   Yes Historical Provider, MD   Cyanocobalamin (B-12) 2500 MCG TABS Take 1 tablet by mouth daily   Yes Historical Provider, MD   ammonium lactate (LAC-HYDRIN) 12 % cream Apply topically as needed. 2/5/20  Yes Hipolito Cummings DPM   lidocaine (XYLOCAINE) 5 % ointment Apply topically as needed. 1/23/20  Yes Juliano Dixon MD   Liraglutide (VICTOZA) 18 MG/3ML SOPN SC injection Inject 1.8 mg into the skin daily    Yes Historical Provider, MD   NOVOLOG 100 UNIT/ML injection vial Per V GO. Max 76 units daily 9/27/19  Yes Historical Provider, MD   DULoxetine (CYMBALTA) 60 MG extended release capsule Take 1 capsule by mouth nightly 5/19/21   YOLANDA Mata NP   Urea (CARMOL) 40 % cream Apply 2g to both feet daily 4/21/21   Hipolito Cummings DPM   pregabalin (LYRICA) 50 MG capsule Take 1 capsule by mouth 3 times daily for 7 days. 1/28/21 5/19/21  Juliano Dixon MD   blood glucose test strips (FREESTYLE LITE) strip 1 each by Does not apply route 6 times daily 11/24/20   YOLANDA Mata NP   Blood Glucose Monitoring Suppl (FREESTYLE LITE) KYLAH 1 Device by Does not apply route 6 times daily 5/22/20   YOLANDA Mar CNP   glucose monitoring kit (FREESTYLE) monitoring kit 1 kit by Does not apply route daily 4/15/20   YOLANDA Mar CNP   Compression Stockings MISC by Does not apply route Wear daily for swelling, pain, and support. 20-30 mmHg. 6/12/19   YOLANDA Mar CNP   Insulin Disposable Pump (V-GO 40) KIT Use as directed per Dr. Sebas Dumont. Historical Provider, MD        Allergies:     Ozempic (0.25 or 0.5 mg-dose) [semaglutide(0.25 or 0.5mg-dos)]; Actos [pioglitazone]; Amitriptyline; Celebrex [celecoxib]; Fenofibrate; Gemfibrozil; Lovastatin; Prempro [conj estrog-medroxyprogest ace]; Statins; Tricor [fenofibrate]; Zetia [ezetimibe]; Zocor [simvastatin];  Ampicillin; Niacin and related; Novolin [insulin isophane & reg, human]; Omeprazole; Pcn [penicillins]; Pregabalin; and Vesicare [solifenacin]    Social History:     Tobacco:    reports that she quit smoking about 43 years ago. Her smoking use included cigarettes. She has a 10.00 pack-year smoking history. She has never used smokeless tobacco.  Alcohol:      reports no history of alcohol use. Drug Use:  reports no history of drug use. Family History:     Family History   Problem Relation Age of Onset    Emphysema Mother     Diabetes Father     Heart Disease Father     High Cholesterol Sister     High Blood Pressure Sister     Heart Disease Sister        Review of Systems:     Positive and Negative as described in HPI. CONSTITUTIONAL:  Negative for fevers, chills, sweats, fatigue, and weight loss. HEENT:  Negative for glasses, hearing changes, rhinorrhea, and throat pain. RESPIRATORY:  Negative for shortness of breath, cough, congestion, and wheezing. CARDIOVASCULAR:  Negative for chest pain, blood clot, irregular heartbeat, and palpitations. GASTROINTESTINAL:  Negative for reflux, nausea, vomiting, diarrhea, constipation, change in bowel habits, and abdominal pain. GENITOURINARY:  Negative for difficulty of urination, burning with urination, and frequency. INTEGUMENT:  Negative for rash, skin lesions, and easy bruising. Instructed pt to call  as soon as possible if a rash or wound develops prior to surgery. Pt voiced understanding. HEMATOLOGIC/LYMPHATIC:  Negative for swelling/edema. ALLERGIC/IMMUNOLOGIC:  Negative for urticaria and itching. ENDOCRINE:  Negative for increase in thirst, increase in urination, and heat or cold intolerance. MUSCULOSKELETAL: See HPI. NEUROLOGICAL:  Negative for headaches, dizziness, lightheadedness, numbness, and tingling extremities. BEHAVIOR/PSYCH:  Negative for depression and anxiety.     Physical Exam:   BP (!) 138/55   Pulse 90   Temp 97 °F (36.1 °C) (Temporal)   Resp 16   Ht 5' 1\" (1.549 m) Wt 265 lb (120.2 kg)   SpO2 96%   BMI 50.07 kg/m²   No LMP recorded. Patient is postmenopausal.  No obstetric history on file. No results for input(s): POCGLU in the last 72 hours. General Appearance:  Alert, well appearing, and in no acute distress. Mental status:  Oriented to person, place, and time. Head:  Normocephalic and atraumatic. Eye:  No icterus, redness, pupils equal and reactive, extraocular eye movements intact, and conjunctiva clear. Ear: Hearing grossly intact. Nose:  No drainage noted. Mouth:  Mucous membranes moist.  Neck:  Supple and no carotid bruits noted. Lungs:  Bilateral equal air entry, clear to auscultation, no wheezing, rales or rhonchi, and normal effort. Cardiovascular:  Normal rate, regular rhythm, no murmur, gallop, or rub. Abdomen:  Soft, non-tender, non-distended, and active bowel sounds. Neurologic:  Normal speech and cranial nerves II through XII grossly intact. Strength 5/5 bilaterally. Skin:  No gross lesions, rashes, bruising, or bleeding on exposed skin area. Extremities:  Posterior tibial pulses 2+ bilaterally. No pedal edema. No calf tenderness with palpation. Psych:  Normal affect. Investigations:      Laboratory Testing:  No results found for this or any previous visit (from the past 24 hour(s)). Recent Labs     05/20/21  1132   HGB 13.0   HCT 40.2   WBC 10.9   MCV 87.3      K 4.8      CO2 24   BUN 20   CREATININE 0.77   GLUCOSE 217*   AST 25   ALT 17   LABALBU 4.1       No results for input(s): COVID19 in the last 720 hours. Imaging/Diagnostics:      US ABDOMEN LIMITED Specify organ? LIVER, SPLEEN, GALLBLADDER, PANCREAS    Result Date: 5/27/2021  EXAMINATION: RIGHT UPPER QUADRANT ULTRASOUND 5/27/2021 10:23 am COMPARISON: CT abdomen and pelvis August 8, 2017. HISTORY: ORDERING SYSTEM PROVIDED HISTORY: Generalized abdominal pain TECHNOLOGIST PROVIDED HISTORY: This procedure can be scheduled via [employer].   Access your [employer] account by visiting [email or web address]. Specify organ?->LIVER Specify organ?->APPENDIX Specify organ?->SPLEEN Specify organ?->GALLBLADDER Specify organ?->PANCREAS Acuity: Acute Type of Exam: Initial FINDINGS: LIVER:  Fatty infiltration of the liver. No focal hepatic mass or intrahepatic bile duct dilatation. Hepatopetal flow seen within the portal vein. BILIARY SYSTEM:  Cholelithiasis. No gallbladder wall thickening or pericholecystic fluid. Negative Brooks's sign. Common bile duct is within normal limits measuring 4.2 mm. RIGHT KIDNEY: The right kidney is grossly unremarkable without evidence of hydronephrosis. PANCREAS:  Visualized portions of the pancreas are unremarkable. SPLEEN: Normal in size without focal lesion. OTHER: No evidence of right upper quadrant ascites. Fatty infiltration of the liver. Cholelithiasis. EKG: See Epic. Diagnosis:      1. Right knee osteoarthritis    Plans:     1.  Right knee total arthroplasty      YOLANDA Taylor CNP  6/10/2021  11:59 AM

## 2021-06-10 NOTE — PRE-PROCEDURE INSTRUCTIONS
ARRIVE AT [facility] [address] ON Tuesday, 7/6/2021 at 8:30 AM    Once you enter the hospital lobby take the elevators to the second floor. Check-In is at the surgery registration desk    One person age 25 or older may remain in the waiting room while you are in surgery. Continue to take your home medications as you normally do up to and including the night before surgery with the exception of any blood thinning medications. Please stop any blood thinning medications as directed by your surgeon or prescribing physician. Failure to stop certain medications may interfere with your scheduled surgery. These may include:  Aspirin, Warfarin (Coumadin), Clopidogrel (Plavix), Ibuprofen (Motrin, Advil), Naproxen (Aleve), Meloxicam (Mobic), Celecoxib (Celebrex), Eliquis, Pradaxa, Xarelto, Effient, Fish Oil, Herbal supplements. Stop Aspirin, Fish oil and Excedrin 7 days prior to surgery    If you are diabetic, do not take any of your diabetic medications by mouth the morning of surgery. If you are taking insulin contact the doctor that manages your diabetes for instructions about any changes to your insulin dosages the day before surgery. Do not inject insulin or other injectable diabetic medications the morning of surgery unless otherwise instructed by the doctor who manages your diabetes. Please take the following medication(s) the day of surgery with a small sip of water:  Levothyroxine, Liothyronine, Pantoprazole, Oxybutynin    Please use your inhaler(s) if needed and bring your inhaler(s) from home the day of surgery. PREPARING FOR YOUR SURGERY:     Before surgery, you can play an important role in your own health. Because skin is not sterile, we need to be sure that your skin is as free of germs as possible before surgery by carefully washing before surgery. Preparing or prepping skin before surgery can reduce the risk of a surgical site infection.   Do not shave the area of your body where your surgery will be performed unless you received specific permission from your physician. You will need to shower at home the night before surgery and the morning of surgery with a special soap called chlorhexidine gluconate (CHG*). *Not to be used by people allergic to Chlorhexidine Gluconate (CHG). Following these instructions will help you be sure that your skin is clean before surgery. Instructions on cleaning your skin before surgery: The night before your surgery:      You will need to shower with warm water (not hot) and the CHG soap.  Use a clean wash cloth and a clean towel. Have clean clothes available to put on after the shower.   First wash your hair with regular shampoo. Rinse your hair and body thoroughly to remove the shampoo.  Wash your face and genital area (private parts) with your regular soap or water only. Thoroughly rinse your body with warm water from the neck down.  Turn water off to prevent rinsing the soap off too soon.  With a clean wet washcloth and half of the CHG soap in the bottle, lather your entire body from the neck down. Do not use CHG soap near your eyes or ears to avoid injury to those areas.  Wash thoroughly, paying special attention to the area where your surgery will be performed.  Wash your body gently for five (5) minutes. Avoid scrubbing your skin too hard.  Turn the water back on and rinse your body thoroughly.  Pat yourself dry with a clean, soft towel. Do not apply lotion, cream or powder.  Dress with clean freshly washed clothes. The morning of surgery:     Repeat shower following steps above - using remaining half of CHG soap in bottle. Patient Instructions:    [facility] If you are having any type of anesthesia you are to have nothing to eat or drink after midnight the night before your surgery.   This includes gum, hard candy, mints, water or smoking or chewing tobacco.  The only exception to this is a form of public transportation is not acceptable. Someone must remain with you for the first 24 hours after your surgery if you receive anesthesia or medication. If you do not have someone to stay with you, your procedure may be cancelled.       If you have any other questions regarding your procedure or the day of surgery, please call [phone]    Covid test on 7/2/21 @ 10:30 am      _________________________  ____________________________  Signature (Patient)    Signature (Provider)            Date

## 2021-06-10 NOTE — DISCHARGE INSTR - COC
Continuity of Care Form    Patient Name: Yessy Hyman   :  1960  MRN:  [id number]    Admit date:  6/10/2021  Discharge date:  ***    Code Status Order: Prior   Advance Directives:   [facility] Documentation     Date/Time Healthcare Directive Type of Healthcare Directive Copy in [address] Agent's Name Healthcare Agent's Phone Number    06/10/21 1049  No, patient does not have an advance directive for healthcare treatment -- -- -- -- --          Admitting Physician:  No admitting provider for patient encounter. PCP: YOLANDA Ruiz NP    Discharging Nurse: [facility] Unit/Room#: No information available for this encounter. Discharging Unit Phone Number: ***    Emergency Contact:   Extended Emergency Contact Information  Primary Emergency Contact: [facility]  Address: [place] [id number]  [address] Phone: [phone]  Relation: Child  Secondary Emergency Contact: Herbert Veloz  Address: [address], [address] Phone: [phone]  Relation: Spouse    Past Surgical History:  Past Surgical History:   Procedure Laterality Date    CARDIAC CATHETERIZATION      Patient states approximately  she had the cardiac catheterization that was negative      SECTION      x 2    COLONOSCOPY      DILATION AND CURETTAGE OF UTERUS      JOINT REPLACEMENT Left 2016    shoulder    [place] COLON CA SCRN NOT HI RSK IND N/A 10/12/2017    COLONOSCOPY performed by Cristobal Heimlich, MD at [address] EGD TRANSORAL BIOPSY SINGLE/MULTIPLE N/A 10/12/2017    EGD BIOPSY performed by Cristobal Heimlich, MD at [address] Right 2017    SHOULDER TOTAL ARTHROPLASTY RIGHT WITH [address], [address] performed by Susan Baltazar DO at [address] Right 2017       Immunization History:      There is no immunization history on file for this patient.     Active Problems:  Patient Active Problem List   Diagnosis Code    Hypercholesterolemia E78.00    Hypertriglyceridemia E78.1    Diabetic neuropathy ([facility] E11.40    DESTINY (obstructive sleep apnea) G47.33    Vitamin D deficiency E55.9    Urinary incontinence R32    Allergic rhinitis J30.9    Degenerative lumbar disc M51.36    Sacroiliitis ([facility]) M46.1    Fatigue R53.83    Encounter for chronic pain management G89.29    Primary osteoarthritis of both shoulders M19.011, M19.012    Primary osteoarthritis of both knees M17.0    Primary osteoarthritis of both hips M16.0    Essential hypertension I10    Hypoactive thyroid E03.9    Calcified granuloma of lung ([facility]) J84.10    Gastroesophageal reflux disease without esophagitis K21.9    Anemia D64.9    Hypomagnesemia E83.42    Palpitations R00.2    Vertigo R42    Persistent headaches R51.9    Osteoarthritis of right shoulder M19.011    Low serum low density lipoprotein (LDL) R79.89    Elevated uric acid in blood E79.0    Seasonal allergic rhinitis J30.2    Bursitis/tendonitis, shoulder [id number]    Nuclear sclerosis H25.10    Iron deficiency anemia D50.9    Primary osteoarthritis of right shoulder M19.011    Leiomyoma of uterus L73.5    Umbilical hernia Z52.2    Generalized headaches R51.9    Tinnitus H93.19    Diverticulosis of intestine without bleeding K57.90    RLS (restless legs syndrome) G25.81    Chronic midline low back pain with right-sided sciatica M54.41, G89.29    Acquired spondylolisthesis M43.10    Type 2 diabetes mellitus with diabetic neuropathy, with long-term current use of insulin ([facility]) E11.40, Z79.4    Morbid obesity with BMI of 45.0-49.9, adult ([facility]) E66.01, Z68.42    Localized swelling of both lower legs R22.43    Anterior subcapsular age-related cataract of both eyes H25.033    RPE (retinal pigment epithelium) atrophy H35.54    Hyperuricemia E79.0    Chronic pancreatitis ([facility] [address]

## 2021-06-11 ENCOUNTER — TELEPHONE (OUTPATIENT)
Dept: ORTHOPEDIC SURGERY | Age: 61
End: 2021-06-11

## 2021-06-11 LAB
EKG ATRIAL RATE: 82 BPM
EKG P AXIS: 45 DEGREES
EKG P-R INTERVAL: 176 MS
EKG Q-T INTERVAL: 388 MS
EKG QRS DURATION: 94 MS
EKG QTC CALCULATION (BAZETT): 453 MS
EKG R AXIS: -35 DEGREES
EKG T AXIS: 21 DEGREES
EKG VENTRICULAR RATE: 82 BPM
MRSA, DNA, NASAL: NORMAL
SPECIMEN DESCRIPTION: NORMAL

## 2021-06-11 NOTE — TELEPHONE ENCOUNTER
Writer had left several messages for patient to call. Patient called today, 6/11/21, 11:15 AM.  Per Dr. David Echavarria, patient needs to be worked up by   [address] [place] [address] faxed order over for them to contact her. Writer called [facility] and spoke with Indy Richmond. Indy Richmond received the order yesterday but did not recognize it as an order. Thought it was a request for records. No phone or fax number for us on the form. Indy Richmond has not been advised of the new [facility] Program.     Also, per Dr. David Echavarria, patient's A1c is 8.1 and above threshold for doing surgery. Dr. David Echavarria giving patient choice of either working with her diabetic doctor and be on an extremely strict diabetic diet and recheck A1c   1 week prior to surgery or reschedule surgery and try and get A1c down for recheck prior to next surgery. Writer spoke with patient about all the above. Patient wants to work with her doctor and try and get the A1c down and keep the current surgery date of 7/6/21. Patient will call doctor to work with them.

## 2021-06-14 ENCOUNTER — TELEPHONE (OUTPATIENT)
Dept: ONCOLOGY | Age: 61
End: 2021-06-14

## 2021-06-14 ENCOUNTER — HOSPITAL ENCOUNTER (OUTPATIENT)
Age: 61
Discharge: HOME | End: 2021-06-14
Payer: COMMERCIAL

## 2021-06-14 DIAGNOSIS — D63.8 ANEMIA ASSOCIATED WITH DIABETES MELLITUS (HCC): Primary | ICD-10-CM

## 2021-06-14 DIAGNOSIS — E11.69 ANEMIA ASSOCIATED WITH DIABETES MELLITUS (HCC): Primary | ICD-10-CM

## 2021-06-14 DIAGNOSIS — E11.69 ANEMIA ASSOCIATED WITH DIABETES MELLITUS (HCC): ICD-10-CM

## 2021-06-14 DIAGNOSIS — D63.8 ANEMIA ASSOCIATED WITH DIABETES MELLITUS (HCC): ICD-10-CM

## 2021-06-14 LAB
FERRITIN: 34 UG/L (ref 13–150)
FOLATE: 15.4 NG/ML
IRON SATURATION: 14 % (ref 20–55)
IRON: 44 UG/DL (ref 37–145)
TOTAL IRON BINDING CAPACITY: 319 UG/DL (ref 250–450)
UNSATURATED IRON BINDING CAPACITY: 275 UG/DL (ref 112–347)
VITAMIN B-12: 566 PG/ML (ref 232–1245)

## 2021-06-14 PROCEDURE — 83540 ASSAY OF IRON: CPT

## 2021-06-14 PROCEDURE — 83550 IRON BINDING TEST: CPT

## 2021-06-14 PROCEDURE — 36415 COLL VENOUS BLD VENIPUNCTURE: CPT

## 2021-06-14 PROCEDURE — 82746 ASSAY OF FOLIC ACID SERUM: CPT

## 2021-06-14 PROCEDURE — 82728 ASSAY OF FERRITIN: CPT

## 2021-06-14 PROCEDURE — 82668 ASSAY OF ERYTHROPOIETIN: CPT

## 2021-06-14 PROCEDURE — 82607 VITAMIN B-12: CPT

## 2021-06-14 NOTE — TELEPHONE ENCOUNTER
RECEIVED A CALL FROM KATYA AT DR Germain Blizzard' OFFICE, PT NEEDS TO BE PUT ON SCHEDULE FOR A BLOODLESS CARE APPOINTMENT.  LABS WILL BE DRAWN TODAY PER KATYA AND PT'S APPT WILL BE TOMORROW 6/14/21 @4:30PM (DOXY VISIT)

## 2021-06-14 NOTE — PROGRESS NOTES
[address] Joint Replacement Pre-surgical Assessment    Scheduled Surgery Date: 2021  Surgery Time: 1030    Surgeon: GEORGINA  Procedure: right Total Knee    Primary Insurance Coverage [facility] MEDICAID  Pre-op class attended YES 06/10/2021.     PCP: YOLANDA Ruiz NP  Clearance received by PCP: Yes    Anticipated Discharge Plan: SNF IN [address] [place] [address],  [phone] (if applicable): Marika Armstrong SW WAS GOING TO CALL PT WITH OPTIONS OF SNF'S NEAR HER HOME    Significant PMH:   Surgical History    Procedure Laterality Date Comment Source   CARDIAC CATHETERIZATION   Patient states approximately  she had the cardiac catheterization that was negative      SECTION   x 2    COLONOSCOPY       DILATION AND CURETTAGE OF UTERUS       JOINT REPLACEMENT Left 2016 shoulder    NC COLON CA SCRN NOT HI RSK IND N/A 10/12/2017 COLONOSCOPY performed by Cristobal Heimlich, MD at [address] EGD TRANSORAL BIOPSY SINGLE/MULTIPLE N/A 10/12/2017 EGD BIOPSY performed by Cristobal Heimlich, MD at [address] Right 2017 SHOULDER TOTAL ARTHROPLASTY RIGHT WITH ARTHREX, CELLSAVER AND AQUAMANTIS performed by Lizzie Odonnell DO at  [facility]  Right 2017     ED Notes    ED Notes    Medical History    Diagnosis Date Comment Source   Anginal pain ([address]  last used Nitro sl 4 yrs   (currently off this medication written: 10/23/2019); no episodes for about a year (written 6/10/21)    Arthritis      Arthritis of right knee 8/15/2018     Asthma  uses inhaler as needed, managed by Jigar Juárez NP    Astigmatism 2014     Back pain  radiculopathy left leg    Blurry vision, bilateral 2016     Carpal tunnel syndrome of right wrist 2012     Cataract  patient denies    Closed displaced fracture of lesser tuberosity of right humerus 2016     Constipation      Depression      Dysmenorrhea      Fatty liver disease, nonalcoholic      GERD (gastroesophageal reflux disease) Gout  found through bloodwork    Headache      Heart murmur      Heart palpitations      Heart palpitations      History of blood transfusion  thinks she might have had a transfusion doesn't remember why or when    History of rib fracture 7/6/2016 Right    Hyperlipidemia      Hypertension      Hypertriglyceridemia      Hypothyroidism 2/17/2016     Myopia with astigmatism and presbyopia 8/22/2014     Neuropathy  bilateral legs and feet    Obesity      Osteoporosis      Palpitations  about once a month    Pancreatitis 2011 no problems    Panic attack 10/30/2014     PONV (postoperative nausea and vomiting)  mild    Presbyopia 8/22/2014     RLS (restless legs syndrome)      Sleep apnea  does not use Cpap    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 PT STATING SHE NEEDS TO GO TO A SNF AFTER SURGERY AND REQUESTING A WHEELCHAIR RX[employer] PENA TOLD PT NO WHEELCHAIR POST-OP RTKA. PT LIVES IN [facility] AND SPOKE WITH SHUKRI SOLANO WHO WOULD CALL PT WITH CHOICES IN HER AREA.     Electronically signed by: Kelly Jacobson RN on 6/14/2021 at 1:03 PM

## 2021-06-15 ENCOUNTER — TELEPHONE (OUTPATIENT)
Dept: ONCOLOGY | Age: 61
End: 2021-06-15

## 2021-06-15 ENCOUNTER — HOSPITAL ENCOUNTER (OUTPATIENT)
Facility: MEDICAL CENTER | Age: 61
End: 2021-06-15
Payer: COMMERCIAL

## 2021-06-15 ENCOUNTER — TELEPHONE (OUTPATIENT)
Dept: ORTHOPEDIC SURGERY | Age: 61
End: 2021-06-15

## 2021-06-15 ENCOUNTER — OFFICE VISIT (OUTPATIENT)
Dept: ONCOLOGY | Age: 61
End: 2021-06-15
Payer: COMMERCIAL

## 2021-06-15 VITALS
HEART RATE: 86 BPM | SYSTOLIC BLOOD PRESSURE: 99 MMHG | RESPIRATION RATE: 18 BRPM | HEIGHT: 61 IN | DIASTOLIC BLOOD PRESSURE: 59 MMHG | BODY MASS INDEX: 50.18 KG/M2 | WEIGHT: 265.8 LBS | TEMPERATURE: 98 F

## 2021-06-15 DIAGNOSIS — D50.8 IRON DEFICIENCY ANEMIA SECONDARY TO INADEQUATE DIETARY IRON INTAKE: ICD-10-CM

## 2021-06-15 DIAGNOSIS — K90.9 IRON MALABSORPTION: ICD-10-CM

## 2021-06-15 DIAGNOSIS — D64.9 NORMOCYTIC ANEMIA: Primary | ICD-10-CM

## 2021-06-15 PROCEDURE — G8417 CALC BMI ABV UP PARAM F/U: HCPCS | Performed by: INTERNAL MEDICINE

## 2021-06-15 PROCEDURE — 99244 OFF/OP CNSLTJ NEW/EST MOD 40: CPT | Performed by: INTERNAL MEDICINE

## 2021-06-15 PROCEDURE — G8427 DOCREV CUR MEDS BY ELIG CLIN: HCPCS | Performed by: INTERNAL MEDICINE

## 2021-06-15 PROCEDURE — 99202 OFFICE O/P NEW SF 15 MIN: CPT

## 2021-06-15 RX ORDER — SODIUM CHLORIDE 9 MG/ML
INJECTION, SOLUTION INTRAVENOUS CONTINUOUS
Status: CANCELLED | OUTPATIENT
Start: 2021-06-24

## 2021-06-15 RX ORDER — FERROUS SULFATE 325(65) MG
325 TABLET ORAL
Qty: 60 TABLET | Refills: 5 | Status: SHIPPED | OUTPATIENT
Start: 2021-06-15 | End: 2021-06-28 | Stop reason: ALTCHOICE

## 2021-06-15 RX ORDER — DIPHENHYDRAMINE HYDROCHLORIDE 50 MG/ML
50 INJECTION INTRAMUSCULAR; INTRAVENOUS ONCE
Status: CANCELLED | OUTPATIENT
Start: 2021-06-24 | End: 2021-06-15

## 2021-06-15 RX ORDER — HEPARIN SODIUM (PORCINE) LOCK FLUSH IV SOLN 100 UNIT/ML 100 UNIT/ML
500 SOLUTION INTRAVENOUS PRN
Status: CANCELLED | OUTPATIENT
Start: 2021-06-24

## 2021-06-15 RX ORDER — EPINEPHRINE 1 MG/ML
0.3 INJECTION, SOLUTION, CONCENTRATE INTRAVENOUS PRN
Status: CANCELLED | OUTPATIENT
Start: 2021-06-24

## 2021-06-15 RX ORDER — SODIUM CHLORIDE 0.9 % (FLUSH) 0.9 %
5-40 SYRINGE (ML) INJECTION PRN
Status: CANCELLED | OUTPATIENT
Start: 2021-06-24

## 2021-06-15 RX ORDER — SODIUM CHLORIDE 9 MG/ML
25 INJECTION, SOLUTION INTRAVENOUS PRN
Status: CANCELLED | OUTPATIENT
Start: 2021-06-24

## 2021-06-15 RX ORDER — METHYLPREDNISOLONE SODIUM SUCCINATE 125 MG/2ML
125 INJECTION, POWDER, LYOPHILIZED, FOR SOLUTION INTRAMUSCULAR; INTRAVENOUS ONCE
Status: CANCELLED | OUTPATIENT
Start: 2021-06-24 | End: 2021-06-15

## 2021-06-15 NOTE — PROGRESS NOTES
_               Ms. Lady Sherman is a very pleasant 61 y.o. female with history of multiple co morbidities as listed. Patient was referred for evaluation of anemia before knee surgery. Patient has history of iron deficiency and she has been maintained on oral iron over the last several years. She had multiple surgical operations and she had right shoulder replacement. The patient is scheduled for right knee replacement July 6, 2021. Presurgical evaluation showed anemia with hemoglobin of 11.4. Patient has no melena or hematochezia. No hematemesis. No vaginal bleeding. No hematuria. She has generalized weakness and fatigue. She has joints problems. She has limited activities due to weight and due to joints problems. No fever or night sweats. No history of smoking or alcohol drinking. Cassandra Florez        PAST MEDICAL HISTORY: has a past medical history of Anginal pain ([address] Arthritis, Arthritis of right knee, Asthma, Astigmatism, Back pain, Blurry vision, bilateral, Carpal tunnel syndrome of right wrist, Cataract, Closed displaced fracture of lesser tuberosity of right humerus, Constipation, Depression, Dysmenorrhea, Fatty liver disease, nonalcoholic, GERD (gastroesophageal reflux disease), Gout, Headache, Heart murmur, Heart palpitations, Heart palpitations, History of blood transfusion, History of rib fracture, Hyperlipidemia, Hypertension, Hypertriglyceridemia, Hypothyroidism, Myopia with astigmatism and presbyopia, Neuropathy, Obesity, Osteoporosis, Palpitations, Pancreatitis, Panic attack, PONV (postoperative nausea and vomiting), Presbyopia, RLS (restless legs syndrome), Sleep apnea, Status post reverse total arthroplasty of left shoulder, Status post total replacement of right shoulder, Stenosis of both internal carotid arteries- Mild 16-49%, and Type II or unspecified type diabetes mellitus without mention of complication, not stated as uncontrolled. PAST SURGICAL HISTORY: has a past surgical history that includes  section; Dilation and curettage of uterus; Colonoscopy; joint replacement (Left, 2016); Cardiac catheterization; Total shoulder arthroplasty (Right, 2017); REPLACEMENT SHOULDER TOTAL (Right, 2017); pr egd transoral biopsy single/multiple (N/A, 10/12/2017); and pr colon ca scrn not hi rsk ind (N/A, 10/12/2017). CURRENT MEDICATIONS:  has a current medication list which includes the following prescription(s): ferrous sulfate, amitriptyline, darifenacin, levothyroxine, fish oil, loratadine, oxybutynin, aspirin-acetaminophen-caffeine, venlafaxine, ropinirole, vitamin d3, aspirin, cyclobenzaprine, ondansetron, albuterol sulfate hfa, nystatin, dicyclomine, lisinopril, insulin glargine, magnesium oxide, gabapentin, atorvastatin, allopurinol, freestyle lite, iron, metformin, meclizine, pantoprazole, furosemide, liothyronine, freestyle lite, glucose monitoring kit, ammonium lactate, lidocaine, liraglutide, novolog, v-go 40, phentermine, duloxetine, urea, pregabalin, and b-12. ALLERGIES:  is allergic to ozempic (0.25 or 0.5 mg-dose) [semaglutide(0.25 or 0.5mg-dos)]; actos [pioglitazone]; amitriptyline; celebrex [celecoxib]; fenofibrate; gemfibrozil; lovastatin; prempro [conj estrog-medroxyprogest ace]; statins; tricor [fenofibrate]; zetia [ezetimibe]; zocor [simvastatin]; ampicillin; niacin and related; novolin [insulin isophane & reg, human]; omeprazole; pcn [penicillins]; pregabalin; and vesicare [solifenacin]. FAMILY HISTORY: Negative for any hematological or oncological conditions. SOCIAL HISTORY:  reports that she quit smoking about 43 years ago. Her smoking use included cigarettes. She has a 10.00 pack-year smoking history. She has never used smokeless tobacco. She reports that she does not drink alcohol and does not use drugs.     REVIEW OF SYSTEMS:     · General: Positive for weakness and fatigue. No unanticipated weight loss or decreased appetite. No fever or chills. · Eyes: No blurred vision, eye pain or double vision. · Ears: No hearing problems or drainage. No tinnitus. · Throat: No sore throat, problems with swallowing or dysphagia. · Respiratory: No cough, sputum or hemoptysis. No shortness of breath. No pleuritic chest pain. · Cardiovascular: No chest pain, orthopnea or PND. No lower extremity edema. No palpitation. · Gastrointestinal: No problems with swallowing. No abdominal pain or bloating. No nausea or vomiting. No diarrhea or constipation. No GI bleeding. · Genitourinary: No dysuria, hematuria, frequency or urgency. · Musculoskeletal: Limited activities due to joints problems. · Dermatologic: No skin rashes or pruritus. No skin lesions or discolorations. · Psychiatric: No depression, anxiety, or stress or signs of schizophrenia. No change in mood or affect. · Hematologic: No history of bleeding tendency. No bruises or ecchymosis. No history of clotting problems. · Infectious disease: No fever, chills or frequent infections. · Endocrine: No polydipsia or polyuria. No temperature intolerance. · Neurologic: No headaches or dizziness. No weakness or numbness of the extremities. No changes in balance, coordination,  memory, mentation, behavior. · Allergic/Immunologic: No nasal congestion or hives. No repeated infections. PHYSICAL EXAM:  The patient is not in acute distress. Vital signs: Blood pressure (!) 99/59, pulse 86, temperature 98 °F (36.7 °C), resp. rate 18, height 5' 1\" (1.549 m), weight 265 lb 12.8 oz (120.6 kg), not currently breastfeeding.      General appearance - well appearing, not in pain or distress  Mental status - good mood, alert and oriented  Eyes - pupils equal and reactive, extraocular eye movements intact  Ears - bilateral TM's and external ear canals normal  Nose - normal and patent, no erythema, discharge or polyps  Mouth - mucous membranes moist, pharynx normal without lesions  Neck - supple, no significant adenopathy  Lymphatics - no palpable lymphadenopathy, no hepatosplenomegaly  Chest - clear to auscultation, no wheezes, rales or rhonchi, symmetric air entry  Heart - normal rate, regular rhythm, normal S1, S2, no murmurs, rubs, clicks or gallops  Abdomen - soft, nontender, nondistended, no masses or organomegaly  Neurological - alert, oriented, normal speech, no focal findings or movement disorder noted  Musculoskeletal -bilateral knee swelling and tenderness right more than left. Extremities - peripheral pulses normal, no pedal edema, no clubbing or cyanosis  Skin - normal coloration and turgor, no rashes, no suspicious skin lesions noted     Review of Diagnostic data:   Lab Results   Component Value Date    WBC 10.8 06/10/2021    HGB 11.5 (L) 06/10/2021    HCT 38.7 06/10/2021    MCV 93.7 06/10/2021     06/10/2021       Chemistry        Component Value Date/Time     06/10/2021 1205    K 4.1 06/10/2021 1205     06/10/2021 1205    CO2 23 06/10/2021 1205    BUN 18 06/10/2021 1205    CREATININE 0.77 06/10/2021 1205        Component Value Date/Time    CALCIUM 8.8 06/10/2021 1205    ALKPHOS 99 05/20/2021 1132    AST 25 05/20/2021 1132    ALT 17 05/20/2021 1132    BILITOT 0.38 05/20/2021 1132            IMPRESSION:   Partially treated normocytic anemia  Iron deficiency anemia  Plan for right knee replacement surgery July 6, 2021  Multiple comorbidities as listed    PLAN: I reviewed the labs available to me and discussed with the patient. For more than 50 minutes of face to face discussion, I explained to the patient the nature of this hematologic problem. I explained the significance of these abnormalities in layman language. Reviewing patient's labs over the last several years obviously she had anemia in several occasions. She has been maintained on oral iron for iron deficiency anemia.   She did not have any good response. She is due for right knee replacement surgery in 3 weeks. We will try to avoid blood transfusion. To help improving the hemoglobin level before the surgery we recommend giving IV iron infusion. Explained benefits and side effects and she agreed. Labs will be monitored after her surgery for any future need for iron replacement or any other supplements. Patient is clear for the surgery from hematology perspective. Patient's questions were answered to the best of her satisfaction and she verbalized full understanding and agreement.

## 2021-06-15 NOTE — LETTER
_    Erin Santo MD    6/15/2021     YOLANDA Sousa - BEATRIZ   [address] [address]    Dear [facility]:    Thank you for referring Monika Denise, 1960, to me for evaluation. Below are the relevant portions of my assessment and plan of care. Ms. Monika Denise is a very pleasant 61 y.o. female with history of multiple co morbidities as listed. Patient was referred for evaluation of anemia before knee surgery. Patient has history of iron deficiency and she has been maintained on oral iron over the last several years. She had multiple surgical operations and she had right shoulder replacement. The patient is scheduled for right knee replacement July 6, 2021. Presurgical evaluation showed anemia with hemoglobin of 11.4. Patient has no melena or hematochezia. No hematemesis. No vaginal bleeding. No hematuria. She has generalized weakness and fatigue. She has joints problems. She has limited activities due to weight and due to joints problems. No fever or night sweats. No history of smoking or alcohol drinking. Arturo         PAST MEDICAL HISTORY: has a past medical history of Anginal pain ([address] Arthritis, Arthritis of right knee, Asthma, Astigmatism, Back pain, Blurry vision, bilateral, Carpal tunnel syndrome of right wrist, Cataract, Closed displaced fracture of lesser tuberosity of right humerus, Constipation, Depression, Dysmenorrhea, Fatty liver disease, nonalcoholic, GERD (gastroesophageal reflux disease), Gout, Headache, Heart murmur, Heart palpitations, Heart palpitations, History of blood transfusion, History of rib fracture, Hyperlipidemia, Hypertension, Hypertriglyceridemia, Hypothyroidism, Myopia with astigmatism and presbyopia, Neuropathy, Obesity, Osteoporosis, Palpitations, Pancreatitis, Panic attack, PONV (postoperative nausea and vomiting), Presbyopia, RLS (restless legs syndrome), Sleep apnea, Status post reverse total arthroplasty of left shoulder, Status post total replacement of right shoulder, Stenosis of both internal carotid arteries- Mild 16-49%, and Type II or unspecified type diabetes mellitus without mention of complication, not stated as uncontrolled. PAST SURGICAL HISTORY: has a past surgical history that includes  section; Dilation and curettage of uterus; Colonoscopy; joint replacement (Left, 2016); Cardiac catheterization; Total shoulder arthroplasty (Right, 2017); REPLACEMENT SHOULDER TOTAL (Right, 2017); pr egd transoral biopsy single/multiple (N/A, 10/12/2017); and pr colon ca scrn not hi rsk ind (N/A, 10/12/2017). CURRENT MEDICATIONS:  has a current medication list which includes the following prescription(s): ferrous sulfate, amitriptyline, darifenacin, levothyroxine, fish oil, loratadine, oxybutynin, aspirin-acetaminophen-caffeine, venlafaxine, ropinirole, vitamin d3, aspirin, cyclobenzaprine, ondansetron, albuterol sulfate hfa, nystatin, dicyclomine, lisinopril, insulin glargine, magnesium oxide, gabapentin, atorvastatin, allopurinol, freestyle lite, iron, metformin, meclizine, pantoprazole, furosemide, liothyronine, freestyle lite, glucose monitoring kit, ammonium lactate, lidocaine, liraglutide, novolog, v-go 40, phentermine, duloxetine, urea, pregabalin, and b-12. ALLERGIES:  is allergic to ozempic (0.25 or 0.5 mg-dose) [semaglutide(0.25 or 0.5mg-dos)]; actos [pioglitazone]; amitriptyline; celebrex [celecoxib]; fenofibrate; gemfibrozil; lovastatin; prempro [conj estrog-medroxyprogest ace]; statins; tricor [fenofibrate]; zetia [ezetimibe]; zocor [simvastatin]; ampicillin; niacin and related; novolin [insulin isophane & reg, human]; omeprazole; pcn [penicillins]; pregabalin; and vesicare [solifenacin]. FAMILY HISTORY: Negative for any hematological or oncological conditions. SOCIAL HISTORY:  reports that she quit smoking about 43 years ago. Her smoking use included cigarettes.  She has a 10.00 pack-year smoking history. She has never used smokeless tobacco. She reports that she does not drink alcohol and does not use drugs. REVIEW OF SYSTEMS:     · General: Positive for weakness and fatigue. No unanticipated weight loss or decreased appetite. No fever or chills. · Eyes: No blurred vision, eye pain or double vision. · Ears: No hearing problems or drainage. No tinnitus. · Throat: No sore throat, problems with swallowing or dysphagia. · Respiratory: No cough, sputum or hemoptysis. No shortness of breath. No pleuritic chest pain. · Cardiovascular: No chest pain, orthopnea or PND. No lower extremity edema. No palpitation. · Gastrointestinal: No problems with swallowing. No abdominal pain or bloating. No nausea or vomiting. No diarrhea or constipation. No GI bleeding. · Genitourinary: No dysuria, hematuria, frequency or urgency. · Musculoskeletal: Limited activities due to joints problems. · Dermatologic: No skin rashes or pruritus. No skin lesions or discolorations. · Psychiatric: No depression, anxiety, or stress or signs of schizophrenia. No change in mood or affect. · Hematologic: No history of bleeding tendency. No bruises or ecchymosis. No history of clotting problems. · Infectious disease: No fever, chills or frequent infections. · Endocrine: No polydipsia or polyuria. No temperature intolerance. · Neurologic: No headaches or dizziness. No weakness or numbness of the extremities. No changes in balance, coordination,  memory, mentation, behavior. · Allergic/Immunologic: No nasal congestion or hives. No repeated infections. PHYSICAL EXAM:  The patient is not in acute distress. Vital signs: Blood pressure (!) 99/59, pulse 86, temperature 98 °F (36.7 °C), resp. rate 18, height 5' 1\" (1.549 m), weight 265 lb 12.8 oz (120.6 kg), not currently breastfeeding.      General appearance - well appearing, not in pain or distress  Mental status - good mood, alert and oriented  Eyes - pupils equal and reactive, extraocular eye movements intact  Ears - bilateral TM's and external ear canals normal  Nose - normal and patent, no erythema, discharge or polyps  Mouth - mucous membranes moist, pharynx normal without lesions  Neck - supple, no significant adenopathy  Lymphatics - no palpable lymphadenopathy, no hepatosplenomegaly  Chest - clear to auscultation, no wheezes, rales or rhonchi, symmetric air entry  Heart - normal rate, regular rhythm, normal S1, S2, no murmurs, rubs, clicks or gallops  Abdomen - soft, nontender, nondistended, no masses or organomegaly  Neurological - alert, oriented, normal speech, no focal findings or movement disorder noted  Musculoskeletal -bilateral knee swelling and tenderness right more than left. Extremities - peripheral pulses normal, no pedal edema, no clubbing or cyanosis  Skin - normal coloration and turgor, no rashes, no suspicious skin lesions noted     Review of Diagnostic data:   Lab Results   Component Value Date    WBC 10.8 06/10/2021    HGB 11.5 (L) 06/10/2021    HCT 38.7 06/10/2021    MCV 93.7 06/10/2021     06/10/2021       Chemistry        Component Value Date/Time     06/10/2021 1205    K 4.1 06/10/2021 1205     06/10/2021 1205    CO2 23 06/10/2021 1205    BUN 18 06/10/2021 1205    CREATININE 0.77 06/10/2021 1205        Component Value Date/Time    CALCIUM 8.8 06/10/2021 1205    ALKPHOS 99 05/20/2021 1132    AST 25 05/20/2021 1132    ALT 17 05/20/2021 1132    BILITOT 0.38 05/20/2021 1132            IMPRESSION:   Partially treated normocytic anemia  Iron deficiency anemia  Plan for right knee replacement surgery July 6, 2021  Multiple comorbidities as listed    PLAN: I reviewed the labs available to me and discussed with the patient. For more than 50 minutes of face to face discussion, I explained to the patient the nature of this hematologic problem.  I explained the significance of these abnormalities in layman language. Reviewing patient's labs over the last several years obviously she had anemia in several occasions. She has been maintained on oral iron for iron deficiency anemia. She did not have any good response. She is due for right knee replacement surgery in 3 weeks. We will try to avoid blood transfusion. To help improving the hemoglobin level before the surgery we recommend giving IV iron infusion. Explained benefits and side effects and she agreed. Labs will be monitored after her surgery for any future need for iron replacement or any other supplements. Patient is clear for the surgery from hematology perspective. Patient's questions were answered to the best of her satisfaction and she verbalized full understanding and agreement. If you have questions, please do not hesitate to call me. I look forward to following Riki Mclain along with you. Sincerely,                            6 [facility]                            This note is created with the assistance of a speech recognition program.  While intending to generate a document that actually reflects the content of the visit, the document can still have some errors including those of syntax and sound a like substitutions which may escape proof reading. It such instances, actual meaning can be extrapolated by contextual diversion.

## 2021-06-15 NOTE — TELEPHONE ENCOUNTER
KATYA HERE FOR CONSULTATION  IV IRON INFUSION SOON  RT KNEE SURGERY 7/6/21  RV 3-4 MTHS W/ CDP AND IRON STUDIES  NEW ORDERS IRON IS PENDING PRECERT W/ DMITRI  LABS CDP FERRITIN FE TIBC ORDERS GIVEN TO PT ON EXIT TO BE DONE 1 WK PRIOR TO RV  MD VISIT 10/5/21 @ 2PM  SCRIPTS SENT TO PT PHARMACY  AVS PRINTED W/ INSTRUCTIONS AND GIVEN TO PT ON EXIT

## 2021-06-15 NOTE — TELEPHONE ENCOUNTER
Writer spoke with patient about details of upcoming surgery. [facility] is trying to get stress test approved by insurance Avani Choi) to try and get stress test done before 6/30/21 appointment with   Dr. Tarik Batista office. Patient has appointment today, 6/15/21, 4:30 at  [facility] to review labs drawn yesterday to determine if patient needs any infusions etc prior to surgery. Also advised patient that Dr. Lawrence Massey wants her to get into PT for pre-hab. Patient stating that she can't do it because it hurts too much. Writer tried to encourage patient to communicate her concerns with the PT staff. Patient said she would try. Patient is quite frustrated with all she is having to do to get ready for surgery and is in a lot of pain.

## 2021-06-16 ENCOUNTER — TELEPHONE (OUTPATIENT)
Dept: INFUSION THERAPY | Facility: MEDICAL CENTER | Age: 61
End: 2021-06-16

## 2021-06-16 LAB — ERYTHROPOIETIN: 16 MU/ML (ref 4–27)

## 2021-06-16 NOTE — TELEPHONE ENCOUNTER
I TRIED TO CALL KATYA TO SCHEDULE HER IRON INFUSIONS AND HAD TO LEAVE A MESSAGE TO CALL THE OFFICE TO SCHEDULE.

## 2021-06-17 ENCOUNTER — TELEPHONE (OUTPATIENT)
Dept: INFUSION THERAPY | Facility: MEDICAL CENTER | Age: 61
End: 2021-06-17

## 2021-06-17 NOTE — TELEPHONE ENCOUNTER
New order per Dr Monse Davila 6/15/21    Injectafer 750 mg IV x 2 doses - 1 week apart. Order noted and to front office for scheduling; kardex updated.

## 2021-06-18 ENCOUNTER — TELEPHONE (OUTPATIENT)
Dept: FAMILY MEDICINE CLINIC | Age: 61
End: 2021-06-18

## 2021-06-18 NOTE — TELEPHONE ENCOUNTER
Received paperwork from Dr. Darlyn Fowler' office for patients upcoming OR on 07/06/21. Patient had an abnormal EKG, Hbg A1c is 8.1, hbg 11.5, and they said she is anemic and they have set her up for Fe infusions, want the patient to schedule a PT eval prior to OR as well. I did speak with Tai Garcia and she states the patient has been reminded multiple times regarding setting up the PT eval but has never done it. They did ask for cardiology clearance from her cardiologist and they are trying to get her set up for a stress test in their office but have not gotten the OK yet. I did explain that Dr. Myra Quintero feels the patient will not be able to get her A1c down before the date of OR and Kari said the patient told her she is being \"very strict\" with her diet. She said Dr. Billy Calderon will do surgery if she is <7.8. I asked that Tai Garcia keep us updated on the situation.

## 2021-06-18 NOTE — TELEPHONE ENCOUNTER
Spoke with patient and scheduled a pre-op visit for 06/28/21 and stressed the importance of getting the things the surgeons office told her to get done especially the PT eval. She said no one ever told her about that and I told her that the OR  told me that they have told her multiple times and she needs to get it done.

## 2021-06-21 ENCOUNTER — TELEPHONE (OUTPATIENT)
Dept: ORTHOPEDIC SURGERY | Age: 61
End: 2021-06-21

## 2021-06-21 RX ORDER — FUROSEMIDE 20 MG/1
TABLET ORAL
Qty: 90 TABLET | Refills: 1 | Status: SHIPPED | OUTPATIENT
Start: 2021-06-21 | End: 2021-12-16

## 2021-06-21 NOTE — TELEPHONE ENCOUNTER
Patient's  called regarding patient's upcoming surgery. We've been trying to get patient to physical therapy for pre-hab.  asking us to \"back off\" on this request. Says patient is in too much pain and it is too difficult for her to get around. She cannot get the physical therapy evaluation prior to surgery. Writer advised Dr. Madhav Abdul.

## 2021-06-24 ENCOUNTER — TELEPHONE (OUTPATIENT)
Dept: ORTHOPEDIC SURGERY | Age: 61
End: 2021-06-24

## 2021-06-24 ENCOUNTER — TELEPHONE (OUTPATIENT)
Dept: FAMILY MEDICINE CLINIC | Age: 61
End: 2021-06-24

## 2021-06-24 ENCOUNTER — HOSPITAL ENCOUNTER (OUTPATIENT)
Dept: INFUSION THERAPY | Facility: MEDICAL CENTER | Age: 61
Discharge: HOME | End: 2021-06-24
Payer: COMMERCIAL

## 2021-06-24 ENCOUNTER — OFFICE VISIT (OUTPATIENT)
Dept: ORTHOPEDIC SURGERY | Age: 61
End: 2021-06-24
Payer: COMMERCIAL

## 2021-06-24 VITALS
TEMPERATURE: 98.4 F | SYSTOLIC BLOOD PRESSURE: 135 MMHG | HEART RATE: 93 BPM | RESPIRATION RATE: 18 BRPM | DIASTOLIC BLOOD PRESSURE: 60 MMHG

## 2021-06-24 VITALS
BODY MASS INDEX: 49.14 KG/M2 | HEIGHT: 61 IN | WEIGHT: 260.3 LBS | DIASTOLIC BLOOD PRESSURE: 90 MMHG | HEART RATE: 99 BPM | SYSTOLIC BLOOD PRESSURE: 143 MMHG

## 2021-06-24 DIAGNOSIS — D63.8 ANEMIA ASSOCIATED WITH DIABETES MELLITUS (HCC): Primary | ICD-10-CM

## 2021-06-24 DIAGNOSIS — E11.69 ANEMIA ASSOCIATED WITH DIABETES MELLITUS (HCC): ICD-10-CM

## 2021-06-24 DIAGNOSIS — M25.531 RIGHT WRIST PAIN: ICD-10-CM

## 2021-06-24 DIAGNOSIS — D63.8 ANEMIA ASSOCIATED WITH DIABETES MELLITUS (HCC): ICD-10-CM

## 2021-06-24 DIAGNOSIS — M17.0 BILATERAL PRIMARY OSTEOARTHRITIS OF KNEE: Primary | ICD-10-CM

## 2021-06-24 DIAGNOSIS — M79.641 RIGHT HAND PAIN: Primary | ICD-10-CM

## 2021-06-24 DIAGNOSIS — E11.69 ANEMIA ASSOCIATED WITH DIABETES MELLITUS (HCC): Primary | ICD-10-CM

## 2021-06-24 DIAGNOSIS — D50.8 IRON DEFICIENCY ANEMIA SECONDARY TO INADEQUATE DIETARY IRON INTAKE: Primary | ICD-10-CM

## 2021-06-24 DIAGNOSIS — K90.9 IRON MALABSORPTION: ICD-10-CM

## 2021-06-24 PROCEDURE — 2580000003 HC RX 258: Performed by: INTERNAL MEDICINE

## 2021-06-24 PROCEDURE — 1036F TOBACCO NON-USER: CPT | Performed by: ORTHOPAEDIC SURGERY

## 2021-06-24 PROCEDURE — 96365 THER/PROPH/DIAG IV INF INIT: CPT

## 2021-06-24 PROCEDURE — 3051F HG A1C>EQUAL 7.0%<8.0%: CPT | Performed by: ORTHOPAEDIC SURGERY

## 2021-06-24 PROCEDURE — 6360000002 HC RX W HCPCS: Performed by: INTERNAL MEDICINE

## 2021-06-24 PROCEDURE — G8427 DOCREV CUR MEDS BY ELIG CLIN: HCPCS | Performed by: ORTHOPAEDIC SURGERY

## 2021-06-24 PROCEDURE — G8417 CALC BMI ABV UP PARAM F/U: HCPCS | Performed by: ORTHOPAEDIC SURGERY

## 2021-06-24 PROCEDURE — 99213 OFFICE O/P EST LOW 20 MIN: CPT | Performed by: ORTHOPAEDIC SURGERY

## 2021-06-24 PROCEDURE — 3017F COLORECTAL CA SCREEN DOC REV: CPT | Performed by: ORTHOPAEDIC SURGERY

## 2021-06-24 PROCEDURE — 2022F DILAT RTA XM EVC RTNOPTHY: CPT | Performed by: ORTHOPAEDIC SURGERY

## 2021-06-24 RX ORDER — HEPARIN SODIUM (PORCINE) LOCK FLUSH IV SOLN 100 UNIT/ML 100 UNIT/ML
500 SOLUTION INTRAVENOUS PRN
Status: CANCELLED | OUTPATIENT
Start: 2021-07-01

## 2021-06-24 RX ORDER — DIPHENHYDRAMINE HYDROCHLORIDE 50 MG/ML
50 INJECTION INTRAMUSCULAR; INTRAVENOUS ONCE
Status: CANCELLED | OUTPATIENT
Start: 2021-07-01 | End: 2021-07-01

## 2021-06-24 RX ORDER — SODIUM CHLORIDE 9 MG/ML
INJECTION, SOLUTION INTRAVENOUS CONTINUOUS
Status: CANCELLED | OUTPATIENT
Start: 2021-07-01

## 2021-06-24 RX ORDER — SODIUM CHLORIDE 9 MG/ML
25 INJECTION, SOLUTION INTRAVENOUS PRN
Status: CANCELLED | OUTPATIENT
Start: 2021-07-01

## 2021-06-24 RX ORDER — METHYLPREDNISOLONE SODIUM SUCCINATE 125 MG/2ML
125 INJECTION, POWDER, LYOPHILIZED, FOR SOLUTION INTRAMUSCULAR; INTRAVENOUS ONCE
Status: CANCELLED | OUTPATIENT
Start: 2021-07-01 | End: 2021-07-01

## 2021-06-24 RX ORDER — SODIUM CHLORIDE 9 MG/ML
INJECTION, SOLUTION INTRAVENOUS CONTINUOUS
Status: ACTIVE | OUTPATIENT
Start: 2021-06-24 | End: 2021-06-24

## 2021-06-24 RX ORDER — SODIUM CHLORIDE 0.9 % (FLUSH) 0.9 %
5-40 SYRINGE (ML) INJECTION PRN
Status: CANCELLED | OUTPATIENT
Start: 2021-07-01

## 2021-06-24 RX ADMIN — FERRIC CARBOXYMALTOSE INJECTION 750 MG: 50 INJECTION, SOLUTION INTRAVENOUS at 11:54

## 2021-06-24 RX ADMIN — SODIUM CHLORIDE: 9 INJECTION, SOLUTION INTRAVENOUS at 11:54

## 2021-06-24 NOTE — TELEPHONE ENCOUNTER
----- Message from Benedict Nessa sent at 6/24/2021  5:12 PM EDT -----  Subject: Message to Provider    QUESTIONS  Information for Provider? Patient called in asking if an x-ray can be   ordered for her right wrist/hand because she is experiencing pain.  ---------------------------------------------------------------------------  --------------  CALL BACK INFO  What is the best way for the office to contact you? OK to leave message on   voicemail  Preferred Call Back Phone Number? [phone]  ---------------------------------------------------------------------------  --------------  SCRIPT ANSWERS  Relationship to Patient?  Self

## 2021-06-24 NOTE — PROGRESS NOTES
4 yrs 2013  (currently off this medication written: 10/23/2019); no episodes for about a year (written 6/10/21)    Arthritis     Arthritis of right knee 8/15/2018    Asthma     uses inhaler as needed, managed by Brittney Levy NP    Astigmatism 8/22/2014    Back pain     radiculopathy left leg    Blurry vision, bilateral 7/8/2016    Carpal tunnel syndrome of right wrist 11/26/2012    Cataract     patient denies    Closed displaced fracture of lesser tuberosity of right humerus 7/11/2016    Constipation     Depression     Dysmenorrhea     Fatty liver disease, nonalcoholic     GERD (gastroesophageal reflux disease)     Gout     found through bloodwork    Headache     Heart murmur     Heart palpitations     Heart palpitations     History of blood transfusion     thinks she might have had a transfusion doesn't remember why or when    History of rib fracture 7/6/2016    Right    Hyperlipidemia     Hypertension     Hypertriglyceridemia     Hypothyroidism 2/17/2016    Myopia with astigmatism and presbyopia 8/22/2014    Neuropathy     bilateral legs and feet    Obesity     Osteoporosis     Palpitations     about once a month    Pancreatitis 2011    no problems    Panic attack 10/30/2014    PONV (postoperative nausea and vomiting)     mild    Presbyopia 8/22/2014    RLS (restless legs syndrome)     Sleep apnea     does not use Cpap    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    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 diabetes mellitus without mention of complication, not stated as uncontrolled     checks daily, has Insulin Pump, has a wearable glucose monitor       Past Surgical History:    Past Surgical History:   Procedure Laterality Date    CARDIAC CATHETERIZATION      Patient states approximately 2007 she had the cardiac catheterization that was negative      SECTION      x 2    COLONOSCOPY      DILATION AND CURETTAGE OF UTERUS      JOINT REPLACEMENT Left 2016    shoulder    TN COLON CA SCRN NOT HI RSK IND N/A 10/12/2017    COLONOSCOPY performed by Malathi Mcgrath MD at [address] [facility] EGD TRANSORAL BIOPSY SINGLE/MULTIPLE N/A 10/12/2017    EGD BIOPSY performed by Malathi Mcgrath MD at [address]  Right 2017    SHOULDER TOTAL ARTHROPLASTY RIGHT WITH ARTHREX, [address] performed by Stacy Adams DO at [address] [facility] Right 2017       CurrentMedications:   Current Outpatient Medications   Medication Sig Dispense Refill    EQ LORATADINE 10 MG tablet Take 1 tablet by mouth once daily 90 tablet 1    furosemide (LASIX) 20 MG tablet Take 1 tablet by mouth once daily 90 tablet 1    amitriptyline (ELAVIL) 50 MG tablet Take 50 mg by mouth nightly      darifenacin (ENABLEX) 15 MG extended release tablet Take 15 mg by mouth 2 times daily      levothyroxine (SYNTHROID) 112 MCG tablet Take 112 mcg by mouth Daily      phentermine 37.5 MG capsule Take 37.5 mg by mouth every morning.       Omega-3 Fatty Acids (FISH OIL) 1000 MG CAPS Take 3,000 mg by mouth daily      oxybutynin (DITROPAN XL) 15 MG extended release tablet Take 15 mg by mouth daily      Aspirin-Acetaminophen-Caffeine (EXCEDRIN PO) Take 2 tablets by mouth daily as needed (headache)      venlafaxine (EFFEXOR XR) 37.5 MG extended release capsule TAKE 1 CAPSULE BY MOUTH THREE TIMES DAILY 90 capsule 0    rOPINIRole (REQUIP) 2 MG tablet TAKE 1 TABLET BY MOUTH THREE TIMES DAILY 90 tablet 0    Cholecalciferol (VITAMIN D3) 50 MCG ( UT) TABS Take 1 tablet by mouth once daily 30 tablet 0    aspirin 81 MG chewable tablet CHEW AND SWALLOW 1 TABLET BY MOUTH ONCE DAILY 90 tablet 1    cyclobenzaprine (FLEXERIL) 10 MG tablet Take one tablet by mouth three times daily as needed 60 tablet 0    DULoxetine (CYMBALTA) 60 MG extended release capsule Take 1 capsule by mouth nightly 90 capsule 1    ondansetron (ZOFRAN) 4 MG tablet Take 1 tablet by mouth every 8 hours as needed for Nausea or Vomiting 20 tablet 0    Urea (CARMOL) 40 % cream Apply 2g to both feet daily 1 Tube 3    nystatin (NYSTATIN) 609067 UNIT/GM powder APPLY POWDER TOPIALLY TO ABDOMINAL FOLDS THREE TIMES DAILY AS NEEDED FOR SKIN IRRITATION 60 g 1    dicyclomine (BENTYL) 10 MG capsule TAKE 1 CAPSULE BY MOUTH 4 TIMES DAILY AS NEEDED FOR CRAMPS 120 capsule 2    lisinopril (PRINIVIL;ZESTRIL) 5 MG tablet Take 1 tablet by mouth once daily 90 tablet 1    insulin glargine (LANTUS) 100 UNIT/ML injection vial Inject 50 Units into the skin nightly       Magnesium Oxide 500 MG TABS TAKE 1 TABLET BY MOUTH TWICE DAILY 180 tablet 1    atorvastatin (LIPITOR) 20 MG tablet Take 1 tablet by mouth once daily 90 tablet 3    allopurinol (ZYLOPRIM) 100 MG tablet Take 1 tablet by mouth once daily 90 tablet 1    blood glucose test strips (FREESTYLE LITE) strip 1 each by Does not apply route 6 times daily 200 each 5    Ferrous Sulfate (IRON) 325 (65 Fe) MG TABS Take 1 tablet by mouth once daily with breakfast 90 tablet 1    metFORMIN (GLUCOPHAGE) 1000 MG tablet TAKE 1 TABLET BY MOUTH TWICE DAILY 60 tablet 0    meclizine (ANTIVERT) 25 MG tablet TAKE 1 TABLET BY MOUTH THREE TIMES DAILY AS NEEDED FOR DIZZINESS 60 tablet 0    pantoprazole (PROTONIX) 40 MG tablet Take 1 tablet by mouth once daily 90 tablet 3    liothyronine (CYTOMEL) 5 MCG tablet Take 5 mcg by mouth daily      Blood Glucose Monitoring Suppl (FREESTYLE LITE) KYLAH 1 Device by Does not apply route 6 times daily 1 Device 0    glucose monitoring kit (FREESTYLE) monitoring kit 1 kit by Does not apply route daily 1 kit 0    Cyanocobalamin (B-12) 2500 MCG TABS Take 1 tablet by mouth daily       lidocaine (XYLOCAINE) 5 % ointment Apply topically as needed.  1 Tube 3    Liraglutide (VICTOZA) 18 MG/3ML SOPN SC injection Inject Paying Living Expenses: Not hard at all   Food Insecurity: No Food Insecurity    Worried About Running Out of Food in the Last Year: Never true    Ran Out of Food in the Last Year: Never true   Transportation Needs:     Lack of Transportation (Medical):  Lack of Transportation (Non-Medical):    Physical Activity:     Days of Exercise per Week:     Minutes of Exercise per Session:    Stress:     Feeling of Stress :    Social Connections:     Frequency of Communication with Friends and Family:     Frequency of Social Gatherings with Friends and Family:     Attends Spiritism Services:     Active Member of Clubs or Organizations:     Attends Club or Organization Meetings:     Marital Status:    Intimate Partner Violence:     Fear of Current or Ex-Partner:     Emotionally Abused:     Physically Abused:     Sexually Abused:        Family History:  Family History   Problem Relation Age of Onset    Emphysema Mother     Diabetes Father     Heart Disease Father     High Cholesterol Sister     High Blood Pressure Sister     Heart Disease Sister        Vitals:   BP (!) 143/90   Pulse 99   Ht 5' 1\" (1.549 m)   Wt 260 lb 4.8 oz (118.1 kg)   BMI 49.18 kg/m²  Body mass index is 49.18 kg/m². Physical Examination:     Orthopedics:    GENERAL: Alert and oriented X3 in no acute distress. SKIN: Intact without lesions or ulcerations. NEURO: Intact to sensory and motor testing. VASC: Capillary refill is less than 3 seconds. KNEE EXAM    LOCATION: Bilateral Knee  GEN: Alert and oriented X 3, in no acute distress. GAIT: The patient's gait was observed while entering the exam room and was noted to be antalgic. The extremity is in anatomic alignment. SKIN: Intact without rashes, lesions, or ulcerations. No obvious deformity or swelling. NEURO: The patient responds to light touch throughout bilateral LE. Patellar and Achilles reflexes are 2/4.   VASC: The bilateral LE is neurovascularly intact with 2/4 DP and 2/4 PT pulses. Brisk capillary refill. ROM: Right: 0/90 degrees. There is painful ROM without effusion. Left: 5/85 degrees. There is no effusion. MUSC: decreased quad tone  LIGAMENT: There is No varus instability at 0 degrees and No varus instability at 30 degrees. There is No valgus instability at 0 degrees and No valgus instability at 30 degrees. PALP: There is medial joint line pain. Assessment:     1. Bilateral primary osteoarthritis of knee    2. Anemia associated with diabetes mellitus ([address]      Procedures:    Procedure: no  Radiology:   No results found. Plan:   Treatment  I reviewed the X-ray with the patient and I informed her that the knee has reached end stage osteoarthritis which means she is at a Kellgren grade IV. We discussed the etiologies and natural histories of Primary osteoarthritis of the bilateral knee. We discussed the various treatment alternatives including anti-inflammatory medications, physical therapy, injections, further imaging studies and as a last result surgery. During today's visit, I explained to the patient that she will need to make sure that she keeps her A1C down to at lease 7.8%; Most recent A1C was 8.1% in 6-2021. Discussed increased risk of infection with an elevated A1C. I then told her that we can do the surgery since she carries the majority of her weight on her upper body but she needs to understand that since she has a BMI of 49, she is at an increased risk of developing complications due to her weight. The patient then stated that she understands. I then asked the patient if she would still like to have the surgery and she stated that she would. At this time, the patient has elected to proceed with a right total knee replacement surgery pending her A1C on 7-2-2021. The risks/benefits/alternatives and potential complications have been discussed with the patient.  We discussed the preparation of surgery regarding showering before with the soap provided, how to use her insulin the day off, when to stop fish oil, no food or drinks after midnight, options of anesthesia, antibiotic prophylaxis. She has clearance from her dentist as well as her endocrinologist. We also had a long discussion with the patient and her family regarding the procedure itself and the expectation of the recovery. I discussed with the patient that she will not go home the same day, however, we will get her up and moving the day of surgery. She will need anticoagulation post-op to lower risk of blood clots, patient verbalizes understanding. All questions and concerns were addressed. A consent was signed today. Patient was instructed to call our office with any question or concerns prior to the procedure occurs. Patient is looking for rehab in [place]. Patient should return to the clinic post op to follow up with Sharath Jaimes PA-C for post op check. The patient will call the office immediately with any problems. No orders of the defined types were placed in this encounter. No orders of the defined types were placed in this encounter. Charmayne Nares,, am scribing for and in the presence of Kwabena Navas D.O.. 6/29/2021  4:23 PM     I, Kwabena Navas DO, have personally seen this patient and I have reviewed the CC, PMH, FHX and Social History as provided by other clinical staff. I reassessed the HPI and ROS as scribed by Sari Cooper in my presence and it is both accurate and complete. Thereafter, I personally performed the PE, reviewed the imaging and established the DX and POC. I agree with the documentation provided by the Medical Scribe. I have reviewed all documentation in its entirety prior to providing my signature indicating agreement. Any areas of disagreement are noted on the chart.     Electronically signed by Karen Hoang DO on 6/29/2021 at 4:24 PM  Electronically signed by Karen Hoang DO, on 6/29/2021 at 4:23 PM

## 2021-06-24 NOTE — TELEPHONE ENCOUNTER
Writer spoke with [facility]: stress test order. Tay Sofia said there was an order in their system from January 2021 that patient didn't go to that would work for 6/30/2021 order.
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
## 2021-06-24 NOTE — PROGRESS NOTES
Pt arrive ambulatory for 1 of 2 injectafer infusion  Denies any complaint or concern. Vitals as charted. Peripheral IV established per policy. Injectafer infused with no sign of adverse reaction; line flushed. Pt educated on Injectafer , Pt verbalizes understanding of possible adverse reaction; denies questions. Pt states he has had Injectafer in past with  no issue   Intact IV catheter removed with pressure dressing applied.   Pt discharged off unit

## 2021-06-28 ENCOUNTER — OFFICE VISIT (OUTPATIENT)
Dept: FAMILY MEDICINE CLINIC | Age: 61
End: 2021-06-28
Payer: COMMERCIAL

## 2021-06-28 ENCOUNTER — TELEPHONE (OUTPATIENT)
Dept: PODIATRY | Age: 61
End: 2021-06-28

## 2021-06-28 VITALS
OXYGEN SATURATION: 96 % | HEIGHT: 61 IN | BODY MASS INDEX: 48.9 KG/M2 | SYSTOLIC BLOOD PRESSURE: 116 MMHG | HEART RATE: 84 BPM | TEMPERATURE: 98 F | DIASTOLIC BLOOD PRESSURE: 70 MMHG | WEIGHT: 259 LBS

## 2021-06-28 DIAGNOSIS — D50.9 IRON DEFICIENCY ANEMIA, UNSPECIFIED IRON DEFICIENCY ANEMIA TYPE: ICD-10-CM

## 2021-06-28 DIAGNOSIS — R94.31 ABNORMAL EKG: ICD-10-CM

## 2021-06-28 DIAGNOSIS — E11.40 TYPE 2 DIABETES MELLITUS WITH DIABETIC NEUROPATHY, WITH LONG-TERM CURRENT USE OF INSULIN (HCC): ICD-10-CM

## 2021-06-28 DIAGNOSIS — Z79.4 TYPE 2 DIABETES MELLITUS WITH DIABETIC NEUROPATHY, WITH LONG-TERM CURRENT USE OF INSULIN (HCC): ICD-10-CM

## 2021-06-28 DIAGNOSIS — Z01.818 PREOPERATIVE CLEARANCE: Primary | ICD-10-CM

## 2021-06-28 DIAGNOSIS — I10 ESSENTIAL HYPERTENSION: Chronic | ICD-10-CM

## 2021-06-28 LAB — HBA1C MFR BLD: 7.4 %

## 2021-06-28 PROCEDURE — 99243 OFF/OP CNSLTJ NEW/EST LOW 30: CPT | Performed by: NURSE PRACTITIONER

## 2021-06-28 PROCEDURE — G8417 CALC BMI ABV UP PARAM F/U: HCPCS | Performed by: NURSE PRACTITIONER

## 2021-06-28 PROCEDURE — 83036 HEMOGLOBIN GLYCOSYLATED A1C: CPT | Performed by: NURSE PRACTITIONER

## 2021-06-28 PROCEDURE — 2022F DILAT RTA XM EVC RTNOPTHY: CPT | Performed by: NURSE PRACTITIONER

## 2021-06-28 PROCEDURE — G8427 DOCREV CUR MEDS BY ELIG CLIN: HCPCS | Performed by: NURSE PRACTITIONER

## 2021-06-28 RX ORDER — ALBUTEROL SULFATE 90 UG/1
2 AEROSOL, METERED RESPIRATORY (INHALATION) EVERY 6 HOURS PRN
Qty: 1 INHALER | Refills: 3 | Status: SHIPPED | OUTPATIENT
Start: 2021-06-28 | End: 2021-10-01

## 2021-06-28 RX ORDER — AMMONIUM LACTATE 12 G/100G
CREAM TOPICAL
Qty: 1 BOTTLE | Refills: 3 | Status: SHIPPED | OUTPATIENT
Start: 2021-06-28

## 2021-06-28 ASSESSMENT — ENCOUNTER SYMPTOMS
CONSTIPATION: 0
CHEST TIGHTNESS: 0
DIARRHEA: 0
NAUSEA: 0
RHINORRHEA: 0
VOMITING: 0
SHORTNESS OF BREATH: 0
APNEA: 1
ABDOMINAL PAIN: 0
COUGH: 0
SORE THROAT: 0
ABDOMINAL DISTENTION: 0
BACK PAIN: 0

## 2021-06-28 NOTE — PROGRESS NOTES
Aldo Gomez, APRN-CNP  [address] [facility]  [address] [address], [address]  [address] [address]  Dept: [phone]  Dept Fax: [phone]    HPI:   Kofi Sanders is a 61 y.o. female is an established patient who presents to the office today for a preoperative consultation at the request of surgeon, Dr. Fareed El, who plans on performing a RTKA on July 6. This consultation is requested for the specific conditions prompting preoperative evaluation (i.e. because of potential affect on operative risk): HTN, anemia, DM, abnormal EKG. The patient has the following known anesthesia issues: nonePatient has a bleeding risk of : no recent abnormal bleeding  Patient does have objection to receiving blood products if needed and plans on being admitted to the bloodless unit at [facility]. My previous office notes, labs and diagnostic studies were reviewed prior to and during encounter. The patient's past medical, surgical, social, and family history as well as current medications and allergies were reviewed as documented in today's encounter by CHLOE Castano. Current Outpatient Medications on File Prior to Visit   Medication Sig Dispense Refill    EQ LORATADINE 10 MG tablet Take 1 tablet by mouth once daily 90 tablet 1    furosemide (LASIX) 20 MG tablet Take 1 tablet by mouth once daily 90 tablet 1    amitriptyline (ELAVIL) 50 MG tablet Take 50 mg by mouth nightly      darifenacin (ENABLEX) 15 MG extended release tablet Take 15 mg by mouth 2 times daily      levothyroxine (SYNTHROID) 112 MCG tablet Take 112 mcg by mouth Daily      phentermine 37.5 MG capsule Take 37.5 mg by mouth every morning.       Omega-3 Fatty Acids (FISH OIL) 1000 MG CAPS Take 3,000 mg by mouth daily      oxybutynin (DITROPAN XL) 15 MG extended release tablet Take 15 mg by mouth daily      Aspirin-Acetaminophen-Caffeine (EXCEDRIN PO) Take 2 tablets by mouth daily as needed (GLUCOPHAGE) 1000 MG tablet TAKE 1 TABLET BY MOUTH TWICE DAILY 60 tablet 0    meclizine (ANTIVERT) 25 MG tablet TAKE 1 TABLET BY MOUTH THREE TIMES DAILY AS NEEDED FOR DIZZINESS 60 tablet 0    pantoprazole (PROTONIX) 40 MG tablet Take 1 tablet by mouth once daily 90 tablet 3    liothyronine (CYTOMEL) 5 MCG tablet Take 5 mcg by mouth daily      Blood Glucose Monitoring Suppl (FREESTYLE LITE) KYLAH 1 Device by Does not apply route 6 times daily 1 Device 0    glucose monitoring kit (FREESTYLE) monitoring kit 1 kit by Does not apply route daily 1 kit 0    Cyanocobalamin (B-12) 2500 MCG TABS Take 1 tablet by mouth daily       lidocaine (XYLOCAINE) 5 % ointment Apply topically as needed. 1 Tube 3    Liraglutide (VICTOZA) 18 MG/3ML SOPN SC injection Inject 1.8 mg into the skin daily       NOVOLOG 100 UNIT/ML injection vial Per V GO. Max 76 units daily  2    Insulin Disposable Pump (V-GO 40) KIT Use as directed per Dr. Lul Dubois. No current facility-administered medications on file prior to visit. SUBJECTIVE:  Review of Systems   Constitutional: Positive for fatigue (chronic). Negative for activity change and fever. HENT: Negative for congestion, ear pain, rhinorrhea and sore throat. Eyes: Positive for visual disturbance (wears glasses). Respiratory: Positive for apnea (stable on CPAP). Negative for cough, chest tightness and shortness of breath. Cardiovascular: Negative for chest pain and palpitations. Gastrointestinal: Negative for abdominal distention, abdominal pain, constipation, diarrhea, nausea and vomiting. Heartburn (stable)   Endocrine: Negative for polydipsia, polyphagia and polyuria. Genitourinary: Negative for difficulty urinating and dysuria. Musculoskeletal: Positive for arthralgias, gait problem and myalgias. Negative for back pain (due to knee pain; presents in wheelchair today). Skin: Negative for rash.    Allergic/Immunologic: Positive for environmental allergies (stable). Neurological: Positive for dizziness (occasional but stable). Negative for weakness, light-headedness and headaches. Hematological: Negative for adenopathy. Psychiatric/Behavioral: Negative for agitation and behavioral problems. The patient is not nervous/anxious. OBJECTIVE:  /70   Pulse 84   Temp 98 °F (36.7 °C)   Ht 5' 1\" (1.549 m)   Wt 259 lb (117.5 kg)   SpO2 96%   Breastfeeding No   BMI 48.94 kg/m²      Physical Exam  Vitals reviewed. Constitutional:       General: She is not in acute distress. Appearance: Normal appearance. She is well-developed. She is obese. HENT:      Head: Normocephalic and atraumatic. Right Ear: Hearing and external ear normal.      Left Ear: Hearing and external ear normal.      Nose: Nose normal. No congestion. Right Sinus: No maxillary sinus tenderness or frontal sinus tenderness. Left Sinus: No maxillary sinus tenderness or frontal sinus tenderness. Mouth/Throat:      Lips: Pink. No lesions. Mouth: Mucous membranes are moist. No oral lesions. Tongue: No lesions. Pharynx: Oropharynx is clear. No oropharyngeal exudate or posterior oropharyngeal erythema. Eyes:      Extraocular Movements: Extraocular movements intact. Conjunctiva/sclera: Conjunctivae normal.      Pupils: Pupils are equal, round, and reactive to light. Neck:      Thyroid: No thyroid mass. Cardiovascular:      Rate and Rhythm: Normal rate and regular rhythm. Pulses: Normal pulses. Heart sounds: Normal heart sounds. No murmur heard. Pulmonary:      Effort: Pulmonary effort is normal. No respiratory distress. Breath sounds: Normal breath sounds and air entry. No wheezing, rhonchi or rales. Abdominal:      General: Bowel sounds are normal. There is no distension. Palpations: Abdomen is soft. Tenderness: There is no abdominal tenderness. Musculoskeletal:         General: Normal range of motion.       Cervical back: Full passive range of motion without pain and normal range of motion. Right lower leg: No edema. Left lower leg: No edema. Lymphadenopathy:      Cervical: No cervical adenopathy. Skin:     General: Skin is warm and dry. Capillary Refill: Capillary refill takes less than 2 seconds. Findings: No rash. Neurological:      General: No focal deficit present. Mental Status: She is alert and oriented to person, place, and time. Deep Tendon Reflexes: Reflexes normal.   Psychiatric:         Attention and Perception: Attention and perception normal.         Mood and Affect: Mood and affect normal.         Speech: Speech normal.         Behavior: Behavior normal. Behavior is cooperative. Cognition and Memory: Memory normal.       ASSESSMENT:   Diagnosis Orders   1. Preoperative clearance     2. Abnormal EKG     3. Essential hypertension     4. Type 2 diabetes mellitus with diabetic neuropathy, with long-term current use of insulin ([facility])  POCT glycosylated hemoglobin (Hb A1C)   5. Iron deficiency anemia, unspecified iron deficiency anemia type       PLAN:  1. Preoperative clearance  - Cardiographics:    - ECG: NSR with left axis deviation; abnormal ECG,    - Echocardiogram: Ordered in April, by me, in preparation  for upcoming surgical procedure but patient did not  complete. Will defer to cardiology, as she has an upcoming  appointment with them in 2 days.     - Imaging: Chest X-Ray: normal     - Lab Review: I have reviewed all the lab results. There are some abnormalities that are not critical to the patient's health, but did discuss them with her at this visit. - Patient is scheduled for RTKA with Dr. Astrid Barajas on July 7, 2021  - Pre-op labs, CXR and EKG have been reviewed  - A clearance letter will be provided once she is seen and cleared by cardiology    2.  Abnormal EKG  - ECG: NSR with left axis deviation; abnormal ECG   - Has upcoming appointment with Dr. Leandro Melara, 6/30 - referral placed by Dr. Daljit Barber  - Has stress test ordered on 6/30 at 830am     3. Essential hypertension  - Stable: Medication re-filled as needed, con't medications as prescribed, con't current tx plan  - Continue Lisinopril as previously prescribed. 4. Type 2 diabetes mellitus with diabetic neuropathy, with long-term current use of insulin ([facility])  - Last A1c was 7.7 in February, repeated by Dr. Daljit Barber office this month and it has increased to 8.1  - Per their preoperative clearance request, an A1c of 8.1 is too high to do surgery. Per Kari at Dr. Daljit Barber office, patient may watch her diet for a couple of weeks and they will redraw her A1c  - A1c in office today is 7.4  - I am questioning compliance, as she mentioned forgetting to take her Victoza today. - At this time, I will defer the management of her DM to her endocrinologist, Dr. Elsy Mojica, who has been managing her for years. - Continue with Metformin as previously prescribed. - Continue with Lantus 50 units nightly as previously prescribed. - Continue with Victoza as previously prescribed. - Continue with Insulin pump as previously prescribed. - Will cont with Lisinopril for renal protection  - POCT glycosylated hemoglobin (Hb A1C)    5. Iron deficiency anemia, unspecified iron deficiency anemia type  - Hx of iron deficiency which has been maintained on oral iron over the last several years  - Pre-operative Hgb 11.4, prior to that last Hgb was in May of this year (13.0) and in April of this year (12.4)  - Referral placed to Dr. Chela Talley for evaluation  - Iron transfusions ordered prior to surgery. She has completed 1/2 thus far  - She has been cleared by hem/onc for the surgery  - Plan to return to office in 3 months with repeat studies.    - Rest of systems unchanged, continue current treatments.     - On this date June 28, 2021,  I have spent greater than 50% of this visit reviewing previous notes, test results and/or face to face with the patient discussing the diagnoses, importance of compliance with the treatment plan, counseling, coordinating care as well as documenting on the day of the visit.      Remedios Rodriguez, APRN-CNP

## 2021-06-29 ENCOUNTER — HOSPITAL ENCOUNTER (OUTPATIENT)
Age: 61
Discharge: HOME | End: 2021-06-29
Payer: COMMERCIAL

## 2021-06-29 DIAGNOSIS — E11.69 ANEMIA ASSOCIATED WITH DIABETES MELLITUS (HCC): ICD-10-CM

## 2021-06-29 DIAGNOSIS — R94.31 ABNORMAL EKG: Primary | ICD-10-CM

## 2021-06-29 DIAGNOSIS — D63.8 ANEMIA ASSOCIATED WITH DIABETES MELLITUS (HCC): ICD-10-CM

## 2021-06-29 LAB
ESTIMATED AVERAGE GLUCOSE: 174 MG/DL
HBA1C MFR BLD: 7.7 % (ref 4–6)

## 2021-06-29 PROCEDURE — 83036 HEMOGLOBIN GLYCOSYLATED A1C: CPT

## 2021-06-29 PROCEDURE — 36415 COLL VENOUS BLD VENIPUNCTURE: CPT

## 2021-06-30 ENCOUNTER — TELEPHONE (OUTPATIENT)
Dept: ORTHOPEDIC SURGERY | Age: 61
End: 2021-06-30

## 2021-06-30 NOTE — TELEPHONE ENCOUNTER
Writer been working with patient and various offices regarding clearances for 7/6/21 surgery. Patient has 8:30 am appointment at [place] office of [facility] for nuclear stress test. Chuck Delgadillo at that office is going to try and get one of the doctors in that office to review stress test and make clearance decision for Tuesday's surgery. Otherwise, patient has 1:30 appointment with their Cardiology office in [facility] with Dr. Toma Mortensen. Patient aware of all this and verbalized understanding. Mick Hudson' office (patient PCP) did an in office A1C test on Monday, 6/28/21 and the result was 7.4. That office will clear patient if cardiology clears her.

## 2021-07-02 ENCOUNTER — HOSPITAL ENCOUNTER (OUTPATIENT)
Dept: INFUSION THERAPY | Facility: MEDICAL CENTER | Age: 61
Discharge: HOME | End: 2021-07-02
Payer: COMMERCIAL

## 2021-07-02 ENCOUNTER — HOSPITAL ENCOUNTER (OUTPATIENT)
Dept: LAB | Age: 61
Setting detail: SPECIMEN
Discharge: HOME | End: 2021-07-02
Payer: COMMERCIAL

## 2021-07-02 VITALS
SYSTOLIC BLOOD PRESSURE: 133 MMHG | RESPIRATION RATE: 16 BRPM | TEMPERATURE: 98.5 F | HEART RATE: 90 BPM | DIASTOLIC BLOOD PRESSURE: 53 MMHG

## 2021-07-02 DIAGNOSIS — D50.8 IRON DEFICIENCY ANEMIA SECONDARY TO INADEQUATE DIETARY IRON INTAKE: Primary | ICD-10-CM

## 2021-07-02 DIAGNOSIS — K90.9 IRON MALABSORPTION: ICD-10-CM

## 2021-07-02 PROCEDURE — 6360000002 HC RX W HCPCS: Performed by: INTERNAL MEDICINE

## 2021-07-02 PROCEDURE — 96365 THER/PROPH/DIAG IV INF INIT: CPT

## 2021-07-02 PROCEDURE — 2580000003 HC RX 258: Performed by: INTERNAL MEDICINE

## 2021-07-02 RX ORDER — SODIUM CHLORIDE 0.9 % (FLUSH) 0.9 %
5-40 SYRINGE (ML) INJECTION PRN
Status: CANCELLED | OUTPATIENT
Start: 2021-07-08

## 2021-07-02 RX ORDER — DIPHENHYDRAMINE HYDROCHLORIDE 50 MG/ML
50 INJECTION INTRAMUSCULAR; INTRAVENOUS ONCE
Status: CANCELLED | OUTPATIENT
Start: 2021-07-08 | End: 2021-07-08

## 2021-07-02 RX ORDER — METHYLPREDNISOLONE SODIUM SUCCINATE 125 MG/2ML
125 INJECTION, POWDER, LYOPHILIZED, FOR SOLUTION INTRAMUSCULAR; INTRAVENOUS ONCE
Status: CANCELLED | OUTPATIENT
Start: 2021-07-08 | End: 2021-07-08

## 2021-07-02 RX ORDER — SODIUM CHLORIDE 9 MG/ML
INJECTION, SOLUTION INTRAVENOUS CONTINUOUS
Status: ACTIVE | OUTPATIENT
Start: 2021-07-02 | End: 2021-07-02

## 2021-07-02 RX ORDER — SODIUM CHLORIDE 9 MG/ML
25 INJECTION, SOLUTION INTRAVENOUS PRN
Status: CANCELLED | OUTPATIENT
Start: 2021-07-08

## 2021-07-02 RX ORDER — HEPARIN SODIUM (PORCINE) LOCK FLUSH IV SOLN 100 UNIT/ML 100 UNIT/ML
500 SOLUTION INTRAVENOUS PRN
Status: CANCELLED | OUTPATIENT
Start: 2021-07-08

## 2021-07-02 RX ORDER — SODIUM CHLORIDE 9 MG/ML
INJECTION, SOLUTION INTRAVENOUS CONTINUOUS
Status: CANCELLED | OUTPATIENT
Start: 2021-07-08

## 2021-07-02 RX ADMIN — FERRIC CARBOXYMALTOSE INJECTION 750 MG: 50 INJECTION, SOLUTION INTRAVENOUS at 11:27

## 2021-07-02 RX ADMIN — SODIUM CHLORIDE: 9 INJECTION, SOLUTION INTRAVENOUS at 11:26

## 2021-07-02 ASSESSMENT — PAIN DESCRIPTION - ORIENTATION: ORIENTATION: RIGHT

## 2021-07-02 ASSESSMENT — PAIN DESCRIPTION - PAIN TYPE: TYPE: CHRONIC PAIN

## 2021-07-02 ASSESSMENT — PAIN DESCRIPTION - LOCATION: LOCATION: KNEE

## 2021-07-02 ASSESSMENT — PAIN SCALES - GENERAL: PAINLEVEL_OUTOF10: 7

## 2021-07-02 NOTE — FLOWSHEET NOTE
Situation:  Writer visited with Ms. Hitesh Ocampo and Family in the treatment cubicle of the infusion clinic. Assessment:  Ms. Hitesh Ocampo was joined by Family (her sister and great nephew). She shared the reason she was receiving treatment. She processed her thoughts and feelings about her medical condition. She acknowledged how she felt about herself. She shared that she draws strength from prayer and her lanny, noting that a family member is a . She was receptive to prayer and voiced her prayer requests. Intervention:  Writer inquired about Pt's coping and needs; offered empathy and active listening; explored Pt's sources of support and strengths; offered words of support and encouragement; prayed for Pt and Family; gave Pt reading materials and lunch. Outcome:  Ms. Hitesh Ocampo thanked writer. Writer encouraged Pt to ask for  support if she is in the hospital.        07/02/21 1230   Encounter Summary   Services provided to: Patient and family together   Referral/Consult From: [address] [place] [address] Children;Family members;Spouse; Taoist/lanny community   Continue Visiting   (7/2/21)   Complexity of Encounter Moderate   Length of Encounter 15 minutes   Spiritual Assessment Completed Yes   Routine   Type Initial   Spiritual/Church   Type Spiritual support   Assessment Calm; Approachable;Coping   Intervention Active listening;Explored feelings, thoughts, concerns;Explored coping resources;Sustaining presence/ Ministry of presence;Prayer;Provided reading materials/devotional materials; Discussed illness/injury and it's impact; Discussed belief system/Rastafari practices/lanny   Outcome Expressed gratitude;Engaged in conversation;Expressed feelings/needs/concerns;Coping;Receptive     Electronically signed by Queen Bess, Oncology Outpatient [address], [address] [facility]  7/2/2021  12:31 PM

## 2021-07-06 ENCOUNTER — TELEPHONE (OUTPATIENT)
Dept: FAMILY MEDICINE CLINIC | Age: 61
End: 2021-07-06

## 2021-07-06 RX ORDER — ROPINIROLE 2 MG/1
TABLET, FILM COATED ORAL
Qty: 90 TABLET | Refills: 0 | Status: SHIPPED | OUTPATIENT
Start: 2021-07-06 | End: 2021-08-02

## 2021-07-06 RX ORDER — VENLAFAXINE HYDROCHLORIDE 37.5 MG/1
CAPSULE, EXTENDED RELEASE ORAL
Qty: 90 CAPSULE | Refills: 0 | Status: SHIPPED | OUTPATIENT
Start: 2021-07-06 | End: 2021-08-02

## 2021-07-06 NOTE — TELEPHONE ENCOUNTER
Patient did not pass stress test on 6/30/21. Patient needs cardiac cath. Surgery cancelled. Patient calling to schedule cardiac cath and will advise.

## 2021-07-06 NOTE — TELEPHONE ENCOUNTER
Patient had testing done before surgery to clear her for surgery. Patient would like a call back from clinical about results she was given. Asked for a voicemail to be left if she does not answer. Please advise.

## 2021-07-06 NOTE — TELEPHONE ENCOUNTER
Patient called and just to inform patient that she had a call from cardiology stating that she has a blockage somewhere in her heart. She is unsure of the location.   I will call for cardiology notes

## 2021-07-07 DIAGNOSIS — E79.0 HYPERURICEMIA: ICD-10-CM

## 2021-07-08 RX ORDER — ALLOPURINOL 100 MG/1
TABLET ORAL
Qty: 90 TABLET | Refills: 1 | Status: SHIPPED | OUTPATIENT
Start: 2021-07-08 | End: 2022-01-03

## 2021-07-19 ENCOUNTER — HOSPITAL ENCOUNTER (OUTPATIENT)
Dept: CARDIAC CATH/INVASIVE PROCEDURES | Age: 61
Discharge: HOME | End: 2021-07-19
Payer: COMMERCIAL

## 2021-07-19 VITALS
WEIGHT: 261 LBS | TEMPERATURE: 98.2 F | DIASTOLIC BLOOD PRESSURE: 78 MMHG | OXYGEN SATURATION: 97 % | HEIGHT: 61 IN | SYSTOLIC BLOOD PRESSURE: 156 MMHG | HEART RATE: 78 BPM | BODY MASS INDEX: 49.28 KG/M2 | RESPIRATION RATE: 21 BRPM

## 2021-07-19 LAB
GFR NON-AFRICAN AMERICAN: >60 ML/MIN
GFR SERPL CREATININE-BSD FRML MDRD: >60 ML/MIN
GFR SERPL CREATININE-BSD FRML MDRD: NORMAL ML/MIN/{1.73_M2}
GLUCOSE BLD-MCNC: 85 MG/DL (ref 74–100)
PLATELET # BLD: 335 K/UL (ref 138–453)
POC BUN: 10 MG/DL (ref 8–26)
POC CHLORIDE: 105 MMOL/L (ref 98–107)
POC CREATININE: 0.7 MG/DL (ref 0.51–1.19)
POC HEMATOCRIT: 39 % (ref 36–46)
POC HEMOGLOBIN: 13.1 G/DL (ref 12–16)
POC POTASSIUM: 4.2 MMOL/L (ref 3.5–4.5)
POC SODIUM: 141 MMOL/L (ref 138–146)

## 2021-07-19 PROCEDURE — C1894 INTRO/SHEATH, NON-LASER: HCPCS

## 2021-07-19 PROCEDURE — 82947 ASSAY GLUCOSE BLOOD QUANT: CPT

## 2021-07-19 PROCEDURE — 6360000004 HC RX CONTRAST MEDICATION

## 2021-07-19 PROCEDURE — 82435 ASSAY OF BLOOD CHLORIDE: CPT

## 2021-07-19 PROCEDURE — 84295 ASSAY OF SERUM SODIUM: CPT

## 2021-07-19 PROCEDURE — 82565 ASSAY OF CREATININE: CPT

## 2021-07-19 PROCEDURE — 85049 AUTOMATED PLATELET COUNT: CPT

## 2021-07-19 PROCEDURE — 85014 HEMATOCRIT: CPT

## 2021-07-19 PROCEDURE — 2500000003 HC RX 250 WO HCPCS

## 2021-07-19 PROCEDURE — 7100000000 HC PACU RECOVERY - FIRST 15 MIN

## 2021-07-19 PROCEDURE — 2709999900 HC NON-CHARGEABLE SUPPLY

## 2021-07-19 PROCEDURE — 84132 ASSAY OF SERUM POTASSIUM: CPT

## 2021-07-19 PROCEDURE — 7100000001 HC PACU RECOVERY - ADDTL 15 MIN

## 2021-07-19 PROCEDURE — 93458 L HRT ARTERY/VENTRICLE ANGIO: CPT | Performed by: INTERNAL MEDICINE

## 2021-07-19 PROCEDURE — 84520 ASSAY OF UREA NITROGEN: CPT

## 2021-07-19 PROCEDURE — 6360000002 HC RX W HCPCS

## 2021-07-19 RX ORDER — SODIUM CHLORIDE 9 MG/ML
25 INJECTION, SOLUTION INTRAVENOUS PRN
Status: CANCELLED | OUTPATIENT
Start: 2021-07-19

## 2021-07-19 RX ORDER — SODIUM CHLORIDE 9 MG/ML
INJECTION, SOLUTION INTRAVENOUS CONTINUOUS
Status: DISCONTINUED | OUTPATIENT
Start: 2021-07-19 | End: 2021-07-20 | Stop reason: HOSPADM

## 2021-07-19 RX ORDER — SODIUM CHLORIDE 0.9 % (FLUSH) 0.9 %
5-40 SYRINGE (ML) INJECTION EVERY 12 HOURS SCHEDULED
Status: CANCELLED | OUTPATIENT
Start: 2021-07-19

## 2021-07-19 RX ORDER — ACETAMINOPHEN 325 MG/1
650 TABLET ORAL EVERY 4 HOURS PRN
Status: CANCELLED | OUTPATIENT
Start: 2021-07-19

## 2021-07-19 RX ORDER — SODIUM CHLORIDE 0.9 % (FLUSH) 0.9 %
5-40 SYRINGE (ML) INJECTION PRN
Status: CANCELLED | OUTPATIENT
Start: 2021-07-19

## 2021-07-19 RX ADMIN — SODIUM CHLORIDE: 9 INJECTION, SOLUTION INTRAVENOUS at 16:02

## 2021-07-19 ASSESSMENT — PAIN SCALES - GENERAL: PAINLEVEL_OUTOF10: 0

## 2021-07-19 NOTE — PROGRESS NOTES
Patient received post cath to [facility] 3. Assessment obtained. Post cath pathway initiated. Right radial site with VASC intact. No hematoma noted. Restrictions reviewed with patient. Patient without complaints.

## 2021-07-19 NOTE — OP NOTE
[facility]    CARDIAC CATHETERIZATION    Date:   7/19/2021  Patient name:  Nevaeh Almazan  Date of admission:  7/19/2021  1:55 PM  MRN:   [id number]  YOB: 1960    Operators:  Primary:   Myron March MD (Attending Physician)    Procedure performed:     [x] Left Heart Catheterization. [] Graft Angiography. [x] Left Ventriculography. [] Right Heart Catheterization. [x] Coronary Angiography. [] Aortic Valve Studies. [] PCI:      [] Other:       Indication:  [] STEMI      [x] + Stress test  [] ACS      [] + EKG Changes  [] Non Q MI       [] Significant Risk Factors  [] Recurrent Angina             [] Diabetes Mellitus    [] New LBBB      [] Other.  [] CHF / Low EF changes     [] Abnormal CTA / Ca Score      Procedure:  Access:  [] Femoral  [x] Radial  artery       [x] Right  [] Left    Procedure: After informed consent was obtained with explanation of the risks and benefits, patient was brought to the cath lab. The access area was prepped and draped in sterile fashion. 1% lidocaine was used for local block. The artery was cannulated with 6  Fr sheath with brisk arterial blood return. The side port was frequently flushed and aspirated with normal saline. Estimated Blood Loss:  [x] Minimal < 25 cc [] Moderate 25-50 cc  [] >50 cc    Findings:  LMCA: Normal 0% stenosis. LAD: Normal 0% stenosis. LCx: Normal 0% stenosis. RCA: Normal 0% stenosis. Coronary Tree  Dominance: Right       LV Analysis LV function assessed as:Normal.     The LV gram was performed in the S[place] 30 position. LVEF: 55%. Conclusions:  Normal coronary arteries   Normal LV function       Recommendations   Medical treatments   Risk factor modification.    Ok to be cleared for her knee surgery       ____________________________________________________________________    History and Risk Factors    [x] Hypertension     [] Family history of CAD  [x] Hyperlipidemia     [] Cerebrovascular Disease   [] Prior MI       [] Peripheral Vascular disease   [] Prior PCI              [x] Diabetes Mellitus    [] Left Main PCI. [] Currently on Dialysis. [] Prior CABG. [] Currently smoker. [] Cardiac Arrest outside of healthcare facility. [] Yes    [x] No        Witnessed     [] Yes   [] No     Arrest after arrival of EMS  [] Yes   [] No     [] Cardiac Arrest at other Facility. [] Yes   [x] No    Pre-Procedure Information. Heart Failure       [] Yes    [x] No        Class  [] I      [] II  [] III    [] IV. New Diagnosis    [] Yes  [] No    HF Type      [] Systolic   [] Diastolic          [] Unknown. Diagnostic Test:   EKG       [] Normal   [x] Abnormal    New antiarrhythmia medications:    [] Yes   [] No   New onset atrial fibrillation / Flutter     [] Yes   [] No   ECG Abnormalities:      [] V. Fib   [] Sofia V. Tach           [] NS V. T   [] New LBBB           [] T. Inv  []  ST dev > 0.5 mm         [] PVC's freq  [] PVC's infrequent    Stress Test Performed:      [x] Yes    [] No     Type:     [] Stress Echo   [] Exercise Stress Test (no imaging)      [x] Stress Nuclear  [] Stress Imaging     Results   [] Negative   [x] Positive        [] Indeterminate  [] Unavailable     If Positive/ Risk / Extent of Ischemia:       [] Low  [] Intermediate         [] High  [] Unavailable      Cardiac CTA Performed:     [] Yes    [x] No      Results   [] CAD   [] Non obstructive CAD      [] No CAD   [] Uncertain      [] Unknown   [] Structural Disease.      Pre Procedure Medications:   [x] Yes    [] No         [x] ASA   [x] Beta Blockers      [] Nitrate   [] Ca Channel Blockers      [] Ranolazine   [x] Statin       [] Plavix/Others antiplatelets      Electronically signed on 7/19/2021 at 4:30 PM by:    Briseyda Kim MD  Fellow, [address]      I have reviewed the case / procedure with resident / fellow  I have examined the patient personally  Patient agree with treatment plan as discussed before, final arrangement based on my evaluation and exam.    Risk and benefit of procedure planned were explained in details. Procedure was performed by me personally, with all aspect of the procedure being done using standard protocol. Note was modified based on my own assessment and treatment.     Fran Sutton MD  [facility]

## 2021-07-19 NOTE — H&P
History:   has a past surgical history that includes  section; Dilation and curettage of uterus; Colonoscopy; joint replacement (Left, 2016); Cardiac catheterization; Total shoulder arthroplasty (Right, 2017); REPLACEMENT SHOULDER TOTAL (Right, 2017); pr egd transoral biopsy single/multiple (N/A, 10/12/2017); and pr colon ca scrn not hi rsk ind (N/A, 10/12/2017). Home Medications:    Prior to Admission medications    Medication Sig Start Date End Date Taking? Authorizing Provider   allopurinol (ZYLOPRIM) 100 MG tablet Take 1 tablet by mouth once daily 21   Bobbetta , APRN - NP   venlafaxine (EFFEXOR XR) 37.5 MG extended release capsule TAKE 1 CAPSULE BY MOUTH THREE TIMES DAILY 21   Theodore Prose, DPM   rOPINIRole (REQUIP) 2 MG tablet TAKE 1 TABLET BY MOUTH THREE TIMES DAILY 21   Theodore Prose, DPM   albuterol sulfate  (90 Base) MCG/ACT inhaler Inhale 2 puffs into the lungs every 6 hours as needed for Wheezing or Shortness of Breath 21   Bobbetta , APRN - NP   ammonium lactate (LAC-HYDRIN) 12 % cream Apply topically as needed. 21   Bobbetta , APRN - NP   EQ LORATADINE 10 MG tablet Take 1 tablet by mouth once daily 21   YOLANDA Maldonado - CNP   furosemide (LASIX) 20 MG tablet Take 1 tablet by mouth once daily 21   YOLANDA Maldonado CNP   amitriptyline (ELAVIL) 50 MG tablet Take 50 mg by mouth nightly    Historical Provider, MD   darifenacin (ENABLEX) 15 MG extended release tablet Take 15 mg by mouth 2 times daily    Historical Provider, MD   levothyroxine (SYNTHROID) 112 MCG tablet Take 112 mcg by mouth Daily    Historical Provider, MD   phentermine 37.5 MG capsule Take 37.5 mg by mouth every morning.     Historical Provider, MD   Omega-3 Fatty Acids (FISH OIL) 1000 MG CAPS Take 3,000 mg by mouth daily    Historical Provider, MD   oxybutynin (DITROPAN XL) 15 MG extended release tablet Take 15 mg by mouth daily    Historical Provider, MD   Aspirin-Acetaminophen-Caffeine (EXCEDRIN PO) Take 2 tablets by mouth daily as needed (headache)    Historical Provider, MD   Cholecalciferol (VITAMIN D3) 50 MCG (2000 UT) TABS Take 1 tablet by mouth once daily 5/28/21   Lenwood Merlin, APRN - CNP   aspirin 81 MG chewable tablet CHEW AND SWALLOW 1 TABLET BY MOUTH ONCE DAILY 5/19/21   YOLANDA Lopez NP   cyclobenzaprine (FLEXERIL) 10 MG tablet Take one tablet by mouth three times daily as needed 5/19/21   YOLANDA Lopez NP   DULoxetine (CYMBALTA) 60 MG extended release capsule Take 1 capsule by mouth nightly 5/19/21   YOLANDA Lopez NP   ondansetron (ZOFRAN) 4 MG tablet Take 1 tablet by mouth every 8 hours as needed for Nausea or Vomiting 5/19/21 5/19/22  YOLANDA Lopez NP   Urea (CARMOL) 40 % cream Apply 2g to both feet daily 4/21/21   Dhruv Schuler DPM   nystatin (NYSTATIN) 133953 UNIT/GM powder APPLY POWDER TOPIALLY TO ABDOMINAL FOLDS THREE TIMES DAILY AS NEEDED FOR SKIN IRRITATION 4/5/21   YOLANDA Lopez NP   dicyclomine (BENTYL) 10 MG capsule TAKE 1 CAPSULE BY MOUTH 4 TIMES DAILY AS NEEDED FOR CRAMPS 4/1/21   Hunter Palencia MD   lisinopril (PRINIVIL;ZESTRIL) 5 MG tablet Take 1 tablet by mouth once daily 4/1/21   YOLANDA Lopez NP   insulin glargine (LANTUS) 100 UNIT/ML injection vial Inject 50 Units into the skin nightly     Historical Provider, MD   Magnesium Oxide 500 MG TABS TAKE 1 TABLET BY MOUTH TWICE DAILY 2/25/21   YOLANDA Lopez NP   gabapentin (NEURONTIN) 300 MG capsule Take 3 capsules by mouth 3 times daily for 90 days. 2/12/21 6/28/21  Howie Zamora MD   pregabalin (LYRICA) 50 MG capsule Take 1 capsule by mouth 3 times daily for 7 days.  1/28/21 6/28/21  Howie Zamora MD   atorvastatin (LIPITOR) 20 MG tablet Take 1 tablet by mouth once daily 1/25/21   YOLANDA Lopez - NP   blood glucose test strips (FREESTYLE LITE) strip 1 each by Does not apply route 6 times daily 11/24/20   YOLANDA Espinal NP   Ferrous Sulfate (IRON) 325 (65 Fe) MG TABS Take 1 tablet by mouth once daily with breakfast 11/24/20   YOLANDA Espinal NP   metFORMIN (GLUCOPHAGE) 1000 MG tablet TAKE 1 TABLET BY MOUTH TWICE DAILY 11/20/20   YOLANDA Tobin CNP   meclizine (ANTIVERT) 25 MG tablet TAKE 1 TABLET BY MOUTH THREE TIMES DAILY AS NEEDED FOR DIZZINESS 10/6/20   YOLANDA Tobin CNP   pantoprazole (PROTONIX) 40 MG tablet Take 1 tablet by mouth once daily 7/20/20   Hunter Palencia MD   liothyronine (CYTOMEL) 5 MCG tablet Take 5 mcg by mouth daily    Historical Provider, MD   Blood Glucose Monitoring Suppl (FREESTYLE LITE) KYLAH 1 Device by Does not apply route 6 times daily 5/22/20   YOLANDA Martinez CNP   glucose monitoring kit (FREESTYLE) monitoring kit 1 kit by Does not apply route daily 4/15/20   YOLANDA Martinez CNP   Cyanocobalamin (B-12) 2500 MCG TABS Take 1 tablet by mouth daily     Historical Provider, MD   lidocaine (XYLOCAINE) 5 % ointment Apply topically as needed. 1/23/20   Julio Cesar Munoz MD   Liraglutide (VICTOZA) 18 MG/3ML SOPN SC injection Inject 1.8 mg into the skin daily     Historical Provider, MD   NOVOLOG 100 UNIT/ML injection vial Per V GO. Max 76 units daily 9/27/19   Historical Provider, MD   Insulin Disposable Pump (V-GO 40) KIT Use as directed per Dr. Jasmina Linton. Historical Provider, MD        No current facility-administered medications for this encounter. Allergies:  Ozempic (0.25 or 0.5 mg-dose) [semaglutide(0.25 or 0.5mg-dos)]; Actos [pioglitazone]; Amitriptyline; Celebrex [celecoxib]; Fenofibrate; Gemfibrozil; Lovastatin; Prempro [conj estrog-medroxyprogest ace]; Statins; Tricor [fenofibrate]; Zetia [ezetimibe]; Zocor [simvastatin]; Ampicillin; Niacin and related; Novolin [insulin isophane & reg, human]; Omeprazole; Pcn [penicillins];  Pregabalin; and Vesicare [solifenacin]    Social History:   reports that she quit smoking about 43 years ago. Her smoking use included cigarettes. She has a 10.00 pack-year smoking history. She has never used smokeless tobacco. She reports that she does not drink alcohol and does not use drugs. Family History: family history includes Diabetes in her father; Emphysema in her mother; Heart Disease in her father and sister; High Blood Pressure in her sister; High Cholesterol in her sister. REVIEW OF SYSTEMS:      · Constitutional: there has been no unanticipated weight loss. · Eyes: No visual changes or diplopia. · ENT: No Headaches  · Cardiovascular:  Remaining as above  · Respiratory: No cough  · Gastrointestinal: No abdominal pain. No change in bowel or bladder habits. · Genitourinary: No dysuria, trouble voiding, or hematuria. · Neurological: No headache. PHYSICAL EXAM:      There were no vitals taken for this visit. No intake or output data in the 24 hours ending 07/19/21 0935        Constitutional and General Appearance:   alert, cooperative, no distress and appears stated age  [de-identified]:  · PERRL, EOMI  Respiratory:  · Normal excursion and expansion without use of accessory muscles  · Resp Auscultation:  Good respiratory effort. No for increased work of breathing. On auscultation: clear to auscultation bilaterally  Cardiovascular:  · Regular rate and rhythm  · S1/S2  · No murmurs  · The apical impulse is not displaced  Abdomen:  · Soft  · Bowel sounds present  · Non-tender to palpation  Extremities:  · No cyanosis or clubbing  · Lower extremity edema: No  Skin:  · Warm and dry  Neurological:  · Alert and oriented. Labs:     CBC: No results for input(s): WBC, HGB, HCT, PLT in the last 72 hours. BMP: No results for input(s): NA, K, CO2, BUN, CREATININE, LABGLOM, GLUCOSE in the last 72 hours. Pro-BNP:  No results for input(s): PROBNP in the last 72 hours.   BNP: No results for input(s): BNP in the last 72 hours.  PT/INR: No results for input(s): PROTIME, INR in the last 72 hours. APTT:No results for input(s): APTT in the last 72 hours. CARDIAC ENZYMES:No results for input(s): CKTOTAL, CKMB, CKMBINDEX, TROPONINI in the last 72 hours. Invalid input(s):  TROPONINT  No results for input(s): TROPONINT in the last 72 hours. FASTING LIPID PANEL:  Lab Results   Component Value Date    HDL 67 04/16/2021    LDLDIRECT 119 10/21/2015    TRIG 149 04/16/2021     LIVER PROFILE:No results for input(s): AST, ALT, LABALBU in the last 72 hours. Patient's Active Problem List  Active Problems:    * No active hospital problems. *  Resolved Problems:    * No resolved hospital problems. *    DATA:      IMPRESSION:    1. Positive stress test with small area of mild-moderate anterior apical ischemia. Normal EF  2. Risks stratification for knee surgery  3. HTN  4. HLD  5. T2DM    RECOMMENDATIONS:  1. Proceed with coronary angiography +/- PCI  2. Further recommendations to follow after procedure    Discussed with patient and Nurse. Pre Procedure Conscious Sedation Data:  ASA Class:                  [] I [x] II [] III [] IV     Mallampati Class:       [] I [x] II [] III [] Steven Urbina MD, M.D. Fellow, [address] [place] [address]      Please note that part of this chart were generated using voice recognition  dictation software. Although every effort was made to ensure the accuracy of this automated transcription, some errors in transcription may have occurred. Attestation signed by      Attending Physician Statement:    I have discussed the care of  Schuyler Schaefer , including pertinent history and exam findings, with the Cardiology fellow/resident. I have seen and examined the patient and the key elements of all parts of the encounter have been performed by me.  I agree with the assessment, plan and orders as documented by the fellow/resident, after I modified exam findings and plan of treatments, and the final version is my approved version of the assessment.      Additional Comments:

## 2021-07-26 PROBLEM — K42.9 UMBILICAL HERNIA: Status: RESOLVED | Noted: 2017-08-09 | Resolved: 2021-07-26

## 2021-07-26 PROBLEM — D50.9 IRON DEFICIENCY ANEMIA: Status: RESOLVED | Noted: 2017-01-13 | Resolved: 2021-07-26

## 2021-08-02 RX ORDER — VENLAFAXINE HYDROCHLORIDE 37.5 MG/1
CAPSULE, EXTENDED RELEASE ORAL
Qty: 90 CAPSULE | Refills: 0 | Status: SHIPPED | OUTPATIENT
Start: 2021-08-02 | End: 2021-08-30

## 2021-08-02 RX ORDER — ROPINIROLE 2 MG/1
TABLET, FILM COATED ORAL
Qty: 90 TABLET | Refills: 0 | Status: SHIPPED | OUTPATIENT
Start: 2021-08-02 | End: 2021-08-30

## 2021-08-05 ENCOUNTER — OFFICE VISIT (OUTPATIENT)
Dept: FAMILY MEDICINE CLINIC | Age: 61
End: 2021-08-05
Payer: COMMERCIAL

## 2021-08-05 VITALS
TEMPERATURE: 98.1 F | DIASTOLIC BLOOD PRESSURE: 78 MMHG | SYSTOLIC BLOOD PRESSURE: 114 MMHG | HEART RATE: 88 BPM | OXYGEN SATURATION: 96 % | WEIGHT: 262 LBS | BODY MASS INDEX: 49.5 KG/M2

## 2021-08-05 DIAGNOSIS — G47.33 OSA (OBSTRUCTIVE SLEEP APNEA): ICD-10-CM

## 2021-08-05 DIAGNOSIS — Z79.4 TYPE 2 DIABETES MELLITUS WITH DIABETIC NEUROPATHY, WITH LONG-TERM CURRENT USE OF INSULIN (HCC): ICD-10-CM

## 2021-08-05 DIAGNOSIS — E55.9 VITAMIN D DEFICIENCY: ICD-10-CM

## 2021-08-05 DIAGNOSIS — I10 ESSENTIAL HYPERTENSION: Primary | Chronic | ICD-10-CM

## 2021-08-05 DIAGNOSIS — E11.40 TYPE 2 DIABETES MELLITUS WITH DIABETIC NEUROPATHY, WITH LONG-TERM CURRENT USE OF INSULIN (HCC): ICD-10-CM

## 2021-08-05 DIAGNOSIS — G25.81 RLS (RESTLESS LEGS SYNDROME): ICD-10-CM

## 2021-08-05 DIAGNOSIS — J30.9 ALLERGIC RHINITIS, UNSPECIFIED SEASONALITY, UNSPECIFIED TRIGGER: ICD-10-CM

## 2021-08-05 DIAGNOSIS — E03.9 HYPOTHYROIDISM, UNSPECIFIED TYPE: ICD-10-CM

## 2021-08-05 DIAGNOSIS — E78.5 HYPERLIPIDEMIA, UNSPECIFIED HYPERLIPIDEMIA TYPE: ICD-10-CM

## 2021-08-05 PROCEDURE — 2022F DILAT RTA XM EVC RTNOPTHY: CPT | Performed by: NURSE PRACTITIONER

## 2021-08-05 PROCEDURE — 1036F TOBACCO NON-USER: CPT | Performed by: NURSE PRACTITIONER

## 2021-08-05 PROCEDURE — G8417 CALC BMI ABV UP PARAM F/U: HCPCS | Performed by: NURSE PRACTITIONER

## 2021-08-05 PROCEDURE — G8427 DOCREV CUR MEDS BY ELIG CLIN: HCPCS | Performed by: NURSE PRACTITIONER

## 2021-08-05 PROCEDURE — 3051F HG A1C>EQUAL 7.0%<8.0%: CPT | Performed by: NURSE PRACTITIONER

## 2021-08-05 PROCEDURE — 3017F COLORECTAL CA SCREEN DOC REV: CPT | Performed by: NURSE PRACTITIONER

## 2021-08-05 PROCEDURE — 99214 OFFICE O/P EST MOD 30 MIN: CPT | Performed by: NURSE PRACTITIONER

## 2021-08-05 RX ORDER — DICYCLOMINE HYDROCHLORIDE 10 MG/1
CAPSULE ORAL
Qty: 120 CAPSULE | Refills: 2 | Status: SHIPPED | OUTPATIENT
Start: 2021-08-05 | End: 2021-10-26

## 2021-08-05 RX ORDER — PANTOPRAZOLE SODIUM 40 MG/1
TABLET, DELAYED RELEASE ORAL
Qty: 90 TABLET | Refills: 3 | Status: SHIPPED | OUTPATIENT
Start: 2021-08-05 | End: 2022-07-25 | Stop reason: SDUPTHER

## 2021-08-05 NOTE — PROGRESS NOTES
Elizabeth Murdock, APRN-CNP  [address] [facility]  [id number]  [facility] [address]  Dept: [phone]  Dept Fax: [phone]    HPI:   Catalino Greene is a 61 y.o. female is an established patient here today for f/u on chronic medical problems; HTN, HLD, elevated triglycerides, DM, vitamin d def, low magnesium, obesity, vertigo, RLS, allergies, hypothyroidism, DESTINY, go over labs and/or diagnostic studies, and medication refills. Pt denies any fever or chills. Pt today denies any HA, chest pain, or SOB. Pt denies any N/V/D/C or abdominal pain. My previous office notes, labs and diagnostic studies were reviewed prior to and during encounter. The patient's past medical, surgical, social, and family history as well as current medications and allergies were reviewed as documented in today's encounter by CHLOE Hawkins. Current Outpatient Medications on File Prior to Visit   Medication Sig Dispense Refill    rOPINIRole (REQUIP) 2 MG tablet TAKE 1 TABLET BY MOUTH THREE TIMES DAILY 90 tablet 0    venlafaxine (EFFEXOR XR) 37.5 MG extended release capsule TAKE 1 CAPSULE BY MOUTH THREE TIMES DAILY 90 capsule 0    allopurinol (ZYLOPRIM) 100 MG tablet Take 1 tablet by mouth once daily 90 tablet 1    albuterol sulfate  (90 Base) MCG/ACT inhaler Inhale 2 puffs into the lungs every 6 hours as needed for Wheezing or Shortness of Breath 1 Inhaler 3    ammonium lactate (LAC-HYDRIN) 12 % cream Apply topically as needed.  1 Bottle 3    EQ LORATADINE 10 MG tablet Take 1 tablet by mouth once daily 90 tablet 1    furosemide (LASIX) 20 MG tablet Take 1 tablet by mouth once daily 90 tablet 1    amitriptyline (ELAVIL) 50 MG tablet Take 50 mg by mouth nightly      darifenacin (ENABLEX) 15 MG extended release tablet Take 15 mg by mouth 2 times daily      levothyroxine (SYNTHROID) 112 MCG tablet Take 112 mcg by mouth Daily      phentermine 37.5 MG capsule Take 37.5 mg by mouth every morning.  Omega-3 Fatty Acids (FISH OIL) 1000 MG CAPS Take 3,000 mg by mouth daily      oxybutynin (DITROPAN XL) 15 MG extended release tablet Take 15 mg by mouth daily      Aspirin-Acetaminophen-Caffeine (EXCEDRIN PO) Take 2 tablets by mouth daily as needed (headache)      Cholecalciferol (VITAMIN D3) 50 MCG (2000 UT) TABS Take 1 tablet by mouth once daily 30 tablet 0    aspirin 81 MG chewable tablet CHEW AND SWALLOW 1 TABLET BY MOUTH ONCE DAILY 90 tablet 1    cyclobenzaprine (FLEXERIL) 10 MG tablet Take one tablet by mouth three times daily as needed 60 tablet 0    ondansetron (ZOFRAN) 4 MG tablet Take 1 tablet by mouth every 8 hours as needed for Nausea or Vomiting 20 tablet 0    nystatin (NYSTATIN) 541560 UNIT/GM powder APPLY POWDER TOPIALLY TO ABDOMINAL FOLDS THREE TIMES DAILY AS NEEDED FOR SKIN IRRITATION 60 g 1    lisinopril (PRINIVIL;ZESTRIL) 5 MG tablet Take 1 tablet by mouth once daily 90 tablet 1    insulin glargine (LANTUS) 100 UNIT/ML injection vial Inject 50 Units into the skin nightly       Magnesium Oxide 500 MG TABS TAKE 1 TABLET BY MOUTH TWICE DAILY 180 tablet 1    gabapentin (NEURONTIN) 300 MG capsule Take 3 capsules by mouth 3 times daily for 90 days.  270 capsule 5    blood glucose test strips (FREESTYLE LITE) strip 1 each by Does not apply route 6 times daily 200 each 5    Ferrous Sulfate (IRON) 325 (65 Fe) MG TABS Take 1 tablet by mouth once daily with breakfast 90 tablet 1    metFORMIN (GLUCOPHAGE) 1000 MG tablet TAKE 1 TABLET BY MOUTH TWICE DAILY 60 tablet 0    meclizine (ANTIVERT) 25 MG tablet TAKE 1 TABLET BY MOUTH THREE TIMES DAILY AS NEEDED FOR DIZZINESS 60 tablet 0    liothyronine (CYTOMEL) 5 MCG tablet Take 5 mcg by mouth daily      Blood Glucose Monitoring Suppl (FREESTYLE LITE) KYLAH 1 Device by Does not apply route 6 times daily 1 Device 0    glucose monitoring kit (FREESTYLE) monitoring kit 1 kit by Does not apply route daily appearance. She is well-developed. She is obese. HENT:      Head: Normocephalic and atraumatic. Right Ear: Hearing and external ear normal.      Left Ear: Hearing and external ear normal.      Nose: Nose normal. No congestion. Right Sinus: No maxillary sinus tenderness or frontal sinus tenderness. Left Sinus: No maxillary sinus tenderness or frontal sinus tenderness. Mouth/Throat:      Lips: Pink. No lesions. Mouth: Mucous membranes are moist. No oral lesions. Tongue: No lesions. Pharynx: Oropharynx is clear. No oropharyngeal exudate or posterior oropharyngeal erythema. Eyes:      Extraocular Movements: Extraocular movements intact. Conjunctiva/sclera: Conjunctivae normal.      Pupils: Pupils are equal, round, and reactive to light. Neck:      Thyroid: No thyroid mass. Cardiovascular:      Rate and Rhythm: Normal rate and regular rhythm. Pulses: Normal pulses. Heart sounds: Normal heart sounds. No murmur heard. Pulmonary:      Effort: Pulmonary effort is normal. No respiratory distress. Breath sounds: Normal breath sounds and air entry. No wheezing, rhonchi or rales. Abdominal:      General: Bowel sounds are normal. There is no distension. Palpations: Abdomen is soft. Tenderness: There is no abdominal tenderness. Musculoskeletal:         General: Normal range of motion. Cervical back: Full passive range of motion without pain and normal range of motion. Right lower leg: No edema. Left lower leg: No edema. Lymphadenopathy:      Cervical: No cervical adenopathy. Skin:     General: Skin is warm and dry. Capillary Refill: Capillary refill takes less than 2 seconds. Findings: No rash. Neurological:      General: No focal deficit present. Mental Status: She is alert and oriented to person, place, and time.       Deep Tendon Reflexes: Reflexes normal.   Psychiatric:         Attention and Perception: Attention and perception normal.         Mood and Affect: Mood and affect normal.         Speech: Speech normal.         Behavior: Behavior normal. Behavior is cooperative. Cognition and Memory: Memory normal.       ASSESSMENT:   Diagnosis Orders   1. Essential hypertension     2. Type 2 diabetes mellitus with diabetic neuropathy, with long-term current use of insulin ([facility] [address]     3. Vitamin D deficiency     4. Hyperlipidemia, unspecified hyperlipidemia type     5. DESTINY (obstructive sleep apnea)     6. Hypothyroidism, unspecified type     7. Allergic rhinitis, unspecified seasonality, unspecified trigger     8. RLS (restless legs syndrome)       PLAN:  1. Essential hypertension  - Stable: Medication re-filled as needed, con't medications as prescribed, con't current tx plan  - Continue Lisinopril as previously prescribed. d.  - Low sodium diet and routine exercise encouraged. - Advised to seek emergent tx if SBP>180 and/or DBP>110, any chest pain, h/a or vision changes.      2. Hypercholesterolemia  3. Hypertriglyceridemia  - Stable: Medication re-filled as needed, con't medications as prescribed, con't current tx plan  - Lifestyle modifications discussed and encouraged. - Decrease fats, sugars, carbohydrates, and increase routine exercise (approx. 150 minutes of aerobic activity a week). - Will cont with low fat/chol diet and Lipitor as ordered.       4. Type 2 diabetes mellitus with diabetic neuropathy, with long-term current use of insulin ([facility])  - Stable: Medication re-filled as needed, con't medications as prescribed, con't current tx plan  - Continue with Metformin as previously prescribed. - Continue with Yolande Chalo as previously prescribed. - Continue with Novolog pre meals as previously prescribed via insulin pump   - Continue with Victoza as previously prescribed.     - Will cont with Lisinopril for renal protection  - Cont with daily foot exams and yearly eye exams  - Diabetes education discussed and material provided. - Last HGBA1C was 7.4  - POCT glycosylated hemoglobin (Hb A1C)  - blood glucose test strips (FREESTYLE LITE) strip; 1 each by Does not apply route 6 times daily  Dispense: 200 each; Refill: 5     5. Vitamin D deficiency  - Stable: Medication re-filled as needed, con't medications as prescribed, con't current tx plan  - Continue Vitamin D supplements as ordered, will re-check Vitamin D level  - Please increase foods with Vitamin D, which include cheese, eggs, cereals, yogurt, milk, tofu, any type of beef, salmon or tuna,  spinach, and mushroom. - Cholecalciferol (VITAMIN D3) 50 MCG (2000 UT) TABS; Take 1 tablet by mouth once daily  Dispense: 90 tablet; Refill: 1     6. Iron deficiency anemia, unspecified iron deficiency anemia type  - Stable: Medication re-filled as needed, con't medications as prescribed, con't current tx plan  - She did recently complete IV iron replacement and follows with Dr. Krystin Stockton with iron supplementation as previously prescribed. - Please increase foods with Iron (red meat, pork, poultry, seafood, beans, dark green leafy vegetables) and Vitamin C (Broccoli, grapefruit, kiwi, Leafy greens, melons, oranges, peppers, strawberries, tangerines and tomatoes)  - Discussion had regarding side effects of iron, such as cramping, constipation, black stools. - Ferrous Sulfate (IRON) 325 (65 Fe) MG TABS; Take 1 tablet by mouth once daily with breakfast  Dispense: 90 tablet; Refill: 1     7. Chronic pancreatitis, unspecified pancreatitis type ([facility]  - Stable: Medication re-filled as needed, con't medications as prescribed, con't current tx plan   - Will cont to follow with Dr. Tommy Villegas as instructed      8. Morbid obesity with BMI of 45.0-49.9, adult ([facility] [address]  - Advised to decrease, fats, carbohydrates, and engage in a healthier diet overall, as well as routine exercise. - Encouraged patient to eat healthy meals throughout the day.   - Encouraged 40 minutes of aerobic exercise- moderate to vigorous intensity three to four times a week. - Rest of systems unchanged, continue current treatments. - On this date August 5, 2021,  I have spent greater than 50% of this visit reviewing previous notes, test results and/or face to face with the patient discussing the diagnoses, importance of compliance with the treatment plan, counseling, coordinating care as well as documenting on the day of the visit.      Gaurav Mirza, YOLANDA-CNP

## 2021-08-18 ENCOUNTER — NURSE TRIAGE (OUTPATIENT)
Dept: OTHER | Facility: CLINIC | Age: 61
End: 2021-08-18

## 2021-08-18 NOTE — TELEPHONE ENCOUNTER
Received call from Estela Primrose at [facility] with [employer]. Brief description of triage: right wrist pain and swelling    Triage indicates for patient to see within 3 days    Care advice provided, patient verbalizes understanding; denies any other questions or concerns; instructed to call back for any new or worsening symptoms. Writer [facility] for appointment scheduling. Attention Provider: Thank you for allowing me to participate in the care of your patient. The patient was connected to triage in response to information provided to the [facility]. Please do not respond through this encounter as the response is not directed to a shared pool. Reason for Disposition   MODERATE pain (e.g., interferes with normal activities) and present > 3 days    Answer Assessment - Initial Assessment Questions  1. ONSET: \"When did the pain start? \"      2 months ago    2. LOCATION: \"Where is the pain located? \"      Right wrist, top side    3. PAIN: \"How bad is the pain? \" (Scale 1-10; or mild, moderate, severe)    - MILD (1-3): doesn't interfere with normal activities    - MODERATE (4-7): interferes with normal activities (e.g., work or school) or awakens from sleep    - SEVERE (8-10): excruciating pain, unable to use hand at all      Varies, 6/10    4. WORK OR EXERCISE: \"Has there been any recent work or exercise that involved this part of the body? \"      Denies    5. CAUSE: \"What do you think is causing the pain? \"      Hx carpal tunnel and arthritis    6. AGGRAVATING FACTORS: \"What makes the pain worse? \" (e.g., using computer)      \"if I use it a lot\"     7. OTHER SYMPTOMS: \"Do you have any other symptoms? \" (e.g., neck pain, swelling, rash, numbness, fever)      Swelling on top side of wrist, no redness    8. PREGNANCY: \"Is there any chance you are pregnant? \" \"When was your last menstrual period? \"      N/a    Protocols used: HAND AND WRIST PAIN-ADULT-[place]

## 2021-08-19 ENCOUNTER — OFFICE VISIT (OUTPATIENT)
Dept: FAMILY MEDICINE CLINIC | Age: 61
End: 2021-08-19
Payer: COMMERCIAL

## 2021-08-19 VITALS
OXYGEN SATURATION: 97 % | WEIGHT: 260 LBS | SYSTOLIC BLOOD PRESSURE: 116 MMHG | TEMPERATURE: 97.7 F | HEART RATE: 80 BPM | BODY MASS INDEX: 49.13 KG/M2 | DIASTOLIC BLOOD PRESSURE: 68 MMHG

## 2021-08-19 DIAGNOSIS — G56.01 ACUTE CARPAL TUNNEL SYNDROME OF RIGHT WRIST: Primary | ICD-10-CM

## 2021-08-19 DIAGNOSIS — K80.20 GALLSTONES: ICD-10-CM

## 2021-08-19 PROCEDURE — 99213 OFFICE O/P EST LOW 20 MIN: CPT | Performed by: NURSE PRACTITIONER

## 2021-08-19 PROCEDURE — 3017F COLORECTAL CA SCREEN DOC REV: CPT | Performed by: NURSE PRACTITIONER

## 2021-08-19 PROCEDURE — G8417 CALC BMI ABV UP PARAM F/U: HCPCS | Performed by: NURSE PRACTITIONER

## 2021-08-19 PROCEDURE — G8427 DOCREV CUR MEDS BY ELIG CLIN: HCPCS | Performed by: NURSE PRACTITIONER

## 2021-08-19 PROCEDURE — 1036F TOBACCO NON-USER: CPT | Performed by: NURSE PRACTITIONER

## 2021-08-19 RX ORDER — PREDNISONE 20 MG/1
TABLET ORAL
Qty: 17 TABLET | Refills: 0 | Status: SHIPPED | OUTPATIENT
Start: 2021-08-19 | End: 2021-08-29

## 2021-08-19 ASSESSMENT — ENCOUNTER SYMPTOMS
SORE THROAT: 0
CONSTIPATION: 0
SHORTNESS OF BREATH: 0
BACK PAIN: 0
VOMITING: 0
ABDOMINAL PAIN: 0
DIARRHEA: 0
RHINORRHEA: 0
ABDOMINAL DISTENTION: 0
COUGH: 0
NAUSEA: 0
CHEST TIGHTNESS: 0

## 2021-08-19 NOTE — PROGRESS NOTES
Kraig Lau, APRN-CNP  [facility]  [id number]  [address] [facility] [address] [address]  Dept: [phone]  Dept Fax: [phone]     PATIENT ID: Hipolito Adkins is a 61 y.o. female. HPI:  Established pt here today for an acute visit secondary to right wrist pain and swelling. She describes the pain of sharp, intense and it shoots up her arm and down into her thumb and pointer finger. Pt denies any fever or chills. Pt today denies any HA, chest pain, or SOB. Pt denies any N/V/D/C or abdominal pain. Otherwise pt doing well on current tx and voices no other concerns. My previous office notes, labs and diagnostic studies were reviewed prior to and during encounter. The patient's past medical, surgical, social, and family history as well as current medications and allergies were reviewed as documented in today's encounter by CHLOE Linares. Current Outpatient Medications on File Prior to Visit   Medication Sig Dispense Refill    dicyclomine (BENTYL) 10 MG capsule TAKE 1 CAPSULE BY MOUTH 4 TIMES DAILY AS NEEDED FOR CRAMPS 120 capsule 2    pantoprazole (PROTONIX) 40 MG tablet Take 1 tablet by mouth once daily 90 tablet 3    rOPINIRole (REQUIP) 2 MG tablet TAKE 1 TABLET BY MOUTH THREE TIMES DAILY 90 tablet 0    venlafaxine (EFFEXOR XR) 37.5 MG extended release capsule TAKE 1 CAPSULE BY MOUTH THREE TIMES DAILY 90 capsule 0    allopurinol (ZYLOPRIM) 100 MG tablet Take 1 tablet by mouth once daily 90 tablet 1    albuterol sulfate  (90 Base) MCG/ACT inhaler Inhale 2 puffs into the lungs every 6 hours as needed for Wheezing or Shortness of Breath 1 Inhaler 3    ammonium lactate (LAC-HYDRIN) 12 % cream Apply topically as needed.  1 Bottle 3    EQ LORATADINE 10 MG tablet Take 1 tablet by mouth once daily 90 tablet 1    furosemide (LASIX) 20 MG tablet Take 1 tablet by mouth once daily 90 tablet 1    amitriptyline (ELAVIL) 50 MG tablet Take 50 mg by mouth nightly      darifenacin (ENABLEX) 15 MG extended release tablet Take 15 mg by mouth 2 times daily      levothyroxine (SYNTHROID) 112 MCG tablet Take 112 mcg by mouth Daily      phentermine 37.5 MG capsule Take 37.5 mg by mouth every morning.  Omega-3 Fatty Acids (FISH OIL) 1000 MG CAPS Take 3,000 mg by mouth daily      oxybutynin (DITROPAN XL) 15 MG extended release tablet Take 15 mg by mouth daily      Aspirin-Acetaminophen-Caffeine (EXCEDRIN PO) Take 2 tablets by mouth daily as needed (headache)      Cholecalciferol (VITAMIN D3) 50 MCG (2000 UT) TABS Take 1 tablet by mouth once daily 30 tablet 0    aspirin 81 MG chewable tablet CHEW AND SWALLOW 1 TABLET BY MOUTH ONCE DAILY 90 tablet 1    cyclobenzaprine (FLEXERIL) 10 MG tablet Take one tablet by mouth three times daily as needed 60 tablet 0    ondansetron (ZOFRAN) 4 MG tablet Take 1 tablet by mouth every 8 hours as needed for Nausea or Vomiting 20 tablet 0    nystatin (NYSTATIN) 887549 UNIT/GM powder APPLY POWDER TOPIALLY TO ABDOMINAL FOLDS THREE TIMES DAILY AS NEEDED FOR SKIN IRRITATION 60 g 1    lisinopril (PRINIVIL;ZESTRIL) 5 MG tablet Take 1 tablet by mouth once daily 90 tablet 1    insulin glargine (LANTUS) 100 UNIT/ML injection vial Inject 50 Units into the skin nightly       Magnesium Oxide 500 MG TABS TAKE 1 TABLET BY MOUTH TWICE DAILY 180 tablet 1    gabapentin (NEURONTIN) 300 MG capsule Take 3 capsules by mouth 3 times daily for 90 days.  270 capsule 5    blood glucose test strips (FREESTYLE LITE) strip 1 each by Does not apply route 6 times daily 200 each 5    Ferrous Sulfate (IRON) 325 (65 Fe) MG TABS Take 1 tablet by mouth once daily with breakfast 90 tablet 1    metFORMIN (GLUCOPHAGE) 1000 MG tablet TAKE 1 TABLET BY MOUTH TWICE DAILY 60 tablet 0    meclizine (ANTIVERT) 25 MG tablet TAKE 1 TABLET BY MOUTH THREE TIMES DAILY AS NEEDED FOR DIZZINESS 60 tablet 0    liothyronine (CYTOMEL) 5 MCG tablet Take 5 mcg by mouth daily      Blood Glucose Monitoring Suppl (FREESTYLE LITE) KYLAH 1 Device by Does not apply route 6 times daily 1 Device 0    glucose monitoring kit (FREESTYLE) monitoring kit 1 kit by Does not apply route daily 1 kit 0    Cyanocobalamin (B-12) 2500 MCG TABS Take 1 tablet by mouth daily       lidocaine (XYLOCAINE) 5 % ointment Apply topically as needed. 1 Tube 3    Liraglutide (VICTOZA) 18 MG/3ML SOPN SC injection Inject 1.8 mg into the skin daily       NOVOLOG 100 UNIT/ML injection vial Per V GO. Max 76 units daily  2    Insulin Disposable Pump (V-GO 40) KIT Use as directed per Dr. Eddie Pond. No current facility-administered medications on file prior to visit. SUBJECTIVE:     Review of Systems   Constitutional: Negative for activity change, fatigue and fever. HENT: Negative for congestion, ear pain, rhinorrhea and sore throat. Respiratory: Negative for cough, chest tightness and shortness of breath. Cardiovascular: Negative for chest pain and palpitations. Gastrointestinal: Negative for abdominal distention, abdominal pain, constipation, diarrhea, nausea and vomiting. Endocrine: Negative for polydipsia, polyphagia and polyuria. Genitourinary: Negative for difficulty urinating and dysuria. Musculoskeletal: Positive for arthralgias (right wrist) and joint swelling (right wrist). Negative for back pain and myalgias. Skin: Negative for rash. Neurological: Negative for dizziness, weakness, light-headedness and headaches. Hematological: Negative for adenopathy. Psychiatric/Behavioral: Negative for agitation and behavioral problems. The patient is not nervous/anxious. OBJECTIVE:      /68   Pulse 80   Temp 97.7 °F (36.5 °C)   Wt 260 lb (117.9 kg)   SpO2 97%   Breastfeeding No   BMI 49.13 kg/m²     Physical Exam  Vitals reviewed. Constitutional:       General: She is not in acute distress. HENT:      Head: Normocephalic and atraumatic.    Pulmonary: Effort: Pulmonary effort is normal. No respiratory distress. Neurological:      General: No focal deficit present. Mental Status: She is alert and oriented to person, place, and time. Psychiatric:         Mood and Affect: Mood normal.       ASSESSMENT:   Diagnosis Orders   1. Acute carpal tunnel syndrome of right wrist       PLAN:  1. Acute carpal tunnel syndrome of right wrist  - Discussion had with patient regarding the avoidance of repetitive movements  - Encouraged patient to obtain a wrist splint- wear for 4-12 weeks, wear mostly at night but encouraged to wear during the day for activities that may exacerbate condition  - Prednisone taper  - NSAIDS   - Rest of systems unchanged, continue current treatments. - predniSONE (DELTASONE) 20 MG tablet; Take 2 tabs daily x 4 days, then 1 tab daily x 6 days, then 1/2 tab daily x 6 days  Dispense: 17 tablet; Refill: 0    2. Gallstones  - Abdominal US with cholelithasis  - Continued discomfort  - Will order HIDA  - May need surgical referral  - [facility] [address]; Future     - On this date August 19, 2021,  I have spent greater than 50% of this visit reviewing previous notes, test results and/or face to face with the patient discussing the diagnoses, importance of compliance with the treatment plan, counseling, coordinating care as well as documenting on the day of the visit.      YOLANDA Neal-CNP

## 2021-08-23 ENCOUNTER — TELEPHONE (OUTPATIENT)
Dept: FAMILY MEDICINE CLINIC | Age: 61
End: 2021-08-23

## 2021-08-23 NOTE — TELEPHONE ENCOUNTER
----- Message from [address] [facility] sent at 8/23/2021  1:33 PM EDT -----  Subject: Message to Provider    QUESTIONS  Information for Provider? C/O BURNING ON URINATION X 4 DAYS, THINK SHE HAS   UTI. Tania Niurka   ---------------------------------------------------------------------------  --------------  CALL BACK INFO  What is the best way for the office to contact you? OK to leave message on   voicemail  Preferred Call Back Phone Number? [phone]  ---------------------------------------------------------------------------  --------------  SCRIPT ANSWERS  Relationship to Patient?  Self

## 2021-08-24 ENCOUNTER — TELEPHONE (OUTPATIENT)
Dept: FAMILY MEDICINE CLINIC | Age: 61
End: 2021-08-24

## 2021-08-24 RX ORDER — CIPROFLOXACIN 500 MG/1
500 TABLET, FILM COATED ORAL 2 TIMES DAILY
Qty: 14 TABLET | Refills: 0 | Status: SHIPPED | OUTPATIENT
Start: 2021-08-24 | End: 2021-08-31

## 2021-08-24 RX ORDER — FLUCONAZOLE 150 MG/1
TABLET ORAL
Qty: 3 TABLET | Refills: 0 | Status: SHIPPED | OUTPATIENT
Start: 2021-08-24 | End: 2021-08-25

## 2021-08-24 NOTE — TELEPHONE ENCOUNTER
----- Message from Becki Kenyon sent at 8/23/2021  5:38 PM EDT -----  Subject: Message to Provider    QUESTIONS  Information for Provider? Pt was returning a phone call about test   results. Please call pt first thing in the am.  ---------------------------------------------------------------------------  --------------  CALL BACK INFO  What is the best way for the office to contact you? OK to leave message on   voicemail  Preferred Call Back Phone Number?  [phone]  ---------------------------------------------------------------------------  --------------  SCRIPT ANSWERS  undefined

## 2021-08-24 NOTE — TELEPHONE ENCOUNTER
Spoke with patient. She states that she is experiencing burning with urination, frequency, a feeling of not being able to empty bladder, flank pain x 4 days. She states that she does get UTI's occasionally. She also asks that if you do call in an antibiotic that you also please call in diflucan because she usually develops a yeast infection. [address] in [place] is preferred.

## 2021-08-30 RX ORDER — ROPINIROLE 2 MG/1
TABLET, FILM COATED ORAL
Qty: 90 TABLET | Refills: 0 | Status: SHIPPED | OUTPATIENT
Start: 2021-08-30 | End: 2021-10-01

## 2021-08-30 RX ORDER — VENLAFAXINE HYDROCHLORIDE 37.5 MG/1
CAPSULE, EXTENDED RELEASE ORAL
Qty: 90 CAPSULE | Refills: 0 | Status: SHIPPED | OUTPATIENT
Start: 2021-08-30 | End: 2021-10-01

## 2021-09-08 ENCOUNTER — OFFICE VISIT (OUTPATIENT)
Dept: ORTHOPEDIC SURGERY | Age: 61
End: 2021-09-08
Payer: COMMERCIAL

## 2021-09-08 VITALS — BODY MASS INDEX: 49.09 KG/M2 | HEIGHT: 61 IN | RESPIRATION RATE: 18 BRPM | WEIGHT: 260 LBS

## 2021-09-08 DIAGNOSIS — M16.0 PRIMARY OSTEOARTHRITIS OF BOTH HIPS: Primary | ICD-10-CM

## 2021-09-08 PROCEDURE — G8417 CALC BMI ABV UP PARAM F/U: HCPCS | Performed by: ORTHOPAEDIC SURGERY

## 2021-09-08 PROCEDURE — 99213 OFFICE O/P EST LOW 20 MIN: CPT | Performed by: ORTHOPAEDIC SURGERY

## 2021-09-08 PROCEDURE — 3017F COLORECTAL CA SCREEN DOC REV: CPT | Performed by: ORTHOPAEDIC SURGERY

## 2021-09-08 PROCEDURE — G8427 DOCREV CUR MEDS BY ELIG CLIN: HCPCS | Performed by: ORTHOPAEDIC SURGERY

## 2021-09-08 PROCEDURE — 1036F TOBACCO NON-USER: CPT | Performed by: ORTHOPAEDIC SURGERY

## 2021-09-08 NOTE — PROGRESS NOTES
Yudy Guerrier M.D.            [address] [place] [address], [address] [place] [address], [facility] [address]           Dept Phone: [phone]           Dept Fax:  [phone]           [place]          Dept Phone: [phone]           Dept Fax:  [phone]      Chief Compliant:  Chief Complaint   Patient presents with    Pain     bilateral knees        History of Present Illness: This is a 61 y.o. female who presents to the clinic today for evaluation / follow up of lateral knee pain right greater than left. Patient is a 72-year-old female who previous patient of Dr. Toni Navarrete is actually had been on scheduled to have her knee replaced at [facility] and now is following with [facility]. She has gone through the full regimen of injections medications etc.  She is at the point where she can barely ambulate. .       Review of Systems   Constitutional: Negative for fever, chills, sweats. Eyes: Negative for changes in vision, or pain. HENT: Negative for ear ache, epistaxis, or sore throat. Respiratory/Cardio: Negative for Chest pain, palpitations, SOB, or cough. Gastrointestinal: Negative for abdominal pain, N/V/D. Genitourinary: Negative for dysuria, frequency, urgency, or hematuria. Neurological: Negative for headache, numbness, or weakness. Integumentary: Negative for rash, itching, laceration, or abrasion. Musculoskeletal: Positive for Pain (bilateral knees)       Physical Exam:  Constitutional: Patient is oriented to person, place, and time. Patient appears well-developed and well nourished. HENT: Negative otherwise noted  Head: Normocephalic and Atraumatic  Nose: Normal  Eyes: Conjunctivae and EOM are normal  Neck: Normal range of motion Neck supple. Respiratory/Cardio: Effort normal. No respiratory distress.   Musculoskeletal: Examination notes patient utilizes a cane and gets in very slowly into the exam room. Examination the patient's right knee notes a varus knee. Her knee motion is tend about 115 degrees. She has severe crepitus and grinding with this. She has some mild collateral laxity laterally about apparent varus stressing but not severe she has severe patellofemoral crepitus and grinding no significant effusion. Examination the patient's left knee is essentially identical.  Neurological: Patient is alert and oriented to person, place, and time. Normal strenght. No sensory deficit. Skin: Skin is warm and dry  Psychiatric: Behavior is normal. Thought content normal.  Nursing note and vitals reviewed. Labs and Imaging:   X-rays were taken in April of this year Dr. Saroj Baum office which show severe degenerative joint disease of both knees right greater than left. The patient's right knee is noted to have lateral tibial subluxation. She has bone-on-bone apposition throughout with severe spur formation. The lateral view shows severe patellofemoral arthrosis with humongous osteophytes superiorly. Patient's left knee is essentially bone-on-bone as well with some varus alignment. She has also significant patellofemoral disease and another huge osteophyte in the superior pole patella. No orders of the defined types were placed in this encounter. Assessment and Plan:  Severe degenerative joint disease both knees right greater than left        This is a 61 y.o. female who presents to the clinic today for evaluation / follow up of severe degenerative joint disease both knees right greater than the left.      Past History:    Current Outpatient Medications:     venlafaxine (EFFEXOR XR) 37.5 MG extended release capsule, TAKE 1 CAPSULE BY MOUTH THREE TIMES DAILY, Disp: 90 capsule, Rfl: 0    rOPINIRole (REQUIP) 2 MG tablet, TAKE 1 TABLET BY MOUTH THREE TIMES DAILY, Disp: 90 tablet, Rfl: 0    dicyclomine (BENTYL) 10 MG capsule, TAKE 1 TOPIALLY TO ABDOMINAL FOLDS THREE TIMES DAILY AS NEEDED FOR SKIN IRRITATION, Disp: 60 g, Rfl: 1    lisinopril (PRINIVIL;ZESTRIL) 5 MG tablet, Take 1 tablet by mouth once daily, Disp: 90 tablet, Rfl: 1    insulin glargine (LANTUS) 100 UNIT/ML injection vial, Inject 50 Units into the skin nightly , Disp: , Rfl:     Magnesium Oxide 500 MG TABS, TAKE 1 TABLET BY MOUTH TWICE DAILY, Disp: 180 tablet, Rfl: 1    gabapentin (NEURONTIN) 300 MG capsule, Take 3 capsules by mouth 3 times daily for 90 days. , Disp: 270 capsule, Rfl: 5    blood glucose test strips (FREESTYLE LITE) strip, 1 each by Does not apply route 6 times daily, Disp: 200 each, Rfl: 5    Ferrous Sulfate (IRON) 325 (65 Fe) MG TABS, Take 1 tablet by mouth once daily with breakfast, Disp: 90 tablet, Rfl: 1    metFORMIN (GLUCOPHAGE) 1000 MG tablet, TAKE 1 TABLET BY MOUTH TWICE DAILY, Disp: 60 tablet, Rfl: 0    meclizine (ANTIVERT) 25 MG tablet, TAKE 1 TABLET BY MOUTH THREE TIMES DAILY AS NEEDED FOR DIZZINESS, Disp: 60 tablet, Rfl: 0    liothyronine (CYTOMEL) 5 MCG tablet, Take 5 mcg by mouth daily, Disp: , Rfl:     Blood Glucose Monitoring Suppl (FREESTYLE LITE) KYLAH, 1 Device by Does not apply route 6 times daily, Disp: 1 Device, Rfl: 0    glucose monitoring kit (FREESTYLE) monitoring kit, 1 kit by Does not apply route daily, Disp: 1 kit, Rfl: 0    Cyanocobalamin (B-12) 2500 MCG TABS, Take 1 tablet by mouth daily , Disp: , Rfl:     lidocaine (XYLOCAINE) 5 % ointment, Apply topically as needed. , Disp: 1 Tube, Rfl: 3    Liraglutide (VICTOZA) 18 MG/3ML SOPN SC injection, Inject 1.8 mg into the skin daily , Disp: , Rfl:     NOVOLOG 100 UNIT/ML injection vial, Per V GO.  Max 76 units daily, Disp: , Rfl: 2    Insulin Disposable Pump (V-GO 40) KIT, Use as directed per Dr. Kenneth Flores., Disp: , Rfl:   Allergies   Allergen Reactions    Ozempic (0.25 Or 0.5 Mg-Dose) [Semaglutide(0.25 Or 0.5mg-Dos)] Other (See Comments)     Severe stomach pain    Actos [Pioglitazone]      Patient unsure of reaction    Amitriptyline Hives    Celebrex [Celecoxib] Hives, Itching and Dermatitis     Burnt red blister on ashlee    Fenofibrate Other (See Comments)     Muscle cramps    Gemfibrozil Other (See Comments)     Muscle pain, spasms, weakness and HA  Muscle pain, spasms, weakness and HA    Lovastatin Other (See Comments)     Muscle cramps  Muscle cramps    Prempro [Conj Estrog-Medroxyprogest Ace] Other (See Comments)     Breast tenderness, pain in vagina, cramping    Statins Other (See Comments)     Muscle cramps  Lipitor ok    Tricor [Fenofibrate] Other (See Comments)     Muscle cramps    Zetia [Ezetimibe] Other (See Comments)     Does not remember reaction  Does not remember reaction  Does not remember reaction    Zocor [Simvastatin] Other (See Comments)     Muscle cramps    Ampicillin Rash    Niacin And Related Rash    Novolin [Insulin Isophane & Reg, Human] Rash    Omeprazole Nausea And Vomiting    Pcn [Penicillins] Rash    Pregabalin Nausea And Vomiting and Rash    Vesicare [Solifenacin] Rash     Social History     Socioeconomic History    Marital status:      Spouse name: Not on file    Number of children: Not on file    Years of education: Not on file    Highest education level: Not on file   Occupational History     Employer: DISABLED   Tobacco Use    Smoking status: Former Smoker     Packs/day: 0.50     Years: 20.00     Pack years: 10.00     Types: Cigarettes     Quit date: 1977     Years since quittin.8    Smokeless tobacco: Never Used   Vaping Use    Vaping Use: Never used   Substance and Sexual Activity    Alcohol use:  Yes     Alcohol/week: 0.0 standard drinks     Comment: rtare    Drug use: No    Sexual activity: Yes     Partners: Male   Other Topics Concern    Not on file   Social History Narrative    Not on file     Social Determinants of Health     Financial Resource Strain: Low Risk     Difficulty of Paying Living Expenses: Not hard at all   Food Insecurity: No Food Insecurity    Worried About [address] in the Last Year: Never true    Mita of Food in the Last Year: Never true   Transportation Needs:     Lack of Transportation (Medical):      Lack of Transportation (Non-Medical):    Physical Activity:     Days of Exercise per Week:     Minutes of Exercise per Session:    Stress:     Feeling of Stress :    Social Connections:     Frequency of Communication with Friends and Family:     Frequency of Social Gatherings with Friends and Family:     Attends Mandaeism Services:     Active Member of Clubs or Organizations:     Attends Club or Organization Meetings:     Marital Status:    Intimate Partner Violence:     Fear of Current or Ex-Partner:     Emotionally Abused:     Physically Abused:     Sexually Abused:      Past Medical History:   Diagnosis Date    Anginal pain ([address]     last used Nitro sl 4 yrs 2013  (currently off this medication written: 10/23/2019); no episodes for about a year (written 6/10/21)    Arthritis     Arthritis of right knee 8/15/2018    Asthma     uses inhaler as needed, managed by Dinorah Sood NP    Astigmatism 8/22/2014    Back pain     radiculopathy left leg    Blurry vision, bilateral 7/8/2016    Carpal tunnel syndrome of right wrist 11/26/2012    Cataract     patient denies    Closed displaced fracture of lesser tuberosity of right humerus 7/11/2016    Constipation     Depression     Dysmenorrhea     Fatty liver disease, nonalcoholic     GERD (gastroesophageal reflux disease)     Gout     found through bloodwork    Headache     Heart murmur     Heart palpitations     Heart palpitations     History of blood transfusion     thinks she might have had a transfusion doesn't remember why or when    History of rib fracture 7/6/2016    Right    Hyperlipidemia     Hypertension     Hypertriglyceridemia     Hypothyroidism 2/17/2016    Myopia with astigmatism and presbyopia 2014    Neuropathy     bilateral legs and feet    Obesity     Osteoporosis     Palpitations     about once a month    Pancreatitis     no problems    Panic attack 10/30/2014    PONV (postoperative nausea and vomiting)     mild    Positive cardiac stress test     Presbyopia 2014    RLS (restless legs syndrome)     Sleep apnea     does not use Cpap    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    Status post total replacement of right shoulder 2017    Stenosis of both internal carotid arteries- Mild 16-49% 2017    Type II or unspecified type diabetes mellitus without mention of complication, not stated as uncontrolled     checks daily, has Insulin Pump, has a wearable glucose monitor    Umbilical hernia      Past Surgical History:   Procedure Laterality Date    CARDIAC CATHETERIZATION      Patient states approximately  she had the cardiac catheterization that was negative     CARDIAC CATHETERIZATION  2021     SECTION      x 2    COLONOSCOPY      DILATION AND CURETTAGE OF UTERUS      JOINT REPLACEMENT Left 2016    shoulder    [place] COLON CA SCRN NOT  [address] IND N/A 10/12/2017    COLONOSCOPY performed by Cherri Smart MD at [address] EGD TRANSORAL BIOPSY SINGLE/MULTIPLE N/A 10/12/2017    EGD BIOPSY performed by Cherri Smart MD at [address] Right 2017    SHOULDER TOTAL ARTHROPLASTY RIGHT WITH [address], [address] AND AQUAMANTIS performed by Nikki Libman, DO at [facility] Right 2017     Family History   Problem Relation Age of Onset    Emphysema Mother     Diabetes Father     Heart Disease Father     High Cholesterol Sister     High Blood Pressure Sister     Heart Disease Sister    Plan  Patient will be scheduled for right total knee arthroplasty.   She is already been through the [facility] total joint camp class as well as clearance issues. She is thinking that she may require rehab facility postoperatively and I cautioned her that she may do better than she thinks getting home therapy but we will see how she does after surgery. Preoperative clearance will be per Calton Ahumada patient was advised given her size that she has a slightly increased incidence of complications postoperatively but is understanding of this. Provider Attestation:  Medina Kendrick, personally performed the services described in this documentation. All medical record entries made by the scribe were at my direction and in my presence. I have reviewed the chart and discharge instructions and agree that the records reflect my personal performance and is accurate and complete. Fam Trimble MD. 09/08/21      Please note that this chart was generated using voice recognition Dragon dictation software. Although every effort was made to ensure the accuracy of this automated transcription, some errors in transcription may have occurred.

## 2021-09-09 ENCOUNTER — TELEPHONE (OUTPATIENT)
Dept: ORTHOPEDIC SURGERY | Age: 61
End: 2021-09-09

## 2021-09-09 DIAGNOSIS — M17.0 PRIMARY OSTEOARTHRITIS OF BOTH KNEES: Primary | ICD-10-CM

## 2021-09-09 DIAGNOSIS — M16.0 PRIMARY OSTEOARTHRITIS OF BOTH HIPS: ICD-10-CM

## 2021-09-09 NOTE — TELEPHONE ENCOUNTER
Patient's sister called in requesting an order for a walker with a seat for the patient. Please advise and address thank you!

## 2021-09-10 ENCOUNTER — TELEPHONE (OUTPATIENT)
Dept: ORTHOPEDIC SURGERY | Age: 61
End: 2021-09-10

## 2021-09-10 NOTE — TELEPHONE ENCOUNTER
Order placed and patient notified the order is here for  or she can call us Monday with name of pharmacy and fax number for us to fax the order over.

## 2021-09-10 NOTE — TELEPHONE ENCOUNTER
Patient called in requesting that an order be put in for a walker with a seat attached. if you get too tired during walking  Please advise and address thank you!

## 2021-09-17 ENCOUNTER — TELEPHONE (OUTPATIENT)
Dept: ORTHOPEDIC SURGERY | Age: 61
End: 2021-09-17

## 2021-09-17 NOTE — TELEPHONE ENCOUNTER
Piper Earl called from [facility] [phone] requesting office progress notes from 09- please fax notes to [phone] thank you

## 2021-09-28 ENCOUNTER — HOSPITAL ENCOUNTER (OUTPATIENT)
Facility: MEDICAL CENTER | Age: 61
End: 2021-09-28
Payer: COMMERCIAL

## 2021-10-01 RX ORDER — ALBUTEROL SULFATE 90 UG/1
AEROSOL, METERED RESPIRATORY (INHALATION)
Qty: 18 G | Refills: 0 | Status: SHIPPED | OUTPATIENT
Start: 2021-10-01 | End: 2021-10-25

## 2021-10-06 ENCOUNTER — HOSPITAL ENCOUNTER (OUTPATIENT)
Dept: PREADMISSION TESTING | Age: 61
Discharge: HOME | End: 2021-10-10
Payer: COMMERCIAL

## 2021-10-06 ENCOUNTER — HOSPITAL ENCOUNTER (OUTPATIENT)
Facility: MEDICAL CENTER | Age: 61
End: 2021-10-06
Payer: COMMERCIAL

## 2021-10-06 ENCOUNTER — HOSPITAL ENCOUNTER (OUTPATIENT)
Age: 61
Discharge: HOME | End: 2021-10-06
Payer: COMMERCIAL

## 2021-10-06 VITALS
TEMPERATURE: 98 F | OXYGEN SATURATION: 100 % | RESPIRATION RATE: 18 BRPM | HEIGHT: 61 IN | SYSTOLIC BLOOD PRESSURE: 133 MMHG | HEART RATE: 82 BPM | DIASTOLIC BLOOD PRESSURE: 64 MMHG | WEIGHT: 250 LBS | BODY MASS INDEX: 47.2 KG/M2

## 2021-10-06 DIAGNOSIS — K90.9 IRON MALABSORPTION: ICD-10-CM

## 2021-10-06 DIAGNOSIS — D50.8 IRON DEFICIENCY ANEMIA SECONDARY TO INADEQUATE DIETARY IRON INTAKE: ICD-10-CM

## 2021-10-06 LAB
ABSOLUTE EOS #: 0.5 K/UL (ref 0–0.4)
ABSOLUTE EOS #: 0.5 K/UL (ref 0–0.4)
ABSOLUTE IMMATURE GRANULOCYTE: ABNORMAL K/UL (ref 0–0.3)
ABSOLUTE IMMATURE GRANULOCYTE: ABNORMAL K/UL (ref 0–0.3)
ABSOLUTE LYMPH #: 2.6 K/UL (ref 1–4.8)
ABSOLUTE LYMPH #: 2.7 K/UL (ref 1–4.8)
ABSOLUTE MONO #: 0.6 K/UL (ref 0.1–1.3)
ABSOLUTE MONO #: 0.6 K/UL (ref 0.1–1.3)
ANION GAP SERPL CALCULATED.3IONS-SCNC: 12 MMOL/L (ref 9–17)
BASOPHILS # BLD: 0 % (ref 0–2)
BASOPHILS # BLD: 1 % (ref 0–2)
BASOPHILS ABSOLUTE: 0 K/UL (ref 0–0.2)
BASOPHILS ABSOLUTE: 0.1 K/UL (ref 0–0.2)
BILIRUBIN URINE: NEGATIVE
BUN BLDV-MCNC: 13 MG/DL (ref 8–23)
BUN/CREAT BLD: ABNORMAL (ref 9–20)
CALCIUM SERPL-MCNC: 9.3 MG/DL (ref 8.6–10.4)
CHLORIDE BLD-SCNC: 100 MMOL/L (ref 98–107)
CO2: 27 MMOL/L (ref 20–31)
COLOR: YELLOW
COMMENT UA: ABNORMAL
CREAT SERPL-MCNC: 0.55 MG/DL (ref 0.5–0.9)
DIFFERENTIAL TYPE: ABNORMAL
DIFFERENTIAL TYPE: ABNORMAL
EOSINOPHILS RELATIVE PERCENT: 5 % (ref 0–4)
EOSINOPHILS RELATIVE PERCENT: 5 % (ref 0–4)
FERRITIN: 365 UG/L (ref 13–150)
GFR AFRICAN AMERICAN: >60 ML/MIN
GFR NON-AFRICAN AMERICAN: >60 ML/MIN
GFR SERPL CREATININE-BSD FRML MDRD: ABNORMAL ML/MIN/{1.73_M2}
GFR SERPL CREATININE-BSD FRML MDRD: ABNORMAL ML/MIN/{1.73_M2}
GLUCOSE BLD-MCNC: 174 MG/DL (ref 70–99)
GLUCOSE URINE: ABNORMAL
HCT VFR BLD CALC: 38.4 % (ref 36–46)
HCT VFR BLD CALC: 38.7 % (ref 36–46)
HEMOGLOBIN: 12.4 G/DL (ref 12–16)
HEMOGLOBIN: 12.6 G/DL (ref 12–16)
IMMATURE GRANULOCYTES: ABNORMAL %
IMMATURE GRANULOCYTES: ABNORMAL %
IRON SATURATION: 34 % (ref 20–55)
IRON: 90 UG/DL (ref 37–145)
KETONES, URINE: NEGATIVE
LEUKOCYTE ESTERASE, URINE: NEGATIVE
LYMPHOCYTES # BLD: 25 % (ref 24–44)
LYMPHOCYTES # BLD: 26 % (ref 24–44)
MCH RBC QN AUTO: 30.5 PG (ref 26–34)
MCH RBC QN AUTO: 30.8 PG (ref 26–34)
MCHC RBC AUTO-ENTMCNC: 32.3 G/DL (ref 31–37)
MCHC RBC AUTO-ENTMCNC: 32.5 G/DL (ref 31–37)
MCV RBC AUTO: 94.5 FL (ref 80–100)
MCV RBC AUTO: 94.6 FL (ref 80–100)
MONOCYTES # BLD: 6 % (ref 1–7)
MONOCYTES # BLD: 6 % (ref 1–7)
NITRITE, URINE: NEGATIVE
NRBC AUTOMATED: ABNORMAL PER 100 WBC
NRBC AUTOMATED: ABNORMAL PER 100 WBC
PDW BLD-RTO: 14.2 % (ref 11.5–14.9)
PDW BLD-RTO: 14.5 % (ref 11.5–14.9)
PH UA: 6.5 (ref 5–8)
PLATELET # BLD: 360 K/UL (ref 150–450)
PLATELET # BLD: 370 K/UL (ref 150–450)
PLATELET ESTIMATE: ABNORMAL
PLATELET ESTIMATE: ABNORMAL
PMV BLD AUTO: 8.4 FL (ref 6–12)
PMV BLD AUTO: 8.6 FL (ref 6–12)
POTASSIUM SERPL-SCNC: 4.8 MMOL/L (ref 3.7–5.3)
PROTEIN UA: NEGATIVE
RBC # BLD: 4.06 M/UL (ref 4–5.2)
RBC # BLD: 4.09 M/UL (ref 4–5.2)
RBC # BLD: ABNORMAL 10*6/UL
RBC # BLD: ABNORMAL 10*6/UL
SEG NEUTROPHILS: 63 % (ref 36–66)
SEG NEUTROPHILS: 63 % (ref 36–66)
SEGMENTED NEUTROPHILS ABSOLUTE COUNT: 6.5 K/UL (ref 1.3–9.1)
SEGMENTED NEUTROPHILS ABSOLUTE COUNT: 6.6 K/UL (ref 1.3–9.1)
SODIUM BLD-SCNC: 139 MMOL/L (ref 135–144)
SPECIFIC GRAVITY UA: 1.03 (ref 1–1.03)
TOTAL IRON BINDING CAPACITY: 263 UG/DL (ref 250–450)
TURBIDITY: CLEAR
UNSATURATED IRON BINDING CAPACITY: 173 UG/DL (ref 112–347)
URINE HGB: NEGATIVE
UROBILINOGEN, URINE: NORMAL
WBC # BLD: 10.2 K/UL (ref 3.5–11)
WBC # BLD: 10.4 K/UL (ref 3.5–11)
WBC # BLD: ABNORMAL 10*3/UL
WBC # BLD: ABNORMAL 10*3/UL

## 2021-10-06 PROCEDURE — 85025 COMPLETE CBC W/AUTO DIFF WBC: CPT

## 2021-10-06 PROCEDURE — 36415 COLL VENOUS BLD VENIPUNCTURE: CPT

## 2021-10-06 PROCEDURE — 87641 MR-STAPH DNA AMP PROBE: CPT

## 2021-10-06 PROCEDURE — 80048 BASIC METABOLIC PNL TOTAL CA: CPT

## 2021-10-06 PROCEDURE — 81003 URINALYSIS AUTO W/O SCOPE: CPT

## 2021-10-06 PROCEDURE — 82728 ASSAY OF FERRITIN: CPT

## 2021-10-06 PROCEDURE — 83540 ASSAY OF IRON: CPT

## 2021-10-06 PROCEDURE — 83550 IRON BINDING TEST: CPT

## 2021-10-06 ASSESSMENT — PAIN DESCRIPTION - ORIENTATION: ORIENTATION: RIGHT

## 2021-10-06 ASSESSMENT — ENCOUNTER SYMPTOMS
SHORTNESS OF BREATH: 0
ABDOMINAL PAIN: 0
SINUS PRESSURE: 1
CONSTIPATION: 0
SORE THROAT: 0
VOMITING: 0
NAUSEA: 0
WHEEZING: 0
COUGH: 1
DIARRHEA: 0
SINUS PAIN: 1

## 2021-10-06 ASSESSMENT — PAIN DESCRIPTION - DESCRIPTORS: DESCRIPTORS: ACHING

## 2021-10-06 ASSESSMENT — PAIN SCALES - GENERAL: PAINLEVEL_OUTOF10: 8

## 2021-10-06 ASSESSMENT — PAIN DESCRIPTION - LOCATION: LOCATION: KNEE

## 2021-10-06 ASSESSMENT — PAIN DESCRIPTION - PAIN TYPE: TYPE: ACUTE PAIN

## 2021-10-06 NOTE — TELEPHONE ENCOUNTER
Please Approve or Refuse.        Next Visit Date:  11/18/2021   Last Visit Date: 4/21/2021    Hemoglobin A1C (%)   Date Value   06/29/2021 7.7 (H)   06/28/2021 7.4   06/10/2021 8.1 (H)             ( goal A1C is < 7)   BP Readings from Last 3 Encounters:   08/19/21 116/68   08/05/21 114/78   07/19/21 (!) 156/78          (goal 120/80)  BUN   Date Value Ref Range Status   06/10/2021 18 8 - 23 mg/dL Final     CREATININE   Date Value Ref Range Status   06/10/2021 0.77 0.50 - 0.90 mg/dL Final     POC Creatinine   Date Value Ref Range Status   07/19/2021 0.70 0.51 - 1.19 mg/dL Final     Potassium   Date Value Ref Range Status   06/10/2021 4.1 3.7 - 5.3 mmol/L Final

## 2021-10-06 NOTE — H&P
HISTORY and [facility] [id number]       NAME:  Sary Roa  MRN: [id number]   YOB: 1960   Date: 10/6/2021   Age: 61 y.o. Gender: female       COMPLAINT AND PRESENT HISTORY:     Sary Roa is 61 y.o., female, undergoing preadmission testing for right knee degenerative joint disease with scheduled right total knee arthroplasty. Symptoms started many years ago and has been progressively worsening. She has bilateral knee pain. Pt denies either knee being worse than the other. Pt c/o pain, swelling, stiffness, popping and cracking in the right knee. Describes pain as intermittent sharp, stabbing pain. Rating pain 10/10. Takes excedrin with minimal relief. She used biofreze with moderate relief. Walking and standing aggravates pain. Heat, rest and elevation helps alleviate symptoms. Pain does radiate to right foot and occasionally up to anterior thighs. Denies numbness and tingling. The knee does buckle, give way under the patient. No recent falls or trauma. No redness or rashes. No Hx of MRSA infections in the past.    PMHx includes:    Pt originally scheduled to have this procedure in early July, 2021 with another surgeon. On previous pre-operative exam, Pt was found to have an abnormal EKG showing left axis deviation on 06/10/2021 and a positive cardiac stress test on 07/02/2021 revealing small area of mild to moderate ischemia anterior apical area. Pt s/p cardiac catheterization in 0719/2021 per Dr. Isaias Desai revealing normal coronary arteries, normal LV function. Anemia: Found to be anemic on previous pre-operative labs from June, 2021. Denies melena, hematochezia, hematemesis, hematuria, vaginal bleeding. Takes iron supplementation orally as well occasional iron infusions with next iron infusion scheduled for tomorrow.       Lab Results   Component Value Date    HGB 12.4 10/06/2021       DM: Uses Lantus nightly, Has insulin pump with continuous basal rate and she self administers boluses based on BS results. Takes Victoza, metformin. Wears continuous glucose monitor. Lab Results   Component Value Date    LABA1C 7.7 (H) 06/29/2021     Lab Results   Component Value Date     06/29/2021     Asthma: Uses albuterol prn. She has intermittent cough with dry mouth. Otherwise, denies SOB, wheezing. HTN/HLD/Hypertriglycerides: Takes lisinopril, low dose aspirin, lasix. She does have palpitations when she feels stressed. She does get intermittent headaches related to sinus pressure. She does have BLE edema. Denies chest pain/pressure, dizziness and changes in vision. BP Readings from Last 3 Encounters:   10/06/21 133/64   08/19/21 116/68   08/05/21 114/78     Sleep apnea: does not use CPAP. Pt does have PONV. Otherwise, denies personal and family history of complications with anesthesia.      PAST MEDICAL HISTORY     Past Medical History:   Diagnosis Date    Anemia     receives injectafer (iron infusions)    Anginal pain ([address]     last used Nitro sl 4 yrs 2013  (currently off this medication written: 10/23/2019); no episodes for about a year (written 6/10/21)    Arthritis     Arthritis of right knee 8/15/2018    Asthma     uses inhaler as needed, managed by Tip Garcia NP    Astigmatism 8/22/2014    Back pain     radiculopathy left leg    Blurry vision, bilateral 7/8/2016    Carpal tunnel syndrome of right wrist 11/26/2012    Cataract     patient denies    Closed displaced fracture of lesser tuberosity of right humerus 7/11/2016    Constipation     Depression     Dysmenorrhea     Fatty liver disease, nonalcoholic     GERD (gastroesophageal reflux disease)     Gout     found through bloodwork    Headache     Heart murmur     Heart palpitations     Heart palpitations     History of blood transfusion     thinks she might have had a transfusion doesn't remember why or when    History of rib fracture 7/6/2016    Right    Hyperlipidemia     Hypertension     Hypertriglyceridemia     Hypothyroidism 2016    Myopia with astigmatism and presbyopia 2014    Neuropathy     bilateral legs and feet    Obesity     Osteoporosis     Palpitations     about once a month    Pancreatitis     no problems    Panic attack 10/30/2014    PONV (postoperative nausea and vomiting)     mild    Positive cardiac stress test     Presbyopia 2014    RLS (restless legs syndrome)     Sleep apnea     does not use Cpap    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    Status post total replacement of right shoulder 2017    Stenosis of both internal carotid arteries- Mild 16-49% 2017    Type II or unspecified type diabetes mellitus without mention of complication, not stated as uncontrolled     checks daily, has Insulin Pump, has a wearable glucose monitor    Umbilical hernia        SURGICAL HISTORY       Past Surgical History:   Procedure Laterality Date    BREAST BIOPSY Left     negative    CARDIAC CATHETERIZATION      Patient states approximately  she had the cardiac catheterization that was negative     CARDIAC CATHETERIZATION  2021     SECTION      x 2    COLONOSCOPY      DILATION AND CURETTAGE OF UTERUS      JOINT REPLACEMENT Left 2016    shoulder    NV COLON CA SCRN NOT  [address] IND N/A 10/12/2017    COLONOSCOPY performed by Vijaya Pena MD at [address] [facility] EGD TRANSORAL BIOPSY SINGLE/MULTIPLE N/A 10/12/2017    EGD BIOPSY performed by Vijaya Pean MD at [address] Right 2017    SHOULDER TOTAL ARTHROPLASTY RIGHT WITH [address], [address] AND AQUAMANTIS performed by Higinio Villanueva DO at [address] [facility] Right 2017       FAMILY HISTORY       Family History   Problem Relation Age of Onset    Emphysema Mother     Diabetes Father     Heart Disease Father     High Cholesterol Sister     High Blood Pressure Sister     Heart Disease Sister        SOCIAL HISTORY       Social History     Socioeconomic History    Marital status:      Spouse name: None    Number of children: None    Years of education: None    Highest education level: None   Occupational History     Employer: DISABLED   Tobacco Use    Smoking status: Former Smoker     Packs/day: 0.50     Years: 20.00     Pack years: 10.00     Types: Cigarettes     Quit date: 1977     Years since quittin.8    Smokeless tobacco: Never Used   Vaping Use    Vaping Use: Never used   Substance and Sexual Activity    Alcohol use: Yes     Alcohol/week: 0.0 standard drinks     Comment: rtare    Drug use: No    Sexual activity: Yes     Partners: Male   Other Topics Concern    None   Social History Narrative    None     Social Determinants of Health     Financial Resource Strain: Low Risk     Difficulty of Paying Living Expenses: Not hard at all   Food Insecurity: No Food Insecurity    Worried About Running Out of Food in the Last Year: Never true    [address] in the Last Year: Never true   Transportation Needs:     Lack of Transportation (Medical):      Lack of Transportation (Non-Medical):    Physical Activity:     Days of Exercise per Week:     Minutes of Exercise per Session:    Stress:     Feeling of Stress :    Social Connections:     Frequency of Communication with Friends and Family:     Frequency of Social Gatherings with Friends and Family:     Attends Church Services:     Active Member of Clubs or Organizations:     Attends Club or Organization Meetings:     Marital Status:    Intimate Partner Violence:     Fear of Current or Ex-Partner:     Emotionally Abused:     Physically Abused:     Sexually Abused:         REVIEW OF SYSTEMS      Allergies   Allergen Reactions    Ozempic (0.25 Or 0.5 Mg-Dose) [Semaglutide(0.25 Or 0.5mg-Dos)] Other (See Comments)     Severe stomach pain    Actos [Pioglitazone]      Patient unsure of reaction    Amitriptyline Hives    Celebrex [Celecoxib] Hives, Itching and Dermatitis     Burnt red blister on ashlee    Fenofibrate Other (See Comments)     Muscle cramps    Gemfibrozil Other (See Comments)     Muscle pain, spasms, weakness and HA  Muscle pain, spasms, weakness and HA    Lovastatin Other (See Comments)     Muscle cramps  Muscle cramps    Prempro [Conj Estrog-Medroxyprogest Ace] Other (See Comments)     Breast tenderness, pain in vagina, cramping    Statins Other (See Comments)     Muscle cramps  Lipitor ok    Tricor [Fenofibrate] Other (See Comments)     Muscle cramps    Zetia [Ezetimibe] Other (See Comments)     Does not remember reaction  Does not remember reaction  Does not remember reaction    Zocor [Simvastatin] Other (See Comments)     Muscle cramps    Ampicillin Rash    Niacin And Related Rash    Novolin [Insulin Isophane & Reg, Human] Rash    Omeprazole Nausea And Vomiting    Pcn [Penicillins] Rash    Pregabalin Nausea And Vomiting and Rash    Vesicare [Solifenacin] Rash       Current Outpatient Medications on File Prior to Encounter   Medication Sig Dispense Refill    gabapentin (NEURONTIN) 600 MG tablet TAKE 1 TABLET BY MOUTH THREE TIMES DAILY 270 tablet 0    venlafaxine (EFFEXOR XR) 37.5 MG extended release capsule TAKE 1 CAPSULE BY MOUTH THREE TIMES DAILY 90 capsule 0    rOPINIRole (REQUIP) 2 MG tablet TAKE 1 TABLET BY MOUTH THREE TIMES DAILY 90 tablet 0    albuterol sulfate  (90 Base) MCG/ACT inhaler INHALE 2 PUFFS BY MOUTH EVERY 6 HOURS AS NEEDED FOR WHEEZING FOR SHORTNESS OF BREATH 18 g 0    dicyclomine (BENTYL) 10 MG capsule TAKE 1 CAPSULE BY MOUTH 4 TIMES DAILY AS NEEDED FOR CRAMPS 120 capsule 2    pantoprazole (PROTONIX) 40 MG tablet Take 1 tablet by mouth once daily 90 tablet 3    allopurinol (ZYLOPRIM) 100 MG tablet Take 1 tablet by mouth once daily 90 tablet 1    ammonium lactate (LAC-HYDRIN) 12 % cream Apply topically as needed.  1 Bottle 3    EQ LORATADINE 10 MG tablet Take 1 tablet by mouth once daily 90 tablet 1    furosemide (LASIX) 20 MG tablet Take 1 tablet by mouth once daily 90 tablet 1    amitriptyline (ELAVIL) 50 MG tablet Take 50 mg by mouth nightly      darifenacin (ENABLEX) 15 MG extended release tablet Take 15 mg by mouth 2 times daily      levothyroxine (SYNTHROID) 112 MCG tablet Take 112 mcg by mouth Daily      phentermine 37.5 MG capsule Take 37.5 mg by mouth every morning.       Omega-3 Fatty Acids (FISH OIL) 1000 MG CAPS Take 3,000 mg by mouth daily      oxybutynin (DITROPAN XL) 15 MG extended release tablet Take 15 mg by mouth daily      Aspirin-Acetaminophen-Caffeine (EXCEDRIN PO) Take 2 tablets by mouth daily as needed (headache)      Cholecalciferol (VITAMIN D3) 50 MCG (2000 UT) TABS Take 1 tablet by mouth once daily 30 tablet 0    aspirin 81 MG chewable tablet CHEW AND SWALLOW 1 TABLET BY MOUTH ONCE DAILY 90 tablet 1    cyclobenzaprine (FLEXERIL) 10 MG tablet Take one tablet by mouth three times daily as needed 60 tablet 0    ondansetron (ZOFRAN) 4 MG tablet Take 1 tablet by mouth every 8 hours as needed for Nausea or Vomiting 20 tablet 0    nystatin (NYSTATIN) 476816 UNIT/GM powder APPLY POWDER TOPIALLY TO ABDOMINAL FOLDS THREE TIMES DAILY AS NEEDED FOR SKIN IRRITATION 60 g 1    lisinopril (PRINIVIL;ZESTRIL) 5 MG tablet Take 1 tablet by mouth once daily 90 tablet 1    insulin glargine (LANTUS) 100 UNIT/ML injection vial Inject 50 Units into the skin nightly       Magnesium Oxide 500 MG TABS TAKE 1 TABLET BY MOUTH TWICE DAILY 180 tablet 1    Ferrous Sulfate (IRON) 325 (65 Fe) MG TABS Take 1 tablet by mouth once daily with breakfast 90 tablet 1    metFORMIN (GLUCOPHAGE) 1000 MG tablet TAKE 1 TABLET BY MOUTH TWICE DAILY 60 tablet 0    meclizine (ANTIVERT) 25 MG tablet TAKE 1 TABLET BY MOUTH THREE TIMES DAILY AS NEEDED FOR DIZZINESS 60 tablet 0    liothyronine (CYTOMEL) 5 MCG tablet Take 5 mcg by mouth daily      Cyanocobalamin (B-12) 2500 MCG TABS Take 1 tablet by mouth daily       lidocaine (XYLOCAINE) 5 % ointment Apply topically as needed. 1 Tube 3    Liraglutide (VICTOZA) 18 MG/3ML SOPN SC injection Inject 1.8 mg into the skin daily       NOVOLOG 100 UNIT/ML injection vial Per V GO. Max 76 units daily  2    blood glucose test strips (FREESTYLE LITE) strip 1 each by Does not apply route 6 times daily 200 each 5    Blood Glucose Monitoring Suppl (FREESTYLE LITE) KYLAH 1 Device by Does not apply route 6 times daily 1 Device 0    glucose monitoring kit (FREESTYLE) monitoring kit 1 kit by Does not apply route daily 1 kit 0    Insulin Disposable Pump (V-GO 40) KIT Use as directed per Dr. Chuck Kenyon. No current facility-administered medications on file prior to encounter. Review of Systems   Constitutional: Negative for appetite change, chills, fever and unexpected weight change. HENT: Positive for sinus pressure (upon waking in the morning) and sinus pain (upon waking in the morning). Negative for congestion and sore throat. Eyes: Positive for visual disturbance (Glasses). Respiratory: Positive for cough (occasional). Negative for shortness of breath and wheezing. Cardiovascular: Positive for palpitations (Occasional with increased stress.) and leg swelling (BLE edema). Negative for chest pain. Gastrointestinal: Negative for abdominal pain, constipation, diarrhea, nausea and vomiting. Genitourinary: Negative. Musculoskeletal:        See HPI   Skin: Negative. Neurological: Positive for headaches (Occasional. She attributes to increased sinus pressure worse in the morning upon waking). Negative for dizziness and light-headedness. Hematological: Negative. Psychiatric/Behavioral: Negative. GENERAL PHYSICAL EXAM     Vitals: /64   Pulse 82   Temp 98 °F (36.7 °C) (Infrared)   Resp 18   Ht 5' 1\" (1.549 m)   Wt 250 lb (113.4 kg)   SpO2 100%   BMI 47.24 kg/m²  Body mass index is 47.24 kg/m². Physical Exam  Constitutional:       General: She is not in acute distress. Appearance: She is not ill-appearing, toxic-appearing or diaphoretic. HENT:      Head: Normocephalic and atraumatic. Nose: Nose normal. No congestion. Mouth/Throat:      Mouth: Mucous membranes are moist.      Pharynx: Oropharynx is clear. No oropharyngeal exudate or posterior oropharyngeal erythema. Eyes:      General: No scleral icterus. Conjunctiva/sclera: Conjunctivae normal.   Cardiovascular:      Rate and Rhythm: Normal rate and regular rhythm. Heart sounds: Normal heart sounds. No murmur heard. No friction rub. No gallop. Pulmonary:      Effort: Pulmonary effort is normal. No respiratory distress. Breath sounds: Normal breath sounds. No wheezing, rhonchi or rales. Abdominal:      General: Bowel sounds are normal. There is no distension. Palpations: Abdomen is soft. Tenderness: There is no abdominal tenderness. There is no guarding. Musculoskeletal:         General: Swelling and tenderness present. Cervical back: Neck supple. No tenderness. Right knee: Swelling present. No erythema or ecchymosis. Tenderness present over the medial joint line. Left knee: Swelling present. No erythema or ecchymosis. Tenderness present over the medial joint line. Right lower leg: Edema (Trace) present. Left lower leg: Edema (Trace) present. Skin:     General: Skin is warm and dry. Coloration: Skin is not jaundiced. Findings: No bruising, erythema or rash. Neurological:      General: No focal deficit present. Mental Status: She is alert and oriented to person, place, and time. Gait: Gait abnormal (Ambulates with cane).    Psychiatric:         Mood and Affect: Mood normal.        PROVISIONAL DIAGNOSES / SURGERY:      DEGENERATIVE JOINT DISEASE OF RIGHT KNEE     KNEE TOTAL ARTHROPLASTY Right    Patient Active Problem List Hypertriglyceridemia            Duran Gaston, YOLANDA - CNP on 10/6/2021 at 5:35 PM

## 2021-10-06 NOTE — PROGRESS NOTES
Patient has already obtained clearance from Hem/onc and Endocrine these are in epic. Patient states that she also has clearance from medical and cardiologist, we will obtain clearances to include in surgery chart.

## 2021-10-07 ENCOUNTER — OFFICE VISIT (OUTPATIENT)
Dept: ONCOLOGY | Age: 61
End: 2021-10-07
Payer: COMMERCIAL

## 2021-10-07 ENCOUNTER — TELEPHONE (OUTPATIENT)
Dept: ONCOLOGY | Age: 61
End: 2021-10-07

## 2021-10-07 ENCOUNTER — TELEPHONE (OUTPATIENT)
Dept: FAMILY MEDICINE CLINIC | Age: 61
End: 2021-10-07

## 2021-10-07 VITALS
SYSTOLIC BLOOD PRESSURE: 139 MMHG | DIASTOLIC BLOOD PRESSURE: 67 MMHG | RESPIRATION RATE: 18 BRPM | HEART RATE: 84 BPM | TEMPERATURE: 97.7 F

## 2021-10-07 DIAGNOSIS — I10 ESSENTIAL HYPERTENSION: Chronic | ICD-10-CM

## 2021-10-07 DIAGNOSIS — D50.8 IRON DEFICIENCY ANEMIA SECONDARY TO INADEQUATE DIETARY IRON INTAKE: Primary | ICD-10-CM

## 2021-10-07 LAB
MRSA, DNA, NASAL: NORMAL
SPECIMEN DESCRIPTION: NORMAL

## 2021-10-07 PROCEDURE — G8427 DOCREV CUR MEDS BY ELIG CLIN: HCPCS | Performed by: INTERNAL MEDICINE

## 2021-10-07 PROCEDURE — 1036F TOBACCO NON-USER: CPT | Performed by: INTERNAL MEDICINE

## 2021-10-07 PROCEDURE — 99214 OFFICE O/P EST MOD 30 MIN: CPT | Performed by: INTERNAL MEDICINE

## 2021-10-07 PROCEDURE — 3017F COLORECTAL CA SCREEN DOC REV: CPT | Performed by: INTERNAL MEDICINE

## 2021-10-07 PROCEDURE — G8417 CALC BMI ABV UP PARAM F/U: HCPCS | Performed by: INTERNAL MEDICINE

## 2021-10-07 PROCEDURE — G8484 FLU IMMUNIZE NO ADMIN: HCPCS | Performed by: INTERNAL MEDICINE

## 2021-10-07 PROCEDURE — 99211 OFF/OP EST MAY X REQ PHY/QHP: CPT | Performed by: INTERNAL MEDICINE

## 2021-10-07 NOTE — TELEPHONE ENCOUNTER
Please let pt know van is OOT and she is due for hgb A1C check for diabetes.  see if she can come in Monday for a visit please and we can do POCT [id number]

## 2021-10-07 NOTE — TELEPHONE ENCOUNTER
Patient has an upcoming total knee replacement on 10/19/2021. Dr Benoit Noel will be doing the procedure. They have faxed over a clearance request.  They state on the fax that the patient said she has clearance through us already. However, I do not see a clearance in her chart. Please advise.   I can schedule her with Asia Cohen for a clearance

## 2021-10-07 NOTE — TELEPHONE ENCOUNTER
KATYA ARRIVES VIA WHEELCHAIR FOR MD VISIT  DR Mccrary Standing IN TO SEE PATIENT  CLEARED FOR KNEE SURGERY  RV 6 MONTHS WITH CBC @RV  LABS CDP 4/7/22  MD VISIT 4/7/22 @2:45PM  AVS PRINTED AND GIVEN TO PATIENT WITH INSTRUCTIONS  PATIENT DISCHARGED VIA WHEELCHAIR

## 2021-10-08 RX ORDER — VENLAFAXINE HYDROCHLORIDE 37.5 MG/1
CAPSULE, EXTENDED RELEASE ORAL
Qty: 90 CAPSULE | Refills: 0 | Status: SHIPPED | OUTPATIENT
Start: 2021-10-08 | End: 2022-07-25 | Stop reason: SDUPTHER

## 2021-10-08 RX ORDER — LISINOPRIL 5 MG/1
TABLET ORAL
Qty: 90 TABLET | Refills: 3 | Status: SHIPPED | OUTPATIENT
Start: 2021-10-08 | End: 2022-07-25 | Stop reason: SDUPTHER

## 2021-10-10 NOTE — PROGRESS NOTES
_       Chief Complaint   Patient presents with    Follow-up    Discuss Labs     DIAGNOSIS:        Partially treated normocytic anemia  Iron deficiency anemia  Plan for right knee replacement surgery July 6, 2021  Multiple comorbidities as listed   CURRENT THERAPY:         Iron replacement. S/P iv iron infusion August 2021. BRIEF CASE HISTORY:      Ms. Morris Deutsch is a very pleasant 61 y.o. female with history of multiple co morbidities as listed. Patient was referred for evaluation of anemia before knee surgery. Patient has history of iron deficiency and she has been maintained on oral iron over the last several years. She had multiple surgical operations and she had right shoulder replacement. The patient is scheduled for right knee replacement July 6, 2021. Presurgical evaluation showed anemia with hemoglobin of 11.4. Patient has no melena or hematochezia. No hematemesis. No vaginal bleeding. No hematuria. She has generalized weakness and fatigue. She has joints problems. She has limited activities due to weight and due to joints problems. No fever or night sweats. No history of smoking or alcohol drinking. .     INTERIM HISTORY:    seen for follow up anemia. Patient had iron replacement. Good response. She has no active bleeding. Patient's surgery has been delayed due orthopedic surgeon leaving the practice.     PAST MEDICAL HISTORY: has a past medical history of Anemia, Anginal pain ([address] Arthritis, Arthritis of right knee, Asthma, Astigmatism, Back pain, Blurry vision, bilateral, Carpal tunnel syndrome of right wrist, Cataract, Closed displaced fracture of lesser tuberosity of right humerus, Constipation, Depression, Dysmenorrhea, Fatty liver disease, nonalcoholic, GERD (gastroesophageal reflux disease), Gout, Headache, Heart murmur, Heart palpitations, Heart palpitations, History of blood transfusion, History of rib fracture, Hyperlipidemia, Hypertension, Hypertriglyceridemia, Hypothyroidism, Myopia with astigmatism and presbyopia, Neuropathy, Obesity, Osteoporosis, Palpitations, Pancreatitis, Panic attack, PONV (postoperative nausea and vomiting), Positive cardiac stress test, Presbyopia, RLS (restless legs syndrome), Sleep apnea, Status post reverse total arthroplasty of left shoulder, Status post total replacement of right shoulder, Stenosis of both internal carotid arteries- Mild 16-49%, Type II or unspecified type diabetes mellitus without mention of complication, not stated as uncontrolled, and Umbilical hernia. PAST SURGICAL HISTORY: has a past surgical history that includes  section; Dilation and curettage of uterus; Colonoscopy; joint replacement (Left, 2016); Total shoulder arthroplasty (Right, 2017); REPLACEMENT SHOULDER TOTAL (Right, 2017); pr egd transoral biopsy single/multiple (N/A, 10/12/2017); pr colon ca scrn not hi rsk ind (N/A, 10/12/2017); Cardiac catheterization; Cardiac catheterization (2021); and Breast biopsy (Left). CURRENT MEDICATIONS:  has a current medication list which includes the following prescription(s): gabapentin, ropinirole, albuterol sulfate hfa, dicyclomine, pantoprazole, allopurinol, ammonium lactate, eq loratadine, furosemide, amitriptyline, darifenacin, levothyroxine, fish oil, oxybutynin, aspirin-acetaminophen-caffeine, vitamin d3, aspirin, cyclobenzaprine, ondansetron, nystatin, insulin glargine, magnesium oxide, freestyle lite, iron, metformin, meclizine, liothyronine, freestyle lite, glucose monitoring, b-12, lidocaine, liraglutide, novolog, v-go 40, venlafaxine, lisinopril, and phentermine.     ALLERGIES:  is allergic to ozempic (0.25 or 0.5 mg-dose) [semaglutide(0.25 or 0.5mg-dos)]; actos [pioglitazone]; amitriptyline; celebrex [celecoxib]; fenofibrate; gemfibrozil; lovastatin; prempro [conj estrog-medroxyprogest ace]; statins; tricor [fenofibrate]; zetia [ezetimibe]; zocor [simvastatin]; ampicillin; niacin and related; novolin [insulin isophane & reg, human]; omeprazole; pcn [penicillins]; pregabalin; and vesicare [solifenacin]. FAMILY HISTORY: Negative for any hematological or oncological conditions. SOCIAL HISTORY:  reports that she quit smoking about 43 years ago. Her smoking use included cigarettes. She has a 10.00 pack-year smoking history. She has never used smokeless tobacco. She reports current alcohol use. She reports that she does not use drugs. REVIEW OF SYSTEMS:     · General: Positive for weakness and fatigue. No unanticipated weight loss or decreased appetite. No fever or chills. · Eyes: No blurred vision, eye pain or double vision. · Ears: No hearing problems or drainage. No tinnitus. · Throat: No sore throat, problems with swallowing or dysphagia. · Respiratory: No cough, sputum or hemoptysis. No shortness of breath. No pleuritic chest pain. · Cardiovascular: No chest pain, orthopnea or PND. No lower extremity edema. No palpitation. · Gastrointestinal: No problems with swallowing. No abdominal pain or bloating. No nausea or vomiting. No diarrhea or constipation. No GI bleeding. · Genitourinary: No dysuria, hematuria, frequency or urgency. · Musculoskeletal: Limited activities due to joints problems. · Dermatologic: No skin rashes or pruritus. No skin lesions or discolorations. · Psychiatric: No depression, anxiety, or stress or signs of schizophrenia. No change in mood or affect. · Hematologic: No history of bleeding tendency. No bruises or ecchymosis. No history of clotting problems. · Infectious disease: No fever, chills or frequent infections. · Endocrine: No polydipsia or polyuria. No temperature intolerance. · Neurologic: No headaches or dizziness. No weakness or numbness of the extremities. No changes in balance, coordination,  memory, mentation, behavior. · Allergic/Immunologic: No nasal congestion or hives. No repeated infections. PHYSICAL EXAM:  The patient is not in acute distress. Vital signs: Blood pressure 139/67, pulse 84, temperature 97.7 °F (36.5 °C), temperature source Temporal, resp. rate 18, not currently breastfeeding. General appearance - well appearing, not in pain or distress  Mental status - good mood, alert and oriented  Eyes - pupils equal and reactive, extraocular eye movements intact  Ears - bilateral TM's and external ear canals normal  Nose - normal and patent, no erythema, discharge or polyps  Mouth - mucous membranes moist, pharynx normal without lesions  Neck - supple, no significant adenopathy  Lymphatics - no palpable lymphadenopathy, no hepatosplenomegaly  Chest - clear to auscultation, no wheezes, rales or rhonchi, symmetric air entry  Heart - normal rate, regular rhythm, normal S1, S2, no murmurs, rubs, clicks or gallops  Abdomen - soft, nontender, nondistended, no masses or organomegaly  Neurological - alert, oriented, normal speech, no focal findings or movement disorder noted  Musculoskeletal -bilateral knee swelling and tenderness right more than left.   Extremities - peripheral pulses normal, no pedal edema, no clubbing or cyanosis  Skin - normal coloration and turgor, no rashes, no suspicious skin lesions noted     Review of Diagnostic data:   Lab Results   Component Value Date    WBC 10.2 10/06/2021    HGB 12.4 10/06/2021    HCT 38.4 10/06/2021    MCV 94.6 10/06/2021     10/06/2021       Chemistry        Component Value Date/Time     10/06/2021 1527    K 4.8 10/06/2021 1527     10/06/2021 1527    CO2 27 10/06/2021 1527    BUN 13 10/06/2021 1527    CREATININE 0.55 10/06/2021 1527        Component Value Date/Time    CALCIUM 9.3 10/06/2021 1527    ALKPHOS 99 05/20/2021 1132    AST 25 05/20/2021 1132    ALT 17 05/20/2021 1132    BILITOT 0.38 05/20/2021 1132            IMPRESSION:   Partially treated normocytic anemia  Iron deficiency anemia  Plan for right knee replacement surgery July 6, 2021  Multiple comorbidities as listed    PLAN: I reviewed the labs as above and discussed with the patient. I explained to the patient the nature of this hematologic problem. I explained the significance of these abnormalities in layman language. Reviewing patient's labs over the last several years obviously she had anemia in several occasions. She has been maintained on oral iron for iron deficiency anemia. She did not have any good response. She had very good response to IV iron. She is due for right knee replacement surgery. Hopefully will be done soon. Patient is clear for the surgery from hematology perspective. Patient's questions were answered to the best of her satisfaction and she verbalized full understanding and agreement.

## 2021-10-11 ENCOUNTER — TELEPHONE (OUTPATIENT)
Dept: ORTHOPEDIC SURGERY | Age: 61
End: 2021-10-11

## 2021-10-11 ENCOUNTER — OFFICE VISIT (OUTPATIENT)
Dept: FAMILY MEDICINE CLINIC | Age: 61
End: 2021-10-11
Payer: COMMERCIAL

## 2021-10-11 VITALS
BODY MASS INDEX: 50.64 KG/M2 | HEART RATE: 84 BPM | WEIGHT: 268 LBS | TEMPERATURE: 97.9 F | RESPIRATION RATE: 16 BRPM | SYSTOLIC BLOOD PRESSURE: 128 MMHG | OXYGEN SATURATION: 98 % | DIASTOLIC BLOOD PRESSURE: 66 MMHG

## 2021-10-11 DIAGNOSIS — Z79.4 TYPE 2 DIABETES MELLITUS WITH DIABETIC NEUROPATHY, WITH LONG-TERM CURRENT USE OF INSULIN (HCC): Primary | ICD-10-CM

## 2021-10-11 DIAGNOSIS — Z01.818 PRE-OPERATIVE CLEARANCE: ICD-10-CM

## 2021-10-11 DIAGNOSIS — I10 ESSENTIAL HYPERTENSION: Chronic | ICD-10-CM

## 2021-10-11 DIAGNOSIS — E11.40 TYPE 2 DIABETES MELLITUS WITH DIABETIC NEUROPATHY, WITH LONG-TERM CURRENT USE OF INSULIN (HCC): Primary | ICD-10-CM

## 2021-10-11 LAB — HBA1C MFR BLD: 7.7 %

## 2021-10-11 PROCEDURE — G8417 CALC BMI ABV UP PARAM F/U: HCPCS | Performed by: NURSE PRACTITIONER

## 2021-10-11 PROCEDURE — 2022F DILAT RTA XM EVC RTNOPTHY: CPT | Performed by: NURSE PRACTITIONER

## 2021-10-11 PROCEDURE — 1036F TOBACCO NON-USER: CPT | Performed by: NURSE PRACTITIONER

## 2021-10-11 PROCEDURE — 3051F HG A1C>EQUAL 7.0%<8.0%: CPT | Performed by: NURSE PRACTITIONER

## 2021-10-11 PROCEDURE — 3017F COLORECTAL CA SCREEN DOC REV: CPT | Performed by: NURSE PRACTITIONER

## 2021-10-11 PROCEDURE — G8427 DOCREV CUR MEDS BY ELIG CLIN: HCPCS | Performed by: NURSE PRACTITIONER

## 2021-10-11 PROCEDURE — 83036 HEMOGLOBIN GLYCOSYLATED A1C: CPT | Performed by: NURSE PRACTITIONER

## 2021-10-11 PROCEDURE — G8484 FLU IMMUNIZE NO ADMIN: HCPCS | Performed by: NURSE PRACTITIONER

## 2021-10-11 PROCEDURE — 99214 OFFICE O/P EST MOD 30 MIN: CPT | Performed by: NURSE PRACTITIONER

## 2021-10-11 ASSESSMENT — ENCOUNTER SYMPTOMS
BACK PAIN: 0
RHINORRHEA: 0
ABDOMINAL DISTENTION: 0
CHEST TIGHTNESS: 0
NAUSEA: 0
COUGH: 0
CONSTIPATION: 0
SORE THROAT: 0
SHORTNESS OF BREATH: 0
COLOR CHANGE: 0
DIARRHEA: 0
ABDOMINAL PAIN: 0

## 2021-10-11 NOTE — TELEPHONE ENCOUNTER
Patient called in concerned about her walker. Pharmacy told the patient they have reached out multiple times and have had no response and they need to speak with the clinical. Patient did not know the name but said it is the Pharmacy on [facility] with the phone number [phone]. Please advise and contact patient thank you!

## 2021-10-11 NOTE — PATIENT INSTRUCTIONS
Patient Education       Learning About Carbohydrate (Carb) Counting and Eating Out When You Have Diabetes  Why plan your meals? Meal planning can be a key part of managing diabetes. Planning meals and snacks with the right balance of carbohydrate, protein, and fat can help you keep your blood sugar at the target level you set with your doctor. You don't have to eat special foods. You can eat what your family eats, including sweets once in a while. But you do have to pay attention to how often you eat and how much you eat of certain foods. You may want to work with a dietitian or a certified diabetes educator. He or she can give you tips and meal ideas and can answer your questions about meal planning. This health professional can also help you reach a healthy weight if that is one of your goals. What should you know about eating carbs? Managing the amount of carbohydrate (carbs) you eat is an important part of healthy meals when you have diabetes. Carbohydrate is found in many foods. · Learn which foods have carbs. And learn the amounts of carbs in different foods. ? Bread, cereal, pasta, and rice have about 15 grams of carbs in a serving. A serving is 1 slice of bread (1 ounce), ½ cup of cooked cereal, or 1/3 cup of cooked pasta or rice. ? Fruits have 15 grams of carbs in a serving. A serving is 1 small fresh fruit, such as an apple or orange; ½ of a banana; ½ cup of cooked or canned fruit; ½ cup of fruit juice; 1 cup of melon or raspberries; or 2 tablespoons of dried fruit. ? Milk and no-sugar-added yogurt have 15 grams of carbs in a serving. A serving is 1 cup of milk or 2/3 cup of no-sugar-added yogurt. ? Starchy vegetables have 15 grams of carbs in a serving. A serving is ½ cup of mashed potatoes or sweet potato; 1 cup winter squash; ½ of a small baked potato; ½ cup of cooked beans; or ½ cup cooked corn or green peas.   · Learn how much carbs to eat each day and at each meal. A dietitian or CDE can teach you how to keep track of the amount of carbs you eat. This is called carbohydrate counting. · If you are not sure how to count carbohydrate grams, use the Plate Method to plan meals. It is a good, quick way to make sure that you have a balanced meal. It also helps you spread carbs throughout the day. ? Divide your plate by types of foods. Put non-starchy vegetables on half the plate, meat or other protein food on one-quarter of the plate, and a grain or starchy vegetable in the final quarter of the plate. To this you can add a small piece of fruit and 1 cup of milk or yogurt, depending on how many carbs you are supposed to eat at a meal.  · Try to eat about the same amount of carbs at each meal. Do not \"save up\" your daily allowance of carbs to eat at one meal.  · Proteins have very little or no carbs per serving. Examples of proteins are beef, chicken, turkey, fish, eggs, tofu, cheese, cottage cheese, and peanut butter. A serving size of meat is 3 ounces, which is about the size of a deck of cards. Examples of meat substitute serving sizes (equal to 1 ounce of meat) are 1/4 cup of cottage cheese, 1 egg, 1 tablespoon of peanut butter, and ½ cup of tofu. How can you eat out and still eat healthy? · Learn to estimate the serving sizes of foods that have carbohydrate. If you measure food at home, it will be easier to estimate the amount in a serving of restaurant food. · If the meal you order has too much carbohydrate (such as potatoes, corn, or baked beans), ask to have a low-carbohydrate food instead. Ask for a salad or green vegetables. · If you use insulin, check your blood sugar before and after eating out to help you plan how much to eat in the future. · If you eat more carbohydrate at a meal than you had planned, take a walk or do other exercise. This will help lower your blood sugar. What are some tips for eating healthy? · Limit saturated fat, such as the fat from meat and dairy products.  This is a healthy choice because people who have diabetes are at higher risk of heart disease. So choose lean cuts of meat and nonfat or low-fat dairy products. Use olive or canola oil instead of butter or shortening when cooking. · Don't skip meals. Your blood sugar may drop too low if you skip meals and take insulin or certain medicines for diabetes. · Check with your doctor before you drink alcohol. Alcohol can cause your blood sugar to drop too low. Alcohol can also cause a bad reaction if you take certain diabetes medicines. Follow-up care is a key part of your treatment and safety. Be sure to make and go to all appointments, and call your doctor if you are having problems. It's also a good idea to know your test results and keep a list of the medicines you take. Where can you learn more? Go to https://Done.erineb.ProspX. org and sign in to your [employer] account. Enter N471 in the "[employer] box to learn more about \"Learning About Carbohydrate (Carb) Counting and Eating Out When You Have Diabetes. \"     If you do not have an account, please click on the \"Sign Up Now\" link. Current as of: December 17, 2020               Content Version: 13.0  © 0092-5788 Healthwise, [employer]. Care instructions adapted under license by [facility] ([facility]). If you have questions about a medical condition or this instruction, always ask your healthcare professional. [place] [address] any warranty or liability for your use of this information. Patient Education        Learning About Meal Planning for Diabetes  Why plan your meals? Meal planning can be a key part of managing diabetes. Planning meals and snacks with the right balance of carbohydrate, protein, and fat can help you keep your blood sugar at the target level you set with your doctor. You don't have to eat special foods. You can eat what your family eats, including sweets once in a while.  But you do have to pay attention to how often you eat and how much you eat of certain foods. You may want to work with a dietitian or a certified diabetes educator. He or she can give you tips and meal ideas and can answer your questions about meal planning. This health professional can also help you reach a healthy weight if that is one of your goals. What plan is right for you? Your dietitian or diabetes educator may suggest that you start with the plate format or carbohydrate counting. The plate format  The plate format is a simple way to help you manage how you eat. You plan meals by learning how much space each food should take on a plate. Using the plate format helps you spread carbohydrate throughout the day. It can make it easier to keep your blood sugar level within your target range. It also helps you see if you're eating healthy portion sizes. To use the plate format, you put non-starchy vegetables on half your plate. Add meat or meat substitutes on one-quarter of the plate. Put a grain or starchy vegetable (such as brown rice or a potato) on the final quarter of the plate. You can add a small piece of fruit and some low-fat or fat-free milk or yogurt, depending on your carbohydrate goal for each meal.  Here are some tips for using the plate format:  · Make sure that you are not using an oversized plate. A 9-inch plate is best. Many restaurants use larger plates. · Get used to using the plate format at home. Then you can use it when you eat out. · Write down your questions about using the plate format. Talk to your doctor, a dietitian, or a diabetes educator about your concerns. Carbohydrate counting  With carbohydrate counting, you plan meals based on the amount of carbohydrate in each food. Carbohydrate raises blood sugar higher and more quickly than any other nutrient. It is found in desserts, breads and cereals, and fruit.  It's also found in starchy vegetables such as potatoes and corn, grains such as rice and pasta, and milk and yogurt. Spreading carbohydrate throughout the day helps keep your blood sugar levels within your target range. Your daily amount depends on several things, including your weight, how active you are, which diabetes medicines you take, and what your goals are for your blood sugar levels. A registered dietitian or diabetes educator can help you plan how much carbohydrate to include in each meal and snack. A guideline for your daily amount of carbohydrate is:  · 45 to 60 grams at each meal. That's about the same as 3 to 4 carbohydrate servings. · 15 to 20 grams at each snack. That's about the same as 1 carbohydrate serving. The Nutrition Facts label on packaged foods tells you how much carbohydrate is in a serving of the food. First, look at the serving size on the food label. Is that the amount you eat in a serving? All of the nutrition information on a food label is based on that serving size. So if you eat more or less than that, you'll need to adjust the other numbers. Total carbohydrate is the next thing you need to look for on the label. If you count carbohydrate servings, one serving of carbohydrate is 15 grams. For foods that don't come with labels, such as fresh fruits and vegetables, you'll need a guide that lists carbohydrate in these foods. Ask your doctor, dietitian, or diabetes educator about books or other nutrition guides you can use. If you take insulin, you need to know how many grams of carbohydrate are in a meal. This lets you know how much rapid-acting insulin to take before you eat. If you use an insulin pump, you get a constant rate of insulin during the day. So the pump must be programmed at meals to give you extra insulin to cover the rise in blood sugar after meals. When you know how much carbohydrate you will eat, you can take the right amount of insulin.  Or, if you always use the same amount of insulin, you need to make sure that you eat the same amount of carbohydrate at meals.  If you need more help to understand carbohydrate counting and food labels, ask your doctor, dietitian, or diabetes educator. How can you plan healthy meals? Here are some tips to get started:  · Plan your meals a week at a time. Don't forget to include snacks too. · Use cookbooks or online recipes to plan several main meals. Plan some quick meals for busy nights. You also can double some recipes that freeze well. Then you can save half for other busy nights when you don't have time to cook. · Make sure you have the ingredients you need for your recipes. If you're running low on basic items, put these items on your shopping list too. · List foods that you use to make breakfasts, lunches, and snacks. List plenty of fruits and vegetables. · Post this list on the refrigerator. Add to it as you think of more things you need. · Take the list to the store to do your weekly shopping. Follow-up care is a key part of your treatment and safety. Be sure to make and go to all appointments, and call your doctor if you are having problems. It's also a good idea to know your test results and keep a list of the medicines you take. Where can you learn more? Go to https://SounderpeCFX BATTERYeweb.[employer]. org and sign in to your [employer] account. Enter J086 in the RentStuff.com box to learn more about \"Learning About Meal Planning for Diabetes. \"     If you do not have an account, please click on the \"Sign Up Now\" link. Current as of: December 17, 2020               Content Version: 13.0  © 4420-3423 Healthwise, Incorporated. Care instructions adapted under license by [facility] ([facility]). If you have questions about a medical condition or this instruction, always ask your healthcare professional. [address] any warranty or liability for your use of this information.

## 2021-10-11 NOTE — PROGRESS NOTES
Dionne Henry, APRN-CNP  [facility]  [address] [address], [address]  [address] [address]  Dept: [phone]  Dept Fax: [phone]    HPI:   Sandra Lucero is a 61 y.o. female is an established patient who presents to the office today for a preoperative consultation at the request from anesthesia and Dr. Rosalie Rome who plans on performing right total knee athroscopy on October 19. This consultation is requested for the specific conditions prompting preoperative evaluation (i.e. because of potential affect on operative risk): DM and HTN. Planned anesthesia is General.  The patient has the following known anesthesia issues: none  Patient has a bleeding risk of : no recent abnormal bleeding  Patient does not have objection to receiving blood products if needed. - at home her BS are running around 200s, once in a while it will be 300. Novolog sliding scale, lantus 50 mg, vicotza and metformin    My previous office notes, labs and diagnostic studies were reviewed prior to and during encounter. The patient's past medical, surgical, social, and family history as well as current medications and allergies were reviewed as documented in today's encounter by CHLOE Hussein.       Current Outpatient Medications on File Prior to Visit   Medication Sig Dispense Refill    venlafaxine (EFFEXOR XR) 37.5 MG extended release capsule TAKE 1 CAPSULE BY MOUTH THREE TIMES DAILY 90 capsule 0    lisinopril (PRINIVIL;ZESTRIL) 5 MG tablet Take 1 tablet by mouth once daily 90 tablet 3    gabapentin (NEURONTIN) 600 MG tablet TAKE 1 TABLET BY MOUTH THREE TIMES DAILY 270 tablet 0    rOPINIRole (REQUIP) 2 MG tablet TAKE 1 TABLET BY MOUTH THREE TIMES DAILY 90 tablet 0    albuterol sulfate  (90 Base) MCG/ACT inhaler INHALE 2 PUFFS BY MOUTH EVERY 6 HOURS AS NEEDED FOR WHEEZING FOR SHORTNESS OF BREATH 18 g 0    dicyclomine (BENTYL) 10 MG capsule TAKE 1 CAPSULE BY MOUTH 4 TIMES DAILY AS NEEDED FOR CRAMPS 120 capsule 2    pantoprazole (PROTONIX) 40 MG tablet Take 1 tablet by mouth once daily 90 tablet 3    allopurinol (ZYLOPRIM) 100 MG tablet Take 1 tablet by mouth once daily 90 tablet 1    ammonium lactate (LAC-HYDRIN) 12 % cream Apply topically as needed. 1 Bottle 3    EQ LORATADINE 10 MG tablet Take 1 tablet by mouth once daily 90 tablet 1    furosemide (LASIX) 20 MG tablet Take 1 tablet by mouth once daily 90 tablet 1    amitriptyline (ELAVIL) 50 MG tablet Take 50 mg by mouth nightly      darifenacin (ENABLEX) 15 MG extended release tablet Take 15 mg by mouth 2 times daily      levothyroxine (SYNTHROID) 112 MCG tablet Take 112 mcg by mouth Daily      phentermine 37.5 MG capsule Take 37.5 mg by mouth every morning.       Omega-3 Fatty Acids (FISH OIL) 1000 MG CAPS Take 3,000 mg by mouth daily      oxybutynin (DITROPAN XL) 15 MG extended release tablet Take 15 mg by mouth daily      Aspirin-Acetaminophen-Caffeine (EXCEDRIN PO) Take 2 tablets by mouth daily as needed (headache)      Cholecalciferol (VITAMIN D3) 50 MCG (2000 UT) TABS Take 1 tablet by mouth once daily 30 tablet 0    aspirin 81 MG chewable tablet CHEW AND SWALLOW 1 TABLET BY MOUTH ONCE DAILY 90 tablet 1    cyclobenzaprine (FLEXERIL) 10 MG tablet Take one tablet by mouth three times daily as needed 60 tablet 0    ondansetron (ZOFRAN) 4 MG tablet Take 1 tablet by mouth every 8 hours as needed for Nausea or Vomiting 20 tablet 0    nystatin (NYSTATIN) 064349 UNIT/GM powder APPLY POWDER TOPIALLY TO ABDOMINAL FOLDS THREE TIMES DAILY AS NEEDED FOR SKIN IRRITATION 60 g 1    insulin glargine (LANTUS) 100 UNIT/ML injection vial Inject 50 Units into the skin nightly       Magnesium Oxide 500 MG TABS TAKE 1 TABLET BY MOUTH TWICE DAILY 180 tablet 1    blood glucose test strips (FREESTYLE LITE) strip 1 each by Does not apply route 6 times daily 200 each 5    Ferrous Sulfate (IRON) 325 (65 Fe) MG TABS Take 1 tablet by mouth once daily with breakfast 90 tablet 1    metFORMIN (GLUCOPHAGE) 1000 MG tablet TAKE 1 TABLET BY MOUTH TWICE DAILY 60 tablet 0    meclizine (ANTIVERT) 25 MG tablet TAKE 1 TABLET BY MOUTH THREE TIMES DAILY AS NEEDED FOR DIZZINESS 60 tablet 0    liothyronine (CYTOMEL) 5 MCG tablet Take 5 mcg by mouth daily      Blood Glucose Monitoring Suppl (FREESTYLE LITE) KYLAH 1 Device by Does not apply route 6 times daily 1 Device 0    glucose monitoring kit (FREESTYLE) monitoring kit 1 kit by Does not apply route daily 1 kit 0    Cyanocobalamin (B-12) 2500 MCG TABS Take 1 tablet by mouth daily       lidocaine (XYLOCAINE) 5 % ointment Apply topically as needed. 1 Tube 3    Liraglutide (VICTOZA) 18 MG/3ML SOPN SC injection Inject 1.8 mg into the skin daily       NOVOLOG 100 UNIT/ML injection vial Per V GO. Max 76 units daily  2    Insulin Disposable Pump (V-GO 40) KIT Use as directed per Dr. Jose Daniel Madera. No current facility-administered medications on file prior to visit. SUBJECTIVE:  Review of Systems   Constitutional: Negative for activity change, fatigue and fever. HENT: Negative for congestion, ear pain, rhinorrhea and sore throat. Respiratory: Negative for cough, chest tightness and shortness of breath. Cardiovascular: Negative for chest pain and palpitations. Gastrointestinal: Negative for abdominal distention, abdominal pain, constipation, diarrhea and nausea. Endocrine: Negative for polydipsia, polyphagia and polyuria. Genitourinary: Negative for difficulty urinating and dysuria. Musculoskeletal: Negative for arthralgias, back pain and myalgias. Skin: Negative for color change and rash. Neurological: Negative for dizziness, weakness, light-headedness and headaches. Hematological: Negative for adenopathy. Psychiatric/Behavioral: Negative for agitation and behavioral problems. The patient is not nervous/anxious.       OBJECTIVE:  /66   Pulse 84   Temp 97.9 °F (36.6 °C)   Resp 16   Wt 268 lb (121.6 kg)   SpO2 98%   BMI 50.64 kg/m²    Physical Exam  Vitals reviewed. Constitutional:       General: She is not in acute distress. Appearance: Normal appearance. HENT:      Head: Normocephalic and atraumatic. Right Ear: External ear normal.      Left Ear: External ear normal.      Nose: Nose normal.      Mouth/Throat:      Mouth: Mucous membranes are moist.      Pharynx: No oropharyngeal exudate or posterior oropharyngeal erythema. Eyes:      Extraocular Movements: Extraocular movements intact. Conjunctiva/sclera: Conjunctivae normal.      Pupils: Pupils are equal, round, and reactive to light. Cardiovascular:      Rate and Rhythm: Normal rate and regular rhythm. Pulses: Normal pulses. Heart sounds: Normal heart sounds. No murmur heard. Pulmonary:      Effort: Pulmonary effort is normal. No respiratory distress. Breath sounds: Normal breath sounds. No wheezing or rales. Abdominal:      General: Bowel sounds are normal. There is no distension. Palpations: Abdomen is soft. Tenderness: There is no abdominal tenderness. Musculoskeletal:         General: Normal range of motion. Cervical back: Normal range of motion. Right lower leg: No edema. Left lower leg: No edema. Lymphadenopathy:      Cervical: No cervical adenopathy. Skin:     General: Skin is warm and dry. Neurological:      General: No focal deficit present. Mental Status: She is alert and oriented to person, place, and time. Deep Tendon Reflexes: Reflexes normal.   Psychiatric:         Mood and Affect: Mood is depressed. Affect is tearful. Behavior: Behavior normal. Behavior is cooperative. ASSESSMENT:   Diagnosis Orders   1. Type 2 diabetes mellitus with diabetic neuropathy, with long-term current use of insulin ([facility])  POCT glycosylated hemoglobin (Hb A1C)   2. Pre-operative clearance     3.  Essential hypertension       PLAN:  Type 2 discussing the diagnoses, importance of compliance with the treatment plan, counseling, coordinating care as well as documenting on the day of the visit.      YOLANDA Cruz-CNP

## 2021-10-12 NOTE — TELEPHONE ENCOUNTER
Attempted to call patient back twice this morning. No answer and VM is full. Currently the patient is not cleared for surgery via her PCP and so will need to be cancelled.

## 2021-10-12 NOTE — TELEPHONE ENCOUNTER
Spoke with the patient and and let her know we will need to cancel surgery until her A1C is below 7.

## 2021-10-14 DIAGNOSIS — B35.4 TINEA CORPORIS: ICD-10-CM

## 2021-10-14 RX ORDER — NYSTATIN 100000 [USP'U]/G
POWDER TOPICAL
Qty: 60 G | Refills: 0 | Status: SHIPPED | OUTPATIENT
Start: 2021-10-14 | End: 2021-12-07

## 2021-10-19 ENCOUNTER — TELEPHONE (OUTPATIENT)
Dept: ORTHOPEDIC SURGERY | Age: 61
End: 2021-10-19

## 2021-10-19 NOTE — TELEPHONE ENCOUNTER
Patient states that she contacted [facility],  [address] regarding an order for a walker that was made by Dr Alejandro Dillon.   However, they are telling her that they need the order for the walker along with the following:  Office notes from last visit   Medical necessity for walker    Please fax the following information along with the order to:  [id number] [employer]  Fax: [phone]  Phone: [phone]

## 2021-10-21 ENCOUNTER — TELEPHONE (OUTPATIENT)
Dept: ORTHOPEDIC SURGERY | Age: 61
End: 2021-10-21

## 2021-10-21 NOTE — TELEPHONE ENCOUNTER
Order and last office note has been faxed over to [address] [place] [address], [address] on 9/17/21 as requested, I re faxed today. Called and left message for patient that this has been completed.

## 2021-10-21 NOTE — TELEPHONE ENCOUNTER
Attempted to call nydia to clarify what the order needed to say, being that it specifies in order why she needs walker with seat. Office was closed

## 2021-10-21 NOTE — TELEPHONE ENCOUNTER
Sandra from [facility] equipment called in regards to an order they received from Dr Martita Parisi regarding a rollator. She states that the progress notes do not mention anywhere the need for the rollator so they will need something detailing the medical necessity for the walker and seat portion.       Please fax to: [phone] attn: Julieta Eldridge    If you would like to call Julieta Eldridge in regards to her request, please call:   [phone]

## 2021-10-25 RX ORDER — ALBUTEROL SULFATE 90 UG/1
AEROSOL, METERED RESPIRATORY (INHALATION)
Qty: 18 G | Refills: 3 | Status: SHIPPED | OUTPATIENT
Start: 2021-10-25 | End: 2022-04-21

## 2021-10-25 NOTE — TELEPHONE ENCOUNTER
Spoke with [facility]. Dr. Griselda Hua office note needs to specify:   1)immobility for walker    2)why cane isn't sufficient    3) why she needs the seat (ex: bathing, adls etc)    Called and told pt that this would not be able to take place until her next appt 11/3/21 being that it needs to be in physician notes. pt understanding

## 2021-10-25 NOTE — TELEPHONE ENCOUNTER
Pt called back with the same number I have for the office, advised pt that I am continuing to work on getting in contact with the office          LVM stating that I am attempting to call [facility] and unable to get through, if she has a different number to contact them to please give our office a call back.

## 2021-10-26 ENCOUNTER — NURSE TRIAGE (OUTPATIENT)
Dept: OTHER | Facility: CLINIC | Age: 61
End: 2021-10-26

## 2021-10-26 RX ORDER — DICYCLOMINE HYDROCHLORIDE 10 MG/1
CAPSULE ORAL
Qty: 120 CAPSULE | Refills: 0 | Status: SHIPPED | OUTPATIENT
Start: 2021-10-26 | End: 2021-12-02

## 2021-10-26 NOTE — TELEPHONE ENCOUNTER
Reason for Disposition   MODERATE hand swelling (e.g., visible swelling of hand and fingers; pitting edema)    Answer Assessment - Initial Assessment Questions  1. ONSET: \"When did the swelling start? \" (e.g., minutes, hours, days)      4-5 weeks ago    2. LOCATION: \"What part of the hand is swollen? \"  \"Are both hands swollen or just one hand? \"      Right hand from thumb to wrist    3. SEVERITY: \"How bad is the swelling? \" (e.g., localized; mild, moderate, severe)    - BALL OR LUMP: small ball or lump    - LOCALIZED: puffy or swollen area or patch of skin    - JOINT SWELLING: swelling of a joint    - MILD: puffiness or mild swelling of fingers or hand    - MODERATE: fingers and hand are swollen    - SEVERE: swelling of entire hand and up into forearm      Localized    4. REDNESS: \"Does the swelling look red or infected? \"      Denies    5. PAIN: \"Is the swelling painful to touch? \" If so, ask: \"How painful is it? \"   (Scale 1-10; mild, moderate or severe)      7-8/10    6. FEVER: \"Do you have a fever? \" If so, ask: \"What is it, how was it measured, and when did it start? \"       Denies    7. CAUSE: \"What do you think is causing the hand swelling? \" (e.g., heat, insect bite, pregnancy, recent injury)      I really don't know. 8. MEDICAL HISTORY: \"Do you have a history of heart failure, kidney disease, liver failure, or cancer? \"      Denies heart and liver failure, kidney disease and cancer    9. RECURRENT SYMPTOM: \"Have you had hand swelling before? \" If so, ask: \"When was the last time? \" \"What happened that time? \"      No    10. OTHER SYMPTOMS: \"Do you have any other symptoms? \" (e.g., blurred vision, difficulty breathing, headache)        Severe pain in right hand(thumb to wrist), pins and needles intermittently    11. PREGNANCY: \"Is there any chance you are pregnant? \" \"When was your last menstrual period? \"        no    Protocols used: HAND SWELLING-ADULT-    [facility]. Brief description of triage: right hand from thumb to wrist swelling and pain    Triage indicates for patient to see PCP within 24 hours . Walk in clinic if no appointments    Care advice provided, patient verbalizes understanding; denies any other questions or concerns; instructed to call back for any new or worsening symptoms. Writer provided warm transfer to [facility] for appointment scheduling. Attention Provider: Thank you for allowing me to participate in the care of your patient. The patient was connected to triage in response to information provided to the ECC/PSC. Please do not respond through this encounter as the response is not directed to a shared pool.

## 2021-10-29 RX ORDER — ROPINIROLE 2 MG/1
TABLET, FILM COATED ORAL
Qty: 90 TABLET | Refills: 0 | Status: SHIPPED | OUTPATIENT
Start: 2021-10-29 | End: 2021-11-29

## 2021-11-01 RX ORDER — VENLAFAXINE HYDROCHLORIDE 37.5 MG/1
CAPSULE, EXTENDED RELEASE ORAL
Qty: 90 CAPSULE | Refills: 0 | OUTPATIENT
Start: 2021-11-01

## 2021-11-01 NOTE — TELEPHONE ENCOUNTER
Please Approve or Refuse. Next Visit Date:  11/18/2021   Last Visit Date: 4/21/2021    Hemoglobin A1C (%)   Date Value   10/11/2021 7.7   06/29/2021 7.7 (H)   06/28/2021 7.4             ( goal A1C is < 7)   BP Readings from Last 3 Encounters:   10/11/21 128/66   10/06/21 133/64   10/07/21 139/67          (goal 120/80)  BUN   Date Value Ref Range Status   10/06/2021 13 8 - 23 mg/dL Final     CREATININE   Date Value Ref Range Status   10/06/2021 0.55 0.50 - 0.90 mg/dL Final     Potassium   Date Value Ref Range Status   10/06/2021 4.8 3.7 - 5.3 mmol/L Final     Comment:     SPECIMEN SLIGHTLY HEMOLYZED, RESULTS MAY BE ADVERSELY AFFECTED.

## 2021-11-01 NOTE — TELEPHONE ENCOUNTER
She will need periodic appointments to continue to get these medications or start getting from her pcp.

## 2021-11-08 DIAGNOSIS — D50.9 IRON DEFICIENCY ANEMIA, UNSPECIFIED IRON DEFICIENCY ANEMIA TYPE: ICD-10-CM

## 2021-11-08 RX ORDER — PNV NO.95/FERROUS FUM/FOLIC AC 28MG-0.8MG
TABLET ORAL
Qty: 90 TABLET | Refills: 0 | Status: SHIPPED | OUTPATIENT
Start: 2021-11-08 | End: 2022-02-11

## 2021-11-08 RX ORDER — DULOXETIN HYDROCHLORIDE 60 MG/1
60 CAPSULE, DELAYED RELEASE ORAL NIGHTLY
Qty: 90 CAPSULE | Refills: 0 | Status: SHIPPED | OUTPATIENT
Start: 2021-11-08 | End: 2021-11-09

## 2021-11-09 RX ORDER — DULOXETIN HYDROCHLORIDE 60 MG/1
60 CAPSULE, DELAYED RELEASE ORAL NIGHTLY
Qty: 90 CAPSULE | Refills: 1 | Status: SHIPPED | OUTPATIENT
Start: 2021-11-09 | End: 2022-06-15 | Stop reason: SDUPTHER

## 2021-11-18 ENCOUNTER — OFFICE VISIT (OUTPATIENT)
Dept: PODIATRY | Age: 61
End: 2021-11-18
Payer: COMMERCIAL

## 2021-11-18 VITALS — WEIGHT: 268 LBS | BODY MASS INDEX: 50.6 KG/M2 | HEIGHT: 61 IN

## 2021-11-18 DIAGNOSIS — M21.962 FOOT DEFORMITY, BILATERAL: ICD-10-CM

## 2021-11-18 DIAGNOSIS — M79.2 NEUROPATHIC PAIN: ICD-10-CM

## 2021-11-18 DIAGNOSIS — M21.961 FOOT DEFORMITY, BILATERAL: ICD-10-CM

## 2021-11-18 DIAGNOSIS — E11.42 TYPE 2 DIABETES MELLITUS WITH DIABETIC POLYNEUROPATHY, WITH LONG-TERM CURRENT USE OF INSULIN (HCC): ICD-10-CM

## 2021-11-18 DIAGNOSIS — Z79.4 TYPE 2 DIABETES MELLITUS WITH DIABETIC POLYNEUROPATHY, WITH LONG-TERM CURRENT USE OF INSULIN (HCC): ICD-10-CM

## 2021-11-18 DIAGNOSIS — B35.1 ONYCHOMYCOSIS: Primary | ICD-10-CM

## 2021-11-18 PROCEDURE — 99213 OFFICE O/P EST LOW 20 MIN: CPT | Performed by: PODIATRIST

## 2021-11-18 PROCEDURE — G8484 FLU IMMUNIZE NO ADMIN: HCPCS | Performed by: PODIATRIST

## 2021-11-18 PROCEDURE — 3051F HG A1C>EQUAL 7.0%<8.0%: CPT | Performed by: PODIATRIST

## 2021-11-18 PROCEDURE — G8427 DOCREV CUR MEDS BY ELIG CLIN: HCPCS | Performed by: PODIATRIST

## 2021-11-18 PROCEDURE — 2022F DILAT RTA XM EVC RTNOPTHY: CPT | Performed by: PODIATRIST

## 2021-11-18 PROCEDURE — 1036F TOBACCO NON-USER: CPT | Performed by: PODIATRIST

## 2021-11-18 PROCEDURE — G8417 CALC BMI ABV UP PARAM F/U: HCPCS | Performed by: PODIATRIST

## 2021-11-18 PROCEDURE — 3017F COLORECTAL CA SCREEN DOC REV: CPT | Performed by: PODIATRIST

## 2021-11-18 RX ORDER — CYCLOBENZAPRINE HCL 10 MG
TABLET ORAL
Qty: 90 TABLET | Refills: 0 | Status: SHIPPED | OUTPATIENT
Start: 2021-11-18

## 2021-11-18 ASSESSMENT — ENCOUNTER SYMPTOMS
DIARRHEA: 0
VOMITING: 0
COLOR CHANGE: 0
NAUSEA: 0
BACK PAIN: 1
CONSTIPATION: 0

## 2021-11-18 NOTE — PROGRESS NOTES
[facility]  Return Patient    Chief Complaint   Patient presents with    Nail Problem     nail trim/last saw Philip Greenwood NP 8/19/21       Subjective: Sonia Ambrocio comes to clinic for Nail Problem (nail trim/last saw Philip Greenwood NP 8/19/21)    she is a [id number]. Neuropathy does not seem to be under control with current medication. Took herself off of Effexor. Legs cramping. Knee surgery cancelled due to high BG. Would like new cream for dry skin. History: was experiencing night cramps, burning pain, keeping her up at night. She is on Gabapentin 600 mg BID, Requip 3 mg, Effexor 50 mg at night. Has been on Flexeril. The Effexor was helping considerable with her neuropathy. Pt's primary care physician is YOLANDA Hernandez NP        Lab Results   Component Value Date    LABA1C 7.7 10/11/2021      Complains of numbness in the feet bilat.   Past Medical History:   Diagnosis Date    Anemia     receives injectafer (iron infusions)    Anginal pain ([address]     last used Nitro sl 4 yrs 2013  (currently off this medication written: 10/23/2019); no episodes for about a year (written 6/10/21)    Arthritis     Arthritis of right knee 8/15/2018    Asthma     uses inhaler as needed, managed by Philip Greenwood NP    Astigmatism 8/22/2014    Back pain     radiculopathy left leg    Blurry vision, bilateral 7/8/2016    Carpal tunnel syndrome of right wrist 11/26/2012    Cataract     patient denies    Closed displaced fracture of lesser tuberosity of right humerus 7/11/2016    Constipation     Depression     Dysmenorrhea     Fatty liver disease, nonalcoholic     GERD (gastroesophageal reflux disease)     Gout     found through bloodwork    Headache     Heart murmur     Heart palpitations     Heart palpitations     History of blood transfusion     thinks she might have had a transfusion doesn't remember why or when    History of rib fracture 7/6/2016    Right     Novolin [Insulin Isophane & Reg, Human] Rash    Omeprazole Nausea And Vomiting    Pcn [Penicillins] Rash    Pregabalin Nausea And Vomiting and Rash    Vesicare [Solifenacin] Rash     Current Outpatient Medications on File Prior to Visit   Medication Sig Dispense Refill    DULoxetine (CYMBALTA) 60 MG extended release capsule TAKE 1 CAPSULE BY MOUTH NIGHTLY 90 capsule 1    Ferrous Sulfate (IRON) 325 (65 Fe) MG TABS Take 1 tablet by mouth once daily with breakfast 90 tablet 0    rOPINIRole (REQUIP) 2 MG tablet TAKE 1 TABLET BY MOUTH THREE TIMES DAILY 90 tablet 0    dicyclomine (BENTYL) 10 MG capsule TAKE 1 CAPSULE BY MOUTH 4 TIMES DAILY AS NEEDED FOR CRAMPS 120 capsule 0    albuterol sulfate  (90 Base) MCG/ACT inhaler INHALE 2 PUFFS BY MOUTH EVERY 6 HOURS AS NEEDED FOR WHEEZING FOR SHORTNESS OF BREATH 18 g 3    nystatin (NYSTATIN) 832150 UNIT/GM powder APPLY POWDER TOPICALLY TO ABDOMINAL FOLDS THREE TIMES DAILY AS NEEDED FOR SKIN IRRITATION 60 g 0    venlafaxine (EFFEXOR XR) 37.5 MG extended release capsule TAKE 1 CAPSULE BY MOUTH THREE TIMES DAILY 90 capsule 0    lisinopril (PRINIVIL;ZESTRIL) 5 MG tablet Take 1 tablet by mouth once daily 90 tablet 3    gabapentin (NEURONTIN) 600 MG tablet TAKE 1 TABLET BY MOUTH THREE TIMES DAILY 270 tablet 0    pantoprazole (PROTONIX) 40 MG tablet Take 1 tablet by mouth once daily 90 tablet 3    allopurinol (ZYLOPRIM) 100 MG tablet Take 1 tablet by mouth once daily 90 tablet 1    ammonium lactate (LAC-HYDRIN) 12 % cream Apply topically as needed.  1 Bottle 3    EQ LORATADINE 10 MG tablet Take 1 tablet by mouth once daily 90 tablet 1    furosemide (LASIX) 20 MG tablet Take 1 tablet by mouth once daily 90 tablet 1    amitriptyline (ELAVIL) 50 MG tablet Take 50 mg by mouth nightly      darifenacin (ENABLEX) 15 MG extended release tablet Take 15 mg by mouth 2 times daily      levothyroxine (SYNTHROID) 112 MCG tablet Take 112 mcg by mouth Daily      phentermine 37.5 MG capsule Take 37.5 mg by mouth every morning.  Omega-3 Fatty Acids (FISH OIL) 1000 MG CAPS Take 3,000 mg by mouth daily      oxybutynin (DITROPAN XL) 15 MG extended release tablet Take 15 mg by mouth daily      Aspirin-Acetaminophen-Caffeine (EXCEDRIN PO) Take 2 tablets by mouth daily as needed (headache)      Cholecalciferol (VITAMIN D3) 50 MCG (2000 UT) TABS Take 1 tablet by mouth once daily 30 tablet 0    aspirin 81 MG chewable tablet CHEW AND SWALLOW 1 TABLET BY MOUTH ONCE DAILY 90 tablet 1    ondansetron (ZOFRAN) 4 MG tablet Take 1 tablet by mouth every 8 hours as needed for Nausea or Vomiting 20 tablet 0    insulin glargine (LANTUS) 100 UNIT/ML injection vial Inject 50 Units into the skin nightly       Magnesium Oxide 500 MG TABS TAKE 1 TABLET BY MOUTH TWICE DAILY 180 tablet 1    blood glucose test strips (FREESTYLE LITE) strip 1 each by Does not apply route 6 times daily 200 each 5    metFORMIN (GLUCOPHAGE) 1000 MG tablet TAKE 1 TABLET BY MOUTH TWICE DAILY 60 tablet 0    meclizine (ANTIVERT) 25 MG tablet TAKE 1 TABLET BY MOUTH THREE TIMES DAILY AS NEEDED FOR DIZZINESS 60 tablet 0    liothyronine (CYTOMEL) 5 MCG tablet Take 5 mcg by mouth daily      Blood Glucose Monitoring Suppl (FREESTYLE LITE) KYLAH 1 Device by Does not apply route 6 times daily 1 Device 0    glucose monitoring kit (FREESTYLE) monitoring kit 1 kit by Does not apply route daily 1 kit 0    Cyanocobalamin (B-12) 2500 MCG TABS Take 1 tablet by mouth daily       lidocaine (XYLOCAINE) 5 % ointment Apply topically as needed. 1 Tube 3    Liraglutide (VICTOZA) 18 MG/3ML SOPN SC injection Inject 1.8 mg into the skin daily       NOVOLOG 100 UNIT/ML injection vial Per V GO. Max 76 units daily  2    Insulin Disposable Pump (V-GO 40) KIT Use as directed per Dr. Joyce Wilkinson. No current facility-administered medications on file prior to visit.        Review of Systems   Constitutional: Negative for chills, diaphoresis, fatigue, fever and unexpected weight change. Cardiovascular: Negative for leg swelling. Gastrointestinal: Negative for constipation, diarrhea, nausea and vomiting. Musculoskeletal: Positive for back pain and gait problem. Negative for arthralgias and joint swelling. Skin: Negative for color change, pallor, rash and wound. Neurological: Positive for numbness. Negative for weakness. Objective:  General: AAO x 3 in NAD. Derm   Skin lesion/ulceration Absent . Skin No rashes or nodules noted. .   Sites of Onychomycosis Involvement (Check affected area)  [x] [x] [x] [x] [x] [x] [x] [x] [x] [x]  5 4 3 2 1 1 2 3 4 5                          Right                                        Left    Thickness  [x] [x] [x] [x] [x] [x] [x] [x] [x] [x]  5 4 3 2 1 1 2 3 4 5                         Right                                        Left    Dystrophic Changes                                                                 [x] [x] [x] [x] [x] [x] [x] [x] [x] [x]  5 4 3 2 1 1 2 3 4 5                         Right                                        Left    Color                                                                  [x] [x] [x] [x] [x] [x] [x] [x] [x] [x]  5 4 3 2 1 1 2 3 4 5                          Right                                        Left    Incurvation/Ingrowin                                                                   [] [] [] [] [] [] [] [] [] []  5 4 3 2 1 1 2 3 4 5                         Right                                        Left    Inflammation/Pain                                                                   [x] [x] [x] [x] [x] [x] [x] [x] [x] [x]  5 4 3 2 1 1 2 3 4 5                         Right                                        Left    Vascular:  DP/PT pulses palpable 2/4, Bilateral.    CFT <3 seconds to digits 1-5, Bilateral .   Hair growth absent to level of digits, Bilateral.  Edema absent, Bilateral.  Erythema absent, Bilateral. DM with PVD       []Yes    [x]No    Neurological:  Sensation absent to light touch to level of digits, Bilateral.  Protective sensation absent via 5.07/10g [place] monofilament 6/10 sites, Bilateral.  Vibratory sensation absent to 1st MPJ, Bilateral.     Musculoskeletal: Muscle strength 5/5, Bilateral.  No Pain present upon palpation of dorsal lisfranc's joint, Bilateral. normal medial longitudinal arch, Bilateral.  Palpable osteophytes dorsal lisfranc's     Radiographs: 3 views right Foot:  Severe loss of joint space of tarsometatarsal joints one, two, and three with dorsal osteophytes. No erosive changes or lytic process. No acute pathology. Radiographs: 3 views left Foot:  Severe loss of joint space of tarsometatarsal joints one, two, and three with dorsal osteophytes. No erosive changes or lytic process. No acute pathology. Assessment:  61 y.o. female with:  1. Onychomycosis    2. Foot deformity, bilateral    3. Type 2 diabetes mellitus with diabetic polyneuropathy, with long-term current use of insulin ([facility] [address]    4. Neuropathic pain       No orders of the defined types were placed in this encounter. Q7   []Yes    []No                Q8   [x]Yes    []No                     Q9   []Yes    []No    Plan:   Pt was evaluated and examined. Patient was given personalized discharge instructions. Nails 1-10 were debrided sharply in length and thickness with a nipper and , without incident. Pt will follow up in 3 months or sooner if any problems arise. Diagnosis was discussed with the pt and all of their questions were answered in detail. Proper foot hygiene and care was discussed with the pt. Informed patient on proper diabetic foot care and importance of tight glycemic control. Patient to check feet daily and contact the office with any questions/problems/concerns. Other comorbidity noted and will be managed by PCP. Diabetic foot examination performed this visit.   The exam included neurological sensory exam, a 10-g monofilament and pinprick sensation, vibration using a 128-Hz tuning fork, ankle reflexes, visual skin inspection, vascular exam including assessment of pedal pulses, orthopedic exam for deformities, and shoe inspection. Increased risk factors noted on the diabetic foot exam include decreased sensory exam and peripheral neuropathy. Shoegear inspected and found to be appropriate size and wear. discontinue Effexor   Continue Motrin when needed  Gabapentin 600 mg TID  Flexeril, increase to 3x per day  Requip same dose  Effexor 50 mg nightly      No orders of the defined types were placed in this encounter.       Orders Placed This Encounter   Medications    cyclobenzaprine (FLEXERIL) 10 MG tablet     Sig: Take one tablet by mouth three times daily as needed     Dispense:  90 tablet     Refill:  0       11/18/2021    Electronically signed by Malik Vasquez DPM on 11/18/2021 at 4:45 PM

## 2021-11-24 NOTE — TELEPHONE ENCOUNTER
Please Approve or Refuse. Next Visit Date:  5/19/2022   Last Visit Date: 11/18/2021    Hemoglobin A1C (%)   Date Value   10/11/2021 7.7   06/29/2021 7.7 (H)   06/28/2021 7.4             ( goal A1C is < 7)   BP Readings from Last 3 Encounters:   10/11/21 128/66   10/06/21 133/64   10/07/21 139/67          (goal 120/80)  BUN   Date Value Ref Range Status   10/06/2021 13 8 - 23 mg/dL Final     CREATININE   Date Value Ref Range Status   10/06/2021 0.55 0.50 - 0.90 mg/dL Final     Potassium   Date Value Ref Range Status   10/06/2021 4.8 3.7 - 5.3 mmol/L Final     Comment:     SPECIMEN SLIGHTLY HEMOLYZED, RESULTS MAY BE ADVERSELY AFFECTED.

## 2021-11-29 RX ORDER — VENLAFAXINE HYDROCHLORIDE 37.5 MG/1
CAPSULE, EXTENDED RELEASE ORAL
Qty: 90 CAPSULE | Refills: 0 | OUTPATIENT
Start: 2021-11-29

## 2021-11-29 RX ORDER — ROPINIROLE 2 MG/1
TABLET, FILM COATED ORAL
Qty: 90 TABLET | Refills: 0 | Status: SHIPPED | OUTPATIENT
Start: 2021-11-29 | End: 2022-07-25 | Stop reason: SDUPTHER

## 2021-12-02 RX ORDER — DICYCLOMINE HYDROCHLORIDE 10 MG/1
CAPSULE ORAL
Qty: 120 CAPSULE | Refills: 0 | Status: SHIPPED | OUTPATIENT
Start: 2021-12-02 | End: 2022-01-03

## 2021-12-03 ENCOUNTER — TELEPHONE (OUTPATIENT)
Dept: PODIATRY | Age: 61
End: 2021-12-03

## 2021-12-03 NOTE — TELEPHONE ENCOUNTER
Patient called and said a script for dry skin was suppose to be sent in from last ov. Please advise.

## 2021-12-03 NOTE — TELEPHONE ENCOUNTER
Her insurance does not cover any prescription creams at this time.  She can call her insurance to see if they can tell her what to do

## 2021-12-06 ENCOUNTER — OFFICE VISIT (OUTPATIENT)
Dept: ORTHOPEDIC SURGERY | Age: 61
End: 2021-12-06
Payer: COMMERCIAL

## 2021-12-06 DIAGNOSIS — M25.561 RIGHT KNEE PAIN, UNSPECIFIED CHRONICITY: Primary | ICD-10-CM

## 2021-12-06 PROCEDURE — G8484 FLU IMMUNIZE NO ADMIN: HCPCS | Performed by: ORTHOPAEDIC SURGERY

## 2021-12-06 PROCEDURE — G8417 CALC BMI ABV UP PARAM F/U: HCPCS | Performed by: ORTHOPAEDIC SURGERY

## 2021-12-06 PROCEDURE — 1036F TOBACCO NON-USER: CPT | Performed by: ORTHOPAEDIC SURGERY

## 2021-12-06 PROCEDURE — G8428 CUR MEDS NOT DOCUMENT: HCPCS | Performed by: ORTHOPAEDIC SURGERY

## 2021-12-06 PROCEDURE — 3017F COLORECTAL CA SCREEN DOC REV: CPT | Performed by: ORTHOPAEDIC SURGERY

## 2021-12-06 PROCEDURE — 99212 OFFICE O/P EST SF 10 MIN: CPT | Performed by: ORTHOPAEDIC SURGERY

## 2021-12-06 RX ORDER — TRAMADOL HYDROCHLORIDE 50 MG/1
50 TABLET ORAL EVERY 4 HOURS PRN
Qty: 42 TABLET | Refills: 0 | Status: SHIPPED | OUTPATIENT
Start: 2021-12-06 | End: 2021-12-13

## 2021-12-07 DIAGNOSIS — B35.4 TINEA CORPORIS: ICD-10-CM

## 2021-12-07 RX ORDER — NYSTATIN 100000 [USP'U]/G
POWDER TOPICAL
Qty: 60 G | Refills: 2 | Status: SHIPPED | OUTPATIENT
Start: 2021-12-07 | End: 2022-02-07

## 2021-12-10 ENCOUNTER — TELEPHONE (OUTPATIENT)
Dept: ORTHOPEDIC SURGERY | Age: 61
End: 2021-12-10

## 2021-12-10 DIAGNOSIS — M17.0 BILATERAL PRIMARY OSTEOARTHRITIS OF KNEE: Primary | ICD-10-CM

## 2021-12-10 DIAGNOSIS — M25.561 RIGHT KNEE PAIN, UNSPECIFIED CHRONICITY: ICD-10-CM

## 2021-12-10 NOTE — TELEPHONE ENCOUNTER
PT says that the traMADol (ULTRAM) 50 MG tablet that Dr [place] gave her is causing her to hallucinate and to be not coherent. PT is asking that Dr [place] [address] switch her to another med, perhaps percocet (per PT). Please advise.

## 2021-12-10 NOTE — TELEPHONE ENCOUNTER
Nella Knowles from [address] [place] [address] called to request office notes from the office visit on 12/6/21.   Please fax office notes to: [phone] attn: Nella Knowles    To reach Nella Knowles by phone, please dial: [phone]

## 2021-12-13 RX ORDER — BENZOYL PEROXIDE
KIT TOPICAL
Qty: 90 TABLET | Refills: 0 | Status: SHIPPED | OUTPATIENT
Start: 2021-12-13 | End: 2022-05-02

## 2021-12-13 NOTE — TELEPHONE ENCOUNTER
Patient would like to know is there anything she could get for pain instead of the tramadol or percocet?

## 2021-12-14 RX ORDER — HYDROCODONE BITARTRATE AND ACETAMINOPHEN 5; 325 MG/1; MG/1
1 TABLET ORAL EVERY 6 HOURS PRN
Qty: 28 TABLET | Refills: 0 | Status: SHIPPED | OUTPATIENT
Start: 2021-12-14 | End: 2021-12-21

## 2021-12-16 ENCOUNTER — TELEPHONE (OUTPATIENT)
Dept: PODIATRY | Age: 61
End: 2021-12-16

## 2021-12-16 RX ORDER — FUROSEMIDE 20 MG/1
TABLET ORAL
Qty: 90 TABLET | Refills: 1 | Status: SHIPPED | OUTPATIENT
Start: 2021-12-16

## 2021-12-16 NOTE — TELEPHONE ENCOUNTER
Patient called because the Ropinirole is not helping. She said you told her to call if it was not working and you could either up one of her other medications or call something else in for her.

## 2021-12-17 NOTE — TELEPHONE ENCOUNTER
Actually, looking at her med list, she will need to start seeing her pcp for medication adjustments. She is on many antidepressants, etc and needs to manages by her pcp.

## 2021-12-28 ENCOUNTER — TELEPHONE (OUTPATIENT)
Dept: FAMILY MEDICINE CLINIC | Age: 61
End: 2021-12-28

## 2021-12-28 NOTE — TELEPHONE ENCOUNTER
I do not see any supporting documentation saying that she has gallstones. Has she had testing somewhere else?

## 2021-12-28 NOTE — TELEPHONE ENCOUNTER
Spoke with patient and relayed message. Scheduled a sooner appt for patient.  Patient wasn't able to come in until VBN 6BS

## 2021-12-28 NOTE — TELEPHONE ENCOUNTER
----- Message from Dianne Thomas sent at 12/28/2021 11:44 AM EST -----  Subject: Message to Provider    QUESTIONS  Information for Provider? PT scheduled an appt for a follow up on her   gallstones, pt. is in pain with them and would like a call back on what to   do at home until her appt.  ---------------------------------------------------------------------------  --------------  [address]  What is the best way for the office to contact you? OK to leave message on   voicemail  Preferred Call Back Phone Number? [phone]  ---------------------------------------------------------------------------  --------------  SCRIPT ANSWERS  Relationship to Patient?  Self

## 2021-12-30 PROBLEM — H04.123 DRY EYES, BILATERAL: Status: ACTIVE | Noted: 2020-08-31

## 2021-12-30 PROBLEM — H16.143 SPK (SUPERFICIAL PUNCTATE KERATITIS), BILATERAL: Status: ACTIVE | Noted: 2020-08-31

## 2021-12-30 RX ORDER — ZOSTER VACCINE RECOMBINANT, ADJUVANTED 50 MCG/0.5
0.5 KIT INTRAMUSCULAR SEE ADMIN INSTRUCTIONS
Qty: 0.5 ML | Refills: 1 | Status: CANCELLED | OUTPATIENT
Start: 2021-12-30 | End: 2022-06-28

## 2022-01-02 DIAGNOSIS — E79.0 HYPERURICEMIA: ICD-10-CM

## 2022-01-03 ENCOUNTER — OFFICE VISIT (OUTPATIENT)
Dept: FAMILY MEDICINE CLINIC | Age: 62
End: 2022-01-03
Payer: COMMERCIAL

## 2022-01-03 VITALS
WEIGHT: 268 LBS | SYSTOLIC BLOOD PRESSURE: 120 MMHG | OXYGEN SATURATION: 96 % | DIASTOLIC BLOOD PRESSURE: 74 MMHG | BODY MASS INDEX: 50.64 KG/M2 | HEART RATE: 94 BPM | TEMPERATURE: 98.1 F

## 2022-01-03 DIAGNOSIS — N32.81 OAB (OVERACTIVE BLADDER): ICD-10-CM

## 2022-01-03 DIAGNOSIS — E78.1 HYPERTRIGLYCERIDEMIA: ICD-10-CM

## 2022-01-03 DIAGNOSIS — D50.8 IRON DEFICIENCY ANEMIA SECONDARY TO INADEQUATE DIETARY IRON INTAKE: ICD-10-CM

## 2022-01-03 DIAGNOSIS — K21.9 GASTROESOPHAGEAL REFLUX DISEASE WITHOUT ESOPHAGITIS: ICD-10-CM

## 2022-01-03 DIAGNOSIS — R51.9 PERSISTENT HEADACHES: ICD-10-CM

## 2022-01-03 DIAGNOSIS — I10 ESSENTIAL HYPERTENSION: Primary | ICD-10-CM

## 2022-01-03 DIAGNOSIS — Z79.4 TYPE 2 DIABETES MELLITUS WITH DIABETIC NEUROPATHY, WITH LONG-TERM CURRENT USE OF INSULIN (HCC): ICD-10-CM

## 2022-01-03 DIAGNOSIS — R10.9 LEFT FLANK PAIN: ICD-10-CM

## 2022-01-03 DIAGNOSIS — E11.40 TYPE 2 DIABETES MELLITUS WITH DIABETIC NEUROPATHY, WITH LONG-TERM CURRENT USE OF INSULIN (HCC): ICD-10-CM

## 2022-01-03 DIAGNOSIS — J30.9 ALLERGIC RHINITIS, UNSPECIFIED SEASONALITY, UNSPECIFIED TRIGGER: ICD-10-CM

## 2022-01-03 DIAGNOSIS — E55.9 VITAMIN D DEFICIENCY: ICD-10-CM

## 2022-01-03 DIAGNOSIS — G47.33 OSA (OBSTRUCTIVE SLEEP APNEA): ICD-10-CM

## 2022-01-03 DIAGNOSIS — E11.40 TYPE 2 DIABETES MELLITUS WITH DIABETIC NEUROPATHY, WITH LONG-TERM CURRENT USE OF INSULIN (HCC): Primary | ICD-10-CM

## 2022-01-03 DIAGNOSIS — E03.9 HYPOTHYROIDISM, UNSPECIFIED TYPE: ICD-10-CM

## 2022-01-03 DIAGNOSIS — G25.81 RLS (RESTLESS LEGS SYNDROME): ICD-10-CM

## 2022-01-03 DIAGNOSIS — E78.5 HYPERLIPIDEMIA, UNSPECIFIED HYPERLIPIDEMIA TYPE: ICD-10-CM

## 2022-01-03 DIAGNOSIS — J30.2 SEASONAL ALLERGIC RHINITIS, UNSPECIFIED TRIGGER: ICD-10-CM

## 2022-01-03 DIAGNOSIS — K59.00 CONSTIPATION, UNSPECIFIED CONSTIPATION TYPE: ICD-10-CM

## 2022-01-03 DIAGNOSIS — R11.0 NAUSEA: ICD-10-CM

## 2022-01-03 DIAGNOSIS — Z87.19 HISTORY OF DIVERTICULITIS: ICD-10-CM

## 2022-01-03 DIAGNOSIS — M1A.9XX0 CHRONIC GOUT WITHOUT TOPHUS, UNSPECIFIED CAUSE, UNSPECIFIED SITE: ICD-10-CM

## 2022-01-03 DIAGNOSIS — Z79.4 TYPE 2 DIABETES MELLITUS WITH DIABETIC NEUROPATHY, WITH LONG-TERM CURRENT USE OF INSULIN (HCC): Primary | ICD-10-CM

## 2022-01-03 PROBLEM — H04.123 DRY EYES, BILATERAL: Status: RESOLVED | Noted: 2020-08-31 | Resolved: 2022-01-03

## 2022-01-03 PROBLEM — R22.43 LOCALIZED SWELLING OF BOTH LOWER LEGS: Status: RESOLVED | Noted: 2019-06-12 | Resolved: 2022-01-03

## 2022-01-03 PROBLEM — M10.9 GOUT: Status: ACTIVE | Noted: 2020-06-08

## 2022-01-03 LAB — HBA1C MFR BLD: 7 %

## 2022-01-03 PROCEDURE — G8417 CALC BMI ABV UP PARAM F/U: HCPCS | Performed by: NURSE PRACTITIONER

## 2022-01-03 PROCEDURE — 1036F TOBACCO NON-USER: CPT | Performed by: NURSE PRACTITIONER

## 2022-01-03 PROCEDURE — G8484 FLU IMMUNIZE NO ADMIN: HCPCS | Performed by: NURSE PRACTITIONER

## 2022-01-03 PROCEDURE — G8427 DOCREV CUR MEDS BY ELIG CLIN: HCPCS | Performed by: NURSE PRACTITIONER

## 2022-01-03 PROCEDURE — 2022F DILAT RTA XM EVC RTNOPTHY: CPT | Performed by: NURSE PRACTITIONER

## 2022-01-03 PROCEDURE — 3017F COLORECTAL CA SCREEN DOC REV: CPT | Performed by: NURSE PRACTITIONER

## 2022-01-03 PROCEDURE — 3051F HG A1C>EQUAL 7.0%<8.0%: CPT | Performed by: NURSE PRACTITIONER

## 2022-01-03 PROCEDURE — 99215 OFFICE O/P EST HI 40 MIN: CPT | Performed by: NURSE PRACTITIONER

## 2022-01-03 PROCEDURE — 83036 HEMOGLOBIN GLYCOSYLATED A1C: CPT | Performed by: NURSE PRACTITIONER

## 2022-01-03 RX ORDER — INSULIN GLARGINE 100 [IU]/ML
60 INJECTION, SOLUTION SUBCUTANEOUS NIGHTLY
Qty: 10 ML | Refills: 0 | Status: SHIPPED | OUTPATIENT
Start: 2022-01-03

## 2022-01-03 RX ORDER — DICYCLOMINE HYDROCHLORIDE 10 MG/1
CAPSULE ORAL
Qty: 120 CAPSULE | Refills: 1 | Status: SHIPPED | OUTPATIENT
Start: 2022-01-03 | End: 2022-03-04

## 2022-01-03 RX ORDER — ALLOPURINOL 100 MG/1
TABLET ORAL
Qty: 90 TABLET | Refills: 0 | Status: SHIPPED | OUTPATIENT
Start: 2022-01-03 | End: 2022-03-30

## 2022-01-03 ASSESSMENT — ENCOUNTER SYMPTOMS
CONSTIPATION: 1
SHORTNESS OF BREATH: 0
VOMITING: 0
ABDOMINAL DISTENTION: 0
BACK PAIN: 1
CHEST TIGHTNESS: 0
APNEA: 1
NAUSEA: 0
ABDOMINAL PAIN: 1
COUGH: 0
SORE THROAT: 0
DIARRHEA: 0
RHINORRHEA: 0

## 2022-01-03 NOTE — PROGRESS NOTES
Eliecer Jeannine, APRN-CNP  [facility]  [address] [address], [address]  [address] [address]  Dept: [phone]  Dept Fax: [phone]     PATIENT ID: Alycia Rankin is a 64 y.o. female. HPI:  Established pt here today for f/u on chronic medical problems; HTN, HLD, elevated triglycerides, DM, vitamin d def, DESTINY, hypothyroidism, RLS, allergies, gout go over labs and/or diagnostic studies, and medication refills. Pt denies any fever or chills. Pt today denies any HA, chest pain, or SOB. Pt denies any N/V/D/C or abdominal pain. Pt stable on CPAP with his sleep apnea and has been using nightly. Pt states RLS is stable on meds. Today, patient complains of left sided abdominal pain with radiation into her back. She thinks that it is a bout of diverticulitis. She also believes that it may be her gallbladder, since it has stones in it. She relates that the pain is not exacerbated by anything except getting out of bed. She is able to eat and able to keep food down but does complain of intermittent nausea. She does relate that it has been 2 or 3 days since her bowels have moved. She reports that the pain is worse when she bears down to urinate. My previous office notes, labs and diagnostic studies were reviewed prior to and during encounter. The patient's past medical, surgical, social, and family history as well as current medications and allergies were reviewed as documented in today's encounter by CHLOE Olivares.        Current Outpatient Medications on File Prior to Visit   Medication Sig Dispense Refill    dicyclomine (BENTYL) 10 MG capsule TAKE 1 CAPSULE BY MOUTH 4 TIMES DAILY AS NEEDED FOR CRAMPS 120 capsule 1    allopurinol (ZYLOPRIM) 100 MG tablet Take 1 tablet by mouth once daily 90 tablet 0    furosemide (LASIX) 20 MG tablet Take 1 tablet by mouth once daily 90 tablet 1    EQ ALLERGY RELIEF 10 MG tablet Take 1 tablet by mouth once daily 90 tablet 0    nystatin (NYSTATIN) 535107 UNIT/GM powder APPLY POWDER TOPICALLY TO ABDOMINAL FOLDS THREE TIMES DAILY AS NEEDED FOR SKIN IRRITATION 60 g 2    rOPINIRole (REQUIP) 2 MG tablet TAKE 1 TABLET BY MOUTH THREE TIMES DAILY 90 tablet 0    cyclobenzaprine (FLEXERIL) 10 MG tablet Take one tablet by mouth three times daily as needed 90 tablet 0    DULoxetine (CYMBALTA) 60 MG extended release capsule TAKE 1 CAPSULE BY MOUTH NIGHTLY 90 capsule 1    Ferrous Sulfate (IRON) 325 (65 Fe) MG TABS Take 1 tablet by mouth once daily with breakfast 90 tablet 0    albuterol sulfate  (90 Base) MCG/ACT inhaler INHALE 2 PUFFS BY MOUTH EVERY 6 HOURS AS NEEDED FOR WHEEZING FOR SHORTNESS OF BREATH 18 g 3    venlafaxine (EFFEXOR XR) 37.5 MG extended release capsule TAKE 1 CAPSULE BY MOUTH THREE TIMES DAILY 90 capsule 0    lisinopril (PRINIVIL;ZESTRIL) 5 MG tablet Take 1 tablet by mouth once daily 90 tablet 3    gabapentin (NEURONTIN) 600 MG tablet TAKE 1 TABLET BY MOUTH THREE TIMES DAILY 270 tablet 0    pantoprazole (PROTONIX) 40 MG tablet Take 1 tablet by mouth once daily 90 tablet 3    ammonium lactate (LAC-HYDRIN) 12 % cream Apply topically as needed. 1 Bottle 3    amitriptyline (ELAVIL) 50 MG tablet Take 50 mg by mouth nightly      darifenacin (ENABLEX) 15 MG extended release tablet Take 15 mg by mouth 2 times daily      levothyroxine (SYNTHROID) 112 MCG tablet Take 112 mcg by mouth Daily      phentermine 37.5 MG capsule Take 37.5 mg by mouth every morning.       Omega-3 Fatty Acids (FISH OIL) 1000 MG CAPS Take 3,000 mg by mouth daily      oxybutynin (DITROPAN XL) 15 MG extended release tablet Take 15 mg by mouth daily      Aspirin-Acetaminophen-Caffeine (EXCEDRIN PO) Take 2 tablets by mouth daily as needed (headache)      Cholecalciferol (VITAMIN D3) 50 MCG (2000 UT) TABS Take 1 tablet by mouth once daily 30 tablet 0    aspirin 81 MG chewable tablet CHEW AND SWALLOW 1 TABLET BY MOUTH ONCE DAILY 90 tablet 1    ondansetron (ZOFRAN) 4 MG tablet Take 1 tablet by mouth every 8 hours as needed for Nausea or Vomiting 20 tablet 0    Magnesium Oxide 500 MG TABS TAKE 1 TABLET BY MOUTH TWICE DAILY 180 tablet 1    blood glucose test strips (FREESTYLE LITE) strip 1 each by Does not apply route 6 times daily 200 each 5    metFORMIN (GLUCOPHAGE) 1000 MG tablet TAKE 1 TABLET BY MOUTH TWICE DAILY 60 tablet 0    meclizine (ANTIVERT) 25 MG tablet TAKE 1 TABLET BY MOUTH THREE TIMES DAILY AS NEEDED FOR DIZZINESS 60 tablet 0    liothyronine (CYTOMEL) 5 MCG tablet Take 5 mcg by mouth daily      Blood Glucose Monitoring Suppl (FREESTYLE LITE) KYLAH 1 Device by Does not apply route 6 times daily 1 Device 0    glucose monitoring kit (FREESTYLE) monitoring kit 1 kit by Does not apply route daily 1 kit 0    Cyanocobalamin (B-12) 2500 MCG TABS Take 1 tablet by mouth daily       lidocaine (XYLOCAINE) 5 % ointment Apply topically as needed. 1 Tube 3    Liraglutide (VICTOZA) 18 MG/3ML SOPN SC injection Inject 1.8 mg into the skin daily       Insulin Disposable Pump (V-GO 40) KIT Use as directed per Dr. Lul Dubois. No current facility-administered medications on file prior to visit. SUBJECTIVE:     Review of Systems   Constitutional: Negative for activity change, fatigue and fever. HENT: Negative for congestion, ear pain, rhinorrhea and sore throat. Eyes: Positive for visual disturbance (wears glasses). Respiratory: Positive for apnea (stable on CPAP). Negative for cough, chest tightness and shortness of breath. Cardiovascular: Negative for chest pain and palpitations. Gastrointestinal: Positive for abdominal pain (left upper quadrant) and constipation (no BM in 2-3 days). Negative for abdominal distention, diarrhea, nausea (denies) and vomiting. Heartburn (stable on Protonix)   Endocrine: Negative for polydipsia, polyphagia and polyuria. Genitourinary: Negative for difficulty urinating and dysuria. Musculoskeletal: Positive for arthralgias (chronic; stable), back pain (chronic; stable), gait problem (using cane and wheelchair), myalgias and neck pain (chronic' stable). RLS (stable on Requip 2 mg TID)   Skin: Negative for rash. Neurological: Negative for dizziness, weakness, light-headedness and headaches. Hematological: Negative for adenopathy. Psychiatric/Behavioral: Positive for dysphoric mood (stable of Effexor 37.5 mg daily & Cymbalta 60 mg nightly) and sleep disturbance (stable on Elavil 50 mg nightly). Negative for agitation and behavioral problems. The patient is nervous/anxious (stable on Effexor 37.5 mg daily). OBJECTIVE:  /74   Pulse 94   Temp 98.1 °F (36.7 °C)   Wt 268 lb (121.6 kg)   SpO2 96%   Breastfeeding No   BMI 50.64 kg/m²     Physical Exam  Vitals and nursing note reviewed. Constitutional:       General: She is not in acute distress. Appearance: Normal appearance. She is well-developed. HENT:      Head: Normocephalic and atraumatic. Cardiovascular:      Rate and Rhythm: Normal rate and regular rhythm. Heart sounds: Normal heart sounds. No murmur heard. Pulmonary:      Effort: Pulmonary effort is normal. No respiratory distress. Breath sounds: Normal breath sounds. Chest:      Chest wall: No tenderness. Abdominal:      General: Bowel sounds are normal.      Palpations: Abdomen is soft. Tenderness: There is no abdominal tenderness (LUQ with palpation). Musculoskeletal:         General: Normal range of motion. Cervical back: Normal range of motion. Right lower leg: No edema. Left lower leg: No edema. Skin:     General: Skin is warm and dry. Findings: No rash. Neurological:      Mental Status: She is alert and oriented to person, place, and time. Psychiatric:         Mood and Affect: Mood normal.         Behavior: Behavior is cooperative. ASSESSMENT:   Diagnosis Orders   1.  Essential hypertension     2. Hyperlipidemia, unspecified hyperlipidemia type     3. Hypertriglyceridemia     4. Type 2 diabetes mellitus with diabetic neuropathy, with long-term current use of insulin ([facility] [address]     5. Vitamin D deficiency     6. DESTINY (obstructive sleep apnea)     7. Hypothyroidism, unspecified type     8. RLS (restless legs syndrome)     9. Nausea  CT ABDOMEN PELVIS W IV CONTRAST   10. Constipation, unspecified constipation type  CT ABDOMEN PELVIS W IV CONTRAST   11. History of diverticulitis  CT ABDOMEN PELVIS W IV CONTRAST   12. Gastroesophageal reflux disease without esophagitis     13. Seasonal allergic rhinitis, unspecified trigger     14. Chronic gout without tophus, unspecified cause, unspecified site     15. Left flank pain  CT ABDOMEN PELVIS W IV CONTRAST   16. OAB (overactive bladder)     17. Iron deficiency anemia secondary to inadequate dietary iron intake     18. Allergic rhinitis, unspecified seasonality, unspecified trigger       PLAN:  1. Essential hypertension  - Stable: Medication re-filled as needed, con't medications as prescribed, con't current tx plan  - BP in office today noted; 120/74  - Continue Lisinopril 5 mg daily as previously prescribed. 2. Hyperlipidemia, unspecified hyperlipidemia type  3. Hypertriglyceridemia  - Stable: Medication re-filled as needed, con't medications as prescribed, con't current tx plan  - Will cont with low fat/chol diet and Omega 3 as ordered. 4. Type 2 diabetes mellitus with diabetic neuropathy, with long-term current use of insulin ([facility])  - Stable: Medication re-filled as needed, con't medications as prescribed, con't current tx plan  - Continue with Metformin as previously prescribed. - Continue with Lantus 60 units nightly as previously prescribed. - Continue with Victoza 1.8 mg as previously prescribed. - Will cont with Lisinopril 5 mg daily for renal protection.   - Will cont to follow with Dr. Milagro Hinson as instructed   - Cont with daily foot exams and yearly eye exams. 5. Vitamin D deficiency  - Stable: Medication re-filled as needed, con't medications as prescribed, con't current tx plan  - Continue Vitamin D supplements as ordered, will re-check Vitamin D level  - Please increase foods with Vitamin D, which include cheese, eggs, cereals, yogurt, milk, tofu, any type of beef, salmon or tuna,  spinach, and mushroom. 6. DESTINY (obstructive sleep apnea)  - Sleep much improved with CPAP  - Pt is consistent with wearing her CPAP at night.  - Discussion had regarding the importance of close follow-up is indicated to assist the patient with adaptation to CPAP and insure compliance and effectiveness of treatment. - The patient was be encouraged to sleep in the lateral decubitus position to minimize episodes of DESTINY. - The patient was encouraged to continue efforts at weight reduction and counseled that alcohol and sedative use may worsen sleep apnea. 7. Hypothyroidism, unspecified type  - Stable: Medication re-filled as needed, con't medications as prescribed, con't current tx plan  - Continue with Synthroid 112 mcg daily as previously prescribed. - Continue with Cytomel 5 mg daily as previously prescribed. - Last TSH 1.96.   - Will continue to follow with Dr. Nancy Medel as instructed    8. RLS (restless legs syndrome)  - Stable: Medication re-filled as needed, con't medications as prescribed, con't current tx plan   - Continue with Requip 2 mg TID as previously prescribed. 9. Left flank pain  10. Nausea  11. Constipation, unspecified constipation type  12. History of diverticulitis  - Given ongoing symptoms for the last 8 days and history of diverticulitis, will proceed with CT abdomen and pelvis  - CT ABDOMEN PELVIS W IV CONTRAST; Future    13. Seasonal allergic rhinitis, unspecified trigger  - Stable: Medication re-filled as needed, con't medications as prescribed, con't current tx plan.   - Continue Claritin 10 mg daily as previously prescribed. 14. Chronic gout without tophus, unspecified cause, unspecified site  - Stable: Medication re-filled as needed, con't medications as prescribed, con't current tx plan  - Continue Allopurinol 100 mg daily as previously prescribed   - Avoidance of alcohol recommended, and  a low purine diet. 15. Gastroesophageal reflux disease without esophagitis  - Stable: Medication re-filled as needed, con't medications as prescribed, con't current tx plan  - Continue Protonix 40 mg daily as previously prescribed  - Patient educated on avoiding trigger food/drinks such as caffeine, soda, chocolate, greasy/fatty, spicy foods.  - Sleep with head of bed elevated, avoid lying flat after eating and avoid restrictive clothing around waist.     16. OAB (overactive bladder)  - Stable: Medication re-filled as needed, con't medications as prescribed, con't current tx plan   - Continue with Ditropan 15 mg daily as previously prescribed. - Continue with Enables 15 mg BID as previously prescribed. 17. Iron deficiency anemia secondary to inadequate dietary iron intake  - Stable: Medication re-filled as needed, con't medications as prescribed, con't current tx plan  - Continue with Iron supplemenation as previously prescribed. - Please increase foods with Iron (red meat, pork, poultry, seafood, beans, dark green leafy vegetables) and Vitamin C (Broccoli, grapefruit, kiwi, Leafy greens, melons, oranges, peppers, strawberries, tangerines and tomatoes)  - Discussion had regarding side effects of iron, such as cramping, constipation, black stools. - Rest of systems unchanged, continue current treatments.     - On this date January 3, 2022,  I have spent greater than 50% of this 40 minute visit reviewing previous notes, test results and/or face to face with the patient discussing the diagnoses, importance of compliance with the treatment plan, counseling, coordinating care as well as documenting on the day of the visit.     Darryl Favre, APRN-CNP

## 2022-01-04 ENCOUNTER — APPOINTMENT (OUTPATIENT)
Dept: GENERAL RADIOLOGY | Age: 62
End: 2022-01-04
Payer: COMMERCIAL

## 2022-01-04 ENCOUNTER — APPOINTMENT (OUTPATIENT)
Dept: GENERAL RADIOLOGY | Age: 62
DRG: 347 | End: 2022-01-04
Payer: COMMERCIAL

## 2022-01-04 ENCOUNTER — APPOINTMENT (OUTPATIENT)
Dept: CT IMAGING | Age: 62
End: 2022-01-04
Payer: COMMERCIAL

## 2022-01-04 ENCOUNTER — HOSPITAL ENCOUNTER (INPATIENT)
Age: 62
LOS: 1 days | Discharge: HOME | DRG: 347 | End: 2022-01-05
Attending: EMERGENCY MEDICINE
Payer: COMMERCIAL

## 2022-01-04 ENCOUNTER — HOSPITAL ENCOUNTER (EMERGENCY)
Age: 62
Discharge: ANOTHER HOSPITAL | End: 2022-01-04
Attending: EMERGENCY MEDICINE
Payer: COMMERCIAL

## 2022-01-04 ENCOUNTER — TELEPHONE (OUTPATIENT)
Dept: ORTHOPEDIC SURGERY | Age: 62
End: 2022-01-04

## 2022-01-04 ENCOUNTER — APPOINTMENT (OUTPATIENT)
Dept: CT IMAGING | Age: 62
DRG: 347 | End: 2022-01-04
Payer: COMMERCIAL

## 2022-01-04 VITALS
DIASTOLIC BLOOD PRESSURE: 76 MMHG | HEART RATE: 86 BPM | TEMPERATURE: 98.2 F | OXYGEN SATURATION: 94 % | RESPIRATION RATE: 20 BRPM | HEIGHT: 61 IN | SYSTOLIC BLOOD PRESSURE: 136 MMHG | WEIGHT: 268 LBS | BODY MASS INDEX: 50.6 KG/M2
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
## 2022-01-04 DIAGNOSIS — S12.191A OTHER CLOSED NONDISPLACED FRACTURE OF SECOND CERVICAL VERTEBRA, INITIAL ENCOUNTER (HCC): Primary | ICD-10-CM
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
## 2022-01-04 DIAGNOSIS — S12.101A CLOSED NONDISPLACED FRACTURE OF SECOND CERVICAL VERTEBRA, UNSPECIFIED FRACTURE MORPHOLOGY, INITIAL ENCOUNTER (HCC): Primary | ICD-10-CM

## 2022-01-04 LAB
ABSOLUTE EOS #: 0.2 K/UL (ref 0–0.4)
ABSOLUTE IMMATURE GRANULOCYTE: ABNORMAL K/UL (ref 0–0.3)
ABSOLUTE LYMPH #: 1.6 K/UL (ref 1–4.8)
ABSOLUTE MONO #: 0.5 K/UL (ref 0.1–1.3)
ALBUMIN SERPL-MCNC: 4.1 G/DL (ref 3.5–5.2)
ALBUMIN/GLOBULIN RATIO: ABNORMAL (ref 1–2.5)
ALP BLD-CCNC: 95 U/L (ref 35–104)
ALT SERPL-CCNC: 21 U/L (ref 5–33)
ANION GAP SERPL CALCULATED.3IONS-SCNC: 13 MMOL/L (ref 9–17)
AST SERPL-CCNC: 25 U/L
BASOPHILS # BLD: 1 % (ref 0–2)
BASOPHILS ABSOLUTE: 0.1 K/UL (ref 0–0.2)
BILIRUB SERPL-MCNC: 0.3 MG/DL (ref 0.3–1.2)
BUN BLDV-MCNC: 11 MG/DL (ref 8–23)
BUN/CREAT BLD: ABNORMAL (ref 9–20)
CALCIUM SERPL-MCNC: 9.2 MG/DL (ref 8.6–10.4)
CHLORIDE BLD-SCNC: 99 MMOL/L (ref 98–107)
CO2: 24 MMOL/L (ref 20–31)
CREAT SERPL-MCNC: 0.63 MG/DL (ref 0.5–0.9)
DIFFERENTIAL TYPE: ABNORMAL
EOSINOPHILS RELATIVE PERCENT: 2 % (ref 0–4)
GFR AFRICAN AMERICAN: >60 ML/MIN
GFR NON-AFRICAN AMERICAN: >60 ML/MIN
GFR SERPL CREATININE-BSD FRML MDRD: ABNORMAL ML/MIN/{1.73_M2}
GFR SERPL CREATININE-BSD FRML MDRD: ABNORMAL ML/MIN/{1.73_M2}
GLUCOSE BLD-MCNC: 158 MG/DL (ref 65–105)
GLUCOSE BLD-MCNC: 158 MG/DL (ref 70–99)
HCT VFR BLD CALC: 37.5 % (ref 36–46)
HEMOGLOBIN: 12.4 G/DL (ref 12–16)
IMMATURE GRANULOCYTES: ABNORMAL %
INR BLD: 0.9
LYMPHOCYTES # BLD: 15 % (ref 24–44)
MCH RBC QN AUTO: 29.8 PG (ref 26–34)
MCHC RBC AUTO-ENTMCNC: 33.1 G/DL (ref 31–37)
MCV RBC AUTO: 90 FL (ref 80–100)
MONOCYTES # BLD: 5 % (ref 1–7)
NRBC AUTOMATED: ABNORMAL PER 100 WBC
PDW BLD-RTO: 13.2 % (ref 11.5–14.9)
PLATELET # BLD: 365 K/UL (ref 150–450)
PLATELET ESTIMATE: ABNORMAL
PMV BLD AUTO: 7.8 FL (ref 6–12)
POTASSIUM SERPL-SCNC: 4.4 MMOL/L (ref 3.7–5.3)
PROTHROMBIN TIME: 12.1 SEC (ref 11.8–14.6)
RBC # BLD: 4.17 M/UL (ref 4–5.2)
RBC # BLD: ABNORMAL 10*6/UL
SEG NEUTROPHILS: 77 % (ref 36–66)
SEGMENTED NEUTROPHILS ABSOLUTE COUNT: 7.7 K/UL (ref 1.3–9.1)
SODIUM BLD-SCNC: 136 MMOL/L (ref 135–144)
TOTAL PROTEIN: 6.8 G/DL (ref 6.4–8.3)
WBC # BLD: 10.1 K/UL (ref 3.5–11)
WBC # BLD: ABNORMAL 10*3/UL

## 2022-01-04 PROCEDURE — 85025 COMPLETE CBC W/AUTO DIFF WBC: CPT

## 2022-01-04 PROCEDURE — 73552 X-RAY EXAM OF FEMUR 2/>: CPT

## 2022-01-04 PROCEDURE — 36415 COLL VENOUS BLD VENIPUNCTURE: CPT

## 2022-01-04 PROCEDURE — 3209999900 CT LUMBAR SPINE TRAUMA RECONSTRUCTION

## 2022-01-04 PROCEDURE — 6360000002 HC RX W HCPCS: Performed by: STUDENT IN AN ORGANIZED HEALTH CARE EDUCATION/TRAINING PROGRAM

## 2022-01-04 PROCEDURE — 99285 EMERGENCY DEPT VISIT HI MDM: CPT

## 2022-01-04 PROCEDURE — 96374 THER/PROPH/DIAG INJ IV PUSH: CPT

## 2022-01-04 PROCEDURE — 6360000004 HC RX CONTRAST MEDICATION: Performed by: EMERGENCY MEDICINE

## 2022-01-04 PROCEDURE — 73562 X-RAY EXAM OF KNEE 3: CPT

## 2022-01-04 PROCEDURE — 85610 PROTHROMBIN TIME: CPT

## 2022-01-04 PROCEDURE — 3209999900 CT THORACIC SPINE TRAUMA RECONSTRUCTION

## 2022-01-04 PROCEDURE — 2580000003 HC RX 258: Performed by: EMERGENCY MEDICINE

## 2022-01-04 PROCEDURE — 6360000002 HC RX W HCPCS: Performed by: EMERGENCY MEDICINE

## 2022-01-04 PROCEDURE — 80053 COMPREHEN METABOLIC PANEL: CPT

## 2022-01-04 PROCEDURE — 82947 ASSAY GLUCOSE BLOOD QUANT: CPT

## 2022-01-04 PROCEDURE — 72125 CT NECK SPINE W/O DYE: CPT

## 2022-01-04 PROCEDURE — 70450 CT HEAD/BRAIN W/O DYE: CPT

## 2022-01-04 PROCEDURE — 1200000000 HC SEMI PRIVATE

## 2022-01-04 PROCEDURE — 70496 CT ANGIOGRAPHY HEAD: CPT

## 2022-01-04 PROCEDURE — 71260 CT THORAX DX C+: CPT

## 2022-01-04 RX ORDER — MORPHINE SULFATE 4 MG/ML
4 INJECTION, SOLUTION INTRAMUSCULAR; INTRAVENOUS ONCE
Status: DISCONTINUED | OUTPATIENT
Start: 2022-01-04 | End: 2022-01-04

## 2022-01-04 RX ORDER — FENTANYL CITRATE 50 UG/ML
50 INJECTION, SOLUTION INTRAMUSCULAR; INTRAVENOUS ONCE
Status: COMPLETED | OUTPATIENT
Start: 2022-01-04 | End: 2022-01-05

## 2022-01-04 RX ORDER — MORPHINE SULFATE 4 MG/ML
4 INJECTION, SOLUTION INTRAMUSCULAR; INTRAVENOUS ONCE
Status: COMPLETED | OUTPATIENT
Start: 2022-01-04 | End: 2022-01-04

## 2022-01-04 RX ORDER — 0.9 % SODIUM CHLORIDE 0.9 %
80 INTRAVENOUS SOLUTION INTRAVENOUS ONCE
Status: COMPLETED | OUTPATIENT
Start: 2022-01-04 | End: 2022-01-04

## 2022-01-04 RX ORDER — MORPHINE SULFATE 2 MG/ML
2 INJECTION, SOLUTION INTRAMUSCULAR; INTRAVENOUS ONCE
Status: COMPLETED | OUTPATIENT
Start: 2022-01-04 | End: 2022-01-04

## 2022-01-04 RX ORDER — SODIUM CHLORIDE 0.9 % (FLUSH) 0.9 %
10 SYRINGE (ML) INJECTION PRN
Status: DISCONTINUED | OUTPATIENT
Start: 2022-01-04 | End: 2022-01-04 | Stop reason: HOSPADM

## 2022-01-04 RX ORDER — ACETAMINOPHEN 500 MG
1000 TABLET ORAL ONCE
Status: COMPLETED | OUTPATIENT
Start: 2022-01-04 | End: 2022-01-05

## 2022-01-04 RX ORDER — 0.9 % SODIUM CHLORIDE 0.9 %
1000 INTRAVENOUS SOLUTION INTRAVENOUS ONCE
Status: COMPLETED | OUTPATIENT
Start: 2022-01-04 | End: 2022-01-04

## 2022-01-04 RX ADMIN — SODIUM CHLORIDE 1000 ML: 9 INJECTION, SOLUTION INTRAVENOUS at 14:45

## 2022-01-04 RX ADMIN — SODIUM CHLORIDE 80 ML: 9 INJECTION, SOLUTION INTRAVENOUS at 16:04

## 2022-01-04 RX ADMIN — MORPHINE SULFATE 4 MG: 4 INJECTION INTRAVENOUS at 22:36

## 2022-01-04 RX ADMIN — IOPAMIDOL 75 ML: 755 INJECTION, SOLUTION INTRAVENOUS at 17:39

## 2022-01-04 RX ADMIN — IOPAMIDOL 100 ML: 755 INJECTION, SOLUTION INTRAVENOUS at 16:03

## 2022-01-04 RX ADMIN — SODIUM CHLORIDE 80 ML: 9 INJECTION, SOLUTION INTRAVENOUS at 17:40

## 2022-01-04 RX ADMIN — MORPHINE SULFATE 2 MG: 2 INJECTION, SOLUTION INTRAMUSCULAR; INTRAVENOUS at 14:44

## 2022-01-04 RX ADMIN — SODIUM CHLORIDE, PRESERVATIVE FREE 10 ML: 5 INJECTION INTRAVENOUS at 17:40

## 2022-01-04 RX ADMIN — SODIUM CHLORIDE, PRESERVATIVE FREE 10 ML: 5 INJECTION INTRAVENOUS at 16:03

## 2022-01-04 ASSESSMENT — ENCOUNTER SYMPTOMS
NAUSEA: 0
DIARRHEA: 0
VOMITING: 0
SHORTNESS OF BREATH: 0
TROUBLE SWALLOWING: 0
FACIAL SWELLING: 0
COLOR CHANGE: 0
EYE DISCHARGE: 0
SHORTNESS OF BREATH: 0
BLOOD IN STOOL: 0
ABDOMINAL PAIN: 1
NAUSEA: 0
VOMITING: 0
COUGH: 0
COUGH: 0
DIARRHEA: 0
BACK PAIN: 0
EYE PAIN: 0
CHEST TIGHTNESS: 0
RHINORRHEA: 0
SORE THROAT: 0
RHINORRHEA: 0
ABDOMINAL PAIN: 0
CONSTIPATION: 0
WHEEZING: 0
EYE REDNESS: 0
SINUS PRESSURE: 0

## 2022-01-04 ASSESSMENT — PAIN DESCRIPTION - ORIENTATION: ORIENTATION: RIGHT

## 2022-01-04 ASSESSMENT — PAIN DESCRIPTION - LOCATION: LOCATION: KNEE

## 2022-01-04 ASSESSMENT — PAIN DESCRIPTION - PAIN TYPE: TYPE: CHRONIC PAIN

## 2022-01-04 ASSESSMENT — PAIN SCALES - GENERAL
PAINLEVEL_OUTOF10: 8
PAINLEVEL_OUTOF10: 5
PAINLEVEL_OUTOF10: 10
PAINLEVEL_OUTOF10: 8

## 2022-01-04 ASSESSMENT — PAIN SCALES - WONG BAKER: WONGBAKER_NUMERICALRESPONSE: 4
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
## 2022-01-04 NOTE — TELEPHONE ENCOUNTER
Patient calling to request a phone call from clinical staff to discuss her order for a walker. She says that she has additional information to provide the office that will assist with the order. Please give the patient a call on the phone number on file.

## 2022-01-05 VITALS
SYSTOLIC BLOOD PRESSURE: 165 MMHG | TEMPERATURE: 98.2 F | DIASTOLIC BLOOD PRESSURE: 87 MMHG | OXYGEN SATURATION: 94 % | HEART RATE: 86 BPM | RESPIRATION RATE: 21 BRPM

## 2022-01-05 LAB
GLUCOSE BLD-MCNC: 153 MG/DL (ref 65–105)
GLUCOSE BLD-MCNC: 219 MG/DL (ref 65–105)
SARS-COV-2, RAPID: NOT DETECTED
SPECIMEN DESCRIPTION: NORMAL

## 2022-01-05 PROCEDURE — 2500000003 HC RX 250 WO HCPCS: Performed by: STUDENT IN AN ORGANIZED HEALTH CARE EDUCATION/TRAINING PROGRAM

## 2022-01-05 PROCEDURE — 2580000003 HC RX 258: Performed by: STUDENT IN AN ORGANIZED HEALTH CARE EDUCATION/TRAINING PROGRAM

## 2022-01-05 PROCEDURE — APPNB15 APP NON BILLABLE TIME 0-15 MINS: Performed by: NURSE PRACTITIONER

## 2022-01-05 PROCEDURE — 6370000000 HC RX 637 (ALT 250 FOR IP): Performed by: STUDENT IN AN ORGANIZED HEALTH CARE EDUCATION/TRAINING PROGRAM

## 2022-01-05 PROCEDURE — 6360000002 HC RX W HCPCS: Performed by: STUDENT IN AN ORGANIZED HEALTH CARE EDUCATION/TRAINING PROGRAM

## 2022-01-05 PROCEDURE — 87635 SARS-COV-2 COVID-19 AMP PRB: CPT

## 2022-01-05 PROCEDURE — 6360000002 HC RX W HCPCS: Performed by: HEALTH CARE PROVIDER

## 2022-01-05 PROCEDURE — 92523 SPEECH SOUND LANG COMPREHEN: CPT

## 2022-01-05 PROCEDURE — 82947 ASSAY GLUCOSE BLOOD QUANT: CPT

## 2022-01-05 RX ORDER — ROPINIROLE 1 MG/1
2 TABLET, FILM COATED ORAL 3 TIMES DAILY
Status: DISCONTINUED | OUTPATIENT
Start: 2022-01-05 | End: 2022-01-05 | Stop reason: HOSPADM

## 2022-01-05 RX ORDER — ONDANSETRON 4 MG/1
4 TABLET, ORALLY DISINTEGRATING ORAL EVERY 8 HOURS PRN
Status: DISCONTINUED | OUTPATIENT
Start: 2022-01-05 | End: 2022-01-05 | Stop reason: HOSPADM

## 2022-01-05 RX ORDER — ALBUTEROL SULFATE 90 UG/1
2 AEROSOL, METERED RESPIRATORY (INHALATION) EVERY 4 HOURS PRN
Status: DISCONTINUED | OUTPATIENT
Start: 2022-01-05 | End: 2022-01-05 | Stop reason: HOSPADM

## 2022-01-05 RX ORDER — VENLAFAXINE HYDROCHLORIDE 37.5 MG/1
37.5 CAPSULE, EXTENDED RELEASE ORAL
Status: DISCONTINUED | OUTPATIENT
Start: 2022-01-05 | End: 2022-01-05 | Stop reason: HOSPADM

## 2022-01-05 RX ORDER — SODIUM CHLORIDE 0.9 % (FLUSH) 0.9 %
5-40 SYRINGE (ML) INJECTION EVERY 12 HOURS SCHEDULED
Status: DISCONTINUED | OUTPATIENT
Start: 2022-01-05 | End: 2022-01-05 | Stop reason: HOSPADM

## 2022-01-05 RX ORDER — SODIUM CHLORIDE, SODIUM LACTATE, POTASSIUM CHLORIDE, CALCIUM CHLORIDE 600; 310; 30; 20 MG/100ML; MG/100ML; MG/100ML; MG/100ML
INJECTION, SOLUTION INTRAVENOUS CONTINUOUS
Status: DISCONTINUED | OUTPATIENT
Start: 2022-01-05 | End: 2022-01-05 | Stop reason: HOSPADM

## 2022-01-05 RX ORDER — ONDANSETRON 2 MG/ML
4 INJECTION INTRAMUSCULAR; INTRAVENOUS EVERY 6 HOURS PRN
Status: DISCONTINUED | OUTPATIENT
Start: 2022-01-05 | End: 2022-01-05 | Stop reason: HOSPADM

## 2022-01-05 RX ORDER — PANTOPRAZOLE SODIUM 40 MG/1
40 TABLET, DELAYED RELEASE ORAL
Status: DISCONTINUED | OUTPATIENT
Start: 2022-01-05 | End: 2022-01-05 | Stop reason: HOSPADM

## 2022-01-05 RX ORDER — ALLOPURINOL 100 MG/1
100 TABLET ORAL DAILY
Status: DISCONTINUED | OUTPATIENT
Start: 2022-01-05 | End: 2022-01-05 | Stop reason: HOSPADM

## 2022-01-05 RX ORDER — ACETAMINOPHEN 500 MG
1000 TABLET ORAL EVERY 8 HOURS PRN
Qty: 42 TABLET | Refills: 0 | Status: SHIPPED | OUTPATIENT
Start: 2022-01-05 | End: 2022-07-25

## 2022-01-05 RX ORDER — GABAPENTIN 600 MG/1
600 TABLET ORAL 3 TIMES DAILY
Status: DISCONTINUED | OUTPATIENT
Start: 2022-01-05 | End: 2022-01-05 | Stop reason: HOSPADM

## 2022-01-05 RX ORDER — OXYCODONE HYDROCHLORIDE 5 MG/1
5 TABLET ORAL EVERY 6 HOURS PRN
Status: DISCONTINUED | OUTPATIENT
Start: 2022-01-05 | End: 2022-01-05 | Stop reason: HOSPADM

## 2022-01-05 RX ORDER — LEVOTHYROXINE SODIUM 112 UG/1
112 TABLET ORAL DAILY
Status: DISCONTINUED | OUTPATIENT
Start: 2022-01-05 | End: 2022-01-05 | Stop reason: HOSPADM

## 2022-01-05 RX ORDER — LISINOPRIL 10 MG/1
5 TABLET ORAL DAILY
Status: DISCONTINUED | OUTPATIENT
Start: 2022-01-05 | End: 2022-01-05 | Stop reason: HOSPADM

## 2022-01-05 RX ORDER — SODIUM CHLORIDE 9 MG/ML
25 INJECTION, SOLUTION INTRAVENOUS PRN
Status: DISCONTINUED | OUTPATIENT
Start: 2022-01-05 | End: 2022-01-05 | Stop reason: HOSPADM

## 2022-01-05 RX ORDER — DULOXETIN HYDROCHLORIDE 30 MG/1
60 CAPSULE, DELAYED RELEASE ORAL NIGHTLY
Status: DISCONTINUED | OUTPATIENT
Start: 2022-01-05 | End: 2022-01-05 | Stop reason: HOSPADM

## 2022-01-05 RX ORDER — POLYETHYLENE GLYCOL 3350 17 G/17G
17 POWDER, FOR SOLUTION ORAL DAILY
Status: DISCONTINUED | OUTPATIENT
Start: 2022-01-05 | End: 2022-01-05 | Stop reason: HOSPADM

## 2022-01-05 RX ORDER — ACETAMINOPHEN 500 MG
1000 TABLET ORAL EVERY 8 HOURS PRN
Status: DISCONTINUED | OUTPATIENT
Start: 2022-01-05 | End: 2022-01-05 | Stop reason: HOSPADM

## 2022-01-05 RX ORDER — FENTANYL CITRATE 50 UG/ML
25 INJECTION, SOLUTION INTRAMUSCULAR; INTRAVENOUS ONCE
Status: COMPLETED | OUTPATIENT
Start: 2022-01-05 | End: 2022-01-05

## 2022-01-05 RX ORDER — SODIUM CHLORIDE 0.9 % (FLUSH) 0.9 %
5-40 SYRINGE (ML) INJECTION PRN
Status: DISCONTINUED | OUTPATIENT
Start: 2022-01-05 | End: 2022-01-05 | Stop reason: HOSPADM

## 2022-01-05 RX ORDER — CYCLOBENZAPRINE HCL 10 MG
10 TABLET ORAL 3 TIMES DAILY PRN
Status: DISCONTINUED | OUTPATIENT
Start: 2022-01-05 | End: 2022-01-05 | Stop reason: HOSPADM

## 2022-01-05 RX ORDER — METHOCARBAMOL 750 MG/1
750 TABLET, FILM COATED ORAL EVERY 6 HOURS PRN
Qty: 28 TABLET | Refills: 0 | Status: SHIPPED | OUTPATIENT
Start: 2022-01-05 | End: 2022-01-12

## 2022-01-05 RX ORDER — AMITRIPTYLINE HYDROCHLORIDE 50 MG/1
50 TABLET, FILM COATED ORAL NIGHTLY
Status: DISCONTINUED | OUTPATIENT
Start: 2022-01-05 | End: 2022-01-05 | Stop reason: HOSPADM

## 2022-01-05 RX ORDER — OXYCODONE HYDROCHLORIDE 5 MG/1
5 TABLET ORAL EVERY 6 HOURS PRN
Status: DISCONTINUED | OUTPATIENT
Start: 2022-01-05 | End: 2022-01-05

## 2022-01-05 RX ORDER — OXYCODONE HYDROCHLORIDE 5 MG/1
2.5 TABLET ORAL ONCE
Status: COMPLETED | OUTPATIENT
Start: 2022-01-05 | End: 2022-01-05

## 2022-01-05 RX ADMIN — LEVOTHYROXINE SODIUM 112 MCG: 112 TABLET ORAL at 08:39

## 2022-01-05 RX ADMIN — PANTOPRAZOLE SODIUM 40 MG: 40 TABLET, DELAYED RELEASE ORAL at 08:37

## 2022-01-05 RX ADMIN — CYCLOBENZAPRINE 10 MG: 10 TABLET, FILM COATED ORAL at 14:02

## 2022-01-05 RX ADMIN — OXYCODONE HYDROCHLORIDE 5 MG: 5 TABLET ORAL at 14:02

## 2022-01-05 RX ADMIN — FENTANYL CITRATE 50 MCG: 50 INJECTION, SOLUTION INTRAMUSCULAR; INTRAVENOUS at 01:08

## 2022-01-05 RX ADMIN — INSULIN LISPRO 6 UNITS: 100 INJECTION, SOLUTION INTRAVENOUS; SUBCUTANEOUS at 14:15

## 2022-01-05 RX ADMIN — ACETAMINOPHEN 1000 MG: 500 TABLET ORAL at 02:10

## 2022-01-05 RX ADMIN — AMITRIPTYLINE HYDROCHLORIDE 50 MG: 50 TABLET, FILM COATED ORAL at 02:08

## 2022-01-05 RX ADMIN — ALLOPURINOL 100 MG: 100 TABLET ORAL at 08:38

## 2022-01-05 RX ADMIN — CYCLOBENZAPRINE 10 MG: 10 TABLET, FILM COATED ORAL at 02:08

## 2022-01-05 RX ADMIN — POLYETHYLENE GLYCOL 3350 17 G: 17 POWDER, FOR SOLUTION ORAL at 08:40

## 2022-01-05 RX ADMIN — OXYCODONE HYDROCHLORIDE 5 MG: 5 TABLET ORAL at 05:12

## 2022-01-05 RX ADMIN — GABAPENTIN 600 MG: 600 TABLET ORAL at 08:37

## 2022-01-05 RX ADMIN — ANTI-FUNGAL POWDER MICONAZOLE NITRATE TALC FREE: 1.42 POWDER TOPICAL at 02:21

## 2022-01-05 RX ADMIN — SODIUM CHLORIDE, PRESERVATIVE FREE 10 ML: 5 INJECTION INTRAVENOUS at 08:39

## 2022-01-05 RX ADMIN — INSULIN LISPRO 2 UNITS: 100 INJECTION, SOLUTION INTRAVENOUS; SUBCUTANEOUS at 01:30

## 2022-01-05 RX ADMIN — OXYCODONE HYDROCHLORIDE 2.5 MG: 5 TABLET ORAL at 08:32

## 2022-01-05 RX ADMIN — LISINOPRIL 5 MG: 10 TABLET ORAL at 08:37

## 2022-01-05 RX ADMIN — SODIUM CHLORIDE, POTASSIUM CHLORIDE, SODIUM LACTATE AND CALCIUM CHLORIDE: 600; 310; 30; 20 INJECTION, SOLUTION INTRAVENOUS at 05:12

## 2022-01-05 RX ADMIN — DULOXETINE HYDROCHLORIDE 60 MG: 30 CAPSULE, DELAYED RELEASE ORAL at 02:22

## 2022-01-05 RX ADMIN — GABAPENTIN 600 MG: 600 TABLET ORAL at 14:02

## 2022-01-05 RX ADMIN — FENTANYL CITRATE 25 MCG: 50 INJECTION, SOLUTION INTRAMUSCULAR; INTRAVENOUS at 08:31

## 2022-01-05 RX ADMIN — LEVOTHYROXINE SODIUM 112 MCG: 112 TABLET ORAL at 02:08

## 2022-01-05 RX ADMIN — ACETAMINOPHEN 1000 MG: 500 TABLET ORAL at 14:02

## 2022-01-05 RX ADMIN — ROPINIROLE HYDROCHLORIDE 2 MG: 1 TABLET, FILM COATED ORAL at 08:38

## 2022-01-05 RX ADMIN — VENLAFAXINE HYDROCHLORIDE 37.5 MG: 37.5 CAPSULE, EXTENDED RELEASE ORAL at 08:39

## 2022-01-05 RX ADMIN — OXYBUTYNIN CHLORIDE 15 MG: 10 TABLET, EXTENDED RELEASE ORAL at 08:38

## 2022-01-05 RX ADMIN — ROPINIROLE HYDROCHLORIDE 2 MG: 1 TABLET, FILM COATED ORAL at 14:10

## 2022-01-05 ASSESSMENT — PAIN DESCRIPTION - DESCRIPTORS: DESCRIPTORS: SHARP;SHOOTING

## 2022-01-05 ASSESSMENT — PAIN DESCRIPTION - ORIENTATION: ORIENTATION: LOWER

## 2022-01-05 ASSESSMENT — PAIN SCALES - GENERAL
PAINLEVEL_OUTOF10: 7
PAINLEVEL_OUTOF10: 6
PAINLEVEL_OUTOF10: 7
PAINLEVEL_OUTOF10: 9
PAINLEVEL_OUTOF10: 10

## 2022-01-05 ASSESSMENT — PAIN DESCRIPTION - LOCATION: LOCATION: BACK

## 2022-01-05 NOTE — DISCHARGE SUMMARY
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 Note      I have reviewed the above TECSS note(s) and I either performed the key elements of the medical history and physical exam or was present with the resident when the key elements of the medical history and physical exam were performed. I have discussed the findings, established the care plan and recommendations with Resident, TECSS RN, bedside nurse.     Crystal Toledo MD  1/5/2022  4:05 PM
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
## 2022-01-05 NOTE — ED NOTES
Bed: 16  Expected date:   Expected time:   Means of arrival:   Comments:  Kofi Pichardo RN  01/04/22 [id number]

## 2022-01-05 NOTE — FLOWSHEET NOTE
[facility]     Emergency/Trauma Note    PATIENT NAME: Sun Duran    Shift date: 1/04/2022  Shift day: Tuesday   Shift # 3    Room # 16/16   Name: Sun Duran            Age: 64 y.o. Gender: female          Religious: Restorationism   Place of Adventism: Unknown    Trauma/Incident type: Adult Trauma Consult  Admit Date & Time: 1/4/2022 10:17 PM  TRAUMA NAME: N/A    ADVANCE DIRECTIVES IN CHART? No    NAME OF DECISION MAKER: Per next of kin hierarchy, patient's spouse, Carina Darnell ([phone]), is patient's decision maker, unless otherwise specified. RELATIONSHIP OF DECISION MAKER TO PATIENT: Patient's Spouse    PATIENT/EVENT DESCRIPTION:  Sun Duran is a 64 y.o. female who arrived to ED16 as a transfer from [address] [place] [address] ED due to an [facility]. \" Patient was wearing c-collar in hospital bed and was awake when  visited. Pt to be admitted to 16/16. SPIRITUAL ASSESSMENT/INTERVENTION:   responded to page and introduced herself to patient in room. Patient was coping well and in good spirits throughout encounter.  inquired about her support system and patient confirmed that her spouse was present with her at outlying ED.  attempted call to spouse, however, he did not answer line.  left voicemail for spouse.  returned to room and informed patient. Patient suggested that  call patient's son and he answered line. Patient's son's call was transferred over to patient's landline room phone. Patient and family thanked  and were receptive of 's support. PATIENT BELONGINGS:  No belongings noted    ANY BELONGINGS OF SIGNIFICANT VALUE NOTED:  N/A    REGISTRATION STAFF NOTIFIED? Yes      WHAT IS YOUR SPIRITUAL CARE PLAN FOR THIS PATIENT?:  [facility] can make follow-up visit, per request. Adama Renee can be reached 24/7 via [employer]. 01/04/22 [id number]   Encounter Summary   Services provided to: Patient; Family   Referral/Consult From: Multi-disciplinary team  (Adult Trauma Consult)   Support System Spouse; Children;Family members   Continue Visiting   (1/04/2022)   Complexity of Encounter Low   Length of Encounter 15 minutes   Spiritual Assessment Completed Yes   Routine   Type Initial   Crisis   Type Trauma   Spiritual/Sabianist   Type Spiritual support   Assessment Calm; Approachable; Hopeful;Coping   Intervention Active listening;Explored feelings, thoughts, concerns;Explored coping resources;Nurtured hope;Sustaining presence/ Ministry of presence; Discussed illness/injury and it's impact   Outcome Comfort;Expressed gratitude;Coping;Receptive     Electronically signed by Ashley Middleton on 1/5/2022 at 6:33 AM.  [facility]  [phone]
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
## 2022-01-05 NOTE — ED NOTES
[facility] and [facility] CARTSIDE TO TRANSFER. BEDSIDE REPORT GAVE TO . PT IS ALERT AND ORIENTED X4. VITALS STABLE. ASPEN COLLAR ON APPROPRIATELY, ALIGNED. PT HAS NO NEURO DEFICITS NOTED PER THIS RN ASSESSMENT SINCE ASSUMING CARE AT 1930. C-SPINE MAINTAINED. PT UPDATED ON PLAN OF CARE. NPO STATUS WHILE IN ER MAINTAINED. PT DENIES QUESTIONS AND TRANSPORT DENIES QUESTIONS. PT HANDOFF IN STABLE CONDITION.       Albino Concepcion RN  01/04/22 1212
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
## 2022-01-05 NOTE — PLAN OF CARE
NEUROSURGERY TO SIGN OFF     Please contact Neurosurgery with any questions or acute changes in neurological exam    PATIENT TO FOLLOW UP IN CLINIC:  [facility]  [address] [facility]   [address] [address] [place] [address]      Patient can follow up in the office in 4-6 weeks with DR Dorothyann Leventhal, continue to wear cervical collar at all times      Electronically signed by YOLANDA Quan NP on 1/5/2022 at 10:43 AM

## 2022-01-05 NOTE — ED NOTES
Writer Perfect Served admitting service regarding pt's diet order and whether pt is being 1200 Buddy Cruz, RN  01/05/22 8221
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
## 2022-01-06 ENCOUNTER — CARE COORDINATION (OUTPATIENT)
Dept: CASE MANAGEMENT | Age: 62
End: 2022-01-06

## 2022-01-06 DIAGNOSIS — S12.100K CLOSED DISPLACED FRACTURE OF SECOND CERVICAL VERTEBRA WITH NONUNION, UNSPECIFIED FRACTURE MORPHOLOGY, SUBSEQUENT ENCOUNTER: ICD-10-CM
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
## 2022-01-07 ENCOUNTER — TELEPHONE (OUTPATIENT)
Dept: ORTHOPEDIC SURGERY | Age: 62
End: 2022-01-07

## 2022-01-10 ENCOUNTER — TELEPHONE (OUTPATIENT)
Dept: FAMILY MEDICINE CLINIC | Age: 62
End: 2022-01-10
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
## 2022-01-10 NOTE — TELEPHONE ENCOUNTER
If she is having worsening pain, than what she was having while in the hospital, I would recommend calling the neurosurgeons office. She already has Flexeril at home and with her cervical fracture, I am reluctant to prescribe more. It will need to come from the physician that is managing her fracture.

## 2022-01-13 ENCOUNTER — TELEPHONE (OUTPATIENT)
Dept: ORTHOPEDIC SURGERY | Age: 62
End: 2022-01-13

## 2022-01-13 ENCOUNTER — CARE COORDINATION (OUTPATIENT)
Dept: CASE MANAGEMENT | Age: 62
End: 2022-01-13

## 2022-01-13 NOTE — TELEPHONE ENCOUNTER
Left a message for patient, a new script was written for patient by Michelle Taveras PA-C for patients walker to take to DME facility of her choice. I have called multiple medical equipment establishments provided by patients insurance that are either closed or do not have the rollator walker she needs. [facility] [place] did not have enough information to provide the walker to patient. I supplied patient with a new script providing a diagnosis code on the script as well as her office note stating the reason she needed the walker as [facility] states they needed information as to why she needed the walker. New script is available for patient to  at the office.

## 2022-01-14 ENCOUNTER — CARE COORDINATION (OUTPATIENT)
Dept: CASE MANAGEMENT | Age: 62
End: 2022-01-14

## 2022-01-20 ENCOUNTER — OFFICE VISIT (OUTPATIENT)
Dept: ORTHOPEDIC SURGERY | Age: 62
End: 2022-01-20
Payer: COMMERCIAL

## 2022-01-20 VITALS — HEIGHT: 61 IN | RESPIRATION RATE: 14 BRPM | WEIGHT: 268 LBS | BODY MASS INDEX: 50.6 KG/M2

## 2022-01-20 DIAGNOSIS — M54.2 NECK PAIN: ICD-10-CM

## 2022-01-20 DIAGNOSIS — S12.191A OTHER CLOSED NONDISPLACED FRACTURE OF SECOND CERVICAL VERTEBRA, INITIAL ENCOUNTER (HCC): ICD-10-CM

## 2022-01-20 PROCEDURE — 1036F TOBACCO NON-USER: CPT | Performed by: PHYSICIAN ASSISTANT

## 2022-01-20 PROCEDURE — 3017F COLORECTAL CA SCREEN DOC REV: CPT | Performed by: PHYSICIAN ASSISTANT

## 2022-01-20 PROCEDURE — G8427 DOCREV CUR MEDS BY ELIG CLIN: HCPCS | Performed by: PHYSICIAN ASSISTANT

## 2022-01-20 PROCEDURE — G8417 CALC BMI ABV UP PARAM F/U: HCPCS | Performed by: PHYSICIAN ASSISTANT

## 2022-01-20 PROCEDURE — G8484 FLU IMMUNIZE NO ADMIN: HCPCS | Performed by: PHYSICIAN ASSISTANT

## 2022-01-20 PROCEDURE — 99203 OFFICE O/P NEW LOW 30 MIN: CPT | Performed by: PHYSICIAN ASSISTANT

## 2022-01-20 PROCEDURE — 1111F DSCHRG MED/CURRENT MED MERGE: CPT | Performed by: PHYSICIAN ASSISTANT

## 2022-01-20 RX ORDER — METHOCARBAMOL 500 MG/1
500 TABLET, FILM COATED ORAL 4 TIMES DAILY
Qty: 40 TABLET | Refills: 0 | Status: SHIPPED | OUTPATIENT
Start: 2022-01-20 | End: 2022-03-20

## 2022-01-20 RX ORDER — OXYCODONE HYDROCHLORIDE AND ACETAMINOPHEN 5; 325 MG/1; MG/1
1 TABLET ORAL EVERY 8 HOURS PRN
Qty: 18 TABLET | Refills: 0 | Status: SHIPPED | OUTPATIENT
Start: 2022-01-20 | End: 2022-03-24 | Stop reason: SDUPTHER
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
## 2022-01-21 ENCOUNTER — CARE COORDINATION (OUTPATIENT)
Dept: CASE MANAGEMENT | Age: 62
End: 2022-01-21

## 2022-01-21 ENCOUNTER — TELEPHONE (OUTPATIENT)
Dept: ORTHOPEDIC SURGERY | Age: 62
End: 2022-01-21
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
## 2022-01-21 NOTE — TELEPHONE ENCOUNTER
Patient requesting a phone call from clinical staff to discuss whether Dr Aracely Carvajal is ok with her removing the neck brace. Please advise.

## 2022-01-24 ENCOUNTER — TELEPHONE (OUTPATIENT)
Dept: ORTHOPEDIC SURGERY | Age: 62
End: 2022-01-24

## 2022-01-24 NOTE — TELEPHONE ENCOUNTER
Patient called in requesting advisement on her neck brace. Patient stated it is starting to give her headaches and would like to know if she can take the brace off. Please advise thank you!

## 2022-01-24 NOTE — TELEPHONE ENCOUNTER
Called pt and instructed her that she needs to remain in the neck collar until she is told by a Era that she may remove it. She was amendable to this but stated that she is getting terrible headaches and it is causing her to sleep more make her miss out on her days.

## 2022-01-24 NOTE — TELEPHONE ENCOUNTER
LVM to pt that she NEEDS to KEEP neck collar in place until she is seen by neurosurgeon and instructed to discontinue.

## 2022-02-07 DIAGNOSIS — B35.4 TINEA CORPORIS: ICD-10-CM

## 2022-02-07 RX ORDER — NYSTATIN 100000 [USP'U]/G
POWDER TOPICAL
Qty: 60 G | Refills: 0 | Status: SHIPPED | OUTPATIENT
Start: 2022-02-07 | End: 2022-03-07

## 2022-02-10 DIAGNOSIS — D50.9 IRON DEFICIENCY ANEMIA, UNSPECIFIED IRON DEFICIENCY ANEMIA TYPE: ICD-10-CM

## 2022-02-11 RX ORDER — PNV NO.95/FERROUS FUM/FOLIC AC 28MG-0.8MG
TABLET ORAL
Qty: 90 TABLET | Refills: 1 | Status: SHIPPED | OUTPATIENT
Start: 2022-02-11 | End: 2022-07-25 | Stop reason: SDUPTHER

## 2022-02-15 RX ORDER — ONDANSETRON 4 MG/1
TABLET, FILM COATED ORAL
Qty: 20 TABLET | Refills: 0 | Status: SHIPPED | OUTPATIENT
Start: 2022-02-15 | End: 2022-03-22

## 2022-02-15 NOTE — TELEPHONE ENCOUNTER
Please Approve or Refuse.   Send to Pharmacy per Pt's Request:      Next Visit Date:  Visit date not found   Last Visit Date: 1/3/2022    Hemoglobin A1C (%)   Date Value   01/03/2022 7.0   10/11/2021 7.7   06/29/2021 7.7 (H)             ( goal A1C is < 7)   BP Readings from Last 3 Encounters:   01/05/22 (!) 165/87   01/04/22 136/76   01/03/22 120/74          (goal 120/80)  BUN   Date Value Ref Range Status   01/04/2022 11 8 - 23 mg/dL Final     CREATININE   Date Value Ref Range Status   01/04/2022 0.63 0.50 - 0.90 mg/dL Final     Potassium   Date Value Ref Range Status   01/04/2022 4.4 3.7 - 5.3 mmol/L Final

## 2022-02-24 ENCOUNTER — TELEPHONE (OUTPATIENT)
Dept: ORTHOPEDIC SURGERY | Age: 62
End: 2022-02-24

## 2022-02-24 NOTE — TELEPHONE ENCOUNTER
Pt called in and stated that she is still having a lot of pain and the percocet is not helping. She stated that she is supplementing with NSAIDs in between doses as well and they is still not helping. Zane Bowling, what do you advise for this pt?

## 2022-02-25 ENCOUNTER — TELEPHONE (OUTPATIENT)
Dept: FAMILY MEDICINE CLINIC | Age: 62
End: 2022-02-25

## 2022-02-25 NOTE — TELEPHONE ENCOUNTER
Kayla Madrigal,   If patient is in that much pain, please advise her to go to the ER if she feels this is an emergency. If not, can you inform her to call her PCP for medication for this or wait until she sees Dr. Deisy Krishnamurthy.      Thank you

## 2022-03-01 ENCOUNTER — TELEPHONE (OUTPATIENT)
Dept: ORTHOPEDIC SURGERY | Age: 62
End: 2022-03-01

## 2022-03-01 NOTE — TELEPHONE ENCOUNTER
[facility] called and stated that they need documention as to why the pt needs a walker or failure of use of cane. Last OV notes from 12/6/21 will need to be addended to include the necessity for the walker according to caller. Dr. Consuelo Mendoza, are you ok with addending your notes?  If so, we will need to fax updated note to [phone]

## 2022-03-02 NOTE — TELEPHONE ENCOUNTER
Can just make a note stating due to patients severe degenerative joint disease of her knees that she requires the use of a wheeled walker

## 2022-03-04 DIAGNOSIS — R42 DIZZINESS: ICD-10-CM

## 2022-03-04 RX ORDER — DICYCLOMINE HYDROCHLORIDE 10 MG/1
CAPSULE ORAL
Qty: 120 CAPSULE | Refills: 0 | Status: SHIPPED | OUTPATIENT
Start: 2022-03-04 | End: 2022-04-04

## 2022-03-04 RX ORDER — MECLIZINE HYDROCHLORIDE 25 MG/1
TABLET ORAL
Qty: 60 TABLET | Refills: 0 | Status: SHIPPED | OUTPATIENT
Start: 2022-03-04 | End: 2022-03-23

## 2022-03-05 DIAGNOSIS — B35.4 TINEA CORPORIS: ICD-10-CM

## 2022-03-07 ENCOUNTER — TELEPHONE (OUTPATIENT)
Dept: ORTHOPEDIC SURGERY | Age: 62
End: 2022-03-07

## 2022-03-07 RX ORDER — NYSTATIN 100000 [USP'U]/G
POWDER TOPICAL
Qty: 60 G | Refills: 3 | Status: SHIPPED | OUTPATIENT
Start: 2022-03-07 | End: 2022-09-06 | Stop reason: SDUPTHER

## 2022-03-07 NOTE — TELEPHONE ENCOUNTER
[facility] called [phone] ext [phone] needs letter from  [facility] [place] stating why the patient needs the wheeled walker with a seat in the home, like for daily activities and able to be more independent in the home. . due to insurance will not pay for the walker until a letter is faxed stating why, please fax letter to [phone], thank you

## 2022-03-10 ENCOUNTER — NURSE TRIAGE (OUTPATIENT)
Dept: OTHER | Facility: CLINIC | Age: 62
End: 2022-03-10
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
## 2022-03-10 NOTE — TELEPHONE ENCOUNTER
Due to patient hx and pain being 8/10 please advise her to go to ER for further workup Noted, Patient health, hx and maintenance updated. Patient reminder set and result letter mailed to patient to address on file. Per Dr. Rob, result note listed below from procedure on 4/20/18.

## 2022-03-14 ENCOUNTER — APPOINTMENT (OUTPATIENT)
Dept: GENERAL RADIOLOGY | Age: 62
End: 2022-03-14
Payer: COMMERCIAL

## 2022-03-14 ENCOUNTER — HOSPITAL ENCOUNTER (EMERGENCY)
Age: 62
Discharge: HOME | End: 2022-03-14
Attending: STUDENT IN AN ORGANIZED HEALTH CARE EDUCATION/TRAINING PROGRAM
Payer: COMMERCIAL

## 2022-03-14 ENCOUNTER — APPOINTMENT (OUTPATIENT)
Dept: CT IMAGING | Age: 62
End: 2022-03-14
Payer: COMMERCIAL

## 2022-03-14 VITALS
RESPIRATION RATE: 16 BRPM | BODY MASS INDEX: 50.6 KG/M2 | HEIGHT: 61 IN | SYSTOLIC BLOOD PRESSURE: 126 MMHG | TEMPERATURE: 98.4 F | DIASTOLIC BLOOD PRESSURE: 54 MMHG | OXYGEN SATURATION: 99 % | WEIGHT: 268 LBS | HEART RATE: 106 BPM

## 2022-03-14 DIAGNOSIS — R19.7 NAUSEA VOMITING AND DIARRHEA: Primary | ICD-10-CM

## 2022-03-14 DIAGNOSIS — R11.2 NAUSEA VOMITING AND DIARRHEA: Primary | ICD-10-CM

## 2022-03-14 LAB
ABSOLUTE BANDS #: 0.64 K/UL (ref 0–1)
ABSOLUTE EOS #: 0.16 K/UL (ref 0–0.4)
ABSOLUTE LYMPH #: 0.48 K/UL (ref 1–4.8)
ABSOLUTE MONO #: 0.32 K/UL (ref 0.1–1.3)
ALBUMIN SERPL-MCNC: 3.9 G/DL (ref 3.5–5.2)
ALP BLD-CCNC: 84 U/L (ref 35–104)
ALT SERPL-CCNC: 23 U/L (ref 5–33)
ANION GAP SERPL CALCULATED.3IONS-SCNC: 13 MMOL/L (ref 9–17)
AST SERPL-CCNC: 27 U/L
BACTERIA: ABNORMAL
BANDS: 4 % (ref 0–10)
BASOPHILS # BLD: 0 % (ref 0–2)
BASOPHILS ABSOLUTE: 0 K/UL (ref 0–0.2)
BILIRUB SERPL-MCNC: 0.39 MG/DL (ref 0.3–1.2)
BILIRUBIN URINE: NEGATIVE
BUN BLDV-MCNC: 25 MG/DL (ref 8–23)
CALCIUM SERPL-MCNC: 8.5 MG/DL (ref 8.6–10.4)
CASTS UA: ABNORMAL /LPF
CHLORIDE BLD-SCNC: 98 MMOL/L (ref 98–107)
CO2: 22 MMOL/L (ref 20–31)
COLOR: YELLOW
CREAT SERPL-MCNC: 0.64 MG/DL (ref 0.5–0.9)
EOSINOPHILS RELATIVE PERCENT: 1 % (ref 0–4)
EPITHELIAL CELLS UA: ABNORMAL /HPF
GFR AFRICAN AMERICAN: >60 ML/MIN
GFR NON-AFRICAN AMERICAN: >60 ML/MIN
GFR SERPL CREATININE-BSD FRML MDRD: ABNORMAL ML/MIN/{1.73_M2}
GLUCOSE BLD-MCNC: 170 MG/DL (ref 70–99)
GLUCOSE URINE: ABNORMAL
HCT VFR BLD CALC: 42.3 % (ref 36–46)
HEMOGLOBIN: 14.3 G/DL (ref 12–16)
KETONES, URINE: ABNORMAL
LEUKOCYTE ESTERASE, URINE: NEGATIVE
LIPASE: 22 U/L (ref 13–60)
LYMPHOCYTES # BLD: 3 % (ref 24–44)
MCH RBC QN AUTO: 29.7 PG (ref 26–34)
MCHC RBC AUTO-ENTMCNC: 33.7 G/DL (ref 31–37)
MCV RBC AUTO: 88.2 FL (ref 80–100)
MONOCYTES # BLD: 2 % (ref 1–7)
MORPHOLOGY: ABNORMAL
NITRITE, URINE: NEGATIVE
PDW BLD-RTO: 14.4 % (ref 11.5–14.9)
PH UA: 5 (ref 5–8)
PLATELET # BLD: 359 K/UL (ref 150–450)
PMV BLD AUTO: 8.1 FL (ref 6–12)
POTASSIUM SERPL-SCNC: 4.8 MMOL/L (ref 3.7–5.3)
PROTEIN UA: NEGATIVE
RBC # BLD: 4.8 M/UL (ref 4–5.2)
RBC UA: ABNORMAL /HPF
SEG NEUTROPHILS: 90 % (ref 36–66)
SEGMENTED NEUTROPHILS ABSOLUTE COUNT: 14.5 K/UL (ref 1.3–9.1)
SODIUM BLD-SCNC: 133 MMOL/L (ref 135–144)
SPECIFIC GRAVITY UA: 1.02 (ref 1–1.03)
TOTAL PROTEIN: 6.5 G/DL (ref 6.4–8.3)
TROPONIN, HIGH SENSITIVITY: 15 NG/L (ref 0–14)
TROPONIN, HIGH SENSITIVITY: 16 NG/L (ref 0–14)
TURBIDITY: CLEAR
URINE HGB: NEGATIVE
UROBILINOGEN, URINE: NORMAL
WBC # BLD: 16.1 K/UL (ref 3.5–11)
WBC UA: ABNORMAL /HPF

## 2022-03-14 PROCEDURE — 36415 COLL VENOUS BLD VENIPUNCTURE: CPT

## 2022-03-14 PROCEDURE — 96375 TX/PRO/DX INJ NEW DRUG ADDON: CPT

## 2022-03-14 PROCEDURE — 83690 ASSAY OF LIPASE: CPT

## 2022-03-14 PROCEDURE — 74177 CT ABD & PELVIS W/CONTRAST: CPT

## 2022-03-14 PROCEDURE — 6360000002 HC RX W HCPCS: Performed by: STUDENT IN AN ORGANIZED HEALTH CARE EDUCATION/TRAINING PROGRAM

## 2022-03-14 PROCEDURE — 93005 ELECTROCARDIOGRAM TRACING: CPT | Performed by: STUDENT IN AN ORGANIZED HEALTH CARE EDUCATION/TRAINING PROGRAM

## 2022-03-14 PROCEDURE — 80053 COMPREHEN METABOLIC PANEL: CPT

## 2022-03-14 PROCEDURE — 96372 THER/PROPH/DIAG INJ SC/IM: CPT

## 2022-03-14 PROCEDURE — 85025 COMPLETE CBC W/AUTO DIFF WBC: CPT

## 2022-03-14 PROCEDURE — 2580000003 HC RX 258: Performed by: STUDENT IN AN ORGANIZED HEALTH CARE EDUCATION/TRAINING PROGRAM

## 2022-03-14 PROCEDURE — 81001 URINALYSIS AUTO W/SCOPE: CPT

## 2022-03-14 PROCEDURE — 6360000004 HC RX CONTRAST MEDICATION: Performed by: STUDENT IN AN ORGANIZED HEALTH CARE EDUCATION/TRAINING PROGRAM

## 2022-03-14 PROCEDURE — 84484 ASSAY OF TROPONIN QUANT: CPT

## 2022-03-14 PROCEDURE — 71045 X-RAY EXAM CHEST 1 VIEW: CPT

## 2022-03-14 PROCEDURE — 99285 EMERGENCY DEPT VISIT HI MDM: CPT

## 2022-03-14 PROCEDURE — 96374 THER/PROPH/DIAG INJ IV PUSH: CPT

## 2022-03-14 PROCEDURE — 2500000003 HC RX 250 WO HCPCS: Performed by: STUDENT IN AN ORGANIZED HEALTH CARE EDUCATION/TRAINING PROGRAM

## 2022-03-14 RX ORDER — SODIUM CHLORIDE 0.9 % (FLUSH) 0.9 %
10 SYRINGE (ML) INJECTION PRN
Status: DISCONTINUED | OUTPATIENT
Start: 2022-03-14 | End: 2022-03-15 | Stop reason: HOSPADM

## 2022-03-14 RX ORDER — FAMOTIDINE 20 MG/1
20 TABLET, FILM COATED ORAL 2 TIMES DAILY
Qty: 60 TABLET | Refills: 0 | Status: SHIPPED | OUTPATIENT
Start: 2022-03-14 | End: 2022-04-25 | Stop reason: SDUPTHER

## 2022-03-14 RX ORDER — 0.9 % SODIUM CHLORIDE 0.9 %
80 INTRAVENOUS SOLUTION INTRAVENOUS ONCE
Status: COMPLETED | OUTPATIENT
Start: 2022-03-14 | End: 2022-03-14

## 2022-03-14 RX ORDER — 0.9 % SODIUM CHLORIDE 0.9 %
1000 INTRAVENOUS SOLUTION INTRAVENOUS ONCE
Status: COMPLETED | OUTPATIENT
Start: 2022-03-14 | End: 2022-03-14

## 2022-03-14 RX ORDER — DICYCLOMINE HYDROCHLORIDE 10 MG/ML
20 INJECTION INTRAMUSCULAR ONCE
Status: COMPLETED | OUTPATIENT
Start: 2022-03-14 | End: 2022-03-14

## 2022-03-14 RX ORDER — MORPHINE SULFATE 4 MG/ML
4 INJECTION, SOLUTION INTRAMUSCULAR; INTRAVENOUS ONCE
Status: COMPLETED | OUTPATIENT
Start: 2022-03-14 | End: 2022-03-14

## 2022-03-14 RX ORDER — ONDANSETRON 2 MG/ML
4 INJECTION INTRAMUSCULAR; INTRAVENOUS ONCE
Status: COMPLETED | OUTPATIENT
Start: 2022-03-14 | End: 2022-03-14

## 2022-03-14 RX ADMIN — SODIUM CHLORIDE 80 ML: 9 INJECTION, SOLUTION INTRAVENOUS at 20:22

## 2022-03-14 RX ADMIN — IOPAMIDOL 75 ML: 755 INJECTION, SOLUTION INTRAVENOUS at 20:21

## 2022-03-14 RX ADMIN — FAMOTIDINE 20 MG: 10 INJECTION, SOLUTION INTRAVENOUS at 19:04

## 2022-03-14 RX ADMIN — MORPHINE SULFATE 4 MG: 4 INJECTION, SOLUTION INTRAMUSCULAR; INTRAVENOUS at 19:08

## 2022-03-14 RX ADMIN — ONDANSETRON 4 MG: 2 INJECTION INTRAMUSCULAR; INTRAVENOUS at 19:02

## 2022-03-14 RX ADMIN — SODIUM CHLORIDE 1000 ML: 9 INJECTION, SOLUTION INTRAVENOUS at 19:00

## 2022-03-14 RX ADMIN — DICYCLOMINE HYDROCHLORIDE 20 MG: 20 INJECTION INTRAMUSCULAR at 21:44

## 2022-03-14 RX ADMIN — SODIUM CHLORIDE, PRESERVATIVE FREE 10 ML: 5 INJECTION INTRAVENOUS at 20:21

## 2022-03-14 ASSESSMENT — ENCOUNTER SYMPTOMS
EYE ITCHING: 0
PHOTOPHOBIA: 0
COLOR CHANGE: 0
FACIAL SWELLING: 0
NAUSEA: 1
DIARRHEA: 1
RHINORRHEA: 0
SHORTNESS OF BREATH: 0
VOMITING: 1
ABDOMINAL PAIN: 1
COUGH: 0

## 2022-03-14 ASSESSMENT — PAIN SCALES - GENERAL: PAINLEVEL_OUTOF10: 10

## 2022-03-14 NOTE — ED PROVIDER NOTES
 Astigmatism 8/22/2014    Back pain     radiculopathy left leg    Blurry vision, bilateral 7/8/2016    Carpal tunnel syndrome of right wrist 11/26/2012    Cataract     patient denies    Closed displaced fracture of lesser tuberosity of right humerus 7/11/2016    Constipation     Depression     Dysmenorrhea     Fatty liver disease, nonalcoholic     GERD (gastroesophageal reflux disease)     Gout     found through bloodwork    Headache     Heart murmur     Heart palpitations     Heart palpitations     History of blood transfusion     thinks she might have had a transfusion doesn't remember why or when    History of rib fracture 7/6/2016    Right    Hyperlipidemia     Hypertension     Hypertriglyceridemia     Hypothyroidism 2/17/2016    Myopia with astigmatism and presbyopia 8/22/2014    Neuropathy     bilateral legs and feet    Obesity     Osteoporosis     Palpitations     about once a month    Pancreatitis 2011    no problems    Panic attack 10/30/2014    PONV (postoperative nausea and vomiting)     mild    Positive cardiac stress test     Presbyopia 8/22/2014    RLS (restless legs syndrome)     Sacroiliitis ([facility] [address] 4/21/2014    Sleep apnea     does not use Cpap    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    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 diabetes mellitus without mention of complication, not stated as uncontrolled     checks daily, has Insulin Pump, has a wearable glucose monitor    Umbilical hernia 9/5/7369     Past Problem List  Patient Active Problem List   Diagnosis Code    Hypercholesterolemia E78.00    Hypertriglyceridemia E78.1    Diabetic neuropathy (HCC) E11.40    DESTINY (obstructive sleep apnea) G47.33    Vitamin D deficiency E55.9    Overactive bladder N32.81    Allergic rhinitis J30.9    Degenerative lumbar disc 10/12/2017    COLONOSCOPY performed by Cedric Ruffin MD at [address] [facility] EGD TRANSORAL BIOPSY SINGLE/MULTIPLE N/A 10/12/2017    EGD BIOPSY performed by Cedric Ruffin MD at [address] Right 5/9/2017    SHOULDER TOTAL ARTHROPLASTY RIGHT WITH ARTHREX, CELLSAVER AND AQUAMANTIS performed by Sabrina Solomon DO at [facility] Right 05/09/2017     CURRENT MEDICATIONS       Previous Medications    ACETAMINOPHEN (TYLENOL) 500 MG TABLET    Take 2 tablets by mouth every 8 hours as needed for Pain    ALBUTEROL SULFATE  (90 BASE) MCG/ACT INHALER    INHALE 2 PUFFS BY MOUTH EVERY 6 HOURS AS NEEDED FOR WHEEZING FOR SHORTNESS OF BREATH    ALLOPURINOL (ZYLOPRIM) 100 MG TABLET    Take 1 tablet by mouth once daily    AMITRIPTYLINE (ELAVIL) 50 MG TABLET    Take 50 mg by mouth nightly    AMMONIUM LACTATE (LAC-HYDRIN) 12 % CREAM    Apply topically as needed.     ASPIRIN 81 MG CHEWABLE TABLET    CHEW AND SWALLOW 1 TABLET BY MOUTH ONCE DAILY    ASPIRIN-ACETAMINOPHEN-CAFFEINE (EXCEDRIN PO)    Take 2 tablets by mouth daily as needed (headache)    BLOOD GLUCOSE MONITORING SUPPL (FREESTYLE LITE) KYLAH    1 Device by Does not apply route 6 times daily    BLOOD GLUCOSE TEST STRIPS (FREESTYLE LITE) STRIP    1 each by Does not apply route 6 times daily    CHOLECALCIFEROL (VITAMIN D3) 50 MCG (2000 UT) TABS    Take 1 tablet by mouth once daily    CYANOCOBALAMIN (B-12) 2500 MCG TABS    Take 1 tablet by mouth daily     CYCLOBENZAPRINE (FLEXERIL) 10 MG TABLET    Take one tablet by mouth three times daily as needed    DARIFENACIN (ENABLEX) 15 MG EXTENDED RELEASE TABLET    Take 15 mg by mouth 2 times daily    DICYCLOMINE (BENTYL) 10 MG CAPSULE    TAKE 1 CAPSULE BY MOUTH 4 TIMES DAILY AS NEEDED FOR CRAMPS    DULOXETINE (CYMBALTA) 60 MG EXTENDED RELEASE CAPSULE    TAKE 1 CAPSULE BY MOUTH NIGHTLY    EQ ALLERGY RELIEF 10 MG TABLET    Take 1 tablet by mouth once daily    FERROUS SULFATE (IRON) 325 (65 FE) MG TABS    Take 1 tablet by mouth once daily with breakfast    FUROSEMIDE (LASIX) 20 MG TABLET    Take 1 tablet by mouth once daily    GABAPENTIN (NEURONTIN) 600 MG TABLET    TAKE 1 TABLET BY MOUTH THREE TIMES DAILY    GLUCOSE MONITORING KIT (FREESTYLE) MONITORING KIT    1 kit by Does not apply route daily    INSULIN DISPOSABLE PUMP (V-GO 40) KIT    Use as directed per Dr. Sebas Dumont. INSULIN GLARGINE (LANTUS) 100 UNIT/ML INJECTION VIAL    Inject 60 Units into the skin nightly Changed per Dr. Immanuel Rushing office    LEVOTHYROXINE (SYNTHROID) 112 MCG TABLET    Take 112 mcg by mouth Daily    LIDOCAINE (XYLOCAINE) 5 % OINTMENT    Apply topically as needed.     LIOTHYRONINE (CYTOMEL) 5 MCG TABLET    Take 5 mcg by mouth daily    LIRAGLUTIDE (VICTOZA) 18 MG/3ML SOPN SC INJECTION    Inject 1.8 mg into the skin daily     LISINOPRIL (PRINIVIL;ZESTRIL) 5 MG TABLET    Take 1 tablet by mouth once daily    MAGNESIUM OXIDE 500 MG TABS    TAKE 1 TABLET BY MOUTH TWICE DAILY    MECLIZINE (ANTIVERT) 25 MG TABLET    TAKE 1 TABLET BY MOUTH THREE TIMES DAILY AS NEEDED FOR DIZZINESS (NO FURTHER REFILLS UNTIL SEEN IN OFFICE)    METFORMIN (GLUCOPHAGE) 1000 MG TABLET    TAKE 1 TABLET BY MOUTH TWICE DAILY    NYSTATIN (NYSTATIN) 136252 UNIT/GM POWDER    APPLY POWDER TOPICALLY TO ABDOMINAL FOLDS THREE TIMES DAILY AS NEEDED FOR SKIN IRRITATION    OMEGA-3 FATTY ACIDS (FISH OIL) 1000 MG CAPS    Take 3,000 mg by mouth daily    ONDANSETRON (ZOFRAN) 4 MG TABLET    TAKE 1 TABLET BY MOUTH EVERY 8 HOURS AS NEEDED FOR NAUSEA FOR VOMITING    OXYBUTYNIN (DITROPAN XL) 15 MG EXTENDED RELEASE TABLET    Take 15 mg by mouth daily    PANTOPRAZOLE (PROTONIX) 40 MG TABLET    Take 1 tablet by mouth once daily    ROPINIROLE (REQUIP) 2 MG TABLET    TAKE 1 TABLET BY MOUTH THREE TIMES DAILY    VENLAFAXINE (EFFEXOR XR) 37.5 MG EXTENDED RELEASE CAPSULE    TAKE 1 CAPSULE BY MOUTH THREE TIMES DAILY     ALLERGIES     is allergic to ozempic (0.25 or 0.5 mg-dose) [semaglutide(0.25 or and dry. Neurological:      General: No focal deficit present. Mental Status: She is alert. Mental status is at baseline. MEDICAL DECISION MAKIN-year-old female presents for evaluation of nausea vomiting abdominal pain worsening over the past several days been intermittent over the past several months. Will work-up with labs and CT scan, treat symptomatically and reevaluate. Work-up shows leukocytosis but no acute findings on CT scan. There is a nodule in the pancreas. Discussed results with patient. She is feeling better on reassessment. Will discharge home. CRITICAL CARE:       PROCEDURES:    Procedures    DIAGNOSTIC RESULTS   EKG:All EKG's are interpreted by the Emergency Department Physician who either signs or Co-signs this chart in the absence of a cardiologist.    Sinus tachycardia rate of 106 left axis left anterior fascicular block no change from prior    RADIOLOGY:All plain film, CT, MRI, and formal ultrasound images (except ED bedside ultrasound) are read by the radiologist, see reports below, unless otherwisenoted in MDM or here. CT ABDOMEN PELVIS W IV CONTRAST Additional Contrast? None   Final Result   No acute finding in the the abdomen or pelvis. There is a 1.7 cm x 2.2 cm low-attenuation nodule in the anterior body of the   pancreas, questionably increasing in size from 2022. A follow-up   pancreatic protocol MRI or CT recommended for further evaluation. Suspect few tiny gallstones. No evidence of acute cholecystitis. XR CHEST PORTABLE   Final Result   Positioning complicates assessment but no acute cardiopulmonary process is   noted. Bilateral intact shoulder arthroplasties. LABS: All lab results were reviewed by myself, and all abnormals are listed below.   Labs Reviewed   CBC WITH AUTO DIFFERENTIAL - Abnormal; Notable for the following components:       Result Value    WBC 16.1 (*)     Seg Neutrophils 90 (*)     Lymphocytes 3 (*)     Segs Absolute 14.50 (*)     Absolute Lymph # 0.48 (*)     All other components within normal limits   COMPREHENSIVE METABOLIC PANEL W/ REFLEX TO MG FOR LOW K - Abnormal; Notable for the following components:    Glucose 170 (*)     BUN 25 (*)     Calcium 8.5 (*)     Sodium 133 (*)     All other components within normal limits   TROPONIN - Abnormal; Notable for the following components:    Troponin, High Sensitivity 15 (*)     All other components within normal limits   URINALYSIS WITH MICROSCOPIC - Abnormal; Notable for the following components:    Glucose, Ur TRACE (*)     Ketones, Urine TRACE (*)     Bacteria, UA FEW (*)     All other components within normal limits   LIPASE   TROPONIN       EMERGENCY DEPARTMENTCOURSE:         Vitals:    Vitals:    03/14/22 1706   BP: (!) 126/54   Pulse: 106   Resp: 16   SpO2: 99%   Weight: 268 lb (121.6 kg)   Height: 5' 1\" (1.549 m)       The patient was given the following medications while in the emergency department:  Orders Placed This Encounter   Medications    0.9 % sodium chloride IV bolus 1,000 mL    ondansetron (ZOFRAN) injection 4 mg    famotidine (PEPCID) injection 20 mg    morphine sulfate (PF) injection 4 mg    0.9 % sodium chloride bolus    sodium chloride flush 0.9 % injection 10 mL    iopamidol (ISOVUE-370) 76 % injection 75 mL    dicyclomine (BENTYL) injection 20 mg    famotidine (PEPCID) 20 MG tablet     Sig: Take 1 tablet by mouth 2 times daily     Dispense:  60 tablet     Refill:  0     CONSULTS:  None    FINAL IMPRESSION      1.  Nausea vomiting and diarrhea          DISPOSITION/PLAN   DISPOSITION Discharge - Pending Orders Complete 03/14/2022 09:22:51 PM      PATIENT REFERRED TO:  YOLANDA Lam NP [address] [place] [address] [address]  [phone]    Call in 1 day      DISCHARGE MEDICATIONS:  New Prescriptions    FAMOTIDINE (PEPCID) 20 MG TABLET    Take 1 tablet by mouth 2 times daily     The care is provided during an unprecedented national emergency due to the novel coronavirus, COVID 19.   MD Ruba Yeh MD  03/14/22 2120

## 2022-03-16 LAB
EKG ATRIAL RATE: 106 BPM
EKG P AXIS: 44 DEGREES
EKG P-R INTERVAL: 152 MS
EKG Q-T INTERVAL: 336 MS
EKG QRS DURATION: 88 MS
EKG QTC CALCULATION (BAZETT): 446 MS
EKG R AXIS: -55 DEGREES
EKG T AXIS: 48 DEGREES
EKG VENTRICULAR RATE: 106 BPM

## 2022-03-16 PROCEDURE — 93010 ELECTROCARDIOGRAM REPORT: CPT | Performed by: INTERNAL MEDICINE

## 2022-03-20 RX ORDER — METHOCARBAMOL 500 MG/1
500 TABLET, FILM COATED ORAL 4 TIMES DAILY
Qty: 40 TABLET | Refills: 0 | Status: SHIPPED | OUTPATIENT
Start: 2022-03-20 | End: 2022-05-05

## 2022-03-22 RX ORDER — ONDANSETRON 4 MG/1
TABLET, FILM COATED ORAL
Qty: 20 TABLET | Refills: 0 | Status: SHIPPED | OUTPATIENT
Start: 2022-03-22

## 2022-03-23 ENCOUNTER — OFFICE VISIT (OUTPATIENT)
Dept: ORTHOPEDIC SURGERY | Age: 62
End: 2022-03-23
Payer: COMMERCIAL

## 2022-03-23 DIAGNOSIS — R42 DIZZINESS: ICD-10-CM

## 2022-03-23 DIAGNOSIS — M17.0 PRIMARY OSTEOARTHRITIS OF BOTH KNEES: Primary | ICD-10-CM

## 2022-03-23 PROCEDURE — 3017F COLORECTAL CA SCREEN DOC REV: CPT | Performed by: ORTHOPAEDIC SURGERY

## 2022-03-23 PROCEDURE — G8417 CALC BMI ABV UP PARAM F/U: HCPCS | Performed by: ORTHOPAEDIC SURGERY

## 2022-03-23 PROCEDURE — G8428 CUR MEDS NOT DOCUMENT: HCPCS | Performed by: ORTHOPAEDIC SURGERY

## 2022-03-23 PROCEDURE — 1036F TOBACCO NON-USER: CPT | Performed by: ORTHOPAEDIC SURGERY

## 2022-03-23 PROCEDURE — 99213 OFFICE O/P EST LOW 20 MIN: CPT | Performed by: ORTHOPAEDIC SURGERY

## 2022-03-23 PROCEDURE — G8484 FLU IMMUNIZE NO ADMIN: HCPCS | Performed by: ORTHOPAEDIC SURGERY

## 2022-03-23 RX ORDER — ASPIRIN 81 MG/1
TABLET, CHEWABLE ORAL
Qty: 90 TABLET | Refills: 0 | Status: SHIPPED | OUTPATIENT
Start: 2022-03-23 | End: 2022-07-12

## 2022-03-23 RX ORDER — MECLIZINE HYDROCHLORIDE 25 MG/1
TABLET ORAL
Qty: 60 TABLET | Refills: 0 | Status: SHIPPED | OUTPATIENT
Start: 2022-03-23

## 2022-03-23 NOTE — LETTER
[address]  [address] [facility] [address]  [address] [facility] [address]  Phone: [phone]  Fax: [phone]    Glenis Guerrero MD         March 23, 2022     Patient: Margaret Weller   YOB: 1960   Date of Visit: 3/23/2022       To Whom It May Concern: It is my medical opinion that An Malloy requires a disability parking placard for the following reasons: can not walk more than 200 feet without needing to stop and rest.  Duration of need: 2 years    If you have any questions or concerns, please don't hesitate to call.     Sincerely,        Glenis Guerrero MD
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
## 2022-03-24 DIAGNOSIS — M54.2 NECK PAIN: ICD-10-CM

## 2022-03-24 RX ORDER — HYDROCODONE BITARTRATE AND ACETAMINOPHEN 5; 325 MG/1; MG/1
TABLET ORAL
COMMUNITY
Start: 2022-03-01 | End: 2022-07-25 | Stop reason: ALTCHOICE

## 2022-03-24 RX ORDER — OXYCODONE HYDROCHLORIDE AND ACETAMINOPHEN 5; 325 MG/1; MG/1
1 TABLET ORAL EVERY 8 HOURS PRN
Qty: 18 TABLET | Refills: 0 | Status: SHIPPED | OUTPATIENT
Start: 2022-03-24 | End: 2022-03-31

## 2022-03-24 NOTE — TELEPHONE ENCOUNTER
Patient is asking for this refill be sent to her pharmacy on file. Any concerns, please advise. Thank you.

## 2022-03-24 NOTE — TELEPHONE ENCOUNTER
Patient was seen in the office yesterday 3/23/22 no pain medication was prescribed during visit. Patient called today to have percocet prescribed. I spoke with pharmacy as last pain medication was filled by Dr Libertad Marte for [facility] 7 days on March 1st.    Davin Madrid filled a script for percocet on 1/20/22 office visit    12/06/21 office vist with Dr Consuelo Mendoza on   12/10/21 patient requested percocet. Please advise.

## 2022-03-25 DIAGNOSIS — R42 DIZZINESS: ICD-10-CM

## 2022-03-25 RX ORDER — MECLIZINE HYDROCHLORIDE 25 MG/1
TABLET ORAL
Qty: 60 TABLET | Refills: 0 | OUTPATIENT
Start: 2022-03-25

## 2022-03-26 DIAGNOSIS — R42 DIZZINESS: ICD-10-CM

## 2022-03-28 RX ORDER — MECLIZINE HYDROCHLORIDE 25 MG/1
TABLET ORAL
Qty: 60 TABLET | Refills: 0 | OUTPATIENT
Start: 2022-03-28

## 2022-03-30 DIAGNOSIS — E79.0 HYPERURICEMIA: ICD-10-CM

## 2022-03-30 RX ORDER — ALLOPURINOL 100 MG/1
TABLET ORAL
Qty: 90 TABLET | Refills: 0 | Status: SHIPPED | OUTPATIENT
Start: 2022-03-30 | End: 2022-07-25 | Stop reason: SDUPTHER

## 2022-03-31 ENCOUNTER — OFFICE VISIT (OUTPATIENT)
Dept: ORTHOPEDIC SURGERY | Age: 62
End: 2022-03-31
Payer: OTHER MISCELLANEOUS

## 2022-03-31 VITALS — RESPIRATION RATE: 14 BRPM | BODY MASS INDEX: 50.6 KG/M2 | WEIGHT: 268 LBS | HEIGHT: 61 IN

## 2022-03-31 DIAGNOSIS — M54.2 NECK PAIN: Primary | ICD-10-CM

## 2022-03-31 PROCEDURE — 1036F TOBACCO NON-USER: CPT | Performed by: PHYSICIAN ASSISTANT

## 2022-03-31 PROCEDURE — G8427 DOCREV CUR MEDS BY ELIG CLIN: HCPCS | Performed by: PHYSICIAN ASSISTANT

## 2022-03-31 PROCEDURE — 3017F COLORECTAL CA SCREEN DOC REV: CPT | Performed by: PHYSICIAN ASSISTANT

## 2022-03-31 PROCEDURE — 99213 OFFICE O/P EST LOW 20 MIN: CPT | Performed by: PHYSICIAN ASSISTANT

## 2022-03-31 PROCEDURE — G8417 CALC BMI ABV UP PARAM F/U: HCPCS | Performed by: PHYSICIAN ASSISTANT

## 2022-03-31 PROCEDURE — G8484 FLU IMMUNIZE NO ADMIN: HCPCS | Performed by: PHYSICIAN ASSISTANT

## 2022-03-31 RX ORDER — TIZANIDINE 2 MG/1
2 TABLET ORAL EVERY 8 HOURS PRN
Qty: 20 TABLET | Refills: 0 | Status: SHIPPED | OUTPATIENT
Start: 2022-03-31 | End: 2022-05-05

## 2022-03-31 RX ORDER — TRAMADOL HYDROCHLORIDE 50 MG/1
50 TABLET ORAL EVERY 6 HOURS PRN
Qty: 12 TABLET | Refills: 0 | Status: SHIPPED | OUTPATIENT
Start: 2022-03-31 | End: 2022-04-03

## 2022-03-31 NOTE — PROGRESS NOTES
[address], [address] [facility]           Dept Phone: [phone]           Dept Fax:  [phone] [address] [facility], [place]          Dept Phone: [phone]           Dept Fax:  [phone]      Chief Compliant:  Chief Complaint   Patient presents with    Follow-up     Lt sided neck pain        History of Present Illness:   64 y.o. female presents to the office for complaint of neck pain. Patient has been seeing Dr. Yumiko Blanco for her knee issues and is also having neck pain. He states that it posterior neck pain she wakes up with headaches, she states that she takes over-the-counter medications that is not helping with her pain, she states that most of the pain is on her left side. She saw neurosurgery in the past but stated did not follow-up with them. Review of Systems   Constitutional: Negative for fever, sweats, weight loss, recent injury, or recent illness  Neurological: Negative for headaches, numbness,  Integumentary: Negative for rashes or swelling  Musculoskeletal: Neck pain      Physical Exam:  Constitutional: Patient is oriented to person, place, and time. Patient appears well-developed and well nourished. HENT: Negative otherwise noted  Neck: Normal range of motion Neck supple. Respiratory/Cardio: Effort normal. No respiratory distress. Musculoskeletal: Mild test palpation left-sided paraspinal muscle of the cervical spine, range of motion intact,  strength bilaterally 5 out of 5  Patient ambulatory  Neurological: Patient is alert and oriented to person, place, and time. Normal strenght. No sensory deficit. Skin: Skin is warm and dry    Nursing note and vitals reviewed.        Labs and Imagin view cervical spine x-ray shows mild degeneration, straightening of normal lordosis of cervical spine           Orders Placed This Encounter   Procedures    [facility] [place]     Referral Priority:   Routine     Referral Type:   Eval and Treat     Referral Reason:   Specialty Services Required     Requested Specialty:   Physical Therapy     Number of Visits Requested:   1       Assessment and Plan:  1. Neck pain            64 y.o. female   Physical therapy  Follow-up 6 weeks      Provider Attestation:  Mily Frankel, personally performed the services described in this documentation. All medical record entries made by the scribe were at my direction and in my presence. I have reviewed the chart and discharge instructions and agree that the records reflect my personal performance and is accurate and complete. 3/31/22       Please note that this chart was generated using voice recognition Dragon dictation software. Although every effort was made to ensure the accuracy of this automated transcription, some errors in transcription may have occurred.

## 2022-04-01 ENCOUNTER — HOSPITAL ENCOUNTER (OUTPATIENT)
Facility: MEDICAL CENTER | Age: 62
End: 2022-04-01

## 2022-04-04 ENCOUNTER — TELEPHONE (OUTPATIENT)
Dept: ORTHOPEDIC SURGERY | Age: 62
End: 2022-04-04

## 2022-04-04 RX ORDER — DICYCLOMINE HYDROCHLORIDE 10 MG/1
CAPSULE ORAL
Qty: 120 CAPSULE | Refills: 1 | Status: SHIPPED | OUTPATIENT
Start: 2022-04-04 | End: 2022-06-15

## 2022-04-04 NOTE — TELEPHONE ENCOUNTER
[address] M[address]ic[address] needs face to face notes with documentation for rollator walker    Please fax to [phone]    Thank you

## 2022-04-04 NOTE — TELEPHONE ENCOUNTER
Please Approve or Refuse. Send to Pharmacy per Pt's Request:      Next Visit Date:  Visit date not found   Last Visit Date: 1/3/2022    Hemoglobin A1C (%)   Date Value   01/03/2022 7.0   10/11/2021 7.7   06/29/2021 7.7 (H)             ( goal A1C is < 7)   BP Readings from Last 3 Encounters:   03/14/22 (!) 126/54   01/05/22 (!) 165/87   01/04/22 136/76          (goal 120/80)  BUN   Date Value Ref Range Status   03/14/2022 25 (H) 8 - 23 mg/dL Final     Comment:     SPECIMEN SLIGHTLY HEMOLYZED, RESULTS MAY BE ADVERSELY AFFECTED. CREATININE   Date Value Ref Range Status   03/14/2022 0.64 0.50 - 0.90 mg/dL Final     Comment:     SPECIMEN SLIGHTLY HEMOLYZED, RESULTS MAY BE ADVERSELY AFFECTED. Potassium   Date Value Ref Range Status   03/14/2022 4.8 3.7 - 5.3 mmol/L Final     Comment:     SPECIMEN SLIGHTLY HEMOLYZED, RESULTS MAY BE ADVERSELY AFFECTED.

## 2022-04-06 DIAGNOSIS — D50.8 IRON DEFICIENCY ANEMIA SECONDARY TO INADEQUATE DIETARY IRON INTAKE: Primary | ICD-10-CM

## 2022-04-07 ENCOUNTER — OFFICE VISIT (OUTPATIENT)
Dept: ONCOLOGY | Age: 62
End: 2022-04-07
Payer: COMMERCIAL

## 2022-04-07 ENCOUNTER — TELEPHONE (OUTPATIENT)
Dept: ONCOLOGY | Age: 62
End: 2022-04-07

## 2022-04-07 ENCOUNTER — HOSPITAL ENCOUNTER (OUTPATIENT)
Age: 62
Discharge: HOME | End: 2022-04-07
Payer: COMMERCIAL

## 2022-04-07 VITALS — TEMPERATURE: 98.6 F | HEART RATE: 84 BPM | DIASTOLIC BLOOD PRESSURE: 76 MMHG | SYSTOLIC BLOOD PRESSURE: 146 MMHG

## 2022-04-07 DIAGNOSIS — K90.9 IRON MALABSORPTION: ICD-10-CM

## 2022-04-07 DIAGNOSIS — D50.8 IRON DEFICIENCY ANEMIA SECONDARY TO INADEQUATE DIETARY IRON INTAKE: Primary | ICD-10-CM

## 2022-04-07 DIAGNOSIS — D64.9 NORMOCYTIC ANEMIA: ICD-10-CM

## 2022-04-07 LAB
CREATININE URINE: 122.5 MG/DL (ref 28–217)
MICROALBUMIN/CREAT 24H UR: 31 MG/L
MICROALBUMIN/CREAT UR-RTO: 25 MCG/MG CREAT
T3 FREE: 2.97 PG/ML (ref 2.02–4.43)
THYROXINE, FREE: 1.26 NG/DL (ref 0.93–1.7)
TSH SERPL DL<=0.05 MIU/L-ACNC: 2.05 UIU/ML (ref 0.3–5)

## 2022-04-07 PROCEDURE — 82043 UR ALBUMIN QUANTITATIVE: CPT

## 2022-04-07 PROCEDURE — 84439 ASSAY OF FREE THYROXINE: CPT

## 2022-04-07 PROCEDURE — 82570 ASSAY OF URINE CREATININE: CPT

## 2022-04-07 PROCEDURE — 3017F COLORECTAL CA SCREEN DOC REV: CPT | Performed by: INTERNAL MEDICINE

## 2022-04-07 PROCEDURE — G8417 CALC BMI ABV UP PARAM F/U: HCPCS | Performed by: INTERNAL MEDICINE

## 2022-04-07 PROCEDURE — G8428 CUR MEDS NOT DOCUMENT: HCPCS | Performed by: INTERNAL MEDICINE

## 2022-04-07 PROCEDURE — 1036F TOBACCO NON-USER: CPT | Performed by: INTERNAL MEDICINE

## 2022-04-07 PROCEDURE — 36415 COLL VENOUS BLD VENIPUNCTURE: CPT

## 2022-04-07 PROCEDURE — 99214 OFFICE O/P EST MOD 30 MIN: CPT | Performed by: INTERNAL MEDICINE

## 2022-04-07 PROCEDURE — 84481 FREE ASSAY (FT-3): CPT

## 2022-04-07 PROCEDURE — 99211 OFF/OP EST MAY X REQ PHY/QHP: CPT | Performed by: INTERNAL MEDICINE

## 2022-04-07 PROCEDURE — 84443 ASSAY THYROID STIM HORMONE: CPT

## 2022-04-07 NOTE — TELEPHONE ENCOUNTER
KATYA HERE FOR MD VISIT  RV 12 MTHS W/ CBC @ RV  LABS CDP 4/2023  MD VISIT 4/2023  AVS PRINTED W/ INSTRUCTIONS AND GIVEN TO PT ON EXIT

## 2022-04-10 NOTE — PROGRESS NOTES
_       Chief Complaint   Patient presents with    Follow-up    Discuss Labs     DIAGNOSIS:        Partially treated normocytic anemia  Iron deficiency anemia  Plan for right knee replacement surgery July 6, 2021  Multiple comorbidities as listed   CURRENT THERAPY:         Iron replacement. S/P iv iron infusion August 2021. BRIEF CASE HISTORY:      Ms. Carina Quinn is a very pleasant 64 y.o. female with history of multiple co morbidities as listed. Patient was referred for evaluation of anemia before knee surgery. Patient has history of iron deficiency and she has been maintained on oral iron over the last several years. She had multiple surgical operations and she had right shoulder replacement. The patient is scheduled for right knee replacement July 6, 2021. Presurgical evaluation showed anemia with hemoglobin of 11.4. Patient has no melena or hematochezia. No hematemesis. No vaginal bleeding. No hematuria. She has generalized weakness and fatigue. She has joints problems. She has limited activities due to weight and due to joints problems. No fever or night sweats. No history of smoking or alcohol drinking. .     INTERIM HISTORY:    seen for follow up anemia. Patient had iron replacement. Good response. She has no active bleeding.       PAST MEDICAL HISTORY: has a past medical history of Anemia, Anginal pain ([address] Arthritis, Arthritis of right knee, Asthma, Astigmatism, Back pain, Blurry vision, bilateral, Carpal tunnel syndrome of right wrist, Cataract, Closed displaced fracture of lesser tuberosity of right humerus, Constipation, Depression, Dysmenorrhea, Fatty liver disease, nonalcoholic, GERD (gastroesophageal reflux disease), Gout, Headache, Heart murmur, Heart palpitations, Heart palpitations, History of blood transfusion, History of rib fracture, Hyperlipidemia, Hypertension, Hypertriglyceridemia, Hypothyroidism, Myopia with astigmatism and presbyopia, Neuropathy, Obesity, Osteoporosis, Palpitations, Pancreatitis, Panic attack, PONV (postoperative nausea and vomiting), Positive cardiac stress test, Presbyopia, RLS (restless legs syndrome), Sacroiliitis ([facility] [address] Sleep apnea, Status post reverse total arthroplasty of left shoulder, Status post total replacement of right shoulder, Stenosis of both internal carotid arteries- Mild 16-49%, Type II or unspecified type diabetes mellitus without mention of complication, not stated as uncontrolled, and Umbilical hernia. PAST SURGICAL HISTORY: has a past surgical history that includes  section; Dilation and curettage of uterus; Colonoscopy; joint replacement (Left, 2016); Total shoulder arthroplasty (Right, 2017); REPLACEMENT SHOULDER TOTAL (Right, 2017); pr egd transoral biopsy single/multiple (N/A, 10/12/2017); pr colon ca scrn not hi rsk ind (N/A, 10/12/2017); Cardiac catheterization; Cardiac catheterization (2021); and Breast biopsy (Left). CURRENT MEDICATIONS:  has a current medication list which includes the following prescription(s): dicyclomine, tizanidine, allopurinol, hydrocodone-acetaminophen, meclizine, aspirin, ondansetron, famotidine, nystatin, iron, acetaminophen, insulin glargine, furosemide, eq allergy relief, ropinirole, cyclobenzaprine, duloxetine, albuterol sulfate hfa, venlafaxine, lisinopril, pantoprazole, ammonium lactate, amitriptyline, darifenacin, levothyroxine, fish oil, oxybutynin, aspirin-acetaminophen-caffeine, vitamin d3, magnesium oxide, freestyle lite, metformin, liothyronine, freestyle lite, glucose monitoring, b-12, lidocaine, liraglutide, v-go 40, and gabapentin.     ALLERGIES:  is allergic to ozempic (0.25 or 0.5 mg-dose) [semaglutide(0.25 or 0.5mg-dos)]; actos [pioglitazone]; amitriptyline; celebrex [celecoxib]; fenofibrate; gemfibrozil; lovastatin; prempro [conj estrog-medroxyprogest ace]; statins; tricor [fenofibrate]; zetia [ezetimibe]; zocor [simvastatin]; ampicillin; niacin and related; novolin [insulin isophane & reg, human]; omeprazole; pcn [penicillins]; pregabalin; and vesicare [solifenacin]. FAMILY HISTORY: Negative for any hematological or oncological conditions. SOCIAL HISTORY:  reports that she quit smoking about 44 years ago. Her smoking use included cigarettes. She has a 10.00 pack-year smoking history. She has never used smokeless tobacco. She reports current alcohol use. She reports that she does not use drugs. REVIEW OF SYSTEMS:     · General: Positive for weakness and fatigue. No unanticipated weight loss or decreased appetite. No fever or chills. · Eyes: No blurred vision, eye pain or double vision. · Ears: No hearing problems or drainage. No tinnitus. · Throat: No sore throat, problems with swallowing or dysphagia. · Respiratory: No cough, sputum or hemoptysis. No shortness of breath. No pleuritic chest pain. · Cardiovascular: No chest pain, orthopnea or PND. No lower extremity edema. No palpitation. · Gastrointestinal: No problems with swallowing. No abdominal pain or bloating. No nausea or vomiting. No diarrhea or constipation. No GI bleeding. · Genitourinary: No dysuria, hematuria, frequency or urgency. · Musculoskeletal: Limited activities due to joints problems. · Dermatologic: No skin rashes or pruritus. No skin lesions or discolorations. · Psychiatric: No depression, anxiety, or stress or signs of schizophrenia. No change in mood or affect. · Hematologic: No history of bleeding tendency. No bruises or ecchymosis. No history of clotting problems. · Infectious disease: No fever, chills or frequent infections. · Endocrine: No polydipsia or polyuria. No temperature intolerance. · Neurologic: No headaches or dizziness. No weakness or numbness of the extremities. No changes in balance, coordination,  memory, mentation, behavior. · Allergic/Immunologic: No nasal congestion or hives. No repeated infections. PHYSICAL EXAM:  The patient is not in acute distress. Vital signs: Blood pressure (!) 146/76, pulse 84, temperature 98.6 °F (37 °C), temperature source Temporal, not currently breastfeeding. General appearance - well appearing, not in pain or distress  Mental status - good mood, alert and oriented  Eyes - pupils equal and reactive, extraocular eye movements intact  Ears - bilateral TM's and external ear canals normal  Nose - normal and patent, no erythema, discharge or polyps  Mouth - mucous membranes moist, pharynx normal without lesions  Neck - supple, no significant adenopathy  Lymphatics - no palpable lymphadenopathy, no hepatosplenomegaly  Chest - clear to auscultation, no wheezes, rales or rhonchi, symmetric air entry  Heart - normal rate, regular rhythm, normal S1, S2, no murmurs, rubs, clicks or gallops  Abdomen - soft, nontender, nondistended, no masses or organomegaly  Neurological - alert, oriented, normal speech, no focal findings or movement disorder noted  Musculoskeletal -bilateral knee swelling and tenderness right more than left.   Extremities - peripheral pulses normal, no pedal edema, no clubbing or cyanosis  Skin - normal coloration and turgor, no rashes, no suspicious skin lesions noted     Review of Diagnostic data:   Lab Results   Component Value Date    WBC 16.1 (H) 03/14/2022    HGB 14.3 03/14/2022    HCT 42.3 03/14/2022    MCV 88.2 03/14/2022     03/14/2022       Chemistry        Component Value Date/Time     (L) 03/14/2022 1755    K 4.8 03/14/2022 1755    CL 98 03/14/2022 1755    CO2 22 03/14/2022 1755    BUN 25 (H) 03/14/2022 1755    CREATININE 0.64 03/14/2022 1755        Component Value Date/Time    CALCIUM 8.5 (L) 03/14/2022 1755    ALKPHOS 84 03/14/2022 1755    AST 27 03/14/2022 1755    ALT 23 03/14/2022 1755    BILITOT 0.39 03/14/2022 1755            IMPRESSION:   Partially treated normocytic anemia  Iron deficiency anemia  Plan for right knee replacement surgery July 6, 2021  Multiple comorbidities as listed    PLAN: I reviewed the labs as above and discussed with the patient. I explained to the patient the nature of this hematologic problem. I explained the significance of these abnormalities in layman language. Reviewing patient's labs over the last several years obviously she had anemia in several occasions. She has been maintained on oral iron for iron deficiency anemia. She did not have any good response. She had very good response to IV iron. We will see her in 12 months with labs  Patient's questions were answered to the best of her satisfaction and she verbalized full understanding and agreement.

## 2022-04-18 ENCOUNTER — TELEPHONE (OUTPATIENT)
Dept: ORTHOPEDIC SURGERY | Age: 62
End: 2022-04-18

## 2022-04-18 NOTE — TELEPHONE ENCOUNTER
Patient called, the medication she was given for her neck, for pain, is not working for her. Patient to like to know if something else can be called in. Pharmacy is [facility] in [facility].     Please contact pt at [phone]

## 2022-04-18 NOTE — TELEPHONE ENCOUNTER
Patient calling to be scheduled for surgery. It appears that she was scheduled in October for Right TKA it was canceled due to sugars. Patient states her sugar is better and would like to be scheduled

## 2022-04-18 NOTE — TELEPHONE ENCOUNTER
Maria Hayes it appears patient was prescribed Tizanidine 2 mg #20 3/31.  Willing to prescribe patient anything else

## 2022-04-19 ENCOUNTER — OFFICE VISIT (OUTPATIENT)
Dept: FAMILY MEDICINE CLINIC | Age: 62
End: 2022-04-19
Payer: COMMERCIAL

## 2022-04-19 VITALS
HEART RATE: 82 BPM | WEIGHT: 264 LBS | BODY MASS INDEX: 49.88 KG/M2 | TEMPERATURE: 98.2 F | SYSTOLIC BLOOD PRESSURE: 108 MMHG | DIASTOLIC BLOOD PRESSURE: 78 MMHG

## 2022-04-19 DIAGNOSIS — K86.2 PANCREAS CYST: ICD-10-CM

## 2022-04-19 DIAGNOSIS — M25.531 RIGHT WRIST PAIN: Primary | ICD-10-CM

## 2022-04-19 PROCEDURE — G8427 DOCREV CUR MEDS BY ELIG CLIN: HCPCS | Performed by: NURSE PRACTITIONER

## 2022-04-19 PROCEDURE — 99213 OFFICE O/P EST LOW 20 MIN: CPT | Performed by: NURSE PRACTITIONER

## 2022-04-19 PROCEDURE — G8417 CALC BMI ABV UP PARAM F/U: HCPCS | Performed by: NURSE PRACTITIONER

## 2022-04-19 PROCEDURE — 3017F COLORECTAL CA SCREEN DOC REV: CPT | Performed by: NURSE PRACTITIONER

## 2022-04-19 PROCEDURE — 1036F TOBACCO NON-USER: CPT | Performed by: NURSE PRACTITIONER

## 2022-04-19 RX ORDER — MELOXICAM 15 MG/1
15 TABLET ORAL DAILY
Qty: 90 TABLET | Refills: 1 | Status: SHIPPED | OUTPATIENT
Start: 2022-04-19

## 2022-04-19 ASSESSMENT — ENCOUNTER SYMPTOMS
DIARRHEA: 0
COUGH: 0
RHINORRHEA: 0
ABDOMINAL DISTENTION: 0
VOMITING: 0
SHORTNESS OF BREATH: 0
NAUSEA: 0
ABDOMINAL PAIN: 0
CONSTIPATION: 0
BACK PAIN: 0
SORE THROAT: 0
CHEST TIGHTNESS: 0

## 2022-04-19 NOTE — TELEPHONE ENCOUNTER
Please advise patient to contact her pcp or pain doctor for her chronic pain. This office will only prescribe pain meds after surgery.
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
## 2022-04-21 NOTE — TELEPHONE ENCOUNTER
Please Approve or Refuse. Send to Pharmacy per Pt's Request:     Next Visit Date:  Visit date not found   Last Visit Date: 4/19/2022    Hemoglobin A1C (%)   Date Value   01/03/2022 7.0   10/11/2021 7.7   06/29/2021 7.7 (H)             ( goal A1C is < 7)   BP Readings from Last 3 Encounters:   04/19/22 108/78   04/07/22 (!) 146/76   03/14/22 (!) 126/54          (goal 120/80)  BUN   Date Value Ref Range Status   03/14/2022 25 (H) 8 - 23 mg/dL Final     Comment:     SPECIMEN SLIGHTLY HEMOLYZED, RESULTS MAY BE ADVERSELY AFFECTED. CREATININE   Date Value Ref Range Status   03/14/2022 0.64 0.50 - 0.90 mg/dL Final     Comment:     SPECIMEN SLIGHTLY HEMOLYZED, RESULTS MAY BE ADVERSELY AFFECTED. Potassium   Date Value Ref Range Status   03/14/2022 4.8 3.7 - 5.3 mmol/L Final     Comment:     SPECIMEN SLIGHTLY HEMOLYZED, RESULTS MAY BE ADVERSELY AFFECTED.

## 2022-04-25 RX ORDER — FAMOTIDINE 20 MG/1
20 TABLET, FILM COATED ORAL 2 TIMES DAILY
Qty: 60 TABLET | Refills: 0 | Status: SHIPPED | OUTPATIENT
Start: 2022-04-25 | End: 2022-06-09 | Stop reason: SDUPTHER

## 2022-05-02 RX ORDER — BENZOYL PEROXIDE
KIT TOPICAL
Qty: 90 TABLET | Refills: 0 | Status: SHIPPED | OUTPATIENT
Start: 2022-05-02 | End: 2022-07-20

## 2022-05-03 DIAGNOSIS — M54.2 NECK PAIN: ICD-10-CM

## 2022-05-05 RX ORDER — TIZANIDINE 2 MG/1
2 TABLET ORAL EVERY 8 HOURS PRN
Qty: 20 TABLET | Refills: 0 | Status: SHIPPED | OUTPATIENT
Start: 2022-05-05

## 2022-05-05 RX ORDER — TRAMADOL HYDROCHLORIDE 50 MG/1
TABLET ORAL
Qty: 12 TABLET | Refills: 0 | OUTPATIENT
Start: 2022-05-05

## 2022-05-05 RX ORDER — METHOCARBAMOL 500 MG/1
500 TABLET, FILM COATED ORAL 4 TIMES DAILY
Qty: 40 TABLET | Refills: 0 | Status: SHIPPED | OUTPATIENT
Start: 2022-05-05 | End: 2022-05-15

## 2022-05-12 ENCOUNTER — OFFICE VISIT (OUTPATIENT)
Dept: ORTHOPEDIC SURGERY | Age: 62
End: 2022-05-12
Payer: COMMERCIAL

## 2022-05-12 VITALS — BODY MASS INDEX: 49.09 KG/M2 | RESPIRATION RATE: 14 BRPM | WEIGHT: 260 LBS | HEIGHT: 61 IN

## 2022-05-12 DIAGNOSIS — M54.12 CERVICAL RADICULOPATHY: ICD-10-CM

## 2022-05-12 DIAGNOSIS — M54.2 NECK PAIN: Primary | ICD-10-CM

## 2022-05-12 PROCEDURE — G8427 DOCREV CUR MEDS BY ELIG CLIN: HCPCS | Performed by: PHYSICIAN ASSISTANT

## 2022-05-12 PROCEDURE — G8417 CALC BMI ABV UP PARAM F/U: HCPCS | Performed by: PHYSICIAN ASSISTANT

## 2022-05-12 PROCEDURE — 3017F COLORECTAL CA SCREEN DOC REV: CPT | Performed by: PHYSICIAN ASSISTANT

## 2022-05-12 PROCEDURE — 99213 OFFICE O/P EST LOW 20 MIN: CPT | Performed by: PHYSICIAN ASSISTANT

## 2022-05-12 PROCEDURE — 1036F TOBACCO NON-USER: CPT | Performed by: PHYSICIAN ASSISTANT

## 2022-05-12 NOTE — PROGRESS NOTES
[address] [facility], [address] [facility]           Dept Phone: [phone]           Dept Fax:  [address] [place]          Dept Phone: [phone]           Dept Fax:  [phone]      Chief Compliant:  Chief Complaint   Patient presents with    Follow-up     Lt sided neck pain        History of Present Illness:   64 y.o. female presents to the office for follow up of neck pain. Has not started physical therapy due to being sick, she states she was started next week. She was supposed to be scheduled for knee replacement next week with Dr. Jacqueline Gonzalez, she states that she postpone this until August      Review of Systems   Constitutional: Negative for fever, sweats, weight loss, recent injury, or recent illness  Neurological: Negative for headaches, numbness,  Integumentary: Negative for rashes or swelling  Musculoskeletal: neck pain        Physical Exam:  Constitutional: Patient is oriented to person, place, and time. Patient appears well-developed and well nourished. HENT: Negative otherwise noted  Neck: Normal range of motion Neck supple. Respiratory/Cardio: Effort normal. No respiratory distress. Musculoskeletal: Full range of motion, neurovascular intact upper extremities bilaterally  Neurological: Patient is alert and oriented to person, place, and time. Normal strenght. No sensory deficit. Skin: Skin is warm and dry    Nursing note and vitals reviewed. Labs and Imaging:            No orders of the defined types were placed in this encounter. Assessment and Plan:  1. Neck pain    2.  Cervical radiculopathy            64 y.o. female   EMG bilateral upper extremities to rule out cervical radiculopathy  Patient will start physical therapy this week  Follow-up after physical therapy, consider MRI in future    Patient's knee surgery has been postponed to August due to her  being sick and needing his own surgery. Provider Attestation:  Ashley Wilson, personally performed the services described in this documentation. All medical record entries made by the scribe were at my direction and in my presence. I have reviewed the chart and discharge instructions and agree that the records reflect my personal performance and is accurate and complete. 5/12/22       Please note that this chart was generated using voice recognition Dragon dictation software. Although every effort was made to ensure the accuracy of this automated transcription, some errors in transcription may have occurred.

## 2022-05-17 ENCOUNTER — HOSPITAL ENCOUNTER (OUTPATIENT)
Age: 62
Discharge: HOME | End: 2022-05-19
Payer: COMMERCIAL

## 2022-05-17 ENCOUNTER — HOSPITAL ENCOUNTER (OUTPATIENT)
Dept: GENERAL RADIOLOGY | Age: 62
Discharge: HOME | End: 2022-05-19
Payer: COMMERCIAL

## 2022-05-17 DIAGNOSIS — M25.531 RIGHT WRIST PAIN: ICD-10-CM

## 2022-05-17 PROCEDURE — 73110 X-RAY EXAM OF WRIST: CPT

## 2022-05-18 ENCOUNTER — HOSPITAL ENCOUNTER (OUTPATIENT)
Dept: MRI IMAGING | Age: 62
Discharge: HOME | End: 2022-05-20
Payer: COMMERCIAL

## 2022-05-18 DIAGNOSIS — K86.2 PANCREAS CYST: ICD-10-CM

## 2022-05-18 LAB
GFR NON-AFRICAN AMERICAN: >60 ML/MIN
GFR SERPL CREATININE-BSD FRML MDRD: >60 ML/MIN
GFR SERPL CREATININE-BSD FRML MDRD: NORMAL ML/MIN/{1.73_M2}
POC CREATININE: 0.78 MG/DL (ref 0.51–1.19)

## 2022-05-18 PROCEDURE — 2580000003 HC RX 258: Performed by: NURSE PRACTITIONER

## 2022-05-18 PROCEDURE — 74183 MRI ABD W/O CNTR FLWD CNTR: CPT

## 2022-05-18 PROCEDURE — 6360000004 HC RX CONTRAST MEDICATION: Performed by: NURSE PRACTITIONER

## 2022-05-18 PROCEDURE — A9579 GAD-BASE MR CONTRAST NOS,1ML: HCPCS | Performed by: NURSE PRACTITIONER

## 2022-05-18 PROCEDURE — 82565 ASSAY OF CREATININE: CPT

## 2022-05-18 RX ORDER — 0.9 % SODIUM CHLORIDE 0.9 %
80 INTRAVENOUS SOLUTION INTRAVENOUS ONCE
Status: COMPLETED | OUTPATIENT
Start: 2022-05-18 | End: 2022-05-18

## 2022-05-18 RX ORDER — SODIUM CHLORIDE 0.9 % (FLUSH) 0.9 %
10 SYRINGE (ML) INJECTION PRN
Status: DISCONTINUED | OUTPATIENT
Start: 2022-05-18 | End: 2022-05-21 | Stop reason: HOSPADM

## 2022-05-18 RX ADMIN — GADOTERIDOL 20 ML: 279.3 INJECTION, SOLUTION INTRAVENOUS at 11:52

## 2022-05-18 RX ADMIN — SODIUM CHLORIDE, PRESERVATIVE FREE 10 ML: 5 INJECTION INTRAVENOUS at 11:53

## 2022-05-18 RX ADMIN — SODIUM CHLORIDE 80 ML: 9 INJECTION, SOLUTION INTRAVENOUS at 11:52

## 2022-05-24 ENCOUNTER — TELEPHONE (OUTPATIENT)
Dept: FAMILY MEDICINE CLINIC | Age: 62
End: 2022-05-24

## 2022-05-24 NOTE — TELEPHONE ENCOUNTER
Patient had a MRI done last week of her stomach, and found out she had a cyst.    Patient would like a call back regarding these results, and what PCP recommends going forward. Please advise.

## 2022-05-24 NOTE — TELEPHONE ENCOUNTER
It looks like [facility] discussed the results with her on 5/20. Her MRI does show a cystic lesion and recommends a 1 year follow up.  I would also like her tot make an appointment with GI (she has seen Dr. Monika Fuller) for the fatty liver and to review the MRI

## 2022-05-26 NOTE — TELEPHONE ENCOUNTER
Left message for patient to call the office in the morning when phones are on or to send a [employer] message.

## 2022-05-27 ENCOUNTER — TELEPHONE (OUTPATIENT)
Dept: FAMILY MEDICINE CLINIC | Age: 62
End: 2022-05-27

## 2022-05-27 NOTE — TELEPHONE ENCOUNTER
Patient calling in regards results. She wanted to know what to do with the results. Per MRI note/ Graylin Counts would like patient to see GI. Patient stated that somebody mention seeing Dr. Ilana Uriostegui.   Patient is calling his office/ stated she had the number.

## 2022-06-01 ENCOUNTER — TELEPHONE (OUTPATIENT)
Dept: GASTROENTEROLOGY | Age: 62
End: 2022-06-01

## 2022-06-01 NOTE — TELEPHONE ENCOUNTER
Patient called stating that her pcp sent over a referral for a office visit. She is already established with [facility]. Called patient back no answer.

## 2022-06-09 RX ORDER — FAMOTIDINE 20 MG/1
20 TABLET, FILM COATED ORAL 2 TIMES DAILY
Qty: 60 TABLET | Refills: 0 | Status: SHIPPED | OUTPATIENT
Start: 2022-06-09 | End: 2022-07-06

## 2022-06-14 ENCOUNTER — OFFICE VISIT (OUTPATIENT)
Dept: ORTHOPEDIC SURGERY | Age: 62
End: 2022-06-14
Payer: COMMERCIAL

## 2022-06-14 VITALS — BODY MASS INDEX: 49.09 KG/M2 | RESPIRATION RATE: 14 BRPM | WEIGHT: 260 LBS | HEIGHT: 61 IN

## 2022-06-14 DIAGNOSIS — M54.2 NECK PAIN: Primary | ICD-10-CM

## 2022-06-14 DIAGNOSIS — M54.12 CERVICAL RADICULOPATHY: ICD-10-CM

## 2022-06-14 PROCEDURE — 3017F COLORECTAL CA SCREEN DOC REV: CPT | Performed by: ORTHOPAEDIC SURGERY

## 2022-06-14 PROCEDURE — G8427 DOCREV CUR MEDS BY ELIG CLIN: HCPCS | Performed by: ORTHOPAEDIC SURGERY

## 2022-06-14 PROCEDURE — 99213 OFFICE O/P EST LOW 20 MIN: CPT | Performed by: ORTHOPAEDIC SURGERY

## 2022-06-14 PROCEDURE — G8417 CALC BMI ABV UP PARAM F/U: HCPCS | Performed by: ORTHOPAEDIC SURGERY

## 2022-06-14 PROCEDURE — 1036F TOBACCO NON-USER: CPT | Performed by: ORTHOPAEDIC SURGERY

## 2022-06-14 NOTE — PROGRESS NOTES
Patient ID: Shelvia Essex is a 64 y.o. female    Chief Compliant:  Chief Complaint   Patient presents with    Neck Pain    Back Pain        Diagnostic imaging:  X-rays and CT scan cervical spine multilevel cervical spondylosis largely age-appropriate      Assessment and Plan:  1. Neck pain    2. Cervical radiculopathy      Patient reports that she did not go to physical therapy because its not helped her with anything in the past      Re-order EMG    Re-order PT    MRI in future if not improving with PT    Follow up 6 weeks     HPI:  This is a 64 y.o. female who presents to the clinic today for neck and back pain. Patient was seen by LAUREN Raymundo on 5/12/2022. EMG of bilateral upper extremities was ordered and the patient was prescribed PT. Patient did not complete EMG or PT. She states that she was sick when they called to schedule PT. She does not want to drive PT due to gas prices. She reports worsening neck pain and headaches that last until the evening. Review of Systems   All other systems reviewed and are negative.       Past History:    Current Outpatient Medications:     famotidine (PEPCID) 20 MG tablet, Take 1 tablet by mouth 2 times daily, Disp: 60 tablet, Rfl: 0    tiZANidine (ZANAFLEX) 2 MG tablet, TAKE 1 TABLET BY MOUTH EVERY 8 HOURS AS NEEDED (NECK  SPASM  AND  PAIN), Disp: 20 tablet, Rfl: 0    EQ ALLERGY RELIEF 10 MG tablet, Take 1 tablet by mouth once daily, Disp: 90 tablet, Rfl: 0    VENTOLIN  (90 Base) MCG/ACT inhaler, INHALE 2 PUFFS BY MOUTH EVERY 6 HOURS AS NEEDED FOR WHEEZING AND FOR SHORTNESS OF BREATH, Disp: 18 g, Rfl: 0    meloxicam (MOBIC) 15 MG tablet, Take 1 tablet by mouth daily, Disp: 90 tablet, Rfl: 1    dicyclomine (BENTYL) 10 MG capsule, TAKE 1 CAPSULE BY MOUTH 4 TIMES DAILY AS NEEDED FOR CRAMPS, Disp: 120 capsule, Rfl: 1    allopurinol (ZYLOPRIM) 100 MG tablet, Take 1 tablet by mouth once daily, Disp: 90 tablet, Rfl: 0    HYDROcodone-acetaminophen (NORCO) 5-325 MG per tablet, , Disp: , Rfl:     meclizine (ANTIVERT) 25 MG tablet, TAKE 1 TABLET BY MOUTH THREE TIMES DAILY AS NEEDED FOR  DIZZINESS, Disp: 60 tablet, Rfl: 0    aspirin 81 MG chewable tablet, CHEW AND SWALLOW 1 TABLET BY MOUTH ONCE DAILY, Disp: 90 tablet, Rfl: 0    ondansetron (ZOFRAN) 4 MG tablet, TAKE 1 TABLET BY MOUTH EVERY 8 HOURS AS NEEDED FOR NAUSEA FOR VOMITING, Disp: 20 tablet, Rfl: 0    nystatin (NYSTATIN) 076123 UNIT/GM powder, APPLY POWDER TOPICALLY TO ABDOMINAL FOLDS THREE TIMES DAILY AS NEEDED FOR SKIN IRRITATION, Disp: 60 g, Rfl: 3    Ferrous Sulfate (IRON) 325 (65 Fe) MG TABS, Take 1 tablet by mouth once daily with breakfast, Disp: 90 tablet, Rfl: 1    acetaminophen (TYLENOL) 500 MG tablet, Take 2 tablets by mouth every 8 hours as needed for Pain, Disp: 42 tablet, Rfl: 0    insulin glargine (LANTUS) 100 UNIT/ML injection vial, Inject 60 Units into the skin nightly Changed per Dr. Adriana Kimball office, Disp: 10 mL, Rfl: 0    furosemide (LASIX) 20 MG tablet, Take 1 tablet by mouth once daily, Disp: 90 tablet, Rfl: 1    rOPINIRole (REQUIP) 2 MG tablet, TAKE 1 TABLET BY MOUTH THREE TIMES DAILY, Disp: 90 tablet, Rfl: 0    cyclobenzaprine (FLEXERIL) 10 MG tablet, Take one tablet by mouth three times daily as needed, Disp: 90 tablet, Rfl: 0    DULoxetine (CYMBALTA) 60 MG extended release capsule, TAKE 1 CAPSULE BY MOUTH NIGHTLY, Disp: 90 capsule, Rfl: 1    venlafaxine (EFFEXOR XR) 37.5 MG extended release capsule, TAKE 1 CAPSULE BY MOUTH THREE TIMES DAILY, Disp: 90 capsule, Rfl: 0    lisinopril (PRINIVIL;ZESTRIL) 5 MG tablet, Take 1 tablet by mouth once daily, Disp: 90 tablet, Rfl: 3    gabapentin (NEURONTIN) 600 MG tablet, TAKE 1 TABLET BY MOUTH THREE TIMES DAILY, Disp: 270 tablet, Rfl: 0    pantoprazole (PROTONIX) 40 MG tablet, Take 1 tablet by mouth once daily, Disp: 90 tablet, Rfl: 3    ammonium lactate (LAC-HYDRIN) 12 % cream, Apply topically as needed. , Disp: 1 Bottle, Rfl: 3    amitriptyline (ELAVIL) 50 MG tablet, Take 50 mg by mouth nightly, Disp: , Rfl:     darifenacin (ENABLEX) 15 MG extended release tablet, Take 15 mg by mouth 2 times daily, Disp: , Rfl:     levothyroxine (SYNTHROID) 112 MCG tablet, Take 112 mcg by mouth Daily, Disp: , Rfl:     Omega-3 Fatty Acids (FISH OIL) 1000 MG CAPS, Take 3,000 mg by mouth daily, Disp: , Rfl:     oxybutynin (DITROPAN XL) 15 MG extended release tablet, Take 15 mg by mouth daily, Disp: , Rfl:     Aspirin-Acetaminophen-Caffeine (EXCEDRIN PO), Take 2 tablets by mouth daily as needed (headache), Disp: , Rfl:     Cholecalciferol (VITAMIN D3) 50 MCG (2000 UT) TABS, Take 1 tablet by mouth once daily, Disp: 30 tablet, Rfl: 0    Magnesium Oxide 500 MG TABS, TAKE 1 TABLET BY MOUTH TWICE DAILY, Disp: 180 tablet, Rfl: 1    blood glucose test strips (FREESTYLE LITE) strip, 1 each by Does not apply route 6 times daily, Disp: 200 each, Rfl: 5    metFORMIN (GLUCOPHAGE) 1000 MG tablet, TAKE 1 TABLET BY MOUTH TWICE DAILY, Disp: 60 tablet, Rfl: 0    liothyronine (CYTOMEL) 5 MCG tablet, Take 5 mcg by mouth daily, Disp: , Rfl:     Blood Glucose Monitoring Suppl (FREESTYLE LITE) KYLAH, 1 Device by Does not apply route 6 times daily, Disp: 1 Device, Rfl: 0    glucose monitoring kit (FREESTYLE) monitoring kit, 1 kit by Does not apply route daily, Disp: 1 kit, Rfl: 0    Cyanocobalamin (B-12) 2500 MCG TABS, Take 1 tablet by mouth daily , Disp: , Rfl:     lidocaine (XYLOCAINE) 5 % ointment, Apply topically as needed. , Disp: 1 Tube, Rfl: 3    Liraglutide (VICTOZA) 18 MG/3ML SOPN SC injection, Inject 1.8 mg into the skin daily , Disp: , Rfl:     Insulin Disposable Pump (V-GO 40) KIT, Use as directed per Dr. Riaz Rahman., Disp: , Rfl:   Allergies   Allergen Reactions    Ozempic (0.25 Or 0.5 Mg-Dose) [Semaglutide(0.25 Or 0.5mg-Dos)] Other (See Comments)     Severe stomach pain    Actos [Pioglitazone]      Patient unsure of reaction    Amitriptyline Hives    Celebrex [Celecoxib] Hives, Itching and Dermatitis     Burnt red blister on ashlee    Fenofibrate Other (See Comments)     Muscle cramps    Gemfibrozil Other (See Comments)     Muscle pain, spasms, weakness and HA  Muscle pain, spasms, weakness and HA    Lovastatin Other (See Comments)     Muscle cramps  Muscle cramps    Prempro [Conj Estrog-Medroxyprogest Ace] Other (See Comments)     Breast tenderness, pain in vagina, cramping    Statins Other (See Comments)     Muscle cramps  Lipitor ok    Tricor [Fenofibrate] Other (See Comments)     Muscle cramps    Zetia [Ezetimibe] Other (See Comments)     Does not remember reaction  Does not remember reaction  Does not remember reaction    Zocor [Simvastatin] Other (See Comments)     Muscle cramps    Ampicillin Rash    Niacin And Related Rash    Novolin [Insulin Isophane & Reg, Human] Rash    Omeprazole Nausea And Vomiting    Pcn [Penicillins] Rash    Pregabalin Nausea And Vomiting and Rash    Vesicare [Solifenacin] Rash     Social History     Socioeconomic History    Marital status:      Spouse name: Not on file    Number of children: Not on file    Years of education: Not on file    Highest education level: Not on file   Occupational History     Employer: DISABLED   Tobacco Use    Smoking status: Former Smoker     Packs/day: 0.50     Years: 20.00     Pack years: 10.00     Types: Cigarettes     Quit date: 1977     Years since quittin.5    Smokeless tobacco: Never Used   Vaping Use    Vaping Use: Never used   Substance and Sexual Activity    Alcohol use:  Yes     Alcohol/week: 0.0 standard drinks     Comment: rtare    Drug use: No    Sexual activity: Yes     Partners: Male   Other Topics Concern    Not on file   Social History Narrative    Not on file     Social Determinants of Health     Financial Resource Strain:     Difficulty of Paying Living Expenses: Not on file   Food Insecurity:     Worried About [address] in the Last Year: Not on file    Ran Out of Food in the Last Year: Not on file   Transportation Needs:     Lack of Transportation (Medical): Not on file    Lack of Transportation (Non-Medical):  Not on file   Physical Activity:     Days of Exercise per Week: Not on file    Minutes of Exercise per Session: Not on file   Stress:     Feeling of Stress : Not on file   Social Connections:     Frequency of Communication with Friends and Family: Not on file    Frequency of Social Gatherings with Friends and Family: Not on file    Attends Judaism Services: Not on file    Active Member of [address] [place] [address] or Organizations: Not on file    Attends Club or Organization Meetings: Not on file    Marital Status: Not on file   Intimate Partner Violence:     Fear of Current or Ex-Partner: Not on file    Emotionally Abused: Not on file    Physically Abused: Not on file    Sexually Abused: Not on file   Housing Stability:     Unable to Pay for Housing in the Last Year: Not on file    Number of [place] in the Last Year: Not on file    Unstable Housing in the Last Year: Not on file     Past Medical History:   Diagnosis Date    Anemia     receives injectafer (iron infusions)    Anginal pain ([address]     last used Nitro sl 4 yrs 2013  (currently off this medication written: 10/23/2019); no episodes for about a year (written 6/10/21)    Arthritis     Arthritis of right knee 8/15/2018    Asthma     uses inhaler as needed, managed by Sallie Simpson NP    Astigmatism 8/22/2014    Back pain     radiculopathy left leg    Blurry vision, bilateral 7/8/2016    Carpal tunnel syndrome of right wrist 11/26/2012    Cataract     patient denies    Closed displaced fracture of lesser tuberosity of right humerus 7/11/2016    Constipation     Depression     Dysmenorrhea     Fatty liver disease, nonalcoholic     GERD (gastroesophageal reflux disease)     Gout     found through bloodwork    Headache     Heart murmur     Heart palpitations     Heart palpitations     History of blood transfusion     thinks she might have had a transfusion doesn't remember why or when    History of rib fracture 2016    Right    Hyperlipidemia     Hypertension     Hypertriglyceridemia     Hypothyroidism 2016    Myopia with astigmatism and presbyopia 2014    Neuropathy     bilateral legs and feet    Obesity     Osteoporosis     Palpitations     about once a month    Pancreatitis     no problems    Panic attack 10/30/2014    PONV (postoperative nausea and vomiting)     mild    Positive cardiac stress test     Presbyopia 2014    RLS (restless legs syndrome)     Sacroiliitis ([facility] [address] 2014    Sleep apnea     does not use Cpap    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    Status post total replacement of right shoulder 2017    Stenosis of both internal carotid arteries- Mild 16-49% 2017    Type II or unspecified type diabetes mellitus without mention of complication, not stated as uncontrolled     checks daily, has Insulin Pump, has a wearable glucose monitor    Umbilical hernia 4829     Past Surgical History:   Procedure Laterality Date    BREAST BIOPSY Left     negative    CARDIAC CATHETERIZATION      Patient states approximately  she had the cardiac catheterization that was negative     CARDIAC CATHETERIZATION  2021     SECTION      x 2    COLONOSCOPY      DILATION AND CURETTAGE OF UTERUS      JOINT REPLACEMENT Left 2016    shoulder    MS COLON CA SCRN NOT  [address] IND N/A 10/12/2017    COLONOSCOPY performed by Taryn Martell MD at [address] EGD TRANSORAL BIOPSY SINGLE/MULTIPLE N/A 10/12/2017    EGD BIOPSY performed by Taryn Martell MD at [address] Right 2017    SHOULDER TOTAL ARTHROPLASTY RIGHT WITH ARTHREX, CELLSAVER AND AQUAMANTIS performed by Enriqueta Benavidez DO at [facility] Right 05/09/2017     Family History   Problem Relation Age of Onset    Emphysema Mother     Diabetes Father     Heart Disease Father     High Cholesterol Sister     High Blood Pressure Sister     Heart Disease Sister         Physical Exam:  Vitals signs and nursing note reviewed. Constitutional:       Appearance: well-developed. HENT:      Head: Normocephalic and atraumatic. Nose: Nose normal.   Eyes:      Conjunctiva/sclera: Conjunctivae normal.   Neck:      Musculoskeletal: Normal range of motion and neck supple. Pulmonary:      Effort: Pulmonary effort is normal. No respiratory distress. Musculoskeletal:      Comments: Normal gait     Skin:     General: Skin is warm and dry. Neurological:      Mental Status: Alert and oriented to person, place, and time. Sensory: No sensory deficit. Psychiatric:         Behavior: Behavior normal.         Thought Content: Thought content normal.        Provider Attestation:  Adriana Mcgregor, personally performed the services described in this documentation. All medical record entries made by the scribe were at my direction and in my presence. I have reviewed the chart and discharge instructions and agree that the records reflect my personal performance and is accurate and complete. Onelia Laird MD 6/14/22       Scribe Attestation:  By signing my name below, Nevaeh Boyd, attest that this documentation has been prepared under the direction and in the presence of Dr. Parag Duncan. Electronically signed: Samanta Carbajal, 6/14/22     Please note that this chart was generated using voice recognition Dragon dictation software. Although every effort was made to ensure the accuracy of this automated transcription, some errors in transcription may have occurred.

## 2022-06-15 RX ORDER — DICYCLOMINE HYDROCHLORIDE 10 MG/1
CAPSULE ORAL
Qty: 120 CAPSULE | Refills: 1 | Status: SHIPPED | OUTPATIENT
Start: 2022-06-15 | End: 2022-07-20

## 2022-06-15 RX ORDER — DULOXETIN HYDROCHLORIDE 60 MG/1
60 CAPSULE, DELAYED RELEASE ORAL NIGHTLY
Qty: 90 CAPSULE | Refills: 1 | Status: SHIPPED | OUTPATIENT
Start: 2022-06-15

## 2022-06-15 RX ORDER — GABAPENTIN 600 MG/1
600 TABLET ORAL 3 TIMES DAILY
Qty: 270 TABLET | Refills: 1 | Status: SHIPPED | OUTPATIENT
Start: 2022-06-15 | End: 2022-09-19

## 2022-06-21 ENCOUNTER — TELEPHONE (OUTPATIENT)
Dept: FAMILY MEDICINE CLINIC | Age: 62
End: 2022-06-21
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
## 2022-06-21 NOTE — TELEPHONE ENCOUNTER
We did an x-ray of her wrist already.  If it continues, I would recommend following up with ortho, as she just saw Dr. Charity Betancourt 6/14

## 2022-07-01 ENCOUNTER — TELEPHONE (OUTPATIENT)
Dept: ORTHOPEDIC SURGERY | Age: 62
End: 2022-07-01

## 2022-07-01 NOTE — TELEPHONE ENCOUNTER
Attempted to call patient to schedule surgery. Called both when dialing a 1 and no 1. No calls went through. Gave me an error message. Tried 3 times.

## 2022-07-06 RX ORDER — FAMOTIDINE 20 MG/1
TABLET, FILM COATED ORAL
Qty: 60 TABLET | Refills: 0 | Status: SHIPPED | OUTPATIENT
Start: 2022-07-06 | End: 2022-08-03

## 2022-07-07 LAB
AVERAGE GLUCOSE: NORMAL
HBA1C MFR BLD: 7.5 %

## 2022-07-12 RX ORDER — ASPIRIN 81 MG/1
TABLET, CHEWABLE ORAL
Qty: 90 TABLET | Refills: 1 | Status: SHIPPED | OUTPATIENT
Start: 2022-07-12

## 2022-07-20 RX ORDER — LORATADINE 10 MG/1
TABLET ORAL
Qty: 90 TABLET | Refills: 1 | Status: SHIPPED | OUTPATIENT
Start: 2022-07-20

## 2022-07-20 RX ORDER — DICYCLOMINE HYDROCHLORIDE 10 MG/1
CAPSULE ORAL
Qty: 120 CAPSULE | Refills: 1 | Status: SHIPPED | OUTPATIENT
Start: 2022-07-20 | End: 2022-10-20

## 2022-07-20 NOTE — TELEPHONE ENCOUNTER
Please Approve or Refuse. Send to Pharmacy per Pt's Request:      Next Visit Date:  Visit date not found   Last Visit Date: 4/19/2022    Hemoglobin A1C (%)   Date Value   01/03/2022 7.0   10/11/2021 7.7   06/29/2021 7.7 (H)             ( goal A1C is < 7)   BP Readings from Last 3 Encounters:   04/19/22 108/78   04/07/22 (!) 146/76   03/14/22 (!) 126/54          (goal 120/80)  BUN   Date Value Ref Range Status   03/14/2022 25 (H) 8 - 23 mg/dL Final     Comment:     SPECIMEN SLIGHTLY HEMOLYZED, RESULTS MAY BE ADVERSELY AFFECTED. CREATININE   Date Value Ref Range Status   03/14/2022 0.64 0.50 - 0.90 mg/dL Final     Comment:     SPECIMEN SLIGHTLY HEMOLYZED, RESULTS MAY BE ADVERSELY AFFECTED. POC Creatinine   Date Value Ref Range Status   05/18/2022 0.78 0.51 - 1.19 mg/dL Final     Potassium   Date Value Ref Range Status   03/14/2022 4.8 3.7 - 5.3 mmol/L Final     Comment:     SPECIMEN SLIGHTLY HEMOLYZED, RESULTS MAY BE ADVERSELY AFFECTED.

## 2022-07-25 ENCOUNTER — OFFICE VISIT (OUTPATIENT)
Dept: FAMILY MEDICINE CLINIC | Age: 62
End: 2022-07-25
Payer: COMMERCIAL

## 2022-07-25 VITALS
BODY MASS INDEX: 51.02 KG/M2 | WEIGHT: 270 LBS | SYSTOLIC BLOOD PRESSURE: 138 MMHG | OXYGEN SATURATION: 97 % | HEART RATE: 87 BPM | DIASTOLIC BLOOD PRESSURE: 86 MMHG

## 2022-07-25 DIAGNOSIS — M54.12 CERVICAL RADICULOPATHY: ICD-10-CM

## 2022-07-25 DIAGNOSIS — M25.511 ACUTE PAIN OF RIGHT SHOULDER: Primary | ICD-10-CM

## 2022-07-25 PROCEDURE — G8417 CALC BMI ABV UP PARAM F/U: HCPCS | Performed by: NURSE PRACTITIONER

## 2022-07-25 PROCEDURE — 1036F TOBACCO NON-USER: CPT | Performed by: NURSE PRACTITIONER

## 2022-07-25 PROCEDURE — 3017F COLORECTAL CA SCREEN DOC REV: CPT | Performed by: NURSE PRACTITIONER

## 2022-07-25 PROCEDURE — 99213 OFFICE O/P EST LOW 20 MIN: CPT | Performed by: NURSE PRACTITIONER

## 2022-07-25 PROCEDURE — G8427 DOCREV CUR MEDS BY ELIG CLIN: HCPCS | Performed by: NURSE PRACTITIONER

## 2022-07-25 RX ORDER — VENLAFAXINE HYDROCHLORIDE 37.5 MG/1
37.5 CAPSULE, EXTENDED RELEASE ORAL DAILY
Qty: 90 CAPSULE | Refills: 1 | Status: SHIPPED | OUTPATIENT
Start: 2022-07-25 | End: 2022-10-23

## 2022-07-25 RX ORDER — PREDNISONE 20 MG/1
TABLET ORAL
Qty: 17 TABLET | Refills: 0 | Status: SHIPPED | OUTPATIENT
Start: 2022-07-25 | End: 2022-08-04

## 2022-07-25 RX ORDER — LISINOPRIL 5 MG/1
5 TABLET ORAL DAILY
Qty: 90 TABLET | Refills: 1 | Status: SHIPPED | OUTPATIENT
Start: 2022-07-25 | End: 2022-10-23

## 2022-07-25 RX ORDER — ROPINIROLE 2 MG/1
2 TABLET, FILM COATED ORAL 3 TIMES DAILY
Qty: 90 TABLET | Refills: 0 | Status: SHIPPED | OUTPATIENT
Start: 2022-07-25 | End: 2022-08-23

## 2022-07-25 RX ORDER — ALLOPURINOL 100 MG/1
100 TABLET ORAL DAILY
Qty: 90 TABLET | Refills: 1 | Status: SHIPPED | OUTPATIENT
Start: 2022-07-25 | End: 2022-11-03

## 2022-07-25 RX ORDER — PNV NO.95/FERROUS FUM/FOLIC AC 28MG-0.8MG
325 TABLET ORAL DAILY
Qty: 90 TABLET | Refills: 1 | Status: SHIPPED | OUTPATIENT
Start: 2022-07-25 | End: 2022-09-06 | Stop reason: SDUPTHER

## 2022-07-25 RX ORDER — PANTOPRAZOLE SODIUM 40 MG/1
40 TABLET, DELAYED RELEASE ORAL DAILY
Qty: 90 TABLET | Refills: 1 | Status: SHIPPED | OUTPATIENT
Start: 2022-07-25 | End: 2022-10-23

## 2022-07-25 SDOH — ECONOMIC STABILITY: FOOD INSECURITY: WITHIN THE PAST 12 MONTHS, THE FOOD YOU BOUGHT JUST DIDN'T LAST AND YOU DIDN'T HAVE MONEY TO GET MORE.: OFTEN TRUE

## 2022-07-25 SDOH — ECONOMIC STABILITY: FOOD INSECURITY: WITHIN THE PAST 12 MONTHS, YOU WORRIED THAT YOUR FOOD WOULD RUN OUT BEFORE YOU GOT MONEY TO BUY MORE.: OFTEN TRUE

## 2022-07-25 ASSESSMENT — PATIENT HEALTH QUESTIONNAIRE - PHQ9
SUM OF ALL RESPONSES TO PHQ QUESTIONS 1-9: 2
SUM OF ALL RESPONSES TO PHQ QUESTIONS 1-9: 2
1. LITTLE INTEREST OR PLEASURE IN DOING THINGS: 1
SUM OF ALL RESPONSES TO PHQ9 QUESTIONS 1 & 2: 2
2. FEELING DOWN, DEPRESSED OR HOPELESS: 1
SUM OF ALL RESPONSES TO PHQ QUESTIONS 1-9: 2
SUM OF ALL RESPONSES TO PHQ QUESTIONS 1-9: 2

## 2022-07-25 ASSESSMENT — ENCOUNTER SYMPTOMS
CONSTIPATION: 0
VOMITING: 0
SORE THROAT: 0
NAUSEA: 0
BACK PAIN: 0
COUGH: 0
RHINORRHEA: 0
CHEST TIGHTNESS: 0
ABDOMINAL DISTENTION: 0
ABDOMINAL PAIN: 0
SHORTNESS OF BREATH: 0
DIARRHEA: 0

## 2022-07-25 ASSESSMENT — SOCIAL DETERMINANTS OF HEALTH (SDOH): HOW HARD IS IT FOR YOU TO PAY FOR THE VERY BASICS LIKE FOOD, HOUSING, MEDICAL CARE, AND HEATING?: NOT HARD AT ALL
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
## 2022-07-26 ENCOUNTER — OFFICE VISIT (OUTPATIENT)
Dept: PODIATRY | Age: 62
End: 2022-07-26
Payer: COMMERCIAL

## 2022-07-26 VITALS — HEIGHT: 61 IN | WEIGHT: 270 LBS | BODY MASS INDEX: 50.98 KG/M2

## 2022-07-26 DIAGNOSIS — M21.962 FOOT DEFORMITY, BILATERAL: ICD-10-CM

## 2022-07-26 DIAGNOSIS — E11.42 TYPE 2 DIABETES MELLITUS WITH DIABETIC POLYNEUROPATHY, WITH LONG-TERM CURRENT USE OF INSULIN (HCC): Primary | ICD-10-CM

## 2022-07-26 DIAGNOSIS — M79.2 NEUROPATHIC PAIN: ICD-10-CM

## 2022-07-26 DIAGNOSIS — B35.1 ONYCHOMYCOSIS: ICD-10-CM

## 2022-07-26 DIAGNOSIS — L85.3 XEROSIS CUTIS: ICD-10-CM

## 2022-07-26 DIAGNOSIS — M21.961 FOOT DEFORMITY, BILATERAL: ICD-10-CM

## 2022-07-26 DIAGNOSIS — Z79.4 TYPE 2 DIABETES MELLITUS WITH DIABETIC POLYNEUROPATHY, WITH LONG-TERM CURRENT USE OF INSULIN (HCC): Primary | ICD-10-CM

## 2022-07-26 PROCEDURE — 99213 OFFICE O/P EST LOW 20 MIN: CPT | Performed by: PODIATRIST

## 2022-07-26 PROCEDURE — G8427 DOCREV CUR MEDS BY ELIG CLIN: HCPCS | Performed by: PODIATRIST

## 2022-07-26 PROCEDURE — G8417 CALC BMI ABV UP PARAM F/U: HCPCS | Performed by: PODIATRIST

## 2022-07-26 PROCEDURE — 3051F HG A1C>EQUAL 7.0%<8.0%: CPT | Performed by: PODIATRIST

## 2022-07-26 PROCEDURE — 3017F COLORECTAL CA SCREEN DOC REV: CPT | Performed by: PODIATRIST

## 2022-07-26 PROCEDURE — 2022F DILAT RTA XM EVC RTNOPTHY: CPT | Performed by: PODIATRIST

## 2022-07-26 PROCEDURE — 1036F TOBACCO NON-USER: CPT | Performed by: PODIATRIST

## 2022-07-26 ASSESSMENT — ENCOUNTER SYMPTOMS
DIARRHEA: 0
COLOR CHANGE: 0
CONSTIPATION: 0
BACK PAIN: 1
NAUSEA: 0
VOMITING: 0

## 2022-07-26 NOTE — PROGRESS NOTES
[facility]  Return Patient    Chief Complaint   Patient presents with    Nail Problem     B/l nail trim/ last seen Tracy Merrill 7/25/2022    Diabetes     Last blood sugar 117       Subjective: Joey Valenzuela comes to clinic for Nail Problem (B/l nail trim/ last seen Tracy Merrill 7/25/2022) and Diabetes (Last blood sugar 117)    she is a diabetic-117. Having knee surgery soot. BG under better control. Would like her nails trimmed. Getting medication from PCP now. History: was experiencing night cramps, burning pain, keeping her up at night. She is on Gabapentin 600 mg BID, Requip 3 mg, Effexor 50 mg at night. Has been on Flexeril. The Effexor was helping considerable with her neuropathy. Pt's primary care physician is YOLANDA Grossman NP        Lab Results   Component Value Date    LABA1C 7.0 01/03/2022      Complains of numbness in the feet bilat.   Past Medical History:   Diagnosis Date    Anemia     receives injectafer (iron infusions)    Anginal pain ([address]     last used Nitro sl 4 yrs 2013  (currently off this medication written: 10/23/2019); no episodes for about a year (written 6/10/21)    Arthritis     Arthritis of right knee 8/15/2018    Asthma     uses inhaler as needed, managed by Tracy Merrill NP    Astigmatism 8/22/2014    Back pain     radiculopathy left leg    Blurry vision, bilateral 7/8/2016    Carpal tunnel syndrome of right wrist 11/26/2012    Cataract     patient denies    Closed displaced fracture of lesser tuberosity of right humerus 7/11/2016    Constipation     Depression     Dysmenorrhea     Fatty liver disease, nonalcoholic     GERD (gastroesophageal reflux disease)     Gout     found through bloodwork    Headache     Heart murmur     Heart palpitations     Heart palpitations     History of blood transfusion     thinks she might have had a transfusion doesn't remember why or when    History of rib fracture 7/6/2016    Right    Hyperlipidemia Hypertension     Hypertriglyceridemia     Hypothyroidism 2/17/2016    Myopia with astigmatism and presbyopia 8/22/2014    Neuropathy     bilateral legs and feet    Obesity     Osteoporosis     Palpitations     about once a month    Pancreatitis 2011    no problems    Panic attack 10/30/2014    PONV (postoperative nausea and vomiting)     mild    Positive cardiac stress test     Presbyopia 8/22/2014    RLS (restless legs syndrome)     Sacroiliitis (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    Status post total replacement of right shoulder 5/9/2017    Stenosis of both internal carotid arteries- Mild 16-49% 9/19/2017    Type II or unspecified type diabetes mellitus without mention of complication, not stated as uncontrolled     checks daily, has Insulin Pump, has a wearable glucose monitor    Umbilical hernia 4/4/1878       Allergies   Allergen Reactions    Ozempic (0.25 Or 0.5 Mg-Dose) [Semaglutide(0.25 Or 0.5mg-Dos)] Other (See Comments)     Severe stomach pain    Actos [Pioglitazone]      Patient unsure of reaction    Amitriptyline Hives    Celebrex [Celecoxib] Hives, Itching and Dermatitis     Burnt red blister on ashlee    Fenofibrate Other (See Comments)     Muscle cramps    Gemfibrozil Other (See Comments)     Muscle pain, spasms, weakness and HA  Muscle pain, spasms, weakness and HA    Lovastatin Other (See Comments)     Muscle cramps  Muscle cramps    Metformin     Prempro [Conj Estrog-Medroxyprogest Ace] Other (See Comments)     Breast tenderness, pain in vagina, cramping    Statins Other (See Comments)     Muscle cramps  Lipitor ok    Tricor [Fenofibrate] Other (See Comments)     Muscle cramps    Zetia [Ezetimibe] Other (See Comments)     Does not remember reaction  Does not remember reaction  Does not remember reaction    Zocor [Simvastatin] Other (See Comments)     Muscle cramps    Ampicillin Rash    Niacin And Related Rash Novolin [Insulin Isophane & Reg, Human] Rash    Omeprazole Nausea And Vomiting    Pcn [Penicillins] Rash    Pregabalin Nausea And Vomiting and Rash    Vesicare [Solifenacin] Rash     Current Outpatient Medications on File Prior to Visit   Medication Sig Dispense Refill    allopurinol (ZYLOPRIM) 100 MG tablet Take 1 tablet by mouth in the morning. 90 tablet 1    Ferrous Sulfate (IRON) 325 (65 Fe) MG TABS Take 325 mg by mouth daily 90 tablet 1    rOPINIRole (REQUIP) 2 MG tablet Take 1 tablet by mouth in the morning and 1 tablet at noon and 1 tablet before bedtime. 90 tablet 0    venlafaxine (EFFEXOR XR) 37.5 MG extended release capsule Take 1 capsule by mouth in the morning. 90 capsule 1    lisinopril (PRINIVIL;ZESTRIL) 5 MG tablet Take 1 tablet by mouth in the morning. 90 tablet 1    pantoprazole (PROTONIX) 40 MG tablet Take 1 tablet by mouth in the morning. Take 1 tablet by mouth once daily. 90 tablet 1    predniSONE (DELTASONE) 20 MG tablet Take 2 tabs daily x 4 days, then 1 tab daily x 6 days, then 1/2 tab daily x 6 days 17 tablet 0    loratadine (CLARITIN) 10 MG tablet TAKE ONE TABLET BY MOUTH DAILY 90 tablet 1    dicyclomine (BENTYL) 10 MG capsule TAKE ONE CAPSULE BY MOUTH FOUR TIMES A DAY AS NEEDED FOR CRAMPS 120 capsule 1    aspirin 81 MG chewable tablet CHEW ONE TABLET BY MOUTH DAILY 90 tablet 1    famotidine (PEPCID) 20 MG tablet TAKE ONE TABLET BY MOUTH TWICE A DAY 60 tablet 0    VENTOLIN  (90 Base) MCG/ACT inhaler Inhale 2 puffs into the lungs every 4 hours as needed for Wheezing 18 g 0    gabapentin (NEURONTIN) 600 MG tablet Take 1 tablet by mouth 3 times daily for 90 days.  270 tablet 1    DULoxetine (CYMBALTA) 60 MG extended release capsule Take 1 capsule by mouth nightly 90 capsule 1    tiZANidine (ZANAFLEX) 2 MG tablet TAKE 1 TABLET BY MOUTH EVERY 8 HOURS AS NEEDED (NECK  SPASM  AND  PAIN) 20 tablet 0    meloxicam (MOBIC) 15 MG tablet Take 1 tablet by mouth daily 90 tablet 1    meclizine (ANTIVERT) 25 MG tablet TAKE 1 TABLET BY MOUTH THREE TIMES DAILY AS NEEDED FOR  DIZZINESS 60 tablet 0    ondansetron (ZOFRAN) 4 MG tablet TAKE 1 TABLET BY MOUTH EVERY 8 HOURS AS NEEDED FOR NAUSEA FOR VOMITING 20 tablet 0    nystatin (NYSTATIN) 316392 UNIT/GM powder APPLY POWDER TOPICALLY TO ABDOMINAL FOLDS THREE TIMES DAILY AS NEEDED FOR SKIN IRRITATION 60 g 3    insulin glargine (LANTUS) 100 UNIT/ML injection vial Inject 60 Units into the skin nightly Changed per Dr. Muse Snmichelle office 10 mL 0    furosemide (LASIX) 20 MG tablet Take 1 tablet by mouth once daily 90 tablet 1    cyclobenzaprine (FLEXERIL) 10 MG tablet Take one tablet by mouth three times daily as needed 90 tablet 0    ammonium lactate (LAC-HYDRIN) 12 % cream Apply topically as needed.  1 Bottle 3    amitriptyline (ELAVIL) 50 MG tablet Take 50 mg by mouth nightly      darifenacin (ENABLEX) 15 MG extended release tablet Take 15 mg by mouth 2 times daily      levothyroxine (SYNTHROID) 112 MCG tablet Take 112 mcg by mouth Daily      Omega-3 Fatty Acids (FISH OIL) 1000 MG CAPS Take 3,000 mg by mouth daily      oxybutynin (DITROPAN XL) 15 MG extended release tablet Take 15 mg by mouth daily      Aspirin-Acetaminophen-Caffeine (EXCEDRIN PO) Take 2 tablets by mouth daily as needed (headache)      Cholecalciferol (VITAMIN D3) 50 MCG (2000 UT) TABS Take 1 tablet by mouth once daily 30 tablet 0    Magnesium Oxide 500 MG TABS TAKE 1 TABLET BY MOUTH TWICE DAILY 180 tablet 1    blood glucose test strips (FREESTYLE LITE) strip 1 each by Does not apply route 6 times daily 200 each 5    liothyronine (CYTOMEL) 5 MCG tablet Take 5 mcg by mouth daily      Blood Glucose Monitoring Suppl (FREESTYLE LITE) KYLAH 1 Device by Does not apply route 6 times daily 1 Device 0    glucose monitoring kit (FREESTYLE) monitoring kit 1 kit by Does not apply route daily 1 kit 0    Cyanocobalamin (B-12) 2500 MCG TABS Take 1 tablet by mouth daily       lidocaine (XYLOCAINE) 5 % ointment Apply topically as needed. 1 Tube 3    Liraglutide (VICTOZA) 18 MG/3ML SOPN SC injection Inject 1.8 mg into the skin daily       Insulin Disposable Pump (V-GO 40) KIT Use as directed per Dr. Sabrina Gloria. acetaminophen (TYLENOL) 500 MG tablet Take 2 tablets by mouth every 8 hours as needed for Pain 42 tablet 0     No current facility-administered medications on file prior to visit. Review of Systems   Constitutional:  Negative for chills, diaphoresis, fatigue, fever and unexpected weight change. Cardiovascular:  Negative for leg swelling. Gastrointestinal:  Negative for constipation, diarrhea, nausea and vomiting. Musculoskeletal:  Positive for back pain and gait problem. Negative for arthralgias and joint swelling. Skin:  Negative for color change, pallor, rash and wound. Neurological:  Positive for numbness. Negative for weakness. Objective:  General: AAO x 3 in NAD. Derm   Skin lesion/ulceration Absent . Skin No rashes or nodules noted. .   Sites of Onychomycosis Involvement (Check affected area)  [x] [x] [x] [x] [x] [x] [x] [x] [x] [x]  5 4 3 2 1 1 2 3 4 5                          Right                                        Left    Thickness  [x] [x] [x] [x] [x] [x] [x] [x] [x] [x]  5 4 3 2 1 1 2 3 4 5                         Right                                        Left    Dystrophic Changes                                                                 [x] [x] [x] [x] [x] [x] [x] [x] [x] [x]  5 4 3 2 1 1 2 3 4 5                         Right                                        Left    Color                                                                  [x] [x] [x] [x] [x] [x] [x] [x] [x] [x]  5 4 3 2 1 1 2 3 4 5                          Right                                        Left    Incurvation/Ingrowin                                                                   [] [] [] [] [] [] [] [] [] []  5 4 3 2 1 1 2 3 4 5                         Right Left    Inflammation/Pain                                                                   [x] [x] [x] [x] [x] [x] [x] [x] [x] [x]  5 4 3 2 1 1 2 3 4 5                         Right                                        Left    Vascular:  DP/PT pulses palpable 2/4, Bilateral.    CFT <3 seconds to digits 1-5, Bilateral .   Hair growth absent to level of digits, Bilateral.  Edema absent, Bilateral.  Erythema absent, Bilateral.     DM with PVD       []Yes    [x]No    Neurological:  Sensation absent to light touch to level of digits, Bilateral.  Protective sensation absent via 5.07/10g [facility] monofilament 6/10 sites, Bilateral.  Vibratory sensation absent to 1st MPJ, Bilateral.     Musculoskeletal: Muscle strength 5/5, Bilateral.  No Pain present upon palpation of dorsal lisfranc's joint, Bilateral. normal medial longitudinal arch, Bilateral.  Palpable osteophytes dorsal lisfranc's     Radiographs: 3 views right Foot:  Severe loss of joint space of tarsometatarsal joints one, two, and three with dorsal osteophytes. No erosive changes or lytic process. No acute pathology. Radiographs: 3 views left Foot:  Severe loss of joint space of tarsometatarsal joints one, two, and three with dorsal osteophytes. No erosive changes or lytic process. No acute pathology. Assessment:  64 y.o. female with:  1. Type 2 diabetes mellitus with diabetic polyneuropathy, with long-term current use of insulin ([address]    2. Onychomycosis    3. Foot deformity, bilateral    4. Neuropathic pain    5. Xerosis cutis       Orders Placed This Encounter   Procedures    Amb External Referral For Orthotics     Referral Priority:   Routine     Referral Type:   Consult for Advice and Opinion     Requested Specialty:   Durable Medical Equipment     Number of Visits Requested:   1           Q7   []Yes    []No                Q8   [x]Yes    []No                     Q9   []Yes    []No    Plan:   Pt was evaluated and examined.  Patient was given personalized discharge instructions. Nails 1-10 were debrided sharply in length and thickness with a nipper and , without incident. Pt will follow up in 3 months or sooner if any problems arise. Diagnosis was discussed with the pt and all of their questions were answered in detail. Proper foot hygiene and care was discussed with the pt. Informed patient on proper diabetic foot care and importance of tight glycemic control. Patient to check feet daily and contact the office with any questions/problems/concerns. Other comorbidity noted and will be managed by PCP. Diabetic foot examination performed this visit. The exam included neurological sensory exam, a 10-g monofilament and pinprick sensation, vibration using a 128-Hz tuning fork, ankle reflexes, visual skin inspection, vascular exam including assessment of pedal pulses, orthopedic exam for deformities, and shoe inspection. Increased risk factors noted on the diabetic foot exam include decreased sensory exam and peripheral neuropathy. Shoegear inspected and found to be appropriate size and wear. Diabetic shoes and insoles: This patient would benefit from extra depth diabetic shoes and custom inserts. I feel he/she qualifies due to the above performed physical exam and diagnosis. I will do the appropriate paperwork and send it to their primary care physician. Then the patient will be measured for shoes and custom inserts to properly off load and protect his/her feet. Orders Placed This Encounter   Procedures    Amb External Referral For Orthotics     Referral Priority:   Routine     Referral Type:   Consult for Advice and Opinion     Requested Specialty:   Durable Medical Equipment     Number of Visits Requested:   1       No orders of the defined types were placed in this encounter.       7/26/2022    Electronically signed by Oskar Stephens DPM on 7/26/2022 at 4:16 PM

## 2022-07-27 DIAGNOSIS — Z79.4 TYPE 2 DIABETES MELLITUS WITH DIABETIC NEUROPATHY, WITH LONG-TERM CURRENT USE OF INSULIN (HCC): ICD-10-CM

## 2022-07-27 DIAGNOSIS — Z79.4 TYPE 2 DIABETES MELLITUS WITH DIABETIC NEUROPATHY, WITH LONG-TERM CURRENT USE OF INSULIN (HCC): Primary | ICD-10-CM

## 2022-07-27 DIAGNOSIS — E11.40 TYPE 2 DIABETES MELLITUS WITH DIABETIC NEUROPATHY, WITH LONG-TERM CURRENT USE OF INSULIN (HCC): Primary | ICD-10-CM

## 2022-07-27 DIAGNOSIS — E11.40 TYPE 2 DIABETES MELLITUS WITH DIABETIC NEUROPATHY, WITH LONG-TERM CURRENT USE OF INSULIN (HCC): ICD-10-CM

## 2022-08-03 RX ORDER — FAMOTIDINE 20 MG/1
TABLET, FILM COATED ORAL
Qty: 60 TABLET | Refills: 0 | Status: SHIPPED | OUTPATIENT
Start: 2022-08-03 | End: 2022-10-13

## 2022-08-05 ENCOUNTER — HOSPITAL ENCOUNTER (OUTPATIENT)
Dept: GENERAL RADIOLOGY | Age: 62
Discharge: HOME | End: 2022-08-07
Payer: COMMERCIAL

## 2022-08-05 ENCOUNTER — HOSPITAL ENCOUNTER (OUTPATIENT)
Age: 62
Discharge: HOME | End: 2022-08-07
Payer: COMMERCIAL

## 2022-08-05 DIAGNOSIS — M25.511 ACUTE PAIN OF RIGHT SHOULDER: ICD-10-CM

## 2022-08-05 PROCEDURE — 73030 X-RAY EXAM OF SHOULDER: CPT

## 2022-08-15 ENCOUNTER — HOSPITAL ENCOUNTER (OUTPATIENT)
Dept: NEUROLOGY | Age: 62
Discharge: HOME | End: 2022-08-15
Payer: COMMERCIAL

## 2022-08-15 DIAGNOSIS — M54.12 CERVICAL RADICULOPATHY: ICD-10-CM

## 2022-08-15 DIAGNOSIS — M54.2 NECK PAIN: ICD-10-CM

## 2022-08-15 PROCEDURE — 95910 NRV CNDJ TEST 7-8 STUDIES: CPT | Performed by: PHYSICAL MEDICINE & REHABILITATION

## 2022-08-15 PROCEDURE — 95886 MUSC TEST DONE W/N TEST COMP: CPT | Performed by: PHYSICAL MEDICINE & REHABILITATION

## 2022-08-23 RX ORDER — ROPINIROLE 2 MG/1
TABLET, FILM COATED ORAL
Qty: 270 TABLET | Refills: 1 | Status: SHIPPED | OUTPATIENT
Start: 2022-08-23

## 2022-08-23 NOTE — TELEPHONE ENCOUNTER
Please Approve or Refuse. Send to Pharmacy per Pt's Request:      Next Visit Date:  Visit date not found   Last Visit Date: 7/25/2022    Hemoglobin A1C (%)   Date Value   07/07/2022 7.5   01/03/2022 7.0   10/11/2021 7.7             ( goal A1C is < 7)   BP Readings from Last 3 Encounters:   07/25/22 138/86   04/19/22 108/78   04/07/22 (!) 146/76          (goal 120/80)  BUN   Date Value Ref Range Status   03/14/2022 25 (H) 8 - 23 mg/dL Final     Comment:     SPECIMEN SLIGHTLY HEMOLYZED, RESULTS MAY BE ADVERSELY AFFECTED. Creatinine   Date Value Ref Range Status   03/14/2022 0.64 0.50 - 0.90 mg/dL Final     Comment:     SPECIMEN SLIGHTLY HEMOLYZED, RESULTS MAY BE ADVERSELY AFFECTED. POC Creatinine   Date Value Ref Range Status   05/18/2022 0.78 0.51 - 1.19 mg/dL Final     Potassium   Date Value Ref Range Status   03/14/2022 4.8 3.7 - 5.3 mmol/L Final     Comment:     SPECIMEN SLIGHTLY HEMOLYZED, RESULTS MAY BE ADVERSELY AFFECTED.

## 2022-09-06 DIAGNOSIS — B35.4 TINEA CORPORIS: ICD-10-CM

## 2022-09-06 RX ORDER — PNV NO.95/FERROUS FUM/FOLIC AC 28MG-0.8MG
325 TABLET ORAL DAILY
Qty: 90 TABLET | Refills: 1 | Status: SHIPPED | OUTPATIENT
Start: 2022-09-06 | End: 2022-12-05

## 2022-09-06 RX ORDER — NYSTATIN 100000 [USP'U]/G
POWDER TOPICAL
Qty: 60 G | Refills: 3 | Status: SHIPPED | OUTPATIENT
Start: 2022-09-06

## 2022-09-19 RX ORDER — GABAPENTIN 600 MG/1
TABLET ORAL
Qty: 270 TABLET | Refills: 3 | Status: SHIPPED | OUTPATIENT
Start: 2022-09-19 | End: 2022-12-18

## 2022-09-19 NOTE — TELEPHONE ENCOUNTER
Please Approve or Refuse. Next Visit Date:  Visit date not found   Last Visit Date: 11/18/2021    Hemoglobin A1C (%)   Date Value   07/07/2022 7.5   01/03/2022 7.0   10/11/2021 7.7             ( goal A1C is < 7)   BP Readings from Last 3 Encounters:   07/25/22 138/86   04/19/22 108/78   04/07/22 (!) 146/76          (goal 120/80)  BUN   Date Value Ref Range Status   03/14/2022 25 (H) 8 - 23 mg/dL Final     Comment:     SPECIMEN SLIGHTLY HEMOLYZED, RESULTS MAY BE ADVERSELY AFFECTED. Creatinine   Date Value Ref Range Status   03/14/2022 0.64 0.50 - 0.90 mg/dL Final     Comment:     SPECIMEN SLIGHTLY HEMOLYZED, RESULTS MAY BE ADVERSELY AFFECTED. POC Creatinine   Date Value Ref Range Status   05/18/2022 0.78 0.51 - 1.19 mg/dL Final     Potassium   Date Value Ref Range Status   03/14/2022 4.8 3.7 - 5.3 mmol/L Final     Comment:     SPECIMEN SLIGHTLY HEMOLYZED, RESULTS MAY BE ADVERSELY AFFECTED.

## 2022-09-27 ENCOUNTER — TELEPHONE (OUTPATIENT)
Dept: ONCOLOGY | Age: 62
End: 2022-09-27

## 2022-09-27 NOTE — TELEPHONE ENCOUNTER
WRITER CALLED AND LEFT A VM MESSAGE TO SCHEDULE A YR F/U APPT  IN April 2023 W/ DR RUBIO W/ LABS CDP @ [facility]  WAITING ON A CALL BACK

## 2022-10-13 RX ORDER — FAMOTIDINE 20 MG/1
TABLET, FILM COATED ORAL
Qty: 180 TABLET | Refills: 1 | Status: SHIPPED | OUTPATIENT
Start: 2022-10-13

## 2022-10-20 RX ORDER — DICYCLOMINE HYDROCHLORIDE 10 MG/1
CAPSULE ORAL
Qty: 120 CAPSULE | Refills: 3 | Status: SHIPPED | OUTPATIENT
Start: 2022-10-20

## 2022-10-27 ENCOUNTER — TELEPHONE (OUTPATIENT)
Dept: ONCOLOGY | Age: 62
End: 2022-10-27

## 2022-11-03 RX ORDER — VENLAFAXINE HYDROCHLORIDE 37.5 MG/1
CAPSULE, EXTENDED RELEASE ORAL
Qty: 90 CAPSULE | Refills: 0 | OUTPATIENT
Start: 2022-11-03

## 2022-11-03 RX ORDER — ALLOPURINOL 100 MG/1
TABLET ORAL
Qty: 90 TABLET | Refills: 0 | Status: SHIPPED | OUTPATIENT
Start: 2022-11-03

## 2022-11-04 LAB
AVERAGE GLUCOSE: NORMAL
HBA1C MFR BLD: 8.6 %

## 2022-11-10 ENCOUNTER — OFFICE VISIT (OUTPATIENT)
Dept: FAMILY MEDICINE CLINIC | Age: 62
End: 2022-11-10
Payer: COMMERCIAL

## 2022-11-10 VITALS
SYSTOLIC BLOOD PRESSURE: 120 MMHG | BODY MASS INDEX: 52.07 KG/M2 | OXYGEN SATURATION: 97 % | HEART RATE: 84 BPM | DIASTOLIC BLOOD PRESSURE: 84 MMHG | TEMPERATURE: 98.4 F | WEIGHT: 275.6 LBS

## 2022-11-10 DIAGNOSIS — E11.42 DIABETIC POLYNEUROPATHY ASSOCIATED WITH TYPE 2 DIABETES MELLITUS (HCC): ICD-10-CM

## 2022-11-10 DIAGNOSIS — J30.2 SEASONAL ALLERGIC RHINITIS, UNSPECIFIED TRIGGER: ICD-10-CM

## 2022-11-10 DIAGNOSIS — N32.81 OAB (OVERACTIVE BLADDER): ICD-10-CM

## 2022-11-10 DIAGNOSIS — Z12.31 ENCOUNTER FOR SCREENING MAMMOGRAM FOR BREAST CANCER: ICD-10-CM

## 2022-11-10 DIAGNOSIS — E78.1 HYPERTRIGLYCERIDEMIA: ICD-10-CM

## 2022-11-10 DIAGNOSIS — I10 ESSENTIAL HYPERTENSION: Primary | ICD-10-CM

## 2022-11-10 DIAGNOSIS — Z79.4 TYPE 2 DIABETES MELLITUS WITH DIABETIC NEUROPATHY, WITH LONG-TERM CURRENT USE OF INSULIN (HCC): ICD-10-CM

## 2022-11-10 DIAGNOSIS — K21.9 GASTROESOPHAGEAL REFLUX DISEASE WITHOUT ESOPHAGITIS: ICD-10-CM

## 2022-11-10 DIAGNOSIS — K86.1 CHRONIC PANCREATITIS, UNSPECIFIED PANCREATITIS TYPE (HCC): ICD-10-CM

## 2022-11-10 DIAGNOSIS — E03.9 HYPOTHYROIDISM, UNSPECIFIED TYPE: ICD-10-CM

## 2022-11-10 DIAGNOSIS — D50.8 IRON DEFICIENCY ANEMIA SECONDARY TO INADEQUATE DIETARY IRON INTAKE: ICD-10-CM

## 2022-11-10 DIAGNOSIS — G47.33 OSA (OBSTRUCTIVE SLEEP APNEA): ICD-10-CM

## 2022-11-10 DIAGNOSIS — M1A.9XX0 CHRONIC GOUT WITHOUT TOPHUS, UNSPECIFIED CAUSE, UNSPECIFIED SITE: ICD-10-CM

## 2022-11-10 DIAGNOSIS — E55.9 VITAMIN D DEFICIENCY: ICD-10-CM

## 2022-11-10 DIAGNOSIS — M79.89 LEG SWELLING: ICD-10-CM

## 2022-11-10 DIAGNOSIS — G25.81 RLS (RESTLESS LEGS SYNDROME): ICD-10-CM

## 2022-11-10 DIAGNOSIS — E11.40 TYPE 2 DIABETES MELLITUS WITH DIABETIC NEUROPATHY, WITH LONG-TERM CURRENT USE OF INSULIN (HCC): ICD-10-CM

## 2022-11-10 DIAGNOSIS — E78.5 HYPERLIPIDEMIA, UNSPECIFIED HYPERLIPIDEMIA TYPE: ICD-10-CM

## 2022-11-10 PROCEDURE — 3052F HG A1C>EQUAL 8.0%<EQUAL 9.0%: CPT | Performed by: NURSE PRACTITIONER

## 2022-11-10 PROCEDURE — 99214 OFFICE O/P EST MOD 30 MIN: CPT | Performed by: NURSE PRACTITIONER

## 2022-11-10 PROCEDURE — 3078F DIAST BP <80 MM HG: CPT | Performed by: NURSE PRACTITIONER

## 2022-11-10 PROCEDURE — 1036F TOBACCO NON-USER: CPT | Performed by: NURSE PRACTITIONER

## 2022-11-10 PROCEDURE — G8427 DOCREV CUR MEDS BY ELIG CLIN: HCPCS | Performed by: NURSE PRACTITIONER

## 2022-11-10 PROCEDURE — G8484 FLU IMMUNIZE NO ADMIN: HCPCS | Performed by: NURSE PRACTITIONER

## 2022-11-10 PROCEDURE — 3017F COLORECTAL CA SCREEN DOC REV: CPT | Performed by: NURSE PRACTITIONER

## 2022-11-10 PROCEDURE — 2022F DILAT RTA XM EVC RTNOPTHY: CPT | Performed by: NURSE PRACTITIONER

## 2022-11-10 PROCEDURE — G8417 CALC BMI ABV UP PARAM F/U: HCPCS | Performed by: NURSE PRACTITIONER

## 2022-11-10 PROCEDURE — 3074F SYST BP LT 130 MM HG: CPT | Performed by: NURSE PRACTITIONER

## 2022-11-10 RX ORDER — MELOXICAM 15 MG/1
TABLET ORAL
Qty: 90 TABLET | Refills: 0 | Status: SHIPPED | OUTPATIENT
Start: 2022-11-10

## 2022-11-10 RX ORDER — AMMONIUM LACTATE 12 G/100G
CREAM TOPICAL
Qty: 1 EACH | Refills: 3 | Status: SHIPPED | OUTPATIENT
Start: 2022-11-10

## 2022-11-10 RX ORDER — DOXYCYCLINE HYCLATE 100 MG
100 TABLET ORAL 2 TIMES DAILY
Qty: 20 TABLET | Refills: 0 | Status: SHIPPED | OUTPATIENT
Start: 2022-11-10 | End: 2022-11-20

## 2022-11-10 RX ORDER — DULOXETIN HYDROCHLORIDE 60 MG/1
60 CAPSULE, DELAYED RELEASE ORAL NIGHTLY
Qty: 90 CAPSULE | Refills: 1 | Status: SHIPPED | OUTPATIENT
Start: 2022-11-10

## 2022-11-10 RX ORDER — FLUTICASONE PROPIONATE 50 MCG
1 SPRAY, SUSPENSION (ML) NASAL DAILY
Qty: 16 G | Refills: 0 | Status: SHIPPED | OUTPATIENT
Start: 2022-11-10 | End: 2023-11-10

## 2022-11-10 ASSESSMENT — PATIENT HEALTH QUESTIONNAIRE - PHQ9
9. THOUGHTS THAT YOU WOULD BE BETTER OFF DEAD, OR OF HURTING YOURSELF: 0
SUM OF ALL RESPONSES TO PHQ QUESTIONS 1-9: 15
1. LITTLE INTEREST OR PLEASURE IN DOING THINGS: 3
SUM OF ALL RESPONSES TO PHQ QUESTIONS 1-9: 15
SUM OF ALL RESPONSES TO PHQ QUESTIONS 1-9: 15
7. TROUBLE CONCENTRATING ON THINGS, SUCH AS READING THE NEWSPAPER OR WATCHING TELEVISION: 3
8. MOVING OR SPEAKING SO SLOWLY THAT OTHER PEOPLE COULD HAVE NOTICED. OR THE OPPOSITE, BEING SO FIGETY OR RESTLESS THAT YOU HAVE BEEN MOVING AROUND A LOT MORE THAN USUAL: 0
3. TROUBLE FALLING OR STAYING ASLEEP: 0
SUM OF ALL RESPONSES TO PHQ9 QUESTIONS 1 & 2: 4
4. FEELING TIRED OR HAVING LITTLE ENERGY: 3
2. FEELING DOWN, DEPRESSED OR HOPELESS: 1
SUM OF ALL RESPONSES TO PHQ QUESTIONS 1-9: 15
5. POOR APPETITE OR OVEREATING: 2
6. FEELING BAD ABOUT YOURSELF - OR THAT YOU ARE A FAILURE OR HAVE LET YOURSELF OR YOUR FAMILY DOWN: 3
10. IF YOU CHECKED OFF ANY PROBLEMS, HOW DIFFICULT HAVE THESE PROBLEMS MADE IT FOR YOU TO DO YOUR WORK, TAKE CARE OF THINGS AT HOME, OR GET ALONG WITH OTHER PEOPLE: 2

## 2022-11-10 ASSESSMENT — ENCOUNTER SYMPTOMS
COUGH: 0
DIARRHEA: 0
NAUSEA: 0
SHORTNESS OF BREATH: 0
ABDOMINAL DISTENTION: 0
BACK PAIN: 1
CONSTIPATION: 0
SORE THROAT: 0
CHEST TIGHTNESS: 0
RHINORRHEA: 0
ABDOMINAL PAIN: 0
VOMITING: 0
APNEA: 1

## 2022-11-10 NOTE — TELEPHONE ENCOUNTER
Please Approve or Refuse. Send to Pharmacy per Pt's Request:      Next Visit Date:  11/10/2022   Last Visit Date: 7/25/2022    Hemoglobin A1C (%)   Date Value   07/07/2022 7.5   01/03/2022 7.0   10/11/2021 7.7             ( goal A1C is < 7)   BP Readings from Last 3 Encounters:   07/25/22 138/86   04/19/22 108/78   04/07/22 (!) 146/76          (goal 120/80)  BUN   Date Value Ref Range Status   03/14/2022 25 (H) 8 - 23 mg/dL Final     Comment:     SPECIMEN SLIGHTLY HEMOLYZED, RESULTS MAY BE ADVERSELY AFFECTED. Creatinine   Date Value Ref Range Status   03/14/2022 0.64 0.50 - 0.90 mg/dL Final     Comment:     SPECIMEN SLIGHTLY HEMOLYZED, RESULTS MAY BE ADVERSELY AFFECTED. POC Creatinine   Date Value Ref Range Status   05/18/2022 0.78 0.51 - 1.19 mg/dL Final     Potassium   Date Value Ref Range Status   03/14/2022 4.8 3.7 - 5.3 mmol/L Final     Comment:     SPECIMEN SLIGHTLY HEMOLYZED, RESULTS MAY BE ADVERSELY AFFECTED.

## 2022-11-10 NOTE — PROGRESS NOTES
Marlon Aldrich, APRN-CNP  [facility]  [id number] [address], [address]  [address] [address]  Dept: [phone]  Dept Fax: [phone]     PATIENT ID: Gema Don is a 64 y.o. female. HPI:  Established pt here today for f/u on chronic medical problems; HTN, HLD, elevated triglycerides, DM, vitamin d def, DESTINY, hypothyroidism, RLS, allergies, gout go over labs and/or diagnostic studies, and medication refills. Pt denies any fever or chills. Pt today denies any HA, chest pain, or SOB. Pt denies any N/V/D/C or abdominal pain. Pt stable on CPAP with his sleep apnea and has been using nightly. Pt states RLS is stable on meds. Today, patient complains of cough, congestion, sore throat, runny nose, headache and sinus pain and pressure ongoing for the last couple of days. She also complains of bilateral lower leg swelling. She has been taking her Lasix daily without significant improvement. She denies shortness of breath. She continues to follow with Dr. Jason Boland for her hypothyroidism and her DM. She did just have an appointment with him this past week. My previous office notes, labs and diagnostic studies were reviewed prior to and during encounter. The patient's past medical, surgical, social, and family history as well as current medications and allergies were reviewed as documented in today's encounter by CHLOE Alves.      Current Outpatient Medications on File Prior to Visit   Medication Sig Dispense Refill    meloxicam (MOBIC) 15 MG tablet Take 1 tablet by mouth once daily 90 tablet 0    allopurinol (ZYLOPRIM) 100 MG tablet Take 1 tablet by mouth once daily 90 tablet 0    dicyclomine (BENTYL) 10 MG capsule TAKE 1 CAPSULE BY MOUTH 4 TIMES DAILY AS NEEDED FOR  CRAMPS 120 capsule 3    famotidine (PEPCID) 20 MG tablet Take 1 tablet by mouth twice daily 180 tablet 1    gabapentin (NEURONTIN) 600 MG tablet TAKE 1 TABLET BY MOUTH THREE TIMES DAILY 270 tablet 3    nystatin 700953 UNIT/GM powder APPLY POWDER TOPICALLY TO ABDOMINAL FOLDS THREE TIMES DAILY AS NEEDED FOR SKIN IRRITATION 60 g 3    Ferrous Sulfate (IRON) 325 (65 Fe) MG TABS Take 325 mg by mouth daily 90 tablet 1    VENTOLIN  (90 Base) MCG/ACT inhaler INHALE 2 PUFFS BY MOUTH EVERY 6 HOURS AS NEEDED FOR WHEEZING AND FOR SHORTNESS OF BREATH 18 g 0    rOPINIRole (REQUIP) 2 MG tablet TAKE ONE TABLET BY MOUTH THREE TIMES A DAY EVERY MORNING, AT NOON, AND AT BEDTIME 270 tablet 1    venlafaxine (EFFEXOR XR) 37.5 MG extended release capsule Take 1 capsule by mouth in the morning. 90 capsule 1    lisinopril (PRINIVIL;ZESTRIL) 5 MG tablet Take 1 tablet by mouth in the morning. 90 tablet 1    pantoprazole (PROTONIX) 40 MG tablet Take 1 tablet by mouth in the morning. Take 1 tablet by mouth once daily.  90 tablet 1    loratadine (CLARITIN) 10 MG tablet TAKE ONE TABLET BY MOUTH DAILY 90 tablet 1    aspirin 81 MG chewable tablet CHEW ONE TABLET BY MOUTH DAILY 90 tablet 1    tiZANidine (ZANAFLEX) 2 MG tablet TAKE 1 TABLET BY MOUTH EVERY 8 HOURS AS NEEDED (NECK  SPASM  AND  PAIN) 20 tablet 0    meclizine (ANTIVERT) 25 MG tablet TAKE 1 TABLET BY MOUTH THREE TIMES DAILY AS NEEDED FOR  DIZZINESS 60 tablet 0    ondansetron (ZOFRAN) 4 MG tablet TAKE 1 TABLET BY MOUTH EVERY 8 HOURS AS NEEDED FOR NAUSEA FOR VOMITING 20 tablet 0    acetaminophen (TYLENOL) 500 MG tablet Take 2 tablets by mouth every 8 hours as needed for Pain 42 tablet 0    insulin glargine (LANTUS) 100 UNIT/ML injection vial Inject 60 Units into the skin nightly Changed per Dr. Sierra Ruiz office 10 mL 0    furosemide (LASIX) 20 MG tablet Take 1 tablet by mouth once daily 90 tablet 1    cyclobenzaprine (FLEXERIL) 10 MG tablet Take one tablet by mouth three times daily as needed 90 tablet 0    amitriptyline (ELAVIL) 50 MG tablet Take 50 mg by mouth nightly      darifenacin (ENABLEX) 15 MG extended release tablet Take 15 mg by mouth 2 times daily levothyroxine (SYNTHROID) 112 MCG tablet Take 112 mcg by mouth Daily      Omega-3 Fatty Acids (FISH OIL) 1000 MG CAPS Take 3,000 mg by mouth daily      oxybutynin (DITROPAN XL) 15 MG extended release tablet Take 15 mg by mouth daily      Aspirin-Acetaminophen-Caffeine (EXCEDRIN PO) Take 2 tablets by mouth daily as needed (headache)      Cholecalciferol (VITAMIN D3) 50 MCG (2000 UT) TABS Take 1 tablet by mouth once daily 30 tablet 0    Magnesium Oxide 500 MG TABS TAKE 1 TABLET BY MOUTH TWICE DAILY 180 tablet 1    blood glucose test strips (FREESTYLE LITE) strip 1 each by Does not apply route 6 times daily 200 each 5    liothyronine (CYTOMEL) 5 MCG tablet Take 5 mcg by mouth daily      Blood Glucose Monitoring Suppl (FREESTYLE LITE) KYLAH 1 Device by Does not apply route 6 times daily 1 Device 0    glucose monitoring kit (FREESTYLE) monitoring kit 1 kit by Does not apply route daily 1 kit 0    Cyanocobalamin (B-12) 2500 MCG TABS Take 1 tablet by mouth daily       lidocaine (XYLOCAINE) 5 % ointment Apply topically as needed. 1 Tube 3    Liraglutide (VICTOZA) 18 MG/3ML SOPN SC injection Inject 1.8 mg into the skin daily       Insulin Disposable Pump (V-GO 40) KIT Use as directed per Dr. Leeroy Gan. No current facility-administered medications on file prior to visit. SUBJECTIVE:     Review of Systems   Constitutional:  Negative for activity change, fatigue and fever. HENT:  Negative for congestion, ear pain, rhinorrhea and sore throat. Eyes:  Positive for visual disturbance (wears glasses). Respiratory:  Positive for apnea (stable on CPAP). Negative for cough, chest tightness and shortness of breath. Cardiovascular:  Negative for chest pain and palpitations. Gastrointestinal:  Negative for abdominal distention, abdominal pain, constipation, diarrhea, nausea (denies) and vomiting. Heartburn (stable on Protonix)   Endocrine: Negative for polydipsia, polyphagia and polyuria.    Genitourinary:  Negative for difficulty urinating and dysuria. Musculoskeletal:  Positive for arthralgias (chronic; stable), back pain (chronic; stable), gait problem (uses cane, walker and wheelchair if needed), myalgias and neck pain (chronic' stable). RLS (stable on Requip 2 mg TID)   Skin:  Negative for rash. Neurological:  Negative for dizziness, weakness, light-headedness and headaches. Hematological:  Negative for adenopathy. Psychiatric/Behavioral:  Positive for dysphoric mood (stable of Effexor 37.5 mg daily & Cymbalta 60 mg nightly) and sleep disturbance (stable on Elavil 50 mg nightly). Negative for agitation and behavioral problems. The patient is nervous/anxious (stable on Effexor 37.5 mg daily). OBJECTIVE:  /84   Pulse 84   Temp 98.4 °F (36.9 °C)   Wt 275 lb 9.6 oz (125 kg)   SpO2 97%   BMI 52.07 kg/m²     Physical Exam  Vitals and nursing note reviewed. Constitutional:       General: She is not in acute distress. Appearance: Normal appearance. She is well-developed. HENT:      Head: Normocephalic and atraumatic. Cardiovascular:      Rate and Rhythm: Normal rate and regular rhythm. Heart sounds: Normal heart sounds. No murmur heard. Pulmonary:      Effort: Pulmonary effort is normal. No respiratory distress. Breath sounds: Normal breath sounds. Chest:      Chest wall: No tenderness. Abdominal:      General: Bowel sounds are normal.      Palpations: Abdomen is soft. Tenderness: There is no abdominal tenderness. Musculoskeletal:         General: Normal range of motion. Cervical back: Normal range of motion. Right lower le+ Edema present. Left lower le+ Edema present. Skin:     General: Skin is warm and dry. Findings: No rash. Neurological:      Mental Status: She is alert and oriented to person, place, and time. Psychiatric:         Mood and Affect: Mood normal.         Behavior: Behavior is cooperative.      ASSESSMENT:   Diagnosis Orders   1. Essential hypertension  CBC    Comprehensive Metabolic Panel    Lipid Panel      2. Hyperlipidemia, unspecified hyperlipidemia type  CBC    Comprehensive Metabolic Panel    Lipid Panel      3. Hypertriglyceridemia        4. Type 2 diabetes mellitus with diabetic neuropathy, with long-term current use of insulin ([facility]        5. Diabetic polyneuropathy associated with type 2 diabetes mellitus ([facility]        6. Vitamin D deficiency  Vitamin D 25 Hydroxy      7. DESTINY (obstructive sleep apnea)        8. Hypothyroidism, unspecified type        9. RLS (restless legs syndrome)        10. Seasonal allergic rhinitis, unspecified trigger        11. Chronic gout without tophus, unspecified cause, unspecified site  Uric Acid      12. Chronic pancreatitis, unspecified pancreatitis type ([facility]        13. Gastroesophageal reflux disease without esophagitis        14. OAB (overactive bladder)        15. Iron deficiency anemia secondary to inadequate dietary iron intake        16. BMI 50.0-59.9, adult ([facility]        17. Encounter for screening mammogram for breast cancer  EMILEE Digital Screen Bilateral      18. Leg swelling  ECHO Complete 2D W Doppler W Color        PLAN:  1. Essential hypertension  - Stable: Medication re-filled as needed, con't medications as prescribed, con't current tx plan  - BP in office today noted; 120/84  - Continue Lisinopril 5 mg daily as previously prescribed. 2. Hyperlipidemia, unspecified hyperlipidemia type  3. Hypertriglyceridemia  - Stable: Medication re-filled as needed, con't medications as prescribed, con't current tx plan  - Will cont with low fat/chol diet and Omega 3 as ordered. 4. Type 2 diabetes mellitus with diabetic neuropathy, with long-term current use of insulin ([facility]  5.  Diabetic neuropathy ([facility]  - Stable: Medication re-filled as needed, con't medications as prescribed, con't current tx plan  - Most recent A1c was 8.6  - Continue with Metformin 1000 mg BID as previously prescribed. - Continue with Lantus 60 units nightly as previously prescribed. - Continue with Victoza 1.8 mg as previously prescribed. - Will cont with Lisinopril 5 mg daily for renal protection.  - Continue with DexCom as previously prescribed. - Will cont to follow with Dr. Meera Chou as instructed   - Cont with daily foot exams, per Dr. Johanna Oglesby and yearly eye exams as recommended. 6. Vitamin D deficiency  - Stable: Medication re-filled as needed, con't medications as prescribed, con't current tx plan  - Continue Vitamin D supplements as ordered, will re-check Vitamin D level  - Please increase foods with Vitamin D, which include cheese, eggs, cereals, yogurt, milk, tofu, any type of beef, salmon or tuna,  spinach, and mushroom. 7. DESTINY (obstructive sleep apnea)  - Sleep much improved with CPAP  - Pt is consistent with wearing her CPAP at night.  - Discussion had regarding the importance of close follow-up is indicated to assist the patient with adaptation to CPAP and insure compliance and effectiveness of treatment. - The patient was be encouraged to sleep in the lateral decubitus position to minimize episodes of DESTINY. - The patient was encouraged to continue efforts at weight reduction and counseled that alcohol and sedative use may worsen sleep apnea. 8. Hypothyroidism, unspecified type  - Stable: Medication re-filled as needed, con't medications as prescribed, con't current tx plan  - Continue with Synthroid 112 mcg daily as previously prescribed. - Continue with Cytomel 5 mg daily as previously prescribed. - Last TSH 1.96.   - Will continue to follow with Dr. Meera Chou as instructed    9. RLS (restless legs syndrome)  - Stable: Medication re-filled as needed, con't medications as prescribed, con't current tx plan   - Continue with Requip 2 mg TID as previously prescribed.       10. Seasonal allergic rhinitis, unspecified trigger  - Stable: Medication re-filled as needed, con't medications as prescribed, con't current tx plan. - Continue Claritin 10 mg daily as previously prescribed. 11. Chronic gout without tophus, unspecified cause, unspecified site  - Stable: Medication re-filled as needed, con't medications as prescribed, con't current tx plan  - Continue Allopurinol 100 mg daily as previously prescribed   - Avoidance of alcohol recommended, and  a low purine diet. 12. Gastroesophageal reflux disease without esophagitis  13. Chronic pancreatitis ([facility]  - Stable: Medication re-filled as needed, con't medications as prescribed, con't current tx plan  - Continue Protonix 40 mg daily as previously prescribed  - Patient educated on avoiding trigger food/drinks such as caffeine, soda, chocolate, greasy/fatty, spicy foods.  - Sleep with head of bed elevated, avoid lying flat after eating and avoid restrictive clothing around waist.   - Will cont to follow with Dr. Phillip Cabrera as instructed     14. OAB (overactive bladder)  - Stable: Medication re-filled as needed, con't medications as prescribed, con't current tx plan   - Continue with Ditropan 10 mg BID as previously prescribed. - Continue with Enables 15 mg BID as previously prescribed. 15. Iron deficiency anemia secondary to inadequate dietary iron intake  - Stable: Medication re-filled as needed, con't medications as prescribed, con't current tx plan  - Continue with Iron supplemenation as previously prescribed. - Please increase foods with Iron (red meat, pork, poultry, seafood, beans, dark green leafy vegetables) and Vitamin C (Broccoli, grapefruit, kiwi, Leafy greens, melons, oranges, peppers, strawberries, tangerines and tomatoes)  - Discussion had regarding side effects of iron, such as cramping, constipation, black stools. 12. Morbid obesity with BMI of 50.0-59.9, adult ([facility]  - Advised to decrease, fats, carbohydrates, and engage in a healthier diet overall, as well as routine exercise.    - Encouraged patient to eat healthy meals throughout the day. - Encouraged 40 minutes of aerobic exercise- moderate to vigorous intensity three to four times a week. 16. Encounter for screening mammogram for breast cancer  - Annual breast CA screening highly recommended per guidelines for women beginning at age 36.  - Encouraged mammogram testing as soon as possible with yearly follow ups  - EMILEE ordered and will call with results     18. Leg swelling  - Will have her double her Lasix for the next couple of days  - Will order echocardiogram  - Watch salt intake  - Compression stockings during the day  - Lower leg elevation.    - Rest of systems unchanged, continue current treatments. - On this date November 10, 2022,  I have spent greater than 50% of this 40 minute visit reviewing previous notes, test results and/or face to face with the patient discussing the diagnoses, importance of compliance with the treatment plan, counseling, coordinating care as well as documenting on the day of the visit.      YOLANDA Sewell-CNP

## 2022-11-22 ENCOUNTER — TELEPHONE (OUTPATIENT)
Dept: FAMILY MEDICINE CLINIC | Age: 62
End: 2022-11-22

## 2022-11-22 RX ORDER — SUMATRIPTAN 50 MG/1
50 TABLET, FILM COATED ORAL
Qty: 9 TABLET | Refills: 3 | Status: SHIPPED | OUTPATIENT
Start: 2022-11-22 | End: 2022-11-22

## 2022-11-22 NOTE — TELEPHONE ENCOUNTER
Pt called asking for medication for headaches. Pt has awaken every day for the last two week with a headache above her eyebrows and back of her head. Pt state that she has had this medication before and the last time she filled it was beginning of the year but does not remember the name of medication. Advised pt I will notify provider but am unsure which medication is would be. Pt states she is okay with trying something new because she is having so much pain.

## 2022-11-22 NOTE — TELEPHONE ENCOUNTER
Have her get over the counter magnesium and take 200 mg nightly. I will send over some Imitrex to her pharmacy. She can take 1 and repeat the dose in 2 hours if her migraine persists.  Max daily dosage 100 mg/24 hours

## 2022-11-28 ENCOUNTER — TELEPHONE (OUTPATIENT)
Dept: FAMILY MEDICINE CLINIC | Age: 62
End: 2022-11-28

## 2022-11-28 NOTE — TELEPHONE ENCOUNTER
Elevate her legs, watch her salt and get some compression stockings.  Wear all day and off only at night

## 2022-11-28 NOTE — TELEPHONE ENCOUNTER
Patient was seen on 11/10/22 and had some swelling to bilcarmen LE and told to take her furosemide BID which she did but it is not helping and she is asking what to do next.

## 2022-11-29 ENCOUNTER — HOSPITAL ENCOUNTER (EMERGENCY)
Age: 62
Discharge: HOME | End: 2022-11-29
Attending: EMERGENCY MEDICINE
Payer: COMMERCIAL

## 2022-11-29 ENCOUNTER — APPOINTMENT (OUTPATIENT)
Dept: GENERAL RADIOLOGY | Age: 62
End: 2022-11-29
Payer: COMMERCIAL

## 2022-11-29 VITALS
TEMPERATURE: 98.1 F | BODY MASS INDEX: 51.92 KG/M2 | HEART RATE: 74 BPM | DIASTOLIC BLOOD PRESSURE: 73 MMHG | OXYGEN SATURATION: 96 % | SYSTOLIC BLOOD PRESSURE: 131 MMHG | HEIGHT: 61 IN | WEIGHT: 275 LBS | RESPIRATION RATE: 18 BRPM

## 2022-11-29 DIAGNOSIS — R60.9 PERIPHERAL EDEMA: Primary | ICD-10-CM

## 2022-11-29 LAB
ABSOLUTE EOS #: 0.4 K/UL (ref 0–0.4)
ABSOLUTE LYMPH #: 2.3 K/UL (ref 1–4.8)
ABSOLUTE MONO #: 0.6 K/UL (ref 0.1–1.3)
ANION GAP SERPL CALCULATED.3IONS-SCNC: 10 MMOL/L (ref 9–17)
BASOPHILS # BLD: 1 % (ref 0–2)
BASOPHILS ABSOLUTE: 0.1 K/UL (ref 0–0.2)
BUN BLDV-MCNC: 18 MG/DL (ref 8–23)
CALCIUM SERPL-MCNC: 8.7 MG/DL (ref 8.6–10.4)
CHLORIDE BLD-SCNC: 99 MMOL/L (ref 98–107)
CO2: 27 MMOL/L (ref 20–31)
CREAT SERPL-MCNC: 0.84 MG/DL (ref 0.5–0.9)
EOSINOPHILS RELATIVE PERCENT: 4 % (ref 0–4)
GFR SERPL CREATININE-BSD FRML MDRD: >60 ML/MIN/1.73M2
GLUCOSE BLD-MCNC: 259 MG/DL (ref 70–99)
HCT VFR BLD CALC: 35.3 % (ref 36–46)
HEMOGLOBIN: 11.6 G/DL (ref 12–16)
LYMPHOCYTES # BLD: 22 % (ref 24–44)
MCH RBC QN AUTO: 29.5 PG (ref 26–34)
MCHC RBC AUTO-ENTMCNC: 33 G/DL (ref 31–37)
MCV RBC AUTO: 89.4 FL (ref 80–100)
MONOCYTES # BLD: 6 % (ref 1–7)
MYOGLOBIN: 34 NG/ML (ref 25–58)
MYOGLOBIN: 39 NG/ML (ref 25–58)
PDW BLD-RTO: 14 % (ref 11.5–14.9)
PLATELET # BLD: 301 K/UL (ref 150–450)
PMV BLD AUTO: 8.5 FL (ref 6–12)
POTASSIUM SERPL-SCNC: 4.3 MMOL/L (ref 3.7–5.3)
PRO-BNP: 84 PG/ML
RBC # BLD: 3.95 M/UL (ref 4–5.2)
SEG NEUTROPHILS: 67 % (ref 36–66)
SEGMENTED NEUTROPHILS ABSOLUTE COUNT: 6.9 K/UL (ref 1.3–9.1)
SODIUM BLD-SCNC: 136 MMOL/L (ref 135–144)
THYROXINE, FREE: 0.96 NG/DL (ref 0.93–1.7)
TROPONIN, HIGH SENSITIVITY: 15 NG/L (ref 0–14)
TROPONIN, HIGH SENSITIVITY: 16 NG/L (ref 0–14)
TSH SERPL DL<=0.05 MIU/L-ACNC: 4.68 UIU/ML (ref 0.3–5)
WBC # BLD: 10.4 K/UL (ref 3.5–11)

## 2022-11-29 PROCEDURE — 99285 EMERGENCY DEPT VISIT HI MDM: CPT

## 2022-11-29 PROCEDURE — 36415 COLL VENOUS BLD VENIPUNCTURE: CPT

## 2022-11-29 PROCEDURE — 71045 X-RAY EXAM CHEST 1 VIEW: CPT

## 2022-11-29 PROCEDURE — 84443 ASSAY THYROID STIM HORMONE: CPT

## 2022-11-29 PROCEDURE — 84439 ASSAY OF FREE THYROXINE: CPT

## 2022-11-29 PROCEDURE — 83874 ASSAY OF MYOGLOBIN: CPT

## 2022-11-29 PROCEDURE — 83880 ASSAY OF NATRIURETIC PEPTIDE: CPT

## 2022-11-29 PROCEDURE — 85025 COMPLETE CBC W/AUTO DIFF WBC: CPT

## 2022-11-29 PROCEDURE — 96374 THER/PROPH/DIAG INJ IV PUSH: CPT

## 2022-11-29 PROCEDURE — 84484 ASSAY OF TROPONIN QUANT: CPT

## 2022-11-29 PROCEDURE — 80048 BASIC METABOLIC PNL TOTAL CA: CPT

## 2022-11-29 PROCEDURE — 93005 ELECTROCARDIOGRAM TRACING: CPT | Performed by: EMERGENCY MEDICINE

## 2022-11-29 PROCEDURE — 6360000002 HC RX W HCPCS: Performed by: EMERGENCY MEDICINE

## 2022-11-29 RX ORDER — FUROSEMIDE 10 MG/ML
40 INJECTION INTRAMUSCULAR; INTRAVENOUS ONCE
Status: COMPLETED | OUTPATIENT
Start: 2022-11-29 | End: 2022-11-29

## 2022-11-29 RX ORDER — FUROSEMIDE 20 MG/1
40 TABLET ORAL 2 TIMES DAILY
Qty: 20 TABLET | Refills: 0 | Status: SHIPPED | OUTPATIENT
Start: 2022-11-29 | End: 2022-12-04

## 2022-11-29 RX ADMIN — FUROSEMIDE 40 MG: 10 INJECTION, SOLUTION INTRAMUSCULAR; INTRAVENOUS at 01:38

## 2022-11-29 ASSESSMENT — ENCOUNTER SYMPTOMS
RHINORRHEA: 0
BLOOD IN STOOL: 0
SHORTNESS OF BREATH: 1
EYE DISCHARGE: 0
COLOR CHANGE: 0
DIARRHEA: 0
SINUS PRESSURE: 0
SORE THROAT: 0
CHEST TIGHTNESS: 0
BACK PAIN: 0
VOMITING: 0
TROUBLE SWALLOWING: 0
CONSTIPATION: 0
WHEEZING: 0
EYE PAIN: 0
COUGH: 0
ABDOMINAL PAIN: 0
NAUSEA: 0
EYE REDNESS: 0
FACIAL SWELLING: 0

## 2022-11-29 ASSESSMENT — PAIN SCALES - GENERAL
PAINLEVEL_OUTOF10: 4
PAINLEVEL_OUTOF10: 4
PAINLEVEL_OUTOF10: 9

## 2022-11-29 ASSESSMENT — PAIN - FUNCTIONAL ASSESSMENT
PAIN_FUNCTIONAL_ASSESSMENT: 0-10

## 2022-11-29 ASSESSMENT — PAIN DESCRIPTION - ORIENTATION: ORIENTATION: RIGHT;LEFT

## 2022-11-29 ASSESSMENT — PAIN DESCRIPTION - LOCATION
LOCATION: LEG
LOCATION: CHEST

## 2022-11-29 NOTE — ED TRIAGE NOTES
Mode of arrival (squad #, walk in, police, etc) : per self by car        Chief complaint(s): bilateral leg pain increased swelling chest pain at times         Arrival Note (brief scenario, treatment PTA, etc). : increased swelling last 5 weeks legs and hands with new increased shortness of breath and chest pain        C= \"Have you ever felt that you should Cut down on your drinking? \"  No  A= \"Have people Annoyed you by criticizing your drinking? \"  No  G= \"Have you ever felt bad or Guilty about your drinking? \"  No  E= \"Have you ever had a drink as an Eye-opener first thing in the morning to steady your nerves or to help a hangover? \"  No      Deferred []      Reason for deferring: N/A    *If yes to two or more: probable alcohol abuse. *

## 2022-11-29 NOTE — ED PROVIDER NOTES
[address]  eMERGENCY dEPARTMENT eNCOUnter      Pt Name: Dwayne Clay  MRN: [id number]  [place] 1960  Date of evaluation: 11/29/22      CHIEF COMPLAINT       Chief Complaint   Patient presents with    Leg Swelling     Increased swelling and pain     Shortness of Breath    Chest Pain         HISTORY OF PRESENT ILLNESS    Dwayne Clay is a 64 y.o. female who presents complaining of leg swelling. Patient states for the last 5 weeks she has been noticing swelling in her legs that has gotten progressively worse to the point where it is now on her face and her arms. Patient called her doctor about 10 days ago they doubled her Lasix from 20 once a day to 20 twice a day and she states it is not helping. Patient states she has been intermittently having shortness of breath she is also intermittently been having chest pain. Patient states nothing seems to bring it on. Patient states she cannot sleep because of it. Patient is tried sitting upright and still not helping. No cough no fevers no vomiting or diarrhea. REVIEW OF SYSTEMS       Review of Systems   Constitutional:  Negative for activity change, appetite change, chills, diaphoresis and fever. HENT:  Negative for congestion, ear pain, facial swelling, nosebleeds, rhinorrhea, sinus pressure, sore throat and trouble swallowing. Eyes:  Negative for pain, discharge and redness. Respiratory:  Positive for shortness of breath. Negative for cough, chest tightness and wheezing. Cardiovascular:  Positive for chest pain and leg swelling. Negative for palpitations. Gastrointestinal:  Negative for abdominal pain, blood in stool, constipation, diarrhea, nausea and vomiting. Genitourinary:  Negative for difficulty urinating, dysuria, flank pain, frequency, genital sores and hematuria. Musculoskeletal:  Negative for arthralgias, back pain, gait problem, joint swelling, myalgias and neck pain.    Skin:  Negative for color change, pallor, rash and wound. Neurological:  Negative for dizziness, tremors, seizures, syncope, speech difficulty, weakness, numbness and headaches. Psychiatric/Behavioral:  Negative for confusion, decreased concentration, hallucinations, self-injury, sleep disturbance and suicidal ideas.       PAST MEDICAL HISTORY     Past Medical History:   Diagnosis Date    Anemia     receives injectafer (iron infusions)    Anginal pain ([facility]     last used Nitro sl 4 yrs 2013  (currently off this medication written: 10/23/2019); no episodes for about a year (written 6/10/21)    Arthritis     Arthritis of right knee 8/15/2018    Asthma     uses inhaler as needed, managed by Tequila Britton NP    Astigmatism 8/22/2014    Back pain     radiculopathy left leg    Blurry vision, bilateral 7/8/2016    Carpal tunnel syndrome of right wrist 11/26/2012    Cataract     patient denies    Closed displaced fracture of lesser tuberosity of right humerus 7/11/2016    Constipation     Depression     Dysmenorrhea     Fatty liver disease, nonalcoholic     GERD (gastroesophageal reflux disease)     Gout     found through bloodwork    Headache     Heart murmur     Heart palpitations     Heart palpitations     History of blood transfusion     thinks she might have had a transfusion doesn't remember why or when    History of rib fracture 7/6/2016    Right    Hyperlipidemia     Hypertension     Hypertriglyceridemia     Hypothyroidism 2/17/2016    Myopia with astigmatism and presbyopia 8/22/2014    Neuropathy     bilateral legs and feet    Obesity     Osteoporosis     Palpitations     about once a month    Pancreatitis 2011    no problems    Panic attack 10/30/2014    PONV (postoperative nausea and vomiting)     mild    Positive cardiac stress test     Presbyopia 8/22/2014    RLS (restless legs syndrome)     Sacroiliitis ([facility] 4/21/2014    Sleep apnea     does not use Cpap    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    Status post total replacement of right shoulder 2017    Stenosis of both internal carotid arteries- Mild 16-49% 2017    Type II or unspecified type diabetes mellitus without mention of complication, not stated as uncontrolled     checks daily, has Insulin Pump, has a wearable glucose monitor    Umbilical hernia 237       SURGICAL HISTORY       Past Surgical History:   Procedure Laterality Date    BREAST BIOPSY Left     negative    CARDIAC CATHETERIZATION      Patient states approximately  she had the cardiac catheterization that was negative     CARDIAC CATHETERIZATION  2021     SECTION      x 2    COLONOSCOPY      DILATION AND CURETTAGE OF UTERUS      JOINT REPLACEMENT Left 2016    shoulder    CO COLON CA SCRN NOT HI RSK IND N/A 10/12/2017    COLONOSCOPY performed by Jose Davis MD at [address] EGD TRANSORAL BIOPSY SINGLE/MULTIPLE N/A 10/12/2017    EGD BIOPSY performed by Jose Davis MD at [address] Right 2017    SHOULDER TOTAL ARTHROPLASTY RIGHT WITH ARTHREX, [address] performed by Margarita Whitfield DO at [employer] Right 2017       CURRENT MEDICATIONS       Current Discharge Medication List        CONTINUE these medications which have NOT CHANGED    Details   SUMAtriptan (IMITREX) 50 MG tablet Take 1 tablet by mouth once as needed for Migraine  Qty: 9 tablet, Refills: 3      VENTOLIN  (90 Base) MCG/ACT inhaler INHALE 2 PUFFS BY MOUTH EVERY 6 HOURS AS NEEDED FOR WHEEZING AND FOR SHORTNESS OF BREATH  Qty: 18 g, Refills: 0      meloxicam (MOBIC) 15 MG tablet Take 1 tablet by mouth once daily  Qty: 90 tablet, Refills: 0      ammonium lactate (LAC-HYDRIN) 12 % cream Apply topically as needed.   Qty: 1 each, Refills: 3      DULoxetine (CYMBALTA) 60 MG extended release capsule Take 1 capsule by mouth nightly  Qty: 90 capsule, Refills: 1      fluticasone (FLONASE) 50 MCG/ACT nasal spray 1 spray by Nasal route daily  Qty: 16 g, Refills: 0      allopurinol (ZYLOPRIM) 100 MG tablet Take 1 tablet by mouth once daily  Qty: 90 tablet, Refills: 0      dicyclomine (BENTYL) 10 MG capsule TAKE 1 CAPSULE BY MOUTH 4 TIMES DAILY AS NEEDED FOR  CRAMPS  Qty: 120 capsule, Refills: 3      famotidine (PEPCID) 20 MG tablet Take 1 tablet by mouth twice daily  Qty: 180 tablet, Refills: 1      gabapentin (NEURONTIN) 600 MG tablet TAKE 1 TABLET BY MOUTH THREE TIMES DAILY  Qty: 270 tablet, Refills: 3      nystatin 745804 UNIT/GM powder APPLY POWDER TOPICALLY TO ABDOMINAL FOLDS THREE TIMES DAILY AS NEEDED FOR SKIN IRRITATION  Qty: 60 g, Refills: 3    Associated Diagnoses: Tinea corporis      Ferrous Sulfate (IRON) 325 (65 Fe) MG TABS Take 325 mg by mouth daily  Qty: 90 tablet, Refills: 1      rOPINIRole (REQUIP) 2 MG tablet TAKE ONE TABLET BY MOUTH THREE TIMES A DAY EVERY MORNING, AT NOON, AND AT BEDTIME  Qty: 270 tablet, Refills: 1      venlafaxine (EFFEXOR XR) 37.5 MG extended release capsule Take 1 capsule by mouth in the morning. Qty: 90 capsule, Refills: 1      lisinopril (PRINIVIL;ZESTRIL) 5 MG tablet Take 1 tablet by mouth in the morning. Qty: 90 tablet, Refills: 1      pantoprazole (PROTONIX) 40 MG tablet Take 1 tablet by mouth in the morning. Take 1 tablet by mouth once daily.   Qty: 90 tablet, Refills: 1      loratadine (CLARITIN) 10 MG tablet TAKE ONE TABLET BY MOUTH DAILY  Qty: 90 tablet, Refills: 1      aspirin 81 MG chewable tablet CHEW ONE TABLET BY MOUTH DAILY  Qty: 90 tablet, Refills: 1      tiZANidine (ZANAFLEX) 2 MG tablet TAKE 1 TABLET BY MOUTH EVERY 8 HOURS AS NEEDED (NECK  SPASM  AND  PAIN)  Qty: 20 tablet, Refills: 0    Associated Diagnoses: Neck pain      meclizine (ANTIVERT) 25 MG tablet TAKE 1 TABLET BY MOUTH THREE TIMES DAILY AS NEEDED FOR  DIZZINESS  Qty: 60 tablet, Refills: 0    Associated Diagnoses: Dizziness      ondansetron (ZOFRAN) 4 MG tablet TAKE 1 TABLET BY MOUTH EVERY 8 HOURS AS NEEDED FOR NAUSEA FOR VOMITING  Qty: 20 tablet, Refills: 0      acetaminophen (TYLENOL) 500 MG tablet Take 2 tablets by mouth every 8 hours as needed for Pain  Qty: 42 tablet, Refills: 0      insulin glargine (LANTUS) 100 UNIT/ML injection vial Inject 60 Units into the skin nightly Changed per Dr. Renate Graf office  Fátima Rajeev: 10 mL, Refills: 0      cyclobenzaprine (FLEXERIL) 10 MG tablet Take one tablet by mouth three times daily as needed  Qty: 90 tablet, Refills: 0      amitriptyline (ELAVIL) 50 MG tablet Take 50 mg by mouth nightly      darifenacin (ENABLEX) 15 MG extended release tablet Take 15 mg by mouth 2 times daily      levothyroxine (SYNTHROID) 112 MCG tablet Take 112 mcg by mouth Daily      Omega-3 Fatty Acids (FISH OIL) 1000 MG CAPS Take 3,000 mg by mouth daily      oxybutynin (DITROPAN XL) 15 MG extended release tablet Take 15 mg by mouth daily      Aspirin-Acetaminophen-Caffeine (EXCEDRIN PO) Take 2 tablets by mouth daily as needed (headache)      Cholecalciferol (VITAMIN D3) 50 MCG (2000 UT) TABS Take 1 tablet by mouth once daily  Qty: 30 tablet, Refills: 0    Associated Diagnoses: Vitamin D deficiency      Magnesium Oxide 500 MG TABS TAKE 1 TABLET BY MOUTH TWICE DAILY  Qty: 180 tablet, Refills: 1    Comments: Please consider 90 day supplies to promote better adherence  Associated Diagnoses: Hypomagnesemia      blood glucose test strips (FREESTYLE LITE) strip 1 each by Does not apply route 6 times daily  Qty: 200 each, Refills: 5    Associated Diagnoses: Type 2 diabetes mellitus with diabetic neuropathy, with long-term current use of insulin ([facility])      liothyronine (CYTOMEL) 5 MCG tablet Take 5 mcg by mouth daily      Blood Glucose Monitoring Suppl (FREESTYLE LITE) KYLAH 1 Device by Does not apply route 6 times daily  Qty: 1 Device, Refills: 0    Associated Diagnoses: Type 2 diabetes mellitus with diabetic neuropathy, with long-term current use of insulin ([facility])      glucose monitoring kit (FREESTYLE) monitoring kit 1 kit by Does not apply route daily  Qty: 1 kit, Refills: 0    Comments: Free Style Lite. Associated Diagnoses: Type 2 diabetes mellitus with diabetic neuropathy, with long-term current use of insulin ([facility])      Cyanocobalamin (B-12) 2500 MCG TABS Take 1 tablet by mouth daily       lidocaine (XYLOCAINE) 5 % ointment Apply topically as needed. Qty: 1 Tube, Refills: 3      Liraglutide (VICTOZA) 18 MG/3ML SOPN SC injection Inject 1.8 mg into the skin daily       Insulin Disposable Pump (V-GO 40) KIT Use as directed per Dr. General Christy. ALLERGIES     is allergic to ozempic (0.25 or 0.5 mg-dose) [semaglutide(0.25 or 0.5mg-dos)]; actos [pioglitazone]; amitriptyline; celebrex [celecoxib]; fenofibrate; gemfibrozil; lovastatin; metformin; prempro [conj estrog-medroxyprogest ace]; statins; tricor [fenofibrate]; zetia [ezetimibe]; zocor [simvastatin]; ampicillin; niacin and related; novolin [insulin isophane & reg, human]; omeprazole; pcn [penicillins]; pregabalin; and vesicare [solifenacin]. FAMILY HISTORY     [unfilled]     SOCIAL HISTORY      reports that she quit smoking about 45 years ago. Her smoking use included cigarettes. She has a 10.00 pack-year smoking history. She has never used smokeless tobacco. She reports current alcohol use. She reports that she does not use drugs. PHYSICAL EXAM     INITIAL VITALS: /82   Pulse 75   Temp 98.1 °F (36.7 °C) (Oral)   Resp 18   Ht 5' 1\" (1.549 m)   Wt 275 lb (124.7 kg)   SpO2 94%   BMI 51.96 kg/m²      Physical Exam  Vitals and nursing note reviewed. Constitutional:       General: She is not in acute distress. Appearance: She is well-developed. She is not diaphoretic. HENT:      Head: Normocephalic and atraumatic. Eyes:      General: No scleral icterus. Right eye: No discharge. Left eye: No discharge. Conjunctiva/sclera: Conjunctivae normal.      Pupils: Pupils are equal, round, and reactive to light.    Cardiovascular:      Rate and Rhythm: Normal rate and regular rhythm. Heart sounds: Normal heart sounds. No murmur heard. No friction rub. No gallop. Pulmonary:      Effort: Pulmonary effort is normal. No respiratory distress. Breath sounds: Normal breath sounds. No wheezing or rales. Chest:      Chest wall: No tenderness. Abdominal:      General: Bowel sounds are normal. There is no distension. Palpations: Abdomen is soft. There is no mass. Tenderness: There is no abdominal tenderness. There is no guarding or rebound. Musculoskeletal:         General: No tenderness. Normal range of motion. Right lower leg: Edema present. Left lower leg: Edema present. Skin:     General: Skin is warm and dry. Coloration: Skin is not pale. Findings: No erythema or rash. Neurological:      Mental Status: She is alert and oriented to person, place, and time. Cranial Nerves: No cranial nerve deficit. Sensory: No sensory deficit. Motor: No abnormal muscle tone. Coordination: Coordination normal.      Deep Tendon Reflexes: Reflexes normal.   Psychiatric:         Behavior: Behavior normal.         Thought Content: Thought content normal.         Judgment: Judgment normal.       MEDICAL DECISION MAKING:     Most likely the patient is having fluid overload causing her to have some CHF or this could be a kidney issue also though she seems to be urinating okay. We will go ahead and do a full cardiac work-up reevaluate give IV Lasix and then discussed with the patient the findings.     DIAGNOSTIC RESULTS     EKG: All EKG's are interpreted by the Emergency Department Physician who either signs or Co-signs this chart in the absence of a cardiologist.    EKG Interpretation    Interpreted by emergency department physician    Rhythm: normal sinus   Rate: normal  Axis: left  Ectopy: none  Conduction: normal  ST Segments: nonspecific changes  T Waves: non specific changes  Q Waves: none    Clinical Impression: EKG: normal sinus rhythm, nonspecific ST and T waves changes, left axis deviation. Víctor De Los Santos MD      RADIOLOGY:All plain film, CT, MRI, and formal ultrasound images (except ED bedside ultrasound)are read by the radiologist and interpretations are directly viewed by the emergency physician. XR CHEST PORTABLE    Result Date: 11/29/2022  EXAMINATION: ONE XRAY VIEW OF THE CHEST 11/29/2022 1:17 am COMPARISON: 03/14/2022 HISTORY: ORDERING SYSTEM PROVIDED HISTORY: Chest Pain TECHNOLOGIST PROVIDED HISTORY: Chest Pain Reason for Exam: chest pain, SOB x5 weeks FINDINGS: Heart size and pulmonary vasculature are normal.  The lungs are clear and normally expanded. Surrounding osseous and soft tissue structures are noted for prior bilateral shoulder juliana arthroplasties. No acute cardiopulmonary abnormality evident. LABS: All lab results were reviewed bymyself, and all abnormals are listed below.   Labs Reviewed   BASIC METABOLIC PANEL - Abnormal; Notable for the following components:       Result Value    Glucose 259 (*)     All other components within normal limits   CBC WITH AUTO DIFFERENTIAL - Abnormal; Notable for the following components:    RBC 3.95 (*)     Hemoglobin 11.6 (*)     Hematocrit 35.3 (*)     Seg Neutrophils 67 (*)     Lymphocytes 22 (*)     All other components within normal limits   TROP/MYOGLOBIN - Abnormal; Notable for the following components:    Troponin, High Sensitivity 15 (*)     All other components within normal limits   TROP/MYOGLOBIN - Abnormal; Notable for the following components:    Troponin, High Sensitivity 16 (*)     All other components within normal limits   BRAIN NATRIURETIC PEPTIDE   TSH   T4, FREE         EMERGENCY DEPARTMENT COURSE:   Vitals:    Vitals:    11/29/22 0109 11/29/22 0200   BP: (!) 165/82 138/82   Pulse: 80 75   Resp: 16 18   Temp: 98.1 °F (36.7 °C)    TempSrc: Oral    SpO2: 96% 94%   Weight: 275 lb (124.7 kg)    Height: 5' 1\" (1.549 m)        The patient was given the following medications while in the emergency department:     Orders Placed This Encounter   Medications    furosemide (LASIX) injection 40 mg    furosemide (LASIX) 20 MG tablet     Sig: Take 2 tablets by mouth 2 times daily for 5 days     Dispense:  20 tablet     Refill:  0       -------------------------  4:01 AM EST  Patient was reevaluated and up-to-date on results. This point time I think patient can be discharged home with some increase in her Lasix. Patient is okay with discharge. CRITICAL CARE:   None    CONSULTS:  None    PROCEDURES:  None    FINAL IMPRESSION      1.  Peripheral edema          DISPOSITION/PLAN   DISPOSITION Decision To Discharge 11/29/2022 04:00:36 AM      PATIENT REFERRED TO:  YOLANDA Arroyo NP  [address] [place] [address]    In 1 week      [facility]  [address] [facility]  [address] [facility]  [phone]    If symptoms worsen    DISCHARGE MEDICATIONS:  Current Discharge Medication List          (Please note that portions of this note were completed with a voice recognition program.  Efforts were made to edit the dictations but occasionally words are mis-transcribed.)    Chasity Carreon MD  Attending Kelly Guaman MD  11/29/22 1967

## 2022-11-30 LAB
EKG ATRIAL RATE: 74 BPM
EKG ATRIAL RATE: 78 BPM
EKG P AXIS: 44 DEGREES
EKG P AXIS: 55 DEGREES
EKG P-R INTERVAL: 176 MS
EKG P-R INTERVAL: 176 MS
EKG Q-T INTERVAL: 412 MS
EKG Q-T INTERVAL: 418 MS
EKG QRS DURATION: 100 MS
EKG QRS DURATION: 96 MS
EKG QTC CALCULATION (BAZETT): 463 MS
EKG QTC CALCULATION (BAZETT): 469 MS
EKG R AXIS: -40 DEGREES
EKG R AXIS: -41 DEGREES
EKG T AXIS: 20 DEGREES
EKG T AXIS: 27 DEGREES
EKG VENTRICULAR RATE: 74 BPM
EKG VENTRICULAR RATE: 78 BPM

## 2022-11-30 PROCEDURE — 93010 ELECTROCARDIOGRAM REPORT: CPT | Performed by: INTERNAL MEDICINE

## 2022-12-06 ENCOUNTER — OFFICE VISIT (OUTPATIENT)
Dept: FAMILY MEDICINE CLINIC | Age: 62
End: 2022-12-06
Payer: COMMERCIAL

## 2022-12-06 VITALS
WEIGHT: 281.8 LBS | HEART RATE: 90 BPM | SYSTOLIC BLOOD PRESSURE: 100 MMHG | BODY MASS INDEX: 53.25 KG/M2 | DIASTOLIC BLOOD PRESSURE: 62 MMHG | OXYGEN SATURATION: 97 %

## 2022-12-06 DIAGNOSIS — R06.02 SHORTNESS OF BREATH: ICD-10-CM

## 2022-12-06 DIAGNOSIS — R22.43 LOCALIZED SWELLING OF BOTH LOWER LEGS: Primary | ICD-10-CM

## 2022-12-06 DIAGNOSIS — R07.89 CHEST PRESSURE: ICD-10-CM

## 2022-12-06 DIAGNOSIS — M79.604 BILATERAL LEG PAIN: ICD-10-CM

## 2022-12-06 DIAGNOSIS — M79.605 BILATERAL LEG PAIN: ICD-10-CM

## 2022-12-06 PROCEDURE — 1036F TOBACCO NON-USER: CPT | Performed by: NURSE PRACTITIONER

## 2022-12-06 PROCEDURE — G8427 DOCREV CUR MEDS BY ELIG CLIN: HCPCS | Performed by: NURSE PRACTITIONER

## 2022-12-06 PROCEDURE — 3078F DIAST BP <80 MM HG: CPT | Performed by: NURSE PRACTITIONER

## 2022-12-06 PROCEDURE — G8484 FLU IMMUNIZE NO ADMIN: HCPCS | Performed by: NURSE PRACTITIONER

## 2022-12-06 PROCEDURE — 3017F COLORECTAL CA SCREEN DOC REV: CPT | Performed by: NURSE PRACTITIONER

## 2022-12-06 PROCEDURE — G8417 CALC BMI ABV UP PARAM F/U: HCPCS | Performed by: NURSE PRACTITIONER

## 2022-12-06 PROCEDURE — 99214 OFFICE O/P EST MOD 30 MIN: CPT | Performed by: NURSE PRACTITIONER

## 2022-12-06 PROCEDURE — 3074F SYST BP LT 130 MM HG: CPT | Performed by: NURSE PRACTITIONER

## 2022-12-06 RX ORDER — FUROSEMIDE 20 MG/1
20 TABLET ORAL DAILY
Qty: 90 TABLET | Refills: 1 | Status: SHIPPED | OUTPATIENT
Start: 2022-12-06 | End: 2023-01-05

## 2022-12-06 ASSESSMENT — ENCOUNTER SYMPTOMS
ABDOMINAL DISTENTION: 0
CHEST TIGHTNESS: 0
SORE THROAT: 0
COUGH: 0
ABDOMINAL PAIN: 0
DIARRHEA: 0
VOMITING: 0
CONSTIPATION: 0
RHINORRHEA: 0
BACK PAIN: 0
NAUSEA: 0
SHORTNESS OF BREATH: 1

## 2022-12-14 ENCOUNTER — TELEPHONE (OUTPATIENT)
Dept: ONCOLOGY | Age: 62
End: 2022-12-14

## 2022-12-30 ENCOUNTER — TELEPHONE (OUTPATIENT)
Dept: ONCOLOGY | Age: 62
End: 2022-12-30

## 2022-12-30 NOTE — TELEPHONE ENCOUNTER
WRITER CALLED AND LEFT A VM MESSAGE TO SCHEDULE A YR F/U W/ DR Prasanna Lopez IN April 2023 W/ LABS WAITING ON A CALL BACK TO SCHEDULE AN APPT

## 2023-01-01 NOTE — PROGRESS NOTES
Infant remains on BCPAP +5; fio2 21%. Three self-resolving heart rate dips. Remains intermittently tachypneic. Tolerating feeds. Voiding and stooling.    Patient eating without difficulty. Air removed from vas band every 15 minutes per protocol .  No hematoma or drainage noted

## 2023-01-05 ENCOUNTER — HOSPITAL ENCOUNTER (OUTPATIENT)
Dept: VASCULAR LAB | Age: 63
Discharge: HOME | End: 2023-01-05
Payer: COMMERCIAL

## 2023-01-05 ENCOUNTER — HOSPITAL ENCOUNTER (OUTPATIENT)
Dept: NON INVASIVE DIAGNOSTICS | Age: 63
Discharge: HOME | End: 2023-01-05
Payer: COMMERCIAL

## 2023-01-05 DIAGNOSIS — R22.43 LOCALIZED SWELLING OF BOTH LOWER LEGS: ICD-10-CM

## 2023-01-05 DIAGNOSIS — M79.604 BILATERAL LEG PAIN: ICD-10-CM

## 2023-01-05 DIAGNOSIS — M79.605 BILATERAL LEG PAIN: ICD-10-CM

## 2023-01-05 DIAGNOSIS — R07.89 CHEST PRESSURE: ICD-10-CM

## 2023-01-05 DIAGNOSIS — R06.02 SHORTNESS OF BREATH: ICD-10-CM

## 2023-01-05 PROCEDURE — 93306 TTE W/DOPPLER COMPLETE: CPT

## 2023-01-05 PROCEDURE — 93923 UPR/LXTR ART STDY 3+ LVLS: CPT

## 2023-01-06 ENCOUNTER — HOSPITAL ENCOUNTER (OUTPATIENT)
Age: 63
Discharge: HOME | End: 2023-01-06
Payer: COMMERCIAL

## 2023-01-06 DIAGNOSIS — E78.5 HYPERLIPIDEMIA, UNSPECIFIED HYPERLIPIDEMIA TYPE: ICD-10-CM

## 2023-01-06 DIAGNOSIS — D50.9 IRON DEFICIENCY ANEMIA, UNSPECIFIED IRON DEFICIENCY ANEMIA TYPE: Primary | ICD-10-CM

## 2023-01-06 DIAGNOSIS — I10 ESSENTIAL HYPERTENSION: ICD-10-CM

## 2023-01-06 DIAGNOSIS — M1A.9XX0 CHRONIC GOUT WITHOUT TOPHUS, UNSPECIFIED CAUSE, UNSPECIFIED SITE: ICD-10-CM

## 2023-01-06 DIAGNOSIS — E55.9 VITAMIN D DEFICIENCY: ICD-10-CM

## 2023-01-06 LAB
ALBUMIN SERPL-MCNC: 4 G/DL (ref 3.5–5.2)
ALP BLD-CCNC: 123 U/L (ref 35–104)
ALT SERPL-CCNC: 18 U/L (ref 5–33)
ANION GAP SERPL CALCULATED.3IONS-SCNC: 10 MMOL/L (ref 9–17)
AST SERPL-CCNC: 17 U/L
BILIRUB SERPL-MCNC: 0.4 MG/DL (ref 0.3–1.2)
BUN BLDV-MCNC: 22 MG/DL (ref 8–23)
CALCIUM SERPL-MCNC: 9.2 MG/DL (ref 8.6–10.4)
CHLORIDE BLD-SCNC: 99 MMOL/L (ref 98–107)
CHOLESTEROL/HDL RATIO: 4.2
CHOLESTEROL: 263 MG/DL
CO2: 27 MMOL/L (ref 20–31)
CREAT SERPL-MCNC: 0.75 MG/DL (ref 0.5–0.9)
GFR SERPL CREATININE-BSD FRML MDRD: >60 ML/MIN/1.73M2
GLUCOSE BLD-MCNC: 315 MG/DL (ref 70–99)
HCT VFR BLD CALC: 39.2 % (ref 36–46)
HDLC SERPL-MCNC: 62 MG/DL
HEMOGLOBIN: 12.6 G/DL (ref 12–16)
LDL CHOLESTEROL: 144 MG/DL (ref 0–130)
MCH RBC QN AUTO: 28.7 PG (ref 26–34)
MCHC RBC AUTO-ENTMCNC: 32 G/DL (ref 31–37)
MCV RBC AUTO: 89.6 FL (ref 80–100)
PDW BLD-RTO: 14.5 % (ref 11.5–14.9)
PLATELET # BLD: 321 K/UL (ref 150–450)
PMV BLD AUTO: 8.9 FL (ref 6–12)
POTASSIUM SERPL-SCNC: 4.6 MMOL/L (ref 3.7–5.3)
RBC # BLD: 4.38 M/UL (ref 4–5.2)
SODIUM BLD-SCNC: 136 MMOL/L (ref 135–144)
TOTAL PROTEIN: 6.8 G/DL (ref 6.4–8.3)
TRIGL SERPL-MCNC: 287 MG/DL
URIC ACID: 6.5 MG/DL (ref 2.4–5.7)
VITAMIN D 25-HYDROXY: 30.5 NG/ML
WBC # BLD: 10.3 K/UL (ref 3.5–11)

## 2023-01-06 PROCEDURE — 85027 COMPLETE CBC AUTOMATED: CPT

## 2023-01-06 PROCEDURE — 82306 VITAMIN D 25 HYDROXY: CPT

## 2023-01-06 PROCEDURE — 80061 LIPID PANEL: CPT

## 2023-01-06 PROCEDURE — 84550 ASSAY OF BLOOD/URIC ACID: CPT

## 2023-01-06 PROCEDURE — 80053 COMPREHEN METABOLIC PANEL: CPT

## 2023-01-06 PROCEDURE — 36415 COLL VENOUS BLD VENIPUNCTURE: CPT

## 2023-01-09 ENCOUNTER — TELEPHONE (OUTPATIENT)
Dept: FAMILY MEDICINE CLINIC | Age: 63
End: 2023-01-09

## 2023-01-09 RX ORDER — HYDROCHLOROTHIAZIDE 25 MG/1
25 TABLET ORAL EVERY MORNING
Qty: 90 TABLET | Refills: 1 | Status: SHIPPED | OUTPATIENT
Start: 2023-01-09

## 2023-01-09 NOTE — TELEPHONE ENCOUNTER
Has she gotten the compression stockings we discussed? Lets have her stop the Lasix and try another water pill. I will send over HCTZ to her pharmacy.

## 2023-01-09 NOTE — TELEPHONE ENCOUNTER
Patient returned call about test results- she stated she is still concerned that all the normal tests don't explain her body swelling- states this makes 11 weeks and swelling has not gone down, states she is taking her water pill- looking for advice

## 2023-01-12 ENCOUNTER — TELEPHONE (OUTPATIENT)
Dept: FAMILY MEDICINE CLINIC | Age: 63
End: 2023-01-12

## 2023-01-12 NOTE — TELEPHONE ENCOUNTER
Pt called back stating that she received a VM today to call back for results. Advised pt it looks like we called about her lab results. Pt states she had already received those. Advised pt I will ask her nurse and see if there was any other testing resutls we received. I did not see anything else, but not sure if I'm missing something?

## 2023-01-20 ENCOUNTER — TELEPHONE (OUTPATIENT)
Dept: FAMILY MEDICINE CLINIC | Age: 63
End: 2023-01-20

## 2023-01-20 NOTE — TELEPHONE ENCOUNTER
Has upcoming appt on 2/8/23 with cardiologist     Says cannot wear the compression stockings because she can't pull over legs too swollen    Swelling is getting worse- does take hydrochlorothiazide once daily- states she doesn't feel it is working and the generic lasix didn't work either.

## 2023-01-20 NOTE — TELEPHONE ENCOUNTER
Let her know Michelle Dubose is out of the office today and give it over the weekend and send us a message on Monday.

## 2023-01-20 NOTE — TELEPHONE ENCOUNTER
Patient states that she has had this leg swelling for 12 weeks and it is not getting any better. She has had multiple test completed and she is not having any relief she is wanting to know what else can be done and she is very uncomfortable with the swelling.

## 2023-01-24 NOTE — TELEPHONE ENCOUNTER
I would recommend following up with cardiology as previously discussed. She does have an upcoming appointment the first part of February.

## 2023-01-30 ENCOUNTER — TELEPHONE (OUTPATIENT)
Dept: FAMILY MEDICINE CLINIC | Age: 63
End: 2023-01-30

## 2023-01-30 RX ORDER — BUMETANIDE 1 MG/1
1 TABLET ORAL DAILY
Qty: 30 TABLET | Refills: 0 | Status: SHIPPED | OUTPATIENT
Start: 2023-01-30 | End: 2023-03-01

## 2023-01-30 NOTE — TELEPHONE ENCOUNTER
I did suggest compression stockings but she is unable to get them on. I would recommend getting ace wraps and wrapping her lower legs, snug, starting at her toes up to her knees (both legs). I would like her to keep the ace wraps on throughout the day and remove them only at night to sleep. I am also going to have her stop the HCTZ and I will send over another water pill for her to take daily.

## 2023-01-30 NOTE — TELEPHONE ENCOUNTER
Pt is calling again regarding leg swelling states she has appt 2/8/23 @ 1:20 pm states leg swelling is not subsiding at all- is there anything else she can do prior to cardiology appt? Pt was asked if any new or worsening symptoms- \"no same old thing\" patient did ask if it was normal for the swelling to go on for 14 weeks like this and not respond-     Pt was advised that the cardiologist would be best able to evaluate and advise on her swelling and if it it not worsening to make sure she keeps that appointment. Still patient would like to know if anything else can be done now?

## 2023-02-06 RX ORDER — MELOXICAM 15 MG/1
TABLET ORAL
Qty: 90 TABLET | Refills: 1 | Status: SHIPPED | OUTPATIENT
Start: 2023-02-06

## 2023-02-06 RX ORDER — VENLAFAXINE HYDROCHLORIDE 37.5 MG/1
CAPSULE, EXTENDED RELEASE ORAL
Qty: 90 CAPSULE | Refills: 1 | Status: SHIPPED | OUTPATIENT
Start: 2023-02-06

## 2023-02-06 RX ORDER — ALLOPURINOL 100 MG/1
TABLET ORAL
Qty: 90 TABLET | Refills: 1 | Status: SHIPPED | OUTPATIENT
Start: 2023-02-06

## 2023-02-06 NOTE — TELEPHONE ENCOUNTER
Please Approve or Refuse.   Send to Pharmacy per Pt's Request:      Next Visit Date:  5/11/2023   Last Visit Date: 12/6/2022    Hemoglobin A1C (%)   Date Value   11/04/2022 8.6   07/07/2022 7.5   01/03/2022 7.0             ( goal A1C is < 7)   BP Readings from Last 3 Encounters:   12/06/22 100/62   11/29/22 131/73   11/10/22 120/84          (goal 120/80)  BUN   Date Value Ref Range Status   01/06/2023 22 8 - 23 mg/dL Final     Creatinine   Date Value Ref Range Status   01/06/2023 0.75 0.50 - 0.90 mg/dL Final     Potassium   Date Value Ref Range Status   01/06/2023 4.6 3.7 - 5.3 mmol/L Final

## 2023-02-17 RX ORDER — LEVOTHYROXINE SODIUM 112 UG/1
112 TABLET ORAL DAILY
Qty: 90 TABLET | Refills: 1 | Status: SHIPPED | OUTPATIENT
Start: 2023-02-17

## 2023-02-21 RX ORDER — BUMETANIDE 1 MG/1
TABLET ORAL
Qty: 90 TABLET | Refills: 1 | Status: SHIPPED | OUTPATIENT
Start: 2023-02-21

## 2023-02-21 RX ORDER — LORATADINE 10 MG/1
TABLET ORAL
Qty: 90 TABLET | Refills: 1 | Status: SHIPPED | OUTPATIENT
Start: 2023-02-21

## 2023-02-24 RX ORDER — ROPINIROLE 2 MG/1
TABLET, FILM COATED ORAL
Qty: 270 TABLET | Refills: 1 | Status: SHIPPED | OUTPATIENT
Start: 2023-02-24

## 2023-03-07 ENCOUNTER — TELEPHONE (OUTPATIENT)
Dept: FAMILY MEDICINE CLINIC | Age: 63
End: 2023-03-07

## 2023-03-07 DIAGNOSIS — R22.43 LOCALIZED SWELLING OF BOTH LOWER LEGS: Primary | ICD-10-CM

## 2023-03-07 LAB
AVERAGE GLUCOSE: NORMAL
HBA1C MFR BLD: 8.2 %

## 2023-03-07 NOTE — TELEPHONE ENCOUNTER
Pt states that legs are still swelling, yesterday makes 14weeks. Pt states that she saw Dr. Sultana Alert office and he states that she should be seen soon or go to the ER. Appt made for Thursday. Advised pt I will let PCP know what is going on and if she has anything else to add we will call her back, if not just expect to see us Thursday, as well as reporting to the ER if symptoms worsen. She states understanding.

## 2023-03-07 NOTE — TELEPHONE ENCOUNTER
Called pt and LVM informing her. [facility] number provided to pt. Advised pt that she does not need to be seen by PCP per PCP so appt on Thursday will be cancelled. Advised she call [facility] tomorrow to get scheduled. Advised she call back with any questions.

## 2023-03-14 ENCOUNTER — INITIAL CONSULT (OUTPATIENT)
Dept: VASCULAR SURGERY | Age: 63
End: 2023-03-14
Payer: COMMERCIAL

## 2023-03-14 VITALS
OXYGEN SATURATION: 92 % | TEMPERATURE: 98.4 F | RESPIRATION RATE: 20 BRPM | WEIGHT: 281 LBS | BODY MASS INDEX: 53.05 KG/M2 | HEIGHT: 61 IN | SYSTOLIC BLOOD PRESSURE: 103 MMHG | HEART RATE: 90 BPM | DIASTOLIC BLOOD PRESSURE: 56 MMHG

## 2023-03-14 DIAGNOSIS — R60.0 BILATERAL LEG EDEMA: Primary | ICD-10-CM

## 2023-03-14 PROCEDURE — 3078F DIAST BP <80 MM HG: CPT | Performed by: STUDENT IN AN ORGANIZED HEALTH CARE EDUCATION/TRAINING PROGRAM

## 2023-03-14 PROCEDURE — G8417 CALC BMI ABV UP PARAM F/U: HCPCS | Performed by: STUDENT IN AN ORGANIZED HEALTH CARE EDUCATION/TRAINING PROGRAM

## 2023-03-14 PROCEDURE — 99204 OFFICE O/P NEW MOD 45 MIN: CPT | Performed by: STUDENT IN AN ORGANIZED HEALTH CARE EDUCATION/TRAINING PROGRAM

## 2023-03-14 PROCEDURE — G8484 FLU IMMUNIZE NO ADMIN: HCPCS | Performed by: STUDENT IN AN ORGANIZED HEALTH CARE EDUCATION/TRAINING PROGRAM

## 2023-03-14 PROCEDURE — G8427 DOCREV CUR MEDS BY ELIG CLIN: HCPCS | Performed by: STUDENT IN AN ORGANIZED HEALTH CARE EDUCATION/TRAINING PROGRAM

## 2023-03-14 PROCEDURE — 3074F SYST BP LT 130 MM HG: CPT | Performed by: STUDENT IN AN ORGANIZED HEALTH CARE EDUCATION/TRAINING PROGRAM

## 2023-03-14 PROCEDURE — 3017F COLORECTAL CA SCREEN DOC REV: CPT | Performed by: STUDENT IN AN ORGANIZED HEALTH CARE EDUCATION/TRAINING PROGRAM

## 2023-03-14 PROCEDURE — 1036F TOBACCO NON-USER: CPT | Performed by: STUDENT IN AN ORGANIZED HEALTH CARE EDUCATION/TRAINING PROGRAM

## 2023-03-14 ASSESSMENT — ENCOUNTER SYMPTOMS
CHEST TIGHTNESS: 0
EYE PAIN: 0
VOICE CHANGE: 0
COLOR CHANGE: 0
VOMITING: 0
ABDOMINAL DISTENTION: 0
TROUBLE SWALLOWING: 0
ABDOMINAL PAIN: 0
COUGH: 0

## 2023-03-14 NOTE — PROGRESS NOTES
[address] [facility] [address] [place] [address]  Dept: [phone]     Patient: Lawyer Denis  : 1960  MRN: [id number]  DOS: 3/15/2023    HPI:  3/14/23: Lawyer Denis is a 58 y.o. female who comes to the office regarding leg swell. She has had full body swelling, including the legs, for about 4 months. She had echo done which did not reveal heart failure. She had PVR study on 23 which I reviewed myself and was normal. Her BMI is 53. She is concerned the swelling may be caused by medication side effects. She also has knee pain and said she needs knee replacement surgery once the swelling subsides.     Past Medical History:   Diagnosis Date    Anemia     receives injectafer (iron infusions)    Anginal pain ([address]     last used Nitro sl 4 yrs   (currently off this medication written: 10/23/2019); no episodes for about a year (written 6/10/21)    Arthritis     Arthritis of right knee 8/15/2018    Asthma     uses inhaler as needed, managed by Jose Guadalupe Jimenez NP    Astigmatism 2014    Back pain     radiculopathy left leg    Blurry vision, bilateral 2016    Carpal tunnel syndrome of right wrist 2012    Cataract     patient denies    Closed displaced fracture of lesser tuberosity of right humerus 2016    Constipation     Depression     Dysmenorrhea     Fatty liver disease, nonalcoholic     GERD (gastroesophageal reflux disease)     Gout     found through bloodwork    Headache     Heart murmur     Heart palpitations     Heart palpitations     History of blood transfusion     thinks she might have had a transfusion doesn't remember why or when    History of rib fracture 2016    Right    Hyperlipidemia     Hypertension     Hypertriglyceridemia     Hypothyroidism 2016    Myopia with astigmatism and presbyopia 2014    Neuropathy     bilateral legs and feet    Obesity Osteoporosis     Palpitations     about once a month    Pancreatitis     no problems    Panic attack 10/30/2014    PONV (postoperative nausea and vomiting)     mild    Positive cardiac stress test     Presbyopia 2014    RLS (restless legs syndrome)     Sacroiliitis ([facility] 2014    Sleep apnea     does not use Cpap    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    Status post total replacement of right shoulder 2017    Stenosis of both internal carotid arteries- Mild 16-49% 2017    Type II or unspecified type diabetes mellitus without mention of complication, not stated as uncontrolled     checks daily, has Insulin Pump, has a wearable glucose monitor    Umbilical hernia 3821     Family History   Problem Relation Age of Onset    Emphysema Mother     Diabetes Father     Heart Disease Father     High Cholesterol Sister     High Blood Pressure Sister     Heart Disease Sister       Social History     Socioeconomic History    Marital status:      Spouse name: Not on file    Number of children: Not on file    Years of education: Not on file    Highest education level: Not on file   Occupational History     Employer: DISABLED   Tobacco Use    Smoking status: Former     Packs/day: 0.50     Years: 20.00     Pack years: 10.00     Types: Cigarettes     Quit date: 1977     Years since quittin.3    Smokeless tobacco: Never   Vaping Use    Vaping Use: Never used   Substance and Sexual Activity    Alcohol use:  Yes     Alcohol/week: 0.0 standard drinks     Comment: rtare    Drug use: No    Sexual activity: Yes     Partners: Male   Other Topics Concern    Not on file   Social History Narrative    Not on file     Social Determinants of Health     Financial Resource Strain: Low Risk     Difficulty of Paying Living Expenses: Not hard at all   Food Insecurity: Food Insecurity Present    Worried About [address] in the Last Year: Often true    [address] [facility] [address] in the Last Year: Often true   Transportation Needs: Not on file   Physical Activity: Not on file   Stress: Not on file   Social Connections: Not on file   Intimate Partner Violence: Not on file   Housing Stability: Not on file      Past Surgical History:   Procedure Laterality Date    BREAST BIOPSY Left     negative    CARDIAC CATHETERIZATION      Patient states approximately  she had the cardiac catheterization that was negative     CARDIAC CATHETERIZATION  2021     SECTION      x 2    COLONOSCOPY      DILATION AND CURETTAGE OF UTERUS      JOINT REPLACEMENT Left 2016    shoulder    ID COLON CA SCRN NOT HI RSK IND N/A 10/12/2017    COLONOSCOPY performed by Lydia Gonzalez MD at [address] EGD TRANSORAL BIOPSY SINGLE/MULTIPLE N/A 10/12/2017    EGD BIOPSY performed by Lydia Gonzalez MD at [address] Right 2017    SHOULDER TOTAL ARTHROPLASTY RIGHT WITH ARTHREX, [address] performed by Rona Love DO at [address] Right 2017      Review of Systems   Constitutional:  Negative for activity change, fever and unexpected weight change. HENT:  Negative for trouble swallowing and voice change. Eyes:  Negative for pain and visual disturbance. Respiratory:  Negative for cough and chest tightness. Cardiovascular:  Positive for leg swelling. Negative for chest pain and palpitations. Gastrointestinal:  Negative for abdominal distention, abdominal pain and vomiting. Endocrine: Negative for cold intolerance and heat intolerance. Genitourinary:  Negative for dysuria, flank pain and hematuria. Musculoskeletal:  Negative for joint swelling and neck pain. Skin:  Negative for color change and rash. Allergic/Immunologic: Negative for immunocompromised state. Neurological:  Negative for syncope, speech difficulty, weakness, numbness and headaches. Hematological:  Negative for adenopathy.    Psychiatric/Behavioral:  Negative for behavioral problems and suicidal ideas. Vitals:    03/14/23 1036   BP: (!) 103/56   Site: Left Lower Arm   Position: Sitting   Cuff Size: Medium Adult   Pulse: 90   Resp: 20   Temp: 98.4 °F (36.9 °C)   TempSrc: Temporal   SpO2: 92%   Weight: 281 lb (127.5 kg)   Height: 5' 1\" (1.549 m)          Physical Exam  Constitutional:       General: She is not in acute distress. HENT:      Mouth/Throat:      Mouth: Mucous membranes are moist.      Pharynx: Oropharynx is clear. Eyes:      General: No scleral icterus. Extraocular Movements: Extraocular movements intact. Conjunctiva/sclera: Conjunctivae normal.   Cardiovascular:      Rate and Rhythm: Normal rate and regular rhythm. Heart sounds: No murmur heard. Comments: Palpable Dps bilaterally. Motor/sensation intact. No wounds. No obvious varicose veins. Pulmonary:      Effort: No respiratory distress. Breath sounds: No rales. Abdominal:      General: There is no distension. Palpations: There is no mass. Tenderness: There is no abdominal tenderness. There is no guarding. Musculoskeletal:      Cervical back: No rigidity or tenderness. Lymphadenopathy:      Cervical: No cervical adenopathy. Skin:     Coloration: Skin is not jaundiced. Findings: No rash. Neurological:      General: No focal deficit present. Mental Status: She is alert and oriented to person, place, and time. Cranial Nerves: No cranial nerve deficit. Psychiatric:         Mood and Affect: Mood normal.       Assessment:  1. Bilateral leg edema      Plan:  Ms. Sliverio Renteria described full body swelling which is more likely not related to varicose veins. However I discussed ordering venous duplex to evaluate for DVT and venous reflux. I also prescribed compression stockings today, 20-30 mmHg to be worn first thing in the morning. She will try to elevate her legs as tolerated.  She needs medical management of her edema as well and will follow up with her PCP. She will follow up with me in one month after venous duplex.     Electronically signed by:  Eliud Calvin MD

## 2023-03-16 RX ORDER — FERROUS SULFATE 325(65) MG
TABLET ORAL
Qty: 90 TABLET | Refills: 0 | Status: SHIPPED | OUTPATIENT
Start: 2023-03-16

## 2023-03-16 NOTE — TELEPHONE ENCOUNTER
Please Approve or Refuse.   Send to Pharmacy per Pt's Request:      Next Visit Date:  5/11/2023   Last Visit Date: 12/6/2022    Hemoglobin A1C (%)   Date Value   11/04/2022 8.6   07/07/2022 7.5   01/03/2022 7.0             ( goal A1C is < 7)   BP Readings from Last 3 Encounters:   03/14/23 (!) 103/56   12/06/22 100/62   11/29/22 131/73          (goal 120/80)  BUN   Date Value Ref Range Status   01/06/2023 22 8 - 23 mg/dL Final     Creatinine   Date Value Ref Range Status   01/06/2023 0.75 0.50 - 0.90 mg/dL Final     Potassium   Date Value Ref Range Status   01/06/2023 4.6 3.7 - 5.3 mmol/L Final

## 2023-03-17 ENCOUNTER — TELEPHONE (OUTPATIENT)
Dept: VASCULAR SURGERY | Age: 63
End: 2023-03-17

## 2023-03-17 NOTE — TELEPHONE ENCOUNTER
Pt called requesting I fax Vompression socks over to two pharmacies.  She gave me the fax numbers where she wanted them sent to    The numbers she wanted me to put in her chart are    Ankle 9.5    Mid Calf 17    Rt Knee 18

## 2023-03-22 ENCOUNTER — HOSPITAL ENCOUNTER (OUTPATIENT)
Dept: VASCULAR LAB | Age: 63
Discharge: HOME | End: 2023-03-22
Payer: COMMERCIAL

## 2023-03-22 DIAGNOSIS — R60.0 BILATERAL LEG EDEMA: ICD-10-CM

## 2023-03-22 PROCEDURE — 93970 EXTREMITY STUDY: CPT

## 2023-03-29 ENCOUNTER — TELEPHONE (OUTPATIENT)
Dept: FAMILY MEDICINE CLINIC | Age: 63
End: 2023-03-29

## 2023-03-29 NOTE — TELEPHONE ENCOUNTER
Pt called stating that she needs longer needles for her insulin. She thinks that her currently are 4m. Pt states the her medication comes out when she takes the needle out.  Pharmacy [facility]

## 2023-04-18 ENCOUNTER — OFFICE VISIT (OUTPATIENT)
Dept: VASCULAR SURGERY | Age: 63
End: 2023-04-18
Payer: COMMERCIAL

## 2023-04-18 VITALS
BODY MASS INDEX: 54.19 KG/M2 | HEIGHT: 61 IN | OXYGEN SATURATION: 92 % | RESPIRATION RATE: 19 BRPM | HEART RATE: 77 BPM | DIASTOLIC BLOOD PRESSURE: 69 MMHG | TEMPERATURE: 97.5 F | SYSTOLIC BLOOD PRESSURE: 121 MMHG | WEIGHT: 287 LBS

## 2023-04-18 DIAGNOSIS — R60.0 BILATERAL LEG EDEMA: Primary | ICD-10-CM

## 2023-04-18 PROCEDURE — 1036F TOBACCO NON-USER: CPT | Performed by: STUDENT IN AN ORGANIZED HEALTH CARE EDUCATION/TRAINING PROGRAM

## 2023-04-18 PROCEDURE — 3017F COLORECTAL CA SCREEN DOC REV: CPT | Performed by: STUDENT IN AN ORGANIZED HEALTH CARE EDUCATION/TRAINING PROGRAM

## 2023-04-18 PROCEDURE — G8427 DOCREV CUR MEDS BY ELIG CLIN: HCPCS | Performed by: STUDENT IN AN ORGANIZED HEALTH CARE EDUCATION/TRAINING PROGRAM

## 2023-04-18 PROCEDURE — 99214 OFFICE O/P EST MOD 30 MIN: CPT | Performed by: STUDENT IN AN ORGANIZED HEALTH CARE EDUCATION/TRAINING PROGRAM

## 2023-04-18 PROCEDURE — 3078F DIAST BP <80 MM HG: CPT | Performed by: STUDENT IN AN ORGANIZED HEALTH CARE EDUCATION/TRAINING PROGRAM

## 2023-04-18 PROCEDURE — 3074F SYST BP LT 130 MM HG: CPT | Performed by: STUDENT IN AN ORGANIZED HEALTH CARE EDUCATION/TRAINING PROGRAM

## 2023-04-18 PROCEDURE — G8417 CALC BMI ABV UP PARAM F/U: HCPCS | Performed by: STUDENT IN AN ORGANIZED HEALTH CARE EDUCATION/TRAINING PROGRAM

## 2023-04-18 ASSESSMENT — ENCOUNTER SYMPTOMS
COUGH: 0
EYE PAIN: 0
CHEST TIGHTNESS: 0
TROUBLE SWALLOWING: 0
VOICE CHANGE: 0
VOMITING: 0
COLOR CHANGE: 0
ABDOMINAL PAIN: 0
ABDOMINAL DISTENTION: 0

## 2023-04-18 NOTE — PROGRESS NOTES
[facility]  [address] [facility] [address] [facility] [address] [place] [address]  Dept: [phone]     Patient: Delight Apley  : 1960  MRN: [id number]  DOS: 2023    HPI:  3/14/23: Delight Apley is a 58 y.o. female who comes to the office regarding leg swelling. She has had full body swelling, including the legs, for about 4 months. She had echo done which did not reveal heart failure. She had PVR study on 23 which I reviewed myself and was normal. Her BMI is 53. She is concerned the swelling may be caused by medication side effects. She also has knee pain and said she needs knee replacement surgery once the swelling subsides. 23: Here today for follow up. Had a venous duplex on 3/22/23 which I reviewed myself. Has right peroneal reflux (1313 ms) and on the left marginal reflux in the SSV (572 ms). Doing better with compression stockings.     Past Medical History:   Diagnosis Date    Anemia     receives injectafer (iron infusions)    Anginal pain ([address]     last used Nitro sl 4 yrs   (currently off this medication written: 10/23/2019); no episodes for about a year (written 6/10/21)    Arthritis     Arthritis of right knee 8/15/2018    Asthma     uses inhaler as needed, managed by Vero Sorto NP    Astigmatism 2014    Back pain     radiculopathy left leg    Blurry vision, bilateral 2016    Carpal tunnel syndrome of right wrist 2012    Cataract     patient denies    Closed displaced fracture of lesser tuberosity of right humerus 2016    Constipation     Depression     Dysmenorrhea     Fatty liver disease, nonalcoholic     GERD (gastroesophageal reflux disease)     Gout     found through bloodwork    Headache     Heart murmur     Heart palpitations     Heart palpitations     History of blood transfusion     thinks she might have had a transfusion doesn't remember why or when    History of rib

## 2023-04-28 ENCOUNTER — TELEPHONE (OUTPATIENT)
Dept: FAMILY MEDICINE CLINIC | Age: 63
End: 2023-04-28

## 2023-04-28 ENCOUNTER — TELEPHONE (OUTPATIENT)
Dept: ORTHOPEDIC SURGERY | Age: 63
End: 2023-04-28

## 2023-04-28 RX ORDER — FAMOTIDINE 20 MG/1
TABLET, FILM COATED ORAL
Qty: 180 TABLET | Refills: 1 | Status: SHIPPED | OUTPATIENT
Start: 2023-04-28

## 2023-04-28 NOTE — TELEPHONE ENCOUNTER
Pt called asking if she can take Meloxicam 15mg BID. Advised her that it is not likely as I believe that is the highest dose. Pt states that he legs are very painful still and the Meloxicam 15mg once daily is not helping.

## 2023-05-01 NOTE — TELEPHONE ENCOUNTER
Patient hasn't been seen in office since March of 2022. Patient will need an appt to discuss surgery again.

## 2023-05-08 RX ORDER — AMMONIUM LACTATE 12 G/100G
CREAM TOPICAL
Qty: 140 G | Refills: 3 | Status: SHIPPED | OUTPATIENT
Start: 2023-05-08

## 2023-05-22 RX ORDER — ALBUTEROL SULFATE 90 UG/1
AEROSOL, METERED RESPIRATORY (INHALATION)
Qty: 18 G | Refills: 0 | Status: SHIPPED | OUTPATIENT
Start: 2023-05-22

## 2023-05-22 NOTE — TELEPHONE ENCOUNTER
Please Approve or Refuse.   Send to Pharmacy per Pt's Request:      Next Visit Date:  Visit date not found   Last Visit Date: 12/6/2022    Hemoglobin A1C (%)   Date Value   11/04/2022 8.6   07/07/2022 7.5   01/03/2022 7.0             ( goal A1C is < 7)   BP Readings from Last 3 Encounters:   04/18/23 121/69   03/14/23 (!) 103/56   12/06/22 100/62          (goal 120/80)  BUN   Date Value Ref Range Status   01/06/2023 22 8 - 23 mg/dL Final     Creatinine   Date Value Ref Range Status   01/06/2023 0.75 0.50 - 0.90 mg/dL Final     Potassium   Date Value Ref Range Status   01/06/2023 4.6 3.7 - 5.3 mmol/L Final

## 2023-06-02 RX ORDER — LISINOPRIL 5 MG/1
TABLET ORAL
Qty: 30 TABLET | Refills: 0 | Status: SHIPPED | OUTPATIENT
Start: 2023-06-02

## 2023-06-07 ENCOUNTER — OFFICE VISIT (OUTPATIENT)
Dept: ORTHOPEDIC SURGERY | Age: 63
End: 2023-06-07
Payer: COMMERCIAL

## 2023-06-07 DIAGNOSIS — M25.561 RIGHT KNEE PAIN, UNSPECIFIED CHRONICITY: Primary | ICD-10-CM

## 2023-06-07 PROCEDURE — 3017F COLORECTAL CA SCREEN DOC REV: CPT | Performed by: ORTHOPAEDIC SURGERY

## 2023-06-07 PROCEDURE — G8417 CALC BMI ABV UP PARAM F/U: HCPCS | Performed by: ORTHOPAEDIC SURGERY

## 2023-06-07 PROCEDURE — 99213 OFFICE O/P EST LOW 20 MIN: CPT | Performed by: ORTHOPAEDIC SURGERY

## 2023-06-07 PROCEDURE — G8428 CUR MEDS NOT DOCUMENT: HCPCS | Performed by: ORTHOPAEDIC SURGERY

## 2023-06-07 PROCEDURE — 1036F TOBACCO NON-USER: CPT | Performed by: ORTHOPAEDIC SURGERY

## 2023-06-07 NOTE — PROGRESS NOTES
Berline Goodpasture M.D.            [address] [facility] [facility],[address], [address] [facility]           Dept Phone: [phone]           Dept Fax:  [phone]           [place]          Dept Phone: [phone]           Dept Fax:  [phone]      Chief Compliant:  Chief Complaint   Patient presents with    Pain     Rt knee        History of Present Illness: This is a 58 y.o. female who presents to the clinic today for evaluation / follow up of bilateral knee pain right greater than left. Patient is a 58-year-old female who have seen multiple times in the past for her knees. She actually had been scheduled for a total knee replacement on the right side however she had blood sugar controls that were way over the top. She has had multiple conservative modalities in the past.  We have had concerns about doing her knees in the past due to her blood sugars as well as her BMI which is presently at 54. She states that her right knee become quite terrible for her with difficult for her to do daily activities. Her left knee has began to become just as problematic for her. .       Review of Systems   Constitutional: Negative for fever, chills, sweats. Eyes: Negative for changes in vision, or pain. HENT: Negative for ear ache, epistaxis, or sore throat. Respiratory/Cardio: Negative for Chest pain, palpitations, SOB, or cough. Gastrointestinal: Negative for abdominal pain, N/V/D. Genitourinary: Negative for dysuria, frequency, urgency, or hematuria. Neurological: Negative for headache, numbness, or weakness. Integumentary: Negative for rash, itching, laceration, or abrasion. Musculoskeletal: Positive for Pain (Rt knee)       Physical Exam:  Constitutional: Patient is oriented to person, place, and time. Patient appears well-developed and well nourished.

## 2023-06-15 LAB
AVERAGE GLUCOSE: NORMAL
HBA1C MFR BLD: 7.6 %

## 2023-06-17 DIAGNOSIS — B35.4 TINEA CORPORIS: ICD-10-CM

## 2023-06-19 RX ORDER — NYSTATIN 100000 [USP'U]/G
POWDER TOPICAL
Qty: 60 G | Refills: 0 | Status: SHIPPED | OUTPATIENT
Start: 2023-06-19

## 2023-06-19 NOTE — TELEPHONE ENCOUNTER
Please Approve or Refuse.   Send to Pharmacy per Pt's Request:      Next Visit Date:  6/20/2023   Last Visit Date: 12/6/2022    Hemoglobin A1C (%)   Date Value   03/07/2023 8.2   11/04/2022 8.6   07/07/2022 7.5             ( goal A1C is < 7)   BP Readings from Last 3 Encounters:   04/18/23 121/69   03/14/23 (!) 103/56   12/06/22 100/62          (goal 120/80)  BUN   Date Value Ref Range Status   01/06/2023 22 8 - 23 mg/dL Final     Creatinine   Date Value Ref Range Status   01/06/2023 0.75 0.50 - 0.90 mg/dL Final     Potassium   Date Value Ref Range Status   01/06/2023 4.6 3.7 - 5.3 mmol/L Final

## 2023-06-20 ENCOUNTER — OFFICE VISIT (OUTPATIENT)
Dept: FAMILY MEDICINE CLINIC | Age: 63
End: 2023-06-20
Payer: COMMERCIAL

## 2023-06-20 VITALS
DIASTOLIC BLOOD PRESSURE: 70 MMHG | TEMPERATURE: 99.1 F | HEART RATE: 86 BPM | SYSTOLIC BLOOD PRESSURE: 106 MMHG | OXYGEN SATURATION: 95 %

## 2023-06-20 DIAGNOSIS — E78.5 HYPERLIPIDEMIA, UNSPECIFIED HYPERLIPIDEMIA TYPE: ICD-10-CM

## 2023-06-20 DIAGNOSIS — Z01.818 PREOPERATIVE CLEARANCE: Primary | ICD-10-CM

## 2023-06-20 DIAGNOSIS — J30.2 SEASONAL ALLERGIC RHINITIS, UNSPECIFIED TRIGGER: ICD-10-CM

## 2023-06-20 DIAGNOSIS — D50.8 IRON DEFICIENCY ANEMIA SECONDARY TO INADEQUATE DIETARY IRON INTAKE: ICD-10-CM

## 2023-06-20 DIAGNOSIS — N32.81 OAB (OVERACTIVE BLADDER): ICD-10-CM

## 2023-06-20 DIAGNOSIS — E11.42 DIABETIC POLYNEUROPATHY ASSOCIATED WITH TYPE 2 DIABETES MELLITUS (HCC): ICD-10-CM

## 2023-06-20 DIAGNOSIS — K21.9 GASTROESOPHAGEAL REFLUX DISEASE WITHOUT ESOPHAGITIS: ICD-10-CM

## 2023-06-20 DIAGNOSIS — F32.A DEPRESSION, UNSPECIFIED DEPRESSION TYPE: ICD-10-CM

## 2023-06-20 DIAGNOSIS — G25.81 RLS (RESTLESS LEGS SYNDROME): ICD-10-CM

## 2023-06-20 DIAGNOSIS — K86.1 CHRONIC PANCREATITIS, UNSPECIFIED PANCREATITIS TYPE (HCC): ICD-10-CM

## 2023-06-20 DIAGNOSIS — E55.9 VITAMIN D DEFICIENCY: ICD-10-CM

## 2023-06-20 DIAGNOSIS — M1A.9XX0 CHRONIC GOUT WITHOUT TOPHUS, UNSPECIFIED CAUSE, UNSPECIFIED SITE: ICD-10-CM

## 2023-06-20 DIAGNOSIS — Z13.31 POSITIVE DEPRESSION SCREENING: ICD-10-CM

## 2023-06-20 DIAGNOSIS — E03.9 HYPOTHYROIDISM, UNSPECIFIED TYPE: ICD-10-CM

## 2023-06-20 DIAGNOSIS — Z79.4 TYPE 2 DIABETES MELLITUS WITH DIABETIC NEUROPATHY, WITH LONG-TERM CURRENT USE OF INSULIN (HCC): ICD-10-CM

## 2023-06-20 DIAGNOSIS — I10 ESSENTIAL HYPERTENSION: ICD-10-CM

## 2023-06-20 DIAGNOSIS — G47.33 OSA (OBSTRUCTIVE SLEEP APNEA): ICD-10-CM

## 2023-06-20 DIAGNOSIS — E78.1 HYPERTRIGLYCERIDEMIA: ICD-10-CM

## 2023-06-20 DIAGNOSIS — E11.40 TYPE 2 DIABETES MELLITUS WITH DIABETIC NEUROPATHY, WITH LONG-TERM CURRENT USE OF INSULIN (HCC): ICD-10-CM

## 2023-06-20 DIAGNOSIS — M17.0 PRIMARY OSTEOARTHRITIS OF BOTH KNEES: ICD-10-CM

## 2023-06-20 PROCEDURE — 3078F DIAST BP <80 MM HG: CPT | Performed by: NURSE PRACTITIONER

## 2023-06-20 PROCEDURE — 99215 OFFICE O/P EST HI 40 MIN: CPT | Performed by: NURSE PRACTITIONER

## 2023-06-20 PROCEDURE — 2022F DILAT RTA XM EVC RTNOPTHY: CPT | Performed by: NURSE PRACTITIONER

## 2023-06-20 PROCEDURE — 3074F SYST BP LT 130 MM HG: CPT | Performed by: NURSE PRACTITIONER

## 2023-06-20 PROCEDURE — 3017F COLORECTAL CA SCREEN DOC REV: CPT | Performed by: NURSE PRACTITIONER

## 2023-06-20 PROCEDURE — G8427 DOCREV CUR MEDS BY ELIG CLIN: HCPCS | Performed by: NURSE PRACTITIONER

## 2023-06-20 PROCEDURE — G8417 CALC BMI ABV UP PARAM F/U: HCPCS | Performed by: NURSE PRACTITIONER

## 2023-06-20 PROCEDURE — 3051F HG A1C>EQUAL 7.0%<8.0%: CPT | Performed by: NURSE PRACTITIONER

## 2023-06-20 PROCEDURE — 1036F TOBACCO NON-USER: CPT | Performed by: NURSE PRACTITIONER

## 2023-06-20 RX ORDER — VENLAFAXINE HYDROCHLORIDE 75 MG/1
75 CAPSULE, EXTENDED RELEASE ORAL DAILY
Qty: 90 CAPSULE | Refills: 1 | Status: SHIPPED | OUTPATIENT
Start: 2023-06-20

## 2023-06-20 SDOH — ECONOMIC STABILITY: FOOD INSECURITY: WITHIN THE PAST 12 MONTHS, THE FOOD YOU BOUGHT JUST DIDN'T LAST AND YOU DIDN'T HAVE MONEY TO GET MORE.: OFTEN TRUE

## 2023-06-20 SDOH — ECONOMIC STABILITY: INCOME INSECURITY: HOW HARD IS IT FOR YOU TO PAY FOR THE VERY BASICS LIKE FOOD, HOUSING, MEDICAL CARE, AND HEATING?: VERY HARD

## 2023-06-20 SDOH — ECONOMIC STABILITY: HOUSING INSECURITY
IN THE LAST 12 MONTHS, WAS THERE A TIME WHEN YOU DID NOT HAVE A STEADY PLACE TO SLEEP OR SLEPT IN A SHELTER (INCLUDING NOW)?: NO

## 2023-06-20 SDOH — ECONOMIC STABILITY: FOOD INSECURITY: WITHIN THE PAST 12 MONTHS, YOU WORRIED THAT YOUR FOOD WOULD RUN OUT BEFORE YOU GOT MONEY TO BUY MORE.: OFTEN TRUE

## 2023-06-20 ASSESSMENT — ENCOUNTER SYMPTOMS
SHORTNESS OF BREATH: 0
CHEST TIGHTNESS: 0
ABDOMINAL DISTENTION: 0
BACK PAIN: 1
NAUSEA: 0
SORE THROAT: 0
RHINORRHEA: 0
DIARRHEA: 0
COUGH: 0
ABDOMINAL PAIN: 0
CONSTIPATION: 0
VOMITING: 0
APNEA: 1

## 2023-06-20 ASSESSMENT — PATIENT HEALTH QUESTIONNAIRE - PHQ9
SUM OF ALL RESPONSES TO PHQ QUESTIONS 1-9: 14
SUM OF ALL RESPONSES TO PHQ9 QUESTIONS 1 & 2: 3
SUM OF ALL RESPONSES TO PHQ QUESTIONS 1-9: 15
2. FEELING DOWN, DEPRESSED OR HOPELESS: 3
4. FEELING TIRED OR HAVING LITTLE ENERGY: 3
6. FEELING BAD ABOUT YOURSELF - OR THAT YOU ARE A FAILURE OR HAVE LET YOURSELF OR YOUR FAMILY DOWN: 3
3. TROUBLE FALLING OR STAYING ASLEEP: 3
SUM OF ALL RESPONSES TO PHQ QUESTIONS 1-9: 15
10. IF YOU CHECKED OFF ANY PROBLEMS, HOW DIFFICULT HAVE THESE PROBLEMS MADE IT FOR YOU TO DO YOUR WORK, TAKE CARE OF THINGS AT HOME, OR GET ALONG WITH OTHER PEOPLE: 0
8. MOVING OR SPEAKING SO SLOWLY THAT OTHER PEOPLE COULD HAVE NOTICED. OR THE OPPOSITE, BEING SO FIGETY OR RESTLESS THAT YOU HAVE BEEN MOVING AROUND A LOT MORE THAN USUAL: 0
5. POOR APPETITE OR OVEREATING: 2
7. TROUBLE CONCENTRATING ON THINGS, SUCH AS READING THE NEWSPAPER OR WATCHING TELEVISION: 0
SUM OF ALL RESPONSES TO PHQ QUESTIONS 1-9: 15
1. LITTLE INTEREST OR PLEASURE IN DOING THINGS: 0
9. THOUGHTS THAT YOU WOULD BE BETTER OFF DEAD, OR OF HURTING YOURSELF: 1

## 2023-06-20 ASSESSMENT — COLUMBIA-SUICIDE SEVERITY RATING SCALE - C-SSRS
2. HAVE YOU ACTUALLY HAD ANY THOUGHTS OF KILLING YOURSELF?: NO
1. WITHIN THE PAST MONTH, HAVE YOU WISHED YOU WERE DEAD OR WISHED YOU COULD GO TO SLEEP AND NOT WAKE UP?: YES
6. HAVE YOU EVER DONE ANYTHING, STARTED TO DO ANYTHING, OR PREPARED TO DO ANYTHING TO END YOUR LIFE?: NO

## 2023-06-20 NOTE — PROGRESS NOTES
(XYLOCAINE) 5 % ointment Apply topically as needed. 1 Tube 3    Liraglutide (VICTOZA) 18 MG/3ML SOPN SC injection Inject 1.8 mg into the skin daily      Insulin Disposable Pump (V-GO 40) KIT Use as directed per Dr. Farzad Stockton. No current facility-administered medications on file prior to visit. SUBJECTIVE:     Review of Systems   Constitutional:  Negative for activity change, fatigue and fever. HENT:  Negative for congestion, ear pain, rhinorrhea and sore throat. Eyes:  Positive for visual disturbance (wears glasses). Respiratory:  Positive for apnea (stable on CPAP). Negative for cough, chest tightness and shortness of breath. Cardiovascular:  Negative for chest pain and palpitations. Gastrointestinal:  Negative for abdominal distention, abdominal pain, constipation, diarrhea, nausea (denies) and vomiting. Heartburn (stable on Protonix)   Endocrine: Negative for polydipsia, polyphagia and polyuria. Genitourinary:  Negative for difficulty urinating and dysuria. Musculoskeletal:  Positive for arthralgias (chronic; stable), back pain (chronic; stable), gait problem (currently in a wheelchiar and relates that she is not able to walk seconday to her bilateral knee pain), myalgias and neck pain (chronic' stable). RLS (stable on Requip 2 mg TID)   Skin:  Negative for rash. Neurological:  Negative for dizziness, weakness, light-headedness and headaches. Hematological:  Negative for adenopathy. Psychiatric/Behavioral:  Positive for dysphoric mood (not well controlled on Effexor 37.5 mg daily) and sleep disturbance (stable on Elavil 50 mg nightly). Negative for agitation and behavioral problems. The patient is nervous/anxious (stable on Effexor 37.5 mg daily). OBJECTIVE:  /70   Pulse 86   Temp 99.1 °F (37.3 °C)   SpO2 95%     Physical Exam  Vitals and nursing note reviewed. Constitutional:       General: She is not in acute distress. Appearance: Normal appearance.

## 2023-06-26 ENCOUNTER — TELEPHONE (OUTPATIENT)
Dept: FAMILY MEDICINE CLINIC | Age: 63
End: 2023-06-26

## 2023-06-27 ENCOUNTER — TELEPHONE (OUTPATIENT)
Dept: FAMILY MEDICINE CLINIC | Age: 63
End: 2023-06-27

## 2023-06-27 RX ORDER — LISINOPRIL 5 MG/1
TABLET ORAL
Qty: 30 TABLET | Refills: 0 | Status: SHIPPED | OUTPATIENT
Start: 2023-06-27

## 2023-06-29 ENCOUNTER — TELEPHONE (OUTPATIENT)
Dept: FAMILY MEDICINE CLINIC | Age: 63
End: 2023-06-29

## 2023-06-29 DIAGNOSIS — F32.A DEPRESSION, UNSPECIFIED DEPRESSION TYPE: Primary | ICD-10-CM

## 2023-06-29 RX ORDER — VENLAFAXINE HYDROCHLORIDE 37.5 MG/1
37.5 CAPSULE, EXTENDED RELEASE ORAL DAILY
Qty: 30 CAPSULE | Refills: 0 | Status: SHIPPED | OUTPATIENT
Start: 2023-06-29

## 2023-07-05 RX ORDER — HYDROCHLOROTHIAZIDE 25 MG/1
TABLET ORAL
Qty: 90 TABLET | Refills: 0 | OUTPATIENT
Start: 2023-07-05

## 2023-07-10 RX ORDER — FERROUS SULFATE 325(65) MG
325 TABLET ORAL
Qty: 30 TABLET | Refills: 5 | Status: SHIPPED | OUTPATIENT
Start: 2023-07-10

## 2023-07-12 RX ORDER — ASPIRIN 81 MG/1
TABLET, CHEWABLE ORAL
Qty: 150 TABLET | Refills: 1 | Status: SHIPPED | OUTPATIENT
Start: 2023-07-12

## 2023-07-17 RX ORDER — MELOXICAM 15 MG/1
TABLET ORAL
Qty: 90 TABLET | Refills: 1 | Status: SHIPPED | OUTPATIENT
Start: 2023-07-17

## 2023-07-20 ENCOUNTER — TELEPHONE (OUTPATIENT)
Dept: FAMILY MEDICINE CLINIC | Age: 63
End: 2023-07-20

## 2023-07-20 DIAGNOSIS — E78.5 HYPERLIPIDEMIA, UNSPECIFIED HYPERLIPIDEMIA TYPE: ICD-10-CM

## 2023-07-20 DIAGNOSIS — I10 ESSENTIAL HYPERTENSION: Primary | ICD-10-CM

## 2023-07-21 ENCOUNTER — TELEPHONE (OUTPATIENT)
Dept: FAMILY MEDICINE CLINIC | Age: 63
End: 2023-07-21

## 2023-07-21 NOTE — TELEPHONE ENCOUNTER
Pt called back and stated she is wanting to have her knee surgery in August and Dr Sanjeev Malin office cannot get her in until the 28th of August. She is wanting to find another group that can see her sooner

## 2023-07-21 NOTE — TELEPHONE ENCOUNTER
Pt sees Dr Nikolay Dean and he had prescribed Gabapentin for restless leg syndrome. She states she has 33 out of the 35 side effects and can no longer take it. Dr Vamsi Rockwell suggested she see her PCP. She states she needs pain meds.

## 2023-07-21 NOTE — TELEPHONE ENCOUNTER
Pt called back and I explained her A1C will be in Sept and that she can keep her appt with cardiology at the end of August. Pt understood

## 2023-07-21 NOTE — TELEPHONE ENCOUNTER
Looks like ortho is concern with her blood sugars and they need to be under control before they will do surgery, so her appt in august will be fine, her a1c is every 3 months with next one in sept

## 2023-07-24 RX ORDER — PRAMIPEXOLE DIHYDROCHLORIDE 0.5 MG/1
0.5 TABLET ORAL NIGHTLY
Qty: 90 TABLET | Refills: 3 | Status: SHIPPED | OUTPATIENT
Start: 2023-07-24

## 2023-07-24 NOTE — TELEPHONE ENCOUNTER
Please let her know that she is at the max dose of Requip (which is for restless legs). She has allergies to Lyrica and Gabapentin. We are running out of options. If she would like to stop the Requip, we could try Mirapex nightly. Let me know.

## 2023-07-25 ENCOUNTER — TELEPHONE (OUTPATIENT)
Dept: FAMILY MEDICINE CLINIC | Age: 63
End: 2023-07-25

## 2023-07-25 NOTE — TELEPHONE ENCOUNTER
Loly from [facility] dropped off forms to be signed. Called her and LVM that forms are ready at  to be picked up.

## 2023-08-07 RX ORDER — LISINOPRIL 5 MG/1
TABLET ORAL
Qty: 30 TABLET | Refills: 5 | Status: SHIPPED | OUTPATIENT
Start: 2023-08-07

## 2023-08-14 RX ORDER — PANTOPRAZOLE SODIUM 40 MG/1
TABLET, DELAYED RELEASE ORAL
Qty: 90 TABLET | Refills: 0 | Status: SHIPPED | OUTPATIENT
Start: 2023-08-14

## 2023-08-14 RX ORDER — ALLOPURINOL 100 MG/1
TABLET ORAL
Qty: 90 TABLET | Refills: 0 | Status: SHIPPED | OUTPATIENT
Start: 2023-08-14

## 2023-08-24 RX ORDER — LORATADINE 10 MG/1
TABLET ORAL
Qty: 90 TABLET | Refills: 1 | Status: SHIPPED | OUTPATIENT
Start: 2023-08-24

## 2023-09-11 RX ORDER — ROPINIROLE 2 MG/1
TABLET, FILM COATED ORAL
Qty: 270 TABLET | Refills: 0 | OUTPATIENT
Start: 2023-09-11

## 2023-09-11 NOTE — TELEPHONE ENCOUNTER
Last Visit Date: 6/20/2023   Next Visit Date: Visit date not found     Patient has new patient appointment scheduled on 10/11/2023 with another provider.

## 2023-09-14 ENCOUNTER — TELEPHONE (OUTPATIENT)
Dept: ORTHOPEDIC SURGERY | Age: 63
End: 2023-09-14

## 2023-09-14 NOTE — TELEPHONE ENCOUNTER
Deborah Pearl from Dr Frantz Hahn office is requesting a cardiac clearance form to be faxed at the earliest convenience to [phone]. Thank you.

## 2023-09-18 ENCOUNTER — HOSPITAL ENCOUNTER (OUTPATIENT)
Age: 63
Discharge: HOME | End: 2023-09-18
Payer: COMMERCIAL

## 2023-09-18 DIAGNOSIS — M17.0 PRIMARY OSTEOARTHRITIS OF BOTH KNEES: ICD-10-CM

## 2023-09-18 LAB
ALBUMIN SERPL-MCNC: 3.8 G/DL (ref 3.5–5.2)
ALP SERPL-CCNC: 99 U/L (ref 35–104)
ALT SERPL-CCNC: 19 U/L (ref 5–33)
ANION GAP SERPL CALCULATED.3IONS-SCNC: 10 MMOL/L (ref 9–17)
AST SERPL-CCNC: 19 U/L
BILIRUB SERPL-MCNC: 0.4 MG/DL (ref 0.3–1.2)
BUN SERPL-MCNC: 25 MG/DL (ref 8–23)
CALCIUM SERPL-MCNC: 9.2 MG/DL (ref 8.6–10.4)
CHLORIDE SERPL-SCNC: 99 MMOL/L (ref 98–107)
CO2 SERPL-SCNC: 29 MMOL/L (ref 20–31)
CREAT SERPL-MCNC: 0.9 MG/DL (ref 0.5–0.9)
ERYTHROCYTE [DISTWIDTH] IN BLOOD BY AUTOMATED COUNT: 15.1 % (ref 11.5–14.9)
EST. AVERAGE GLUCOSE BLD GHB EST-MCNC: 169 MG/DL
GFR SERPL CREATININE-BSD FRML MDRD: >60 ML/MIN/1.73M2
GLUCOSE SERPL-MCNC: 160 MG/DL (ref 70–99)
HBA1C MFR BLD: 7.5 % (ref 4–6)
HCT VFR BLD AUTO: 39.5 % (ref 36–46)
HGB BLD-MCNC: 12.5 G/DL (ref 12–16)
MCH RBC QN AUTO: 28.2 PG (ref 26–34)
MCHC RBC AUTO-ENTMCNC: 31.6 G/DL (ref 31–37)
MCV RBC AUTO: 89.3 FL (ref 80–100)
PLATELET # BLD AUTO: 313 K/UL (ref 150–450)
PMV BLD AUTO: 8.7 FL (ref 6–12)
POTASSIUM SERPL-SCNC: 4.4 MMOL/L (ref 3.7–5.3)
PROT SERPL-MCNC: 6.7 G/DL (ref 6.4–8.3)
RBC # BLD AUTO: 4.42 M/UL (ref 4–5.2)
SODIUM SERPL-SCNC: 138 MMOL/L (ref 135–144)
WBC OTHER # BLD: 10.3 K/UL (ref 3.5–11)

## 2023-09-18 PROCEDURE — 80053 COMPREHEN METABOLIC PANEL: CPT

## 2023-09-18 PROCEDURE — 85027 COMPLETE CBC AUTOMATED: CPT

## 2023-09-18 PROCEDURE — 83036 HEMOGLOBIN GLYCOSYLATED A1C: CPT

## 2023-09-18 PROCEDURE — 36415 COLL VENOUS BLD VENIPUNCTURE: CPT

## 2023-09-20 RX ORDER — ROPINIROLE 2 MG/1
TABLET, FILM COATED ORAL
Qty: 90 TABLET | Refills: 0 | Status: SHIPPED | OUTPATIENT
Start: 2023-09-20

## 2023-10-05 ENCOUNTER — HOSPITAL ENCOUNTER (OUTPATIENT)
Dept: PREADMISSION TESTING | Age: 63
Discharge: HOME | End: 2023-10-05
Attending: ORTHOPAEDIC SURGERY | Admitting: ORTHOPAEDIC SURGERY
Payer: COMMERCIAL

## 2023-10-05 VITALS
WEIGHT: 287 LBS | SYSTOLIC BLOOD PRESSURE: 133 MMHG | HEART RATE: 79 BPM | TEMPERATURE: 98.8 F | BODY MASS INDEX: 54.19 KG/M2 | HEIGHT: 61 IN | RESPIRATION RATE: 16 BRPM | DIASTOLIC BLOOD PRESSURE: 67 MMHG | OXYGEN SATURATION: 93 %

## 2023-10-05 LAB
BACTERIA URNS QL MICRO: ABNORMAL
BILIRUB UR QL STRIP: NEGATIVE
CASTS #/AREA URNS LPF: ABNORMAL /LPF
CLARITY UR: ABNORMAL
COLOR UR: YELLOW
EPI CELLS #/AREA URNS HPF: ABNORMAL /HPF
GLUCOSE UR STRIP-MCNC: NEGATIVE MG/DL
HGB UR QL STRIP.AUTO: NEGATIVE
KETONES UR STRIP-MCNC: ABNORMAL MG/DL
LEUKOCYTE ESTERASE UR QL STRIP: ABNORMAL
NITRITE UR QL STRIP: POSITIVE
PH UR STRIP: 7.5 [PH] (ref 5–8)
PROT UR STRIP-MCNC: ABNORMAL MG/DL
RBC #/AREA URNS HPF: ABNORMAL /HPF
SP GR UR STRIP: 1.02 (ref 1–1.03)
UROBILINOGEN UR STRIP-ACNC: NORMAL EU/DL (ref 0–1)
WBC #/AREA URNS HPF: ABNORMAL /HPF

## 2023-10-05 PROCEDURE — 87088 URINE BACTERIA CULTURE: CPT

## 2023-10-05 PROCEDURE — 87086 URINE CULTURE/COLONY COUNT: CPT

## 2023-10-05 PROCEDURE — 87641 MR-STAPH DNA AMP PROBE: CPT

## 2023-10-05 PROCEDURE — 81001 URINALYSIS AUTO W/SCOPE: CPT

## 2023-10-05 PROCEDURE — 87186 SC STD MICRODIL/AGAR DIL: CPT

## 2023-10-05 ASSESSMENT — PAIN DESCRIPTION - ORIENTATION: ORIENTATION: RIGHT

## 2023-10-05 ASSESSMENT — PAIN DESCRIPTION - DESCRIPTORS: DESCRIPTORS: ACHING

## 2023-10-05 ASSESSMENT — PAIN SCALES - GENERAL: PAINLEVEL_OUTOF10: 9

## 2023-10-05 ASSESSMENT — PAIN DESCRIPTION - PAIN TYPE: TYPE: ACUTE PAIN

## 2023-10-05 ASSESSMENT — PAIN DESCRIPTION - LOCATION: LOCATION: KNEE

## 2023-10-05 NOTE — DISCHARGE INSTRUCTIONS
Pre-op Instructions For Out-Patient Surgery    Medication Instructions:  Please stop herbs and any supplements now (includes vitamins and minerals). Please contact your surgeon and prescribing physician for pre-op instructions for any blood thinners. Aspirin, excedrin, meloxicam as directed. If you have inhalers/aerosol treatments at home, please use them the morning of your surgery and bring the inhalers with you to the hospital.    Please take the following medications the morning of your surgery with a sip of water:    inhaler,  levothyroxine, liothyronine, famotide, pantoprazole    Surgery Instructions:  After midnight before surgery:  Do not eat or drink anything, including water, mints, gum, and hard candy. You may brush your teeth without swallowing. No smoking, chewing tobacco, or street drugs. Please shower or bathe before surgery. If you were given Surgical Scrub Chlorhexidine Gluconate Liquid (CHG), please shower the night before and the morning of your surgery following the detailed instructions you received during your pre-admission visit. Please do not wear any cologne, lotion, powder, deodorant, jewelry, piercings, perfume, makeup, nail polish, hair accessories, or hair spray on the day of surgery. Wear loose comfortable clothing. Leave your valuables at home but bring a payment source for any after-surgery prescriptions you plan to fill at [facility]. Bring a storage case for any glasses/contacts. An adult who is responsible for you MUST drive you home and should be with you for the first 24 hours after surgery. If having out-patient knee and foot surgeries, please arrange for planned crutches, walker, or wheelchair before arriving to the hospital.    The Day of Surgery:  Arrive at [facility] at the time directed by your surgeon and check in at the desk.      If you have a living will or healthcare power

## 2023-10-05 NOTE — H&P (VIEW-ONLY)
HISTORY and [address]       NAME:  Lucila Sawyer  MRN: [id number]   YOB: 1960   Date: 10/5/2023   Age: 58 y.o. Gender: female       COMPLAINT AND PRESENT HISTORY:   Lucila Sawyer is 58 y.o.,  female, undergoing preadmission testing for : Primary osteoarthritis of right knee    Patient will be havin [address] note per Dr. Tali Foss on 23  History of Present Illness: This is a 58 y.o. female who presents to the clinic today for evaluation / follow up of bilateral knee pain right greater than left. Patient is a 68-year-old female who have seen multiple times in the past for her knees. She actually had been scheduled for a total knee replacement on the right side however she had blood sugar controls that were way over the top. She has had multiple conservative modalities in the past.  We have had concerns about doing her knees in the past due to her blood sugars as well as her BMI which is presently at 54. She states that her right knee become quite terrible for her with difficult for her to do daily activities. Her left knee has began to become just as problematic for her. .     Above reviewed with patient and she  concurs    UPDATE:     Patient denies any  injury  or prior knee surgery to the  right knee. Patient c/o pain , swelling, stiffness, Crunching and grinding in the right Knee. Pt describes the pain as  \"hard  brings me down \" and rates on number scale as  10/10 in intensity on average. Onset of symptoms started 3 years ago,  that has progressively gotten worse since onset. Patient reports that  standing aggravates sxs the most.   Patient has been using  Mobic, requipp, Ice,  injections x2, tylenol, Excedrin to help in pain relief. Patient states that nothing works for her pain. Pt reports having physical therapy over a year ago. Hx of corticosteroid injections approx 2 yrs ago.       Pt reports that

## 2023-10-05 NOTE — H&P
HISTORY and [address]       NAME:  Jewell Zelaya  MRN: [id number]   YOB: 1960   Date: 10/5/2023   Age: 58 y.o. Gender: female       COMPLAINT AND PRESENT HISTORY:   Jewell Zelaya is 58 y.o.,  female, undergoing preadmission testing for : Primary osteoarthritis of right knee    Patient will be havin [address] note per Dr. Rebeca High on 23  History of Present Illness: This is a 58 y.o. female who presents to the clinic today for evaluation / follow up of bilateral knee pain right greater than left. Patient is a 55-year-old female who have seen multiple times in the past for her knees. She actually had been scheduled for a total knee replacement on the right side however she had blood sugar controls that were way over the top. She has had multiple conservative modalities in the past.  We have had concerns about doing her knees in the past due to her blood sugars as well as her BMI which is presently at 54. She states that her right knee become quite terrible for her with difficult for her to do daily activities. Her left knee has began to become just as problematic for her. .     Above reviewed with patient and she  concurs    UPDATE:     Patient denies any  injury  or prior knee surgery to the  right knee. Patient c/o pain , swelling, stiffness, Crunching and grinding in the right Knee. Pt describes the pain as  \"hard  brings me down \" and rates on number scale as  10/10 in intensity on average. Onset of symptoms started 3 years ago,  that has progressively gotten worse since onset. Patient reports that  standing aggravates sxs the most.   Patient has been using  Mobic, requipp, Ice,  injections x2, tylenol, Excedrin to help in pain relief. Patient states that nothing works for her pain. Pt reports having physical therapy over a year ago. Hx of corticosteroid injections approx 2 yrs ago.       Pt reports that hernia 2017         SURGICAL HISTORY       Past Surgical History:   Procedure Laterality Date    BREAST BIOPSY Left     negative    CARDIAC CATHETERIZATION      Patient states approximately  she had the cardiac catheterization that was negative     CARDIAC CATHETERIZATION  2021     SECTION      x 2    COLONOSCOPY      DILATION AND CURETTAGE OF UTERUS      JOINT REPLACEMENT Left 2016    shoulder    GA COLON CA SCRN NOT HI RSK IND N/A 10/12/2017    COLONOSCOPY performed by Thelma Dunham MD at [address] EGD TRANSORAL BIOPSY SINGLE/MULTIPLE N/A 10/12/2017    EGD BIOPSY performed by Thelma Dunham MD at [address] Right 2017    SHOULDER TOTAL ARTHROPLASTY RIGHT WITH [address] [place], [place] AND AQUAMANTIS performed by Lisa Chavez DO at [facility] Right 2017       FAMILY HISTORY       Family History   Problem Relation Age of Onset    Emphysema Mother     Diabetes Father     Heart Disease Father     High Cholesterol Sister     High Blood Pressure Sister     Heart Disease Sister        SOCIAL HISTORY       Social History     Socioeconomic History    Marital status:      Spouse name: None    Number of children: None    Years of education: None    Highest education level: None   Occupational History     Employer: DISABLED   Tobacco Use    Smoking status: Former     Packs/day: 0.50     Years: 20.00     Additional pack years: 0.00     Total pack years: 10.00     Types: Cigarettes     Quit date: 1977     Years since quittin.8    Smokeless tobacco: Never   Vaping Use    Vaping Use: Never used   Substance and Sexual Activity    Alcohol use:  Yes     Alcohol/week: 0.0 standard drinks of alcohol     Comment: rtare    Drug use: No    Sexual activity: Yes     Partners: Male     Social Determinants of Health     Financial Resource Strain: High Risk (2023)    Overall Financial Resource Strain (CARDIA)     Difficulty of Paying

## 2023-10-06 LAB
MRSA, DNA, NASAL: NEGATIVE
SPECIMEN DESCRIPTION: NORMAL

## 2023-10-06 RX ORDER — LEVOTHYROXINE SODIUM 112 UG/1
112 TABLET ORAL DAILY
Qty: 90 TABLET | Refills: 0 | Status: SHIPPED | OUTPATIENT
Start: 2023-10-06

## 2023-10-07 LAB
MICROORGANISM SPEC CULT: ABNORMAL
SPECIMEN DESCRIPTION: ABNORMAL

## 2023-10-09 DIAGNOSIS — M17.0 PRIMARY OSTEOARTHRITIS OF BOTH KNEES: Primary | ICD-10-CM

## 2023-10-09 RX ORDER — FAMOTIDINE 20 MG/1
TABLET, FILM COATED ORAL
Qty: 180 TABLET | Refills: 0 | Status: SHIPPED | OUTPATIENT
Start: 2023-10-09

## 2023-10-09 RX ORDER — SULFAMETHOXAZOLE AND TRIMETHOPRIM 800; 160 MG/1; MG/1
1 TABLET ORAL 2 TIMES DAILY
Qty: 20 TABLET | Refills: 0 | Status: SHIPPED | OUTPATIENT
Start: 2023-10-09 | End: 2023-10-19

## 2023-10-10 ENCOUNTER — TELEPHONE (OUTPATIENT)
Dept: FAMILY MEDICINE CLINIC | Age: 63
End: 2023-10-10

## 2023-10-10 DIAGNOSIS — Z01.818 PREOPERATIVE CLEARANCE: Primary | ICD-10-CM

## 2023-10-10 NOTE — TELEPHONE ENCOUNTER
Patient called in regards to clearance. [facility] office informed her they had everything they needed to proceed with Monday's surgery aside from PCP clearance.

## 2023-10-10 NOTE — TELEPHONE ENCOUNTER
When I saw her in June for her \"pre-op clearance\" I did place orders for an EKG and CXR. Neither of these have been done. I can not clear her without at least an updated EKG.  I will reorder the EKG

## 2023-10-13 ENCOUNTER — ANESTHESIA EVENT (OUTPATIENT)
Dept: OPERATING ROOM | Age: 63
End: 2023-10-13
Payer: COMMERCIAL

## 2023-10-13 NOTE — PRE-PROCEDURE INSTRUCTIONS
Nothing to eat after midnight. Are you taking any blood thinners? When was the last day? Make sure to use Hibiclens prior to surgery. Remove any jewelry and body piercings. Do you wear glasses? If so, please bring a case to store them in. Are you having any Covid symptoms? Do you have any new rashes, infections, etc. that we should be aware of? Do you have a ride home the day of surgery? It cannot be a cab or medical transportation. Verify surgery time and what time to arrive at hospital.   Left detailed message regarding arrival time, procedure time, NPO status midnight on Sunday, need for , bathe with Hibiclens on Sunday night and Monday morning, pre op phone number for any questions.

## 2023-10-16 ENCOUNTER — HOSPITAL ENCOUNTER (OUTPATIENT)
Age: 63
Setting detail: OBSERVATION
Discharge: NURSING, REHAB OR LONG-TERM CARE | End: 2023-10-19
Attending: ORTHOPAEDIC SURGERY | Admitting: ORTHOPAEDIC SURGERY
Payer: COMMERCIAL

## 2023-10-16 ENCOUNTER — APPOINTMENT (OUTPATIENT)
Dept: GENERAL RADIOLOGY | Age: 63
End: 2023-10-16
Attending: ORTHOPAEDIC SURGERY
Payer: COMMERCIAL

## 2023-10-16 ENCOUNTER — ANESTHESIA (OUTPATIENT)
Dept: OPERATING ROOM | Age: 63
End: 2023-10-16
Payer: COMMERCIAL

## 2023-10-16 DIAGNOSIS — M17.11 PRIMARY OSTEOARTHRITIS OF RIGHT KNEE: Primary | ICD-10-CM

## 2023-10-16 LAB
GLUCOSE BLD-MCNC: 125 MG/DL (ref 65–105)
GLUCOSE BLD-MCNC: 143 MG/DL (ref 65–105)
GLUCOSE BLD-MCNC: 228 MG/DL (ref 65–105)
GLUCOSE BLD-MCNC: 268 MG/DL (ref 65–105)

## 2023-10-16 PROCEDURE — 6360000002 HC RX W HCPCS: Performed by: ORTHOPAEDIC SURGERY

## 2023-10-16 PROCEDURE — 82947 ASSAY GLUCOSE BLOOD QUANT: CPT

## 2023-10-16 PROCEDURE — 7100000000 HC PACU RECOVERY - FIRST 15 MIN: Performed by: ORTHOPAEDIC SURGERY

## 2023-10-16 PROCEDURE — 2500000003 HC RX 250 WO HCPCS: Performed by: ANESTHESIOLOGY

## 2023-10-16 PROCEDURE — 97116 GAIT TRAINING THERAPY: CPT

## 2023-10-16 PROCEDURE — 97162 PT EVAL MOD COMPLEX 30 MIN: CPT

## 2023-10-16 PROCEDURE — 3700000000 HC ANESTHESIA ATTENDED CARE: Performed by: ORTHOPAEDIC SURGERY

## 2023-10-16 PROCEDURE — 73560 X-RAY EXAM OF KNEE 1 OR 2: CPT

## 2023-10-16 PROCEDURE — 3700000001 HC ADD 15 MINUTES (ANESTHESIA): Performed by: ORTHOPAEDIC SURGERY

## 2023-10-16 PROCEDURE — 6370000000 HC RX 637 (ALT 250 FOR IP): Performed by: ORTHOPAEDIC SURGERY

## 2023-10-16 PROCEDURE — 6360000002 HC RX W HCPCS: Performed by: ANESTHESIOLOGY

## 2023-10-16 PROCEDURE — 2580000003 HC RX 258: Performed by: ANESTHESIOLOGY

## 2023-10-16 PROCEDURE — 2500000003 HC RX 250 WO HCPCS: Performed by: ORTHOPAEDIC SURGERY

## 2023-10-16 PROCEDURE — 97166 OT EVAL MOD COMPLEX 45 MIN: CPT

## 2023-10-16 PROCEDURE — 2709999900 HC NON-CHARGEABLE SUPPLY: Performed by: ORTHOPAEDIC SURGERY

## 2023-10-16 PROCEDURE — 94640 AIRWAY INHALATION TREATMENT: CPT

## 2023-10-16 PROCEDURE — 2580000003 HC RX 258: Performed by: ORTHOPAEDIC SURGERY

## 2023-10-16 PROCEDURE — 2500000003 HC RX 250 WO HCPCS: Performed by: NURSE ANESTHETIST, CERTIFIED REGISTERED

## 2023-10-16 PROCEDURE — 97110 THERAPEUTIC EXERCISES: CPT

## 2023-10-16 PROCEDURE — 2720000010 HC SURG SUPPLY STERILE: Performed by: ORTHOPAEDIC SURGERY

## 2023-10-16 PROCEDURE — C1776 JOINT DEVICE (IMPLANTABLE): HCPCS | Performed by: ORTHOPAEDIC SURGERY

## 2023-10-16 PROCEDURE — 97530 THERAPEUTIC ACTIVITIES: CPT

## 2023-10-16 PROCEDURE — 7100000001 HC PACU RECOVERY - ADDTL 15 MIN: Performed by: ORTHOPAEDIC SURGERY

## 2023-10-16 PROCEDURE — 64447 NJX AA&/STRD FEMORAL NRV IMG: CPT | Performed by: ANESTHESIOLOGY

## 2023-10-16 PROCEDURE — C1713 ANCHOR/SCREW BN/BN,TIS/BN: HCPCS | Performed by: ORTHOPAEDIC SURGERY

## 2023-10-16 PROCEDURE — 3600000003 HC SURGERY LEVEL 3 BASE: Performed by: ORTHOPAEDIC SURGERY

## 2023-10-16 PROCEDURE — 3600000013 HC SURGERY LEVEL 3 ADDTL 15MIN: Performed by: ORTHOPAEDIC SURGERY

## 2023-10-16 PROCEDURE — 94761 N-INVAS EAR/PLS OXIMETRY MLT: CPT

## 2023-10-16 PROCEDURE — 6360000002 HC RX W HCPCS: Performed by: NURSE ANESTHETIST, CERTIFIED REGISTERED

## 2023-10-16 PROCEDURE — 6370000000 HC RX 637 (ALT 250 FOR IP): Performed by: INTERNAL MEDICINE

## 2023-10-16 PROCEDURE — 2700000000 HC OXYGEN THERAPY PER DAY

## 2023-10-16 PROCEDURE — 99223 1ST HOSP IP/OBS HIGH 75: CPT | Performed by: INTERNAL MEDICINE

## 2023-10-16 PROCEDURE — A4216 STERILE WATER/SALINE, 10 ML: HCPCS | Performed by: ORTHOPAEDIC SURGERY

## 2023-10-16 DEVICE — IMPLANTABLE DEVICE: Type: IMPLANTABLE DEVICE | Site: KNEE | Status: FUNCTIONAL

## 2023-10-16 DEVICE — CEMENT BNE 40GM HI VISC RADPQ FOR REV SURG: Type: IMPLANTABLE DEVICE | Site: KNEE | Status: FUNCTIONAL

## 2023-10-16 DEVICE — TRAY TIB L67MM KNEE CO CHROM FIN MOD INTLOK VANGUARD: Type: IMPLANTABLE DEVICE | Site: KNEE | Status: FUNCTIONAL

## 2023-10-16 DEVICE — COMPONENT PAT DIA34MM THK7.8MM THN KNEE POLY 3 PEG SER A: Type: IMPLANTABLE DEVICE | Site: KNEE | Status: FUNCTIONAL

## 2023-10-16 RX ORDER — ONDANSETRON 2 MG/ML
4 INJECTION INTRAMUSCULAR; INTRAVENOUS
Status: DISCONTINUED | OUTPATIENT
Start: 2023-10-16 | End: 2023-10-16 | Stop reason: HOSPADM

## 2023-10-16 RX ORDER — EPHEDRINE SULFATE/0.9% NACL/PF 50 MG/5 ML
SYRINGE (ML) INTRAVENOUS PRN
Status: DISCONTINUED | OUTPATIENT
Start: 2023-10-16 | End: 2023-10-16 | Stop reason: SDUPTHER

## 2023-10-16 RX ORDER — SCOLOPAMINE TRANSDERMAL SYSTEM 1 MG/1
1 PATCH, EXTENDED RELEASE TRANSDERMAL ONCE
Status: DISCONTINUED | OUTPATIENT
Start: 2023-10-16 | End: 2023-10-16

## 2023-10-16 RX ORDER — ROPIVACAINE HYDROCHLORIDE 5 MG/ML
INJECTION, SOLUTION EPIDURAL; INFILTRATION; PERINEURAL
Status: DISCONTINUED | OUTPATIENT
Start: 2023-10-16 | End: 2023-10-16 | Stop reason: SDUPTHER

## 2023-10-16 RX ORDER — SODIUM CHLORIDE 9 MG/ML
INJECTION, SOLUTION INTRAVENOUS PRN
Status: DISCONTINUED | OUTPATIENT
Start: 2023-10-16 | End: 2023-10-16 | Stop reason: HOSPADM

## 2023-10-16 RX ORDER — ACETAMINOPHEN 325 MG/1
650 TABLET ORAL EVERY 6 HOURS
Status: DISCONTINUED | OUTPATIENT
Start: 2023-10-16 | End: 2023-10-19 | Stop reason: HOSPADM

## 2023-10-16 RX ORDER — ACETAMINOPHEN 325 MG/1
650 TABLET ORAL
Status: DISCONTINUED | OUTPATIENT
Start: 2023-10-16 | End: 2023-10-16 | Stop reason: HOSPADM

## 2023-10-16 RX ORDER — INSULIN LISPRO 100 [IU]/ML
0-16 INJECTION, SOLUTION INTRAVENOUS; SUBCUTANEOUS
Status: DISCONTINUED | OUTPATIENT
Start: 2023-10-16 | End: 2023-10-19 | Stop reason: HOSPADM

## 2023-10-16 RX ORDER — LABETALOL HYDROCHLORIDE 5 MG/ML
10 INJECTION, SOLUTION INTRAVENOUS
Status: DISCONTINUED | OUTPATIENT
Start: 2023-10-16 | End: 2023-10-16 | Stop reason: HOSPADM

## 2023-10-16 RX ORDER — SUCCINYLCHOLINE/SOD CL,ISO/PF 200MG/10ML
SYRINGE (ML) INTRAVENOUS PRN
Status: DISCONTINUED | OUTPATIENT
Start: 2023-10-16 | End: 2023-10-16 | Stop reason: SDUPTHER

## 2023-10-16 RX ORDER — ASPIRIN 81 MG/1
81 TABLET ORAL 2 TIMES DAILY
Status: DISCONTINUED | OUTPATIENT
Start: 2023-10-16 | End: 2023-10-19 | Stop reason: HOSPADM

## 2023-10-16 RX ORDER — LANOLIN ALCOHOL/MO/W.PET/CERES
200 CREAM (GRAM) TOPICAL DAILY
Status: DISCONTINUED | OUTPATIENT
Start: 2023-10-16 | End: 2023-10-19 | Stop reason: HOSPADM

## 2023-10-16 RX ORDER — DEXAMETHASONE SODIUM PHOSPHATE 10 MG/ML
10 INJECTION, SOLUTION INTRAMUSCULAR; INTRAVENOUS ONCE
Status: COMPLETED | OUTPATIENT
Start: 2023-10-16 | End: 2023-10-16

## 2023-10-16 RX ORDER — INSULIN GLARGINE 100 [IU]/ML
80 INJECTION, SOLUTION SUBCUTANEOUS 2 TIMES DAILY
Status: DISCONTINUED | OUTPATIENT
Start: 2023-10-16 | End: 2023-10-19 | Stop reason: HOSPADM

## 2023-10-16 RX ORDER — OXYCODONE HYDROCHLORIDE 10 MG/1
10 TABLET ORAL EVERY 4 HOURS PRN
Status: DISCONTINUED | OUTPATIENT
Start: 2023-10-16 | End: 2023-10-19 | Stop reason: HOSPADM

## 2023-10-16 RX ORDER — ALBUTEROL SULFATE 90 UG/1
2 AEROSOL, METERED RESPIRATORY (INHALATION) EVERY 4 HOURS PRN
Status: DISCONTINUED | OUTPATIENT
Start: 2023-10-16 | End: 2023-10-19 | Stop reason: HOSPADM

## 2023-10-16 RX ORDER — ONDANSETRON 2 MG/ML
4 INJECTION INTRAMUSCULAR; INTRAVENOUS EVERY 6 HOURS PRN
Status: DISCONTINUED | OUTPATIENT
Start: 2023-10-16 | End: 2023-10-19 | Stop reason: HOSPADM

## 2023-10-16 RX ORDER — LEVOTHYROXINE SODIUM 112 UG/1
112 TABLET ORAL DAILY
Status: DISCONTINUED | OUTPATIENT
Start: 2023-10-17 | End: 2023-10-19 | Stop reason: HOSPADM

## 2023-10-16 RX ORDER — LIDOCAINE HYDROCHLORIDE 10 MG/ML
1 INJECTION, SOLUTION EPIDURAL; INFILTRATION; INTRACAUDAL; PERINEURAL
Status: COMPLETED | OUTPATIENT
Start: 2023-10-16 | End: 2023-10-16

## 2023-10-16 RX ORDER — ACETAMINOPHEN 500 MG
1000 TABLET ORAL ONCE
Status: COMPLETED | OUTPATIENT
Start: 2023-10-16 | End: 2023-10-16

## 2023-10-16 RX ORDER — VENLAFAXINE HYDROCHLORIDE 37.5 MG/1
37.5 CAPSULE, EXTENDED RELEASE ORAL DAILY
Status: DISCONTINUED | OUTPATIENT
Start: 2023-10-16 | End: 2023-10-19 | Stop reason: HOSPADM

## 2023-10-16 RX ORDER — SODIUM CHLORIDE 9 MG/ML
INJECTION, SOLUTION INTRAVENOUS CONTINUOUS
Status: DISCONTINUED | OUTPATIENT
Start: 2023-10-16 | End: 2023-10-16 | Stop reason: HOSPADM

## 2023-10-16 RX ORDER — PRAMIPEXOLE DIHYDROCHLORIDE 0.25 MG/1
0.5 TABLET ORAL NIGHTLY
Status: DISCONTINUED | OUTPATIENT
Start: 2023-10-16 | End: 2023-10-19 | Stop reason: HOSPADM

## 2023-10-16 RX ORDER — TRANEXAMIC ACID 100 MG/ML
INJECTION, SOLUTION INTRAVENOUS PRN
Status: DISCONTINUED | OUTPATIENT
Start: 2023-10-16 | End: 2023-10-16 | Stop reason: SDUPTHER

## 2023-10-16 RX ORDER — METOCLOPRAMIDE HYDROCHLORIDE 5 MG/ML
10 INJECTION INTRAMUSCULAR; INTRAVENOUS
Status: DISCONTINUED | OUTPATIENT
Start: 2023-10-16 | End: 2023-10-16 | Stop reason: HOSPADM

## 2023-10-16 RX ORDER — OXYCODONE HYDROCHLORIDE AND ACETAMINOPHEN 5; 325 MG/1; MG/1
1 TABLET ORAL EVERY 4 HOURS PRN
Qty: 42 TABLET | Refills: 0 | Status: SHIPPED | OUTPATIENT
Start: 2023-10-16 | End: 2023-10-19 | Stop reason: SDUPTHER

## 2023-10-16 RX ORDER — MEPERIDINE HYDROCHLORIDE 25 MG/ML
12.5 INJECTION INTRAMUSCULAR; INTRAVENOUS; SUBCUTANEOUS EVERY 5 MIN PRN
Status: DISCONTINUED | OUTPATIENT
Start: 2023-10-16 | End: 2023-10-16 | Stop reason: HOSPADM

## 2023-10-16 RX ORDER — ONDANSETRON 2 MG/ML
INJECTION INTRAMUSCULAR; INTRAVENOUS PRN
Status: DISCONTINUED | OUTPATIENT
Start: 2023-10-16 | End: 2023-10-16 | Stop reason: SDUPTHER

## 2023-10-16 RX ORDER — PROPOFOL 10 MG/ML
INJECTION, EMULSION INTRAVENOUS PRN
Status: DISCONTINUED | OUTPATIENT
Start: 2023-10-16 | End: 2023-10-16 | Stop reason: SDUPTHER

## 2023-10-16 RX ORDER — MIDAZOLAM HYDROCHLORIDE 1 MG/ML
INJECTION INTRAMUSCULAR; INTRAVENOUS PRN
Status: DISCONTINUED | OUTPATIENT
Start: 2023-10-16 | End: 2023-10-16 | Stop reason: SDUPTHER

## 2023-10-16 RX ORDER — LISINOPRIL 5 MG/1
5 TABLET ORAL EVERY MORNING
Status: DISCONTINUED | OUTPATIENT
Start: 2023-10-17 | End: 2023-10-19 | Stop reason: HOSPADM

## 2023-10-16 RX ORDER — HYDRALAZINE HYDROCHLORIDE 20 MG/ML
10 INJECTION INTRAMUSCULAR; INTRAVENOUS
Status: DISCONTINUED | OUTPATIENT
Start: 2023-10-16 | End: 2023-10-16 | Stop reason: HOSPADM

## 2023-10-16 RX ORDER — DEXTROSE MONOHYDRATE 100 MG/ML
INJECTION, SOLUTION INTRAVENOUS CONTINUOUS PRN
Status: DISCONTINUED | OUTPATIENT
Start: 2023-10-16 | End: 2023-10-19 | Stop reason: HOSPADM

## 2023-10-16 RX ORDER — ALBUTEROL SULFATE 2.5 MG/3ML
2.5 SOLUTION RESPIRATORY (INHALATION) ONCE
Status: COMPLETED | OUTPATIENT
Start: 2023-10-16 | End: 2023-10-16

## 2023-10-16 RX ORDER — SODIUM CHLORIDE 0.9 % (FLUSH) 0.9 %
5-40 SYRINGE (ML) INJECTION PRN
Status: DISCONTINUED | OUTPATIENT
Start: 2023-10-16 | End: 2023-10-16 | Stop reason: HOSPADM

## 2023-10-16 RX ORDER — DEXAMETHASONE SODIUM PHOSPHATE 4 MG/ML
INJECTION, SOLUTION INTRA-ARTICULAR; INTRALESIONAL; INTRAMUSCULAR; INTRAVENOUS; SOFT TISSUE
Status: DISCONTINUED | OUTPATIENT
Start: 2023-10-16 | End: 2023-10-16 | Stop reason: SDUPTHER

## 2023-10-16 RX ORDER — SODIUM CHLORIDE 0.9 % (FLUSH) 0.9 %
5-40 SYRINGE (ML) INJECTION EVERY 12 HOURS SCHEDULED
Status: DISCONTINUED | OUTPATIENT
Start: 2023-10-16 | End: 2023-10-16 | Stop reason: HOSPADM

## 2023-10-16 RX ORDER — ASPIRIN 81 MG/1
81 TABLET, CHEWABLE ORAL 2 TIMES DAILY
Qty: 150 TABLET | Refills: 1 | Status: SHIPPED | OUTPATIENT
Start: 2023-10-16

## 2023-10-16 RX ORDER — DOXYCYCLINE HYCLATE 100 MG
100 TABLET ORAL 2 TIMES DAILY
Qty: 20 TABLET | Refills: 1 | Status: SHIPPED | OUTPATIENT
Start: 2023-10-16 | End: 2023-11-05

## 2023-10-16 RX ORDER — SODIUM CHLORIDE, SODIUM LACTATE, POTASSIUM CHLORIDE, CALCIUM CHLORIDE 600; 310; 30; 20 MG/100ML; MG/100ML; MG/100ML; MG/100ML
INJECTION, SOLUTION INTRAVENOUS CONTINUOUS
Status: DISCONTINUED | OUTPATIENT
Start: 2023-10-16 | End: 2023-10-16

## 2023-10-16 RX ORDER — LIDOCAINE HYDROCHLORIDE 10 MG/ML
INJECTION, SOLUTION EPIDURAL; INFILTRATION; INTRACAUDAL; PERINEURAL PRN
Status: DISCONTINUED | OUTPATIENT
Start: 2023-10-16 | End: 2023-10-16 | Stop reason: SDUPTHER

## 2023-10-16 RX ORDER — OXYCODONE HYDROCHLORIDE 5 MG/1
5 TABLET ORAL EVERY 4 HOURS PRN
Status: DISCONTINUED | OUTPATIENT
Start: 2023-10-16 | End: 2023-10-19 | Stop reason: HOSPADM

## 2023-10-16 RX ORDER — SODIUM CHLORIDE 0.9 % (FLUSH) 0.9 %
5-40 SYRINGE (ML) INJECTION EVERY 12 HOURS SCHEDULED
Status: DISCONTINUED | OUTPATIENT
Start: 2023-10-16 | End: 2023-10-19 | Stop reason: HOSPADM

## 2023-10-16 RX ORDER — LIOTHYRONINE SODIUM 5 UG/1
5 TABLET ORAL DAILY
Status: DISCONTINUED | OUTPATIENT
Start: 2023-10-17 | End: 2023-10-19 | Stop reason: HOSPADM

## 2023-10-16 RX ORDER — CALCIUM CHLORIDE 100 MG/ML
INJECTION INTRAVENOUS; INTRAVENTRICULAR PRN
Status: DISCONTINUED | OUTPATIENT
Start: 2023-10-16 | End: 2023-10-16 | Stop reason: ALTCHOICE

## 2023-10-16 RX ORDER — SODIUM CHLORIDE 0.9 % (FLUSH) 0.9 %
5-40 SYRINGE (ML) INJECTION PRN
Status: DISCONTINUED | OUTPATIENT
Start: 2023-10-16 | End: 2023-10-19 | Stop reason: HOSPADM

## 2023-10-16 RX ORDER — ALLOPURINOL 100 MG/1
100 TABLET ORAL DAILY
Status: DISCONTINUED | OUTPATIENT
Start: 2023-10-16 | End: 2023-10-19 | Stop reason: HOSPADM

## 2023-10-16 RX ORDER — SODIUM CHLORIDE 9 MG/ML
INJECTION, SOLUTION INTRAVENOUS PRN
Status: DISCONTINUED | OUTPATIENT
Start: 2023-10-16 | End: 2023-10-19 | Stop reason: HOSPADM

## 2023-10-16 RX ORDER — BUMETANIDE 1 MG/1
1 TABLET ORAL DAILY
Status: DISCONTINUED | OUTPATIENT
Start: 2023-10-16 | End: 2023-10-19 | Stop reason: HOSPADM

## 2023-10-16 RX ORDER — FAMOTIDINE 20 MG/1
20 TABLET, FILM COATED ORAL 2 TIMES DAILY
Status: DISCONTINUED | OUTPATIENT
Start: 2023-10-16 | End: 2023-10-19 | Stop reason: HOSPADM

## 2023-10-16 RX ORDER — FENTANYL CITRATE 50 UG/ML
INJECTION, SOLUTION INTRAMUSCULAR; INTRAVENOUS PRN
Status: DISCONTINUED | OUTPATIENT
Start: 2023-10-16 | End: 2023-10-16 | Stop reason: SDUPTHER

## 2023-10-16 RX ORDER — DIPHENHYDRAMINE HYDROCHLORIDE 50 MG/ML
12.5 INJECTION INTRAMUSCULAR; INTRAVENOUS
Status: DISCONTINUED | OUTPATIENT
Start: 2023-10-16 | End: 2023-10-16 | Stop reason: HOSPADM

## 2023-10-16 RX ORDER — FENTANYL CITRATE 0.05 MG/ML
25 INJECTION, SOLUTION INTRAMUSCULAR; INTRAVENOUS EVERY 5 MIN PRN
Status: DISCONTINUED | OUTPATIENT
Start: 2023-10-16 | End: 2023-10-16 | Stop reason: HOSPADM

## 2023-10-16 RX ORDER — ROCURONIUM BROMIDE 10 MG/ML
INJECTION, SOLUTION INTRAVENOUS PRN
Status: DISCONTINUED | OUTPATIENT
Start: 2023-10-16 | End: 2023-10-16 | Stop reason: SDUPTHER

## 2023-10-16 RX ORDER — INSULIN LISPRO 100 [IU]/ML
0-4 INJECTION, SOLUTION INTRAVENOUS; SUBCUTANEOUS NIGHTLY
Status: DISCONTINUED | OUTPATIENT
Start: 2023-10-16 | End: 2023-10-19 | Stop reason: HOSPADM

## 2023-10-16 RX ORDER — PANTOPRAZOLE SODIUM 40 MG/1
40 TABLET, DELAYED RELEASE ORAL
Status: DISCONTINUED | OUTPATIENT
Start: 2023-10-17 | End: 2023-10-19 | Stop reason: HOSPADM

## 2023-10-16 RX ORDER — ROPINIROLE 2 MG/1
2 TABLET, FILM COATED ORAL 3 TIMES DAILY
Status: DISCONTINUED | OUTPATIENT
Start: 2023-10-16 | End: 2023-10-19 | Stop reason: HOSPADM

## 2023-10-16 RX ADMIN — MIDAZOLAM 2 MG: 1 INJECTION INTRAMUSCULAR; INTRAVENOUS at 10:49

## 2023-10-16 RX ADMIN — SODIUM CHLORIDE: 9 INJECTION, SOLUTION INTRAVENOUS at 10:14

## 2023-10-16 RX ADMIN — PHENYLEPHRINE HYDROCHLORIDE 200 MCG: 10 INJECTION INTRAVENOUS at 11:13

## 2023-10-16 RX ADMIN — ASPIRIN 81 MG: 81 TABLET, COATED ORAL at 21:48

## 2023-10-16 RX ADMIN — INSULIN LISPRO 4 UNITS: 100 INJECTION, SOLUTION INTRAVENOUS; SUBCUTANEOUS at 17:28

## 2023-10-16 RX ADMIN — SODIUM CHLORIDE: 9 INJECTION, SOLUTION INTRAVENOUS at 11:58

## 2023-10-16 RX ADMIN — ALBUTEROL SULFATE 2.5 MG: 2.5 SOLUTION RESPIRATORY (INHALATION) at 13:55

## 2023-10-16 RX ADMIN — Medication 3000 MG: at 17:50

## 2023-10-16 RX ADMIN — ROCURONIUM BROMIDE 40 MG: 10 INJECTION, SOLUTION INTRAVENOUS at 11:29

## 2023-10-16 RX ADMIN — ROPINIROLE 2 MG: 0.5 TABLET, FILM COATED ORAL at 17:25

## 2023-10-16 RX ADMIN — PROPOFOL 200 MG: 10 INJECTION, EMULSION INTRAVENOUS at 11:00

## 2023-10-16 RX ADMIN — FENTANYL CITRATE 25 MCG: 50 INJECTION, SOLUTION INTRAMUSCULAR; INTRAVENOUS at 10:52

## 2023-10-16 RX ADMIN — ACETAMINOPHEN 1000 MG: 500 TABLET ORAL at 10:01

## 2023-10-16 RX ADMIN — Medication 20 MG: at 11:19

## 2023-10-16 RX ADMIN — ROCURONIUM BROMIDE 10 MG: 10 INJECTION, SOLUTION INTRAVENOUS at 10:59

## 2023-10-16 RX ADMIN — PRAMIPEXOLE DIHYDROCHLORIDE 0.5 MG: 0.25 TABLET ORAL at 21:58

## 2023-10-16 RX ADMIN — SUGAMMADEX 200 MG: 100 INJECTION, SOLUTION INTRAVENOUS at 12:22

## 2023-10-16 RX ADMIN — ROPIVACAINE HYDROCHLORIDE 20 ML: 5 INJECTION, SOLUTION EPIDURAL; INFILTRATION; PERINEURAL at 13:15

## 2023-10-16 RX ADMIN — FAMOTIDINE 20 MG: 20 TABLET ORAL at 21:48

## 2023-10-16 RX ADMIN — Medication 10 MG: at 11:22

## 2023-10-16 RX ADMIN — FENTANYL CITRATE 50 MCG: 50 INJECTION, SOLUTION INTRAMUSCULAR; INTRAVENOUS at 13:00

## 2023-10-16 RX ADMIN — ONDANSETRON 4 MG: 2 INJECTION INTRAMUSCULAR; INTRAVENOUS at 12:02

## 2023-10-16 RX ADMIN — ACETAMINOPHEN 650 MG: 325 TABLET ORAL at 17:28

## 2023-10-16 RX ADMIN — FENTANYL CITRATE 50 MCG: 50 INJECTION, SOLUTION INTRAMUSCULAR; INTRAVENOUS at 11:33

## 2023-10-16 RX ADMIN — TRANEXAMIC ACID 1000 MG: 100 INJECTION, SOLUTION INTRAVENOUS at 12:14

## 2023-10-16 RX ADMIN — FENTANYL CITRATE 25 MCG: 50 INJECTION, SOLUTION INTRAMUSCULAR; INTRAVENOUS at 12:42

## 2023-10-16 RX ADMIN — SODIUM CHLORIDE, PRESERVATIVE FREE 10 ML: 5 INJECTION INTRAVENOUS at 21:59

## 2023-10-16 RX ADMIN — Medication 160 MG: at 10:59

## 2023-10-16 RX ADMIN — OXYCODONE HYDROCHLORIDE 10 MG: 10 TABLET ORAL at 17:55

## 2023-10-16 RX ADMIN — Medication 3000 MG: at 11:19

## 2023-10-16 RX ADMIN — Medication 200 MG: at 21:49

## 2023-10-16 RX ADMIN — INSULIN GLARGINE 80 UNITS: 100 INJECTION, SOLUTION SUBCUTANEOUS at 21:50

## 2023-10-16 RX ADMIN — LIDOCAINE HYDROCHLORIDE 1 ML: 10 INJECTION, SOLUTION EPIDURAL; INFILTRATION; INTRACAUDAL; PERINEURAL at 10:04

## 2023-10-16 RX ADMIN — ROCURONIUM BROMIDE 20 MG: 10 INJECTION, SOLUTION INTRAVENOUS at 11:44

## 2023-10-16 RX ADMIN — LIDOCAINE HYDROCHLORIDE 50 MG: 10 INJECTION, SOLUTION EPIDURAL; INFILTRATION; INTRACAUDAL; PERINEURAL at 10:52

## 2023-10-16 RX ADMIN — DEXAMETHASONE SODIUM PHOSPHATE 4 MG: 4 INJECTION INTRA-ARTICULAR; INTRALESIONAL; INTRAMUSCULAR; INTRAVENOUS; SOFT TISSUE at 13:15

## 2023-10-16 RX ADMIN — ROPINIROLE 2 MG: 0.5 TABLET, FILM COATED ORAL at 21:49

## 2023-10-16 RX ADMIN — DEXAMETHASONE SODIUM PHOSPHATE 10 MG: 10 INJECTION INTRAMUSCULAR; INTRAVENOUS at 10:19

## 2023-10-16 RX ADMIN — ALLOPURINOL 100 MG: 100 TABLET ORAL at 17:28

## 2023-10-16 RX ADMIN — TRANEXAMIC ACID 1000 MG: 100 INJECTION, SOLUTION INTRAVENOUS at 11:21

## 2023-10-16 RX ADMIN — Medication 10 MG: at 11:59

## 2023-10-16 ASSESSMENT — ENCOUNTER SYMPTOMS
STRIDOR: 0
SHORTNESS OF BREATH: 0

## 2023-10-16 ASSESSMENT — PAIN DESCRIPTION - LOCATION: LOCATION: KNEE

## 2023-10-16 ASSESSMENT — PAIN SCALES - GENERAL
PAINLEVEL_OUTOF10: 9
PAINLEVEL_OUTOF10: 8
PAINLEVEL_OUTOF10: 9
PAINLEVEL_OUTOF10: 10

## 2023-10-16 ASSESSMENT — PAIN DESCRIPTION - PAIN TYPE: TYPE: SURGICAL PAIN

## 2023-10-16 ASSESSMENT — PAIN DESCRIPTION - ORIENTATION: ORIENTATION: RIGHT

## 2023-10-16 ASSESSMENT — PAIN - FUNCTIONAL ASSESSMENT: PAIN_FUNCTIONAL_ASSESSMENT: 0-10

## 2023-10-16 ASSESSMENT — LIFESTYLE VARIABLES: SMOKING_STATUS: 0

## 2023-10-16 NOTE — INTERVAL H&P NOTE
Update History & Physical    The patient's History and Physical of   October 5, 2023     Patient will be having : Procedure(s):  KNEE TOTAL ARTHROPLASTY  RIGHT  The surgical site was confirmed by the  patient and me. There was no change. Or updates at this time. Patient did obtain Cardiac, medical , endocrine clearance. Patient has been NPO since midnight. No blood thinners in the past 5-7 days. (Aspirin,excedrin, mobic)     Patient took  Lisinopril, synthroid, Ditropan, Requip this am with sip of water. Patient has hx of PONV,  denies any personal or family problems with anesthesia. Patient was physically assessed, including cardiovascular and respiratory. Pt admits to SOB. Blood sugar this am 157. Patient understands and wants to proceed with the procedure.      Electronically signed by YOLANDA Rushing CNP on 10/16/2023 at 9:16 AM    Note marked for cosign by provider

## 2023-10-16 NOTE — DISCHARGE INSTRUCTIONS
DISCHARGE INSTRUCTIONS  Caring for yourself after joint replacement surgery (Total Hip and Total Knee Replacement)    Activity and Therapy  Receive physical therapy three times per week. (Pain medication one hour prior to therapy)   Perform PT exercises on own when not receiving home or outpatient PT. Ideally exercises should be at least two times a day. Increase level of activity and ambulation each day. Perform deep breathing exercises daily. Patient provides self-care when possible. Work on Range of motion for Total knee patients. No pillow under the knee for Total knee patients. Elevate the surgical leg when seated. No driving until cleared by surgeon      Diet:  Increase oral intake of fruits, fiber and water to prevent constipation. Drink fluids frequently and take stool softeners to aid in bowel motility. Increase protein intake/reduce high-sugar intake to help promote healing and prevent infection. Incision Care:  Keep Aquacel or other dressing intact until seen and removed by surgeon, unless saturated, in which case, call surgeon and request instructions. If dressing falls off, call surgeon. [facility] on in the am and off in the pm to reduce swelling. Ice affected area four times a day, for twenty minutes. Pain Medications and Anticoagulant  You have been place on an anticoagulant to prevent blood clots. Take this medication exactly as prescribed. Be alert for signs of bleeding. Take care not to injure yourself. You have been provided pain medicine to control your pain. Do not take more narcotics than prescribed. You may begin weaning from narcotics as your pain level improves by decreasing the amount or frequency of the narcotics. You may also take plain acetaminophen as an alternate to the narcotics. Never exceed the recommended dosage. Ice, rest and elevating the surgical limb also help with pain control.       When to call the Surgeon:  Increased redness, warmth, drainage,

## 2023-10-16 NOTE — OP NOTE
a large 6-inch Ace dressing from toes to mid-thigh. The patient was awakened and taken to PACU in good condition.       Electronically signed by Magda Washington MD on 10/16/2023 at 12:25 PM

## 2023-10-16 NOTE — ANESTHESIA PROCEDURE NOTES
Peripheral Block    Patient location during procedure: PACU  Reason for block: post-op pain management and at surgeon's request  Start time: 10/16/2023 1:15 PM  End time: 10/16/2023 1:30 PM  Staffing  Performed: anesthesiologist   Anesthesiologist: Zachary Grijalva MD  Performed by: Zachary Grijalva MD  Authorized by: Zachary Grijalva MD    Preanesthetic Checklist  Completed: patient identified, IV checked, site marked, risks and benefits discussed, surgical/procedural consents, equipment checked, pre-op evaluation, timeout performed, anesthesia consent given, oxygen available, monitors applied/VS acknowledged, fire risk safety assessment completed and verbalized and blood product R/B/A discussed and consented  Peripheral Block   Patient position: supine  Prep: ChloraPrep  Provider prep: mask and sterile gloves  Patient monitoring: cardiac monitor, continuous pulse ox, continuous capnometry, frequent blood pressure checks and IV access  Block type: Femoral  Adductor canal  Laterality: right  Injection technique: single-shot  Guidance: ultrasound guided  Local infiltration: lidocaine  Infiltration strength: 1 %  Local infiltration: lidocaine  Dose: 5 mL    Needle   Needle type: pencil-tip   Needle gauge: 22 G  Needle localization: ultrasound guidance  Test dose: negative  Assessment   Injection assessment: negative aspiration for heme, no paresthesia on injection, local visualized surrounding nerve on ultrasound and no intravascular symptoms  Paresthesia pain: none  Slow fractionated injection: yes  Hemodynamics: stableno    Medications Administered  ropivacaine (NAROPIN) injection 0.5% - Perineural   20 mL - 10/16/2023 1:15:00 PM  dexamethasone (DECADRON) injection 4 mg/mL - Perineural   4 mg - 10/16/2023 1:15:00 PM

## 2023-10-16 NOTE — ANESTHESIA PRE PROCEDURE
Department of Anesthesiology  Preprocedure Note       Name:  Ruth Shepard   Age:  58 y.o.  :  1960                                          MRN:  [id number]         Date:  10/16/2023      Surgeon: Lincoln Mcardle): Mackenzie James MD    Procedure: Procedure(s):  KNEE TOTAL ARTHROPLASTY  RIGHT    Medications prior to admission:   Prior to Admission medications    Medication Sig Start Date End Date Taking? Authorizing Provider   famotidine (PEPCID) 20 MG tablet Take 1 tablet by mouth twice daily 10/9/23   YOLANDA Rosario - NP   sulfamethoxazole-trimethoprim (BACTRIM DS;SEPTRA DS) 800-160 MG per tablet Take 1 tablet by mouth 2 times daily for 10 days Take with food. 10/9/23 10/19/23  Mackenzie James MD   levothyroxine (SYNTHROID) 112 MCG tablet Take 1 tablet by mouth once daily 10/6/23   YOLANDA Luevano CNP   Potassium 99 MG TABS Take 1 tablet by mouth daily    ProviderRenetta MD   rOPINIRole (REQUIP) 2 MG tablet TAKE 1 TABLET BY MOUTH THREE TIMES DAILY.  MORNING, NOON, AND NIGHT 23   YOLANDA Luevano - CNP   Aspirin 81 MG CAPS 1 tablet Orally Once a day for 30 day(s)    ProviderRenetta MD   ferrous sulfate (IRON 325) 325 (65 Fe) MG tablet 1 tablet Orally Once a day for 30 day(s)    Renetta Jovel MD   Omega-3 1000 MG CAPS 1 capsule    Renetta Jovel MD   acetaminophen (TYLENOL) 500 MG tablet 1 tablet as needed Orally every 6 hrs    Renetta Jovel MD   Continuous Blood Gluc Sensor (FREESTYLE KRISTIN 14 DAY SENSOR) MISC  23   Renetta Jovel MD   NOVOLOG 100 UNIT/ML injection vial Sliding scale 23   Renetta Jovel MD   Insulin Disposable Pump (OMNIPOD DASH PODS, GEN 4,) MISC USE 1 POD EVERY 3 DAYS AS DIRECTED 23   Renetta Jovel MD   Insulin Disposable Pump (V-GO 40) 40 UNIT/24HR KIT  23   Renetta Jovel MD LANTUS SOLOSTAR 100 UNIT/ML injection pen  23   Renetta Jovel MD   loratadine (Anival Balcarlene)

## 2023-10-16 NOTE — DISCHARGE INSTR - COC
operative leg is normal.  Will improve with time. Fatigue and moderate pain after therapy is very normal!  Use pain medications, ice, repositioning, distraction - music, tv, reading a book. Numbness near the incision site is normal - this will improve with time. NOTE: Ensure/Remind patient to go to their follow-up appointment with their orthopedic surgeon, which is usually scheduled for 7-10 days after the surgery. Abnormal Conditions - When to call the Surgeon:  Increasing/excessive redness, warmth or swelling at the incisional site not relieved with ice and elevation. Increasing/excessive pain not well-controlled by prescribed medications. Drainage or odor from or around the incision site.  (A little blood showing through the bandage is ok, but active leaking, of any color, coming out of the bandage is NOT normal.  Temperature above 101 degrees. (A mild temp of 99 - 100 is normal - if temp gets to 101 you may use Tylenol once - if it does not improve, you may use a 2nd dose of Tylenol after 5 hours - if temperature is still at or above 101 degrees then call the surgeon.)  Calf tenderness, swelling, or redness or numbness of the foot/lower leg. Shortness of breath or chest pain. Any other incision or surgical-related concerns.   CALL SURGEON with concerns PRIOR TO sending patient to hospital.     Patient's personal belongings (please select all that are sent with patient):  Glasses    RN SIGNATURE:  Electronically signed by Pamela Streeter RN on 10/18/23 at [facility] PM EDT    CASE MANAGEMENT/SOCIAL WORK SECTION    Inpatient Status Date:     Readmission Risk Assessment Score:  Readmission Risk              Risk of Unplanned Readmission:  0           Discharging to Facility/ Agency   [facility] and the [address] [place] [address]   Phone [phone]  Fax [phone]     Dialysis Facility (if applicable)   Name:  Address:  Dialysis Schedule:  Phone:  Fax:    / signature:

## 2023-10-17 LAB
ANION GAP SERPL CALCULATED.3IONS-SCNC: 8 MMOL/L (ref 9–17)
BASOPHILS # BLD: 0 K/UL (ref 0–0.2)
BASOPHILS NFR BLD: 0 % (ref 0–2)
BUN SERPL-MCNC: 39 MG/DL (ref 8–23)
CALCIUM SERPL-MCNC: 8.2 MG/DL (ref 8.6–10.4)
CHLORIDE SERPL-SCNC: 99 MMOL/L (ref 98–107)
CO2 SERPL-SCNC: 26 MMOL/L (ref 20–31)
CREAT SERPL-MCNC: 1.5 MG/DL (ref 0.5–0.9)
EOSINOPHIL # BLD: 0 K/UL (ref 0–0.4)
EOSINOPHILS RELATIVE PERCENT: 0 % (ref 0–4)
ERYTHROCYTE [DISTWIDTH] IN BLOOD BY AUTOMATED COUNT: 15.7 % (ref 11.5–14.9)
GFR SERPL CREATININE-BSD FRML MDRD: 39 ML/MIN/1.73M2
GLUCOSE BLD-MCNC: 137 MG/DL (ref 65–105)
GLUCOSE BLD-MCNC: 161 MG/DL (ref 65–105)
GLUCOSE BLD-MCNC: 255 MG/DL (ref 65–105)
GLUCOSE BLD-MCNC: 310 MG/DL (ref 65–105)
GLUCOSE SERPL-MCNC: 352 MG/DL (ref 70–99)
HCT VFR BLD AUTO: 31.6 % (ref 36–46)
HGB BLD-MCNC: 10 G/DL (ref 12–16)
LYMPHOCYTES NFR BLD: 2.09 K/UL (ref 1–4.8)
LYMPHOCYTES RELATIVE PERCENT: 12 % (ref 24–44)
MCH RBC QN AUTO: 28.9 PG (ref 26–34)
MCHC RBC AUTO-ENTMCNC: 31.5 G/DL (ref 31–37)
MCV RBC AUTO: 91.8 FL (ref 80–100)
MONOCYTES NFR BLD: 1.22 K/UL (ref 0.1–1.3)
MONOCYTES NFR BLD: 7 % (ref 1–7)
MORPHOLOGY: ABNORMAL
NEUTROPHILS NFR BLD: 81 % (ref 36–66)
NEUTS SEG NFR BLD: 14.09 K/UL (ref 1.3–9.1)
PLATELET # BLD AUTO: 286 K/UL (ref 150–450)
PMV BLD AUTO: 8.4 FL (ref 6–12)
POTASSIUM SERPL-SCNC: 5.3 MMOL/L (ref 3.7–5.3)
POTASSIUM SERPL-SCNC: 6.2 MMOL/L (ref 3.7–5.3)
RBC # BLD AUTO: 3.45 M/UL (ref 4–5.2)
SODIUM SERPL-SCNC: 133 MMOL/L (ref 135–144)
WBC OTHER # BLD: 17.4 K/UL (ref 3.5–11)

## 2023-10-17 PROCEDURE — 82947 ASSAY GLUCOSE BLOOD QUANT: CPT

## 2023-10-17 PROCEDURE — 6370000000 HC RX 637 (ALT 250 FOR IP): Performed by: ORTHOPAEDIC SURGERY

## 2023-10-17 PROCEDURE — 97535 SELF CARE MNGMENT TRAINING: CPT

## 2023-10-17 PROCEDURE — 99232 SBSQ HOSP IP/OBS MODERATE 35: CPT | Performed by: INTERNAL MEDICINE

## 2023-10-17 PROCEDURE — G0378 HOSPITAL OBSERVATION PER HR: HCPCS

## 2023-10-17 PROCEDURE — 85025 COMPLETE CBC W/AUTO DIFF WBC: CPT

## 2023-10-17 PROCEDURE — 80048 BASIC METABOLIC PNL TOTAL CA: CPT

## 2023-10-17 PROCEDURE — 6370000000 HC RX 637 (ALT 250 FOR IP): Performed by: INTERNAL MEDICINE

## 2023-10-17 PROCEDURE — 2580000003 HC RX 258: Performed by: ORTHOPAEDIC SURGERY

## 2023-10-17 PROCEDURE — G0379 DIRECT REFER HOSPITAL OBSERV: HCPCS

## 2023-10-17 PROCEDURE — 97110 THERAPEUTIC EXERCISES: CPT

## 2023-10-17 PROCEDURE — 84132 ASSAY OF SERUM POTASSIUM: CPT

## 2023-10-17 PROCEDURE — 36415 COLL VENOUS BLD VENIPUNCTURE: CPT

## 2023-10-17 RX ORDER — POLYETHYLENE GLYCOL 3350 17 G/17G
17 POWDER, FOR SOLUTION ORAL DAILY
Status: DISCONTINUED | OUTPATIENT
Start: 2023-10-17 | End: 2023-10-19 | Stop reason: HOSPADM

## 2023-10-17 RX ORDER — ROPINIROLE 2 MG/1
2 TABLET, FILM COATED ORAL 3 TIMES DAILY
Qty: 90 TABLET | Refills: 0 | Status: SHIPPED | OUTPATIENT
Start: 2023-10-17

## 2023-10-17 RX ADMIN — PANTOPRAZOLE SODIUM 40 MG: 40 TABLET, DELAYED RELEASE ORAL at 06:05

## 2023-10-17 RX ADMIN — ACETAMINOPHEN 650 MG: 325 TABLET ORAL at 11:11

## 2023-10-17 RX ADMIN — SODIUM CHLORIDE, PRESERVATIVE FREE 10 ML: 5 INJECTION INTRAVENOUS at 20:46

## 2023-10-17 RX ADMIN — LEVOTHYROXINE SODIUM 112 MCG: 0.11 TABLET ORAL at 06:06

## 2023-10-17 RX ADMIN — OXYCODONE HYDROCHLORIDE 10 MG: 10 TABLET ORAL at 23:25

## 2023-10-17 RX ADMIN — SODIUM CHLORIDE, PRESERVATIVE FREE 10 ML: 5 INJECTION INTRAVENOUS at 07:51

## 2023-10-17 RX ADMIN — LISINOPRIL 5 MG: 5 TABLET ORAL at 07:48

## 2023-10-17 RX ADMIN — ACETAMINOPHEN 650 MG: 325 TABLET ORAL at 06:04

## 2023-10-17 RX ADMIN — INSULIN GLARGINE 80 UNITS: 100 INJECTION, SOLUTION SUBCUTANEOUS at 20:43

## 2023-10-17 RX ADMIN — ROPINIROLE 2 MG: 0.5 TABLET, FILM COATED ORAL at 21:56

## 2023-10-17 RX ADMIN — ROPINIROLE 2 MG: 0.5 TABLET, FILM COATED ORAL at 07:48

## 2023-10-17 RX ADMIN — OXYBUTYNIN CHLORIDE 15 MG: 10 TABLET, EXTENDED RELEASE ORAL at 07:47

## 2023-10-17 RX ADMIN — PRAMIPEXOLE DIHYDROCHLORIDE 0.5 MG: 0.25 TABLET ORAL at 21:57

## 2023-10-17 RX ADMIN — FAMOTIDINE 20 MG: 20 TABLET ORAL at 20:42

## 2023-10-17 RX ADMIN — BUMETANIDE 1 MG: 1 TABLET ORAL at 07:48

## 2023-10-17 RX ADMIN — ROPINIROLE 2 MG: 0.5 TABLET, FILM COATED ORAL at 14:58

## 2023-10-17 RX ADMIN — Medication 200 MG: at 20:41

## 2023-10-17 RX ADMIN — VENLAFAXINE HYDROCHLORIDE 37.5 MG: 37.5 CAPSULE, EXTENDED RELEASE ORAL at 07:50

## 2023-10-17 RX ADMIN — INSULIN LISPRO 12 UNITS: 100 INJECTION, SOLUTION INTRAVENOUS; SUBCUTANEOUS at 11:11

## 2023-10-17 RX ADMIN — ACETAMINOPHEN 650 MG: 325 TABLET ORAL at 18:11

## 2023-10-17 RX ADMIN — FAMOTIDINE 20 MG: 20 TABLET ORAL at 07:48

## 2023-10-17 RX ADMIN — ACETAMINOPHEN 650 MG: 325 TABLET ORAL at 00:22

## 2023-10-17 RX ADMIN — ASPIRIN 81 MG: 81 TABLET, COATED ORAL at 20:42

## 2023-10-17 RX ADMIN — POLYETHYLENE GLYCOL 3350 17 G: 17 POWDER, FOR SOLUTION ORAL at 09:51

## 2023-10-17 RX ADMIN — ASPIRIN 81 MG: 81 TABLET, COATED ORAL at 07:48

## 2023-10-17 RX ADMIN — ALLOPURINOL 100 MG: 100 TABLET ORAL at 07:48

## 2023-10-17 RX ADMIN — INSULIN LISPRO 8 UNITS: 100 INJECTION, SOLUTION INTRAVENOUS; SUBCUTANEOUS at 07:47

## 2023-10-17 RX ADMIN — INSULIN GLARGINE 80 UNITS: 100 INJECTION, SOLUTION SUBCUTANEOUS at 07:47

## 2023-10-17 RX ADMIN — LIOTHYRONINE SODIUM 5 MCG: 5 TABLET ORAL at 07:50

## 2023-10-17 RX ADMIN — OXYCODONE HYDROCHLORIDE 10 MG: 10 TABLET ORAL at 18:11

## 2023-10-17 RX ADMIN — ACETAMINOPHEN 650 MG: 325 TABLET ORAL at 23:22

## 2023-10-17 ASSESSMENT — PAIN DESCRIPTION - ORIENTATION
ORIENTATION: RIGHT

## 2023-10-17 ASSESSMENT — PAIN DESCRIPTION - DESCRIPTORS
DESCRIPTORS: SORE;THROBBING
DESCRIPTORS: ACHING;THROBBING
DESCRIPTORS: THROBBING;SORE;BURNING

## 2023-10-17 ASSESSMENT — PAIN DESCRIPTION - LOCATION
LOCATION: LEG;KNEE
LOCATION: KNEE
LOCATION: LEG;KNEE

## 2023-10-17 ASSESSMENT — PAIN SCALES - GENERAL
PAINLEVEL_OUTOF10: 5
PAINLEVEL_OUTOF10: 9
PAINLEVEL_OUTOF10: 5
PAINLEVEL_OUTOF10: 3

## 2023-10-17 NOTE — PLAN OF CARE
Problem: Discharge Planning  Goal: Discharge to home or other facility with appropriate resources  10/17/2023 0416 by Ramya Lockhart RN  Outcome: Progressing     Problem: Pain  Goal: Verbalizes/displays adequate comfort level or baseline comfort level  10/17/2023 1516 by Carmencita Nunez RN  Outcome: Progressing  Note: Assess the location, characteristics, onset, duration, frequency, quality, and severity of pain. Encourage immediate report of pain. Use appropriate pain scale to rate pain. Manage pain using nonpharmacologic/pharmacologic interventions. 10/17/2023 0416 by Ramya Lockhart RN  Outcome: Progressing     Problem: Chronic Conditions and Co-morbidities  Goal: Patient's chronic conditions and co-morbidity symptoms are monitored and maintained or improved  10/17/2023 0416 by Ramya Lockhart RN  Outcome: Progressing     Problem: Safety - Adult  Goal: Free from fall injury  10/17/2023 1516 by Carmencita Nunez RN  Outcome: Progressing  Note: Patient remains free of falls and injuries throughout shift. Bed remains in the lowest position, wheels locked, call light and bedside table are within reach.    10/17/2023 0416 by Ramya Lockhart RN  Outcome: Progressing

## 2023-10-17 NOTE — PLAN OF CARE
Problem: Pain  Goal: Verbalizes/displays adequate comfort level or baseline comfort level  Outcome: Progressing, Patients pain managed with Oxycodone every 4 hours as needed for pain. Problem: Safety - Adult  Goal: Free from fall injury  Outcome: Progressing, Patient remains free of incidence/ injury. Bed remains in low position. Side rails up x2.

## 2023-10-18 LAB
ANION GAP SERPL CALCULATED.3IONS-SCNC: 9 MMOL/L (ref 9–17)
BUN SERPL-MCNC: 37 MG/DL (ref 8–23)
CALCIUM SERPL-MCNC: 8.3 MG/DL (ref 8.6–10.4)
CHLORIDE SERPL-SCNC: 101 MMOL/L (ref 98–107)
CO2 SERPL-SCNC: 27 MMOL/L (ref 20–31)
CREAT SERPL-MCNC: 1.3 MG/DL (ref 0.5–0.9)
GFR SERPL CREATININE-BSD FRML MDRD: 46 ML/MIN/1.73M2
GLUCOSE BLD-MCNC: 115 MG/DL (ref 65–105)
GLUCOSE BLD-MCNC: 129 MG/DL (ref 65–105)
GLUCOSE SERPL-MCNC: 140 MG/DL (ref 70–99)
POTASSIUM SERPL-SCNC: 5.3 MMOL/L (ref 3.7–5.3)
SODIUM SERPL-SCNC: 137 MMOL/L (ref 135–144)

## 2023-10-18 PROCEDURE — 6370000000 HC RX 637 (ALT 250 FOR IP): Performed by: ORTHOPAEDIC SURGERY

## 2023-10-18 PROCEDURE — 99233 SBSQ HOSP IP/OBS HIGH 50: CPT | Performed by: INTERNAL MEDICINE

## 2023-10-18 PROCEDURE — 82947 ASSAY GLUCOSE BLOOD QUANT: CPT

## 2023-10-18 PROCEDURE — G0378 HOSPITAL OBSERVATION PER HR: HCPCS

## 2023-10-18 PROCEDURE — 96374 THER/PROPH/DIAG INJ IV PUSH: CPT

## 2023-10-18 PROCEDURE — 2580000003 HC RX 258: Performed by: INTERNAL MEDICINE

## 2023-10-18 PROCEDURE — 97116 GAIT TRAINING THERAPY: CPT

## 2023-10-18 PROCEDURE — 36415 COLL VENOUS BLD VENIPUNCTURE: CPT

## 2023-10-18 PROCEDURE — 6370000000 HC RX 637 (ALT 250 FOR IP): Performed by: INTERNAL MEDICINE

## 2023-10-18 PROCEDURE — 80048 BASIC METABOLIC PNL TOTAL CA: CPT

## 2023-10-18 PROCEDURE — 96361 HYDRATE IV INFUSION ADD-ON: CPT

## 2023-10-18 PROCEDURE — 97110 THERAPEUTIC EXERCISES: CPT

## 2023-10-18 PROCEDURE — 6360000002 HC RX W HCPCS: Performed by: ORTHOPAEDIC SURGERY

## 2023-10-18 RX ORDER — ROPINIROLE 2 MG/1
2 TABLET, FILM COATED ORAL 3 TIMES DAILY
Status: CANCELLED | OUTPATIENT
Start: 2023-10-18

## 2023-10-18 RX ORDER — LIOTHYRONINE SODIUM 5 UG/1
5 TABLET ORAL DAILY
Qty: 30 TABLET | Refills: 3 | Status: SHIPPED | OUTPATIENT
Start: 2023-10-18

## 2023-10-18 RX ORDER — INSULIN LISPRO 100 [IU]/ML
0-4 INJECTION, SOLUTION INTRAVENOUS; SUBCUTANEOUS NIGHTLY
Qty: 1 EACH | Refills: 0 | Status: SHIPPED | OUTPATIENT
Start: 2023-10-18

## 2023-10-18 RX ORDER — ALBUTEROL SULFATE 90 UG/1
2 AEROSOL, METERED RESPIRATORY (INHALATION) EVERY 4 HOURS PRN
Qty: 18 G | Refills: 3 | Status: SHIPPED | OUTPATIENT
Start: 2023-10-18

## 2023-10-18 RX ORDER — VENLAFAXINE HYDROCHLORIDE 37.5 MG/1
37.5 CAPSULE, EXTENDED RELEASE ORAL DAILY
Qty: 30 CAPSULE | Refills: 3 | Status: SHIPPED | OUTPATIENT
Start: 2023-10-18

## 2023-10-18 RX ORDER — LANOLIN ALCOHOL/MO/W.PET/CERES
200 CREAM (GRAM) TOPICAL DAILY
Qty: 30 TABLET | Refills: 0 | Status: SHIPPED | OUTPATIENT
Start: 2023-10-18

## 2023-10-18 RX ORDER — ASPIRIN 81 MG/1
81 TABLET ORAL 2 TIMES DAILY
Qty: 30 TABLET | Refills: 3 | Status: SHIPPED | OUTPATIENT
Start: 2023-10-18

## 2023-10-18 RX ORDER — PRAMIPEXOLE DIHYDROCHLORIDE 0.5 MG/1
0.5 TABLET ORAL NIGHTLY
Qty: 90 TABLET | Refills: 3 | Status: SHIPPED | OUTPATIENT
Start: 2023-10-18

## 2023-10-18 RX ORDER — LISINOPRIL 5 MG/1
5 TABLET ORAL EVERY MORNING
Qty: 30 TABLET | Refills: 3 | Status: SHIPPED | OUTPATIENT
Start: 2023-10-18 | End: 2023-10-19 | Stop reason: HOSPADM

## 2023-10-18 RX ORDER — BUMETANIDE 1 MG/1
1 TABLET ORAL DAILY
Qty: 30 TABLET | Refills: 3 | Status: SHIPPED | OUTPATIENT
Start: 2023-10-19

## 2023-10-18 RX ORDER — INSULIN GLARGINE 100 [IU]/ML
80 INJECTION, SOLUTION SUBCUTANEOUS 2 TIMES DAILY
Qty: 10 ML | Refills: 3 | Status: SHIPPED | OUTPATIENT
Start: 2023-10-18

## 2023-10-18 RX ORDER — INSULIN LISPRO 100 [IU]/ML
0-16 INJECTION, SOLUTION INTRAVENOUS; SUBCUTANEOUS
Qty: 1 EACH | Refills: 0 | Status: SHIPPED | OUTPATIENT
Start: 2023-10-18

## 2023-10-18 RX ORDER — PANTOPRAZOLE SODIUM 40 MG/1
40 TABLET, DELAYED RELEASE ORAL
Qty: 30 TABLET | Refills: 3 | Status: SHIPPED | OUTPATIENT
Start: 2023-10-19

## 2023-10-18 RX ORDER — OXYBUTYNIN CHLORIDE 15 MG/1
15 TABLET, EXTENDED RELEASE ORAL DAILY
Qty: 30 TABLET | Refills: 3 | Status: SHIPPED | OUTPATIENT
Start: 2023-10-19

## 2023-10-18 RX ORDER — POLYETHYLENE GLYCOL 3350 17 G/17G
17 POWDER, FOR SOLUTION ORAL DAILY
Qty: 527 G | Refills: 1 | Status: SHIPPED | OUTPATIENT
Start: 2023-10-19 | End: 2023-12-20

## 2023-10-18 RX ORDER — FAMOTIDINE 20 MG/1
20 TABLET, FILM COATED ORAL 2 TIMES DAILY
Qty: 60 TABLET | Refills: 3 | Status: SHIPPED | OUTPATIENT
Start: 2023-10-18

## 2023-10-18 RX ORDER — LEVOTHYROXINE SODIUM 112 UG/1
112 TABLET ORAL DAILY
Qty: 30 TABLET | Refills: 3 | Status: SHIPPED | OUTPATIENT
Start: 2023-10-19

## 2023-10-18 RX ORDER — ROPINIROLE 2 MG/1
2 TABLET, FILM COATED ORAL 3 TIMES DAILY
Qty: 90 TABLET | Refills: 3 | Status: SHIPPED | OUTPATIENT
Start: 2023-10-18

## 2023-10-18 RX ORDER — SODIUM CHLORIDE 9 MG/ML
INJECTION, SOLUTION INTRAVENOUS CONTINUOUS
Status: DISCONTINUED | OUTPATIENT
Start: 2023-10-18 | End: 2023-10-19

## 2023-10-18 RX ADMIN — PRAMIPEXOLE DIHYDROCHLORIDE 0.5 MG: 0.25 TABLET ORAL at 21:48

## 2023-10-18 RX ADMIN — FAMOTIDINE 20 MG: 20 TABLET ORAL at 09:44

## 2023-10-18 RX ADMIN — ACETAMINOPHEN 650 MG: 325 TABLET ORAL at 18:36

## 2023-10-18 RX ADMIN — ACETAMINOPHEN 650 MG: 325 TABLET ORAL at 05:30

## 2023-10-18 RX ADMIN — LEVOTHYROXINE SODIUM 112 MCG: 0.11 TABLET ORAL at 06:17

## 2023-10-18 RX ADMIN — OXYCODONE HYDROCHLORIDE 10 MG: 10 TABLET ORAL at 09:45

## 2023-10-18 RX ADMIN — ROPINIROLE 2 MG: 0.5 TABLET, FILM COATED ORAL at 14:32

## 2023-10-18 RX ADMIN — ROPINIROLE 2 MG: 0.5 TABLET, FILM COATED ORAL at 21:47

## 2023-10-18 RX ADMIN — POLYETHYLENE GLYCOL 3350 17 G: 17 POWDER, FOR SOLUTION ORAL at 09:44

## 2023-10-18 RX ADMIN — INSULIN GLARGINE 80 UNITS: 100 INJECTION, SOLUTION SUBCUTANEOUS at 21:47

## 2023-10-18 RX ADMIN — OXYBUTYNIN CHLORIDE 15 MG: 10 TABLET, EXTENDED RELEASE ORAL at 09:44

## 2023-10-18 RX ADMIN — BUMETANIDE 1 MG: 1 TABLET ORAL at 09:44

## 2023-10-18 RX ADMIN — SODIUM CHLORIDE: 9 INJECTION, SOLUTION INTRAVENOUS at 14:21

## 2023-10-18 RX ADMIN — LIOTHYRONINE SODIUM 5 MCG: 5 TABLET ORAL at 10:32

## 2023-10-18 RX ADMIN — ASPIRIN 81 MG: 81 TABLET, COATED ORAL at 09:44

## 2023-10-18 RX ADMIN — PANTOPRAZOLE SODIUM 40 MG: 40 TABLET, DELAYED RELEASE ORAL at 06:17

## 2023-10-18 RX ADMIN — OXYCODONE HYDROCHLORIDE 10 MG: 10 TABLET ORAL at 21:53

## 2023-10-18 RX ADMIN — FAMOTIDINE 20 MG: 20 TABLET ORAL at 21:47

## 2023-10-18 RX ADMIN — Medication 200 MG: at 21:47

## 2023-10-18 RX ADMIN — ROPINIROLE 2 MG: 0.5 TABLET, FILM COATED ORAL at 10:32

## 2023-10-18 RX ADMIN — OXYCODONE HYDROCHLORIDE 10 MG: 10 TABLET ORAL at 05:30

## 2023-10-18 RX ADMIN — ASPIRIN 81 MG: 81 TABLET, COATED ORAL at 21:47

## 2023-10-18 RX ADMIN — INSULIN LISPRO 4 UNITS: 100 INJECTION, SOLUTION INTRAVENOUS; SUBCUTANEOUS at 12:15

## 2023-10-18 RX ADMIN — HYDROMORPHONE HYDROCHLORIDE 0.5 MG: 1 INJECTION, SOLUTION INTRAMUSCULAR; INTRAVENOUS; SUBCUTANEOUS at 14:29

## 2023-10-18 RX ADMIN — VENLAFAXINE HYDROCHLORIDE 37.5 MG: 37.5 CAPSULE, EXTENDED RELEASE ORAL at 10:32

## 2023-10-18 RX ADMIN — INSULIN GLARGINE 80 UNITS: 100 INJECTION, SOLUTION SUBCUTANEOUS at 09:45

## 2023-10-18 RX ADMIN — ACETAMINOPHEN 650 MG: 325 TABLET ORAL at 12:25

## 2023-10-18 RX ADMIN — ACETAMINOPHEN 650 MG: 325 TABLET ORAL at 23:48

## 2023-10-18 ASSESSMENT — PAIN DESCRIPTION - ORIENTATION
ORIENTATION: RIGHT

## 2023-10-18 ASSESSMENT — PAIN DESCRIPTION - LOCATION
LOCATION: LEG
LOCATION: BACK;KNEE
LOCATION: KNEE

## 2023-10-18 ASSESSMENT — PAIN SCALES - GENERAL
PAINLEVEL_OUTOF10: 7
PAINLEVEL_OUTOF10: 8
PAINLEVEL_OUTOF10: 9
PAINLEVEL_OUTOF10: 10
PAINLEVEL_OUTOF10: 8
PAINLEVEL_OUTOF10: 8
PAINLEVEL_OUTOF10: 10

## 2023-10-18 ASSESSMENT — PAIN DESCRIPTION - DESCRIPTORS
DESCRIPTORS: SORE;ACHING
DESCRIPTORS: THROBBING
DESCRIPTORS: ACHING
DESCRIPTORS: CRUSHING
DESCRIPTORS: CRUSHING

## 2023-10-18 ASSESSMENT — PAIN - FUNCTIONAL ASSESSMENT
PAIN_FUNCTIONAL_ASSESSMENT: PREVENTS OR INTERFERES SOME ACTIVE ACTIVITIES AND ADLS
PAIN_FUNCTIONAL_ASSESSMENT: PREVENTS OR INTERFERES SOME ACTIVE ACTIVITIES AND ADLS

## 2023-10-18 NOTE — PLAN OF CARE
Problem: Discharge Planning  Goal: Discharge to home or other facility with appropriate resources  10/18/2023 1730 by Trung Massey RN  Outcome: Progressing     Problem: Pain  Goal: Verbalizes/displays adequate comfort level or baseline comfort level  10/18/2023 1730 by Trung Massey RN  Outcome: Progressing     Problem: Chronic Conditions and Co-morbidities  Goal: Patient's chronic conditions and co-morbidity symptoms are monitored and maintained or improved  10/18/2023 1730 by Trung Massey RN  Outcome: Progressing     Problem: Safety - Adult  Goal: Free from fall injury  10/18/2023 1730 by Trung Massey RN  Outcome: Progressing     Problem: Skin/Tissue Integrity  Goal: Absence of new skin breakdown  10/18/2023 1730 by Trung Massey RN  Outcome: Progressing     Problem: ABCDS Injury Assessment  Goal: Absence of physical injury  10/18/2023 1730 by Trung Massey RN  Outcome: Progressing

## 2023-10-18 NOTE — PLAN OF CARE
Problem: Pain  Goal: Verbalizes/displays adequate comfort level or baseline comfort level  10/18/2023 0331 by Enrique Mathis RN  Outcome: Progressing, patients pain managed with as needed Oxycodone every 4 hours. Problem: Chronic Conditions and Co-morbidities  Goal: Patient's chronic conditions and co-morbidity symptoms are monitored and maintained or improved  Outcome: Progressing  Flowsheets (Taken 10/17/2023 1940)  Care Plan - Patient's Chronic Conditions and Co-Morbidity Symptoms are Monitored and Maintained or Improved: Monitor and assess patient's chronic conditions and comorbid symptoms for stability, deterioration, or improvement         Problem: Skin/Tissue Integrity  Goal: Absence of new skin breakdown  Description: 1. Monitor for areas of redness and/or skin breakdown  2. Assess vascular access sites hourly  3. Every 4-6 hours minimum:  Change oxygen saturation probe site  4. Every 4-6 hours:  If on nasal continuous positive airway pressure, respiratory therapy assess nares and determine need for appliance change or resting period.   Outcome: Progressing

## 2023-10-19 VITALS
TEMPERATURE: 97.7 F | BODY MASS INDEX: 52.87 KG/M2 | HEIGHT: 61 IN | HEART RATE: 81 BPM | WEIGHT: 280 LBS | DIASTOLIC BLOOD PRESSURE: 70 MMHG | SYSTOLIC BLOOD PRESSURE: 133 MMHG | RESPIRATION RATE: 18 BRPM | OXYGEN SATURATION: 90 %

## 2023-10-19 LAB
ANION GAP SERPL CALCULATED.3IONS-SCNC: 7 MMOL/L (ref 9–17)
BUN SERPL-MCNC: 28 MG/DL (ref 8–23)
CALCIUM SERPL-MCNC: 8.5 MG/DL (ref 8.6–10.4)
CHLORIDE SERPL-SCNC: 101 MMOL/L (ref 98–107)
CO2 SERPL-SCNC: 30 MMOL/L (ref 20–31)
CREAT SERPL-MCNC: 0.9 MG/DL (ref 0.5–0.9)
GFR SERPL CREATININE-BSD FRML MDRD: >60 ML/MIN/1.73M2
GLUCOSE BLD-MCNC: 166 MG/DL (ref 65–105)
GLUCOSE BLD-MCNC: 189 MG/DL (ref 65–105)
GLUCOSE BLD-MCNC: 189 MG/DL (ref 65–105)
GLUCOSE BLD-MCNC: 244 MG/DL (ref 65–105)
GLUCOSE SERPL-MCNC: 168 MG/DL (ref 70–99)
POTASSIUM SERPL-SCNC: 4.9 MMOL/L (ref 3.7–5.3)
SODIUM SERPL-SCNC: 138 MMOL/L (ref 135–144)

## 2023-10-19 PROCEDURE — 96376 TX/PRO/DX INJ SAME DRUG ADON: CPT

## 2023-10-19 PROCEDURE — 6360000002 HC RX W HCPCS: Performed by: ORTHOPAEDIC SURGERY

## 2023-10-19 PROCEDURE — 99232 SBSQ HOSP IP/OBS MODERATE 35: CPT | Performed by: INTERNAL MEDICINE

## 2023-10-19 PROCEDURE — 96361 HYDRATE IV INFUSION ADD-ON: CPT

## 2023-10-19 PROCEDURE — G0378 HOSPITAL OBSERVATION PER HR: HCPCS

## 2023-10-19 PROCEDURE — 6370000000 HC RX 637 (ALT 250 FOR IP): Performed by: INTERNAL MEDICINE

## 2023-10-19 PROCEDURE — 82947 ASSAY GLUCOSE BLOOD QUANT: CPT

## 2023-10-19 PROCEDURE — 97110 THERAPEUTIC EXERCISES: CPT

## 2023-10-19 PROCEDURE — 36415 COLL VENOUS BLD VENIPUNCTURE: CPT

## 2023-10-19 PROCEDURE — 80048 BASIC METABOLIC PNL TOTAL CA: CPT

## 2023-10-19 PROCEDURE — 2580000003 HC RX 258: Performed by: INTERNAL MEDICINE

## 2023-10-19 PROCEDURE — 6370000000 HC RX 637 (ALT 250 FOR IP): Performed by: ORTHOPAEDIC SURGERY

## 2023-10-19 PROCEDURE — 97116 GAIT TRAINING THERAPY: CPT

## 2023-10-19 RX ORDER — OXYCODONE HYDROCHLORIDE AND ACETAMINOPHEN 5; 325 MG/1; MG/1
1 TABLET ORAL EVERY 4 HOURS PRN
Qty: 18 TABLET | Refills: 0 | Status: SHIPPED | OUTPATIENT
Start: 2023-10-19 | End: 2023-10-22

## 2023-10-19 RX ADMIN — PANTOPRAZOLE SODIUM 40 MG: 40 TABLET, DELAYED RELEASE ORAL at 06:18

## 2023-10-19 RX ADMIN — OXYBUTYNIN CHLORIDE 15 MG: 10 TABLET, EXTENDED RELEASE ORAL at 08:10

## 2023-10-19 RX ADMIN — SODIUM CHLORIDE: 9 INJECTION, SOLUTION INTRAVENOUS at 04:07

## 2023-10-19 RX ADMIN — ALLOPURINOL 100 MG: 100 TABLET ORAL at 12:15

## 2023-10-19 RX ADMIN — FAMOTIDINE 20 MG: 20 TABLET ORAL at 08:10

## 2023-10-19 RX ADMIN — INSULIN LISPRO 12 UNITS: 100 INJECTION, SOLUTION INTRAVENOUS; SUBCUTANEOUS at 11:26

## 2023-10-19 RX ADMIN — INSULIN GLARGINE 80 UNITS: 100 INJECTION, SOLUTION SUBCUTANEOUS at 08:09

## 2023-10-19 RX ADMIN — LIOTHYRONINE SODIUM 5 MCG: 5 TABLET ORAL at 09:45

## 2023-10-19 RX ADMIN — HYDROMORPHONE HYDROCHLORIDE 0.5 MG: 1 INJECTION, SOLUTION INTRAMUSCULAR; INTRAVENOUS; SUBCUTANEOUS at 08:10

## 2023-10-19 RX ADMIN — OXYCODONE HYDROCHLORIDE 10 MG: 10 TABLET ORAL at 13:41

## 2023-10-19 RX ADMIN — OXYCODONE HYDROCHLORIDE 10 MG: 10 TABLET ORAL at 06:18

## 2023-10-19 RX ADMIN — VENLAFAXINE HYDROCHLORIDE 37.5 MG: 37.5 CAPSULE, EXTENDED RELEASE ORAL at 09:45

## 2023-10-19 RX ADMIN — ACETAMINOPHEN 650 MG: 325 TABLET ORAL at 06:18

## 2023-10-19 RX ADMIN — ACETAMINOPHEN 650 MG: 325 TABLET ORAL at 11:25

## 2023-10-19 RX ADMIN — ASPIRIN 81 MG: 81 TABLET, COATED ORAL at 08:10

## 2023-10-19 RX ADMIN — ROPINIROLE 2 MG: 0.5 TABLET, FILM COATED ORAL at 11:56

## 2023-10-19 RX ADMIN — LEVOTHYROXINE SODIUM 112 MCG: 0.11 TABLET ORAL at 06:18

## 2023-10-19 ASSESSMENT — PAIN DESCRIPTION - ORIENTATION
ORIENTATION: RIGHT

## 2023-10-19 ASSESSMENT — PAIN DESCRIPTION - DESCRIPTORS
DESCRIPTORS: ACHING
DESCRIPTORS: ACHING
DESCRIPTORS: CRUSHING
DESCRIPTORS: ACHING

## 2023-10-19 ASSESSMENT — PAIN DESCRIPTION - LOCATION
LOCATION: LEG
LOCATION: LEG
LOCATION: KNEE
LOCATION: KNEE

## 2023-10-19 ASSESSMENT — PAIN SCALES - GENERAL
PAINLEVEL_OUTOF10: 7
PAINLEVEL_OUTOF10: 8

## 2023-10-19 ASSESSMENT — PAIN - FUNCTIONAL ASSESSMENT: PAIN_FUNCTIONAL_ASSESSMENT: PREVENTS OR INTERFERES SOME ACTIVE ACTIVITIES AND ADLS

## 2023-10-19 NOTE — PLAN OF CARE
Problem: Pain  Goal: Verbalizes/displays adequate comfort level or baseline comfort level  10/19/2023 0359 by Anca Sneed RN  Outcome: Progressing  Patient expresses relief following administration of prn pain medication. Problem: Safety - Adult  Goal: Free from fall injury  10/19/2023 0359 by Anca Sneed RN  Outcome: Progressing   Patient remains free of incidence/ injury. Bed remains in low position. Call light within reach. Side rails up x2.

## 2023-10-19 NOTE — DISCHARGE SUMMARY
Intended supply: 7 days. Take lowest dose possible to manage pain Max Daily Amount: 6 tablets, Disp-18 tablet, R-0Print      !! bumetanide (BUMEX) 1 MG tablet Take 1 tablet by mouth daily, Disp-30 tablet, R-3Normal      !! famotidine (PEPCID) 20 MG tablet Take 1 tablet by mouth 2 times daily, Disp-60 tablet, R-3Normal      !! insulin glargine (LANTUS) 100 UNIT/ML injection vial Inject 80 Units into the skin 2 times daily, Disp-10 mL, R-3Normal      !! liothyronine (CYTOMEL) 5 MCG tablet Take 1 tablet by mouth daily, Disp-30 tablet, R-3Normal      !! levothyroxine (SYNTHROID) 112 MCG tablet Take 1 tablet by mouth Daily, Disp-30 tablet, R-3Normal      !! magnesium oxide (MAG-OX) 400 (240 Mg) MG tablet Take 0.5 tablets by mouth daily, Disp-30 tablet, R-0Normal      !! oxybutynin (DITROPAN XL) 15 MG extended release tablet Take 1 tablet by mouth daily, Disp-30 tablet, R-3Normal      !! pantoprazole (PROTONIX) 40 MG tablet Take 1 tablet by mouth every morning (before breakfast), Disp-30 tablet, R-3Normal      !! pramipexole (MIRAPEX) 0.5 MG tablet Take 1 tablet by mouth nightly, Disp-90 tablet, R-3Normal      !! venlafaxine (EFFEXOR XR) 37.5 MG extended release capsule Take 1 capsule by mouth daily, Disp-30 capsule, R-3Normal      !! albuterol sulfate HFA (VENTOLIN HFA) 108 (90 Base) MCG/ACT inhaler Inhale 2 puffs into the lungs every 4 hours as needed for Wheezing, Disp-18 g, R-3Normal      !! rOPINIRole (REQUIP) 2 MG tablet Take 1 tablet by mouth 3 times daily, Disp-90 tablet, R-3Normal      aspirin 81 MG chewable tablet Take 1 tablet by mouth 2 times daily at 0800 and 1400 Chew and swallow 1 tablet by mouth once daily, Disp-150 tablet, R-1Normal       !! - Potential duplicate medications found. Please discuss with provider.         CONTINUE these medications which have NOT CHANGED    Details   !! rOPINIRole (REQUIP) 2 MG tablet TAKE 1 TABLET BY MOUTH THREE TIMES DAILY, Disp-90 tablet, R-0Normal      !! famotidine

## 2023-10-19 NOTE — PLAN OF CARE
Problem: Discharge Planning  Goal: Discharge to home or other facility with appropriate resources  10/19/2023 1347 by Angelito Howe RN  Outcome: Completed     Problem: Pain  Goal: Verbalizes/displays adequate comfort level or baseline comfort level  10/19/2023 1347 by Angelito Howe RN  Outcome: Completed     Problem: Chronic Conditions and Co-morbidities  Goal: Patient's chronic conditions and co-morbidity symptoms are monitored and maintained or improved  10/19/2023 1347 by Angelito Howe RN  Outcome: Completed     Problem: Safety - Adult  Goal: Free from fall injury  10/19/2023 1347 by Angelito Howe RN  Outcome: Completed     Problem: Skin/Tissue Integrity  Goal: Absence of new skin breakdown  10/19/2023 1347 by Angelito Howe RN  Outcome: Completed     Problem: ABCDS Injury Assessment  Goal: Absence of physical injury  10/19/2023 1347 by Angelito Howe RN  Outcome: Completed

## 2023-10-19 NOTE — PLAN OF CARE
Problem: Discharge Planning  Goal: Discharge to home or other facility with appropriate resources  Outcome: Progressing     Problem: Pain  Goal: Verbalizes/displays adequate comfort level or baseline comfort level  10/19/2023 1149 by Pamela Streeter RN  Outcome: Progressing     Problem: Chronic Conditions and Co-morbidities  Goal: Patient's chronic conditions and co-morbidity symptoms are monitored and maintained or improved  Outcome: Progressing     Problem: Safety - Adult  Goal: Free from fall injury  10/19/2023 1149 by Pamela Streeter RN  Outcome: Progressing     Problem: Skin/Tissue Integrity  Goal: Absence of new skin breakdown  Outcome: Progressing     Problem: ABCDS Injury Assessment  Goal: Absence of physical injury  Outcome: Progressing

## 2023-10-23 ENCOUNTER — HOSPITAL ENCOUNTER (OUTPATIENT)
Age: 63
Setting detail: SPECIMEN
Discharge: HOME | End: 2023-10-23

## 2023-10-23 ENCOUNTER — TELEPHONE (OUTPATIENT)
Dept: ORTHOPEDIC SURGERY | Age: 63
End: 2023-10-23

## 2023-10-23 LAB
ANION GAP SERPL CALCULATED.3IONS-SCNC: 9 MMOL/L (ref 9–16)
BUN SERPL-MCNC: 28 MG/DL (ref 8–23)
CALCIUM SERPL-MCNC: 8.9 MG/DL (ref 8.6–10.4)
CHLORIDE SERPL-SCNC: 98 MMOL/L (ref 98–107)
CO2 SERPL-SCNC: 31 MMOL/L (ref 20–31)
CREAT SERPL-MCNC: 1 MG/DL (ref 0.5–0.9)
ERYTHROCYTE [DISTWIDTH] IN BLOOD BY AUTOMATED COUNT: 14.5 % (ref 11.8–14.4)
GFR SERPL CREATININE-BSD FRML MDRD: >60 ML/MIN/1.73M2
GLUCOSE BLD-MCNC: 309 MG/DL (ref 65–105)
GLUCOSE SERPL-MCNC: 268 MG/DL (ref 74–99)
HCT VFR BLD AUTO: 31.3 % (ref 36.3–47.1)
HGB BLD-MCNC: 9.6 G/DL (ref 11.9–15.1)
MCH RBC QN AUTO: 29.3 PG (ref 25.2–33.5)
MCHC RBC AUTO-ENTMCNC: 30.7 G/DL (ref 28.4–34.8)
MCV RBC AUTO: 95.4 FL (ref 82.6–102.9)
NRBC BLD-RTO: 0 PER 100 WBC
PLATELET # BLD AUTO: 323 K/UL (ref 138–453)
PMV BLD AUTO: 10.2 FL (ref 8.1–13.5)
POTASSIUM SERPL-SCNC: 4.3 MMOL/L (ref 3.7–5.3)
RBC # BLD AUTO: 3.28 M/UL (ref 3.95–5.11)
SODIUM SERPL-SCNC: 138 MMOL/L (ref 136–145)
WBC OTHER # BLD: 10.9 K/UL (ref 3.5–11.3)

## 2023-10-23 PROCEDURE — P9603 ONE-WAY ALLOW PRORATED MILES: HCPCS

## 2023-10-23 PROCEDURE — 80048 BASIC METABOLIC PNL TOTAL CA: CPT

## 2023-10-23 PROCEDURE — 85027 COMPLETE CBC AUTOMATED: CPT

## 2023-10-23 PROCEDURE — 36415 COLL VENOUS BLD VENIPUNCTURE: CPT

## 2023-10-23 NOTE — TELEPHONE ENCOUNTER
The patient's  called in to day stating that there is blistering and warmth around the incision. Right TKA on 10/16/23.       Please advise

## 2023-10-25 NOTE — TELEPHONE ENCOUNTER
LM on VM that blistering is not uncommon and that she is on oral antibiotics so we would see her at her next scheduled appt

## 2023-10-27 ENCOUNTER — TELEPHONE (OUTPATIENT)
Dept: ORTHOPEDIC SURGERY | Age: 63
End: 2023-10-27

## 2023-10-27 ENCOUNTER — APPOINTMENT (OUTPATIENT)
Dept: VASCULAR LAB | Age: 63
End: 2023-10-27
Attending: EMERGENCY MEDICINE
Payer: COMMERCIAL

## 2023-10-27 ENCOUNTER — HOSPITAL ENCOUNTER (EMERGENCY)
Age: 63
Discharge: HOME | End: 2023-10-27
Attending: EMERGENCY MEDICINE
Payer: COMMERCIAL

## 2023-10-27 VITALS
RESPIRATION RATE: 16 BRPM | OXYGEN SATURATION: 93 % | HEART RATE: 93 BPM | SYSTOLIC BLOOD PRESSURE: 111 MMHG | HEIGHT: 61 IN | WEIGHT: 280 LBS | BODY MASS INDEX: 52.87 KG/M2 | DIASTOLIC BLOOD PRESSURE: 53 MMHG | TEMPERATURE: 99.2 F

## 2023-10-27 DIAGNOSIS — G89.29 CHRONIC PAIN OF RIGHT KNEE: Primary | ICD-10-CM

## 2023-10-27 DIAGNOSIS — M25.561 CHRONIC PAIN OF RIGHT KNEE: Primary | ICD-10-CM

## 2023-10-27 DIAGNOSIS — L53.9 LEG ERYTHEMA: ICD-10-CM

## 2023-10-27 LAB
ALBUMIN SERPL-MCNC: 3.5 G/DL (ref 3.5–5.2)
ALP SERPL-CCNC: 106 U/L (ref 35–104)
ALT SERPL-CCNC: 12 U/L (ref 5–33)
ANION GAP SERPL CALCULATED.3IONS-SCNC: 12 MMOL/L (ref 9–17)
AST SERPL-CCNC: 14 U/L
BASOPHILS # BLD: 0.1 K/UL (ref 0–0.2)
BASOPHILS NFR BLD: 1 % (ref 0–2)
BILIRUB SERPL-MCNC: 0.3 MG/DL (ref 0.3–1.2)
BUN SERPL-MCNC: 23 MG/DL (ref 8–23)
CALCIUM SERPL-MCNC: 8.9 MG/DL (ref 8.6–10.4)
CHLORIDE SERPL-SCNC: 96 MMOL/L (ref 98–107)
CO2 SERPL-SCNC: 29 MMOL/L (ref 20–31)
CREAT SERPL-MCNC: 1 MG/DL (ref 0.5–0.9)
CRP SERPL HS-MCNC: 52.1 MG/L (ref 0–5)
D DIMER PPP FEU-MCNC: 3.47 UG/ML FEU (ref 0–0.59)
ECHO BSA: 2.34 M2
EOSINOPHIL # BLD: 0.29 K/UL (ref 0–0.4)
EOSINOPHILS RELATIVE PERCENT: 3 % (ref 0–4)
ERYTHROCYTE [DISTWIDTH] IN BLOOD BY AUTOMATED COUNT: 15.1 % (ref 11.5–14.9)
ERYTHROCYTE [SEDIMENTATION RATE] IN BLOOD BY PHOTOMETRIC METHOD: 32 MM/HR (ref 0–30)
GFR SERPL CREATININE-BSD FRML MDRD: >60 ML/MIN/1.73M2
GLUCOSE SERPL-MCNC: 242 MG/DL (ref 70–99)
HCT VFR BLD AUTO: 30 % (ref 36–46)
HGB BLD-MCNC: 9.9 G/DL (ref 12–16)
LYMPHOCYTES NFR BLD: 1.57 K/UL (ref 1–4.8)
LYMPHOCYTES RELATIVE PERCENT: 16 % (ref 24–44)
MCH RBC QN AUTO: 29.9 PG (ref 26–34)
MCHC RBC AUTO-ENTMCNC: 33.1 G/DL (ref 31–37)
MCV RBC AUTO: 90.4 FL (ref 80–100)
MONOCYTES NFR BLD: 0.69 K/UL (ref 0.1–1.3)
MONOCYTES NFR BLD: 7 % (ref 1–7)
MORPHOLOGY: ABNORMAL
NEUTROPHILS NFR BLD: 73 % (ref 36–66)
NEUTS SEG NFR BLD: 7.15 K/UL (ref 1.3–9.1)
PLATELET # BLD AUTO: 326 K/UL (ref 150–450)
PMV BLD AUTO: 7.3 FL (ref 6–12)
POTASSIUM SERPL-SCNC: 4.1 MMOL/L (ref 3.7–5.3)
PROT SERPL-MCNC: 6.2 G/DL (ref 6.4–8.3)
RBC # BLD AUTO: 3.32 M/UL (ref 4–5.2)
SODIUM SERPL-SCNC: 137 MMOL/L (ref 135–144)
WBC OTHER # BLD: 9.8 K/UL (ref 3.5–11)

## 2023-10-27 PROCEDURE — 85379 FIBRIN DEGRADATION QUANT: CPT

## 2023-10-27 PROCEDURE — 93970 EXTREMITY STUDY: CPT | Performed by: SURGERY

## 2023-10-27 PROCEDURE — 85025 COMPLETE CBC W/AUTO DIFF WBC: CPT

## 2023-10-27 PROCEDURE — 85652 RBC SED RATE AUTOMATED: CPT

## 2023-10-27 PROCEDURE — 86140 C-REACTIVE PROTEIN: CPT

## 2023-10-27 PROCEDURE — 99284 EMERGENCY DEPT VISIT MOD MDM: CPT

## 2023-10-27 PROCEDURE — 6370000000 HC RX 637 (ALT 250 FOR IP): Performed by: EMERGENCY MEDICINE

## 2023-10-27 PROCEDURE — 93970 EXTREMITY STUDY: CPT

## 2023-10-27 PROCEDURE — 80053 COMPREHEN METABOLIC PANEL: CPT

## 2023-10-27 PROCEDURE — 36415 COLL VENOUS BLD VENIPUNCTURE: CPT

## 2023-10-27 RX ORDER — OXYCODONE HYDROCHLORIDE AND ACETAMINOPHEN 5; 325 MG/1; MG/1
1 TABLET ORAL ONCE
Status: COMPLETED | OUTPATIENT
Start: 2023-10-27 | End: 2023-10-27

## 2023-10-27 RX ADMIN — OXYCODONE AND ACETAMINOPHEN 1 TABLET: 325; 5 TABLET ORAL at 11:29

## 2023-10-27 ASSESSMENT — PAIN DESCRIPTION - LOCATION: LOCATION: KNEE

## 2023-10-27 ASSESSMENT — LIFESTYLE VARIABLES
HOW OFTEN DO YOU HAVE A DRINK CONTAINING ALCOHOL: NEVER
HOW MANY STANDARD DRINKS CONTAINING ALCOHOL DO YOU HAVE ON A TYPICAL DAY: PATIENT DOES NOT DRINK

## 2023-10-27 ASSESSMENT — PAIN DESCRIPTION - ORIENTATION: ORIENTATION: RIGHT

## 2023-10-27 ASSESSMENT — PAIN SCALES - GENERAL
PAINLEVEL_OUTOF10: 8
PAINLEVEL_OUTOF10: 7

## 2023-10-27 ASSESSMENT — PAIN - FUNCTIONAL ASSESSMENT: PAIN_FUNCTIONAL_ASSESSMENT: 0-10

## 2023-10-27 NOTE — TELEPHONE ENCOUNTER
Pt called telling me that she cant keep her appt for Monday because she went to the ER today. I rescheduled her for Alisa Huggins on Tuesday the 31st because she has stitches in and her  insisted he couldn't bring her on the 35th. In the mean time she would like percocet sent to her pharmacy. She said she never received any pain meds from you after surgery or from the ER physician and she is in a lot of pain.
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
## 2023-10-27 NOTE — TELEPHONE ENCOUNTER
Pt calls in stating that two nurses at her facility have stated that they are concerned for blood clot. Pt is 11 days SP R TKA. Pt reports both legs are swollen and R leg is red, painful, and hot to touch. Pt denies any difficulty breathing or SOB. Pt advised to go to nearest ER to r/o DVT and blood clot. Pt voices understanding.

## 2023-10-31 ENCOUNTER — TELEPHONE (OUTPATIENT)
Dept: ORTHOPEDIC SURGERY | Age: 63
End: 2023-10-31

## 2023-10-31 ENCOUNTER — OFFICE VISIT (OUTPATIENT)
Dept: ORTHOPEDIC SURGERY | Age: 63
End: 2023-10-31

## 2023-10-31 VITALS — BODY MASS INDEX: 52.87 KG/M2 | WEIGHT: 280 LBS | HEIGHT: 61 IN

## 2023-10-31 DIAGNOSIS — Z96.651 S/P TOTAL KNEE ARTHROPLASTY, RIGHT: Primary | ICD-10-CM

## 2023-10-31 PROCEDURE — 99024 POSTOP FOLLOW-UP VISIT: CPT | Performed by: PHYSICIAN ASSISTANT

## 2023-10-31 RX ORDER — OXYCODONE HYDROCHLORIDE AND ACETAMINOPHEN 5; 325 MG/1; MG/1
1 TABLET ORAL EVERY 6 HOURS PRN
Qty: 28 TABLET | Refills: 0 | Status: SHIPPED | OUTPATIENT
Start: 2023-10-31 | End: 2023-11-07

## 2023-10-31 NOTE — TELEPHONE ENCOUNTER
Pt called in stating she forgot to mention at her appt today that her oxygen levels have been down and is wanting to know if she can get a prescription for oxygen

## 2023-10-31 NOTE — PROGRESS NOTES
Patient is alert and oriented to person, place, and time. Normal strenght. No sensory deficit. Skin: Skin is warm and dry. Incision is healing well without signs of redness. Distalmost aspect of the incision measuring approximately 1 cm is draining serosanguineous fluid but appears to be well approximated no significant openings. Steri-Strips are applied the wound and drainage is controlled. A new postoperative dressing is applied due to concern for recurrent drainage. Nursing note and vitals reviewed. Labs and Imaging:     XR taken today:  No results found. X-rays taken in clinic today on 10/31/2023 available for independent review  AP bilateral knees standing lateral view of the right knee demonstrates patient status post right total knee arthroplasty. Components appear in appropriate position without evidence of hardware complication. Left knee is visualized with severe medial compartmental narrowing. Orders Placed This Encounter   Procedures    XR KNEE RIGHT (1-2 VIEWS)     Standing Status:   Future     Number of Occurrences:   1     Standing Expiration Date:   10/31/2024    External Referral To Physical Therapy     Referral Priority:   Routine     Referral Type:   Eval and Treat     Referral Reason:   Specialty Services Required     Requested Specialty:   Physical Therapist     Number of Visits Requested:   1       Assessment and Plan:  1. S/P total knee arthroplasty, right        2 weeks status post right TKA        This is a 58 y.o. female who presents to the clinic today status post right TKA. Continue anticoagulation. Transition to outpatient Pt. she did have some drainage to the distalmost aspect of the incision which was controlled during visit today. No concern for infection as this is nonpurulent nonodorous drainage.   A Aquacel dressing is applied for an absorbent dressing recommend follow-up in 1 week for reevaluation however patient may call it in sooner for any questions or

## 2023-11-01 NOTE — TELEPHONE ENCOUNTER
If patient is chronically on O2 would advise this come from PCP or Pulm. If O2 is truly low patient should be seen in ED. Would not prescribe oxygen from an orthopedic standpoint.

## 2023-11-02 NOTE — TELEPHONE ENCOUNTER
Informed pt we cant order oxygen for her and that she should call her PCP for that, she gave good understanding

## 2023-11-03 RX ORDER — PEN NEEDLE, DIABETIC 29 G X1/2"
NEEDLE, DISPOSABLE MISCELLANEOUS
Qty: 100 EACH | Refills: 0 | Status: SHIPPED | OUTPATIENT
Start: 2023-11-03

## 2023-11-07 ENCOUNTER — OFFICE VISIT (OUTPATIENT)
Dept: ORTHOPEDIC SURGERY | Age: 63
End: 2023-11-07

## 2023-11-07 VITALS — WEIGHT: 280 LBS | BODY MASS INDEX: 52.87 KG/M2 | HEIGHT: 61 IN

## 2023-11-07 DIAGNOSIS — Z96.651 S/P TOTAL KNEE ARTHROPLASTY, RIGHT: ICD-10-CM

## 2023-11-07 PROCEDURE — 99024 POSTOP FOLLOW-UP VISIT: CPT | Performed by: PHYSICIAN ASSISTANT

## 2023-11-07 RX ORDER — OXYCODONE HYDROCHLORIDE AND ACETAMINOPHEN 5; 325 MG/1; MG/1
1 TABLET ORAL EVERY 6 HOURS PRN
Qty: 28 TABLET | Refills: 0 | Status: SHIPPED | OUTPATIENT
Start: 2023-11-07 | End: 2023-11-14

## 2023-11-07 NOTE — PROGRESS NOTES
[facility], [address] [place]           Dept Phone: [phone]           Dept Fax:  [phone] [address]          Dept Phone: [phone]           Dept Fax:  [phone]      Chief Compliant:  Chief Complaint   Patient presents with    Post-Op Check     Right tka        History of Present Illness:  Ms. Feliz Schrader is a 80-year-old female returns today for postoperative really evaluation status post right total knee arthroplasty on 10/16/2023. Patient seen by me last week on 10/31/2023 incision overall looked good but today she did have some serosanguineous drainage coming out of the distal aspect of the incision. Dressing was replaced at that time patient returns today for reevaluation and wound check. Patient reports she had some drainage couple days after the appointment with me and ended up putting on a new dressing on this past Friday which she reports has remained in since then she did note some drainage on it but significantly improving. Upon dressing removal there is no drainage coming from the wound. She does continue to note moderate pain but does admit she has not gotten into any formal physical therapy since the surgery occurred. Review of Systems   Constitutional: Negative for fever, chills, sweats, recent injury, recent illness  Neurological: Negative for Headaches, numbness, or weakness. Integumentary: Negative for rash, itching, ecchymosis, or wounds. Musculoskeletal: Positive for Post-Op Check (Right tka)       Physical Exam:  Constitutional: Patient is oriented to person, place, and time. Patient appears well-developed and well nourished. Musculoskeletal:    Right Knee    Gait:  . Incision:  Well healed without any incisional erythema.   Distalmost portion of the

## 2023-11-09 RX ORDER — ALLOPURINOL 100 MG/1
TABLET ORAL
Qty: 90 TABLET | Refills: 3 | Status: SHIPPED | OUTPATIENT
Start: 2023-11-09

## 2023-11-27 RX ORDER — LISINOPRIL 5 MG/1
5 TABLET ORAL EVERY MORNING
Qty: 30 TABLET | Refills: 5 | OUTPATIENT
Start: 2023-11-27

## 2023-11-30 ENCOUNTER — OFFICE VISIT (OUTPATIENT)
Dept: ORTHOPEDIC SURGERY | Age: 63
End: 2023-11-30

## 2023-11-30 DIAGNOSIS — Z96.651 S/P TOTAL KNEE ARTHROPLASTY, RIGHT: Primary | ICD-10-CM

## 2023-11-30 PROCEDURE — 99024 POSTOP FOLLOW-UP VISIT: CPT | Performed by: ORTHOPAEDIC SURGERY

## 2023-11-30 NOTE — PROGRESS NOTES
Darnell Murphy returns today she is status post right total knee arthroplasty on 10/16/2023. She has seen Shantal Castaneda in a couple occasions. She has had couple small little wound superficial ulcerations that have had scant drainage. She had been on antibiotics and these drainage has come down to almost nothing. She has been cut out lax in her physical therapy and she states that she is did not want to have any the 8 wound issues while doing therapy    Physical examination notes patient surprisingly has maintained good motion despite the lack of therapy although she does exercises at home she has full extension and can easily get her to 110 degrees in regards to her incision she has 1 area at the inferior aspect of the wound its about the size of the pencil eraser tip the has just scant serous drainage there is no purulence or redness whatsoever and she has a similar 1 near the top of her incision the incision in between otherwise is pristine. She has no cellulitis of the leg and no calf tenderness and a negative Homans.     Impression  Status post right total knee 10/16/2023  2 small superficial ulcerations does not appear to be infectious per se    Plan  Because of this is ongoing I like to get the patient in the wound care to have these finally closed off she has pretty much pain-free range of motion I am not very concerned about a deep infection here although I do wish to be aggressive in getting these to be healed I would like to recheck her back in 2 weeks

## 2023-12-01 PROBLEM — J84.10 PULMONARY FIBROSIS, UNSPECIFIED (HCC): Status: ACTIVE | Noted: 2023-10-19

## 2023-12-01 PROBLEM — M62.81 MUSCLE WEAKNESS (GENERALIZED): Status: ACTIVE | Noted: 2023-10-19

## 2023-12-01 PROBLEM — R26.89 OTHER ABNORMALITIES OF GAIT AND MOBILITY: Status: ACTIVE | Noted: 2023-10-19

## 2023-12-01 PROBLEM — R48.8 OTHER SYMBOLIC DYSFUNCTIONS: Status: ACTIVE | Noted: 2023-10-19

## 2023-12-01 PROBLEM — H25.10 AGE-RELATED NUCLEAR CATARACT, UNSPECIFIED EYE: Status: ACTIVE | Noted: 2023-10-19

## 2023-12-01 RX ORDER — SULFAMETHOXAZOLE AND TRIMETHOPRIM 800; 160 MG/1; MG/1
TABLET ORAL
COMMUNITY
Start: 2023-10-20 | End: 2023-12-04 | Stop reason: ALTCHOICE

## 2023-12-01 RX ORDER — DOXYCYCLINE 100 MG/1
CAPSULE ORAL
COMMUNITY
Start: 2023-10-20 | End: 2023-12-04 | Stop reason: ALTCHOICE

## 2023-12-01 RX ORDER — CYCLOBENZAPRINE HCL 10 MG
TABLET ORAL
COMMUNITY
Start: 2023-10-20 | End: 2023-12-04

## 2023-12-04 ENCOUNTER — OFFICE VISIT (OUTPATIENT)
Dept: FAMILY MEDICINE CLINIC | Age: 63
End: 2023-12-04
Payer: COMMERCIAL

## 2023-12-04 VITALS
BODY MASS INDEX: 54.68 KG/M2 | TEMPERATURE: 98.4 F | OXYGEN SATURATION: 91 % | SYSTOLIC BLOOD PRESSURE: 136 MMHG | HEIGHT: 60 IN | HEART RATE: 81 BPM | DIASTOLIC BLOOD PRESSURE: 86 MMHG

## 2023-12-04 DIAGNOSIS — Z12.11 SCREENING FOR COLON CANCER: ICD-10-CM

## 2023-12-04 DIAGNOSIS — G25.81 RLS (RESTLESS LEGS SYNDROME): ICD-10-CM

## 2023-12-04 DIAGNOSIS — R42 DIZZINESS: ICD-10-CM

## 2023-12-04 DIAGNOSIS — J84.10 PULMONARY FIBROSIS, UNSPECIFIED (HCC): ICD-10-CM

## 2023-12-04 DIAGNOSIS — E11.40 TYPE 2 DIABETES MELLITUS WITH DIABETIC NEUROPATHY, WITH LONG-TERM CURRENT USE OF INSULIN (HCC): Primary | ICD-10-CM

## 2023-12-04 DIAGNOSIS — E55.9 VITAMIN D DEFICIENCY: ICD-10-CM

## 2023-12-04 DIAGNOSIS — I10 ESSENTIAL HYPERTENSION: Chronic | ICD-10-CM

## 2023-12-04 DIAGNOSIS — Z12.31 ENCOUNTER FOR SCREENING MAMMOGRAM FOR BREAST CANCER: ICD-10-CM

## 2023-12-04 DIAGNOSIS — Z79.4 TYPE 2 DIABETES MELLITUS WITH DIABETIC NEUROPATHY, WITH LONG-TERM CURRENT USE OF INSULIN (HCC): Primary | ICD-10-CM

## 2023-12-04 DIAGNOSIS — R06.02 SHORTNESS OF BREATH: ICD-10-CM

## 2023-12-04 DIAGNOSIS — Z12.4 PAP SMEAR FOR CERVICAL CANCER SCREENING: ICD-10-CM

## 2023-12-04 DIAGNOSIS — G47.33 OSA (OBSTRUCTIVE SLEEP APNEA): Chronic | ICD-10-CM

## 2023-12-04 DIAGNOSIS — E03.9 HYPOTHYROIDISM, UNSPECIFIED TYPE: ICD-10-CM

## 2023-12-04 DIAGNOSIS — E78.1 HYPERTRIGLYCERIDEMIA: ICD-10-CM

## 2023-12-04 DIAGNOSIS — Z11.59 SCREENING FOR VIRAL DISEASE: ICD-10-CM

## 2023-12-04 PROCEDURE — 1036F TOBACCO NON-USER: CPT | Performed by: NURSE PRACTITIONER

## 2023-12-04 PROCEDURE — G8417 CALC BMI ABV UP PARAM F/U: HCPCS | Performed by: NURSE PRACTITIONER

## 2023-12-04 PROCEDURE — 3079F DIAST BP 80-89 MM HG: CPT | Performed by: NURSE PRACTITIONER

## 2023-12-04 PROCEDURE — 99215 OFFICE O/P EST HI 40 MIN: CPT | Performed by: NURSE PRACTITIONER

## 2023-12-04 PROCEDURE — 2022F DILAT RTA XM EVC RTNOPTHY: CPT | Performed by: NURSE PRACTITIONER

## 2023-12-04 PROCEDURE — G8427 DOCREV CUR MEDS BY ELIG CLIN: HCPCS | Performed by: NURSE PRACTITIONER

## 2023-12-04 PROCEDURE — 3075F SYST BP GE 130 - 139MM HG: CPT | Performed by: NURSE PRACTITIONER

## 2023-12-04 PROCEDURE — G8484 FLU IMMUNIZE NO ADMIN: HCPCS | Performed by: NURSE PRACTITIONER

## 2023-12-04 PROCEDURE — A9270 NON-COVERED ITEM OR SERVICE: HCPCS | Performed by: NURSE PRACTITIONER

## 2023-12-04 PROCEDURE — 3017F COLORECTAL CA SCREEN DOC REV: CPT | Performed by: NURSE PRACTITIONER

## 2023-12-04 PROCEDURE — 3051F HG A1C>EQUAL 7.0%<8.0%: CPT | Performed by: NURSE PRACTITIONER

## 2023-12-04 RX ORDER — MECLIZINE HYDROCHLORIDE 25 MG/1
25 TABLET ORAL 3 TIMES DAILY PRN
Qty: 30 TABLET | Refills: 0 | Status: SHIPPED | OUTPATIENT
Start: 2023-12-04

## 2023-12-04 SDOH — ECONOMIC STABILITY: INCOME INSECURITY: HOW HARD IS IT FOR YOU TO PAY FOR THE VERY BASICS LIKE FOOD, HOUSING, MEDICAL CARE, AND HEATING?: NOT HARD AT ALL

## 2023-12-04 SDOH — ECONOMIC STABILITY: FOOD INSECURITY: WITHIN THE PAST 12 MONTHS, YOU WORRIED THAT YOUR FOOD WOULD RUN OUT BEFORE YOU GOT MONEY TO BUY MORE.: NEVER TRUE

## 2023-12-04 SDOH — ECONOMIC STABILITY: FOOD INSECURITY: WITHIN THE PAST 12 MONTHS, THE FOOD YOU BOUGHT JUST DIDN'T LAST AND YOU DIDN'T HAVE MONEY TO GET MORE.: NEVER TRUE

## 2023-12-04 ASSESSMENT — PATIENT HEALTH QUESTIONNAIRE - PHQ9
7. TROUBLE CONCENTRATING ON THINGS, SUCH AS READING THE NEWSPAPER OR WATCHING TELEVISION: 0
9. THOUGHTS THAT YOU WOULD BE BETTER OFF DEAD, OR OF HURTING YOURSELF: 0
3. TROUBLE FALLING OR STAYING ASLEEP: 1
1. LITTLE INTEREST OR PLEASURE IN DOING THINGS: 0
6. FEELING BAD ABOUT YOURSELF - OR THAT YOU ARE A FAILURE OR HAVE LET YOURSELF OR YOUR FAMILY DOWN: 0
2. FEELING DOWN, DEPRESSED OR HOPELESS: 1
SUM OF ALL RESPONSES TO PHQ9 QUESTIONS 1 & 2: 1
5. POOR APPETITE OR OVEREATING: 0
8. MOVING OR SPEAKING SO SLOWLY THAT OTHER PEOPLE COULD HAVE NOTICED. OR THE OPPOSITE, BEING SO FIGETY OR RESTLESS THAT YOU HAVE BEEN MOVING AROUND A LOT MORE THAN USUAL: 0
10. IF YOU CHECKED OFF ANY PROBLEMS, HOW DIFFICULT HAVE THESE PROBLEMS MADE IT FOR YOU TO DO YOUR WORK, TAKE CARE OF THINGS AT HOME, OR GET ALONG WITH OTHER PEOPLE: 1
SUM OF ALL RESPONSES TO PHQ QUESTIONS 1-9: 3
4. FEELING TIRED OR HAVING LITTLE ENERGY: 1
SUM OF ALL RESPONSES TO PHQ QUESTIONS 1-9: 3

## 2023-12-04 ASSESSMENT — ENCOUNTER SYMPTOMS
ABDOMINAL PAIN: 0
CHEST TIGHTNESS: 0
SHORTNESS OF BREATH: 1
NAUSEA: 0
BLOOD IN STOOL: 0
ABDOMINAL DISTENTION: 0
DIARRHEA: 0
COUGH: 0
WHEEZING: 0
BACK PAIN: 0
VOMITING: 0
CONSTIPATION: 0

## 2023-12-04 ASSESSMENT — COLUMBIA-SUICIDE SEVERITY RATING SCALE - C-SSRS
5. HAVE YOU STARTED TO WORK OUT OR WORKED OUT THE DETAILS OF HOW TO KILL YOURSELF? DO YOU INTEND TO CARRY OUT THIS PLAN?: NO
4. HAVE YOU HAD THESE THOUGHTS AND HAD SOME INTENTION OF ACTING ON THEM?: NO
3. HAVE YOU BEEN THINKING ABOUT HOW YOU MIGHT KILL YOURSELF?: NO
7. DID THIS OCCUR IN THE LAST THREE MONTHS: NO

## 2023-12-04 NOTE — PROGRESS NOTES
Visit Information    Have you changed or started any medications since your last visit including any over-the-counter medicines, vitamins, or herbal medicines? no   Have you stopped taking any of your medications? Is so, why? -  no  Are you having any side effects from any of your medications? - no    Have you seen any other physician or provider since your last visit?  no   Have you had any other diagnostic tests since your last visit?  no   Have you been seen in the emergency room and/or had an admission in a hospital since we last saw you?  no   Have you had your routine dental cleaning in the past 6 months?  no     Do you have an active MyChart account? If no, what is the barrier?   Yes    Patient Care Team:  YOLANDA Tompkins CNP as PCP - General (Nurse Practitioner)  YOLANDA Millard NP as PCP - Empaneled Provider  Rivera Ross MD as Orthopedic Surgeon (Orthopedic Surgery)  Ty Dyson MD as Surgeon (Cardiology)  YOLANDA Chau CNP (Family Medicine)  Arvel Lennox, MD as Consulting Physician (Gastroenterology)    Medical History Review  Past Medical, Family, and Social History reviewed and does contribute to the patient presenting condition    Health Maintenance   Topic Date Due    COVID-19 Vaccine (1) Never done    DTaP/Tdap/Td vaccine (1 - Tdap) Never done    Shingles vaccine (1 of 2) Never done    Breast cancer screen  09/16/2018    Hepatitis B vaccine (1 of 3 - Risk 3-dose series) Never done    Respiratory Syncytial Virus (RSV) age 61 yrs+ (3 - 1-dose 60+ series) Never done    Diabetic foot exam  01/13/2021    Cervical cancer screen  04/24/2022    Colorectal Cancer Screen  10/12/2022    Diabetic Alb to Cr ratio (uACR) test  04/07/2023    Flu vaccine (1) Never done    Pneumococcal 0-64 years Vaccine (1 - PCV) 03/27/2029 (Originally 12/19/1966)    Lipids  01/06/2024    Depression Monitoring  06/20/2024    Diabetic retinal exam  08/28/2024    A1C test (Diabetic or Prediabetic)

## 2023-12-04 NOTE — PROGRESS NOTES
Ruben Pedraza (:  1960) is a 58 y.o. female,New patient, here for evaluation of the following chief complaint(s): New Patient, Diabetes (TYPE 2 ), Immunizations (No to all vaccines ), and Discuss Medications (Blood pressure med /Oxygen )      ASSESSMENT/PLAN:    Radha Talavera received counseling on the following healthy behaviors: nutrition, exercise, and medication adherence  Reviewed prior labs and health maintenance  Discussed use, benefit, and side effects of prescribed medications. Barriers to medication compliance addressed. Patient given educational materials - see patient instructions  All patient questions answered. Patient voiced understanding. The patient's past medical,surgical, social, and family history as well as her current medications and allergies were reviewed as documented in today's encounter. Medications, labs, diagnostic studies, consultations and follow-up as documented in this encounter. Radha Talavera was seen today for new patient, diabetes, immunizations and discuss medications. Diagnoses and all orders for this visit:    Type 2 diabetes mellitus with diabetic neuropathy, with long-term current use of insulin ([address])    Lab Results   Component Value Date    LABA1C 7.5 (H) 2023    LABA1C 7.6 06/15/2023    LABA1C 8.2 2023   -Slightly improving most recent A1c  -Continue following with endocrinology, Dr. Charles Tracy  -Continue lifestyle changes such as diet changes and exercise as tolerated  -Continue current regimen  -     Microalbumin, Ur; Future  -      DIABETES FOOT EXAM  -     CBC; Future  -     Comprehensive Metabolic Panel; Future  -     Hemoglobin A1C; Future  -     Vitamin D 25 Hydroxy; Future  -     Vitamin B12 & Folate; Future  -     Urinalysis with Reflex to Culture; Future  -     Lipid Panel; Future  -     Magnesium; Future  -     Phosphorus;  Future    Encounter for screening mammogram for breast cancer  -     [facility] Digital Screen Bilateral; Future    Screening

## 2023-12-05 ENCOUNTER — HOSPITAL ENCOUNTER (OUTPATIENT)
Dept: WOUND CARE | Age: 63
Discharge: HOME | End: 2023-12-05

## 2023-12-05 ENCOUNTER — TELEPHONE (OUTPATIENT)
Dept: WOUND CARE | Age: 63
End: 2023-12-05

## 2023-12-05 NOTE — TELEPHONE ENCOUNTER
Patient calling and canceling her [facility] wound care appointment today stating she was not feeling well, she was rescheduled for 12-12-23

## 2023-12-07 RX ORDER — VENLAFAXINE HYDROCHLORIDE 75 MG/1
75 CAPSULE, EXTENDED RELEASE ORAL DAILY
Qty: 90 CAPSULE | Refills: 0 | OUTPATIENT
Start: 2023-12-07

## 2023-12-10 NOTE — DISCHARGE INSTRUCTIONS
[facility] and HYPERBARIC TREATMENT  CENTER      Visit  Discharge Instructions / Physician Orders  DATE: 12/12/23     Home Care:NONE     SUPPLIES ORDERED THRU:      [address] [facility]               DATE LAST SUPPLIED 12/12/23     Wound Location:  Right Knee     Cleanse with: Liquid antibacterial soap and water, rinse well      Dressing Orders:  Primary dressing  Crystal tuck into wound with end of Q tip   Secondary dressing   Cover with Silicone border dressing secure with           x 30days     Frequency:  DAILY     Additional Orders: Increase protein to diet (meat, cheese, eggs, fish, peanut butter, nuts and beans)  Multivitamin daily    OFFLOADING [x] YES  TYPE:                  [] NA    Weekly wound care visits until determined otherwise. Antibiotic therapy-wound care related YES [] NO [x] NA[]    MY CHART []     Smart Device  []     HYPERBARIC TREATMENT-                TREATMENT #                          Your next appointment with the [address] [place] [address] is in 1 week WITH DR. Milla Hinojosa                                                                                                   (Please note your next appointment above and if you are unable to keep, kindly give a 24 hour notice. Thank you.)  If more than 15 min late we cannot guarantee you will be seen due to clinician schedule  Per Policy, Excessive cancellation will call for dismissal from program.  If you experience any of the following, please call the [address] [place] [address] during business hours:  [phone]     * Increase in Pain  * Temperature over 101  * Increase in drainage from your wound  * Drainage with a foul odor  * Bleeding  * Increase in swelling  * Need for compression bandage changes due to slippage, breakthrough drainage. If you need medical attention outside of the business hours of the [address] [place] [address] please contact your PCP or go to the nearest emergency room.      The information contained in the After Visit Summary has been

## 2023-12-11 RX ORDER — PEN NEEDLE, DIABETIC 29 G X1/2"
NEEDLE, DISPOSABLE MISCELLANEOUS
Qty: 100 EACH | Refills: 0 | OUTPATIENT
Start: 2023-12-11

## 2023-12-12 ENCOUNTER — HOSPITAL ENCOUNTER (OUTPATIENT)
Dept: WOUND CARE | Age: 63
Discharge: HOME | End: 2023-12-12
Payer: COMMERCIAL

## 2023-12-12 VITALS
WEIGHT: 270 LBS | RESPIRATION RATE: 20 BRPM | TEMPERATURE: 98.6 F | HEART RATE: 81 BPM | BODY MASS INDEX: 53.01 KG/M2 | HEIGHT: 60 IN | DIASTOLIC BLOOD PRESSURE: 58 MMHG | SYSTOLIC BLOOD PRESSURE: 152 MMHG

## 2023-12-12 DIAGNOSIS — Z79.4 TYPE 2 DIABETES MELLITUS WITH DIABETIC NEUROPATHY, WITH LONG-TERM CURRENT USE OF INSULIN (HCC): ICD-10-CM

## 2023-12-12 DIAGNOSIS — E11.40 TYPE 2 DIABETES MELLITUS WITH DIABETIC NEUROPATHY, WITH LONG-TERM CURRENT USE OF INSULIN (HCC): ICD-10-CM

## 2023-12-12 DIAGNOSIS — E66.01 MORBID OBESITY WITH BMI OF 45.0-49.9, ADULT (HCC): ICD-10-CM

## 2023-12-12 DIAGNOSIS — S81.001D OPEN KNEE WOUND, RIGHT, SUBSEQUENT ENCOUNTER: Primary | ICD-10-CM

## 2023-12-12 DIAGNOSIS — M17.0 PRIMARY OSTEOARTHRITIS OF BOTH KNEES: ICD-10-CM

## 2023-12-12 DIAGNOSIS — E11.42 DIABETIC POLYNEUROPATHY ASSOCIATED WITH TYPE 2 DIABETES MELLITUS (HCC): ICD-10-CM

## 2023-12-12 DIAGNOSIS — M17.11 PRIMARY OSTEOARTHRITIS OF RIGHT KNEE: ICD-10-CM

## 2023-12-12 PROCEDURE — 99214 OFFICE O/P EST MOD 30 MIN: CPT

## 2023-12-12 PROCEDURE — 11042 DBRDMT SUBQ TIS 1ST 20SQCM/<: CPT | Performed by: SURGERY

## 2023-12-12 PROCEDURE — 99203 OFFICE O/P NEW LOW 30 MIN: CPT | Performed by: SURGERY

## 2023-12-12 RX ORDER — LIDOCAINE HYDROCHLORIDE 40 MG/ML
SOLUTION TOPICAL ONCE
OUTPATIENT
Start: 2023-12-12 | End: 2023-12-12

## 2023-12-12 RX ORDER — LIDOCAINE HYDROCHLORIDE 20 MG/ML
JELLY TOPICAL ONCE
OUTPATIENT
Start: 2023-12-12 | End: 2023-12-12

## 2023-12-12 ASSESSMENT — PAIN DESCRIPTION - DESCRIPTORS: DESCRIPTORS: ACHING;SORE

## 2023-12-12 ASSESSMENT — PAIN DESCRIPTION - PAIN TYPE: TYPE: CHRONIC PAIN

## 2023-12-12 ASSESSMENT — PAIN SCALES - GENERAL: PAINLEVEL_OUTOF10: 6

## 2023-12-12 ASSESSMENT — PAIN DESCRIPTION - LOCATION: LOCATION: KNEE

## 2023-12-12 ASSESSMENT — PAIN DESCRIPTION - ORIENTATION: ORIENTATION: RIGHT

## 2023-12-12 ASSESSMENT — PAIN - FUNCTIONAL ASSESSMENT: PAIN_FUNCTIONAL_ASSESSMENT: PREVENTS OR INTERFERES SOME ACTIVE ACTIVITIES AND ADLS

## 2023-12-12 ASSESSMENT — PAIN DESCRIPTION - ONSET: ONSET: ON-GOING

## 2023-12-12 NOTE — PROGRESS NOTES
[address] [place] [address]:     [address] [facility] [address]:   [facility] [address] [facility] [address]  [address] [place] [address]  [phone]  [phone]     Patient Information:      Ramiro Baird [address] [place] [address]   [phone]   : 1960  AGE: 58 y.o. GENDER: female   EPISODE DATE: 2023    Insurance:      PRIMARY INSURANCE:  Plan: [facility] MEDICAID  Coverage: [employer]  Effective Date: 2013  Group Number: [unfilled]  Subscriber Number: [id number] - (Medicaid Managed)    Payer/Plan Subscr  Sex Relation Sub. Ins. ID Effective Group Num   1. SIGRID - Ramiro Gave 1960 Female Self [id number] [facility]                                   [address]       Patient Wound Information:      Problem List Items Addressed This Visit          Endocrine    Diabetic neuropathy ([address])    Type 2 diabetes mellitus with diabetic neuropathy, with long-term current use of insulin ([address])       Other    Primary osteoarthritis of both knees    Morbid obesity with BMI of 45.0-49.9, adult ([address])    Primary osteoarthritis of right knee    Open knee wound, right, subsequent encounter - Primary       WOUNDS REQUIRING DRESSING SUPPLIES:     Incision 16 Shoulder Left (Active)   Number of days: 2821       Incision 17 Shoulder Right (Active)   Number of days: 2408       Wound 23 Knee Right;Distal wound #1 right knee distal (Active)   Wound Image   23 1347   Wound Etiology Non-Healing Surgical 23 1347   Dressing Status New drainage noted; Old drainage noted 23 1347   Wound Cleansed Cleansed with saline; Soap and water 23 1347   Wound Length (cm) 0.8 cm 23 1347   Wound Width (cm) 0.5 cm 23 1347   Wound Depth (cm) 0.5 cm 23 1347   Wound Surface Area (cm^2) 0.4 cm^2 23 1347   Wound Volume (cm^3) 0.2 cm^3 23 1347   Post-Procedure Length (cm) 0.8
Yes    Pain Control: Anesthetic  Anesthetic: 4% Lidocaine Liquid Topical     Debridement:Excisional Debridement    Using curette and scissors the wound(s)/ulcer(s) was/were sharply debrided down through and including the removal of subcutaneous tissue. Devitalized Tissue Debrided:  fibrin, biofilm, and slough    Pre Debridement Measurements:  Are located in the [place]  Documentation Flow Sheet    Wound/Ulcer #: 1    Post Debridement Measurements:  Wound/Ulcer Descriptions are Pre Debridement except measurements:    Incision 03/22/16 Shoulder Left (Active)   Number of days: 2821       Incision 05/09/17 Shoulder Right (Active)   Number of days: 2408       Wound 12/12/23 Knee Right;Distal wound #1 right knee distal (Active)   Wound Image   12/12/23 1347   Wound Etiology Non-Healing Surgical 12/12/23 1347   Dressing Status New drainage noted; Old drainage noted 12/12/23 1347   Wound Cleansed Cleansed with saline; Soap and water 12/12/23 1347   Wound Length (cm) 0.8 cm 12/12/23 1347   Wound Width (cm) 0.5 cm 12/12/23 1347   Wound Depth (cm) 0.5 cm 12/12/23 1347   Wound Surface Area (cm^2) 0.4 cm^2 12/12/23 1347   Wound Volume (cm^3) 0.2 cm^3 12/12/23 1347   Post-Procedure Length (cm) 0.8 cm 12/12/23 1347   Post-Procedure Width (cm) 0.5 cm 12/12/23 1347   Post-Procedure Depth (cm) 0.5 cm 12/12/23 1347   Post-Procedure Surface Area (cm^2) 0.4 cm^2 12/12/23 1347   Post-Procedure Volume (cm^3) 0.2 cm^3 12/12/23 1347   Wound Assessment [facility] 12/12/23 1347   Drainage Amount Moderate (25-50%) 12/12/23 1347   Drainage Description Serosanguinous; Yellow 12/12/23 1347   Odor None 12/12/23 1347   Liza-wound Assessment Blanchable erythema;Fragile 12/12/23 1347   Margins Undefined edges 12/12/23 1347   Wound Thickness Description not for Pressure Injury Full thickness 12/12/23 1347   Number of days: 0          Percent of Wound(s)/Ulcer(s) Debrided: 100%    Total Surface Area Debrided:  0.4 sq cm     Diabetic/Pressure/Non

## 2023-12-14 ENCOUNTER — OFFICE VISIT (OUTPATIENT)
Dept: ORTHOPEDIC SURGERY | Age: 63
End: 2023-12-14

## 2023-12-14 VITALS — BODY MASS INDEX: 51.04 KG/M2 | HEIGHT: 60 IN | WEIGHT: 260 LBS | RESPIRATION RATE: 16 BRPM

## 2023-12-14 DIAGNOSIS — Z96.651 S/P TOTAL KNEE ARTHROPLASTY, RIGHT: Primary | ICD-10-CM

## 2023-12-14 PROCEDURE — 99024 POSTOP FOLLOW-UP VISIT: CPT | Performed by: PHYSICIAN ASSISTANT

## 2023-12-14 RX ORDER — HYDROCODONE BITARTRATE AND ACETAMINOPHEN 5; 325 MG/1; MG/1
1 TABLET ORAL EVERY 6 HOURS PRN
Qty: 28 TABLET | Refills: 0 | Status: SHIPPED | OUTPATIENT
Start: 2023-12-14 | End: 2023-12-21

## 2023-12-14 NOTE — PROGRESS NOTES
nourished. Musculoskeletal:    Right Knee    Gait:  Antalgic. Incision: Small pinpoint scab to the proximal portion of the incision with approximately 4 mm opening to the distalmost portion of the incision. Edges are granular and this does appear to be healing no active drainage noted. Remainder of the incision appears to be well-healed without evidence of dehiscence. Tenderness:  none   Flexion ROM:  85   Extension ROM:  0   Effusion:  mild   DVT Evaluation:  No evidence of DVT seen on physical exam.       Neurological: Patient is alert and oriented to person, place, and time. Normal strenght. No sensory deficit. Skin: Skin is warm and dry  Psychiatric: Behavior is normal. Thought content normal.  Nursing note and vitals reviewed. Labs and Imaging:     XR taken today:  No results found. Assessment and Plan:  1. S/P total knee arthroplasty, right              PLAN:  This is a 58 y.o. female who presents to the clinic today for follow up status post right total knee arthroplasty done on 10/19/2023. 1.  Patient continues to have slow healing distalmost portion of the wound. Currently in wound care did have a debridement on 12/12/2023 patient reports she has already had some improvement. She is seeing them every other week doing dressing changes, every other day. 2.  Continue working with home exercises and home PT. Patient is again reiterated the importance of eventually starting outpatient physical therapy given her stiffness she does have a surprising amount of range of motion given her lack of PT but do believe she would benefit from outpatient PT here soon. 3.  Follow-up in 3 weeks with myself or Dr. Chrystal Salgado for wound check and reevaluation. Please note that this chart was generated using voice recognition Dragon dictation software. Although every effort was made to ensure the accuracy of this automated transcription, some errors in transcription may have occurred.

## 2023-12-18 RX ORDER — MAGNESIUM OXIDE TAB 400 MG (241.3 MG ELEMENTAL MG) 400 (241.3 MG) MG
TAB ORAL
Qty: 30 TABLET | Refills: 0 | OUTPATIENT
Start: 2023-12-18

## 2023-12-18 NOTE — TELEPHONE ENCOUNTER
DISCONTINUED BY PCP 12.4.23   MAGNESIUM-OXIDE 400 (240 Mg) MG tablet: 30 tab: 0 refill: discontinued

## 2023-12-26 RX ORDER — FERROUS SULFATE 325(65) MG
1 TABLET ORAL
Qty: 30 TABLET | Refills: 0 | OUTPATIENT
Start: 2023-12-26

## 2023-12-29 NOTE — DISCHARGE INSTRUCTIONS
[facility] WOUND and HYPERBARIC TREATMENT  CENTER                            Visit  Discharge Instructions / Physician Orders  DATE: 1/4/24     Home Care:NONE     SUPPLIES ORDERED THRU:      [employer]               DATE LAST SUPPLIED 12/12/23     Wound Location:  Right Knee     Cleanse with: Liquid antibacterial soap and water, rinse well      Dressing Orders:  Primary dressing  Crystal tuck into wound with end of Q tip   Secondary dressing   Cover with Silicone border dressing secure with           x 30days     Frequency:  DAILY     Additional Orders: Increase protein to diet (meat, cheese, eggs, fish, peanut butter, nuts and beans)  Multivitamin daily     OFFLOADING [x] YES  TYPE:                  [] NA     Weekly wound care visits until determined otherwise.     Antibiotic therapy-wound care related YES [] NO [x] NA[]     MY CHART []     Smart Device  []      HYPERBARIC TREATMENT-                TREATMENT #                          Your next appointment with the Wound Care Center is in 2 weeks                                                                                                    (Please note your next appointment above and if you are unable to keep, kindly give a 24 hour notice. Thank you.)  If more than 15 min late we cannot guarantee you will be seen due to clinician schedule  Per Policy, Excessive cancellation will call for dismissal from program.  If you experience any of the following, please call the Wound Care Center during business hours:  [phone]     * Increase in Pain  * Temperature over 101  * Increase in drainage from your wound  * Drainage with a foul odor  * Bleeding  * Increase in swelling  * Need for compression bandage changes due to slippage, breakthrough drainage.     If you need medical attention outside of the business hours of the Wound Care Centers please contact your PCP or go to the nearest emergency room.     The information contained in the After Visit Summary

## 2024-01-02 RX ORDER — AMMONIUM LACTATE 12 G/100G
CREAM TOPICAL
Qty: 140 G | Refills: 0 | Status: SHIPPED | OUTPATIENT
Start: 2024-01-02

## 2024-01-02 RX ORDER — MELOXICAM 15 MG/1
TABLET ORAL
Qty: 90 TABLET | Refills: 0 | Status: SHIPPED | OUTPATIENT
Start: 2024-01-02

## 2024-01-04 ENCOUNTER — HOSPITAL ENCOUNTER (OUTPATIENT)
Dept: WOUND CARE | Age: 64
Discharge: HOME | End: 2024-01-04
Payer: COMMERCIAL

## 2024-01-04 VITALS
BODY MASS INDEX: 51.04 KG/M2 | RESPIRATION RATE: 18 BRPM | TEMPERATURE: 98.4 F | DIASTOLIC BLOOD PRESSURE: 71 MMHG | WEIGHT: 260 LBS | HEART RATE: 83 BPM | SYSTOLIC BLOOD PRESSURE: 182 MMHG | HEIGHT: 60 IN

## 2024-01-04 DIAGNOSIS — M62.81 MUSCLE WEAKNESS (GENERALIZED): Primary | ICD-10-CM

## 2024-01-04 DIAGNOSIS — E11.40 TYPE 2 DIABETES MELLITUS WITH DIABETIC NEUROPATHY, WITH LONG-TERM CURRENT USE OF INSULIN (HCC): ICD-10-CM

## 2024-01-04 DIAGNOSIS — Z79.4 TYPE 2 DIABETES MELLITUS WITH DIABETIC NEUROPATHY, WITH LONG-TERM CURRENT USE OF INSULIN (HCC): ICD-10-CM

## 2024-01-04 DIAGNOSIS — M17.11 PRIMARY OSTEOARTHRITIS OF RIGHT KNEE: ICD-10-CM

## 2024-01-04 DIAGNOSIS — S81.001D OPEN KNEE WOUND, RIGHT, SUBSEQUENT ENCOUNTER: ICD-10-CM

## 2024-01-04 DIAGNOSIS — E66.01 MORBID OBESITY WITH BMI OF 45.0-49.9, ADULT (HCC): ICD-10-CM

## 2024-01-04 PROCEDURE — 11042 DBRDMT SUBQ TIS 1ST 20SQCM/<: CPT | Performed by: NURSE PRACTITIONER

## 2024-01-04 PROCEDURE — 11042 DBRDMT SUBQ TIS 1ST 20SQCM/<: CPT

## 2024-01-04 RX ORDER — LIDOCAINE HYDROCHLORIDE 40 MG/ML
SOLUTION TOPICAL ONCE
Status: COMPLETED | OUTPATIENT
Start: 2024-01-04 | End: 2024-01-04

## 2024-01-04 RX ORDER — LIDOCAINE HYDROCHLORIDE 20 MG/ML
JELLY TOPICAL ONCE
OUTPATIENT
Start: 2024-01-04 | End: 2024-01-04

## 2024-01-04 RX ORDER — LIDOCAINE HYDROCHLORIDE 40 MG/ML
SOLUTION TOPICAL ONCE
OUTPATIENT
Start: 2024-01-04 | End: 2024-01-04

## 2024-01-04 RX ADMIN — LIDOCAINE HYDROCHLORIDE 5 ML: 40 SOLUTION TOPICAL at 13:01

## 2024-01-04 ASSESSMENT — ENCOUNTER SYMPTOMS
RHINORRHEA: 0
NAUSEA: 0
COUGH: 0
VOMITING: 0
SHORTNESS OF BREATH: 0
DIARRHEA: 0

## 2024-01-04 ASSESSMENT — PAIN SCALES - GENERAL: PAINLEVEL_OUTOF10: 8

## 2024-01-04 NOTE — PROGRESS NOTES
[facility]   Progress Note and Procedure Note      Marcio Veloz  MEDICAL RECORD NUMBER:  [id number]  AGE: 63 y.o.   GENDER: female  : 1960  EPISODE DATE:  2024    Subjective:     Chief Complaint   Patient presents with    Wound Check     Right leg         HISTORY of PRESENT ILLNESS HPI     Marcio Veloz is a 63 y.o. female who presents today for wound/ulcer evaluation.   History of Wound Context: here to follow up on right knee wound after right total knee arthroplasty 10/16/2023.   Wound/Ulcer Pain Timing/Severity: intermittent  Quality of pain: aching  Severity:  3 / 10   Modifying Factors: None  Associated Signs/Symptoms: none    Ulcer Identification:  Ulcer Type: non-healing surgical  Contributing Factors: diabetes, decreased mobility, and obesity         PAST MEDICAL HISTORY        Diagnosis Date    Anemia     receives injectafer (iron infusions)    Anginal pain (HCC)     last used Nitro sl 4 yrs   (currently off this medication written: 10/23/2019); no episodes for about a year (written 6/10/21)    Arthritis     Arthritis of right knee 08/15/2018    Asthma     uses inhaler as needed, managed by Kira De León NP    Astigmatism 2014    Back pain     radiculopathy left leg    Blurry vision, bilateral 2016    Bursitis     left hip    CAD (coronary artery disease)     Carpal tunnel syndrome of right wrist 2012    Cataract     patient denies    Closed displaced fracture of lesser tuberosity of right humerus 2016    Constipation     Depression     Dysmenorrhea     Fatty liver disease, nonalcoholic     GERD (gastroesophageal reflux disease)     Gout     found through bloodwork    Headache     Heart murmur     Heart palpitations     Heart palpitations     History of blood transfusion     thinks she might have had a transfusion doesn't remember why or when    History of rib fracture 2016    Right    Hyperlipidemia     Hypertension

## 2024-01-04 NOTE — PLAN OF CARE
Problem: Chronic Conditions and Co-morbidities  Goal: Patient's chronic conditions and co-morbidity symptoms are monitored and maintained or improved  Outcome: Progressing     Problem: Wound:  Goal: Will show signs of wound healing; wound closure and no evidence of infection  Description: Will show signs of wound healing; wound closure and no evidence of infection  Outcome: Progressing     Problem: Falls - Risk of:  Goal: Will remain free from falls  Description: Will remain free from falls  Outcome: Progressing

## 2024-01-11 DIAGNOSIS — Z96.651 S/P TOTAL KNEE ARTHROPLASTY, RIGHT: Primary | ICD-10-CM

## 2024-01-11 RX ORDER — TRAMADOL HYDROCHLORIDE 50 MG/1
50 TABLET ORAL EVERY 4 HOURS PRN
Qty: 18 TABLET | Refills: 0 | Status: SHIPPED | OUTPATIENT
Start: 2024-01-11 | End: 2024-01-14

## 2024-01-11 NOTE — TELEPHONE ENCOUNTER
Pt called in stating she has been off her Norco for a week and is still having lots of pains and want to know if there is something and can be prescribe to her for the pain

## 2024-01-11 NOTE — TELEPHONE ENCOUNTER
Given that patient is 10 weeks out from surgery would like to wean entirely off of narcotics.  Will provide 1 single prescription for Ultram otherwise would advise ibuprofen and Tylenol.

## 2024-01-12 RX ORDER — TRAMADOL HYDROCHLORIDE 50 MG/1
50 TABLET ORAL EVERY 4 HOURS PRN
Qty: 30 TABLET | Refills: 0 | OUTPATIENT
Start: 2024-01-12 | End: 2024-01-17

## 2024-01-14 NOTE — DISCHARGE INSTRUCTIONS
[facility] WOUND and HYPERBARIC TREATMENT  CENTER                            Visit  Discharge Instructions / Physician Orders  DATE: 1/18/24     Home Care:NONE     SUPPLIES ORDERED THRU:      [employer]               DATE LAST SUPPLIED 12/12/23     Wound Location:  Right Knee     Cleanse with: Liquid antibacterial soap and water, rinse well      Dressing Orders:  Primary dressing  Crystal tuck into wound with end of Q tip   Secondary dressing   Cover with Silicone border dressing secure with           x 30days     Frequency:  DAILY     Additional Orders: Increase protein to diet (meat, cheese, eggs, fish, peanut butter, nuts and beans)  Multivitamin daily     OFFLOADING [x] YES  TYPE:                  [] NA     Weekly wound care visits until determined otherwise.     Antibiotic therapy-wound care related YES [] NO [x] NA[]     MY CHART []     Smart Device  []      HYPERBARIC TREATMENT-                TREATMENT #                          Your next appointment with the Wound Care Center is in 2 weeks                                                                                                    (Please note your next appointment above and if you are unable to keep, kindly give a 24 hour notice. Thank you.)  If more than 15 min late we cannot guarantee you will be seen due to clinician schedule  Per Policy, Excessive cancellation will call for dismissal from program.  If you experience any of the following, please call the Wound Care Center during business hours:  [phone]     * Increase in Pain  * Temperature over 101  * Increase in drainage from your wound  * Drainage with a foul odor  * Bleeding  * Increase in swelling  * Need for compression bandage changes due to slippage, breakthrough drainage.     If you need medical attention outside of the business hours of the Wound Care Centers please contact your PCP or go to the nearest emergency room.     The information contained in the After Visit

## 2024-01-18 ENCOUNTER — HOSPITAL ENCOUNTER (OUTPATIENT)
Dept: WOUND CARE | Age: 64
Discharge: HOME | End: 2024-01-18

## 2024-01-22 NOTE — DISCHARGE INSTRUCTIONS
[facility] WOUND and HYPERBARIC TREATMENT  CENTER                            Visit  Discharge Instructions / Physician Orders  DATE: 1/25/24     Home Care:NONE     SUPPLIES ORDERED THRU:      [employer]               DATE LAST SUPPLIED 12/12/23     Wound Location:  Right Knee     Cleanse with: Liquid antibacterial soap and water, rinse well      Dressing Orders:  Primary dressing  Crystal tuck into wound with end of Q tip   Secondary dressing   Cover with Silicone border dressing secure with           x 30days     Frequency:  DAILY     Additional Orders: Increase protein to diet (meat, cheese, eggs, fish, peanut butter, nuts and beans)  Multivitamin daily     OFFLOADING [x] YES  TYPE:                  [] NA     Weekly wound care visits until determined otherwise.     Antibiotic therapy-wound care related YES [] NO [x] NA[]     MY CHART []     Smart Device  []      HYPERBARIC TREATMENT-                TREATMENT #                          Your next appointment with the Wound Care Center is in 2 weeks                                                                                                    (Please note your next appointment above and if you are unable to keep, kindly give a 24 hour notice. Thank you.)  If more than 15 min late we cannot guarantee you will be seen due to clinician schedule  Per Policy, Excessive cancellation will call for dismissal from program.  If you experience any of the following, please call the Wound Care Center during business hours:  [phone]     * Increase in Pain  * Temperature over 101  * Increase in drainage from your wound  * Drainage with a foul odor  * Bleeding  * Increase in swelling  * Need for compression bandage changes due to slippage, breakthrough drainage.     If you need medical attention outside of the business hours of the Wound Care Centers please contact your PCP or go to the nearest emergency room.     The information contained in the After Visit

## 2024-01-25 ENCOUNTER — TELEPHONE (OUTPATIENT)
Dept: WOUND CARE | Age: 64
End: 2024-01-25

## 2024-01-25 ENCOUNTER — HOSPITAL ENCOUNTER (OUTPATIENT)
Dept: WOUND CARE | Age: 64
Discharge: HOME | End: 2024-01-25

## 2024-01-25 DIAGNOSIS — B35.4 TINEA CORPORIS: ICD-10-CM

## 2024-01-25 RX ORDER — NYSTATIN 100000 [USP'U]/G
POWDER TOPICAL
Qty: 60 G | Refills: 0 | OUTPATIENT
Start: 2024-01-25

## 2024-01-25 RX ORDER — AMMONIUM LACTATE 12 G/100G
CREAM TOPICAL
Qty: 140 G | Refills: 0 | OUTPATIENT
Start: 2024-01-25

## 2024-01-25 NOTE — TELEPHONE ENCOUNTER
Patient calling and canceling her [facility] wound care appointment stating that she was not feeling well, she was rescheduled for 1-30-24

## 2024-01-28 NOTE — DISCHARGE INSTRUCTIONS
[facility] WOUND and HYPERBARIC TREATMENT  CENTER                            Visit  Discharge Instructions / Physician Orders  DATE: 1/30/24     Home Care:NONE     SUPPLIES ORDERED THRU:      [employer]               DATE LAST SUPPLIED 12/12/23     Wound Location:  Right Knee     Cleanse with: Liquid antibacterial soap and water, rinse well      Dressing Orders:  Primary dressing  Crystal tuck into wound with end of Q tip   Secondary dressing   Cover with Silicone border dressing secure with           x 30days     Frequency:  DAILY     Additional Orders: Increase protein to diet (meat, cheese, eggs, fish, peanut butter, nuts and beans)  Multivitamin daily     OFFLOADING [x] YES  TYPE:                  [] NA     Weekly wound care visits until determined otherwise.     Antibiotic therapy-wound care related YES [] NO [x] NA[]     MY CHART []     Smart Device  []      HYPERBARIC TREATMENT-                TREATMENT #                          Your next appointment with the Wound Care Center is in 2 weeks                                                                                                    (Please note your next appointment above and if you are unable to keep, kindly give a 24 hour notice. Thank you.)  If more than 15 min late we cannot guarantee you will be seen due to clinician schedule  Per Policy, Excessive cancellation will call for dismissal from program.  If you experience any of the following, please call the Wound Care Center during business hours:  [phone]     * Increase in Pain  * Temperature over 101  * Increase in drainage from your wound  * Drainage with a foul odor  * Bleeding  * Increase in swelling  * Need for compression bandage changes due to slippage, breakthrough drainage.     If you need medical attention outside of the business hours of the Wound Care Centers please contact your PCP or go to the nearest emergency room.     The information contained in the After Visit

## 2024-01-29 RX ORDER — AMMONIUM LACTATE 12 G/100G
CREAM TOPICAL
Qty: 140 G | Refills: 0 | OUTPATIENT
Start: 2024-01-29

## 2024-01-30 ENCOUNTER — HOSPITAL ENCOUNTER (OUTPATIENT)
Dept: WOUND CARE | Age: 64
Discharge: HOME | End: 2024-01-30
Payer: COMMERCIAL

## 2024-01-30 ENCOUNTER — HOSPITAL ENCOUNTER (OUTPATIENT)
Age: 64
Discharge: HOME | End: 2024-01-30
Payer: COMMERCIAL

## 2024-01-30 VITALS
RESPIRATION RATE: 20 BRPM | HEART RATE: 82 BPM | SYSTOLIC BLOOD PRESSURE: 181 MMHG | DIASTOLIC BLOOD PRESSURE: 77 MMHG | TEMPERATURE: 98.5 F

## 2024-01-30 DIAGNOSIS — R06.02 SHORTNESS OF BREATH: ICD-10-CM

## 2024-01-30 DIAGNOSIS — Z11.59 SCREENING FOR VIRAL DISEASE: ICD-10-CM

## 2024-01-30 DIAGNOSIS — M17.11 PRIMARY OSTEOARTHRITIS OF RIGHT KNEE: ICD-10-CM

## 2024-01-30 DIAGNOSIS — E03.9 HYPOTHYROIDISM, UNSPECIFIED TYPE: ICD-10-CM

## 2024-01-30 DIAGNOSIS — S81.001D OPEN KNEE WOUND, RIGHT, SUBSEQUENT ENCOUNTER: Primary | ICD-10-CM

## 2024-01-30 DIAGNOSIS — Z79.4 TYPE 2 DIABETES MELLITUS WITH DIABETIC NEUROPATHY, WITH LONG-TERM CURRENT USE OF INSULIN (HCC): ICD-10-CM

## 2024-01-30 DIAGNOSIS — E78.1 HYPERTRIGLYCERIDEMIA: ICD-10-CM

## 2024-01-30 DIAGNOSIS — E11.40 TYPE 2 DIABETES MELLITUS WITH DIABETIC NEUROPATHY, WITH LONG-TERM CURRENT USE OF INSULIN (HCC): ICD-10-CM

## 2024-01-30 DIAGNOSIS — E66.01 MORBID OBESITY WITH BMI OF 45.0-49.9, ADULT (HCC): ICD-10-CM

## 2024-01-30 DIAGNOSIS — E55.9 VITAMIN D DEFICIENCY: ICD-10-CM

## 2024-01-30 DIAGNOSIS — I10 ESSENTIAL HYPERTENSION: Chronic | ICD-10-CM

## 2024-01-30 DIAGNOSIS — M62.81 MUSCLE WEAKNESS (GENERALIZED): ICD-10-CM

## 2024-01-30 LAB
25(OH)D3 SERPL-MCNC: 31.8 NG/ML
ALBUMIN SERPL-MCNC: 4.1 G/DL (ref 3.5–5.2)
ALP SERPL-CCNC: 147 U/L (ref 35–104)
ALT SERPL-CCNC: 22 U/L (ref 5–33)
ANION GAP SERPL CALCULATED.3IONS-SCNC: 11 MMOL/L (ref 9–17)
AST SERPL-CCNC: 21 U/L
BACTERIA URNS QL MICRO: ABNORMAL
BILIRUB SERPL-MCNC: 0.3 MG/DL (ref 0.3–1.2)
BILIRUB UR QL STRIP: NEGATIVE
BNP SERPL-MCNC: 68 PG/ML
BUN SERPL-MCNC: 20 MG/DL (ref 8–23)
CALCIUM SERPL-MCNC: 9.6 MG/DL (ref 8.6–10.4)
CASTS #/AREA URNS LPF: ABNORMAL /LPF
CHLORIDE SERPL-SCNC: 102 MMOL/L (ref 98–107)
CHOLEST SERPL-MCNC: 238 MG/DL
CHOLESTEROL/HDL RATIO: 4.1
CLARITY UR: CLEAR
CO2 SERPL-SCNC: 26 MMOL/L (ref 20–31)
COLOR UR: YELLOW
CREAT SERPL-MCNC: 0.8 MG/DL (ref 0.5–0.9)
CREAT UR-MCNC: 105 MG/DL (ref 28–217)
EPI CELLS #/AREA URNS HPF: ABNORMAL /HPF
ERYTHROCYTE [DISTWIDTH] IN BLOOD BY AUTOMATED COUNT: 15.9 % (ref 11.5–14.9)
EST. AVERAGE GLUCOSE BLD GHB EST-MCNC: 180 MG/DL
FOLATE SERPL-MCNC: >20 NG/ML
GFR SERPL CREATININE-BSD FRML MDRD: >60 ML/MIN/1.73M2
GLUCOSE SERPL-MCNC: 204 MG/DL (ref 70–99)
GLUCOSE UR STRIP-MCNC: ABNORMAL MG/DL
HBA1C MFR BLD: 7.9 % (ref 4–6)
HBV SURFACE AB SERPL IA-ACNC: <3.5 MIU/ML
HCT VFR BLD AUTO: 41.6 % (ref 36–46)
HDLC SERPL-MCNC: 58 MG/DL
HGB BLD-MCNC: 13.6 G/DL (ref 12–16)
HGB UR QL STRIP.AUTO: NEGATIVE
KETONES UR STRIP-MCNC: NEGATIVE MG/DL
LDLC SERPL CALC-MCNC: 104 MG/DL (ref 0–130)
LEUKOCYTE ESTERASE UR QL STRIP: NEGATIVE
MAGNESIUM SERPL-MCNC: 1.6 MG/DL (ref 1.6–2.6)
MCH RBC QN AUTO: 29.1 PG (ref 26–34)
MCHC RBC AUTO-ENTMCNC: 32.6 G/DL (ref 31–37)
MCV RBC AUTO: 89.2 FL (ref 80–100)
MICROALBUMIN UR-MCNC: 80 MG/L (ref 0–20)
MICROALBUMIN/CREAT UR-RTO: 76 MCG/MG CREAT (ref 0–25)
NITRITE UR QL STRIP: NEGATIVE
PH UR STRIP: 5.5 [PH] (ref 5–8)
PHOSPHATE SERPL-MCNC: 2.7 MG/DL (ref 2.6–4.5)
PLATELET # BLD AUTO: 328 K/UL (ref 150–450)
PMV BLD AUTO: 8.2 FL (ref 6–12)
POTASSIUM SERPL-SCNC: 4.6 MMOL/L (ref 3.7–5.3)
PROT SERPL-MCNC: 7.3 G/DL (ref 6.4–8.3)
PROT UR STRIP-MCNC: ABNORMAL MG/DL
RBC # BLD AUTO: 4.67 M/UL (ref 4–5.2)
RBC #/AREA URNS HPF: ABNORMAL /HPF
SODIUM SERPL-SCNC: 139 MMOL/L (ref 135–144)
SP GR UR STRIP: 1.02 (ref 1–1.03)
TRIGL SERPL-MCNC: 378 MG/DL
TSH SERPL DL<=0.05 MIU/L-ACNC: 3.62 UIU/ML (ref 0.3–5)
URATE SERPL-MCNC: 7.3 MG/DL (ref 2.4–5.7)
UROBILINOGEN UR STRIP-ACNC: NORMAL EU/DL (ref 0–1)
VIT B12 SERPL-MCNC: 550 PG/ML (ref 232–1245)
WBC #/AREA URNS HPF: ABNORMAL /HPF
WBC OTHER # BLD: 10.1 K/UL (ref 3.5–11)

## 2024-01-30 PROCEDURE — 83880 ASSAY OF NATRIURETIC PEPTIDE: CPT

## 2024-01-30 PROCEDURE — 36415 COLL VENOUS BLD VENIPUNCTURE: CPT

## 2024-01-30 PROCEDURE — 11042 DBRDMT SUBQ TIS 1ST 20SQCM/<: CPT | Performed by: SURGERY

## 2024-01-30 PROCEDURE — 81001 URINALYSIS AUTO W/SCOPE: CPT

## 2024-01-30 PROCEDURE — 80061 LIPID PANEL: CPT

## 2024-01-30 PROCEDURE — 83735 ASSAY OF MAGNESIUM: CPT

## 2024-01-30 PROCEDURE — 82570 ASSAY OF URINE CREATININE: CPT

## 2024-01-30 PROCEDURE — 82746 ASSAY OF FOLIC ACID SERUM: CPT

## 2024-01-30 PROCEDURE — 80053 COMPREHEN METABOLIC PANEL: CPT

## 2024-01-30 PROCEDURE — 85027 COMPLETE CBC AUTOMATED: CPT

## 2024-01-30 PROCEDURE — 84550 ASSAY OF BLOOD/URIC ACID: CPT

## 2024-01-30 PROCEDURE — 11042 DBRDMT SUBQ TIS 1ST 20SQCM/<: CPT

## 2024-01-30 PROCEDURE — 82306 VITAMIN D 25 HYDROXY: CPT

## 2024-01-30 PROCEDURE — 82607 VITAMIN B-12: CPT

## 2024-01-30 PROCEDURE — 84443 ASSAY THYROID STIM HORMONE: CPT

## 2024-01-30 PROCEDURE — 84100 ASSAY OF PHOSPHORUS: CPT

## 2024-01-30 PROCEDURE — 83036 HEMOGLOBIN GLYCOSYLATED A1C: CPT

## 2024-01-30 PROCEDURE — 86317 IMMUNOASSAY INFECTIOUS AGENT: CPT

## 2024-01-30 PROCEDURE — 82043 UR ALBUMIN QUANTITATIVE: CPT

## 2024-01-30 RX ORDER — LIDOCAINE HYDROCHLORIDE 20 MG/ML
JELLY TOPICAL ONCE
OUTPATIENT
Start: 2024-01-30 | End: 2024-01-30

## 2024-01-30 RX ORDER — LIDOCAINE HYDROCHLORIDE 40 MG/ML
SOLUTION TOPICAL ONCE
Status: COMPLETED | OUTPATIENT
Start: 2024-01-30 | End: 2024-01-30

## 2024-01-30 RX ORDER — LIDOCAINE HYDROCHLORIDE 40 MG/ML
SOLUTION TOPICAL ONCE
OUTPATIENT
Start: 2024-01-30 | End: 2024-01-30

## 2024-01-30 RX ADMIN — LIDOCAINE HYDROCHLORIDE 10 ML: 40 SOLUTION TOPICAL at 13:02

## 2024-01-30 ASSESSMENT — PAIN DESCRIPTION - LOCATION: LOCATION: KNEE

## 2024-01-30 ASSESSMENT — ENCOUNTER SYMPTOMS
COUGH: 0
SHORTNESS OF BREATH: 0
NAUSEA: 0
VOMITING: 0
DIARRHEA: 0
RHINORRHEA: 0

## 2024-01-30 ASSESSMENT — PAIN DESCRIPTION - DESCRIPTORS: DESCRIPTORS: PRESSURE

## 2024-01-30 ASSESSMENT — PAIN SCALES - GENERAL: PAINLEVEL_OUTOF10: 6

## 2024-01-30 ASSESSMENT — PAIN DESCRIPTION - ONSET: ONSET: ON-GOING

## 2024-01-30 ASSESSMENT — PAIN DESCRIPTION - ORIENTATION: ORIENTATION: RIGHT

## 2024-01-30 ASSESSMENT — PAIN DESCRIPTION - FREQUENCY: FREQUENCY: CONTINUOUS

## 2024-01-30 NOTE — PROGRESS NOTES
Wound Care Supplies      Supply Company:     [employer]      Ordering Center:      WOUND CARE  [address] [facility] [address]  [phone]  WOUND CARE Dept: [phone]   FAX NUMBER [unfilled]    Patient Information:      Marcio Veloz  [address] [facility] [facility] [address]   [phone]   : 1960  AGE: 63 y.o.     GENDER: female   TODAYS DATE:  2024    Insurance:      PRIMARY INSURANCE:  Plan: [employer] [place]ICA[place]  Coverage: [employer]  Effective Date: 2013  [id number] - (Medicaid Managed)    SECONDARY INSURANCE:  Plan:   Coverage:   Effective Date:   @[employer]@    [unfilled]   [unfilled]     Patient Wound Information:      Problem List Items Addressed This Visit          Endocrine    Type 2 diabetes mellitus with diabetic neuropathy, with long-term current use of insulin (HCC)    Relevant Orders    Initiate Outpatient Wound Care Protocol       Other    Morbid obesity with BMI of 45.0-49.9, adult (HCC)    Relevant Orders    Initiate Outpatient Wound Care Protocol    Primary osteoarthritis of right knee    Relevant Orders    Initiate Outpatient Wound Care Protocol    Muscle weakness (generalized)    Relevant Orders    Initiate Outpatient Wound Care Protocol    Open knee wound, right, subsequent encounter - Primary    Relevant Orders    Initiate Outpatient Wound Care Protocol       WOUNDS REQUIRING DRESSING SUPPLIES:     Wound 23 Knee Right;Distal wound #1 right knee distal (Active)   Wound Image   24 1250   Wound Etiology Non-Healing Surgical 24 1250   Dressing Status New drainage noted;Old drainage noted 24 1250   Wound Cleansed Soap and water 24 1250   Wound Length (cm) 0.3 cm 24 1250   Wound Width (cm) 0.5 cm 24 1250   Wound Depth (cm) 0.2 cm 24 1250   Wound Surface Area (cm^2) 0.15 cm^2 24 1250   Change in Wound Size % (l*w) 62.5 24 1250   Wound Volume (cm^3) 0.03 cm^3 24 1250   Wound Healing %

## 2024-01-30 NOTE — PROGRESS NOTES
[facility]   Progress Note and Procedure Note      Marcio Veloz  MEDICAL RECORD NUMBER:  [id number]  AGE: 63 y.o.   GENDER: female  : 1960  EPISODE DATE:  2024    Subjective:     Chief Complaint   Patient presents with    Wound Check     Right knee         HISTORY of PRESENT ILLNESS HPI     Marcio Veloz is a 63 y.o. female who presents today for wound/ulcer evaluation.   History of Wound Context: right knee replacement surgical site with open area a bottom of incision for several months. Upper wound has healed    Wound/Ulcer Pain Timing/Severity: none  Quality of pain: N/A  Severity:  0 / 10   Modifying Factors: None  Associated Signs/Symptoms: none    Ulcer Identification:  Ulcer Type: non-healing surgical  Contributing Factors: obesity         PAST MEDICAL HISTORY        Diagnosis Date    Anemia     receives injectafer (iron infusions)    Anginal pain (HCC)     last used Nitro sl 4 yrs   (currently off this medication written: 10/23/2019); no episodes for about a year (written 6/10/21)    Arthritis     Arthritis of right knee 08/15/2018    Asthma     uses inhaler as needed, managed by Kira De León NP    Astigmatism 2014    Back pain     radiculopathy left leg    Blurry vision, bilateral 2016    Bursitis     left hip    CAD (coronary artery disease)     Carpal tunnel syndrome of right wrist 2012    Cataract     patient denies    Closed displaced fracture of lesser tuberosity of right humerus 2016    Constipation     Depression     Dysmenorrhea     Fatty liver disease, nonalcoholic     GERD (gastroesophageal reflux disease)     Gout     found through bloodwork    Headache     Heart murmur     Heart palpitations     Heart palpitations     History of blood transfusion     thinks she might have had a transfusion doesn't remember why or when    History of rib fracture 2016    Right    Hyperlipidemia     Hypertension

## 2024-01-31 ENCOUNTER — TELEPHONE (OUTPATIENT)
Dept: FAMILY MEDICINE CLINIC | Age: 64
End: 2024-01-31

## 2024-01-31 DIAGNOSIS — R74.8 ELEVATED ALKALINE PHOSPHATASE LEVEL: Primary | ICD-10-CM

## 2024-01-31 NOTE — TELEPHONE ENCOUNTER
----- Message from Shaye Nascimento MA sent at 1/31/2024  2:51 PM EST -----  SPOKE WITH PATIENT REGARDING RESULTS/DIET CHANGES/ ORDERS PLACED/REFERRAL VERBALIZED UNDERSTANDING    STATED NO TO UTI SYMPTOMS.     PATIENT STATED BLOOD PRESSURE READINGS HAVE BEEN HIGH SINCE SURGERY LAST YEAR 10/2023. AFTER SURGERY STATED SHE WAS TAKEN OFF BP MEDICATION AND HAS NOT TAKEN ANY SINCE.

## 2024-01-31 NOTE — TELEPHONE ENCOUNTER
Lets bring patient in for blood pressure reading in the office, please have her bring her cuff with her.  May need to start her back on medication for her blood pressure.

## 2024-01-31 NOTE — RESULT ENCOUNTER NOTE
Please notify patient that worsening diabetes is noted, she needs to follow-up with Dr. Kirkland.    Glucose and protein noted in the urine, if she have any symptoms of urinary tract infection at this time?    Uric acid is increased as well, continue allopurinol in decrease foods rich in purine.    Alk phos is elevated, will order liver ultrasound.    Slightly improving cholesterol but worsening triglycerides, likely related to worsening diabetes.  Continue omega-3's.  Lifestyle modifications recommended, increasing exercise as tolerated and diet changes to aid in weight loss.  She has allergies to CT as well as statins.    Not immune to hepatitis B and would benefit for immunization.    Other labs appear to be within normal limits.

## 2024-02-05 ENCOUNTER — TELEPHONE (OUTPATIENT)
Dept: FAMILY MEDICINE CLINIC | Age: 64
End: 2024-02-05

## 2024-02-05 DIAGNOSIS — I10 ESSENTIAL HYPERTENSION: Primary | Chronic | ICD-10-CM

## 2024-02-05 RX ORDER — LISINOPRIL 5 MG/1
5 TABLET ORAL DAILY
Qty: 90 TABLET | Refills: 1 | Status: SHIPPED | OUTPATIENT
Start: 2024-02-05

## 2024-02-05 NOTE — TELEPHONE ENCOUNTER
Patient called and stated that she needed refills of medication below, although medication is d/c.       lisinopril (PRINIVIL;ZESTRIL) 5 MG tablet [[id number]]  DISCONTINUED    Order Details  Dose: 5 mg Route: Oral Frequency: EVERY MORNING   Dispense Quantity: 30 tablet Refills: 3          Sig: Take 1 tablet by mouth every morning         Start Date: 10/18/23 End Date: 10/19/23   Discontinued by: Ronaldo Bunch RN on 10/19/2023 10:52   Reason: Stop Taking at Discharge         Written Date: 10/18/23 Expiration Date: 10/17/24   Providers    Ordering and Authorizing Provider: Matt Zuniga MD NPI: [id number]   Ordering User: Matt Zuniga MD          Pharmacy      Please Approve or Refuse.  Send to Pharmacy per Pt's Request: [facility]     Next Visit Date:  3/5/2024   Last Visit Date: 12/4/2023    Hemoglobin A1C (%)   Date Value   01/30/2024 7.9 (H)   09/18/2023 7.5 (H)   06/15/2023 7.6             ( goal A1C is < 7)   BP Readings from Last 3 Encounters:   01/30/24 (!) 181/77   01/04/24 (!) 182/71   12/21/23 (!) 159/75          (goal 120/80)  BUN   Date Value Ref Range Status   01/30/2024 20 8 - 23 mg/dL Final     Creatinine   Date Value Ref Range Status   01/30/2024 0.8 0.5 - 0.9 mg/dL Final     Potassium   Date Value Ref Range Status   01/30/2024 4.6 3.7 - 5.3 mmol/L Final

## 2024-02-06 ENCOUNTER — HOSPITAL ENCOUNTER (OUTPATIENT)
Dept: PULMONOLOGY | Age: 64
Discharge: HOME | End: 2024-02-06

## 2024-02-13 ENCOUNTER — FOLLOWUP TELEPHONE ENCOUNTER (OUTPATIENT)
Dept: WOUND CARE | Age: 64
End: 2024-02-13

## 2024-02-13 NOTE — DISCHARGE INSTRUCTIONS
[facility] WOUND and HYPERBARIC TREATMENT  CENTER                            Visit  Discharge Instructions / Physician Orders  DATE: 2/15/24     Home Care:NONE     SUPPLIES ORDERED THRU:      [employer]               DATE LAST SUPPLIED 12/12/23     Wound Location:  Right Knee     Cleanse with: Liquid antibacterial soap and water, rinse well      Dressing Orders:  Primary dressing  Crystal tuck into wound with end of Q tip   Secondary dressing   Cover with Silicone border dressing secure with           x 30days     Frequency:  DAILY     Additional Orders: Increase protein to diet (meat, cheese, eggs, fish, peanut butter, nuts and beans)  Multivitamin daily     OFFLOADING [x] YES  TYPE:                  [] NA     Weekly wound care visits until determined otherwise.     Antibiotic therapy-wound care related YES [] NO [x] NA[]     MY CHART []     Smart Device  []      HYPERBARIC TREATMENT-                TREATMENT #                          Your next appointment with the Wound Care Center is in 2 weeks                                                                                                    (Please note your next appointment above and if you are unable to keep, kindly give a 24 hour notice. Thank you.)  If more than 15 min late we cannot guarantee you will be seen due to clinician schedule  Per Policy, Excessive cancellation will call for dismissal from program.  If you experience any of the following, please call the Wound Care Center during business hours:  [phone]     * Increase in Pain  * Temperature over 101  * Increase in drainage from your wound  * Drainage with a foul odor  * Bleeding  * Increase in swelling  * Need for compression bandage changes due to slippage, breakthrough drainage.     If you need medical attention outside of the business hours of the Wound Care Centers please contact your PCP or go to the nearest emergency room.     The information contained in the After Visit

## 2024-02-13 NOTE — PROGRESS NOTES
Pt called to cancel [facility] Wound care apt scheduled for 2/15/24. She stated she had an apt with a surgeon the day before, and felt like the wound care apt wasn't needed. Pt requested to reschedule on or around 2/27/24. Pt rescheduled on 2/29/24 1300. [facility] Wound care RN updated.

## 2024-02-14 ENCOUNTER — OFFICE VISIT (OUTPATIENT)
Dept: ORTHOPEDIC SURGERY | Age: 64
End: 2024-02-14
Payer: COMMERCIAL

## 2024-02-14 ENCOUNTER — APPOINTMENT (OUTPATIENT)
Dept: VASCULAR LAB | Age: 64
DRG: 194 | End: 2024-02-14
Attending: EMERGENCY MEDICINE
Payer: COMMERCIAL

## 2024-02-14 ENCOUNTER — APPOINTMENT (OUTPATIENT)
Dept: GENERAL RADIOLOGY | Age: 64
DRG: 194 | End: 2024-02-14
Payer: COMMERCIAL

## 2024-02-14 ENCOUNTER — HOSPITAL ENCOUNTER (INPATIENT)
Age: 64
LOS: 4 days | Discharge: HOME | DRG: 194 | End: 2024-02-18
Attending: EMERGENCY MEDICINE | Admitting: INTERNAL MEDICINE
Payer: COMMERCIAL

## 2024-02-14 VITALS — RESPIRATION RATE: 14 BRPM | HEIGHT: 60 IN | WEIGHT: 280 LBS | BODY MASS INDEX: 54.97 KG/M2

## 2024-02-14 DIAGNOSIS — I27.81 COR PULMONALE (CHRONIC) (HCC): ICD-10-CM

## 2024-02-14 DIAGNOSIS — Z96.651 S/P TOTAL KNEE ARTHROPLASTY, RIGHT: Primary | ICD-10-CM

## 2024-02-14 DIAGNOSIS — R60.9 PERIPHERAL EDEMA: Primary | ICD-10-CM

## 2024-02-14 DIAGNOSIS — M17.0 PRIMARY OSTEOARTHRITIS OF BOTH KNEES: ICD-10-CM

## 2024-02-14 PROBLEM — R60.1 ANASARCA: Status: ACTIVE | Noted: 2024-02-14

## 2024-02-14 LAB
ANION GAP SERPL CALCULATED.3IONS-SCNC: 9 MMOL/L (ref 9–17)
BASOPHILS # BLD: 0 K/UL (ref 0–0.2)
BASOPHILS NFR BLD: 0 % (ref 0–2)
BNP SERPL-MCNC: <36 PG/ML
BUN SERPL-MCNC: 25 MG/DL (ref 8–23)
CALCIUM SERPL-MCNC: 8.4 MG/DL (ref 8.6–10.4)
CHLORIDE SERPL-SCNC: 99 MMOL/L (ref 98–107)
CO2 SERPL-SCNC: 29 MMOL/L (ref 20–31)
CREAT SERPL-MCNC: 0.8 MG/DL (ref 0.5–0.9)
D DIMER PPP FEU-MCNC: 1.36 UG/ML FEU (ref 0–0.59)
ECHO BSA: 2.32 M2
EOSINOPHIL # BLD: 0.11 K/UL (ref 0–0.4)
EOSINOPHILS RELATIVE PERCENT: 1 % (ref 0–4)
ERYTHROCYTE [DISTWIDTH] IN BLOOD BY AUTOMATED COUNT: 16.2 % (ref 11.5–14.9)
GFR SERPL CREATININE-BSD FRML MDRD: >60 ML/MIN/1.73M2
GLUCOSE SERPL-MCNC: 230 MG/DL (ref 70–99)
HCT VFR BLD AUTO: 37.8 % (ref 36–46)
HGB BLD-MCNC: 12 G/DL (ref 12–16)
LYMPHOCYTES NFR BLD: 1.67 K/UL (ref 1–4.8)
LYMPHOCYTES RELATIVE PERCENT: 15 % (ref 24–44)
MCH RBC QN AUTO: 28.5 PG (ref 26–34)
MCHC RBC AUTO-ENTMCNC: 31.8 G/DL (ref 31–37)
MCV RBC AUTO: 89.6 FL (ref 80–100)
MONOCYTES NFR BLD: 0.67 K/UL (ref 0.1–1.3)
MONOCYTES NFR BLD: 6 % (ref 1–7)
MORPHOLOGY: ABNORMAL
MYOGLOBIN SERPL-MCNC: 52 NG/ML (ref 25–58)
MYOGLOBIN SERPL-MCNC: 57 NG/ML (ref 25–58)
NEUTROPHILS NFR BLD: 78 % (ref 36–66)
NEUTS SEG NFR BLD: 8.65 K/UL (ref 1.3–9.1)
PLATELET # BLD AUTO: 270 K/UL (ref 150–450)
PMV BLD AUTO: 8.9 FL (ref 6–12)
POTASSIUM SERPL-SCNC: 4.1 MMOL/L (ref 3.7–5.3)
RBC # BLD AUTO: 4.21 M/UL (ref 4–5.2)
SODIUM SERPL-SCNC: 137 MMOL/L (ref 135–144)
TROPONIN I SERPL HS-MCNC: 20 NG/L (ref 0–14)
TROPONIN I SERPL HS-MCNC: 21 NG/L (ref 0–14)
WBC OTHER # BLD: 11.1 K/UL (ref 3.5–11)

## 2024-02-14 PROCEDURE — 2580000003 HC RX 258: Performed by: INTERNAL MEDICINE

## 2024-02-14 PROCEDURE — 85025 COMPLETE CBC W/AUTO DIFF WBC: CPT

## 2024-02-14 PROCEDURE — G8484 FLU IMMUNIZE NO ADMIN: HCPCS | Performed by: PHYSICIAN ASSISTANT

## 2024-02-14 PROCEDURE — 83874 ASSAY OF MYOGLOBIN: CPT

## 2024-02-14 PROCEDURE — 36415 COLL VENOUS BLD VENIPUNCTURE: CPT

## 2024-02-14 PROCEDURE — 6370000000 HC RX 637 (ALT 250 FOR IP): Performed by: INTERNAL MEDICINE

## 2024-02-14 PROCEDURE — 3017F COLORECTAL CA SCREEN DOC REV: CPT | Performed by: PHYSICIAN ASSISTANT

## 2024-02-14 PROCEDURE — 93970 EXTREMITY STUDY: CPT | Performed by: SURGERY

## 2024-02-14 PROCEDURE — 2500000003 HC RX 250 WO HCPCS: Performed by: INTERNAL MEDICINE

## 2024-02-14 PROCEDURE — 71045 X-RAY EXAM CHEST 1 VIEW: CPT

## 2024-02-14 PROCEDURE — 99213 OFFICE O/P EST LOW 20 MIN: CPT | Performed by: PHYSICIAN ASSISTANT

## 2024-02-14 PROCEDURE — 6360000002 HC RX W HCPCS: Performed by: EMERGENCY MEDICINE

## 2024-02-14 PROCEDURE — 93005 ELECTROCARDIOGRAM TRACING: CPT | Performed by: EMERGENCY MEDICINE

## 2024-02-14 PROCEDURE — 80048 BASIC METABOLIC PNL TOTAL CA: CPT

## 2024-02-14 PROCEDURE — 2060000000 HC ICU INTERMEDIATE R&B

## 2024-02-14 PROCEDURE — 99285 EMERGENCY DEPT VISIT HI MDM: CPT

## 2024-02-14 PROCEDURE — G8427 DOCREV CUR MEDS BY ELIG CLIN: HCPCS | Performed by: PHYSICIAN ASSISTANT

## 2024-02-14 PROCEDURE — 85379 FIBRIN DEGRADATION QUANT: CPT

## 2024-02-14 PROCEDURE — 1036F TOBACCO NON-USER: CPT | Performed by: PHYSICIAN ASSISTANT

## 2024-02-14 PROCEDURE — G8417 CALC BMI ABV UP PARAM F/U: HCPCS | Performed by: PHYSICIAN ASSISTANT

## 2024-02-14 PROCEDURE — 83880 ASSAY OF NATRIURETIC PEPTIDE: CPT

## 2024-02-14 PROCEDURE — 84484 ASSAY OF TROPONIN QUANT: CPT

## 2024-02-14 PROCEDURE — 6360000002 HC RX W HCPCS: Performed by: INTERNAL MEDICINE

## 2024-02-14 PROCEDURE — 93970 EXTREMITY STUDY: CPT

## 2024-02-14 RX ORDER — INSULIN GLARGINE 100 [IU]/ML
40 INJECTION, SOLUTION SUBCUTANEOUS 2 TIMES DAILY
Status: DISCONTINUED | OUTPATIENT
Start: 2024-02-14 | End: 2024-02-18 | Stop reason: HOSPADM

## 2024-02-14 RX ORDER — FUROSEMIDE 10 MG/ML
40 INJECTION INTRAMUSCULAR; INTRAVENOUS 3 TIMES DAILY
Status: DISCONTINUED | OUTPATIENT
Start: 2024-02-14 | End: 2024-02-15

## 2024-02-14 RX ORDER — ACETAMINOPHEN 650 MG/1
650 SUPPOSITORY RECTAL EVERY 6 HOURS PRN
Status: DISCONTINUED | OUTPATIENT
Start: 2024-02-14 | End: 2024-02-18 | Stop reason: HOSPADM

## 2024-02-14 RX ORDER — ROPINIROLE 1 MG/1
2 TABLET, FILM COATED ORAL 3 TIMES DAILY
Status: DISCONTINUED | OUTPATIENT
Start: 2024-02-14 | End: 2024-02-18 | Stop reason: HOSPADM

## 2024-02-14 RX ORDER — ALLOPURINOL 100 MG/1
100 TABLET ORAL DAILY
Status: DISCONTINUED | OUTPATIENT
Start: 2024-02-14 | End: 2024-02-18 | Stop reason: HOSPADM

## 2024-02-14 RX ORDER — INSULIN GLARGINE 100 [IU]/ML
80 INJECTION, SOLUTION SUBCUTANEOUS 2 TIMES DAILY
COMMUNITY

## 2024-02-14 RX ORDER — MAGNESIUM SULFATE 1 G/100ML
1000 INJECTION INTRAVENOUS PRN
Status: DISCONTINUED | OUTPATIENT
Start: 2024-02-14 | End: 2024-02-18 | Stop reason: HOSPADM

## 2024-02-14 RX ORDER — LEVOTHYROXINE SODIUM 112 UG/1
112 TABLET ORAL DAILY
Status: DISCONTINUED | OUTPATIENT
Start: 2024-02-15 | End: 2024-02-18 | Stop reason: HOSPADM

## 2024-02-14 RX ORDER — POTASSIUM CHLORIDE 20 MEQ/1
40 TABLET, EXTENDED RELEASE ORAL PRN
Status: DISCONTINUED | OUTPATIENT
Start: 2024-02-14 | End: 2024-02-18 | Stop reason: HOSPADM

## 2024-02-14 RX ORDER — SODIUM CHLORIDE 0.9 % (FLUSH) 0.9 %
5-40 SYRINGE (ML) INJECTION EVERY 12 HOURS SCHEDULED
Status: DISCONTINUED | OUTPATIENT
Start: 2024-02-14 | End: 2024-02-18 | Stop reason: HOSPADM

## 2024-02-14 RX ORDER — MECLIZINE HYDROCHLORIDE 25 MG/1
25 TABLET ORAL 3 TIMES DAILY PRN
Status: DISCONTINUED | OUTPATIENT
Start: 2024-02-14 | End: 2024-02-18 | Stop reason: HOSPADM

## 2024-02-14 RX ORDER — ASPIRIN 81 MG/1
81 TABLET ORAL DAILY
COMMUNITY

## 2024-02-14 RX ORDER — ASPIRIN 81 MG/1
81 TABLET ORAL DAILY
Status: DISCONTINUED | OUTPATIENT
Start: 2024-02-14 | End: 2024-02-18 | Stop reason: HOSPADM

## 2024-02-14 RX ORDER — LIOTHYRONINE SODIUM 5 UG/1
5 TABLET ORAL DAILY
Status: DISCONTINUED | OUTPATIENT
Start: 2024-02-14 | End: 2024-02-18 | Stop reason: HOSPADM

## 2024-02-14 RX ORDER — FUROSEMIDE 10 MG/ML
40 INJECTION INTRAMUSCULAR; INTRAVENOUS ONCE
Status: COMPLETED | OUTPATIENT
Start: 2024-02-14 | End: 2024-02-14

## 2024-02-14 RX ORDER — POTASSIUM CHLORIDE 7.45 MG/ML
10 INJECTION INTRAVENOUS PRN
Status: DISCONTINUED | OUTPATIENT
Start: 2024-02-14 | End: 2024-02-18 | Stop reason: HOSPADM

## 2024-02-14 RX ORDER — SODIUM CHLORIDE 9 MG/ML
INJECTION, SOLUTION INTRAVENOUS PRN
Status: DISCONTINUED | OUTPATIENT
Start: 2024-02-14 | End: 2024-02-18 | Stop reason: HOSPADM

## 2024-02-14 RX ORDER — ONDANSETRON 2 MG/ML
4 INJECTION INTRAMUSCULAR; INTRAVENOUS EVERY 6 HOURS PRN
Status: DISCONTINUED | OUTPATIENT
Start: 2024-02-14 | End: 2024-02-18 | Stop reason: HOSPADM

## 2024-02-14 RX ORDER — POLYETHYLENE GLYCOL 3350 17 G/17G
17 POWDER, FOR SOLUTION ORAL DAILY PRN
Status: DISCONTINUED | OUTPATIENT
Start: 2024-02-14 | End: 2024-02-18 | Stop reason: HOSPADM

## 2024-02-14 RX ORDER — ENOXAPARIN SODIUM 100 MG/ML
30 INJECTION SUBCUTANEOUS 2 TIMES DAILY
Status: DISCONTINUED | OUTPATIENT
Start: 2024-02-14 | End: 2024-02-18 | Stop reason: HOSPADM

## 2024-02-14 RX ORDER — PRAMIPEXOLE DIHYDROCHLORIDE 0.25 MG/1
0.5 TABLET ORAL NIGHTLY
Status: DISCONTINUED | OUTPATIENT
Start: 2024-02-14 | End: 2024-02-18 | Stop reason: HOSPADM

## 2024-02-14 RX ORDER — VENLAFAXINE HYDROCHLORIDE 37.5 MG/1
37.5 CAPSULE, EXTENDED RELEASE ORAL DAILY
Status: DISCONTINUED | OUTPATIENT
Start: 2024-02-14 | End: 2024-02-18 | Stop reason: HOSPADM

## 2024-02-14 RX ORDER — SODIUM CHLORIDE 0.9 % (FLUSH) 0.9 %
10 SYRINGE (ML) INJECTION PRN
Status: DISCONTINUED | OUTPATIENT
Start: 2024-02-14 | End: 2024-02-18 | Stop reason: HOSPADM

## 2024-02-14 RX ORDER — AMMONIUM LACTATE 12 G/100G
LOTION TOPICAL
Status: DISCONTINUED | OUTPATIENT
Start: 2024-02-14 | End: 2024-02-18 | Stop reason: HOSPADM

## 2024-02-14 RX ORDER — OXYBUTYNIN CHLORIDE 15 MG/1
15 TABLET, EXTENDED RELEASE ORAL 2 TIMES DAILY
COMMUNITY

## 2024-02-14 RX ORDER — ACETAMINOPHEN 325 MG/1
650 TABLET ORAL EVERY 6 HOURS PRN
Status: DISCONTINUED | OUTPATIENT
Start: 2024-02-14 | End: 2024-02-18 | Stop reason: HOSPADM

## 2024-02-14 RX ORDER — LISINOPRIL 5 MG/1
5 TABLET ORAL DAILY
Status: DISCONTINUED | OUTPATIENT
Start: 2024-02-14 | End: 2024-02-18 | Stop reason: HOSPADM

## 2024-02-14 RX ORDER — ALBUTEROL SULFATE 90 UG/1
2 AEROSOL, METERED RESPIRATORY (INHALATION) EVERY 4 HOURS PRN
Status: DISCONTINUED | OUTPATIENT
Start: 2024-02-14 | End: 2024-02-18 | Stop reason: HOSPADM

## 2024-02-14 RX ORDER — ONDANSETRON 4 MG/1
4 TABLET, ORALLY DISINTEGRATING ORAL EVERY 8 HOURS PRN
Status: DISCONTINUED | OUTPATIENT
Start: 2024-02-14 | End: 2024-02-18 | Stop reason: HOSPADM

## 2024-02-14 RX ORDER — FAMOTIDINE 40 MG/1
40 TABLET, FILM COATED ORAL DAILY
COMMUNITY

## 2024-02-14 RX ORDER — PANTOPRAZOLE SODIUM 40 MG/1
40 TABLET, DELAYED RELEASE ORAL
Status: DISCONTINUED | OUTPATIENT
Start: 2024-02-15 | End: 2024-02-18 | Stop reason: HOSPADM

## 2024-02-14 RX ADMIN — ASPIRIN 81 MG: 81 TABLET, COATED ORAL at 21:26

## 2024-02-14 RX ADMIN — VENLAFAXINE HYDROCHLORIDE 37.5 MG: 37.5 CAPSULE, EXTENDED RELEASE ORAL at 21:32

## 2024-02-14 RX ADMIN — FUROSEMIDE 40 MG: 10 INJECTION, SOLUTION INTRAMUSCULAR; INTRAVENOUS at 18:39

## 2024-02-14 RX ADMIN — OXYBUTYNIN CHLORIDE 15 MG: 10 TABLET, EXTENDED RELEASE ORAL at 21:26

## 2024-02-14 RX ADMIN — ANTI-FUNGAL POWDER MICONAZOLE NITRATE TALC FREE: 1.42 POWDER TOPICAL at 21:34

## 2024-02-14 RX ADMIN — ALLOPURINOL 100 MG: 100 TABLET ORAL at 21:27

## 2024-02-14 RX ADMIN — ROPINIROLE HYDROCHLORIDE 2 MG: 1 TABLET, FILM COATED ORAL at 21:27

## 2024-02-14 RX ADMIN — FUROSEMIDE 40 MG: 10 INJECTION, SOLUTION INTRAMUSCULAR; INTRAVENOUS at 21:35

## 2024-02-14 RX ADMIN — ENOXAPARIN SODIUM 30 MG: 100 INJECTION SUBCUTANEOUS at 21:34

## 2024-02-14 RX ADMIN — LIOTHYRONINE SODIUM 5 MCG: 5 TABLET ORAL at 21:33

## 2024-02-14 RX ADMIN — LISINOPRIL 5 MG: 5 TABLET ORAL at 21:39

## 2024-02-14 RX ADMIN — PRAMIPEXOLE DIHYDROCHLORIDE 0.5 MG: 0.25 TABLET ORAL at 21:31

## 2024-02-14 RX ADMIN — SODIUM CHLORIDE, PRESERVATIVE FREE 10 ML: 5 INJECTION INTRAVENOUS at 21:58

## 2024-02-14 RX ADMIN — INSULIN GLARGINE 40 UNITS: 100 INJECTION, SOLUTION SUBCUTANEOUS at 21:57

## 2024-02-14 ASSESSMENT — PAIN SCALES - GENERAL
PAINLEVEL_OUTOF10: 0
PAINLEVEL_OUTOF10: 0
PAINLEVEL_OUTOF10: 7
PAINLEVEL_OUTOF10: 0
PAINLEVEL_OUTOF10: 0

## 2024-02-14 ASSESSMENT — PAIN - FUNCTIONAL ASSESSMENT: PAIN_FUNCTIONAL_ASSESSMENT: 0-10

## 2024-02-14 NOTE — ED PROVIDER NOTES
[facility]  eMERGENCY dEPARTMENT eNCOUnter      Pt Name: Marcio Veloz  MRN: [id number]  Birthdate 1960  Date of evaluation: 2/14/24      CHIEF COMPLAINT       Chief Complaint   Patient presents with    Shortness of Breath    Post-op Problem         HISTORY OF PRESENT ILLNESS    Marcio Veloz is a 63 y.o. female who presents complaining of shortness of breath.  Patient states that she has been struggling with shortness of breath for a long time since she had her right knee replacement.  Patient has seen her doctors been worked up they increased her Bumex to try to get fluid moving without any relief.  Patient states her legs and all of her body feels swollen.  Patient states she does not intermittently get chest pains.  Patient does have cardiac history.  Patient has had no cough or fevers.      REVIEW OF SYSTEMS       Review of Systems   Constitutional:  Negative for activity change, appetite change, chills, diaphoresis and fever.   HENT:  Negative for congestion, ear pain, facial swelling, nosebleeds, rhinorrhea, sinus pressure, sore throat and trouble swallowing.    Eyes:  Negative for pain, discharge and redness.   Respiratory:  Positive for shortness of breath. Negative for cough, chest tightness and wheezing.    Cardiovascular:  Positive for chest pain and leg swelling. Negative for palpitations.   Gastrointestinal:  Negative for abdominal pain, blood in stool, constipation, diarrhea, nausea and vomiting.   Genitourinary:  Negative for difficulty urinating, dysuria, flank pain, frequency, genital sores and hematuria.   Musculoskeletal:  Positive for back pain. Negative for arthralgias, gait problem, joint swelling, myalgias and neck pain.   Skin:  Negative for color change, pallor, rash and wound.   Neurological:  Negative for dizziness, tremors, seizures, syncope, speech difficulty, weakness, numbness and headaches.   Psychiatric/Behavioral:  Negative for confusion, decreased concentration,

## 2024-02-14 NOTE — ED TRIAGE NOTES
Mode of arrival (squad #, walk in, police, etc) : walk i        Chief complaint(s): SOB, post op problem        Arrival Note (brief scenario, treatment PTA, etc).: pt had knee replacement done in October was on aspirin for 2 weeks. Pt has had SOB since. Pt states she hasn't been moving her knee much. Pt is not any blood thinners. PCP and ortho surg sent her to ED        C= \"Have you ever felt that you should Cut down on your drinking?\"  No  A= \"Have people Annoyed you by criticizing your drinking?\"  No  G= \"Have you ever felt bad or Guilty about your drinking?\"  No  E= \"Have you ever had a drink as an Eye-opener first thing in the morning to steady your nerves or to help a hangover?\"  No      Deferred []      Reason for deferring: N/A    *If yes to two or more: probable alcohol abuse.*

## 2024-02-15 ENCOUNTER — HOSPITAL ENCOUNTER (OUTPATIENT)
Dept: WOUND CARE | Age: 64
Discharge: HOME | End: 2024-02-15

## 2024-02-15 PROBLEM — I27.81 COR PULMONALE (CHRONIC) (HCC): Status: ACTIVE | Noted: 2024-02-15

## 2024-02-15 PROBLEM — R60.0 PERIPHERAL EDEMA: Status: ACTIVE | Noted: 2024-02-15

## 2024-02-15 PROBLEM — R60.9 PERIPHERAL EDEMA: Status: ACTIVE | Noted: 2024-02-15

## 2024-02-15 LAB
ALBUMIN SERPL-MCNC: 3.5 G/DL (ref 3.5–5.2)
ALP SERPL-CCNC: 112 U/L (ref 35–104)
ALT SERPL-CCNC: 16 U/L (ref 5–33)
ANION GAP SERPL CALCULATED.3IONS-SCNC: 10 MMOL/L (ref 9–17)
AST SERPL-CCNC: 19 U/L
BASOPHILS # BLD: 0.1 K/UL (ref 0–0.2)
BASOPHILS NFR BLD: 1 % (ref 0–2)
BILIRUB SERPL-MCNC: 0.2 MG/DL (ref 0.3–1.2)
BUN SERPL-MCNC: 26 MG/DL (ref 8–23)
CALCIUM SERPL-MCNC: 8.8 MG/DL (ref 8.6–10.4)
CHLORIDE SERPL-SCNC: 98 MMOL/L (ref 98–107)
CO2 SERPL-SCNC: 30 MMOL/L (ref 20–31)
CREAT SERPL-MCNC: 0.9 MG/DL (ref 0.5–0.9)
EKG ATRIAL RATE: 78 BPM
EKG P AXIS: 44 DEGREES
EKG P-R INTERVAL: 182 MS
EKG Q-T INTERVAL: 416 MS
EKG QRS DURATION: 100 MS
EKG QTC CALCULATION (BAZETT): 474 MS
EKG R AXIS: -39 DEGREES
EKG T AXIS: 24 DEGREES
EKG VENTRICULAR RATE: 78 BPM
EOSINOPHIL # BLD: 0.3 K/UL (ref 0–0.4)
EOSINOPHILS RELATIVE PERCENT: 3 % (ref 0–4)
ERYTHROCYTE [DISTWIDTH] IN BLOOD BY AUTOMATED COUNT: 16.4 % (ref 11.5–14.9)
GFR SERPL CREATININE-BSD FRML MDRD: >60 ML/MIN/1.73M2
GLUCOSE BLD-MCNC: 170 MG/DL (ref 65–105)
GLUCOSE BLD-MCNC: 187 MG/DL (ref 65–105)
GLUCOSE BLD-MCNC: 230 MG/DL (ref 65–105)
GLUCOSE BLD-MCNC: 231 MG/DL (ref 65–105)
GLUCOSE BLD-MCNC: 250 MG/DL (ref 65–105)
GLUCOSE BLD-MCNC: 257 MG/DL (ref 65–105)
GLUCOSE SERPL-MCNC: 172 MG/DL (ref 70–99)
HCT VFR BLD AUTO: 37.6 % (ref 36–46)
HGB BLD-MCNC: 11.9 G/DL (ref 12–16)
INR PPP: 0.9
LYMPHOCYTES NFR BLD: 2.3 K/UL (ref 1–4.8)
LYMPHOCYTES RELATIVE PERCENT: 23 % (ref 24–44)
MCH RBC QN AUTO: 28.5 PG (ref 26–34)
MCHC RBC AUTO-ENTMCNC: 31.8 G/DL (ref 31–37)
MCV RBC AUTO: 89.8 FL (ref 80–100)
MONOCYTES NFR BLD: 0.7 K/UL (ref 0.1–1.3)
MONOCYTES NFR BLD: 7 % (ref 1–7)
NEUTROPHILS NFR BLD: 66 % (ref 36–66)
NEUTS SEG NFR BLD: 6.7 K/UL (ref 1.3–9.1)
PLATELET # BLD AUTO: 273 K/UL (ref 150–450)
PMV BLD AUTO: 9.1 FL (ref 6–12)
POTASSIUM SERPL-SCNC: 3.9 MMOL/L (ref 3.7–5.3)
PROT SERPL-MCNC: 6.2 G/DL (ref 6.4–8.3)
PROTHROMBIN TIME: 12.9 SEC (ref 11.8–14.6)
RBC # BLD AUTO: 4.18 M/UL (ref 4–5.2)
SODIUM SERPL-SCNC: 138 MMOL/L (ref 135–144)
T4 FREE SERPL-MCNC: 1 NG/DL (ref 0.9–1.7)
WBC OTHER # BLD: 10.1 K/UL (ref 3.5–11)

## 2024-02-15 PROCEDURE — 2580000003 HC RX 258: Performed by: INTERNAL MEDICINE

## 2024-02-15 PROCEDURE — 97162 PT EVAL MOD COMPLEX 30 MIN: CPT

## 2024-02-15 PROCEDURE — 2060000000 HC ICU INTERMEDIATE R&B

## 2024-02-15 PROCEDURE — 6360000002 HC RX W HCPCS: Performed by: INTERNAL MEDICINE

## 2024-02-15 PROCEDURE — 82947 ASSAY GLUCOSE BLOOD QUANT: CPT

## 2024-02-15 PROCEDURE — 97166 OT EVAL MOD COMPLEX 45 MIN: CPT

## 2024-02-15 PROCEDURE — 99223 1ST HOSP IP/OBS HIGH 75: CPT | Performed by: INTERNAL MEDICINE

## 2024-02-15 PROCEDURE — 93010 ELECTROCARDIOGRAM REPORT: CPT | Performed by: INTERNAL MEDICINE

## 2024-02-15 PROCEDURE — 6370000000 HC RX 637 (ALT 250 FOR IP): Performed by: INTERNAL MEDICINE

## 2024-02-15 PROCEDURE — 85610 PROTHROMBIN TIME: CPT

## 2024-02-15 PROCEDURE — 97530 THERAPEUTIC ACTIVITIES: CPT

## 2024-02-15 PROCEDURE — 36415 COLL VENOUS BLD VENIPUNCTURE: CPT

## 2024-02-15 PROCEDURE — 85025 COMPLETE CBC W/AUTO DIFF WBC: CPT

## 2024-02-15 PROCEDURE — 84439 ASSAY OF FREE THYROXINE: CPT

## 2024-02-15 PROCEDURE — 99254 IP/OBS CNSLTJ NEW/EST MOD 60: CPT | Performed by: INTERNAL MEDICINE

## 2024-02-15 PROCEDURE — 80053 COMPREHEN METABOLIC PANEL: CPT

## 2024-02-15 PROCEDURE — 84481 FREE ASSAY (FT-3): CPT

## 2024-02-15 RX ORDER — DEXTROSE MONOHYDRATE 100 MG/ML
INJECTION, SOLUTION INTRAVENOUS CONTINUOUS PRN
Status: DISCONTINUED | OUTPATIENT
Start: 2024-02-15 | End: 2024-02-18 | Stop reason: HOSPADM

## 2024-02-15 RX ORDER — INSULIN LISPRO 100 [IU]/ML
0-4 INJECTION, SOLUTION INTRAVENOUS; SUBCUTANEOUS
Status: DISCONTINUED | OUTPATIENT
Start: 2024-02-15 | End: 2024-02-16

## 2024-02-15 RX ORDER — INSULIN LISPRO 100 [IU]/ML
0-4 INJECTION, SOLUTION INTRAVENOUS; SUBCUTANEOUS NIGHTLY
Status: DISCONTINUED | OUTPATIENT
Start: 2024-02-15 | End: 2024-02-16

## 2024-02-15 RX ORDER — FUROSEMIDE 10 MG/ML
40 INJECTION INTRAMUSCULAR; INTRAVENOUS 2 TIMES DAILY
Status: DISCONTINUED | OUTPATIENT
Start: 2024-02-15 | End: 2024-02-17

## 2024-02-15 RX ADMIN — FUROSEMIDE 40 MG: 10 INJECTION, SOLUTION INTRAMUSCULAR; INTRAVENOUS at 17:58

## 2024-02-15 RX ADMIN — PANTOPRAZOLE SODIUM 40 MG: 40 TABLET, DELAYED RELEASE ORAL at 06:25

## 2024-02-15 RX ADMIN — LIOTHYRONINE SODIUM 5 MCG: 5 TABLET ORAL at 09:25

## 2024-02-15 RX ADMIN — LEVOTHYROXINE SODIUM 112 MCG: 0.11 TABLET ORAL at 06:26

## 2024-02-15 RX ADMIN — ENOXAPARIN SODIUM 30 MG: 100 INJECTION SUBCUTANEOUS at 21:35

## 2024-02-15 RX ADMIN — INSULIN LISPRO 2 UNITS: 100 INJECTION, SOLUTION INTRAVENOUS; SUBCUTANEOUS at 17:57

## 2024-02-15 RX ADMIN — INSULIN GLARGINE 40 UNITS: 100 INJECTION, SOLUTION SUBCUTANEOUS at 21:44

## 2024-02-15 RX ADMIN — VENLAFAXINE HYDROCHLORIDE 37.5 MG: 37.5 CAPSULE, EXTENDED RELEASE ORAL at 09:25

## 2024-02-15 RX ADMIN — ANTI-FUNGAL POWDER MICONAZOLE NITRATE TALC FREE: 1.42 POWDER TOPICAL at 21:45

## 2024-02-15 RX ADMIN — ASPIRIN 81 MG: 81 TABLET, COATED ORAL at 09:23

## 2024-02-15 RX ADMIN — OXYBUTYNIN CHLORIDE 15 MG: 10 TABLET, EXTENDED RELEASE ORAL at 21:35

## 2024-02-15 RX ADMIN — LISINOPRIL 5 MG: 5 TABLET ORAL at 09:23

## 2024-02-15 RX ADMIN — ROPINIROLE HYDROCHLORIDE 2 MG: 1 TABLET, FILM COATED ORAL at 14:29

## 2024-02-15 RX ADMIN — PRAMIPEXOLE DIHYDROCHLORIDE 0.5 MG: 0.25 TABLET ORAL at 21:36

## 2024-02-15 RX ADMIN — OXYBUTYNIN CHLORIDE 15 MG: 10 TABLET, EXTENDED RELEASE ORAL at 09:23

## 2024-02-15 RX ADMIN — FUROSEMIDE 40 MG: 10 INJECTION, SOLUTION INTRAMUSCULAR; INTRAVENOUS at 09:23

## 2024-02-15 RX ADMIN — INSULIN GLARGINE 40 UNITS: 100 INJECTION, SOLUTION SUBCUTANEOUS at 09:23

## 2024-02-15 RX ADMIN — ENOXAPARIN SODIUM 30 MG: 100 INJECTION SUBCUTANEOUS at 09:23

## 2024-02-15 RX ADMIN — SODIUM CHLORIDE, PRESERVATIVE FREE 10 ML: 5 INJECTION INTRAVENOUS at 09:24

## 2024-02-15 RX ADMIN — ROPINIROLE HYDROCHLORIDE 2 MG: 1 TABLET, FILM COATED ORAL at 21:35

## 2024-02-15 RX ADMIN — ROPINIROLE HYDROCHLORIDE 2 MG: 1 TABLET, FILM COATED ORAL at 09:23

## 2024-02-15 RX ADMIN — SODIUM CHLORIDE, PRESERVATIVE FREE 10 ML: 5 INJECTION INTRAVENOUS at 21:45

## 2024-02-15 RX ADMIN — ALLOPURINOL 100 MG: 100 TABLET ORAL at 09:23

## 2024-02-15 ASSESSMENT — PAIN SCALES - GENERAL
PAINLEVEL_OUTOF10: 0
PAINLEVEL_OUTOF10: 0
PAINLEVEL_OUTOF10: 8
PAINLEVEL_OUTOF10: 0

## 2024-02-15 ASSESSMENT — PAIN DESCRIPTION - LOCATION: LOCATION: BACK;KNEE

## 2024-02-15 NOTE — ED NOTES
TRANSFER - OUT REPORT:    Verbal report given to LUNA Pereira on Marcio Veloz  being transferred to PCU 2124 for routine progression of patient care       Report consisted of patient's Situation, Background, Assessment and   Recommendations(SBAR).     Information from the following report(s) Nurse Handoff Report, ED Encounter Summary, ED SBAR, MAR, Recent Results, and Event Log was reviewed with the receiving nurse.    Kinder Fall Assessment:                           Lines:   Peripheral IV 02/14/24 Left Antecubital (Active)   Site Assessment Clean, dry & intact 02/14/24 1639   Line Status Blood return noted;Brisk blood return;Flushed;Normal saline locked;Specimen collected 02/14/24 1639   Line Care Connections checked and tightened 02/14/24 1639   Phlebitis Assessment No symptoms 02/14/24 1639   Infiltration Assessment 0 02/14/24 1639   Alcohol Cap Used No 02/14/24 1639   Dressing Status New dressing applied;Clean, dry & intact 02/14/24 1639   Dressing Type Transparent 02/14/24 1639   Dressing Intervention New 02/14/24 1639        Opportunity for questions and clarification was provided.      Patient transported with:  Tech
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
## 2024-02-15 NOTE — PROGRESS NOTES
[facility]                   Bhavesh Franco PA-C            [address], [address]               [place], [place] [address]           Dept Phone: [phone]           Dept Fax:  [phone] [address] [facility]                       [address]           [place], [place] [address]          Dept Phone: [phone]           Dept Fax:  [phone]      Chief Compliant:  Chief Complaint   Patient presents with    Knee Pain     Bilateral knee pain.   S/P right TKA 10/19/23        History of Present Illness:  Marcio returns today.  This is a 63 y.o. female who presents to the clinic today for follow up of right total knee arthroplasty on 10/16/2023.  Patient has had a complicated postoperative recovery due to delayed healing of a portion of the wound.  She has been working with wound care On a biweekly basis for routine dressing changes and debridement.  She reports that the wound to the distalmost aspect is still open but the drainage has significantly decreased.  She is doing dressing changes every other day and feels that the drainage is decreasing as well.    Patient reports that this recovery is complicated by severe water retention and edema to both bilateral lower extremities.  She reports this has gotten so severe that she is actually having edema blisters popping on her contralateral leg and feels like it has even got to the point where she is having difficulty breathing secondary to all this fluid.  She did contact her PCP who did recommend she go to the ED for further evaluation after today's appointment      Review of Systems   Constitutional: Negative for fever, chills, sweats, recent illness, or recent injury.   Neurological: Negative for headaches, numbness, or weakness.   Integumentary: Negative for rash, itching, ecchymosis, abrasions, or laceration.   Musculoskeletal: Positive for Knee Pain (Bilateral knee pain. /S/P right TKA 10/19/23)       Physical

## 2024-02-15 NOTE — H&P
2/14/2024  No acute process.       Assessment :      Hospital Problems             Last Modified POA    * (Principal) Anasarca 2/14/2024 Yes    Hypercholesterolemia 2/15/2024 Yes    Diabetic neuropathy (HCC) 2/15/2024 Yes    DESTINY (obstructive sleep apnea) (Chronic) 2/15/2024 Yes    Vitamin D deficiency 2/15/2024 Yes    Primary osteoarthritis of both knees 2/15/2024 Yes    Primary osteoarthritis of both hips 2/15/2024 Yes    Essential hypertension (Chronic) 2/15/2024 Yes    Hypothyroidism 2/15/2024 Yes    Primary osteoarthritis of right shoulder (Chronic) 2/15/2024 Yes    RLS (restless legs syndrome) (Chronic) 2/15/2024 Yes    Type 2 diabetes mellitus with diabetic neuropathy, with long-term current use of insulin (HCC) 2/15/2024 Yes    Morbid obesity with BMI of 45.0-49.9, adult ([facility]) 2/15/2024 Yes       Plan:     Patient status inpatient in the Progressive Unit/Step down    1.  62-year-old female admitted with anasarca multifactorial, morbid obesity with BMI 55, poor diet control, obstructive sleep apnea, obesity hypoventilation syndrome, and acute respiratory failure needing 2 to 3 L of oxygen, oxygen saturation dropped 80s, started on Lasix 40 every 8 hours, drop to twice a day, monitor kidney functions and intravascular depletion,  Patient cannot tolerate CPAP at home, pulmonary consulted,  Possible right heart failure, cor pulmonale, cardiology on board, advised to control the sleep apnea, no cardiac interventions at this time per cardiology,  Type 2 diabetes, A1c 7.7, patient may get benefit with GLP-1 agonist as outpatient,  Bilateral knee arthritis due to overweight and osteoarthritis status post replacement,  Restless leg syndrome,  Major depression on Effexor which will make her gain weight as well,  Hypothyroidism, TSH normal, free T4 normal,  Echocardiogram looked normal with a EF of 55% in May 02 23, cardiology did not repeated,  DVT prophylaxis with Lovenox,  Full CODE STATUS,  Check BMP tomorrow,    2.

## 2024-02-16 ENCOUNTER — APPOINTMENT (OUTPATIENT)
Age: 64
DRG: 194 | End: 2024-02-16
Payer: COMMERCIAL

## 2024-02-16 LAB
ANION GAP SERPL CALCULATED.3IONS-SCNC: 8 MMOL/L (ref 9–17)
BUN SERPL-MCNC: 26 MG/DL (ref 8–23)
CALCIUM SERPL-MCNC: 8.8 MG/DL (ref 8.6–10.4)
CHLORIDE SERPL-SCNC: 97 MMOL/L (ref 98–107)
CO2 SERPL-SCNC: 32 MMOL/L (ref 20–31)
COHGB MFR BLD: 2 % (ref 0–5)
CREAT SERPL-MCNC: 0.9 MG/DL (ref 0.5–0.9)
ECHO AO ROOT DIAM: 2.5 CM
ECHO AO ROOT INDEX: 1.16 CM/M2
ECHO AV PEAK GRADIENT: 14 MMHG
ECHO AV PEAK VELOCITY: 1.9 M/S
ECHO AV VELOCITY RATIO: 0.58
ECHO BSA: 2.33 M2
ECHO LA AREA 2C: 16.6 CM2
ECHO LA AREA 4C: 15.5 CM2
ECHO LA DIAMETER INDEX: 1.76 CM/M2
ECHO LA DIAMETER: 3.8 CM
ECHO LA MAJOR AXIS: 5 CM
ECHO LA MINOR AXIS: 5.4 CM
ECHO LA TO AORTIC ROOT RATIO: 1.52
ECHO LA VOL BP: 42 ML (ref 22–52)
ECHO LA VOL MOD A2C: 43 ML (ref 22–52)
ECHO LA VOL MOD A4C: 38 ML (ref 22–52)
ECHO LA VOL/BSA BIPLANE: 19 ML/M2 (ref 16–34)
ECHO LA VOLUME INDEX MOD A2C: 20 ML/M2 (ref 16–34)
ECHO LA VOLUME INDEX MOD A4C: 18 ML/M2 (ref 16–34)
ECHO LV E' LATERAL VELOCITY: 6 CM/S
ECHO LV E' SEPTAL VELOCITY: 5 CM/S
ECHO LV FRACTIONAL SHORTENING: 31 % (ref 28–44)
ECHO LV INTERNAL DIMENSION DIASTOLE INDEX: 2.22 CM/M2
ECHO LV INTERNAL DIMENSION DIASTOLIC: 4.8 CM (ref 3.9–5.3)
ECHO LV INTERNAL DIMENSION SYSTOLIC INDEX: 1.53 CM/M2
ECHO LV INTERNAL DIMENSION SYSTOLIC: 3.3 CM
ECHO LV IVSD: 1.1 CM (ref 0.6–0.9)
ECHO LV MASS 2D: 158.8 G (ref 67–162)
ECHO LV MASS INDEX 2D: 73.5 G/M2 (ref 43–95)
ECHO LV POSTERIOR WALL DIASTOLIC: 0.8 CM (ref 0.6–0.9)
ECHO LV RELATIVE WALL THICKNESS RATIO: 0.33
ECHO LVOT MEAN GRADIENT: 3 MMHG
ECHO LVOT PEAK GRADIENT: 5 MMHG
ECHO LVOT PEAK VELOCITY: 1.1 M/S
ECHO LVOT VTI: 25.9 CM
ECHO MV A VELOCITY: 0.91 M/S
ECHO MV E DECELERATION TIME (DT): 261 MS
ECHO MV E VELOCITY: 0.9 M/S
ECHO MV E/A RATIO: 0.99
ECHO MV E/E' LATERAL: 15
ECHO MV E/E' RATIO (AVERAGED): 16.5
ECHO RA AREA 4C: 8.7 CM2
ECHO RA END SYSTOLIC VOLUME APICAL 4 CHAMBER INDEX BSA: 8 ML/M2
ECHO RA VOLUME: 17 ML
ECHO RV BASAL DIMENSION: 3.8 CM
ECHO RV FREE WALL PEAK S': 12 CM/S
ECHO RV TAPSE: 2.2 CM (ref 1.7–?)
ECHO TV REGURGITANT MAX VELOCITY: 1.1 M/S
ECHO TV REGURGITANT PEAK GRADIENT: 5 MMHG
GFR SERPL CREATININE-BSD FRML MDRD: >60 ML/MIN/1.73M2
GLUCOSE BLD-MCNC: 242 MG/DL (ref 65–105)
GLUCOSE BLD-MCNC: 259 MG/DL (ref 65–105)
GLUCOSE BLD-MCNC: 281 MG/DL (ref 65–105)
GLUCOSE BLD-MCNC: 339 MG/DL (ref 65–105)
GLUCOSE SERPL-MCNC: 257 MG/DL (ref 70–99)
HCO3 VENOUS: 34 MMOL/L (ref 24–30)
METHEMOGLOBIN: 1.1 % (ref 0–1.9)
O2 SAT, VEN: 91.7 % (ref 60–85)
PCO2, VEN: 60.3 MM HG (ref 39–55)
PH VENOUS: 7.36 (ref 7.32–7.42)
PO2, VEN: 81 MM HG (ref 30–50)
POSITIVE BASE EXCESS, VEN: 8.6 MMOL/L (ref 0–2)
POTASSIUM SERPL-SCNC: 4.3 MMOL/L (ref 3.7–5.3)
SODIUM SERPL-SCNC: 137 MMOL/L (ref 135–144)
T3FREE SERPL-MCNC: 3.03 PG/ML (ref 2.02–4.43)
TEXT FOR RESPIRATORY: ABNORMAL

## 2024-02-16 PROCEDURE — 93306 TTE W/DOPPLER COMPLETE: CPT | Performed by: INTERNAL MEDICINE

## 2024-02-16 PROCEDURE — 97110 THERAPEUTIC EXERCISES: CPT

## 2024-02-16 PROCEDURE — 99233 SBSQ HOSP IP/OBS HIGH 50: CPT | Performed by: INTERNAL MEDICINE

## 2024-02-16 PROCEDURE — 97116 GAIT TRAINING THERAPY: CPT

## 2024-02-16 PROCEDURE — 82800 BLOOD PH: CPT

## 2024-02-16 PROCEDURE — 6370000000 HC RX 637 (ALT 250 FOR IP): Performed by: INTERNAL MEDICINE

## 2024-02-16 PROCEDURE — 82947 ASSAY GLUCOSE BLOOD QUANT: CPT

## 2024-02-16 PROCEDURE — 36415 COLL VENOUS BLD VENIPUNCTURE: CPT

## 2024-02-16 PROCEDURE — 2060000000 HC ICU INTERMEDIATE R&B

## 2024-02-16 PROCEDURE — 82805 BLOOD GASES W/O2 SATURATION: CPT

## 2024-02-16 PROCEDURE — 93306 TTE W/DOPPLER COMPLETE: CPT

## 2024-02-16 PROCEDURE — 6360000002 HC RX W HCPCS: Performed by: INTERNAL MEDICINE

## 2024-02-16 PROCEDURE — 2580000003 HC RX 258: Performed by: INTERNAL MEDICINE

## 2024-02-16 PROCEDURE — 99233 SBSQ HOSP IP/OBS HIGH 50: CPT | Performed by: NURSE PRACTITIONER

## 2024-02-16 PROCEDURE — 80048 BASIC METABOLIC PNL TOTAL CA: CPT

## 2024-02-16 RX ORDER — INSULIN LISPRO 100 [IU]/ML
0-4 INJECTION, SOLUTION INTRAVENOUS; SUBCUTANEOUS NIGHTLY
Status: DISCONTINUED | OUTPATIENT
Start: 2024-02-16 | End: 2024-02-18 | Stop reason: HOSPADM

## 2024-02-16 RX ORDER — INSULIN LISPRO 100 [IU]/ML
0-16 INJECTION, SOLUTION INTRAVENOUS; SUBCUTANEOUS
Status: DISCONTINUED | OUTPATIENT
Start: 2024-02-16 | End: 2024-02-18 | Stop reason: HOSPADM

## 2024-02-16 RX ORDER — BUMETANIDE 1 MG/1
1 TABLET ORAL DAILY
Qty: 30 TABLET | Refills: 0 | OUTPATIENT
Start: 2024-02-16

## 2024-02-16 RX ORDER — LIOTHYRONINE SODIUM 5 UG/1
5 TABLET ORAL DAILY
Qty: 30 TABLET | Refills: 0 | OUTPATIENT
Start: 2024-02-16

## 2024-02-16 RX ORDER — VENLAFAXINE HYDROCHLORIDE 37.5 MG/1
37.5 CAPSULE, EXTENDED RELEASE ORAL DAILY
Qty: 30 CAPSULE | Refills: 0 | OUTPATIENT
Start: 2024-02-16

## 2024-02-16 RX ADMIN — SODIUM CHLORIDE, PRESERVATIVE FREE 10 ML: 5 INJECTION INTRAVENOUS at 17:02

## 2024-02-16 RX ADMIN — INSULIN LISPRO 4 UNITS: 100 INJECTION, SOLUTION INTRAVENOUS; SUBCUTANEOUS at 08:21

## 2024-02-16 RX ADMIN — ROPINIROLE HYDROCHLORIDE 2 MG: 1 TABLET, FILM COATED ORAL at 21:31

## 2024-02-16 RX ADMIN — Medication: at 08:23

## 2024-02-16 RX ADMIN — SODIUM CHLORIDE, PRESERVATIVE FREE 10 ML: 5 INJECTION INTRAVENOUS at 21:39

## 2024-02-16 RX ADMIN — VENLAFAXINE HYDROCHLORIDE 37.5 MG: 37.5 CAPSULE, EXTENDED RELEASE ORAL at 08:22

## 2024-02-16 RX ADMIN — INSULIN LISPRO 3 UNITS: 100 INJECTION, SOLUTION INTRAVENOUS; SUBCUTANEOUS at 11:54

## 2024-02-16 RX ADMIN — INSULIN GLARGINE 40 UNITS: 100 INJECTION, SOLUTION SUBCUTANEOUS at 21:37

## 2024-02-16 RX ADMIN — PANTOPRAZOLE SODIUM 40 MG: 40 TABLET, DELAYED RELEASE ORAL at 05:21

## 2024-02-16 RX ADMIN — ENOXAPARIN SODIUM 30 MG: 100 INJECTION SUBCUTANEOUS at 21:31

## 2024-02-16 RX ADMIN — LISINOPRIL 5 MG: 5 TABLET ORAL at 08:21

## 2024-02-16 RX ADMIN — ENOXAPARIN SODIUM 30 MG: 100 INJECTION SUBCUTANEOUS at 08:20

## 2024-02-16 RX ADMIN — INSULIN GLARGINE 40 UNITS: 100 INJECTION, SOLUTION SUBCUTANEOUS at 08:20

## 2024-02-16 RX ADMIN — LEVOTHYROXINE SODIUM 112 MCG: 0.11 TABLET ORAL at 05:21

## 2024-02-16 RX ADMIN — ALLOPURINOL 100 MG: 100 TABLET ORAL at 08:21

## 2024-02-16 RX ADMIN — ASPIRIN 81 MG: 81 TABLET, COATED ORAL at 08:21

## 2024-02-16 RX ADMIN — OXYBUTYNIN CHLORIDE 15 MG: 10 TABLET, EXTENDED RELEASE ORAL at 08:21

## 2024-02-16 RX ADMIN — SODIUM CHLORIDE, PRESERVATIVE FREE 10 ML: 5 INJECTION INTRAVENOUS at 08:20

## 2024-02-16 RX ADMIN — FUROSEMIDE 40 MG: 10 INJECTION, SOLUTION INTRAMUSCULAR; INTRAVENOUS at 17:02

## 2024-02-16 RX ADMIN — ROPINIROLE HYDROCHLORIDE 2 MG: 1 TABLET, FILM COATED ORAL at 15:54

## 2024-02-16 RX ADMIN — ANTI-FUNGAL POWDER MICONAZOLE NITRATE TALC FREE: 1.42 POWDER TOPICAL at 08:23

## 2024-02-16 RX ADMIN — FUROSEMIDE 40 MG: 10 INJECTION, SOLUTION INTRAMUSCULAR; INTRAVENOUS at 08:20

## 2024-02-16 RX ADMIN — INSULIN LISPRO 8 UNITS: 100 INJECTION, SOLUTION INTRAVENOUS; SUBCUTANEOUS at 17:02

## 2024-02-16 RX ADMIN — PRAMIPEXOLE DIHYDROCHLORIDE 0.5 MG: 0.25 TABLET ORAL at 21:33

## 2024-02-16 RX ADMIN — LIOTHYRONINE SODIUM 5 MCG: 5 TABLET ORAL at 08:22

## 2024-02-16 RX ADMIN — ANTI-FUNGAL POWDER MICONAZOLE NITRATE TALC FREE: 1.42 POWDER TOPICAL at 21:39

## 2024-02-16 RX ADMIN — ROPINIROLE HYDROCHLORIDE 2 MG: 1 TABLET, FILM COATED ORAL at 08:21

## 2024-02-16 RX ADMIN — OXYBUTYNIN CHLORIDE 15 MG: 10 TABLET, EXTENDED RELEASE ORAL at 21:31

## 2024-02-16 ASSESSMENT — PAIN DESCRIPTION - LOCATION: LOCATION: BACK;SHOULDER

## 2024-02-16 ASSESSMENT — PAIN SCALES - GENERAL: PAINLEVEL_OUTOF10: 6

## 2024-02-17 LAB
ANION GAP SERPL CALCULATED.3IONS-SCNC: 8 MMOL/L (ref 9–17)
BUN SERPL-MCNC: 27 MG/DL (ref 8–23)
CALCIUM SERPL-MCNC: 9.4 MG/DL (ref 8.6–10.4)
CHLORIDE SERPL-SCNC: 97 MMOL/L (ref 98–107)
CO2 SERPL-SCNC: 35 MMOL/L (ref 20–31)
CREAT SERPL-MCNC: 0.9 MG/DL (ref 0.5–0.9)
GFR SERPL CREATININE-BSD FRML MDRD: >60 ML/MIN/1.73M2
GLUCOSE BLD-MCNC: 259 MG/DL (ref 65–105)
GLUCOSE BLD-MCNC: 296 MG/DL (ref 65–105)
GLUCOSE BLD-MCNC: 318 MG/DL (ref 65–105)
GLUCOSE BLD-MCNC: 358 MG/DL (ref 65–105)
GLUCOSE SERPL-MCNC: 287 MG/DL (ref 70–99)
POTASSIUM SERPL-SCNC: 4.3 MMOL/L (ref 3.7–5.3)
SODIUM SERPL-SCNC: 140 MMOL/L (ref 135–144)

## 2024-02-17 PROCEDURE — 6370000000 HC RX 637 (ALT 250 FOR IP): Performed by: INTERNAL MEDICINE

## 2024-02-17 PROCEDURE — 6360000002 HC RX W HCPCS: Performed by: INTERNAL MEDICINE

## 2024-02-17 PROCEDURE — 80048 BASIC METABOLIC PNL TOTAL CA: CPT

## 2024-02-17 PROCEDURE — 99233 SBSQ HOSP IP/OBS HIGH 50: CPT | Performed by: INTERNAL MEDICINE

## 2024-02-17 PROCEDURE — 82947 ASSAY GLUCOSE BLOOD QUANT: CPT

## 2024-02-17 PROCEDURE — 2060000000 HC ICU INTERMEDIATE R&B

## 2024-02-17 PROCEDURE — 2580000003 HC RX 258: Performed by: INTERNAL MEDICINE

## 2024-02-17 PROCEDURE — 36415 COLL VENOUS BLD VENIPUNCTURE: CPT

## 2024-02-17 RX ORDER — SPIRONOLACTONE 25 MG/1
25 TABLET ORAL DAILY
Status: DISCONTINUED | OUTPATIENT
Start: 2024-02-17 | End: 2024-02-18 | Stop reason: HOSPADM

## 2024-02-17 RX ORDER — FUROSEMIDE 10 MG/ML
40 INJECTION INTRAMUSCULAR; INTRAVENOUS 3 TIMES DAILY
Status: DISCONTINUED | OUTPATIENT
Start: 2024-02-17 | End: 2024-02-18

## 2024-02-17 RX ADMIN — INSULIN GLARGINE 40 UNITS: 100 INJECTION, SOLUTION SUBCUTANEOUS at 09:37

## 2024-02-17 RX ADMIN — INSULIN GLARGINE 40 UNITS: 100 INJECTION, SOLUTION SUBCUTANEOUS at 21:54

## 2024-02-17 RX ADMIN — FUROSEMIDE 40 MG: 10 INJECTION, SOLUTION INTRAMUSCULAR; INTRAVENOUS at 09:37

## 2024-02-17 RX ADMIN — PANTOPRAZOLE SODIUM 40 MG: 40 TABLET, DELAYED RELEASE ORAL at 04:30

## 2024-02-17 RX ADMIN — ENOXAPARIN SODIUM 30 MG: 100 INJECTION SUBCUTANEOUS at 21:54

## 2024-02-17 RX ADMIN — PRAMIPEXOLE DIHYDROCHLORIDE 0.5 MG: 0.25 TABLET ORAL at 21:57

## 2024-02-17 RX ADMIN — SODIUM CHLORIDE, PRESERVATIVE FREE 10 ML: 5 INJECTION INTRAVENOUS at 09:44

## 2024-02-17 RX ADMIN — LEVOTHYROXINE SODIUM 112 MCG: 0.11 TABLET ORAL at 04:30

## 2024-02-17 RX ADMIN — LIOTHYRONINE SODIUM 5 MCG: 5 TABLET ORAL at 09:45

## 2024-02-17 RX ADMIN — ROPINIROLE HYDROCHLORIDE 2 MG: 1 TABLET, FILM COATED ORAL at 21:54

## 2024-02-17 RX ADMIN — INSULIN LISPRO 8 UNITS: 100 INJECTION, SOLUTION INTRAVENOUS; SUBCUTANEOUS at 17:33

## 2024-02-17 RX ADMIN — FUROSEMIDE 40 MG: 10 INJECTION, SOLUTION INTRAMUSCULAR; INTRAVENOUS at 21:53

## 2024-02-17 RX ADMIN — ACETAMINOPHEN 650 MG: 325 TABLET ORAL at 23:41

## 2024-02-17 RX ADMIN — EMPAGLIFLOZIN 10 MG: 10 TABLET, FILM COATED ORAL at 11:19

## 2024-02-17 RX ADMIN — ROPINIROLE HYDROCHLORIDE 2 MG: 1 TABLET, FILM COATED ORAL at 13:48

## 2024-02-17 RX ADMIN — ASPIRIN 81 MG: 81 TABLET, COATED ORAL at 09:37

## 2024-02-17 RX ADMIN — INSULIN LISPRO 8 UNITS: 100 INJECTION, SOLUTION INTRAVENOUS; SUBCUTANEOUS at 09:38

## 2024-02-17 RX ADMIN — ENOXAPARIN SODIUM 30 MG: 100 INJECTION SUBCUTANEOUS at 09:36

## 2024-02-17 RX ADMIN — INSULIN LISPRO 4 UNITS: 100 INJECTION, SOLUTION INTRAVENOUS; SUBCUTANEOUS at 21:53

## 2024-02-17 RX ADMIN — INSULIN LISPRO 16 UNITS: 100 INJECTION, SOLUTION INTRAVENOUS; SUBCUTANEOUS at 11:20

## 2024-02-17 RX ADMIN — VENLAFAXINE HYDROCHLORIDE 37.5 MG: 37.5 CAPSULE, EXTENDED RELEASE ORAL at 09:45

## 2024-02-17 RX ADMIN — ALLOPURINOL 100 MG: 100 TABLET ORAL at 09:36

## 2024-02-17 RX ADMIN — SPIRONOLACTONE 25 MG: 25 TABLET, FILM COATED ORAL at 11:22

## 2024-02-17 RX ADMIN — SODIUM CHLORIDE, PRESERVATIVE FREE 10 ML: 5 INJECTION INTRAVENOUS at 21:57

## 2024-02-17 RX ADMIN — ACETAMINOPHEN 650 MG: 325 TABLET ORAL at 04:30

## 2024-02-17 RX ADMIN — ROPINIROLE HYDROCHLORIDE 2 MG: 1 TABLET, FILM COATED ORAL at 09:36

## 2024-02-17 RX ADMIN — LISINOPRIL 5 MG: 5 TABLET ORAL at 09:36

## 2024-02-17 RX ADMIN — FUROSEMIDE 40 MG: 10 INJECTION, SOLUTION INTRAMUSCULAR; INTRAVENOUS at 13:48

## 2024-02-17 ASSESSMENT — PAIN SCALES - GENERAL
PAINLEVEL_OUTOF10: 0
PAINLEVEL_OUTOF10: 8
PAINLEVEL_OUTOF10: 5

## 2024-02-17 ASSESSMENT — PAIN DESCRIPTION - LOCATION
LOCATION: SHOULDER
LOCATION: HEAD

## 2024-02-17 ASSESSMENT — PAIN - FUNCTIONAL ASSESSMENT: PAIN_FUNCTIONAL_ASSESSMENT: ACTIVITIES ARE NOT PREVENTED

## 2024-02-17 ASSESSMENT — PAIN DESCRIPTION - DESCRIPTORS
DESCRIPTORS: DISCOMFORT;SORE
DESCRIPTORS: ACHING

## 2024-02-17 ASSESSMENT — PAIN DESCRIPTION - ORIENTATION: ORIENTATION: RIGHT

## 2024-02-18 VITALS
SYSTOLIC BLOOD PRESSURE: 105 MMHG | RESPIRATION RATE: 18 BRPM | DIASTOLIC BLOOD PRESSURE: 71 MMHG | HEIGHT: 60 IN | BODY MASS INDEX: 57.52 KG/M2 | HEART RATE: 93 BPM | TEMPERATURE: 98.7 F | OXYGEN SATURATION: 94 % | WEIGHT: 293 LBS

## 2024-02-18 LAB
ANION GAP SERPL CALCULATED.3IONS-SCNC: 15 MMOL/L (ref 9–17)
BUN SERPL-MCNC: 30 MG/DL (ref 8–23)
CALCIUM SERPL-MCNC: 9.5 MG/DL (ref 8.6–10.4)
CHLORIDE SERPL-SCNC: 91 MMOL/L (ref 98–107)
CO2 SERPL-SCNC: 31 MMOL/L (ref 20–31)
CREAT SERPL-MCNC: 1.1 MG/DL (ref 0.5–0.9)
GFR SERPL CREATININE-BSD FRML MDRD: 56 ML/MIN/1.73M2
GLUCOSE BLD-MCNC: 263 MG/DL (ref 65–105)
GLUCOSE BLD-MCNC: 274 MG/DL (ref 65–105)
GLUCOSE SERPL-MCNC: 327 MG/DL (ref 70–99)
POTASSIUM SERPL-SCNC: 4.3 MMOL/L (ref 3.7–5.3)
SODIUM SERPL-SCNC: 137 MMOL/L (ref 135–144)

## 2024-02-18 PROCEDURE — 99233 SBSQ HOSP IP/OBS HIGH 50: CPT | Performed by: INTERNAL MEDICINE

## 2024-02-18 PROCEDURE — 82947 ASSAY GLUCOSE BLOOD QUANT: CPT

## 2024-02-18 PROCEDURE — 36415 COLL VENOUS BLD VENIPUNCTURE: CPT

## 2024-02-18 PROCEDURE — 6360000002 HC RX W HCPCS: Performed by: INTERNAL MEDICINE

## 2024-02-18 PROCEDURE — 6370000000 HC RX 637 (ALT 250 FOR IP): Performed by: INTERNAL MEDICINE

## 2024-02-18 PROCEDURE — 94664 DEMO&/EVAL PT USE INHALER: CPT

## 2024-02-18 PROCEDURE — 99239 HOSP IP/OBS DSCHRG MGMT >30: CPT | Performed by: INTERNAL MEDICINE

## 2024-02-18 PROCEDURE — 80048 BASIC METABOLIC PNL TOTAL CA: CPT

## 2024-02-18 PROCEDURE — 6370000000 HC RX 637 (ALT 250 FOR IP): Performed by: NURSE PRACTITIONER

## 2024-02-18 PROCEDURE — 6360000002 HC RX W HCPCS: Performed by: NURSE PRACTITIONER

## 2024-02-18 PROCEDURE — 2580000003 HC RX 258: Performed by: INTERNAL MEDICINE

## 2024-02-18 RX ORDER — BUMETANIDE 1 MG/1
2 TABLET ORAL DAILY
Status: DISCONTINUED | OUTPATIENT
Start: 2024-02-18 | End: 2024-02-18 | Stop reason: HOSPADM

## 2024-02-18 RX ORDER — MENTHOL 1.4 %
ADHESIVE PATCH, MEDICATED TOPICAL 4 TIMES DAILY PRN
Status: DISCONTINUED | OUTPATIENT
Start: 2024-02-18 | End: 2024-02-18 | Stop reason: HOSPADM

## 2024-02-18 RX ORDER — HYDRALAZINE HYDROCHLORIDE 20 MG/ML
10 INJECTION INTRAMUSCULAR; INTRAVENOUS EVERY 6 HOURS PRN
Status: DISCONTINUED | OUTPATIENT
Start: 2024-02-18 | End: 2024-02-18 | Stop reason: HOSPADM

## 2024-02-18 RX ORDER — SPIRONOLACTONE 25 MG/1
25 TABLET ORAL DAILY
Qty: 30 TABLET | Refills: 3 | Status: SHIPPED | OUTPATIENT
Start: 2024-02-19

## 2024-02-18 RX ORDER — BUMETANIDE 2 MG/1
2 TABLET ORAL DAILY
Qty: 30 TABLET | Refills: 3 | Status: SHIPPED | OUTPATIENT
Start: 2024-02-18

## 2024-02-18 RX ADMIN — ROPINIROLE HYDROCHLORIDE 2 MG: 1 TABLET, FILM COATED ORAL at 08:49

## 2024-02-18 RX ADMIN — FUROSEMIDE 40 MG: 10 INJECTION, SOLUTION INTRAMUSCULAR; INTRAVENOUS at 08:48

## 2024-02-18 RX ADMIN — SPIRONOLACTONE 25 MG: 25 TABLET, FILM COATED ORAL at 08:49

## 2024-02-18 RX ADMIN — EMPAGLIFLOZIN 10 MG: 10 TABLET, FILM COATED ORAL at 08:49

## 2024-02-18 RX ADMIN — INSULIN LISPRO 8 UNITS: 100 INJECTION, SOLUTION INTRAVENOUS; SUBCUTANEOUS at 11:56

## 2024-02-18 RX ADMIN — ASPIRIN 81 MG: 81 TABLET, COATED ORAL at 08:49

## 2024-02-18 RX ADMIN — LISINOPRIL 5 MG: 5 TABLET ORAL at 08:49

## 2024-02-18 RX ADMIN — BUMETANIDE 2 MG: 1 TABLET ORAL at 11:56

## 2024-02-18 RX ADMIN — LIOTHYRONINE SODIUM 5 MCG: 5 TABLET ORAL at 08:50

## 2024-02-18 RX ADMIN — VENLAFAXINE HYDROCHLORIDE 37.5 MG: 37.5 CAPSULE, EXTENDED RELEASE ORAL at 08:50

## 2024-02-18 RX ADMIN — ACETAMINOPHEN 650 MG: 325 TABLET ORAL at 09:59

## 2024-02-18 RX ADMIN — INSULIN LISPRO 8 UNITS: 100 INJECTION, SOLUTION INTRAVENOUS; SUBCUTANEOUS at 08:48

## 2024-02-18 RX ADMIN — HYDRALAZINE HYDROCHLORIDE 10 MG: 20 INJECTION, SOLUTION INTRAMUSCULAR; INTRAVENOUS at 05:52

## 2024-02-18 RX ADMIN — INSULIN GLARGINE 40 UNITS: 100 INJECTION, SOLUTION SUBCUTANEOUS at 08:48

## 2024-02-18 RX ADMIN — ROPINIROLE HYDROCHLORIDE 2 MG: 1 TABLET, FILM COATED ORAL at 15:11

## 2024-02-18 RX ADMIN — LEVOTHYROXINE SODIUM 112 MCG: 0.11 TABLET ORAL at 05:50

## 2024-02-18 RX ADMIN — MENTHOL, UNSPECIFIED FORM AND METHYL SALICYLATE: 10; 150 CREAM TOPICAL at 04:44

## 2024-02-18 RX ADMIN — SODIUM CHLORIDE, PRESERVATIVE FREE 10 ML: 5 INJECTION INTRAVENOUS at 08:48

## 2024-02-18 RX ADMIN — PANTOPRAZOLE SODIUM 40 MG: 40 TABLET, DELAYED RELEASE ORAL at 05:50

## 2024-02-18 RX ADMIN — ALLOPURINOL 100 MG: 100 TABLET ORAL at 08:49

## 2024-02-18 RX ADMIN — ENOXAPARIN SODIUM 30 MG: 100 INJECTION SUBCUTANEOUS at 08:48

## 2024-02-18 ASSESSMENT — PAIN SCALES - GENERAL
PAINLEVEL_OUTOF10: 7
PAINLEVEL_OUTOF10: 4
PAINLEVEL_OUTOF10: 7

## 2024-02-18 ASSESSMENT — PAIN DESCRIPTION - LOCATION
LOCATION: SHOULDER
LOCATION: HEAD

## 2024-02-18 ASSESSMENT — PAIN DESCRIPTION - PAIN TYPE: TYPE: CHRONIC PAIN

## 2024-02-18 ASSESSMENT — PAIN - FUNCTIONAL ASSESSMENT
PAIN_FUNCTIONAL_ASSESSMENT: ACTIVITIES ARE NOT PREVENTED
PAIN_FUNCTIONAL_ASSESSMENT: ACTIVITIES ARE NOT PREVENTED

## 2024-02-18 ASSESSMENT — PAIN DESCRIPTION - DESCRIPTORS
DESCRIPTORS: DISCOMFORT
DESCRIPTORS: POUNDING

## 2024-02-18 ASSESSMENT — PAIN DESCRIPTION - ORIENTATION
ORIENTATION: RIGHT
ORIENTATION: MID;UPPER
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
## 2024-02-18 NOTE — PLAN OF CARE
Problem: Discharge Planning  Goal: Discharge to home or other facility with appropriate resources  2/16/2024 1507 by Svetlana Bates RN  Outcome: Progressing     Problem: Pain  Goal: Verbalizes/displays adequate comfort level or baseline comfort level  2/16/2024 1507 by Svetlana Bates RN  Outcome: Progressing     Problem: Safety - Adult  Goal: Free from fall injury  2/16/2024 1507 by Svetlana Bates RN  Outcome: Progressing     Problem: ABCDS Injury Assessment  Goal: Absence of physical injury  2/16/2024 1507 by Svetlana Bates RN  Outcome: Progressing     Problem: Chronic Conditions and Co-morbidities  Goal: Patient's chronic conditions and co-morbidity symptoms are monitored and maintained or improved  2/16/2024 1507 by Svetlana Bates, RN  Outcome: Progressing     
  Problem: Discharge Planning  Goal: Discharge to home or other facility with appropriate resources  2/17/2024 0042 by Sera Donohue RN  Outcome: Progressing  2/16/2024 1507 by Svetlana Bates RN  Outcome: Progressing     Problem: Pain  Goal: Verbalizes/displays adequate comfort level or baseline comfort level  2/17/2024 0042 by Sera Donohue RN  Outcome: Progressing  Note: Patient denies pain.    2/16/2024 1507 by Svetlana Bates RN  Outcome: Progressing     Problem: Safety - Adult  Goal: Free from fall injury  2/17/2024 0042 by Sera Donohue RN  Outcome: Progressing  Note: Patient with recent knee replacement OR.  Up with one assist and cane to bathroom.  Alert and oriented.  Using call light aprpiately.  Bed alarm on.  2/16/2024 1507 by Svetlana Bates RN  Outcome: Progressing     Problem: ABCDS Injury Assessment  Goal: Absence of physical injury  2/17/2024 0042 by Sera Donohue RN  Outcome: Progressing  2/16/2024 1507 by Svetlana Bates RN  Outcome: Progressing     Problem: Chronic Conditions and Co-morbidities  Goal: Patient's chronic conditions and co-morbidity symptoms are monitored and maintained or improved  2/17/2024 0042 by Sera Donohue RN  Outcome: Progressing  Note: Sleep study in progress.  Blood sugars monitored and insulin given as ordered.  2/16/2024 1507 by Svetlana Bates RN  Outcome: Progressing     
  Problem: Discharge Planning  Goal: Discharge to home or other facility with appropriate resources  2/18/2024 1420 by Delia Burrell RN  Outcome: Adequate for Discharge     Problem: Pain  Goal: Verbalizes/displays adequate comfort level or baseline comfort level  2/18/2024 1420 by Delia Burrell RN  Outcome: Adequate for Discharge     Problem: Safety - Adult  Goal: Free from fall injury  2/18/2024 1420 by Delia Burrell RN  Outcome: Adequate for Discharge     Problem: ABCDS Injury Assessment  Goal: Absence of physical injury  Outcome: Adequate for Discharge     
  Problem: Discharge Planning  Goal: Discharge to home or other facility with appropriate resources  Outcome: Progressing     Problem: Pain  Goal: Verbalizes/displays adequate comfort level or baseline comfort level  Outcome: Progressing     Problem: Safety - Adult  Goal: Free from fall injury  Outcome: Progressing     Problem: ABCDS Injury Assessment  Goal: Absence of physical injury  Outcome: Progressing     Problem: Chronic Conditions and Co-morbidities  Goal: Patient's chronic conditions and co-morbidity symptoms are monitored and maintained or improved  Outcome: Progressing     
  Problem: Discharge Planning  Goal: Discharge to home or other facility with appropriate resources  Outcome: Progressing  Flowsheets (Taken 2/14/2024 2000)  Discharge to home or other facility with appropriate resources:   Identify barriers to discharge with patient and caregiver   Identify discharge learning needs (meds, wound care, etc)   Arrange for needed discharge resources and transportation as appropriate     Problem: Pain  Goal: Verbalizes/displays adequate comfort level or baseline comfort level  Outcome: Progressing  Flowsheets (Taken 2/15/2024 0312)  Verbalizes/displays adequate comfort level or baseline comfort level:   Encourage patient to monitor pain and request assistance   Administer analgesics based on type and severity of pain and evaluate response   Assess pain using appropriate pain scale   Implement non-pharmacological measures as appropriate and evaluate response     Problem: Safety - Adult  Goal: Free from fall injury  Outcome: Progressing  Note: Bed was locked and in lowest position. Raised side rails for safety. Call light within reach.      Problem: ABCDS Injury Assessment  Goal: Absence of physical injury  Outcome: Progressing  Flowsheets (Taken 2/15/2024 0312)  Absence of Physical Injury: Implement safety measures based on patient assessment     
  Problem: Discharge Planning  Goal: Discharge to home or other facility with appropriate resources  Outcome: Progressing  Flowsheets (Taken 2/15/2024 0945)  Discharge to home or other facility with appropriate resources:   Identify barriers to discharge with patient and caregiver   Arrange for needed discharge resources and transportation as appropriate   Identify discharge learning needs (meds, wound care, etc)   Refer to discharge planning if patient needs post-hospital services based on physician order or complex needs related to functional status, cognitive ability or social support system     Problem: Pain  Goal: Verbalizes/displays adequate comfort level or baseline comfort level  Outcome: Progressing     Problem: Safety - Adult  Goal: Free from fall injury  Outcome: Progressing     Problem: ABCDS Injury Assessment  Goal: Absence of physical injury  Outcome: Progressing     Problem: Chronic Conditions and Co-morbidities  Goal: Patient's chronic conditions and co-morbidity symptoms are monitored and maintained or improved  Outcome: Progressing  Flowsheets (Taken 2/15/2024 0945)  Care Plan - Patient's Chronic Conditions and Co-Morbidity Symptoms are Monitored and Maintained or Improved:   Monitor and assess patient's chronic conditions and comorbid symptoms for stability, deterioration, or improvement   Collaborate with multidisciplinary team to address chronic and comorbid conditions and prevent exacerbation or deterioration   Update acute care plan with appropriate goals if chronic or comorbid symptoms are exacerbated and prevent overall improvement and discharge     
  Problem: Safety - Adult  Goal: Free from fall injury  2/18/2024 0526 by Sari Drake RN  Outcome: Progressing  Note: No falls noted this shift. Patient ambulates with x1 staff assistance without difficulty.  Bed kept in low position. Safe environment maintained. Bedside table & call light in reach. Uses call light appropriately when needing assistance.       Problem: Chronic Conditions and Co-morbidities  Goal: Patient's chronic conditions and co-morbidity symptoms are monitored and maintained or improved  2/18/2024 0526 by Sari Drake, RN  Outcome: Progressing     Problem: Pain  Goal: Verbalizes/displays adequate comfort level or baseline comfort level  2/18/2024 0526 by Sari Drake RN  Outcome: Progressing  Note: Pt medicated with pain medication prn.  Assessed all pain characteristics including level, type, location, frequency, and onset.  Non-pharmacologic interventions offered to pt as well.  Pt states pain is tolerable at this time.       Problem: Discharge Planning  Goal: Discharge to home or other facility with appropriate resources  2/18/2024 0526 by Sari Drake, RN  Outcome: Progressing     
Conditions and Co-Morbidity Symptoms are Monitored and Maintained or Improved:   Monitor and assess patient's chronic conditions and comorbid symptoms for stability, deterioration, or improvement   Collaborate with multidisciplinary team to address chronic and comorbid conditions and prevent exacerbation or deterioration   Update acute care plan with appropriate goals if chronic or comorbid symptoms are exacerbated and prevent overall improvement and discharge

## 2024-02-18 NOTE — DISCHARGE INSTR - DIET
Good nutrition is important when healing from an illness, injury, or surgery.  Follow any nutrition recommendations given to you during your hospital stay.   If you were given an oral nutrition supplement while in the hospital, continue to take this supplement at home.  You can take it with meals, in-between meals, and/or before bedtime. These supplements can be purchased at most local grocery stores, pharmacies, and chain [employer]-stores.   If you have any questions about your diet or nutrition, call the hospital and ask for the dietitian.    General diet, 4 carb
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
## 2024-02-19 NOTE — PROGRESS NOTES
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 detail at bedside. All questions answered. Patient agrees with plan as outlined above.     Thank you for allowing me to participate in the care of this patient, please do not hesitate to call if you have any questions.    Dixon Mariscal DO, FACC, RPVI, CAITLYN SAMUEL  [facility]  [facility]  [phone]       
Take 1 tablet by mouth 3 times daily as needed for Dizziness 12/4/23   Calvin Titus APRN - CNP   allopurinol (ZYLOPRIM) 100 MG tablet Take 1 tablet by mouth once daily 11/9/23   Kira De León APRN - NP   RELION PEN NEEDLE 31G/8MM 31G X 8 MM MISC USE ONE PEN NEEDLE BY SUBCUTANEOUS ROUTE DAILY 11/3/23   Kira Whelan APRN - CNP   liothyronine (CYTOMEL) 5 MCG tablet Take 1 tablet by mouth daily 10/18/23   Matt Zuniga MD   levothyroxine (SYNTHROID) 112 MCG tablet Take 1 tablet by mouth Daily 10/19/23   Matt Zuniga MD   pantoprazole (PROTONIX) 40 MG tablet Take 1 tablet by mouth every morning (before breakfast) 10/19/23   Matt Zuniga MD   pramipexole (MIRAPEX) 0.5 MG tablet Take 1 tablet by mouth nightly 10/18/23   Matt Zuniga MD   venlafaxine (EFFEXOR XR) 37.5 MG extended release capsule Take 1 capsule by mouth daily 10/18/23   Matt Zuniga MD   albuterol sulfate HFA (VENTOLIN HFA) 108 (90 Base) MCG/ACT inhaler Inhale 2 puffs into the lungs every 4 hours as needed for Wheezing 10/18/23   Matt Zuniga MD   insulin lispro (HUMALOG) 100 UNIT/ML SOLN injection vial Inject 0-16 Units into the skin 3 times daily (with meals) 10/18/23   Matt Zuniga MD   rOPINIRole (REQUIP) 2 MG tablet Take 1 tablet by mouth 3 times daily 10/18/23   Matt Zuniga MD   ferrous sulfate (IRON 325) 325 (65 Fe) MG tablet Take 1 tablet by mouth daily (with breakfast)    Renetta Jovel MD   Omega-3 1000 MG CAPS 1 capsule    Renetta Jovel MD   Continuous Blood Gluc Sensor (FREESTYLE KRISTIN 14 DAY SENSOR) [facility]  8/20/23   Renetta Jovel MD   loratadine (CLARITIN) 10 MG tablet TAKE ONE TABLET BY MOUTH DAILY 8/24/23   Vikki Ndiaye MD   nystatin 106196 UNIT/GM powder APPLY POWDER TOPICALLY  TO ABDOMINAL FOLDS THREE TIMES DAILY AS NEEDED FOR SKIN IRRITATION 6/19/23   Kira De León APRN - NP   Compression Stockings MISC by Does not apply route Needs bilateral knee high

## 2024-02-20 ENCOUNTER — HOSPITAL ENCOUNTER (OUTPATIENT)
Dept: PULMONOLOGY | Age: 64
Discharge: HOME | End: 2024-02-20
Payer: COMMERCIAL

## 2024-02-20 ENCOUNTER — TELEPHONE (OUTPATIENT)
Dept: FAMILY MEDICINE CLINIC | Age: 64
End: 2024-02-20

## 2024-02-20 DIAGNOSIS — R06.02 SHORTNESS OF BREATH: ICD-10-CM

## 2024-02-20 LAB
DLCO %PRED: NORMAL
DLCO PRED: NORMAL
DLCO/VA %PRED: NORMAL
DLCO/VA PRED: NORMAL
DLCO/VA: NORMAL
DLCO: NORMAL
EXPIRATORY TIME: NORMAL
FEF 25-75% %PRED-PRE: NORMAL
FEF 25-75% PRED: NORMAL
FEF 25-75-PRE: NORMAL
FEV1 %PRED-PRE: NORMAL
FEV1 PRED: NORMAL
FEV1/FVC %PRED-PRE: NORMAL
FEV1/FVC PRED: NORMAL
FEV1/FVC: NORMAL
FEV1: NORMAL
FVC %PRED-PRE: NORMAL
FVC PRED: NORMAL
FVC: NORMAL
GAW %PRED: NORMAL
GAW PRED: NORMAL
GAW: NORMAL
IC PRE %PRED: NORMAL
IC PRED: NORMAL
IC: NORMAL
MVV %PRED-PRE: NORMAL
MVV PRED: NORMAL
MVV-PRE: NORMAL
PEF %PRED-PRE: NORMAL
PEF PRED: NORMAL
PEF-PRE: NORMAL
RAW %PRED: NORMAL
RAW PRED: NORMAL
RAW: NORMAL
RV PRE %PRED: NORMAL
RV PRED: NORMAL
RV: NORMAL
SVC %PRED: NORMAL
SVC PRED: NORMAL
SVC: NORMAL
TLC PRE %PRED: NORMAL
TLC PRED: NORMAL
TLC: NORMAL
VA %PRED: NORMAL
VA PRED: NORMAL
VA: NORMAL
VTG %PRED: NORMAL
VTG PRED: NORMAL
VTG: NORMAL

## 2024-02-20 PROCEDURE — 94060 EVALUATION OF WHEEZING: CPT

## 2024-02-20 PROCEDURE — 94729 DIFFUSING CAPACITY: CPT

## 2024-02-20 PROCEDURE — 6370000000 HC RX 637 (ALT 250 FOR IP): Performed by: NURSE PRACTITIONER

## 2024-02-20 PROCEDURE — 94726 PLETHYSMOGRAPHY LUNG VOLUMES: CPT

## 2024-02-20 PROCEDURE — 94664 DEMO&/EVAL PT USE INHALER: CPT

## 2024-02-20 RX ORDER — ALBUTEROL SULFATE 90 UG/1
2 AEROSOL, METERED RESPIRATORY (INHALATION) ONCE
Status: COMPLETED | OUTPATIENT
Start: 2024-02-20 | End: 2024-02-20

## 2024-02-20 RX ADMIN — ALBUTEROL SULFATE 2 PUFF: 90 AEROSOL, METERED RESPIRATORY (INHALATION) at 13:13

## 2024-02-20 NOTE — TELEPHONE ENCOUNTER
Patient is overdue for (HM/CARE CAPS listed below).  Patient was reached by phone (Patient did not answer so LVM, Also sent via My-chart message to patient.) to remind how important it is to schedule a screening/appointment to follow up for testing to be completed.     I did mail out standing orders to patient home address with OVERDUE reminder letter.     Health Maintenance Due   Topic Date Due    Breast cancer screen  09/16/2018    Cervical cancer screen  04/24/2022    Colorectal Cancer Screen  10/12/2022

## 2024-02-20 NOTE — PROCEDURES
PFT Interpretation:    NORMAL  Lung Mechanics, Volumes,and Diffusion Capacity is noted.  Evidence of air trapping / hyperinflation is NOT noted.  NO Significant improvement is noted lung mechanics after bronchodilators.      Arlene Churchill MD

## 2024-02-21 RX ORDER — FERROUS SULFATE 325(65) MG
1 TABLET ORAL
Qty: 30 TABLET | Refills: 0 | OUTPATIENT
Start: 2024-02-21

## 2024-02-22 RX ORDER — LORATADINE 10 MG/1
TABLET ORAL
Qty: 90 TABLET | Refills: 0 | OUTPATIENT
Start: 2024-02-22

## 2024-02-27 NOTE — DISCHARGE INSTRUCTIONS
[facility] WOUND and HYPERBARIC TREATMENT  CENTER                            Visit  Discharge Instructions / Physician Orders  DATE: 2/29/24     Home Care:NONE     SUPPLIES ORDERED THRU:      [employer]               DATE LAST SUPPLIED 12/12/23     Wound Location:  Right Knee     Cleanse with: Liquid antibacterial soap and water, rinse well      Dressing Orders:  Primary dressing  Crystal tuck into wound with end of Q tip   Secondary dressing   Cover with Silicone border dressing secure with           x 30days     Frequency:  DAILY     Additional Orders: Increase protein to diet (meat, cheese, eggs, fish, peanut butter, nuts and beans)  Multivitamin daily    Enlivaderm, or Aquaphor to feet.     OFFLOADING [x] YES  TYPE:                  [] NA     Weekly wound care visits until determined otherwise.     Antibiotic therapy-wound care related YES [] NO [x] NA[]     MY CHART []     Smart Device  []      HYPERBARIC TREATMENT-                TREATMENT #                          Your next appointment with the Wound Care Center is in 2 weeks                                                                                                    (Please note your next appointment above and if you are unable to keep, kindly give a 24 hour notice. Thank you.)  If more than 15 min late we cannot guarantee you will be seen due to clinician schedule  Per Policy, Excessive cancellation will call for dismissal from program.  If you experience any of the following, please call the Wound Care Center during business hours:  [phone]     * Increase in Pain  * Temperature over 101  * Increase in drainage from your wound  * Drainage with a foul odor  * Bleeding  * Increase in swelling  * Need for compression bandage changes due to slippage, breakthrough drainage.     If you need medical attention outside of the business hours of the Wound Care Centers please contact your PCP or go to the nearest emergency room.     The information

## 2024-02-29 ENCOUNTER — HOSPITAL ENCOUNTER (OUTPATIENT)
Dept: WOUND CARE | Age: 64
Discharge: HOME | End: 2024-02-29
Payer: COMMERCIAL

## 2024-02-29 VITALS
DIASTOLIC BLOOD PRESSURE: 58 MMHG | HEART RATE: 86 BPM | TEMPERATURE: 97.4 F | RESPIRATION RATE: 24 BRPM | SYSTOLIC BLOOD PRESSURE: 144 MMHG

## 2024-02-29 DIAGNOSIS — S81.001D OPEN KNEE WOUND, RIGHT, SUBSEQUENT ENCOUNTER: ICD-10-CM

## 2024-02-29 DIAGNOSIS — M17.11 PRIMARY OSTEOARTHRITIS OF RIGHT KNEE: ICD-10-CM

## 2024-02-29 DIAGNOSIS — E66.01 MORBID OBESITY WITH BMI OF 45.0-49.9, ADULT (HCC): ICD-10-CM

## 2024-02-29 DIAGNOSIS — M62.81 MUSCLE WEAKNESS (GENERALIZED): Primary | ICD-10-CM

## 2024-02-29 DIAGNOSIS — E11.40 TYPE 2 DIABETES MELLITUS WITH DIABETIC NEUROPATHY, WITH LONG-TERM CURRENT USE OF INSULIN (HCC): ICD-10-CM

## 2024-02-29 DIAGNOSIS — Z79.4 TYPE 2 DIABETES MELLITUS WITH DIABETIC NEUROPATHY, WITH LONG-TERM CURRENT USE OF INSULIN (HCC): ICD-10-CM

## 2024-02-29 PROCEDURE — 11042 DBRDMT SUBQ TIS 1ST 20SQCM/<: CPT | Performed by: NURSE PRACTITIONER

## 2024-02-29 PROCEDURE — 11042 DBRDMT SUBQ TIS 1ST 20SQCM/<: CPT

## 2024-02-29 RX ORDER — LIDOCAINE HYDROCHLORIDE 40 MG/ML
SOLUTION TOPICAL ONCE
Status: COMPLETED | OUTPATIENT
Start: 2024-02-29 | End: 2024-02-29

## 2024-02-29 RX ORDER — LIDOCAINE HYDROCHLORIDE 40 MG/ML
SOLUTION TOPICAL ONCE
OUTPATIENT
Start: 2024-02-29 | End: 2024-02-29

## 2024-02-29 RX ORDER — LIDOCAINE HYDROCHLORIDE 20 MG/ML
JELLY TOPICAL ONCE
OUTPATIENT
Start: 2024-02-29 | End: 2024-02-29

## 2024-02-29 RX ADMIN — LIDOCAINE HYDROCHLORIDE 5 ML: 40 SOLUTION TOPICAL at 13:20

## 2024-02-29 ASSESSMENT — ENCOUNTER SYMPTOMS
COUGH: 0
SHORTNESS OF BREATH: 1
NAUSEA: 0
VOMITING: 0
DIARRHEA: 0
RHINORRHEA: 0

## 2024-02-29 ASSESSMENT — PAIN DESCRIPTION - PAIN TYPE: TYPE: CHRONIC PAIN

## 2024-02-29 ASSESSMENT — PAIN SCALES - GENERAL: PAINLEVEL_OUTOF10: 5

## 2024-02-29 ASSESSMENT — PAIN DESCRIPTION - ONSET: ONSET: ON-GOING

## 2024-02-29 ASSESSMENT — PAIN DESCRIPTION - FREQUENCY: FREQUENCY: INTERMITTENT

## 2024-02-29 ASSESSMENT — PAIN DESCRIPTION - LOCATION: LOCATION: KNEE

## 2024-02-29 ASSESSMENT — PAIN DESCRIPTION - DESCRIPTORS: DESCRIPTORS: POUNDING

## 2024-02-29 ASSESSMENT — PAIN DESCRIPTION - ORIENTATION: ORIENTATION: RIGHT

## 2024-03-05 ENCOUNTER — ENROLLMENT (OUTPATIENT)
Dept: CARE COORDINATION | Age: 64
End: 2024-03-05

## 2024-03-05 ENCOUNTER — OFFICE VISIT (OUTPATIENT)
Dept: FAMILY MEDICINE CLINIC | Age: 64
End: 2024-03-05
Payer: COMMERCIAL

## 2024-03-05 VITALS
BODY MASS INDEX: 58.23 KG/M2 | DIASTOLIC BLOOD PRESSURE: 48 MMHG | HEART RATE: 83 BPM | HEIGHT: 61 IN | SYSTOLIC BLOOD PRESSURE: 104 MMHG

## 2024-03-05 DIAGNOSIS — Z12.31 ENCOUNTER FOR SCREENING MAMMOGRAM FOR BREAST CANCER: ICD-10-CM

## 2024-03-05 DIAGNOSIS — I10 PRIMARY HYPERTENSION: ICD-10-CM

## 2024-03-05 DIAGNOSIS — Z79.4 TYPE 2 DIABETES MELLITUS WITH DIABETIC NEUROPATHY, WITH LONG-TERM CURRENT USE OF INSULIN (HCC): ICD-10-CM

## 2024-03-05 DIAGNOSIS — R60.0 BILATERAL LOWER EXTREMITY EDEMA: Primary | ICD-10-CM

## 2024-03-05 DIAGNOSIS — E11.40 TYPE 2 DIABETES MELLITUS WITH DIABETIC NEUROPATHY, WITH LONG-TERM CURRENT USE OF INSULIN (HCC): ICD-10-CM

## 2024-03-05 PROCEDURE — 3074F SYST BP LT 130 MM HG: CPT | Performed by: NURSE PRACTITIONER

## 2024-03-05 PROCEDURE — G8427 DOCREV CUR MEDS BY ELIG CLIN: HCPCS | Performed by: NURSE PRACTITIONER

## 2024-03-05 PROCEDURE — 99215 OFFICE O/P EST HI 40 MIN: CPT | Performed by: NURSE PRACTITIONER

## 2024-03-05 PROCEDURE — 3078F DIAST BP <80 MM HG: CPT | Performed by: NURSE PRACTITIONER

## 2024-03-05 PROCEDURE — 2022F DILAT RTA XM EVC RTNOPTHY: CPT | Performed by: NURSE PRACTITIONER

## 2024-03-05 PROCEDURE — G8417 CALC BMI ABV UP PARAM F/U: HCPCS | Performed by: NURSE PRACTITIONER

## 2024-03-05 PROCEDURE — 1036F TOBACCO NON-USER: CPT | Performed by: NURSE PRACTITIONER

## 2024-03-05 PROCEDURE — 3051F HG A1C>EQUAL 7.0%<8.0%: CPT | Performed by: NURSE PRACTITIONER

## 2024-03-05 PROCEDURE — 3017F COLORECTAL CA SCREEN DOC REV: CPT | Performed by: NURSE PRACTITIONER

## 2024-03-05 PROCEDURE — 1111F DSCHRG MED/CURRENT MED MERGE: CPT | Performed by: NURSE PRACTITIONER

## 2024-03-05 PROCEDURE — G8484 FLU IMMUNIZE NO ADMIN: HCPCS | Performed by: NURSE PRACTITIONER

## 2024-03-05 RX ORDER — BUMETANIDE 2 MG/1
2 TABLET ORAL 2 TIMES DAILY
Qty: 14 TABLET | Refills: 0 | Status: SHIPPED | OUTPATIENT
Start: 2024-03-05 | End: 2024-03-12

## 2024-03-05 RX ORDER — SUB-Q INSULIN DEVICE, 40 UNIT
EACH MISCELLANEOUS
COMMUNITY
Start: 2024-02-19

## 2024-03-05 ASSESSMENT — PATIENT HEALTH QUESTIONNAIRE - PHQ9
SUM OF ALL RESPONSES TO PHQ QUESTIONS 1-9: 8
5. POOR APPETITE OR OVEREATING: 1
3. TROUBLE FALLING OR STAYING ASLEEP: 1
10. IF YOU CHECKED OFF ANY PROBLEMS, HOW DIFFICULT HAVE THESE PROBLEMS MADE IT FOR YOU TO DO YOUR WORK, TAKE CARE OF THINGS AT HOME, OR GET ALONG WITH OTHER PEOPLE: 3
SUM OF ALL RESPONSES TO PHQ QUESTIONS 1-9: 8
8. MOVING OR SPEAKING SO SLOWLY THAT OTHER PEOPLE COULD HAVE NOTICED. OR THE OPPOSITE, BEING SO FIGETY OR RESTLESS THAT YOU HAVE BEEN MOVING AROUND A LOT MORE THAN USUAL: 1
1. LITTLE INTEREST OR PLEASURE IN DOING THINGS: 1
2. FEELING DOWN, DEPRESSED OR HOPELESS: 1
6. FEELING BAD ABOUT YOURSELF - OR THAT YOU ARE A FAILURE OR HAVE LET YOURSELF OR YOUR FAMILY DOWN: 1
SUM OF ALL RESPONSES TO PHQ QUESTIONS 1-9: 8
SUM OF ALL RESPONSES TO PHQ9 QUESTIONS 1 & 2: 2
4. FEELING TIRED OR HAVING LITTLE ENERGY: 1
7. TROUBLE CONCENTRATING ON THINGS, SUCH AS READING THE NEWSPAPER OR WATCHING TELEVISION: 1
SUM OF ALL RESPONSES TO PHQ QUESTIONS 1-9: 8
9. THOUGHTS THAT YOU WOULD BE BETTER OFF DEAD, OR OF HURTING YOURSELF: 0

## 2024-03-05 ASSESSMENT — ANXIETY QUESTIONNAIRES
1. FEELING NERVOUS, ANXIOUS, OR ON EDGE: 1
IF YOU CHECKED OFF ANY PROBLEMS ON THIS QUESTIONNAIRE, HOW DIFFICULT HAVE THESE PROBLEMS MADE IT FOR YOU TO DO YOUR WORK, TAKE CARE OF THINGS AT HOME, OR GET ALONG WITH OTHER PEOPLE: EXTREMELY DIFFICULT
4. TROUBLE RELAXING: 1
6. BECOMING EASILY ANNOYED OR IRRITABLE: 1
5. BEING SO RESTLESS THAT IT IS HARD TO SIT STILL: 1
7. FEELING AFRAID AS IF SOMETHING AWFUL MIGHT HAPPEN: 1
3. WORRYING TOO MUCH ABOUT DIFFERENT THINGS: 1
GAD7 TOTAL SCORE: 7
2. NOT BEING ABLE TO STOP OR CONTROL WORRYING: 1

## 2024-03-05 ASSESSMENT — ENCOUNTER SYMPTOMS
COUGH: 0
CONSTIPATION: 0
WHEEZING: 0
DIARRHEA: 0
SHORTNESS OF BREATH: 0
CHEST TIGHTNESS: 0
BACK PAIN: 0
VOMITING: 0
ABDOMINAL PAIN: 0
ABDOMINAL DISTENTION: 0
BLOOD IN STOOL: 0
NAUSEA: 0

## 2024-03-05 ASSESSMENT — COLUMBIA-SUICIDE SEVERITY RATING SCALE - C-SSRS
1. WITHIN THE PAST MONTH, HAVE YOU WISHED YOU WERE DEAD OR WISHED YOU COULD GO TO SLEEP AND NOT WAKE UP?: NO
2. HAVE YOU ACTUALLY HAD ANY THOUGHTS OF KILLING YOURSELF?: NO
6. HAVE YOU EVER DONE ANYTHING, STARTED TO DO ANYTHING, OR PREPARED TO DO ANYTHING TO END YOUR LIFE?: NO

## 2024-03-05 NOTE — PROGRESS NOTES
Prediabetic)  01/30/2025    Diabetic Alb to Cr ratio (uACR) test  01/30/2025    Lipids  01/30/2025    GFR test (Diabetes, CKD 3-4, OR last GFR 15-59)  02/18/2025    Hepatitis C screen  Addressed    HIV screen  Addressed    Hepatitis A vaccine  Aged Out    Hepatitis B vaccine  Aged Out    Hib vaccine  Aged Out    Polio vaccine  Aged Out    Meningococcal (ACWY) vaccine  Aged Out    Depression Screen  Discontinued               
     Cervical back: Normal range of motion and neck supple.      Right lower leg: Edema (2+ non pitting) present.      Left lower leg: Edema (2+ pitting) present.   Skin:     General: Skin is warm and dry.      Capillary Refill: Capillary refill takes less than 2 seconds.      Findings: No rash.   Neurological:      Mental Status: She is alert and oriented to person, place, and time.      Cranial Nerves: No cranial nerve deficit.      Motor: No abnormal muscle tone.      Gait: Gait normal.      Deep Tendon Reflexes: Reflexes normal.      Reflex Scores:       Patellar reflexes are 2+ on the right side and 2+ on the left side.  Psychiatric:         Mood and Affect: Mood normal.         Behavior: Behavior normal.         Thought Content: Thought content normal.         Judgment: Judgment normal.         I personally reviewed testing with patient, and all questions answered.      Lab Results   Component Value Date    WBC 10.1 02/15/2024    HGB 11.9 (L) 02/15/2024    HCT 37.6 02/15/2024    MCV 89.8 02/15/2024     02/15/2024       Lab Results   Component Value Date/Time     02/18/2024 08:59 AM    K 4.3 02/18/2024 08:59 AM    CL 91 02/18/2024 08:59 AM    CO2 31 02/18/2024 08:59 AM    BUN 30 02/18/2024 08:59 AM    CREATININE 1.1 02/18/2024 08:59 AM    GLUCOSE 327 02/18/2024 08:59 AM    GLUCOSE 163 03/06/2012 07:44 PM    CALCIUM 9.5 02/18/2024 08:59 AM        Lab Results   Component Value Date    ALT 16 02/15/2024    AST 19 02/15/2024    ALKPHOS 112 (H) 02/15/2024    BILITOT 0.2 (L) 02/15/2024       Lab Results   Component Value Date    TSH 3.62 01/30/2024       Lab Results   Component Value Date    CHOL 238 (H) 01/30/2024    CHOL 263 (H) 01/06/2023    CHOL 141 04/16/2021     Lab Results   Component Value Date    TRIG 378 (H) 01/30/2024    TRIG 287 (H) 01/06/2023    TRIG 149 04/16/2021     Lab Results   Component Value Date    HDL 58 01/30/2024    HDL 62 01/06/2023    HDL 67 04/16/2021     Lab Results

## 2024-03-07 RX ORDER — LORATADINE 10 MG/1
TABLET ORAL
Qty: 90 TABLET | Refills: 0 | OUTPATIENT
Start: 2024-03-07

## 2024-03-08 ENCOUNTER — CARE COORDINATION (OUTPATIENT)
Dept: CARE COORDINATION | Age: 64
End: 2024-03-08

## 2024-03-08 RX ORDER — LIOTHYRONINE SODIUM 5 UG/1
5 TABLET ORAL DAILY
Qty: 30 TABLET | Refills: 0 | OUTPATIENT
Start: 2024-03-08

## 2024-03-08 NOTE — CARE COORDINATION
Attempted to reach Marcio to discuss ACM program and enrollment. Left a message on her voicemail with call back number.   Will try to reach her next week.

## 2024-03-12 NOTE — DISCHARGE INSTRUCTIONS
information contained in the After Visit Summary has been reviewed with me, the patient and/or responsible adult, by my health care provider(s). I had the opportunity to ask questions regarding this information. I have elected to receive;      []After Visit Summary  [x]Comprehensive Discharge Instruction        Patient signature______________________________________Date:____

## 2024-03-13 ENCOUNTER — HOSPITAL ENCOUNTER (OUTPATIENT)
Dept: WOUND CARE | Age: 64
Discharge: HOME | End: 2024-03-13

## 2024-03-13 ENCOUNTER — TELEPHONE (OUTPATIENT)
Dept: WOUND CARE | Age: 64
End: 2024-03-13

## 2024-03-13 NOTE — TELEPHONE ENCOUNTER
Patient calling and stating she was not feeling well and wanted to cancel her [facility] wound care apt today she will call back to reschedule

## 2024-03-15 RX ORDER — ROPINIROLE 2 MG/1
2 TABLET, FILM COATED ORAL 3 TIMES DAILY
Qty: 90 TABLET | Refills: 0 | OUTPATIENT
Start: 2024-03-15

## 2024-03-19 ENCOUNTER — HOSPITAL ENCOUNTER (OUTPATIENT)
Dept: PULMONOLOGY | Age: 64
Discharge: HOME | End: 2024-03-19

## 2024-03-19 NOTE — PROCEDURES
Pulmonary function study report    Spirometry suggest a mild restrictive defect with no improvement with bronchodilator therapy.    The restrictive defect is  is is not supported by the static lung volumes as the total capacity is within normal limits.    The DLCO is normal.    In summary, the the above study reveals no obstructive defect.  Total lung capacity is within normal limits    Clinical correlation recommend    Jhonatan Cota MD.

## 2024-03-21 ENCOUNTER — CARE COORDINATION (OUTPATIENT)
Dept: CARE COORDINATION | Age: 64
End: 2024-03-21

## 2024-03-21 NOTE — CARE COORDINATION
Second attempt to reach patient for ACM enrollment. Left a message on her voicemail with call back number.   Will try to reach her one more time next week.

## 2024-03-25 RX ORDER — MELOXICAM 15 MG/1
TABLET ORAL
Qty: 90 TABLET | Refills: 0 | OUTPATIENT
Start: 2024-03-25

## 2024-03-25 NOTE — DISCHARGE INSTRUCTIONS
contained in the After Visit Summary has been reviewed with me, the patient and/or responsible adult, by my health care provider(s). I had the opportunity to ask questions regarding this information. I have elected to receive;      []After Visit Summary  [x]Comprehensive Discharge Instruction        Patient signature______________________________________Date:___

## 2024-03-27 ENCOUNTER — HOSPITAL ENCOUNTER (OUTPATIENT)
Dept: WOUND CARE | Age: 64
Discharge: HOME | End: 2024-03-27

## 2024-03-27 ENCOUNTER — TELEPHONE (OUTPATIENT)
Dept: WOUND CARE | Age: 64
End: 2024-03-27

## 2024-03-27 NOTE — TELEPHONE ENCOUNTER
Patient calling and canceling her [facility] wound care apt today stating she did not have a ride and will call back and reschedule.

## 2024-03-29 RX ORDER — ASPIRIN 81 MG/1
81 TABLET ORAL DAILY
Qty: 30 TABLET | Refills: 3 | Status: SHIPPED | OUTPATIENT
Start: 2024-03-29

## 2024-03-29 NOTE — TELEPHONE ENCOUNTER
Last visit: 3/5/24  Last Med refill: 2/14/2024    Does patient have enough medication for 72 hours:   Next Visit Date:  Future Appointments   Date Time Provider Department Center   4/11/2024 11:00 AM Ian Pena MD [facility] -   6/5/2024 12:00 PM Calvin Titus, APRN - CNP [facility]       Health Maintenance   Topic Date Due    Breast cancer screen  09/16/2018    Cervical cancer screen  04/24/2022    Colorectal Cancer Screen  10/12/2022    COVID-19 Vaccine (1) 09/01/2024 (Originally 6/19/1961)    DTaP/Tdap/Td vaccine (1 - Tdap) 12/04/2024 (Originally 12/19/1979)    Flu vaccine (1) 12/04/2024 (Originally 8/1/2023)    Shingles vaccine (1 of 2) 12/04/2024 (Originally 12/19/2010)    Respiratory Syncytial Virus (RSV) Pregnant or age 60 yrs+ (1 - 1-dose 60+ series) 12/04/2024 (Originally 12/19/2020)    Pneumococcal 0-64 years Vaccine (1 of 2 - PCV) 03/27/2029 (Originally [birth year]/19/1966)    Diabetic retinal exam  08/28/2024    Diabetic foot exam  12/04/2024    A1C test (Diabetic or Prediabetic)  01/30/2025    Diabetic Alb to Cr ratio (uACR) test  01/30/2025    Lipids  01/30/2025    GFR test (Diabetes, CKD 3-4, OR last GFR 15-59)  02/18/2025    Depression Monitoring  03/05/2025    Hepatitis C screen  Addressed    HIV screen  Addressed    Hepatitis A vaccine  Aged Out    Hepatitis B vaccine  Aged Out    Hib vaccine  Aged Out    Polio vaccine  Aged Out    Meningococcal (ACWY) vaccine  Aged Out    Depression Screen  Discontinued       Hemoglobin A1C (%)   Date Value   01/30/2024 7.9 (H)   09/18/2023 7.5 (H)   06/15/2023 7.6             ( goal A1C is < 7)   No components found for: \"LABMICR\"  LDL Cholesterol (mg/dL)   Date Value   01/30/2024 104   01/06/2023 144 (H)       (goal LDL is <100)   AST (U/L)   Date Value   02/15/2024 19     ALT (U/L)   Date Value   02/15/2024 16     BUN (mg/dL)   Date Value   02/18/2024 30 (H)     BP Readings from Last 3 Encounters:   03/05/24 (!) 104/48   02/29/24 (!) 144/58

## 2024-04-02 ENCOUNTER — CARE COORDINATION (OUTPATIENT)
Dept: CARE COORDINATION | Age: 64
End: 2024-04-02

## 2024-04-02 NOTE — CARE COORDINATION
Third and final attempt to reach patient for ACM enrollment. Left a message on her voicemail with call back number.

## 2024-04-03 RX ORDER — PANTOPRAZOLE SODIUM 40 MG/1
40 TABLET, DELAYED RELEASE ORAL
Qty: 30 TABLET | Refills: 3 | Status: SHIPPED | OUTPATIENT
Start: 2024-04-03

## 2024-04-03 NOTE — TELEPHONE ENCOUNTER
KATYA CALLED IN REQUESTING SOMETHING FOR HER THIGHS, THE MUSCLE IN THE BACK OF HER THIGHS ARE TIGHTENING UP EVERYDAY, SHE STATES THAT IT HURTS SO BAD ITS HARD FOR HER TO WALK   THIS HAS BEEN GOING ON FOR A FEW MONTHS

## 2024-04-03 NOTE — TELEPHONE ENCOUNTER
Please Approve or Refuse.  Send to Pharmacy per Pt's Request:      Next Visit Date:  6/5/2024   Last Visit Date: 3/5/2024    Hemoglobin A1C (%)   Date Value   01/30/2024 7.9 (H)   09/18/2023 7.5 (H)   06/15/2023 7.6             ( goal A1C is < 7)   BP Readings from Last 3 Encounters:   03/05/24 (!) 104/48   02/29/24 (!) 144/58   02/18/24 105/71          (goal 120/80)  BUN   Date Value Ref Range Status   02/18/2024 30 (H) 8 - 23 mg/dL Final     Creatinine   Date Value Ref Range Status   02/18/2024 1.1 (H) 0.5 - 0.9 mg/dL Final     Potassium   Date Value Ref Range Status   02/18/2024 4.3 3.7 - 5.3 mmol/L Final

## 2024-04-11 ENCOUNTER — TELEPHONE (OUTPATIENT)
Dept: FAMILY MEDICINE CLINIC | Age: 64
End: 2024-04-11

## 2024-04-11 NOTE — TELEPHONE ENCOUNTER
Pt called in stating that due to her pain level and difficulty walking she had to cancel her appointment today regarding her restless leg syndrome and had to reschedule for June 27th. Writer added pt to providers scheduled on Monday 4/15/24 @ 5 pm for a VV.    Writer did adv pt medication has been called in for her and to follow up with pharmacy to confirm they have it ready for .

## 2024-04-12 RX ORDER — ROPINIROLE 2 MG/1
2 TABLET, FILM COATED ORAL 3 TIMES DAILY
Qty: 90 TABLET | Refills: 0 | OUTPATIENT
Start: 2024-04-12

## 2024-04-16 ENCOUNTER — CARE COORDINATION (OUTPATIENT)
Dept: CARE COORDINATION | Age: 64
End: 2024-04-16

## 2024-04-16 SDOH — ECONOMIC STABILITY: INCOME INSECURITY: IN THE LAST 12 MONTHS, WAS THERE A TIME WHEN YOU WERE NOT ABLE TO PAY THE MORTGAGE OR RENT ON TIME?: NO

## 2024-04-16 SDOH — ECONOMIC STABILITY: TRANSPORTATION INSECURITY
IN THE PAST 12 MONTHS, HAS THE LACK OF TRANSPORTATION KEPT YOU FROM MEDICAL APPOINTMENTS OR FROM GETTING MEDICATIONS?: NO

## 2024-04-16 SDOH — ECONOMIC STABILITY: HOUSING INSECURITY: IN THE LAST 12 MONTHS, HOW MANY PLACES HAVE YOU LIVED?: 1

## 2024-04-16 SDOH — ECONOMIC STABILITY: TRANSPORTATION INSECURITY
IN THE PAST 12 MONTHS, HAS LACK OF TRANSPORTATION KEPT YOU FROM MEETINGS, WORK, OR FROM GETTING THINGS NEEDED FOR DAILY LIVING?: NO

## 2024-04-17 NOTE — CARE COORDINATION
Ambulatory Care Coordination Note  2024    Patient Current Location:  Home: [address] [place] [id number]    ACM contacted the patient by telephone. Verified name and  with patient as identifiers. Provided introduction to self, and explanation of the ACM role.     ACM: Allison Vieira RN    Challenges to be reviewed by the provider   Additional needs identified to be addressed with provider: No  none               Method of communication with provider: none.  Summary  Date Care Coordination Episode Started:  2024     Reason for Call Today:     CC Enrollment    Reason patient is in Care Coordination:     PCP Referral  Diabetes  HTN    Topics Discussed Today:     - Marcio had called ACM in response to attempt to reach her for ACM enrollment. She has been having a hard time with muscle spasms and cramping in her thighs. She said she will get a yanira horse in her thigh, that goes all the way up to her back.  She has restless leg syndrome and it has been getting worse, especially at night. She had a right TKA back in October. Had followed with the wound care center for follow up of her post op incision. She states at times it feels like there is a fever in her thighs. She monitors her BP at home and most recent was 126/64-80. She was sent home on oxygen but states she uses it as needed. She states it has been about a week since she used it and her pulse ox is in the 90's. Will discuss diabetes with next contact.     Interventions completed today:    Assessments completed today:     Fall risk  Initial assessment  Medication reconciliation  SDOH  General Health     Care Coordinator plan of care:     Marcio will continue to take her medications as prescribed  Monitor BP, HR, blood sugars daily   Continue doing post total knee exercises  Complete labs as ordered  Orders for mammogram, liver US  Will follow up next week to complete enrollment; assess for symptoms; discuss goals    Offered

## 2024-04-23 ENCOUNTER — CARE COORDINATION (OUTPATIENT)
Dept: CARE COORDINATION | Age: 64
End: 2024-04-23

## 2024-04-23 ENCOUNTER — TELEPHONE (OUTPATIENT)
Dept: FAMILY MEDICINE CLINIC | Age: 64
End: 2024-04-23

## 2024-04-23 NOTE — TELEPHONE ENCOUNTER
Response have a virtual visit previously, I sent a link multiple times as well as call the patient and was unable to get through.  Multiple attempts were made.  Patient needs an in person evaluation.  So many things can cause lower extremity swelling.  I can add some lab work to be checked, but once again she will have to go get this done.  I cannot just adjust medications with assumption.  Let me know what she decides to do.

## 2024-04-23 NOTE — TELEPHONE ENCOUNTER
[facility]     Subjective: Caller states: Pt is in a lot of pain and is having trouble walking and wont be able to make appt today. Pt states she tried to do a VV previously and couldn't get it connected.      Current Symptoms:  Patient is complaining of onset symptoms of states it has been an ongoing issue.       Onset:  Been ongoing for the past 14 days.      Associated Symptoms: LEG SWELLING, DIFFICULTY WALKING      Pain Severity: 8     Temperature: none        What has been tried: Pt believes it is something with her medications.      Recommended disposition: I did Triage phone call and gathered enough information. Due to the serious onset symptoms/conditions.   Patient was advised to seek medical attention with nearest Emergency room department/walk in clinic/urgent care. To be seen and evaluated. Also once discharged. Patient was advised to give our office a call back to schedule a follow up appointment with provider.    Pt was also adv that if she cannot walk to call EMS to be transported, writer stressed importance of an in person evaluation and pt adv will figure something out to get to either the Urgent Care or ED.     Patient verbalized : Yes.         Future Appointments   Date Time Provider Department Center   6/5/2024 12:00 PM Calvin Titus APRN - CNP [facility]   6/27/2024  1:20 PM Ian Pena MD [facility] -

## 2024-04-24 NOTE — CARE COORDINATION
Ambulatory Care Coordination Note  2024    Patient Current Location:  Home: [address] [place] [id number]     ACM contacted the patient by telephone. Verified name and  with patient as identifiers. Provided introduction to self, and explanation of the ACM role.     Challenges to be reviewed by the provider   Additional needs identified to be addressed with provider: No  none               Method of communication with provider: none.    ACM: Allison Vieira RN  Summary  Date Care Coordination Episode Started:  2024    Reason for Call Today:     CC Follow Up     Reason patient is in Care Coordination:     PCP Referral  Diabetes  HTN       Topics Discussed Today:     General- called Marcio for follow up. She is having a lot of pain and muscle spasms in her legs today. She said the top of her knee is having the most spasms. She also noticed the middle of her calf, down to her ankle, appears slightly red. She does not noticed any warmth, or signs of infection. Instructed her to keep a close eye on it and if it increases or starts feeling warm, or swelling to call the provider or go to a walk in clinic. She cancelled her PCP appt. She stated that her sister couldn't take her.     Interventions completed today:    Assessments completed today:     Fall risk  Initial assessment  Medication reconciliation  SDOH  General Health     Care Coordinator plan of care:     Marcio will continue to take her medications as prescribed  Monitor BP, HR, blood sugars daily   Continue doing post total knee exercises  Orders for mammogram, liver US- need scheduled   Will follow up next week to complete enrollment; assess for symptoms; discuss goals     Offered patient enrollment in the Remote Patient Monitoring (RPM) program for in-home monitoring: Patient is not eligible for RPM program.    Lab Results       None            Care Coordination Interventions    Referral from Primary Care Provider: Yes  Suggested

## 2024-04-30 ENCOUNTER — CARE COORDINATION (OUTPATIENT)
Dept: CARE COORDINATION | Age: 64
End: 2024-04-30

## 2024-04-30 NOTE — CARE COORDINATION
Attempted to reach Marcio for follow up. Left a message on her voicemail with call back number.   Will try to reach her next week.

## 2024-05-03 ENCOUNTER — HOSPITAL ENCOUNTER (OUTPATIENT)
Age: 64
Discharge: HOME | End: 2024-05-03
Payer: COMMERCIAL

## 2024-05-03 LAB
T3FREE SERPL-MCNC: 2.9 PG/ML (ref 2–4.4)
T4 FREE SERPL-MCNC: 1.1 NG/DL (ref 0.9–1.7)
TSH SERPL DL<=0.05 MIU/L-ACNC: 3.69 UIU/ML (ref 0.3–5)

## 2024-05-03 PROCEDURE — 84439 ASSAY OF FREE THYROXINE: CPT

## 2024-05-03 PROCEDURE — 36415 COLL VENOUS BLD VENIPUNCTURE: CPT

## 2024-05-03 PROCEDURE — 84443 ASSAY THYROID STIM HORMONE: CPT

## 2024-05-03 PROCEDURE — 84481 FREE ASSAY (FT-3): CPT

## 2024-05-06 ENCOUNTER — OFFICE VISIT (OUTPATIENT)
Dept: FAMILY MEDICINE CLINIC | Age: 64
End: 2024-05-06
Payer: COMMERCIAL

## 2024-05-06 VITALS
HEIGHT: 61 IN | SYSTOLIC BLOOD PRESSURE: 124 MMHG | DIASTOLIC BLOOD PRESSURE: 72 MMHG | HEART RATE: 80 BPM | BODY MASS INDEX: 58.23 KG/M2 | TEMPERATURE: 97.2 F | OXYGEN SATURATION: 93 %

## 2024-05-06 DIAGNOSIS — I27.81 COR PULMONALE (CHRONIC) (HCC): ICD-10-CM

## 2024-05-06 DIAGNOSIS — M54.41 CHRONIC BILATERAL LOW BACK PAIN WITH BILATERAL SCIATICA: Primary | ICD-10-CM

## 2024-05-06 DIAGNOSIS — M54.42 CHRONIC BILATERAL LOW BACK PAIN WITH BILATERAL SCIATICA: Primary | ICD-10-CM

## 2024-05-06 DIAGNOSIS — R60.0 PERIPHERAL EDEMA: ICD-10-CM

## 2024-05-06 DIAGNOSIS — R00.2 PALPITATIONS: ICD-10-CM

## 2024-05-06 DIAGNOSIS — G89.29 CHRONIC BILATERAL LOW BACK PAIN WITH BILATERAL SCIATICA: Primary | ICD-10-CM

## 2024-05-06 DIAGNOSIS — I10 PRIMARY HYPERTENSION: ICD-10-CM

## 2024-05-06 DIAGNOSIS — F32.A DEPRESSION, UNSPECIFIED DEPRESSION TYPE: ICD-10-CM

## 2024-05-06 PROCEDURE — 3078F DIAST BP <80 MM HG: CPT | Performed by: NURSE PRACTITIONER

## 2024-05-06 PROCEDURE — G8417 CALC BMI ABV UP PARAM F/U: HCPCS | Performed by: NURSE PRACTITIONER

## 2024-05-06 PROCEDURE — G8427 DOCREV CUR MEDS BY ELIG CLIN: HCPCS | Performed by: NURSE PRACTITIONER

## 2024-05-06 PROCEDURE — 99214 OFFICE O/P EST MOD 30 MIN: CPT | Performed by: NURSE PRACTITIONER

## 2024-05-06 PROCEDURE — 3074F SYST BP LT 130 MM HG: CPT | Performed by: NURSE PRACTITIONER

## 2024-05-06 PROCEDURE — 3017F COLORECTAL CA SCREEN DOC REV: CPT | Performed by: NURSE PRACTITIONER

## 2024-05-06 PROCEDURE — 1036F TOBACCO NON-USER: CPT | Performed by: NURSE PRACTITIONER

## 2024-05-06 RX ORDER — LIDOCAINE 50 MG/G
1 PATCH TOPICAL DAILY
Qty: 10 PATCH | Refills: 0 | Status: SHIPPED | OUTPATIENT
Start: 2024-05-06 | End: 2024-05-16

## 2024-05-06 RX ORDER — VENLAFAXINE HYDROCHLORIDE 75 MG/1
75 CAPSULE, EXTENDED RELEASE ORAL DAILY
Qty: 30 CAPSULE | Refills: 0 | Status: SHIPPED | OUTPATIENT
Start: 2024-05-06 | End: 2024-06-05

## 2024-05-06 RX ORDER — TIZANIDINE 4 MG/1
4 TABLET ORAL 3 TIMES DAILY
Qty: 45 TABLET | Refills: 0 | Status: SHIPPED | OUTPATIENT
Start: 2024-05-06 | End: 2024-05-21

## 2024-05-06 SDOH — ECONOMIC STABILITY: FOOD INSECURITY: WITHIN THE PAST 12 MONTHS, YOU WORRIED THAT YOUR FOOD WOULD RUN OUT BEFORE YOU GOT MONEY TO BUY MORE.: SOMETIMES TRUE

## 2024-05-06 SDOH — ECONOMIC STABILITY: INCOME INSECURITY: HOW HARD IS IT FOR YOU TO PAY FOR THE VERY BASICS LIKE FOOD, HOUSING, MEDICAL CARE, AND HEATING?: SOMEWHAT HARD

## 2024-05-06 SDOH — ECONOMIC STABILITY: HOUSING INSECURITY
IN THE LAST 12 MONTHS, WAS THERE A TIME WHEN YOU DID NOT HAVE A STEADY PLACE TO SLEEP OR SLEPT IN A SHELTER (INCLUDING NOW)?: YES

## 2024-05-06 SDOH — ECONOMIC STABILITY: FOOD INSECURITY: WITHIN THE PAST 12 MONTHS, THE FOOD YOU BOUGHT JUST DIDN'T LAST AND YOU DIDN'T HAVE MONEY TO GET MORE.: SOMETIMES TRUE

## 2024-05-06 ASSESSMENT — PATIENT HEALTH QUESTIONNAIRE - PHQ9
SUM OF ALL RESPONSES TO PHQ QUESTIONS 1-9: 14
2. FEELING DOWN, DEPRESSED OR HOPELESS: MORE THAN HALF THE DAYS
10. IF YOU CHECKED OFF ANY PROBLEMS, HOW DIFFICULT HAVE THESE PROBLEMS MADE IT FOR YOU TO DO YOUR WORK, TAKE CARE OF THINGS AT HOME, OR GET ALONG WITH OTHER PEOPLE: VERY DIFFICULT
4. FEELING TIRED OR HAVING LITTLE ENERGY: MORE THAN HALF THE DAYS
SUM OF ALL RESPONSES TO PHQ QUESTIONS 1-9: 14
SUM OF ALL RESPONSES TO PHQ9 QUESTIONS 1 & 2: 4
5. POOR APPETITE OR OVEREATING: MORE THAN HALF THE DAYS
1. LITTLE INTEREST OR PLEASURE IN DOING THINGS: MORE THAN HALF THE DAYS
SUM OF ALL RESPONSES TO PHQ QUESTIONS 1-9: 14
8. MOVING OR SPEAKING SO SLOWLY THAT OTHER PEOPLE COULD HAVE NOTICED. OR THE OPPOSITE, BEING SO FIGETY OR RESTLESS THAT YOU HAVE BEEN MOVING AROUND A LOT MORE THAN USUAL: NOT AT ALL
7. TROUBLE CONCENTRATING ON THINGS, SUCH AS READING THE NEWSPAPER OR WATCHING TELEVISION: MORE THAN HALF THE DAYS
SUM OF ALL RESPONSES TO PHQ QUESTIONS 1-9: 14
6. FEELING BAD ABOUT YOURSELF - OR THAT YOU ARE A FAILURE OR HAVE LET YOURSELF OR YOUR FAMILY DOWN: MORE THAN HALF THE DAYS
9. THOUGHTS THAT YOU WOULD BE BETTER OFF DEAD, OR OF HURTING YOURSELF: NOT AT ALL
3. TROUBLE FALLING OR STAYING ASLEEP: MORE THAN HALF THE DAYS

## 2024-05-06 ASSESSMENT — COLUMBIA-SUICIDE SEVERITY RATING SCALE - C-SSRS
2. HAVE YOU ACTUALLY HAD ANY THOUGHTS OF KILLING YOURSELF?: NO
1. WITHIN THE PAST MONTH, HAVE YOU WISHED YOU WERE DEAD OR WISHED YOU COULD GO TO SLEEP AND NOT WAKE UP?: NO
6. HAVE YOU EVER DONE ANYTHING, STARTED TO DO ANYTHING, OR PREPARED TO DO ANYTHING TO END YOUR LIFE?: NO

## 2024-05-06 ASSESSMENT — ENCOUNTER SYMPTOMS
BLOOD IN STOOL: 0
WHEEZING: 0
BACK PAIN: 1
DIARRHEA: 0
VOMITING: 0
NAUSEA: 0
CHEST TIGHTNESS: 0
ABDOMINAL DISTENTION: 0
CONSTIPATION: 0
ABDOMINAL PAIN: 0
SHORTNESS OF BREATH: 0
COUGH: 0

## 2024-05-06 NOTE — PATIENT INSTRUCTIONS
[facility] Financial Resources*  (Call 211 if need more resources).     [facility] Veterans Service Commission:  What they offer:  Electric and gas payment services for veterans  Phone Number: (257) 923-9393 Service-Intake    [facility] Action Partnership (GLCAP):   Jennifer Benavides [place], [place], [place], [place], Jonah [place]  counites  What they offer: Assistance with utility and housing expenses.  Phone Number: 899.436.7191     [facility] Community Action Commission (NOCAC):   Maria G Sharma [place], [place], [place], and [place] counites  What they offer: Services for homelessness, housing, and home weatherization and repair.  Phone Number: 529.631.8951    [employer]:   [facility]  What they offer: Heating Fuel Payment Assistance, Electric Service Payment Assistance and Water Service Payment Assistance  Phone Number: (420) 632-9498 ext: x11 Service-Intake    [employer]:  What they offer: Heat, Electric, Gas and water payment services.  Phone Number: (202) 984-1301 Service-Intake    Community Action Commission of [place] & [place] Counites:  What they offer: Electric, gas and water payment service.  Phone: 884.801.6569        [place] Department of Job and Family Services (ODJSF):  What they offer: Government programs including Medicaid, SNAP(food stamps), TANF (cash assistance), and childcare assistance.  Phone Number: 126.208.3996    Area Office on Aging of [facility] ([facility]):  What they offer: Medical cab rides for seniors and referral to community resources  Phone Number: 478.231.9391     Area Agency on Aging, District 5:    [place], [place], [place], [place], [place], [place], [place], [place],  [facility]:  What they offer: Referral to transportation and other resources for seniors.  Phone Number: [phone]     Medications/Medical    [facility] Financial Assistance  What they offer: Assistance with [facility] bills  Phone: 712.415.7208;

## 2024-05-06 NOTE — PROGRESS NOTES
Visit Information    Have you changed or started any medications since your last visit including any over-the-counter medicines, vitamins, or herbal medicines? no   Have you stopped taking any of your medications? Is so, why? -  no  Are you having any side effects from any of your medications? - no    Have you seen any other physician or provider since your last visit?  no   Have you had any other diagnostic tests since your last visit?  no   Have you been seen in the emergency room and/or had an admission in a hospital since we last saw you?  no   Have you had your routine dental cleaning in the past 6 months?  no     Do you have an active MyChart account? If no, what is the barrier?  Yes    Patient Care Team:  Calvin Titus APRN - CNP as PCP - General (Nurse Practitioner)  Calvin Titus APRN - CNP as PCP - Empaneled Provider  Shawn Mcgregor MD as Orthopedic Surgeon (Orthopedic Surgery)  Jovany Roblero MD as Surgeon (Cardiology)  Rosi Joya APRN - CNP (Family Medicine)  Hunter Palencia MD as Consulting Physician (Gastroenterology)  Allison Vieira RN as Ambulatory Care Manager    Medical History Review  Past Medical, Family, and Social History reviewed and does contribute to the patient presenting condition    Health Maintenance   Topic Date Due    Breast cancer screen  09/16/2018    Cervical cancer screen  04/24/2022    Colorectal Cancer Screen  10/12/2022    COVID-19 Vaccine (1) 09/01/2024 (Originally 6/19/1961)    DTaP/Tdap/Td vaccine (1 - Tdap) 12/04/2024 (Originally 12/19/1979)    Flu vaccine (Season Ended) 12/04/2024 (Originally 8/1/2024)    Shingles vaccine (1 of 2) 12/04/2024 (Originally 12/19/2010)    Respiratory Syncytial Virus (RSV) Pregnant or age 60 yrs+ (1 - 1-dose 60+ series) 12/04/2024 (Originally 12/19/2020)    Pneumococcal 0-64 years Vaccine (1 of 2 - PCV) 03/27/2029 (Originally 12/19/1966)    Diabetic retinal exam  08/28/2024    Diabetic foot exam  12/04/2024    A1C test (Diabetic or 
 AFL (Epic) - Jovany Roblero MD, Cardiology, [place]    Palpitations  -Not improving or worsening  -Has been ongoing for many years  -Patient has been under a lot of stress as of recently  -Plan for new referral to cardiology  -     AFL (Epic) - Jovany Roblero MD, Cardiology, [place]    Peripheral edema  -Patient reports worsening as of recently  -Appears to be close to baseline on my exam, recommend compression stockings, continue current regimen, follow-up with cardiology  -Denies any chest pain or shortness of breath  -Low suspicion for DVT at this time, no erythema or calf tenderness noted  -     AFL (Epic) - Jovany Roblero MD, Cardiology, [place]    Depression, unspecified depression type  -Worsening as of recently  -No thoughts of self-harm or harming others  -Plan for referral to behavioral health, increase Effexor dose, discussed potential side effects of the medication  -Patient does have a good support system including her children friends and sister  -Going through separation from her   -See note below  -     venlafaxine (EFFEXOR XR) 75 MG extended release capsule; Take 1 capsule by mouth daily  -     [facility] ([facility])    Prior to Visit Medications    Medication Sig Taking? Authorizing Provider   tiZANidine (ZANAFLEX) 4 MG tablet Take 1 tablet by mouth 3 times daily for 15 days Yes Calvin Titus APRN - CNP   lidocaine (LIDODERM) 5 % Place 1 patch onto the skin daily for 10 days 12 hours on, 12 hours off. Yes Calvin Titus APRN - CNP   venlafaxine (EFFEXOR XR) 75 MG extended release capsule Take 1 capsule by mouth daily Yes Calvin Titus APRN - CNP   TRULICITY 0.75 MG/0.5ML SOPN  Yes Provider, MD Renetta   NOVOLOG RELION 100 UNIT/ML injection vial  Yes Provider, MD Renetta   bumetanide (BUMEX) 2 MG tablet Take 1 tablet by mouth daily Yes Calvin Titus APRN - CNP   pantoprazole (PROTONIX) 40 MG tablet Take 1 tablet by mouth every morning (before breakfast) Yes

## 2024-05-08 ENCOUNTER — TELEPHONE (OUTPATIENT)
Dept: FAMILY MEDICINE CLINIC | Age: 64
End: 2024-05-08

## 2024-05-08 ENCOUNTER — CARE COORDINATION (OUTPATIENT)
Dept: CARE COORDINATION | Age: 64
End: 2024-05-08

## 2024-05-08 DIAGNOSIS — E11.42 DIABETIC POLYNEUROPATHY ASSOCIATED WITH TYPE 2 DIABETES MELLITUS (HCC): Primary | ICD-10-CM

## 2024-05-08 DIAGNOSIS — R60.0 PERIPHERAL EDEMA: ICD-10-CM

## 2024-05-08 DIAGNOSIS — G89.29 ENCOUNTER FOR CHRONIC PAIN MANAGEMENT: ICD-10-CM

## 2024-05-08 NOTE — TELEPHONE ENCOUNTER
----- Message from Figueroa Pimentel sent at 5/8/2024 11:42 AM EDT -----  Subject: Message to Provider    QUESTIONS  Information for Provider? Pt is calling to see what she needs to do to   renew her handicaps sticker   ---------------------------------------------------------------------------  --------------  CALL BACK INFO  [phone]; OK to leave message on voicemail  ---------------------------------------------------------------------------  --------------  SCRIPT ANSWERS  Relationship to Patient? Self

## 2024-05-08 NOTE — CARE COORDINATION
Attempted to reach Marcio for follow up. Left a message on her voicemail with call back number.   Will try to reach her next week if no return call.

## 2024-05-09 NOTE — TELEPHONE ENCOUNTER
Pt informed of her handicap placard and she will be here on Tuesday to  the order.    She also states the muscle relaxer and patch are not working for her pain ,please advise

## 2024-05-10 ENCOUNTER — CARE COORDINATION (OUTPATIENT)
Dept: CARE COORDINATION | Age: 64
End: 2024-05-10

## 2024-05-10 NOTE — TELEPHONE ENCOUNTER
As discussed at previous visit, a referral has been placed to pain management, I encouraged she schedule with them.

## 2024-05-10 NOTE — CARE COORDINATION
Received a voicemail from Marcio stating that everything went real well with her doctor's appt on Monday. She is going to do some physical therapy. She stated \"everything's good\".  Will follow up with her next week.

## 2024-05-14 DIAGNOSIS — R42 DIZZINESS: ICD-10-CM

## 2024-05-14 DIAGNOSIS — B35.4 TINEA CORPORIS: ICD-10-CM

## 2024-05-14 DIAGNOSIS — G89.29 CHRONIC BILATERAL LOW BACK PAIN WITH BILATERAL SCIATICA: ICD-10-CM

## 2024-05-14 DIAGNOSIS — M54.42 CHRONIC BILATERAL LOW BACK PAIN WITH BILATERAL SCIATICA: ICD-10-CM

## 2024-05-14 DIAGNOSIS — F32.A DEPRESSION, UNSPECIFIED DEPRESSION TYPE: ICD-10-CM

## 2024-05-14 DIAGNOSIS — M54.41 CHRONIC BILATERAL LOW BACK PAIN WITH BILATERAL SCIATICA: ICD-10-CM

## 2024-05-14 RX ORDER — FERROUS SULFATE 325(65) MG
325 TABLET ORAL
Qty: 90 TABLET | Refills: 3 | Status: SHIPPED | OUTPATIENT
Start: 2024-05-14

## 2024-05-14 RX ORDER — NYSTATIN 100000 [USP'U]/G
POWDER TOPICAL
Qty: 60 G | Refills: 10 | OUTPATIENT
Start: 2024-05-14

## 2024-05-14 RX ORDER — LIOTHYRONINE SODIUM 5 UG/1
5 TABLET ORAL DAILY
Qty: 90 TABLET | Refills: 3 | Status: SHIPPED | OUTPATIENT
Start: 2024-05-14

## 2024-05-14 RX ORDER — MECLIZINE HYDROCHLORIDE 25 MG/1
25 TABLET ORAL 3 TIMES DAILY PRN
Qty: 90 TABLET | Refills: 1 | Status: SHIPPED | OUTPATIENT
Start: 2024-05-14

## 2024-05-14 RX ORDER — TIZANIDINE 4 MG/1
4 TABLET ORAL 3 TIMES DAILY
Qty: 45 TABLET | Refills: 0 | Status: CANCELLED | OUTPATIENT
Start: 2024-05-14 | End: 2024-05-29

## 2024-05-14 RX ORDER — LORATADINE 10 MG/1
TABLET ORAL
Qty: 90 TABLET | Refills: 1 | Status: SHIPPED | OUTPATIENT
Start: 2024-05-14

## 2024-05-14 RX ORDER — LEVOTHYROXINE SODIUM 112 UG/1
112 TABLET ORAL DAILY
Qty: 90 TABLET | Refills: 3 | Status: SHIPPED | OUTPATIENT
Start: 2024-05-14 | End: 2024-05-17 | Stop reason: SDUPTHER

## 2024-05-14 RX ORDER — ALLOPURINOL 100 MG/1
100 TABLET ORAL DAILY
Qty: 90 TABLET | Refills: 3 | Status: SHIPPED | OUTPATIENT
Start: 2024-05-14

## 2024-05-14 RX ORDER — ALBUTEROL SULFATE 90 UG/1
2 AEROSOL, METERED RESPIRATORY (INHALATION) EVERY 4 HOURS PRN
Qty: 18 G | Refills: 3 | Status: SHIPPED | OUTPATIENT
Start: 2024-05-14

## 2024-05-14 RX ORDER — NYSTATIN 100000 [USP'U]/G
POWDER TOPICAL
Qty: 60 G | Refills: 0 | Status: SHIPPED | OUTPATIENT
Start: 2024-05-14 | End: 2024-05-17 | Stop reason: SDUPTHER

## 2024-05-14 NOTE — TELEPHONE ENCOUNTER
Please Approve or Refuse.  Send to Pharmacy per Pt's Request: Pt is changing pharmacies and has been adv to have pharmacy transfer any remaining refills to new pharmacy     Next Visit Date:  8/6/2024   Last Visit Date: 5/6/2024    Hemoglobin A1C (%)   Date Value   01/30/2024 7.9 (H)   09/18/2023 7.5 (H)   06/15/2023 7.6             ( goal A1C is < 7)   BP Readings from Last 3 Encounters:   05/06/24 124/72   03/05/24 (!) 104/48   02/29/24 (!) 144/58          (goal 120/80)  BUN   Date Value Ref Range Status   02/18/2024 30 (H) 8 - 23 mg/dL Final     Creatinine   Date Value Ref Range Status   02/18/2024 1.1 (H) 0.5 - 0.9 mg/dL Final     Potassium   Date Value Ref Range Status   02/18/2024 4.3 3.7 - 5.3 mmol/L Final

## 2024-05-16 ENCOUNTER — TELEPHONE (OUTPATIENT)
Dept: FAMILY MEDICINE CLINIC | Age: 64
End: 2024-05-16

## 2024-05-16 RX ORDER — SPIRONOLACTONE 25 MG/1
25 TABLET ORAL DAILY
Qty: 30 TABLET | Refills: 10 | Status: SHIPPED | OUTPATIENT
Start: 2024-05-16

## 2024-05-16 NOTE — TELEPHONE ENCOUNTER
Pt called in stating that she is having bad leg pain that she has not been able to sleep for 2 days now. Pt states that she has been taking the muscle relaxer and it isn't helping. Pt wants to know if she can have some type of pain medicine called in or if she can double up on the muscle relaxer.     Pt confirmed that she has called pain management but they cannot get her in until September. Writer adv pt to call back and ask if she can also be placed on a cancellation list if something sooner opens up. Pt verbalized understanding.

## 2024-05-17 ENCOUNTER — TELEPHONE (OUTPATIENT)
Dept: FAMILY MEDICINE CLINIC | Age: 64
End: 2024-05-17

## 2024-05-17 DIAGNOSIS — I10 ESSENTIAL HYPERTENSION: Chronic | ICD-10-CM

## 2024-05-17 DIAGNOSIS — B35.4 TINEA CORPORIS: ICD-10-CM

## 2024-05-17 DIAGNOSIS — F32.A DEPRESSION, UNSPECIFIED DEPRESSION TYPE: ICD-10-CM

## 2024-05-17 DIAGNOSIS — G89.29 CHRONIC BILATERAL LOW BACK PAIN WITH BILATERAL SCIATICA: ICD-10-CM

## 2024-05-17 DIAGNOSIS — M54.41 CHRONIC BILATERAL LOW BACK PAIN WITH BILATERAL SCIATICA: ICD-10-CM

## 2024-05-17 DIAGNOSIS — M54.42 CHRONIC BILATERAL LOW BACK PAIN WITH BILATERAL SCIATICA: ICD-10-CM

## 2024-05-17 RX ORDER — PANTOPRAZOLE SODIUM 40 MG/1
40 TABLET, DELAYED RELEASE ORAL
Qty: 30 TABLET | Refills: 3 | Status: SHIPPED | OUTPATIENT
Start: 2024-05-17

## 2024-05-17 RX ORDER — LISINOPRIL 5 MG/1
5 TABLET ORAL DAILY
Qty: 90 TABLET | Refills: 1 | Status: SHIPPED | OUTPATIENT
Start: 2024-05-17

## 2024-05-17 RX ORDER — NYSTATIN 100000 [USP'U]/G
POWDER TOPICAL
Qty: 60 G | Refills: 0 | Status: SHIPPED | OUTPATIENT
Start: 2024-05-17

## 2024-05-17 RX ORDER — LEVOTHYROXINE SODIUM 112 UG/1
112 TABLET ORAL DAILY
Qty: 90 TABLET | Refills: 3 | Status: SHIPPED | OUTPATIENT
Start: 2024-05-17

## 2024-05-17 RX ORDER — TIZANIDINE 4 MG/1
4 TABLET ORAL 3 TIMES DAILY
Qty: 45 TABLET | Refills: 0 | OUTPATIENT
Start: 2024-05-17 | End: 2024-06-01

## 2024-05-17 RX ORDER — LIDOCAINE 50 MG/G
1 PATCH TOPICAL DAILY
Qty: 10 PATCH | Refills: 0 | OUTPATIENT
Start: 2024-05-17 | End: 2024-05-27

## 2024-05-17 RX ORDER — VENLAFAXINE HYDROCHLORIDE 75 MG/1
75 CAPSULE, EXTENDED RELEASE ORAL DAILY
Qty: 30 CAPSULE | Refills: 0 | OUTPATIENT
Start: 2024-05-17 | End: 2024-06-16

## 2024-05-17 NOTE — TELEPHONE ENCOUNTER
Can we have patient make an appointment, it can be virtual, but I need to discuss her bilateral lower extremity pain as well as go through her medication list.  She is on a few different medications that can interact, can also cause issues with her kidneys and I need to discuss this further with her.  She also sees cardiology, and I think she needs to have a further discussion with them as well in regards to her lisinopril, Bumex, spironolactone to see if there can be some changes made as this can affect her kidneys.

## 2024-05-17 NOTE — TELEPHONE ENCOUNTER
[employer] message sent to patient.  She has an upcoming appointment with pain management next month.

## 2024-05-17 NOTE — TELEPHONE ENCOUNTER
Please Approve or Refuse.  Send to Pharmacy per Pt's Request:      Next Visit Date:  8/6/2024   Last Visit Date: 5/6/2024    Hemoglobin A1C (%)   Date Value   01/30/2024 7.9 (H)   09/18/2023 7.5 (H)   06/15/2023 7.6             ( goal A1C is < 7)   BP Readings from Last 3 Encounters:   05/06/24 124/72   03/05/24 (!) 104/48   02/29/24 (!) 144/58          (goal 120/80)  BUN   Date Value Ref Range Status   02/18/2024 30 (H) 8 - 23 mg/dL Final     Creatinine   Date Value Ref Range Status   02/18/2024 1.1 (H) 0.5 - 0.9 mg/dL Final     Potassium   Date Value Ref Range Status   02/18/2024 4.3 3.7 - 5.3 mmol/L Final

## 2024-05-21 ENCOUNTER — CARE COORDINATION (OUTPATIENT)
Dept: CARE COORDINATION | Age: 64
End: 2024-05-21

## 2024-05-21 NOTE — CARE COORDINATION
Attempted to reach patient for follow up. Left a message on her voicemail with call back number.   Will try to reach her next week

## 2024-05-22 RX ORDER — TIZANIDINE 4 MG/1
4 TABLET ORAL 3 TIMES DAILY PRN
Qty: 45 TABLET | Refills: 10 | OUTPATIENT
Start: 2024-05-22

## 2024-05-22 RX ORDER — BUMETANIDE 2 MG/1
2 TABLET ORAL DAILY
Qty: 30 TABLET | Refills: 10 | OUTPATIENT
Start: 2024-05-22

## 2024-05-22 NOTE — TELEPHONE ENCOUNTER
Please Approve or Refuse.  Send to Pharmacy per Pt's Request:      Next Visit Date:  5/30/2024   Last Visit Date: 5/6/2024    Hemoglobin A1C (%)   Date Value   01/30/2024 7.9 (H)   09/18/2023 7.5 (H)   06/15/2023 7.6             ( goal A1C is < 7)   BP Readings from Last 3 Encounters:   05/06/24 124/72   03/05/24 (!) 104/48   02/29/24 (!) 144/58          (goal 120/80)  BUN   Date Value Ref Range Status   02/18/2024 30 (H) 8 - 23 mg/dL Final     Creatinine   Date Value Ref Range Status   02/18/2024 1.1 (H) 0.5 - 0.9 mg/dL Final     Potassium   Date Value Ref Range Status   02/18/2024 4.3 3.7 - 5.3 mmol/L Final

## 2024-05-28 ENCOUNTER — CARE COORDINATION (OUTPATIENT)
Dept: CARE COORDINATION | Age: 64
End: 2024-05-28

## 2024-05-29 NOTE — CARE COORDINATION
Received a voicemail from Marcio stating, \"Nancy Matthews, seems like everything's going OK so far but if I have any problems I will definitely give you a call back. I appreciate you calling and checking. Thank you and have a good day\".   She has a PT evaluation and PCP appt on 5/30. Will follow up next week.

## 2024-05-30 ENCOUNTER — OFFICE VISIT (OUTPATIENT)
Dept: FAMILY MEDICINE CLINIC | Age: 64
End: 2024-05-30
Payer: COMMERCIAL

## 2024-05-30 ENCOUNTER — HOSPITAL ENCOUNTER (OUTPATIENT)
Dept: PHYSICAL THERAPY | Age: 64
Setting detail: THERAPIES SERIES
Discharge: HOME | End: 2024-05-30
Payer: COMMERCIAL

## 2024-05-30 ENCOUNTER — TELEPHONE (OUTPATIENT)
Dept: FAMILY MEDICINE CLINIC | Age: 64
End: 2024-05-30

## 2024-05-30 VITALS
SYSTOLIC BLOOD PRESSURE: 100 MMHG | DIASTOLIC BLOOD PRESSURE: 62 MMHG | OXYGEN SATURATION: 96 % | HEART RATE: 63 BPM | WEIGHT: 293 LBS | HEIGHT: 61 IN | RESPIRATION RATE: 22 BRPM | BODY MASS INDEX: 55.32 KG/M2

## 2024-05-30 DIAGNOSIS — R25.2 MUSCLE CRAMPS: Primary | ICD-10-CM

## 2024-05-30 DIAGNOSIS — E03.9 HYPOTHYROIDISM, UNSPECIFIED TYPE: ICD-10-CM

## 2024-05-30 DIAGNOSIS — G25.81 RLS (RESTLESS LEGS SYNDROME): ICD-10-CM

## 2024-05-30 DIAGNOSIS — I10 ESSENTIAL HYPERTENSION: Chronic | ICD-10-CM

## 2024-05-30 DIAGNOSIS — I10 PRIMARY HYPERTENSION: ICD-10-CM

## 2024-05-30 PROCEDURE — G8417 CALC BMI ABV UP PARAM F/U: HCPCS | Performed by: NURSE PRACTITIONER

## 2024-05-30 PROCEDURE — 3074F SYST BP LT 130 MM HG: CPT | Performed by: NURSE PRACTITIONER

## 2024-05-30 PROCEDURE — 99213 OFFICE O/P EST LOW 20 MIN: CPT | Performed by: NURSE PRACTITIONER

## 2024-05-30 PROCEDURE — 1036F TOBACCO NON-USER: CPT | Performed by: NURSE PRACTITIONER

## 2024-05-30 PROCEDURE — 3078F DIAST BP <80 MM HG: CPT | Performed by: NURSE PRACTITIONER

## 2024-05-30 PROCEDURE — G8427 DOCREV CUR MEDS BY ELIG CLIN: HCPCS | Performed by: NURSE PRACTITIONER

## 2024-05-30 PROCEDURE — 97162 PT EVAL MOD COMPLEX 30 MIN: CPT

## 2024-05-30 PROCEDURE — 3017F COLORECTAL CA SCREEN DOC REV: CPT | Performed by: NURSE PRACTITIONER

## 2024-05-30 RX ORDER — ROPINIROLE 3 MG/1
3 TABLET, FILM COATED ORAL 2 TIMES DAILY
Qty: 60 TABLET | Refills: 2 | Status: SHIPPED | OUTPATIENT
Start: 2024-05-30 | End: 2024-08-28

## 2024-05-30 RX ORDER — POTASSIUM CHLORIDE 750 MG/1
10 TABLET, EXTENDED RELEASE ORAL EVERY OTHER DAY
Qty: 45 TABLET | Refills: 0 | Status: SHIPPED | OUTPATIENT
Start: 2024-05-30 | End: 2024-08-28

## 2024-05-30 RX ORDER — LISINOPRIL 2.5 MG/1
2.5 TABLET ORAL DAILY
Qty: 30 TABLET | Refills: 2 | Status: SHIPPED | OUTPATIENT
Start: 2024-05-30 | End: 2024-08-28

## 2024-05-30 RX ORDER — ROPINIROLE 2 MG/1
3 TABLET, FILM COATED ORAL 2 TIMES DAILY
Qty: 90 TABLET | Refills: 2 | Status: SHIPPED | OUTPATIENT
Start: 2024-05-30 | End: 2024-05-30

## 2024-05-30 ASSESSMENT — ENCOUNTER SYMPTOMS
COUGH: 0
DIARRHEA: 0
ABDOMINAL DISTENTION: 0
NAUSEA: 0
CHEST TIGHTNESS: 0
BACK PAIN: 1
CONSTIPATION: 0
VOMITING: 0
ABDOMINAL PAIN: 0
WHEEZING: 0
SHORTNESS OF BREATH: 0
BLOOD IN STOOL: 0

## 2024-05-30 NOTE — PROGRESS NOTES
Discuss medications  Patient is c/o cramps and spasms in her legs. She states she has the charley hoarses through out the day  Patient c/o numbness in fingers and toes    Visit Information    Have you changed or started any medications since your last visit including any over-the-counter medicines, vitamins, or herbal medicines? no   Have you stopped taking any of your medications? Is so, why? -  no  Are you having any side effects from any of your medications? - no    Have you seen any other physician or provider since your last visit?  no   Have you had any other diagnostic tests since your last visit?  no   Have you been seen in the emergency room and/or had an admission in a hospital since we last saw you?  no   Have you had your routine dental cleaning in the past 6 months?  no     Do you have an active MyChart account? If no, what is the barrier?  Yes    Patient Care Team:  Calvin Titus APRN - CNP as PCP - General (Nurse Practitioner)  Calvin Titus APRN - CNP as PCP - Empaneled Provider  Shawn Mcgregor MD as Orthopedic Surgeon (Orthopedic Surgery)  Jovany Roblero MD as Surgeon (Cardiology)  Rosi Joya APRN - CNP (Family Medicine)  Hunter Palencia MD as Consulting Physician (Gastroenterology)  Allison Vieira, RN as Ambulatory Care Manager    Medical History Review  Past Medical, Family, and Social History reviewed and does contribute to the patient presenting condition    Health Maintenance   Topic Date Due    Breast cancer screen  09/16/2018    Cervical cancer screen  04/24/2022    Colorectal Cancer Screen  10/12/2022    COVID-19 Vaccine (1) 09/01/2024 (Originally 6/19/1961)    DTaP/Tdap/Td vaccine (1 - Tdap) 12/04/2024 (Originally 12/19/1979)    Flu vaccine (Season Ended) 12/04/2024 (Originally 8/1/2024)    Shingles vaccine (1 of 2) 12/04/2024 (Originally 12/19/2010)    Respiratory Syncytial Virus (RSV) Pregnant or age 60 yrs+ (1 - 1-dose 60+ series) 12/04/2024 (Originally 12/19/2020)

## 2024-05-30 NOTE — TELEPHONE ENCOUNTER
I just went to reach out to let you know that I am going to decrease patient's lisinopril to 2.5 mg daily.  She has been on the lower end of normal the last couple of checks in the office.  I did let the staff know, to reach out to her to let her know of the dosage change as I decided to do this after our visit was over.  When you speak with patient, can you just ensure she is taking the medication as prescribed and that she is checking her blood pressure daily and keeping a log.  If her blood pressure starts to creep back up, can you please let me know.  Thank you.

## 2024-05-30 NOTE — CONSULTS
Efrain Fall Risk Assessment    Patient Name:  Marcio Veloz  : 1960    Risk Factor Scale  Score   History of Falls [x] Yes  [] No 25  0 25   Secondary Diagnosis [x] Yes  [] No 15  0 15   Ambulatory Aid [] Furniture  [x] Crutches/cane/walker  [] None/bedrest/wheelchair/nurse 30  15  0 15   IV/Heparin Lock [] Yes  [x] No 20  0 0   Gait/Transferring [] Impaired  [x] Weak  [] Normal/bedrest/immobile 20  10  0 10   Mental Status [] Forgets limitations  [x] Oriented to own ability 15  0 0      Total:65     Based on the Assessment score: check the appropriate box.    []  No intervention needed   Low =   Score of 0-24    []  Use standard prevention interventions Moderate =  Score of 24-44   [] Give patient handout and discuss fall prevention strategies   [] Establish goal of education for patient/family RE: fall prevention strategies    [x]  Use high risk prevention interventions High = Score of 45 and higher   [x] Give patient handout and discuss fall prevention strategies   [x] Establish goal of education for patient/family Re: fall prevention strategies   [x] Discuss lifeline / other resources    Electronically signed by:   Harmeet Patterson PT  Date: 2024     
minutes      Time in:11:15 AM     Time out:12:15 PM    Electronically signed by: Harmeet Patterson PT        Physician Signature:________________________________Date:__________________  By signing above or cosigning this note, I have reviewed this plan of care and certify a need for medically necessary rehabilitation services.     *PLEASE SIGN ABOVE AND FAX BACK ALL PAGES*

## 2024-05-30 NOTE — PROGRESS NOTES
Marcio Veloz (:  1960) is a 63 y.o. female,Established patient, here for evaluation of the following chief complaint(s): Discuss Medications      ASSESSMENT/PLAN:    Marcio received counseling on the following healthy behaviors: nutrition, exercise, and medication adherence  Reviewed prior labs and health maintenance  Discussed use, benefit, and side effects of prescribed medications.   Barriers to medication compliance addressed.   Patient given educational materials - see patient instructions  All patient questions answered.  Patient voiced understanding.  The patient's past medical,surgical, social, and family history as well as her current medications and allergies were reviewed as documented in today's encounter.    Medications, labs, diagnostic studies, consultations and follow-up as documented in this encounter.    Marcio was seen today for discuss medications.    Diagnoses and all orders for this visit:    Muscle cramps  -Started a few days ago  -States she has been trying to drink enough water  -Would like to check her electrolytes as she is on diuretics  -Patient used to be on potassium, but had been on Aldactone, plan to discontinue the Aldactone, continue Bumex, will reorder potassium to be taken every other day and keep a close eye on potassium levels, I did recommend we check her levels prior to starting she agrees  -     Magnesium; Future  -     Basic Metabolic Panel; Future  -     potassium chloride (KLOR-CON M) 10 MEQ extended release tablet; Take 1 tablet by mouth every other day    Hypothyroidism, unspecified type  -Appears well-controlled  -Continue to follow-up with endocrinology  -Would like to verify current regimen    RLS (restless legs syndrome)  -Not well-controlled  -Requip regimen adjusted  -Follow-up with neurology and pain management    Primary hypertension  -Controlled  -She is on the lower end of normal, I do think decreasing the lisinopril to 2.5 mg daily would be

## 2024-06-03 RX ORDER — TIZANIDINE 4 MG/1
4 TABLET ORAL 3 TIMES DAILY PRN
Qty: 45 TABLET | Refills: 10 | OUTPATIENT
Start: 2024-06-03

## 2024-06-04 ENCOUNTER — TELEPHONE (OUTPATIENT)
Dept: FAMILY MEDICINE CLINIC | Age: 64
End: 2024-06-04

## 2024-06-04 DIAGNOSIS — F32.A DEPRESSION, UNSPECIFIED DEPRESSION TYPE: ICD-10-CM

## 2024-06-04 NOTE — TELEPHONE ENCOUNTER
Patient called in stating she had multiple side effects to the following medications   If there is anything else she can take she is willing to try.  Patient did advise that she did stop these medications as well.    venlafaxine (EFFEXOR XR) 75 MG extended release capsule     rOPINIRole (REQUIP) 3 MG     Ok to send to [facility] in [place] if something else can be prescribed

## 2024-06-04 NOTE — TELEPHONE ENCOUNTER
I called and spoke with patient to clarify what exactly she was referring to.  All of the symptoms she was reporting in terms of side effects include body aches, restless leg, nausea, dizziness.  She states it seems to start after she takes her Effexor and her Requip.  We had recently adjusted the dose of the Requip, but she states that she had been doing some research and she believes that those 2 medications are causing some of her symptoms.  She did abruptly stop both medications without tapering, this was over a week ago.  This was not mentioned to me at our most recent visit on the 30th of last month.  She states that she has no interest in starting a new medication at this time.  We discussed Cymbalta, I do think it would be a good option for her for both her depression as well as her generalized pain.  She states she has an appointment with pain management this week and would like to discuss with them and get back with me.  I did explain to her that her symptoms she is experiencing may not be side effects from medication but in terms just her symptoms that are not well-controlled and she does state understanding.  In terms of her depression, I did stressed the importance of following with behavioral health, which she states she has no interest in doing at this time.  Will wait to hear back from patient this week to see how she would like to proceed.    Electronically signed by YOLANDA Jackson CNP on 6/4/2024 at 11:10 AM

## 2024-06-06 ENCOUNTER — CARE COORDINATION (OUTPATIENT)
Dept: CARE COORDINATION | Age: 64
End: 2024-06-06

## 2024-06-07 NOTE — CARE COORDINATION
Ambulatory Care Coordination Note  2024    Patient Current Location:  Home: [address] [place] [id number]     ACM contacted the patient by telephone. Verified name and  with patient as identifiers. Provided introduction to self, and explanation of the ACM role.     Challenges to be reviewed by the provider   Additional needs identified to be addressed with provider: No  none               Method of communication with provider: none.    ACM: Allison Vieira RN  Summary  Date Care Coordination Episode Started:  2024     Reason for Call Today:     CC  Follow Up     Reason patient is in Care Coordination:     PCP Referral  Diabetes  HTN    Topics Discussed Today:     General- Talked to Marcio for follow up. She is having a lot of pain in her lower back and legs. She said she had an appt with pain management yesterday, but she didn't know where to go. She tried calling and kept getting their voicemail. She said there wasn't any wheelchairs for her to use and it was too difficult to walk very far. ACM will call tomorrow, to verify  location in the hospital and have them reach out to her for a new appt. She had a follow up appt with endocrine. Her Lantus dose was decreased to 50 units BID. Her Trulicity was increased to 1.5 mg weekly. Med list updated. She has a new patient appt with neurology the end of the month.     Interventions completed today:      Assessments completed today:     Fall risk  Initial assessment  Medication reconciliation  SDOH  Diabetes; General Health     Care Coordinator plan of care:     Marcio will continue to take her medications as prescribed  Monitor BP, HR, blood sugars daily   Continue doing post total knee exercises  Orders for mammogram, liver US- need scheduled   Will follow up next week to assess for symptoms; discuss goals      Offered patient enrollment in the Remote Patient Monitoring (RPM) program for in-home monitoring: Patient is not eligible for RPM program

## 2024-06-11 RX ORDER — BUMETANIDE 2 MG/1
2 TABLET ORAL DAILY
Qty: 90 TABLET | Refills: 3 | Status: SHIPPED | OUTPATIENT
Start: 2024-06-11

## 2024-06-11 NOTE — TELEPHONE ENCOUNTER
Please Approve or Refuse.  Send to Pharmacy per Pt's Request:     Pt called in stating that she needs medication to go through her mail order. Updated qty to 90 with 3 refills. Pt also in inquiring about the Tizanidine and if she can get a refill or if something else could be called in place of this.      Next Visit Date:  8/6/2024   Last Visit Date: 5/30/2024    Hemoglobin A1C (%)   Date Value   01/30/2024 7.9 (H)   09/18/2023 7.5 (H)   06/15/2023 7.6             ( goal A1C is < 7)   BP Readings from Last 3 Encounters:   05/30/24 100/62   05/06/24 124/72   03/05/24 (!) 104/48          (goal 120/80)  BUN   Date Value Ref Range Status   02/18/2024 30 (H) 8 - 23 mg/dL Final     Creatinine   Date Value Ref Range Status   02/18/2024 1.1 (H) 0.5 - 0.9 mg/dL Final     Potassium   Date Value Ref Range Status   02/18/2024 4.3 3.7 - 5.3 mmol/L Final

## 2024-06-13 ENCOUNTER — CARE COORDINATION (OUTPATIENT)
Dept: CARE COORDINATION | Age: 64
End: 2024-06-13

## 2024-06-13 NOTE — CARE COORDINATION
Ambulatory Care Coordination Note     6/13/2024 3:08 PM     patient outreach attempt by this ACM today to perform care management follow up . ACM was unable to reach the patient by telephone today; left voice message requesting a return phone call to this ACM.     ACM: Allison Vieira RN     Care Summary Note: n/a    PCP/Specialist follow up:   Future Appointments         Provider Specialty Dept Phone    6/27/2024 1:20 PM Ian Pena MD Neurology [phone]    7/8/2024 1:00 PM Amelia Pichardo PTA Physical Therapy [phone]    7/10/2024 1:00 PM Amelia Pichardo PTA Physical Therapy [phone]    7/11/2024 1:20 PM Andrei Trujillo MD Pain Management [phone]    8/6/2024 10:30 AM Calvin Titus APRN - CNP Family Medicine [phone]    9/4/2024 1:30 PM Dixon Mariscal DO Cardiology [phone]            Follow Up:   Plan for next AC outreach in approximately 1 week to complete:  - disease specific assessments  - SDOH assessments  - advance care planning  - goal progression  - education .

## 2024-06-13 NOTE — TELEPHONE ENCOUNTER
Please Approve or Refuse.  Send to Pharmacy per Pt's Request:      Next Visit Date:  8/6/2024   Last Visit Date: 5/30/2024    Hemoglobin A1C (%)   Date Value   01/30/2024 7.9 (H)   09/18/2023 7.5 (H)   06/15/2023 7.6             ( goal A1C is < 7)   BP Readings from Last 3 Encounters:   05/30/24 100/62   05/06/24 124/72   03/05/24 (!) 104/48          (goal 120/80)  BUN   Date Value Ref Range Status   02/18/2024 30 (H) 8 - 23 mg/dL Final     Creatinine   Date Value Ref Range Status   02/18/2024 1.1 (H) 0.5 - 0.9 mg/dL Final     Potassium   Date Value Ref Range Status   02/18/2024 4.3 3.7 - 5.3 mmol/L Final

## 2024-06-15 RX ORDER — ASPIRIN 81 MG/1
81 TABLET, COATED ORAL DAILY
Qty: 30 TABLET | Refills: 10 | Status: SHIPPED | OUTPATIENT
Start: 2024-06-15

## 2024-06-19 DIAGNOSIS — R42 DIZZINESS: ICD-10-CM

## 2024-06-20 ENCOUNTER — CARE COORDINATION (OUTPATIENT)
Dept: CARE COORDINATION | Age: 64
End: 2024-06-20

## 2024-06-20 RX ORDER — MECLIZINE HYDROCHLORIDE 25 MG/1
25 TABLET ORAL 3 TIMES DAILY PRN
Qty: 90 TABLET | Refills: 10 | Status: SHIPPED | OUTPATIENT
Start: 2024-06-20

## 2024-06-20 NOTE — CARE COORDINATION
Ambulatory Care Coordination Note     6/20/2024 2:50 PM     Patient outreach attempt by this ACM today to perform care management follow up . ACM was unable to reach the patient by telephone today; left voice message requesting a return phone call to this ACM.     ACM: Allison Vieira RN     Care Summary Note: n/a    PCP/Specialist follow up:   Future Appointments         Provider Specialty Dept Phone    6/27/2024 1:20 PM Ian Pena MD Neurology [phone]    7/8/2024 1:00 PM Amelia Picahrdo PTA Physical Therapy [phone]    7/10/2024 1:00 PM Amelia Pichadro PTA Physical Therapy [phone]    7/11/2024 1:20 PM Andrei Trujillo MD Pain Management [phone]    9/4/2024 1:30 PM Dixon Mariscal DO Cardiology [phone]            Follow Up:   Plan for next ACM outreach in approximately 1 week to complete:  - disease specific assessments  - SDOH assessments  - advance care planning  - goal progression  - education .

## 2024-06-20 NOTE — TELEPHONE ENCOUNTER
Please Approve or Refuse.  Send to Pharmacy per Pt's Request: exact care      Next Visit Date:  Visit date not found   Last Visit Date: 5/30/2024    Hemoglobin A1C (%)   Date Value   01/30/2024 7.9 (H)   09/18/2023 7.5 (H)   06/15/2023 7.6             ( goal A1C is < 7)   BP Readings from Last 3 Encounters:   05/30/24 100/62   05/06/24 124/72   03/05/24 (!) 104/48          (goal 120/80)  BUN   Date Value Ref Range Status   02/18/2024 30 (H) 8 - 23 mg/dL Final     Creatinine   Date Value Ref Range Status   02/18/2024 1.1 (H) 0.5 - 0.9 mg/dL Final     Potassium   Date Value Ref Range Status   02/18/2024 4.3 3.7 - 5.3 mmol/L Final

## 2024-06-25 ENCOUNTER — CARE COORDINATION (OUTPATIENT)
Dept: CARE COORDINATION | Age: 64
End: 2024-06-25

## 2024-06-25 NOTE — CARE COORDINATION
Ambulatory Care Coordination Note     2024 4:53 PM     Patient Current Location:  Home: [facility] [address]  [facility] [address]     ACM contacted the patient by telephone. Verified name and  with patient as identifiers.         ACM: Allison Vieira RN     Challenges to be reviewed by the provider   Additional needs identified to be addressed with provider Yes  Marcio called upset.  She was referred to neurology, back on 23, for her RLS. Her initial appt was scheduled for 24. The neurology office cancelled and rescheduled. She had an appt scheduled for this week, , and they called and cancelled again. She is now rescheduled for 24. Almost a year after the referral was placed.   She is asking if there is anyone else she can be referred to. Thank you.                Method of communication with provider: chart routing.    Care Summary Note: see yellow box above    Offered patient enrollment in the Remote Patient Monitoring (RPM) program for in-home monitoring: Yes, but did not enroll at this time: already monitoring with home equipment.     Assessments Completed:   Diabetes Assessment    Medic Alert ID: No  Meal Planning: Avoidance of concentrated sweets   How often do you test your blood sugar?: Bedtime, Meals, Daily   Do you have barriers with adherence to non-pharmacologic self-management interventions? (Nutrition/Exercise/Self-Monitoring): Yes   Have you ever had to go to the ED for symptoms of low blood sugar?: No       No patient-reported symptoms   Do you have hyperglycemia symptoms?: No   Do you have hypoglycemia symptoms?: No   Blood Sugar Monitoring Regimen: Morning Fasting, At Bedtime, Before Meals   Blood Sugar Trends: No Change             Medications Reviewed:   Completed during a previous call     Advance Care Planning:   Not reviewed during this call     Care Planning:    Goals Addressed                   This Visit's Progress     Conditions and Symptoms   On track     I

## 2024-06-26 NOTE — TELEPHONE ENCOUNTER
I can send a new referral, can you find out who she would like to see, someone who is covered in her network.

## 2024-06-27 NOTE — CARE COORDINATION
Attempted to reach Marcio to discuss neurology referral. Left a message on her voicemail with call back number.   There are multiple providers in network, including [facility].

## 2024-07-03 ENCOUNTER — CARE COORDINATION (OUTPATIENT)
Dept: CARE COORDINATION | Age: 64
End: 2024-07-03

## 2024-07-03 ENCOUNTER — OFFICE VISIT (OUTPATIENT)
Dept: ORTHOPEDIC SURGERY | Age: 64
End: 2024-07-03
Payer: COMMERCIAL

## 2024-07-03 VITALS — BODY MASS INDEX: 55.32 KG/M2 | WEIGHT: 293 LBS | RESPIRATION RATE: 14 BRPM | HEIGHT: 61 IN

## 2024-07-03 DIAGNOSIS — Z96.651 S/P TOTAL KNEE ARTHROPLASTY, RIGHT: Primary | ICD-10-CM

## 2024-07-03 PROCEDURE — 99213 OFFICE O/P EST LOW 20 MIN: CPT | Performed by: PHYSICIAN ASSISTANT

## 2024-07-03 PROCEDURE — 3017F COLORECTAL CA SCREEN DOC REV: CPT | Performed by: PHYSICIAN ASSISTANT

## 2024-07-03 PROCEDURE — G8427 DOCREV CUR MEDS BY ELIG CLIN: HCPCS | Performed by: PHYSICIAN ASSISTANT

## 2024-07-03 PROCEDURE — 1036F TOBACCO NON-USER: CPT | Performed by: PHYSICIAN ASSISTANT

## 2024-07-03 PROCEDURE — G8417 CALC BMI ABV UP PARAM F/U: HCPCS | Performed by: PHYSICIAN ASSISTANT

## 2024-07-03 RX ORDER — TIZANIDINE 4 MG/1
4 TABLET ORAL EVERY 8 HOURS PRN
Qty: 30 TABLET | Refills: 0 | Status: SHIPPED | OUTPATIENT
Start: 2024-07-03

## 2024-07-03 NOTE — PROGRESS NOTES
she was previously treated with ropinirole but has since discontinued this medication due to side effects.  Would advise patient follow-up with neurology as planned for this as well  Patient also with history of chronic low back pain and I question if some of her pain in the leg is radicular in nature as she does note intermittent numbness and tingling all the way down to the foot and ankle.  Advise continue follow-up with pain management for this.  Patient is strongly interested in pursuing left total knee arthroplasty and would like to have a discussion with Dr. Zuniga for scheduling of this so an appointment is made  In the interim we will start patient on tizanidine to help with nighttime pain    Please note that this chart was generated using voice recognition Dragon dictation software.  Although every effort was made to ensure the accuracy of this automated transcription, some errors in transcription may have occurred.

## 2024-07-05 NOTE — TELEPHONE ENCOUNTER
Please Approve or Refuse.  Send to Pharmacy per Pt's Request:      Next Visit Date:  Visit date not found   Last Visit Date: 5/30/2024    Hemoglobin A1C (%)   Date Value   01/30/2024 7.9 (H)   09/18/2023 7.5 (H)   06/15/2023 7.6             ( goal A1C is < 7)   BP Readings from Last 3 Encounters:   05/30/24 100/62   05/06/24 124/72   03/05/24 (!) 104/48          (goal 120/80)  BUN   Date Value Ref Range Status   02/18/2024 30 (H) 8 - 23 mg/dL Final     Creatinine   Date Value Ref Range Status   02/18/2024 1.1 (H) 0.5 - 0.9 mg/dL Final     Potassium   Date Value Ref Range Status   02/18/2024 4.3 3.7 - 5.3 mmol/L Final

## 2024-07-06 RX ORDER — SPIRONOLACTONE 25 MG/1
25 TABLET ORAL DAILY
Qty: 30 TABLET | Refills: 10 | OUTPATIENT
Start: 2024-07-06

## 2024-07-06 RX ORDER — BUMETANIDE 2 MG/1
2 TABLET ORAL DAILY
Qty: 90 TABLET | Refills: 3 | Status: SHIPPED | OUTPATIENT
Start: 2024-07-06

## 2024-07-06 NOTE — CARE COORDINATION
Ambulatory Care Coordination Note     7/5/2024 11:59 PM     Patient outreach attempt by this ACM today to perform care management follow up . ACM was unable to reach the patient by telephone today; left voice message requesting a return phone call to this ACM.     ACM: Allison Vieira RN     Care Summary Note: n/a    PCP/Specialist follow up:   Future Appointments         Provider Specialty Dept Phone    7/8/2024 1:00 PM Amelia Pichardo PTA Physical Therapy [phone]    7/10/2024 1:00 PM Amelia Pichardo PTA Physical Therapy [phone]    7/11/2024 1:20 PM Andrei Trujillo MD Pain Management [phone]    7/17/2024 9:10 AM Matt Zuniga MD Orthopedic Surgery [phone]    9/4/2024 1:30 PM Dixon Mariscal DO Cardiology [phone]    11/14/2024 11:20 AM Ian Pena MD Neurology [phone]            Follow Up:   Plan for next ACM outreach in approximately 1 week to complete:  - disease specific assessments  - goal progression  - education .

## 2024-07-08 ENCOUNTER — HOSPITAL ENCOUNTER (OUTPATIENT)
Dept: PHYSICAL THERAPY | Age: 64
Setting detail: THERAPIES SERIES
Discharge: HOME | End: 2024-07-08
Payer: COMMERCIAL

## 2024-07-08 PROCEDURE — 97113 AQUATIC THERAPY/EXERCISES: CPT

## 2024-07-08 NOTE — FLOWSHEET NOTE
[facility]   Outpatient Rehabilitation & Therapy  [address]  P: [phone]   F: [phone]    Physical Therapy Daily Treatment Note      Date:  2024  Patient Name:  Marcio Veloz    :  1960  MRN: [id number]        Physician: Calvin Titus APRN - ARNAV                                 Insurance: [facility] ;  AUTH AFTER EVAL;  Approved 72 units There EX 06279, Gait 41351, 48 Neuromuscular re-ed 14760; Manual 39987  Aquatics 55113, 24 units vaso 6/3/2024-2024  Medical Diagnosis: M54.42, M54.41, G89.29 (ICD-10-CM) - Chronic bilateral low back pain with bilateral sciatica                    Rehab Codes: M25.561, M25.661, M25.662, M25.651, M25.652, R26.2  Onset Date:   and 10/16/23                              Next 's appt: 24  Visit# / total visits:   Cancels/No Shows: 0/0    Subjective:  Reports compliance with HEP a few times a week. States she had done well with aquatic therapy in the past but always notes difficulty when getting out.  R knee has numbness since TKA but recently saw ortho and reports all is healing well.    Pain:  [x] Yes  [] No Location: Lower back and B LE's to feet Pain Rating: (0-10 scale) 9/10  Pain altered Tx:  [x] No  [] Yes  Action:    Comments: utilizes motorized scooter to and from pool area; able to enter/exit via steps    Objective:    Precautions:Right TKA 10/16/23; Fall Risk, Diabetic monitor/sensor- patient opts to cover (left upper arm and stomach0  Exercises:      [facility]   Rehabilitation Services Exercise Log  Aquatic, Hip & DLS Program- Phase 1    Date of Eval:  24                              Primary PT: Harmeet Patterson, BRANDON    Things to Focus On (goals): improve ROM, strength, pain, and mobility      Approved 72 units There EX 06325, Gait 10878, 48 Neuromuscular re-ed 56800; Manual 95018  Aquatics 09474, 24 units vaso 6/3/2024-2024    Date 24       Visit # 2       Walk F/L/R 2 [employer] @ [facility]

## 2024-07-10 ENCOUNTER — HOSPITAL ENCOUNTER (OUTPATIENT)
Dept: PHYSICAL THERAPY | Age: 64
Setting detail: THERAPIES SERIES
Discharge: HOME | End: 2024-07-10
Payer: COMMERCIAL

## 2024-07-10 PROCEDURE — 97113 AQUATIC THERAPY/EXERCISES: CPT

## 2024-07-10 NOTE — FLOWSHEET NOTE
[facility]   Outpatient Rehabilitation & Therapy  [address]  P: [phone]   F: [phone]    Physical Therapy Daily Treatment Note      Date:  7/10/2024  Patient Name:  Marcio Veloz    :  1960  MRN: [id number]        Physician: Calvin Titus APRN - ARNAV                                 Insurance: [facility] ;  AUTH AFTER EVAL;  Approved 72 units There EX 57414, Gait 23604, 48 Neuromuscular re-ed 28638; Manual 88016  Aquatics 54871, 24 units vaso 6/3/2024-2024  Medical Diagnosis: M54.42, M54.41, G89.29 (ICD-10-CM) - Chronic bilateral low back pain with bilateral sciatica                    Rehab Codes: M25.561, M25.661, M25.662, M25.651, M25.652, R26.2  Onset Date:   and 10/16/23                              Next 's appt: 24  Visit# / total visits: 3/24  Cancels/No Shows: 0/0    Subjective:  Increased pain after last visit- states she felt good for awhile but pain returned and increased that evening and last night also. Patient feels rainy weather is also not helping as her entire body hurts, even her hands.     Pain:  [x] Yes  [] No Location: Lower back and B LE's to feet; L knee; generalized- total body Pain Rating: (0-10 scale) 9/10  Pain altered Tx:  [] No  [x] Yes  Action: Modified program to tolerance    Comments: Arrives today with rollator to pool deck- states her sister helps her dress    Objective:    Precautions:Right TKA 10/16/23; Fall Risk, Diabetic monitor/sensor- patient opts to cover (left upper arm and stomach0  Exercises:      [facility]   Rehabilitation Services Exercise Log  Aquatic, Hip & DLS Program- Phase 1    Date of Eval:  24                              Primary PT: Harmeet Patterson, PT    Things to Focus On (goals): improve ROM, strength, pain, and mobility      Approved 72 units There EX 04541, Gait 07576, 48 Neuromuscular re-ed 66059; Manual 87822  Aquatics 39932, 24 units vaso 6/3/2024-2024    Date 7/8/24 7/10/24

## 2024-07-15 DIAGNOSIS — Z96.651 S/P TOTAL KNEE ARTHROPLASTY, RIGHT: ICD-10-CM

## 2024-07-15 RX ORDER — TIZANIDINE 4 MG/1
4 TABLET ORAL EVERY 8 HOURS PRN
Qty: 30 TABLET | Refills: 0 | Status: SHIPPED | OUTPATIENT
Start: 2024-07-15

## 2024-07-15 NOTE — TELEPHONE ENCOUNTER
Pt called in is requesting a refill on tiZANidine (ZANAFLEX) 4 MG tablet      Please send to    [facility] [address] [place] [address] [place] - P [phone] - F [phone]     Please call pt with any questions @ [phone]

## 2024-07-16 DIAGNOSIS — R25.2 MUSCLE CRAMPS: ICD-10-CM

## 2024-07-17 RX ORDER — POTASSIUM CHLORIDE 750 MG/1
10 TABLET, EXTENDED RELEASE ORAL EVERY OTHER DAY
Qty: 15 TABLET | Refills: 2 | Status: SHIPPED | OUTPATIENT
Start: 2024-07-17 | End: 2024-10-15

## 2024-07-22 ENCOUNTER — CARE COORDINATION (OUTPATIENT)
Dept: CARE COORDINATION | Age: 64
End: 2024-07-22

## 2024-07-22 NOTE — CARE COORDINATION
Ambulatory Care Coordination Note     7/22/2024 1:23 PM     Patient outreach attempt by this ACM today to perform care management follow up . ACM was unable to reach the patient by telephone today; left voice message requesting a return phone call to this ACM.     ACM: Allison Vieira RN     Care Summary Note: n/a    PCP/Specialist follow up:   Future Appointments         Provider Specialty Dept Phone    8/14/2024 2:40 PM Matt Zuniga MD Orthopedic Surgery [phone]    9/4/2024 1:30 PM Dixon Mariscal DO Cardiology [phone]    11/14/2024 11:20 AM Ian Pena MD Neurology [phone]            Follow Up:   Plan for next AC outreach in approximately 1 week to complete:  - disease specific assessments  - SDOH assessments  - advance care planning  - goal progression  - education .

## 2024-07-23 DIAGNOSIS — E55.9 VITAMIN D DEFICIENCY: ICD-10-CM

## 2024-07-23 RX ORDER — CHOLECALCIFEROL (VITAMIN D3) 125 MCG
1 CAPSULE ORAL DAILY
Qty: 30 TABLET | Refills: 0 | Status: SHIPPED | OUTPATIENT
Start: 2024-07-23

## 2024-07-25 DIAGNOSIS — Z96.651 S/P TOTAL KNEE ARTHROPLASTY, RIGHT: ICD-10-CM

## 2024-07-29 RX ORDER — TIZANIDINE 4 MG/1
TABLET ORAL
Qty: 30 TABLET | Refills: 0 | Status: SHIPPED | OUTPATIENT
Start: 2024-07-29

## 2024-07-29 RX ORDER — AMMONIUM LACTATE 12 G/100G
CREAM TOPICAL
Qty: 140 G | Refills: 0 | OUTPATIENT
Start: 2024-07-29

## 2024-08-01 ENCOUNTER — CARE COORDINATION (OUTPATIENT)
Dept: CARE COORDINATION | Age: 64
End: 2024-08-01

## 2024-08-01 NOTE — CARE COORDINATION
Ambulatory Care Coordination Note     8/1/2024 12:21 PM     Patient outreach attempt by this ACM today to perform care management follow up . ACM was unable to reach the patient by telephone today; left voice message requesting a return phone call to this ACM.     ACM: Allison Vieira RN     Care Summary Note: n/a    PCP/Specialist follow up:   Future Appointments         Provider Specialty Dept Phone    8/14/2024 2:40 PM Matt Zuniga MD Orthopedic Surgery [phone]    8/16/2024 1:00 PM Calvin Titus, APRN - CNP Family Medicine [phone]    9/4/2024 1:30 PM Dixon Mariscal DO Cardiology [phone]    11/14/2024 11:20 AM Ian Pena MD Neurology [phone]            Follow Up:   Plan for next ACM outreach in approximately 1 week to complete:  - disease specific assessments  - goal progression  - education .

## 2024-08-08 ENCOUNTER — CARE COORDINATION (OUTPATIENT)
Dept: CARE COORDINATION | Age: 64
End: 2024-08-08

## 2024-08-08 NOTE — CARE COORDINATION
Received a voicemail from Marcio stating that she was still having a little problems. She said they never rescheduled her for her legs. Stated she was frustrated with that. Stated that she has been having trouble with her phone. Stated that she needs a new one, and right now is not a good time to get one. She stated that she will return call to [facility] when she gets them. She stated that she will get back with ACM at another time.   She has follow up with ortho next week, as well as PCP.   Will call her next week.

## 2024-08-09 ENCOUNTER — HOSPITAL ENCOUNTER (OUTPATIENT)
Age: 64
Discharge: HOME | End: 2024-08-09
Payer: COMMERCIAL

## 2024-08-09 LAB
ALBUMIN SERPL-MCNC: 4 G/DL (ref 3.5–5.2)
ALP SERPL-CCNC: 121 U/L (ref 35–104)
ANION GAP SERPL CALCULATED.3IONS-SCNC: 14 MMOL/L (ref 9–17)
AST SERPL-CCNC: 17 U/L
BILIRUB SERPL-MCNC: 0.4 MG/DL (ref 0.3–1.2)
BUN SERPL-MCNC: 22 MG/DL (ref 8–23)
CALCIUM SERPL-MCNC: 9.6 MG/DL (ref 8.6–10.4)
CHLORIDE SERPL-SCNC: 97 MMOL/L (ref 98–107)
CO2 SERPL-SCNC: 27 MMOL/L (ref 20–31)
CREAT SERPL-MCNC: 0.9 MG/DL (ref 0.5–0.9)
CREAT UR-MCNC: 94.4 MG/DL (ref 28–217)
GFR, ESTIMATED: 72 ML/MIN/1.73M2
GLUCOSE SERPL-MCNC: 129 MG/DL (ref 70–99)
MICROALBUMIN UR-MCNC: <12 MG/L (ref 0–20)
MICROALBUMIN/CREAT UR-RTO: NORMAL MCG/MG CREAT (ref 0–25)
POTASSIUM SERPL-SCNC: 3.6 MMOL/L (ref 3.7–5.3)
PROT SERPL-MCNC: 7.7 G/DL (ref 6.4–8.3)
SODIUM SERPL-SCNC: 138 MMOL/L (ref 135–144)

## 2024-08-09 PROCEDURE — 36415 COLL VENOUS BLD VENIPUNCTURE: CPT

## 2024-08-09 PROCEDURE — 82570 ASSAY OF URINE CREATININE: CPT

## 2024-08-09 PROCEDURE — 82043 UR ALBUMIN QUANTITATIVE: CPT

## 2024-08-09 PROCEDURE — 80053 COMPREHEN METABOLIC PANEL: CPT

## 2024-08-12 ENCOUNTER — TELEPHONE (OUTPATIENT)
Dept: FAMILY MEDICINE CLINIC | Age: 64
End: 2024-08-12

## 2024-08-12 NOTE — TELEPHONE ENCOUNTER
1st Attempt - LVM for pt to return call to switch to a VV or reschedule IP to a different day, provider working from home  2nd attempt - [employer] message sent

## 2024-08-13 RX ORDER — PANTOPRAZOLE SODIUM 40 MG/1
40 TABLET, DELAYED RELEASE ORAL
Qty: 30 TABLET | Refills: 10 | Status: SHIPPED | OUTPATIENT
Start: 2024-08-13

## 2024-08-13 RX ORDER — EMPAGLIFLOZIN 10 MG/1
10 TABLET, FILM COATED ORAL DAILY
Qty: 30 TABLET | Refills: 10 | Status: SHIPPED | OUTPATIENT
Start: 2024-08-13

## 2024-08-13 NOTE — TELEPHONE ENCOUNTER
Please Approve or Refuse.  Send to Pharmacy per Pt's Request:      Next Visit Date:  8/16/2024   Last Visit Date: 5/30/2024    Hemoglobin A1C (%)   Date Value   01/30/2024 7.9 (H)   09/18/2023 7.5 (H)   06/15/2023 7.6             ( goal A1C is < 7)   BP Readings from Last 3 Encounters:   05/30/24 100/62   05/06/24 124/72   03/05/24 (!) 104/48          (goal 120/80)  BUN   Date Value Ref Range Status   08/09/2024 22 8 - 23 mg/dL Final     Creatinine   Date Value Ref Range Status   08/09/2024 0.9 0.5 - 0.9 mg/dL Final     Potassium   Date Value Ref Range Status   08/09/2024 3.6 (L) 3.7 - 5.3 mmol/L Final

## 2024-08-14 ENCOUNTER — OFFICE VISIT (OUTPATIENT)
Dept: ORTHOPEDIC SURGERY | Age: 64
End: 2024-08-14
Payer: COMMERCIAL

## 2024-08-14 DIAGNOSIS — M17.0 PRIMARY OSTEOARTHRITIS OF BOTH KNEES: Primary | ICD-10-CM

## 2024-08-14 PROCEDURE — G8428 CUR MEDS NOT DOCUMENT: HCPCS | Performed by: ORTHOPAEDIC SURGERY

## 2024-08-14 PROCEDURE — 1036F TOBACCO NON-USER: CPT | Performed by: ORTHOPAEDIC SURGERY

## 2024-08-14 PROCEDURE — G8417 CALC BMI ABV UP PARAM F/U: HCPCS | Performed by: ORTHOPAEDIC SURGERY

## 2024-08-14 PROCEDURE — 99213 OFFICE O/P EST LOW 20 MIN: CPT | Performed by: ORTHOPAEDIC SURGERY

## 2024-08-14 PROCEDURE — 3017F COLORECTAL CA SCREEN DOC REV: CPT | Performed by: ORTHOPAEDIC SURGERY

## 2024-08-14 NOTE — PROGRESS NOTES
[facility] Orthopedics & Sports Medicine                   Matt Zuniga M.D.            [address], [address]               [place], [place] [address]           Dept Phone: [phone]           Dept Fax:  [phone] [address]                       [address]           [place], [place] [address]          Dept Phone: [phone]           Dept Fax:  [phone]      Chief Compliant:  Chief Complaint   Patient presents with    Pain     Lt knee        History of Present Illness:  This is a 63 y.o. female who presents to the clinic today for evaluation / follow up of severe left knee pain.  Patient is a 63-year-old female who had a right total knee arthroplasty done earlier this year and finally has gone on to develop to heal.  She has occasional pain in this knee especially at nighttime.  She says sometimes she gets a little bit of burning sensation in her thigh but overall doing much better than she did before it sounds like she has a lot of restless leg symptoms and I did suggest that she discuss Requip with her family physician    In the meantime the patient's left knee is severely painful for her..       Review of Systems   Constitutional: Negative for fever, chills, sweats.   Eyes: Negative for changes in vision, or pain.   HENT: Negative for ear ache, epistaxis, or sore throat.  Respiratory/Cardio: Negative for Chest pain, palpitations, SOB, or cough.  Gastrointestinal: Negative for abdominal pain, N/V/D.   Genitourinary: Negative for dysuria, frequency, urgency, or hematuria.   Neurological: Negative for headache, numbness, or weakness.   Integumentary: Negative for rash, itching, laceration, or abrasion.   Musculoskeletal: Positive for Pain (Lt knee)       Physical Exam:  Constitutional: Patient is oriented to person, place, and time. Patient appears well-developed and well nourished.   HENT: Negative otherwise noted  Head: Normocephalic and Atraumatic  Nose: Normal  Eyes:

## 2024-08-15 DIAGNOSIS — Z96.651 S/P TOTAL KNEE ARTHROPLASTY, RIGHT: ICD-10-CM

## 2024-08-15 RX ORDER — TIZANIDINE 4 MG/1
4 TABLET ORAL EVERY 8 HOURS PRN
Qty: 30 TABLET | Refills: 0 | Status: SHIPPED | OUTPATIENT
Start: 2024-08-15

## 2024-08-15 NOTE — TELEPHONE ENCOUNTER
Dr. Zuniga,     Patient is requesting refill of Zanaflex. Zanaflex Q8h 30# 0 refills pended if you agree.

## 2024-08-16 ENCOUNTER — TELEPHONE (OUTPATIENT)
Dept: FAMILY MEDICINE CLINIC | Age: 64
End: 2024-08-16

## 2024-08-16 ENCOUNTER — TELEPHONE (OUTPATIENT)
Dept: ORTHOPEDIC SURGERY | Age: 64
End: 2024-08-16

## 2024-08-16 DIAGNOSIS — Z96.651 S/P TOTAL KNEE ARTHROPLASTY, RIGHT: ICD-10-CM

## 2024-08-16 DIAGNOSIS — Z01.818 PRE-OP TESTING: Primary | ICD-10-CM

## 2024-08-16 NOTE — TELEPHONE ENCOUNTER
Called and spoke with patient regarding surgery scheduling.  All dates and times given via phone.

## 2024-08-16 NOTE — TELEPHONE ENCOUNTER
Medical Clearance received via fax from: [facility]    Upcoming Surgery/Procedure: 09/09/2024          Pt requires need medical clearance prior to scheduled surgery/procedure .       Next Visit Date:  Visit date not found   Last Visit Date: 5/30/2024    Please see attachment below.     Thank you!

## 2024-08-16 NOTE — TELEPHONE ENCOUNTER
I did order some additional preop testing including blood work and a chest x-ray.  Patient did have a EKG done in February.  She does follow with cardiology will need cardiac clearance prior to her surgery.  Please make her aware of this, so she can get this scheduled.

## 2024-08-19 ENCOUNTER — HOSPITAL ENCOUNTER (OUTPATIENT)
Age: 64
Discharge: HOME | End: 2024-08-19
Payer: COMMERCIAL

## 2024-08-19 LAB — POTASSIUM SERPL-SCNC: 3.4 MMOL/L (ref 3.7–5.3)

## 2024-08-19 PROCEDURE — 84132 ASSAY OF SERUM POTASSIUM: CPT

## 2024-08-19 PROCEDURE — 36415 COLL VENOUS BLD VENIPUNCTURE: CPT

## 2024-08-26 ENCOUNTER — HOSPITAL ENCOUNTER (OUTPATIENT)
Dept: PREADMISSION TESTING | Age: 64
Discharge: HOME | End: 2024-08-30
Attending: ORTHOPAEDIC SURGERY | Admitting: ORTHOPAEDIC SURGERY
Payer: COMMERCIAL

## 2024-08-26 VITALS
TEMPERATURE: 99.2 F | HEIGHT: 61 IN | OXYGEN SATURATION: 94 % | SYSTOLIC BLOOD PRESSURE: 120 MMHG | BODY MASS INDEX: 52.67 KG/M2 | DIASTOLIC BLOOD PRESSURE: 56 MMHG | RESPIRATION RATE: 22 BRPM | HEART RATE: 75 BPM | WEIGHT: 279 LBS

## 2024-08-26 DIAGNOSIS — E55.9 VITAMIN D DEFICIENCY: ICD-10-CM

## 2024-08-26 LAB
ANION GAP SERPL CALCULATED.3IONS-SCNC: 12 MMOL/L (ref 9–17)
BASOPHILS # BLD: 0.1 K/UL (ref 0–0.2)
BASOPHILS NFR BLD: 1 % (ref 0–2)
BILIRUB UR QL STRIP: NEGATIVE
BUN SERPL-MCNC: 18 MG/DL (ref 8–23)
CALCIUM SERPL-MCNC: 9.3 MG/DL (ref 8.6–10.4)
CHLORIDE SERPL-SCNC: 100 MMOL/L (ref 98–107)
CLARITY UR: CLEAR
CO2 SERPL-SCNC: 27 MMOL/L (ref 20–31)
COLOR UR: YELLOW
COMMENT: ABNORMAL
CREAT SERPL-MCNC: 0.9 MG/DL (ref 0.5–0.9)
EKG ATRIAL RATE: 73 BPM
EKG P AXIS: 67 DEGREES
EKG P-R INTERVAL: 200 MS
EKG Q-T INTERVAL: 404 MS
EKG QRS DURATION: 106 MS
EKG QTC CALCULATION (BAZETT): 445 MS
EKG R AXIS: -36 DEGREES
EKG T AXIS: 31 DEGREES
EKG VENTRICULAR RATE: 73 BPM
EOSINOPHIL # BLD: 0.2 K/UL (ref 0–0.4)
EOSINOPHILS RELATIVE PERCENT: 2 % (ref 0–4)
ERYTHROCYTE [DISTWIDTH] IN BLOOD BY AUTOMATED COUNT: 15.7 % (ref 11.5–14.9)
GFR, ESTIMATED: 72 ML/MIN/1.73M2
GLUCOSE SERPL-MCNC: 166 MG/DL (ref 70–99)
GLUCOSE UR STRIP-MCNC: ABNORMAL MG/DL
HCT VFR BLD AUTO: 39.1 % (ref 36–46)
HGB BLD-MCNC: 12.7 G/DL (ref 12–16)
HGB UR QL STRIP.AUTO: NEGATIVE
KETONES UR STRIP-MCNC: NEGATIVE MG/DL
LEUKOCYTE ESTERASE UR QL STRIP: NEGATIVE
LYMPHOCYTES NFR BLD: 2.4 K/UL (ref 1–4.8)
LYMPHOCYTES RELATIVE PERCENT: 20 % (ref 24–44)
MCH RBC QN AUTO: 29.4 PG (ref 26–34)
MCHC RBC AUTO-ENTMCNC: 32.5 G/DL (ref 31–37)
MCV RBC AUTO: 90.5 FL (ref 80–100)
MONOCYTES NFR BLD: 0.6 K/UL (ref 0.1–1.3)
MONOCYTES NFR BLD: 5 % (ref 1–7)
NEUTROPHILS NFR BLD: 72 % (ref 36–66)
NEUTS SEG NFR BLD: 8.4 K/UL (ref 1.3–9.1)
NITRITE UR QL STRIP: NEGATIVE
PH UR STRIP: 7.5 [PH] (ref 5–8)
PLATELET # BLD AUTO: 329 K/UL (ref 150–450)
PMV BLD AUTO: 8.7 FL (ref 6–12)
POTASSIUM SERPL-SCNC: 4.4 MMOL/L (ref 3.7–5.3)
PROT UR STRIP-MCNC: NEGATIVE MG/DL
RBC # BLD AUTO: 4.32 M/UL (ref 4–5.2)
SODIUM SERPL-SCNC: 139 MMOL/L (ref 135–144)
SP GR UR STRIP: 1.03 (ref 1–1.03)
UROBILINOGEN UR STRIP-ACNC: NORMAL EU/DL (ref 0–1)
WBC OTHER # BLD: 11.8 K/UL (ref 3.5–11)

## 2024-08-26 PROCEDURE — 85025 COMPLETE CBC W/AUTO DIFF WBC: CPT

## 2024-08-26 PROCEDURE — 36415 COLL VENOUS BLD VENIPUNCTURE: CPT

## 2024-08-26 PROCEDURE — 81003 URINALYSIS AUTO W/O SCOPE: CPT

## 2024-08-26 PROCEDURE — 80048 BASIC METABOLIC PNL TOTAL CA: CPT

## 2024-08-26 PROCEDURE — 93005 ELECTROCARDIOGRAM TRACING: CPT | Performed by: ANESTHESIOLOGY

## 2024-08-26 PROCEDURE — APPSS45 APP SPLIT SHARED TIME 31-45 MINUTES: Performed by: NURSE PRACTITIONER

## 2024-08-26 PROCEDURE — 87641 MR-STAPH DNA AMP PROBE: CPT

## 2024-08-26 RX ORDER — DULAGLUTIDE 4.5 MG/.5ML
4.5 INJECTION, SOLUTION SUBCUTANEOUS WEEKLY
COMMUNITY

## 2024-08-26 RX ORDER — CHOLECALCIFEROL (VITAMIN D3) 50 MCG
1 TABLET ORAL DAILY
Qty: 30 TABLET | Refills: 0 | Status: SHIPPED | OUTPATIENT
Start: 2024-08-26

## 2024-08-26 ASSESSMENT — ENCOUNTER SYMPTOMS
ABDOMINAL PAIN: 0
VOMITING: 0
WHEEZING: 0
NAUSEA: 0
DIARRHEA: 0
SHORTNESS OF BREATH: 1
CONSTIPATION: 0
COUGH: 0

## 2024-08-26 NOTE — TELEPHONE ENCOUNTER
Please Approve or Refuse.  Send to Pharmacy per Pt's Request: [facility]       Next Visit Date:  8/26/2024   Last Visit Date: 5/30/2024    Hemoglobin A1C (%)   Date Value   01/30/2024 7.9 (H)   09/18/2023 7.5 (H)   06/15/2023 7.6             ( goal A1C is < 7)   BP Readings from Last 3 Encounters:   05/30/24 100/62   05/06/24 124/72   03/05/24 (!) 104/48          (goal 120/80)  BUN   Date Value Ref Range Status   08/09/2024 22 8 - 23 mg/dL Final     Creatinine   Date Value Ref Range Status   08/09/2024 0.9 0.5 - 0.9 mg/dL Final     Potassium   Date Value Ref Range Status   08/19/2024 3.4 (L) 3.7 - 5.3 mmol/L Final

## 2024-08-26 NOTE — H&P
vial Inject 50 Units into the skin 2 times daily      oxyBUTYnin (DITROPAN XL) 15 MG extended release tablet Take 1 tablet by mouth daily      ammonium lactate (AMLACTIN) 12 % cream APPLY  CREAM TOPICALLY AS NEEDED 140 g 0    RELION PEN NEEDLE 31G/8MM 31G X 8 MM MISC USE ONE PEN NEEDLE BY SUBCUTANEOUS ROUTE DAILY 100 each 0    pramipexole (MIRAPEX) 0.5 MG tablet Take 1 tablet by mouth nightly 90 tablet 3    insulin lispro (HUMALOG) 100 UNIT/ML SOLN injection vial Inject 0-16 Units into the skin 3 times daily (with meals) 1 each 0    Omega-3 1000 MG CAPS 1 capsule      Continuous Blood Gluc Sensor (FREESTYLE KRISTIN 14 DAY SENSOR) MISC       Compression Stockings MISC by Does not apply route Needs bilateral knee high compression stockings 20-30 mmHg for leg swelling 2 each 0    blood glucose test strips (FREESTYLE LITE) strip 1 each by Does not apply route 6 times daily 200 each 5    Blood Glucose Monitoring Suppl (FREESTYLE LITE) KYLAH 1 Device by Does not apply route 6 times daily 1 Device 0    glucose monitoring kit (FREESTYLE) monitoring kit 1 kit by Does not apply route daily (Patient not taking: Reported on 4/15/2024) 1 kit 0    Cyanocobalamin (B-12) 1000 MCG CAPS Take 1 tablet by mouth daily      Insulin Disposable Pump (V-GO 40) KIT Use as directed per Dr. Kirkland.       No current facility-administered medications on file prior to encounter.     Notation: Above medications are not currently reconciled at time of signing this H&P note, to be reconciled in pre-op per RN.     Review of Systems   Constitutional:  Negative for chills and fever.   HENT:  Negative for dental problem and hearing loss.    Eyes:  Positive for visual disturbance (glasses).   Respiratory:  Positive for shortness of breath. Negative for cough and wheezing.    Cardiovascular:  Positive for chest pain, palpitations and leg swelling.   Gastrointestinal:  Negative for abdominal pain, constipation, diarrhea, nausea and vomiting.   Genitourinary:   08/09/2017    Primary osteoarthritis of right shoulder 05/10/2017    Seasonal allergic rhinitis 11/14/2016    Osteoarthritis of right shoulder 07/08/2016    Vertigo 06/17/2016    Palpitations 05/12/2016    Hypomagnesemia 05/09/2016    Shortness of breath 03/24/2016    Gastroesophageal reflux disease without esophagitis     Calcified granuloma of lung (HCC) 03/16/2016    Primary hypertension 02/17/2016    Hypothyroidism 02/17/2016    Primary osteoarthritis of both hips     Primary osteoarthritis of both knees     Encounter for chronic pain management     Primary osteoarthritis of both shoulders     Fatigue 10/30/2014    Nuclear sclerosis 08/22/2014    Degenerative lumbar disc 04/21/2014    Allergic rhinitis 07/03/2013    Overactive bladder 05/28/2013    Vitamin D deficiency 05/02/2013    Diabetic neuropathy (HCC) 08/06/2012    DESTINY (obstructive sleep apnea) 08/06/2012    Hypercholesterolemia     Hypertriglyceridemia          Surgeon requested medical clearance. PCP requested cardiac clearance prior to medical clearance.     Based on my personal evaluation of patient including review of patient's chart, no additional clearance requested for scheduled surgery     Dr. Zuniga's office will be responsible for making sure the clearance is obtained and is in the chart for surgery.      Agustina Nam, APRN - CNP on 8/26/2024 at 2:44 PM    Total time spent on encounter- PAT provider minutes: 31-40 minutes

## 2024-08-26 NOTE — DISCHARGE INSTRUCTIONS
Pre-op Instructions For Out-Patient Surgery    Medication Instructions:  Please stop herbs and any supplements now (includes vitamins and minerals).    Please contact your surgeon and prescribing physician for pre-op instructions for any blood thinners. Stop Aspirin as directed    If you have inhalers/aerosol treatments at home, please use them the morning of your surgery and bring the inhalers with you to the hospital.    Please take the following medications the morning of your surgery with a sip of water:    Levothyroxine, Liothyronine, Pantoprazole, Inhaler    Surgery Instructions:  After midnight before surgery:  Do not eat or drink anything, including water, mints, gum, and hard candy.  You may brush your teeth without swallowing.  No smoking, chewing tobacco, or street drugs.    Please shower or bathe before surgery.  If you were given Surgical Scrub Chlorhexidine Gluconate Liquid (CHG), please shower the night before and the morning of your surgery following the detailed instructions you received during your pre-admission visit.     Please do not wear any cologne, lotion, powder, deodorant, jewelry, piercings, perfume, makeup, nail polish, hair accessories, or hair spray on the day of surgery.  Wear loose comfortable clothing.    Leave your valuables at home but bring a payment source for any after-surgery prescriptions you plan to fill at [facility].  Bring a storage case for any glasses/contacts.    An adult who is responsible for you MUST drive you home and should be with you for the first 24 hours after surgery.     If having out-patient knee and foot surgeries, please arrange for planned crutches, walker, or wheelchair before arriving to the hospital.    The Day of Surgery:  Arrive at [facility] Surgery Entrance at the time directed by your surgeon and check in at the desk.     If you have a living will or healthcare power of , please bring a  copy.    You will be taken to the pre-op holding area where you will be prepared for surgery.  A physical assessment will be performed by a nurse practitioner or house officer.  Your IV will be started and you will meet your anesthesiologist.    When you go to surgery, your family will be directed to the surgical waiting room, where the doctor should speak with them after your surgery.    After surgery, you will be taken to the recovery area.  When you are alert and stable, you will receive instructions and be prepared for discharge.     If you use a Bi-PAP or C-PAP machine, please bring it with you and leave it in the car in case it is needed in recovery room.

## 2024-08-27 LAB
MRSA, DNA, NASAL: NEGATIVE
SPECIMEN DESCRIPTION: NORMAL

## 2024-08-28 DIAGNOSIS — Z96.651 S/P TOTAL KNEE ARTHROPLASTY, RIGHT: ICD-10-CM

## 2024-08-28 NOTE — TELEPHONE ENCOUNTER
Patient would like a refill of Trizanidine 4mg    Last refill 8/15/24 #30 tabs    [facility] [place]

## 2024-08-29 NOTE — PROGRESS NOTES
Please call and remind patient that I need to know what her A1c is before we can clear her for surgery.  It has been many months since she has had this checked.  She also needs to complete the chest x-ray that is ordered.  Will need cardiac clearance before we can clear her for surgery.  It looks like she has an appointment coming up on 9/4.

## 2024-09-03 ENCOUNTER — CARE COORDINATION (OUTPATIENT)
Dept: CARE COORDINATION | Age: 64
End: 2024-09-03

## 2024-09-03 NOTE — CARE COORDINATION
Per [facility] request,I spoke with the patient for continued Care Coordination follow up. The patient is aware of her Cardiology appt tomorrow 9.4 and states she will be having her chest xray and labs completed so her PCP can clear her for her upcoming surgery on 9.9.  BS fasting this am was 114 on freestyle Tera. Doing ok with BS. I advised patient to contact PCP office if needed.  No further concerns or needs at this time.   CC f/u one week

## 2024-09-04 ENCOUNTER — HOSPITAL ENCOUNTER (OUTPATIENT)
Age: 64
Setting detail: SPECIMEN
Discharge: HOME | End: 2024-09-04

## 2024-09-04 DIAGNOSIS — Z01.818 PRE-OP TESTING: ICD-10-CM

## 2024-09-04 LAB
ALBUMIN SERPL-MCNC: 4.1 G/DL (ref 3.5–5.2)
ALBUMIN/GLOB SERPL: 1 {RATIO} (ref 1–2.5)
ALP SERPL-CCNC: 107 U/L (ref 35–104)
ALT SERPL-CCNC: 15 U/L (ref 10–35)
ANION GAP SERPL CALCULATED.3IONS-SCNC: 15 MMOL/L (ref 9–16)
AST SERPL-CCNC: 19 U/L (ref 10–35)
BASOPHILS # BLD: 0.06 K/UL (ref 0–0.2)
BASOPHILS NFR BLD: 0 % (ref 0–2)
BILIRUB SERPL-MCNC: 0.4 MG/DL (ref 0–1.2)
BUN SERPL-MCNC: 17 MG/DL (ref 8–23)
CALCIUM SERPL-MCNC: 9.2 MG/DL (ref 8.6–10.4)
CHLORIDE SERPL-SCNC: 99 MMOL/L (ref 98–107)
CO2 SERPL-SCNC: 26 MMOL/L (ref 20–31)
CREAT SERPL-MCNC: 1 MG/DL (ref 0.5–0.9)
EOSINOPHIL # BLD: 0.42 K/UL (ref 0–0.44)
EOSINOPHILS RELATIVE PERCENT: 3 % (ref 1–4)
ERYTHROCYTE [DISTWIDTH] IN BLOOD BY AUTOMATED COUNT: 15 % (ref 11.8–14.4)
EST. AVERAGE GLUCOSE BLD GHB EST-MCNC: 163 MG/DL
GFR, ESTIMATED: 61 ML/MIN/1.73M2
GLUCOSE SERPL-MCNC: 143 MG/DL (ref 74–99)
HBA1C MFR BLD: 7.3 % (ref 4–6)
HCT VFR BLD AUTO: 42.5 % (ref 36.3–47.1)
HGB BLD-MCNC: 13.2 G/DL (ref 11.9–15.1)
IMM GRANULOCYTES # BLD AUTO: 0.1 K/UL (ref 0–0.3)
IMM GRANULOCYTES NFR BLD: 1 %
LYMPHOCYTES NFR BLD: 3.41 K/UL (ref 1.1–3.7)
LYMPHOCYTES RELATIVE PERCENT: 24 % (ref 24–43)
MCH RBC QN AUTO: 28.6 PG (ref 25.2–33.5)
MCHC RBC AUTO-ENTMCNC: 31.1 G/DL (ref 28.4–34.8)
MCV RBC AUTO: 92 FL (ref 82.6–102.9)
MONOCYTES NFR BLD: 0.86 K/UL (ref 0.1–1.2)
MONOCYTES NFR BLD: 6 % (ref 3–12)
NEUTROPHILS NFR BLD: 66 % (ref 36–65)
NEUTS SEG NFR BLD: 9.32 K/UL (ref 1.5–8.1)
NRBC BLD-RTO: 0 PER 100 WBC
PLATELET # BLD AUTO: 353 K/UL (ref 138–453)
PMV BLD AUTO: 11 FL (ref 8.1–13.5)
POTASSIUM SERPL-SCNC: 3.9 MMOL/L (ref 3.7–5.3)
PROT SERPL-MCNC: 7.2 G/DL (ref 6.6–8.7)
RBC # BLD AUTO: 4.62 M/UL (ref 3.95–5.11)
RBC # BLD: ABNORMAL 10*6/UL
SODIUM SERPL-SCNC: 140 MMOL/L (ref 136–145)
WBC OTHER # BLD: 14.2 K/UL (ref 3.5–11.3)

## 2024-09-05 ENCOUNTER — TELEPHONE (OUTPATIENT)
Dept: FAMILY MEDICINE CLINIC | Age: 64
End: 2024-09-05

## 2024-09-05 ENCOUNTER — TELEPHONE (OUTPATIENT)
Dept: ORTHOPEDIC SURGERY | Age: 64
End: 2024-09-05

## 2024-09-05 DIAGNOSIS — Z96.651 S/P TOTAL KNEE ARTHROPLASTY, RIGHT: ICD-10-CM

## 2024-09-05 NOTE — RESULT ENCOUNTER NOTE
Please notify patient that her kidney function is at baseline.  Glucose is improved.  Alkaline phosphatase is a little elevated, but close to baseline.  A1c is improved.  No other concerns have his white blood cell count which is elevated at 14.2.  If she had any symptoms of infection?

## 2024-09-05 NOTE — TELEPHONE ENCOUNTER
PRE-OP CALLED STATING PATIENT WAS SEEN FOR CARDIOLOGY CONSULT AND LETTER WAS SENT TO PCP. ASKING IF PATIENT WILL BE CLEARED FOR SURGERY.     I DO SHOW A NOTE IN LETTERS TAB

## 2024-09-05 NOTE — TELEPHONE ENCOUNTER
I have already reviewed cardiology clearance, I have already filled out her clearance paperwork, it is in my folder ready to be faxed.

## 2024-09-06 ENCOUNTER — ANESTHESIA EVENT (OUTPATIENT)
Dept: OPERATING ROOM | Age: 64
DRG: 326 | End: 2024-09-06
Payer: COMMERCIAL

## 2024-09-06 DIAGNOSIS — Z96.651 S/P TOTAL KNEE ARTHROPLASTY, RIGHT: ICD-10-CM

## 2024-09-09 ENCOUNTER — APPOINTMENT (OUTPATIENT)
Dept: GENERAL RADIOLOGY | Age: 64
DRG: 326 | End: 2024-09-09
Attending: ORTHOPAEDIC SURGERY
Payer: COMMERCIAL

## 2024-09-09 ENCOUNTER — HOSPITAL ENCOUNTER (INPATIENT)
Age: 64
LOS: 3 days | Discharge: NURSING, REHAB OR LONG-TERM CARE | DRG: 326 | End: 2024-09-12
Attending: ORTHOPAEDIC SURGERY | Admitting: ORTHOPAEDIC SURGERY
Payer: COMMERCIAL

## 2024-09-09 ENCOUNTER — ANESTHESIA (OUTPATIENT)
Dept: OPERATING ROOM | Age: 64
DRG: 326 | End: 2024-09-09
Payer: COMMERCIAL

## 2024-09-09 DIAGNOSIS — M17.12 PRIMARY OSTEOARTHRITIS OF LEFT KNEE: Primary | ICD-10-CM

## 2024-09-09 LAB
ALBUMIN SERPL-MCNC: 3.7 G/DL (ref 3.5–5.2)
ALP SERPL-CCNC: 98 U/L (ref 35–104)
ALT SERPL-CCNC: 16 U/L (ref 5–33)
ANION GAP SERPL CALCULATED.3IONS-SCNC: 13 MMOL/L (ref 9–17)
AST SERPL-CCNC: 20 U/L
BASOPHILS # BLD: 0 K/UL (ref 0–0.2)
BASOPHILS NFR BLD: 0 % (ref 0–2)
BILIRUB SERPL-MCNC: 0.2 MG/DL (ref 0.3–1.2)
BUN SERPL-MCNC: 28 MG/DL (ref 8–23)
CALCIUM SERPL-MCNC: 8.5 MG/DL (ref 8.6–10.4)
CHLORIDE SERPL-SCNC: 99 MMOL/L (ref 98–107)
CO2 SERPL-SCNC: 26 MMOL/L (ref 20–31)
CREAT SERPL-MCNC: 1.2 MG/DL (ref 0.5–0.9)
EOSINOPHIL # BLD: 0 K/UL (ref 0–0.4)
EOSINOPHILS RELATIVE PERCENT: 0 % (ref 0–4)
ERYTHROCYTE [DISTWIDTH] IN BLOOD BY AUTOMATED COUNT: 15.4 % (ref 11.5–14.9)
GFR, ESTIMATED: 51 ML/MIN/1.73M2
GLUCOSE BLD-MCNC: 119 MG/DL (ref 65–105)
GLUCOSE BLD-MCNC: 157 MG/DL (ref 65–105)
GLUCOSE BLD-MCNC: 172 MG/DL (ref 65–105)
GLUCOSE BLD-MCNC: 303 MG/DL (ref 65–105)
GLUCOSE SERPL-MCNC: 229 MG/DL (ref 70–99)
HCT VFR BLD AUTO: 37.6 % (ref 36–46)
HGB BLD-MCNC: 12.2 G/DL (ref 12–16)
LYMPHOCYTES NFR BLD: 1.33 K/UL (ref 1–4.8)
LYMPHOCYTES RELATIVE PERCENT: 6 % (ref 24–44)
MCH RBC QN AUTO: 29.3 PG (ref 26–34)
MCHC RBC AUTO-ENTMCNC: 32.4 G/DL (ref 31–37)
MCV RBC AUTO: 90.2 FL (ref 80–100)
MONOCYTES NFR BLD: 0.44 K/UL (ref 0.1–1.3)
MONOCYTES NFR BLD: 2 % (ref 1–7)
MORPHOLOGY: NORMAL
NEUTROPHILS NFR BLD: 92 % (ref 36–66)
NEUTS SEG NFR BLD: 20.33 K/UL (ref 1.3–9.1)
PLATELET # BLD AUTO: 305 K/UL (ref 150–450)
PMV BLD AUTO: 8.3 FL (ref 6–12)
POTASSIUM SERPL-SCNC: 4 MMOL/L (ref 3.7–5.3)
PROT SERPL-MCNC: 7 G/DL (ref 6.4–8.3)
RBC # BLD AUTO: 4.16 M/UL (ref 4–5.2)
SODIUM SERPL-SCNC: 138 MMOL/L (ref 135–144)
TSH SERPL DL<=0.05 MIU/L-ACNC: 1.4 UIU/ML (ref 0.3–5)
WBC OTHER # BLD: 22.1 K/UL (ref 3.5–11)

## 2024-09-09 PROCEDURE — 6370000000 HC RX 637 (ALT 250 FOR IP): Performed by: INTERNAL MEDICINE

## 2024-09-09 PROCEDURE — 64447 NJX AA&/STRD FEMORAL NRV IMG: CPT | Performed by: ANESTHESIOLOGY

## 2024-09-09 PROCEDURE — 80053 COMPREHEN METABOLIC PANEL: CPT

## 2024-09-09 PROCEDURE — 2500000003 HC RX 250 WO HCPCS: Performed by: ANESTHESIOLOGY

## 2024-09-09 PROCEDURE — 3600000013 HC SURGERY LEVEL 3 ADDTL 15MIN: Performed by: ORTHOPAEDIC SURGERY

## 2024-09-09 PROCEDURE — 27447 TOTAL KNEE ARTHROPLASTY: CPT | Performed by: ORTHOPAEDIC SURGERY

## 2024-09-09 PROCEDURE — 2500000003 HC RX 250 WO HCPCS: Performed by: NURSE ANESTHETIST, CERTIFIED REGISTERED

## 2024-09-09 PROCEDURE — 6360000002 HC RX W HCPCS: Performed by: ANESTHESIOLOGY

## 2024-09-09 PROCEDURE — 97162 PT EVAL MOD COMPLEX 30 MIN: CPT

## 2024-09-09 PROCEDURE — 6360000002 HC RX W HCPCS: Performed by: NURSE ANESTHETIST, CERTIFIED REGISTERED

## 2024-09-09 PROCEDURE — 82947 ASSAY GLUCOSE BLOOD QUANT: CPT

## 2024-09-09 PROCEDURE — 2580000003 HC RX 258: Performed by: ORTHOPAEDIC SURGERY

## 2024-09-09 PROCEDURE — 36415 COLL VENOUS BLD VENIPUNCTURE: CPT

## 2024-09-09 PROCEDURE — 2500000003 HC RX 250 WO HCPCS: Performed by: ORTHOPAEDIC SURGERY

## 2024-09-09 PROCEDURE — 6370000000 HC RX 637 (ALT 250 FOR IP): Performed by: ORTHOPAEDIC SURGERY

## 2024-09-09 PROCEDURE — C1713 ANCHOR/SCREW BN/BN,TIS/BN: HCPCS | Performed by: ORTHOPAEDIC SURGERY

## 2024-09-09 PROCEDURE — 2580000003 HC RX 258: Performed by: ANESTHESIOLOGY

## 2024-09-09 PROCEDURE — 3700000001 HC ADD 15 MINUTES (ANESTHESIA): Performed by: ORTHOPAEDIC SURGERY

## 2024-09-09 PROCEDURE — 85025 COMPLETE CBC W/AUTO DIFF WBC: CPT

## 2024-09-09 PROCEDURE — 3600000003 HC SURGERY LEVEL 3 BASE: Performed by: ORTHOPAEDIC SURGERY

## 2024-09-09 PROCEDURE — 1200000000 HC SEMI PRIVATE

## 2024-09-09 PROCEDURE — 6360000002 HC RX W HCPCS: Performed by: ORTHOPAEDIC SURGERY

## 2024-09-09 PROCEDURE — 97166 OT EVAL MOD COMPLEX 45 MIN: CPT

## 2024-09-09 PROCEDURE — 84443 ASSAY THYROID STIM HORMONE: CPT

## 2024-09-09 PROCEDURE — 0SRD069 REPLACEMENT OF LEFT KNEE JOINT WITH OXIDIZED ZIRCONIUM ON POLYETHYLENE SYNTHETIC SUBSTITUTE, CEMENTED, OPEN APPROACH: ICD-10-PCS | Performed by: ORTHOPAEDIC SURGERY

## 2024-09-09 PROCEDURE — 2720000010 HC SURG SUPPLY STERILE: Performed by: ORTHOPAEDIC SURGERY

## 2024-09-09 PROCEDURE — 99222 1ST HOSP IP/OBS MODERATE 55: CPT | Performed by: INTERNAL MEDICINE

## 2024-09-09 PROCEDURE — C1776 JOINT DEVICE (IMPLANTABLE): HCPCS | Performed by: ORTHOPAEDIC SURGERY

## 2024-09-09 PROCEDURE — 7100000000 HC PACU RECOVERY - FIRST 15 MIN: Performed by: ORTHOPAEDIC SURGERY

## 2024-09-09 PROCEDURE — 2709999900 HC NON-CHARGEABLE SUPPLY: Performed by: ORTHOPAEDIC SURGERY

## 2024-09-09 PROCEDURE — 97530 THERAPEUTIC ACTIVITIES: CPT

## 2024-09-09 PROCEDURE — 7100000001 HC PACU RECOVERY - ADDTL 15 MIN: Performed by: ORTHOPAEDIC SURGERY

## 2024-09-09 PROCEDURE — 97535 SELF CARE MNGMENT TRAINING: CPT

## 2024-09-09 PROCEDURE — 73560 X-RAY EXAM OF KNEE 1 OR 2: CPT

## 2024-09-09 PROCEDURE — 0SRD0J9 REPLACEMENT OF LEFT KNEE JOINT WITH SYNTHETIC SUBSTITUTE, CEMENTED, OPEN APPROACH: ICD-10-PCS | Performed by: ORTHOPAEDIC SURGERY

## 2024-09-09 PROCEDURE — 3700000000 HC ANESTHESIA ATTENDED CARE: Performed by: ORTHOPAEDIC SURGERY

## 2024-09-09 DEVICE — IMPLANTABLE DEVICE
Type: IMPLANTABLE DEVICE | Site: KNEE | Status: FUNCTIONAL
Brand: VANGUARD KNEE SYSTEM

## 2024-09-09 DEVICE — IMPLANTABLE DEVICE
Type: IMPLANTABLE DEVICE | Site: TIBIA | Status: FUNCTIONAL
Brand: BIOMET® KNEE SYSTEM

## 2024-09-09 DEVICE — IMPLANTABLE DEVICE
Type: IMPLANTABLE DEVICE | Site: KNEE | Status: FUNCTIONAL
Brand: BIOMET® BONE CEMENT R

## 2024-09-09 DEVICE — IMPLANTABLE DEVICE
Type: IMPLANTABLE DEVICE | Site: FEMUR | Status: FUNCTIONAL
Brand: VANGUARD® KNEE SYSTEM

## 2024-09-09 DEVICE — IMPLANTABLE DEVICE
Type: IMPLANTABLE DEVICE | Site: PATELLA | Status: FUNCTIONAL
Brand: VANGUARD® KNEE SYSTEM

## 2024-09-09 RX ORDER — HYDRALAZINE HYDROCHLORIDE 20 MG/ML
10 INJECTION INTRAMUSCULAR; INTRAVENOUS
Status: DISCONTINUED | OUTPATIENT
Start: 2024-09-09 | End: 2024-09-09 | Stop reason: HOSPADM

## 2024-09-09 RX ORDER — SCOLOPAMINE TRANSDERMAL SYSTEM 1 MG/1
1 PATCH, EXTENDED RELEASE TRANSDERMAL ONCE
Status: COMPLETED | OUTPATIENT
Start: 2024-09-09 | End: 2024-09-12

## 2024-09-09 RX ORDER — LIDOCAINE HYDROCHLORIDE 10 MG/ML
INJECTION, SOLUTION EPIDURAL; INFILTRATION; INTRACAUDAL; PERINEURAL PRN
Status: DISCONTINUED | OUTPATIENT
Start: 2024-09-09 | End: 2024-09-09 | Stop reason: SDUPTHER

## 2024-09-09 RX ORDER — ASPIRIN 81 MG/1
81 TABLET ORAL 2 TIMES DAILY
Qty: 30 TABLET | Refills: 10 | Status: SHIPPED | OUTPATIENT
Start: 2024-09-09

## 2024-09-09 RX ORDER — CALCIUM CHLORIDE 100 MG/ML
INJECTION INTRAVENOUS; INTRAVENTRICULAR PRN
Status: DISCONTINUED | OUTPATIENT
Start: 2024-09-09 | End: 2024-09-09 | Stop reason: ALTCHOICE

## 2024-09-09 RX ORDER — ACETAMINOPHEN 500 MG
1000 TABLET ORAL ONCE
Status: COMPLETED | OUTPATIENT
Start: 2024-09-09 | End: 2024-09-09

## 2024-09-09 RX ORDER — SUCCINYLCHOLINE/SOD CL,ISO/PF 200MG/10ML
SYRINGE (ML) INTRAVENOUS PRN
Status: DISCONTINUED | OUTPATIENT
Start: 2024-09-09 | End: 2024-09-09 | Stop reason: SDUPTHER

## 2024-09-09 RX ORDER — ACETAMINOPHEN 325 MG/1
650 TABLET ORAL EVERY 6 HOURS SCHEDULED
Status: DISCONTINUED | OUTPATIENT
Start: 2024-09-09 | End: 2024-09-12 | Stop reason: HOSPADM

## 2024-09-09 RX ORDER — LEVOTHYROXINE SODIUM 112 UG/1
112 TABLET ORAL DAILY
Status: DISCONTINUED | OUTPATIENT
Start: 2024-09-09 | End: 2024-09-12 | Stop reason: HOSPADM

## 2024-09-09 RX ORDER — SODIUM CHLORIDE 9 MG/ML
INJECTION, SOLUTION INTRAVENOUS PRN
Status: DISCONTINUED | OUTPATIENT
Start: 2024-09-09 | End: 2024-09-09 | Stop reason: HOSPADM

## 2024-09-09 RX ORDER — ASPIRIN 81 MG/1
81 TABLET ORAL 2 TIMES DAILY
Status: DISCONTINUED | OUTPATIENT
Start: 2024-09-09 | End: 2024-09-12 | Stop reason: HOSPADM

## 2024-09-09 RX ORDER — SODIUM CHLORIDE 9 MG/ML
INJECTION, SOLUTION INTRAVENOUS PRN
Status: DISCONTINUED | OUTPATIENT
Start: 2024-09-09 | End: 2024-09-12 | Stop reason: HOSPADM

## 2024-09-09 RX ORDER — ONDANSETRON 2 MG/ML
4 INJECTION INTRAMUSCULAR; INTRAVENOUS
Status: DISCONTINUED | OUTPATIENT
Start: 2024-09-09 | End: 2024-09-09 | Stop reason: HOSPADM

## 2024-09-09 RX ORDER — ONDANSETRON 2 MG/ML
INJECTION INTRAMUSCULAR; INTRAVENOUS PRN
Status: DISCONTINUED | OUTPATIENT
Start: 2024-09-09 | End: 2024-09-09 | Stop reason: SDUPTHER

## 2024-09-09 RX ORDER — MEPERIDINE HYDROCHLORIDE 25 MG/ML
12.5 INJECTION INTRAMUSCULAR; INTRAVENOUS; SUBCUTANEOUS EVERY 5 MIN PRN
Status: DISCONTINUED | OUTPATIENT
Start: 2024-09-09 | End: 2024-09-09 | Stop reason: HOSPADM

## 2024-09-09 RX ORDER — SODIUM CHLORIDE, SODIUM LACTATE, POTASSIUM CHLORIDE, CALCIUM CHLORIDE 600; 310; 30; 20 MG/100ML; MG/100ML; MG/100ML; MG/100ML
INJECTION, SOLUTION INTRAVENOUS CONTINUOUS
Status: DISCONTINUED | OUTPATIENT
Start: 2024-09-09 | End: 2024-09-11

## 2024-09-09 RX ORDER — DIPHENHYDRAMINE HYDROCHLORIDE 50 MG/ML
12.5 INJECTION INTRAMUSCULAR; INTRAVENOUS
Status: DISCONTINUED | OUTPATIENT
Start: 2024-09-09 | End: 2024-09-09 | Stop reason: HOSPADM

## 2024-09-09 RX ORDER — LABETALOL HYDROCHLORIDE 5 MG/ML
10 INJECTION, SOLUTION INTRAVENOUS
Status: DISCONTINUED | OUTPATIENT
Start: 2024-09-09 | End: 2024-09-09 | Stop reason: HOSPADM

## 2024-09-09 RX ORDER — ALBUTEROL SULFATE 90 UG/1
2 INHALANT RESPIRATORY (INHALATION) EVERY 4 HOURS PRN
Status: DISCONTINUED | OUTPATIENT
Start: 2024-09-09 | End: 2024-09-12 | Stop reason: HOSPADM

## 2024-09-09 RX ORDER — OXYCODONE HYDROCHLORIDE 10 MG/1
10 TABLET ORAL EVERY 4 HOURS PRN
Status: DISCONTINUED | OUTPATIENT
Start: 2024-09-09 | End: 2024-09-12 | Stop reason: HOSPADM

## 2024-09-09 RX ORDER — SODIUM CHLORIDE 0.9 % (FLUSH) 0.9 %
5-40 SYRINGE (ML) INJECTION EVERY 12 HOURS SCHEDULED
Status: DISCONTINUED | OUTPATIENT
Start: 2024-09-09 | End: 2024-09-12 | Stop reason: HOSPADM

## 2024-09-09 RX ORDER — ROPIVACAINE HYDROCHLORIDE 5 MG/ML
INJECTION, SOLUTION EPIDURAL; INFILTRATION; PERINEURAL
Status: DISCONTINUED | OUTPATIENT
Start: 2024-09-09 | End: 2024-09-09 | Stop reason: SDUPTHER

## 2024-09-09 RX ORDER — BUMETANIDE 1 MG/1
2 TABLET ORAL DAILY
Status: DISCONTINUED | OUTPATIENT
Start: 2024-09-09 | End: 2024-09-12 | Stop reason: HOSPADM

## 2024-09-09 RX ORDER — SODIUM CHLORIDE 0.9 % (FLUSH) 0.9 %
5-40 SYRINGE (ML) INJECTION EVERY 12 HOURS SCHEDULED
Status: DISCONTINUED | OUTPATIENT
Start: 2024-09-09 | End: 2024-09-09 | Stop reason: HOSPADM

## 2024-09-09 RX ORDER — LIDOCAINE HYDROCHLORIDE 20 MG/ML
INJECTION, SOLUTION INFILTRATION; PERINEURAL PRN
Status: DISCONTINUED | OUTPATIENT
Start: 2024-09-09 | End: 2024-09-09

## 2024-09-09 RX ORDER — ONDANSETRON 2 MG/ML
4 INJECTION INTRAMUSCULAR; INTRAVENOUS EVERY 6 HOURS PRN
Status: DISCONTINUED | OUTPATIENT
Start: 2024-09-09 | End: 2024-09-12 | Stop reason: HOSPADM

## 2024-09-09 RX ORDER — LIOTHYRONINE SODIUM 5 UG/1
5 TABLET ORAL DAILY
Status: DISCONTINUED | OUTPATIENT
Start: 2024-09-09 | End: 2024-09-12 | Stop reason: HOSPADM

## 2024-09-09 RX ORDER — ROCURONIUM BROMIDE 10 MG/ML
INJECTION, SOLUTION INTRAVENOUS PRN
Status: DISCONTINUED | OUTPATIENT
Start: 2024-09-09 | End: 2024-09-09 | Stop reason: SDUPTHER

## 2024-09-09 RX ORDER — PROPOFOL 10 MG/ML
INJECTION, EMULSION INTRAVENOUS PRN
Status: DISCONTINUED | OUTPATIENT
Start: 2024-09-09 | End: 2024-09-09 | Stop reason: SDUPTHER

## 2024-09-09 RX ORDER — OXYCODONE AND ACETAMINOPHEN 5; 325 MG/1; MG/1
1 TABLET ORAL EVERY 4 HOURS PRN
Qty: 42 TABLET | Refills: 0 | Status: SHIPPED | OUTPATIENT
Start: 2024-09-09 | End: 2024-09-16

## 2024-09-09 RX ORDER — CEFDINIR 300 MG/1
300 CAPSULE ORAL EVERY 12 HOURS SCHEDULED
Status: DISCONTINUED | OUTPATIENT
Start: 2024-09-09 | End: 2024-09-12 | Stop reason: HOSPADM

## 2024-09-09 RX ORDER — MIDAZOLAM HYDROCHLORIDE 1 MG/ML
INJECTION INTRAMUSCULAR; INTRAVENOUS PRN
Status: DISCONTINUED | OUTPATIENT
Start: 2024-09-09 | End: 2024-09-09 | Stop reason: SDUPTHER

## 2024-09-09 RX ORDER — LIDOCAINE HYDROCHLORIDE 20 MG/ML
INJECTION, SOLUTION INFILTRATION; PERINEURAL
Status: DISCONTINUED | OUTPATIENT
Start: 2024-09-09 | End: 2024-09-09 | Stop reason: SDUPTHER

## 2024-09-09 RX ORDER — FENTANYL CITRATE 0.05 MG/ML
25 INJECTION, SOLUTION INTRAMUSCULAR; INTRAVENOUS EVERY 5 MIN PRN
Status: COMPLETED | OUTPATIENT
Start: 2024-09-09 | End: 2024-09-09

## 2024-09-09 RX ORDER — TRANEXAMIC ACID 100 MG/ML
INJECTION, SOLUTION INTRAVENOUS PRN
Status: DISCONTINUED | OUTPATIENT
Start: 2024-09-09 | End: 2024-09-09 | Stop reason: SDUPTHER

## 2024-09-09 RX ORDER — METOCLOPRAMIDE HYDROCHLORIDE 5 MG/ML
10 INJECTION INTRAMUSCULAR; INTRAVENOUS
Status: DISCONTINUED | OUTPATIENT
Start: 2024-09-09 | End: 2024-09-09 | Stop reason: HOSPADM

## 2024-09-09 RX ORDER — LIDOCAINE HYDROCHLORIDE 10 MG/ML
1 INJECTION, SOLUTION EPIDURAL; INFILTRATION; INTRACAUDAL; PERINEURAL
Status: DISCONTINUED | OUTPATIENT
Start: 2024-09-09 | End: 2024-09-09 | Stop reason: HOSPADM

## 2024-09-09 RX ORDER — SODIUM CHLORIDE 0.9 % (FLUSH) 0.9 %
5-40 SYRINGE (ML) INJECTION PRN
Status: DISCONTINUED | OUTPATIENT
Start: 2024-09-09 | End: 2024-09-09 | Stop reason: HOSPADM

## 2024-09-09 RX ORDER — PRAMIPEXOLE DIHYDROCHLORIDE 0.25 MG/1
0.5 TABLET ORAL NIGHTLY
Status: DISCONTINUED | OUTPATIENT
Start: 2024-09-09 | End: 2024-09-12 | Stop reason: HOSPADM

## 2024-09-09 RX ORDER — NALOXONE HYDROCHLORIDE 0.4 MG/ML
INJECTION, SOLUTION INTRAMUSCULAR; INTRAVENOUS; SUBCUTANEOUS PRN
Status: DISCONTINUED | OUTPATIENT
Start: 2024-09-09 | End: 2024-09-09 | Stop reason: HOSPADM

## 2024-09-09 RX ORDER — SODIUM CHLORIDE 9 MG/ML
INJECTION, SOLUTION INTRAVENOUS CONTINUOUS
Status: DISCONTINUED | OUTPATIENT
Start: 2024-09-09 | End: 2024-09-09 | Stop reason: HOSPADM

## 2024-09-09 RX ORDER — FENTANYL CITRATE 50 UG/ML
INJECTION, SOLUTION INTRAMUSCULAR; INTRAVENOUS PRN
Status: DISCONTINUED | OUTPATIENT
Start: 2024-09-09 | End: 2024-09-09 | Stop reason: SDUPTHER

## 2024-09-09 RX ORDER — OXYCODONE HYDROCHLORIDE 5 MG/1
5 TABLET ORAL EVERY 4 HOURS PRN
Status: DISCONTINUED | OUTPATIENT
Start: 2024-09-09 | End: 2024-09-12 | Stop reason: HOSPADM

## 2024-09-09 RX ORDER — DEXAMETHASONE SODIUM PHOSPHATE 10 MG/ML
10 INJECTION, SOLUTION INTRAMUSCULAR; INTRAVENOUS ONCE
Status: COMPLETED | OUTPATIENT
Start: 2024-09-09 | End: 2024-09-09

## 2024-09-09 RX ORDER — SODIUM CHLORIDE 0.9 % (FLUSH) 0.9 %
5-40 SYRINGE (ML) INJECTION PRN
Status: DISCONTINUED | OUTPATIENT
Start: 2024-09-09 | End: 2024-09-12 | Stop reason: HOSPADM

## 2024-09-09 RX ORDER — LISINOPRIL 5 MG/1
2.5 TABLET ORAL DAILY
Status: DISCONTINUED | OUTPATIENT
Start: 2024-09-09 | End: 2024-09-12 | Stop reason: HOSPADM

## 2024-09-09 RX ORDER — ACETAMINOPHEN 325 MG/1
650 TABLET ORAL
Status: DISCONTINUED | OUTPATIENT
Start: 2024-09-09 | End: 2024-09-09 | Stop reason: HOSPADM

## 2024-09-09 RX ADMIN — Medication 140 MG: at 08:49

## 2024-09-09 RX ADMIN — OXYCODONE HYDROCHLORIDE 5 MG: 5 TABLET ORAL at 19:47

## 2024-09-09 RX ADMIN — FENTANYL CITRATE 50 MCG: 50 INJECTION INTRAMUSCULAR; INTRAVENOUS at 09:36

## 2024-09-09 RX ADMIN — ACETAMINOPHEN 1000 MG: 500 TABLET ORAL at 08:01

## 2024-09-09 RX ADMIN — FENTANYL CITRATE 50 MCG: 50 INJECTION INTRAMUSCULAR; INTRAVENOUS at 10:29

## 2024-09-09 RX ADMIN — Medication 3000 MG: at 08:50

## 2024-09-09 RX ADMIN — ONDANSETRON 4 MG: 2 INJECTION INTRAMUSCULAR; INTRAVENOUS at 10:09

## 2024-09-09 RX ADMIN — SODIUM CHLORIDE, PRESERVATIVE FREE 10 ML: 5 INJECTION INTRAVENOUS at 19:48

## 2024-09-09 RX ADMIN — TRANEXAMIC ACID 1000 MG: 100 INJECTION, SOLUTION INTRAVENOUS at 08:59

## 2024-09-09 RX ADMIN — LABETALOL HYDROCHLORIDE 10 MG: 5 INJECTION, SOLUTION INTRAVENOUS at 10:51

## 2024-09-09 RX ADMIN — BUMETANIDE 2 MG: 1 TABLET ORAL at 16:32

## 2024-09-09 RX ADMIN — OXYCODONE HYDROCHLORIDE 10 MG: 10 TABLET ORAL at 13:01

## 2024-09-09 RX ADMIN — PROPOFOL 200 MG: 10 INJECTION, EMULSION INTRAVENOUS at 08:49

## 2024-09-09 RX ADMIN — PRAMIPEXOLE DIHYDROCHLORIDE 0.5 MG: 0.25 TABLET ORAL at 20:21

## 2024-09-09 RX ADMIN — ROCURONIUM BROMIDE 20 MG: 10 INJECTION, SOLUTION INTRAVENOUS at 09:16

## 2024-09-09 RX ADMIN — HYDROMORPHONE HYDROCHLORIDE 0.5 MG: 1 INJECTION, SOLUTION INTRAMUSCULAR; INTRAVENOUS; SUBCUTANEOUS at 21:12

## 2024-09-09 RX ADMIN — ROPIVACAINE HYDROCHLORIDE 20 ML: 5 INJECTION, SOLUTION EPIDURAL; INFILTRATION; PERINEURAL at 11:00

## 2024-09-09 RX ADMIN — ROCURONIUM BROMIDE 10 MG: 10 INJECTION, SOLUTION INTRAVENOUS at 08:49

## 2024-09-09 RX ADMIN — FENTANYL CITRATE 25 MCG: 0.05 INJECTION, SOLUTION INTRAMUSCULAR; INTRAVENOUS at 11:02

## 2024-09-09 RX ADMIN — PROPOFOL 50 MG: 10 INJECTION, EMULSION INTRAVENOUS at 09:18

## 2024-09-09 RX ADMIN — HYDROMORPHONE HYDROCHLORIDE 0.5 MG: 1 INJECTION, SOLUTION INTRAMUSCULAR; INTRAVENOUS; SUBCUTANEOUS at 10:49

## 2024-09-09 RX ADMIN — ASPIRIN 81 MG: 81 TABLET, COATED ORAL at 19:47

## 2024-09-09 RX ADMIN — CEFDINIR 300 MG: 300 CAPSULE ORAL at 20:21

## 2024-09-09 RX ADMIN — DEXAMETHASONE SODIUM PHOSPHATE 10 MG: 10 INJECTION, SOLUTION INTRAMUSCULAR; INTRAVENOUS at 08:01

## 2024-09-09 RX ADMIN — CEFDINIR 300 MG: 300 CAPSULE ORAL at 14:41

## 2024-09-09 RX ADMIN — FENTANYL CITRATE 50 MCG: 50 INJECTION INTRAMUSCULAR; INTRAVENOUS at 09:18

## 2024-09-09 RX ADMIN — ACETAMINOPHEN 650 MG: 325 TABLET ORAL at 14:41

## 2024-09-09 RX ADMIN — SUGAMMADEX 200 MG: 100 INJECTION, SOLUTION INTRAVENOUS at 10:20

## 2024-09-09 RX ADMIN — SODIUM CHLORIDE, POTASSIUM CHLORIDE, SODIUM LACTATE AND CALCIUM CHLORIDE: 600; 310; 30; 20 INJECTION, SOLUTION INTRAVENOUS at 12:23

## 2024-09-09 RX ADMIN — SODIUM CHLORIDE: 9 INJECTION, SOLUTION INTRAVENOUS at 10:20

## 2024-09-09 RX ADMIN — SODIUM CHLORIDE: 9 INJECTION, SOLUTION INTRAVENOUS at 07:54

## 2024-09-09 RX ADMIN — Medication 3000 MG: at 14:43

## 2024-09-09 RX ADMIN — ROCURONIUM BROMIDE 40 MG: 10 INJECTION, SOLUTION INTRAVENOUS at 08:55

## 2024-09-09 RX ADMIN — ACETAMINOPHEN 650 MG: 325 TABLET ORAL at 19:48

## 2024-09-09 RX ADMIN — TRANEXAMIC ACID 1000 MG: 100 INJECTION, SOLUTION INTRAVENOUS at 10:09

## 2024-09-09 RX ADMIN — FENTANYL CITRATE 50 MCG: 50 INJECTION INTRAMUSCULAR; INTRAVENOUS at 08:49

## 2024-09-09 RX ADMIN — ONDANSETRON 4 MG: 2 INJECTION INTRAMUSCULAR; INTRAVENOUS at 21:18

## 2024-09-09 RX ADMIN — LISINOPRIL 2.5 MG: 5 TABLET ORAL at 16:32

## 2024-09-09 RX ADMIN — LIDOCAINE HYDROCHLORIDE 15 ML: 20 INJECTION, SOLUTION INFILTRATION; PERINEURAL at 11:00

## 2024-09-09 RX ADMIN — MIDAZOLAM 2 MG: 1 INJECTION INTRAMUSCULAR; INTRAVENOUS at 08:42

## 2024-09-09 RX ADMIN — FENTANYL CITRATE 25 MCG: 0.05 INJECTION, SOLUTION INTRAMUSCULAR; INTRAVENOUS at 11:08

## 2024-09-09 RX ADMIN — LIDOCAINE HYDROCHLORIDE 50 MG: 10 INJECTION, SOLUTION EPIDURAL; INFILTRATION; INTRACAUDAL; PERINEURAL at 08:49

## 2024-09-09 ASSESSMENT — PAIN DESCRIPTION - LOCATION
LOCATION: KNEE

## 2024-09-09 ASSESSMENT — PAIN DESCRIPTION - DESCRIPTORS
DESCRIPTORS: ACHING
DESCRIPTORS: BURNING
DESCRIPTORS: ACHING
DESCRIPTORS: ACHING;HEAVINESS
DESCRIPTORS: DISCOMFORT;SORE;HEAVINESS

## 2024-09-09 ASSESSMENT — PAIN - FUNCTIONAL ASSESSMENT
PAIN_FUNCTIONAL_ASSESSMENT: PREVENTS OR INTERFERES SOME ACTIVE ACTIVITIES AND ADLS
PAIN_FUNCTIONAL_ASSESSMENT: PREVENTS OR INTERFERES SOME ACTIVE ACTIVITIES AND ADLS
PAIN_FUNCTIONAL_ASSESSMENT: ADULT NONVERBAL PAIN SCALE (NPVS)
PAIN_FUNCTIONAL_ASSESSMENT: 0-10
PAIN_FUNCTIONAL_ASSESSMENT: PREVENTS OR INTERFERES SOME ACTIVE ACTIVITIES AND ADLS

## 2024-09-09 ASSESSMENT — PAIN DESCRIPTION - DIRECTION
RADIATING_TOWARDS: DENIES
RADIATING_TOWARDS: DENIES

## 2024-09-09 ASSESSMENT — PAIN DESCRIPTION - PAIN TYPE
TYPE: SURGICAL PAIN
TYPE: SURGICAL PAIN

## 2024-09-09 ASSESSMENT — PAIN DESCRIPTION - ORIENTATION
ORIENTATION: LEFT

## 2024-09-09 ASSESSMENT — PAIN DESCRIPTION - FREQUENCY
FREQUENCY: CONTINUOUS
FREQUENCY: CONTINUOUS

## 2024-09-09 ASSESSMENT — PAIN SCALES - GENERAL
PAINLEVEL_OUTOF10: 7
PAINLEVEL_OUTOF10: 6
PAINLEVEL_OUTOF10: 8
PAINLEVEL_OUTOF10: 10
PAINLEVEL_OUTOF10: 5
PAINLEVEL_OUTOF10: 6
PAINLEVEL_OUTOF10: 8
PAINLEVEL_OUTOF10: 5
PAINLEVEL_OUTOF10: 10

## 2024-09-09 ASSESSMENT — PAIN DESCRIPTION - ONSET
ONSET: ON-GOING
ONSET: ON-GOING

## 2024-09-09 ASSESSMENT — ENCOUNTER SYMPTOMS: SHORTNESS OF BREATH: 1

## 2024-09-10 LAB
BILIRUB UR QL STRIP: NEGATIVE
BODY TEMPERATURE: 37
CLARITY UR: CLEAR
COHGB MFR BLD: 2.6 % (ref 0–5)
COLOR UR: YELLOW
COMMENT: ABNORMAL
GLUCOSE BLD-MCNC: 181 MG/DL (ref 65–105)
GLUCOSE BLD-MCNC: 218 MG/DL (ref 65–105)
GLUCOSE BLD-MCNC: 226 MG/DL (ref 65–105)
GLUCOSE BLD-MCNC: 237 MG/DL (ref 65–105)
GLUCOSE UR STRIP-MCNC: ABNORMAL MG/DL
HCO3 VENOUS: 33.4 MMOL/L (ref 24–30)
HGB UR QL STRIP.AUTO: NEGATIVE
KETONES UR STRIP-MCNC: NEGATIVE MG/DL
LEUKOCYTE ESTERASE UR QL STRIP: NEGATIVE
METHEMOGLOBIN: 1.2 % (ref 0–1.9)
NITRITE UR QL STRIP: NEGATIVE
O2 SAT, VEN: 53.9 % (ref 60–85)
PCO2 VENOUS: 52.7 MM HG (ref 39–55)
PH UR STRIP: 5.5 [PH] (ref 5–8)
PH VENOUS: 7.41 (ref 7.32–7.42)
PO2 VENOUS: 29.9 MM HG (ref 30–50)
POSITIVE BASE EXCESS, VEN: 8.8 MMOL/L (ref 0–2)
PROT UR STRIP-MCNC: NEGATIVE MG/DL
SP GR UR STRIP: 1.02 (ref 1–1.03)
TEXT FOR RESPIRATORY: ABNORMAL
UROBILINOGEN UR STRIP-ACNC: NORMAL EU/DL (ref 0–1)

## 2024-09-10 PROCEDURE — 82947 ASSAY GLUCOSE BLOOD QUANT: CPT

## 2024-09-10 PROCEDURE — 99024 POSTOP FOLLOW-UP VISIT: CPT | Performed by: ORTHOPAEDIC SURGERY

## 2024-09-10 PROCEDURE — 97535 SELF CARE MNGMENT TRAINING: CPT

## 2024-09-10 PROCEDURE — 6360000002 HC RX W HCPCS: Performed by: ORTHOPAEDIC SURGERY

## 2024-09-10 PROCEDURE — 6370000000 HC RX 637 (ALT 250 FOR IP): Performed by: INTERNAL MEDICINE

## 2024-09-10 PROCEDURE — 97116 GAIT TRAINING THERAPY: CPT

## 2024-09-10 PROCEDURE — 6370000000 HC RX 637 (ALT 250 FOR IP): Performed by: ORTHOPAEDIC SURGERY

## 2024-09-10 PROCEDURE — 82800 BLOOD PH: CPT

## 2024-09-10 PROCEDURE — 81003 URINALYSIS AUTO W/O SCOPE: CPT

## 2024-09-10 PROCEDURE — 97110 THERAPEUTIC EXERCISES: CPT

## 2024-09-10 PROCEDURE — 2580000003 HC RX 258: Performed by: ORTHOPAEDIC SURGERY

## 2024-09-10 PROCEDURE — 82805 BLOOD GASES W/O2 SATURATION: CPT

## 2024-09-10 PROCEDURE — 36415 COLL VENOUS BLD VENIPUNCTURE: CPT

## 2024-09-10 PROCEDURE — 99233 SBSQ HOSP IP/OBS HIGH 50: CPT | Performed by: INTERNAL MEDICINE

## 2024-09-10 PROCEDURE — 6370000000 HC RX 637 (ALT 250 FOR IP): Performed by: NURSE PRACTITIONER

## 2024-09-10 PROCEDURE — 1200000000 HC SEMI PRIVATE

## 2024-09-10 RX ORDER — INSULIN GLARGINE 100 [IU]/ML
50 INJECTION, SOLUTION SUBCUTANEOUS 2 TIMES DAILY
Status: DISCONTINUED | OUTPATIENT
Start: 2024-09-10 | End: 2024-09-11

## 2024-09-10 RX ORDER — INSULIN LISPRO 100 [IU]/ML
0-4 INJECTION, SOLUTION INTRAVENOUS; SUBCUTANEOUS NIGHTLY
Status: DISCONTINUED | OUTPATIENT
Start: 2024-09-10 | End: 2024-09-12

## 2024-09-10 RX ORDER — HYDROXYZINE HYDROCHLORIDE 25 MG/1
25 TABLET, FILM COATED ORAL ONCE
Status: COMPLETED | OUTPATIENT
Start: 2024-09-10 | End: 2024-09-10

## 2024-09-10 RX ORDER — INSULIN LISPRO 100 [IU]/ML
0-8 INJECTION, SOLUTION INTRAVENOUS; SUBCUTANEOUS
Status: DISCONTINUED | OUTPATIENT
Start: 2024-09-10 | End: 2024-09-12

## 2024-09-10 RX ORDER — DIPHENHYDRAMINE HCL 25 MG
25 TABLET ORAL EVERY 6 HOURS PRN
Status: DISCONTINUED | OUTPATIENT
Start: 2024-09-10 | End: 2024-09-12 | Stop reason: HOSPADM

## 2024-09-10 RX ORDER — DEXTROSE MONOHYDRATE 100 MG/ML
INJECTION, SOLUTION INTRAVENOUS CONTINUOUS PRN
Status: DISCONTINUED | OUTPATIENT
Start: 2024-09-10 | End: 2024-09-12 | Stop reason: HOSPADM

## 2024-09-10 RX ADMIN — DIPHENHYDRAMINE HYDROCHLORIDE 25 MG: 25 TABLET ORAL at 16:05

## 2024-09-10 RX ADMIN — DIPHENHYDRAMINE HYDROCHLORIDE 25 MG: 25 TABLET ORAL at 10:01

## 2024-09-10 RX ADMIN — INSULIN GLARGINE 50 UNITS: 100 INJECTION, SOLUTION SUBCUTANEOUS at 22:08

## 2024-09-10 RX ADMIN — INSULIN GLARGINE 50 UNITS: 100 INJECTION, SOLUTION SUBCUTANEOUS at 07:51

## 2024-09-10 RX ADMIN — ACETAMINOPHEN 650 MG: 325 TABLET ORAL at 16:05

## 2024-09-10 RX ADMIN — BUMETANIDE 2 MG: 1 TABLET ORAL at 07:52

## 2024-09-10 RX ADMIN — DIPHENHYDRAMINE HYDROCHLORIDE 25 MG: 25 TABLET ORAL at 03:44

## 2024-09-10 RX ADMIN — SODIUM CHLORIDE, PRESERVATIVE FREE 10 ML: 5 INJECTION INTRAVENOUS at 07:52

## 2024-09-10 RX ADMIN — HYDROXYZINE HYDROCHLORIDE 25 MG: 25 TABLET ORAL at 05:38

## 2024-09-10 RX ADMIN — LIOTHYRONINE SODIUM 5 MCG: 5 TABLET ORAL at 07:53

## 2024-09-10 RX ADMIN — OXYCODONE HYDROCHLORIDE 5 MG: 5 TABLET ORAL at 08:01

## 2024-09-10 RX ADMIN — ASPIRIN 81 MG: 81 TABLET, COATED ORAL at 20:10

## 2024-09-10 RX ADMIN — LISINOPRIL 2.5 MG: 5 TABLET ORAL at 07:52

## 2024-09-10 RX ADMIN — LEVOTHYROXINE SODIUM 112 MCG: 0.11 TABLET ORAL at 05:19

## 2024-09-10 RX ADMIN — ACETAMINOPHEN 650 MG: 325 TABLET ORAL at 22:08

## 2024-09-10 RX ADMIN — CEFDINIR 300 MG: 300 CAPSULE ORAL at 20:11

## 2024-09-10 RX ADMIN — HYDROMORPHONE HYDROCHLORIDE 0.5 MG: 1 INJECTION, SOLUTION INTRAMUSCULAR; INTRAVENOUS; SUBCUTANEOUS at 10:59

## 2024-09-10 RX ADMIN — INSULIN LISPRO 2 UNITS: 100 INJECTION, SOLUTION INTRAVENOUS; SUBCUTANEOUS at 11:53

## 2024-09-10 RX ADMIN — OXYCODONE HYDROCHLORIDE 10 MG: 10 TABLET ORAL at 17:55

## 2024-09-10 RX ADMIN — HYDROMORPHONE HYDROCHLORIDE 0.25 MG: 1 INJECTION, SOLUTION INTRAMUSCULAR; INTRAVENOUS; SUBCUTANEOUS at 03:44

## 2024-09-10 RX ADMIN — HYDROMORPHONE HYDROCHLORIDE 0.5 MG: 1 INJECTION, SOLUTION INTRAMUSCULAR; INTRAVENOUS; SUBCUTANEOUS at 16:42

## 2024-09-10 RX ADMIN — OXYCODONE HYDROCHLORIDE 10 MG: 10 TABLET ORAL at 13:45

## 2024-09-10 RX ADMIN — DIPHENHYDRAMINE HYDROCHLORIDE 25 MG: 25 TABLET ORAL at 22:08

## 2024-09-10 RX ADMIN — CEFDINIR 300 MG: 300 CAPSULE ORAL at 10:01

## 2024-09-10 RX ADMIN — HYDROMORPHONE HYDROCHLORIDE 0.5 MG: 1 INJECTION, SOLUTION INTRAMUSCULAR; INTRAVENOUS; SUBCUTANEOUS at 20:10

## 2024-09-10 RX ADMIN — OXYCODONE HYDROCHLORIDE 10 MG: 10 TABLET ORAL at 22:08

## 2024-09-10 RX ADMIN — PRAMIPEXOLE DIHYDROCHLORIDE 0.5 MG: 0.25 TABLET ORAL at 20:11

## 2024-09-10 RX ADMIN — SODIUM CHLORIDE, PRESERVATIVE FREE 10 ML: 5 INJECTION INTRAVENOUS at 20:13

## 2024-09-10 RX ADMIN — ACETAMINOPHEN 650 MG: 325 TABLET ORAL at 05:18

## 2024-09-10 RX ADMIN — OXYCODONE HYDROCHLORIDE 10 MG: 10 TABLET ORAL at 01:59

## 2024-09-10 RX ADMIN — INSULIN LISPRO 2 UNITS: 100 INJECTION, SOLUTION INTRAVENOUS; SUBCUTANEOUS at 16:42

## 2024-09-10 RX ADMIN — ACETAMINOPHEN 650 MG: 325 TABLET ORAL at 10:01

## 2024-09-10 RX ADMIN — ASPIRIN 81 MG: 81 TABLET, COATED ORAL at 07:52

## 2024-09-10 ASSESSMENT — PAIN SCALES - GENERAL
PAINLEVEL_OUTOF10: 5
PAINLEVEL_OUTOF10: 10
PAINLEVEL_OUTOF10: 7
PAINLEVEL_OUTOF10: 7
PAINLEVEL_OUTOF10: 10
PAINLEVEL_OUTOF10: 5
PAINLEVEL_OUTOF10: 3
PAINLEVEL_OUTOF10: 5
PAINLEVEL_OUTOF10: 4
PAINLEVEL_OUTOF10: 4
PAINLEVEL_OUTOF10: 3
PAINLEVEL_OUTOF10: 3
PAINLEVEL_OUTOF10: 7
PAINLEVEL_OUTOF10: 4
PAINLEVEL_OUTOF10: 7
PAINLEVEL_OUTOF10: 4
PAINLEVEL_OUTOF10: 5
PAINLEVEL_OUTOF10: 7
PAINLEVEL_OUTOF10: 5
PAINLEVEL_OUTOF10: 4

## 2024-09-10 ASSESSMENT — PAIN DESCRIPTION - ORIENTATION
ORIENTATION: LEFT

## 2024-09-10 ASSESSMENT — PAIN DESCRIPTION - DIRECTION
RADIATING_TOWARDS: DENIES

## 2024-09-10 ASSESSMENT — PAIN DESCRIPTION - PAIN TYPE
TYPE: SURGICAL PAIN

## 2024-09-10 ASSESSMENT — PAIN DESCRIPTION - DESCRIPTORS
DESCRIPTORS: ACHING;DISCOMFORT
DESCRIPTORS: HEAVINESS;DISCOMFORT
DESCRIPTORS: HEAVINESS;DISCOMFORT;ACHING

## 2024-09-10 ASSESSMENT — PAIN SCALES - WONG BAKER: WONGBAKER_NUMERICALRESPONSE: NO HURT

## 2024-09-10 ASSESSMENT — PAIN - FUNCTIONAL ASSESSMENT
PAIN_FUNCTIONAL_ASSESSMENT: PREVENTS OR INTERFERES SOME ACTIVE ACTIVITIES AND ADLS

## 2024-09-10 ASSESSMENT — PAIN DESCRIPTION - ONSET
ONSET: ON-GOING
ONSET: AWAKENED FROM SLEEP
ONSET: GRADUAL

## 2024-09-10 ASSESSMENT — PAIN DESCRIPTION - FREQUENCY
FREQUENCY: CONTINUOUS

## 2024-09-10 ASSESSMENT — PAIN DESCRIPTION - LOCATION
LOCATION: KNEE

## 2024-09-11 LAB
ANION GAP SERPL CALCULATED.3IONS-SCNC: 9 MMOL/L (ref 9–17)
BASOPHILS # BLD: 0.1 K/UL (ref 0–0.2)
BASOPHILS NFR BLD: 1 % (ref 0–2)
BUN SERPL-MCNC: 22 MG/DL (ref 8–23)
CALCIUM SERPL-MCNC: 8.7 MG/DL (ref 8.6–10.4)
CHLORIDE SERPL-SCNC: 100 MMOL/L (ref 98–107)
CO2 SERPL-SCNC: 31 MMOL/L (ref 20–31)
CREAT SERPL-MCNC: 0.9 MG/DL (ref 0.5–0.9)
EOSINOPHIL # BLD: 0.2 K/UL (ref 0–0.4)
EOSINOPHILS RELATIVE PERCENT: 2 % (ref 0–4)
ERYTHROCYTE [DISTWIDTH] IN BLOOD BY AUTOMATED COUNT: 15.8 % (ref 11.5–14.9)
GFR, ESTIMATED: 72 ML/MIN/1.73M2
GLUCOSE BLD-MCNC: 218 MG/DL (ref 65–105)
GLUCOSE BLD-MCNC: 219 MG/DL (ref 65–105)
GLUCOSE BLD-MCNC: 243 MG/DL (ref 65–105)
GLUCOSE SERPL-MCNC: 203 MG/DL (ref 70–99)
HCT VFR BLD AUTO: 36 % (ref 36–46)
HGB BLD-MCNC: 11.7 G/DL (ref 12–16)
LYMPHOCYTES NFR BLD: 2.3 K/UL (ref 1–4.8)
LYMPHOCYTES RELATIVE PERCENT: 18 % (ref 24–44)
MCH RBC QN AUTO: 29.3 PG (ref 26–34)
MCHC RBC AUTO-ENTMCNC: 32.4 G/DL (ref 31–37)
MCV RBC AUTO: 90.2 FL (ref 80–100)
MONOCYTES NFR BLD: 1.2 K/UL (ref 0.1–1.3)
MONOCYTES NFR BLD: 9 % (ref 1–7)
NEUTROPHILS NFR BLD: 70 % (ref 36–66)
NEUTS SEG NFR BLD: 9.1 K/UL (ref 1.3–9.1)
PLATELET # BLD AUTO: 270 K/UL (ref 150–450)
PMV BLD AUTO: 8.6 FL (ref 6–12)
POTASSIUM SERPL-SCNC: 4.1 MMOL/L (ref 3.7–5.3)
RBC # BLD AUTO: 3.99 M/UL (ref 4–5.2)
SODIUM SERPL-SCNC: 140 MMOL/L (ref 135–144)
WBC OTHER # BLD: 12.9 K/UL (ref 3.5–11)

## 2024-09-11 PROCEDURE — 36415 COLL VENOUS BLD VENIPUNCTURE: CPT

## 2024-09-11 PROCEDURE — 97530 THERAPEUTIC ACTIVITIES: CPT

## 2024-09-11 PROCEDURE — 1200000000 HC SEMI PRIVATE

## 2024-09-11 PROCEDURE — 99232 SBSQ HOSP IP/OBS MODERATE 35: CPT | Performed by: INTERNAL MEDICINE

## 2024-09-11 PROCEDURE — 99024 POSTOP FOLLOW-UP VISIT: CPT | Performed by: ORTHOPAEDIC SURGERY

## 2024-09-11 PROCEDURE — 6370000000 HC RX 637 (ALT 250 FOR IP): Performed by: NURSE PRACTITIONER

## 2024-09-11 PROCEDURE — 80048 BASIC METABOLIC PNL TOTAL CA: CPT

## 2024-09-11 PROCEDURE — 6370000000 HC RX 637 (ALT 250 FOR IP): Performed by: ORTHOPAEDIC SURGERY

## 2024-09-11 PROCEDURE — 85025 COMPLETE CBC W/AUTO DIFF WBC: CPT

## 2024-09-11 PROCEDURE — 2580000003 HC RX 258: Performed by: ORTHOPAEDIC SURGERY

## 2024-09-11 PROCEDURE — 6370000000 HC RX 637 (ALT 250 FOR IP): Performed by: INTERNAL MEDICINE

## 2024-09-11 PROCEDURE — 6360000002 HC RX W HCPCS: Performed by: ORTHOPAEDIC SURGERY

## 2024-09-11 PROCEDURE — 82947 ASSAY GLUCOSE BLOOD QUANT: CPT

## 2024-09-11 RX ORDER — INSULIN GLARGINE 100 [IU]/ML
52 INJECTION, SOLUTION SUBCUTANEOUS 2 TIMES DAILY
Status: DISCONTINUED | OUTPATIENT
Start: 2024-09-11 | End: 2024-09-12

## 2024-09-11 RX ADMIN — ACETAMINOPHEN 650 MG: 325 TABLET ORAL at 10:42

## 2024-09-11 RX ADMIN — INSULIN GLARGINE 52 UNITS: 100 INJECTION, SOLUTION SUBCUTANEOUS at 20:24

## 2024-09-11 RX ADMIN — SODIUM CHLORIDE, PRESERVATIVE FREE 10 ML: 5 INJECTION INTRAVENOUS at 19:53

## 2024-09-11 RX ADMIN — OXYCODONE HYDROCHLORIDE 10 MG: 10 TABLET ORAL at 10:43

## 2024-09-11 RX ADMIN — INSULIN LISPRO 2 UNITS: 100 INJECTION, SOLUTION INTRAVENOUS; SUBCUTANEOUS at 13:01

## 2024-09-11 RX ADMIN — HYDROMORPHONE HYDROCHLORIDE 0.5 MG: 1 INJECTION, SOLUTION INTRAMUSCULAR; INTRAVENOUS; SUBCUTANEOUS at 07:36

## 2024-09-11 RX ADMIN — INSULIN GLARGINE 50 UNITS: 100 INJECTION, SOLUTION SUBCUTANEOUS at 07:46

## 2024-09-11 RX ADMIN — ONDANSETRON 4 MG: 2 INJECTION INTRAMUSCULAR; INTRAVENOUS at 07:36

## 2024-09-11 RX ADMIN — DIPHENHYDRAMINE HYDROCHLORIDE 25 MG: 25 TABLET ORAL at 19:53

## 2024-09-11 RX ADMIN — ASPIRIN 81 MG: 81 TABLET, COATED ORAL at 07:36

## 2024-09-11 RX ADMIN — ACETAMINOPHEN 650 MG: 325 TABLET ORAL at 06:40

## 2024-09-11 RX ADMIN — ACETAMINOPHEN 650 MG: 325 TABLET ORAL at 16:58

## 2024-09-11 RX ADMIN — SODIUM CHLORIDE, POTASSIUM CHLORIDE, SODIUM LACTATE AND CALCIUM CHLORIDE: 600; 310; 30; 20 INJECTION, SOLUTION INTRAVENOUS at 12:15

## 2024-09-11 RX ADMIN — CEFDINIR 300 MG: 300 CAPSULE ORAL at 20:23

## 2024-09-11 RX ADMIN — ACETAMINOPHEN 650 MG: 325 TABLET ORAL at 19:52

## 2024-09-11 RX ADMIN — CEFDINIR 300 MG: 300 CAPSULE ORAL at 07:36

## 2024-09-11 RX ADMIN — LEVOTHYROXINE SODIUM 112 MCG: 0.11 TABLET ORAL at 06:40

## 2024-09-11 RX ADMIN — ASPIRIN 81 MG: 81 TABLET, COATED ORAL at 19:53

## 2024-09-11 RX ADMIN — OXYCODONE HYDROCHLORIDE 10 MG: 10 TABLET ORAL at 19:53

## 2024-09-11 RX ADMIN — INSULIN LISPRO 2 UNITS: 100 INJECTION, SOLUTION INTRAVENOUS; SUBCUTANEOUS at 17:38

## 2024-09-11 RX ADMIN — PRAMIPEXOLE DIHYDROCHLORIDE 0.5 MG: 0.25 TABLET ORAL at 20:24

## 2024-09-11 RX ADMIN — OXYCODONE HYDROCHLORIDE 10 MG: 10 TABLET ORAL at 02:37

## 2024-09-11 RX ADMIN — SODIUM CHLORIDE, PRESERVATIVE FREE 10 ML: 5 INJECTION INTRAVENOUS at 07:47

## 2024-09-11 RX ADMIN — LIOTHYRONINE SODIUM 5 MCG: 5 TABLET ORAL at 07:36

## 2024-09-11 ASSESSMENT — PAIN DESCRIPTION - ORIENTATION
ORIENTATION: LEFT
ORIENTATION: MID
ORIENTATION: LEFT

## 2024-09-11 ASSESSMENT — PAIN DESCRIPTION - DESCRIPTORS
DESCRIPTORS: ACHING

## 2024-09-11 ASSESSMENT — PAIN SCALES - GENERAL
PAINLEVEL_OUTOF10: 5
PAINLEVEL_OUTOF10: 7
PAINLEVEL_OUTOF10: 2
PAINLEVEL_OUTOF10: 7
PAINLEVEL_OUTOF10: 3
PAINLEVEL_OUTOF10: 7

## 2024-09-11 ASSESSMENT — PAIN SCALES - WONG BAKER
WONGBAKER_NUMERICALRESPONSE: HURTS A LITTLE BIT
WONGBAKER_NUMERICALRESPONSE: NO HURT
WONGBAKER_NUMERICALRESPONSE: NO HURT

## 2024-09-11 ASSESSMENT — PAIN DESCRIPTION - LOCATION
LOCATION: KNEE
LOCATION: KNEE
LOCATION: LEG
LOCATION: LEG;KNEE
LOCATION: LEG;KNEE
LOCATION: ABDOMEN
LOCATION: KNEE

## 2024-09-12 ENCOUNTER — APPOINTMENT (OUTPATIENT)
Dept: GENERAL RADIOLOGY | Age: 64
DRG: 326 | End: 2024-09-12
Attending: ORTHOPAEDIC SURGERY
Payer: COMMERCIAL

## 2024-09-12 VITALS
WEIGHT: 279 LBS | BODY MASS INDEX: 52.67 KG/M2 | DIASTOLIC BLOOD PRESSURE: 70 MMHG | TEMPERATURE: 99.2 F | HEART RATE: 79 BPM | OXYGEN SATURATION: 94 % | RESPIRATION RATE: 16 BRPM | HEIGHT: 61 IN | SYSTOLIC BLOOD PRESSURE: 146 MMHG

## 2024-09-12 LAB
GLUCOSE BLD-MCNC: 203 MG/DL (ref 65–105)
GLUCOSE BLD-MCNC: 221 MG/DL (ref 65–105)
GLUCOSE BLD-MCNC: 256 MG/DL (ref 65–105)

## 2024-09-12 PROCEDURE — 99232 SBSQ HOSP IP/OBS MODERATE 35: CPT | Performed by: INTERNAL MEDICINE

## 2024-09-12 PROCEDURE — 71045 X-RAY EXAM CHEST 1 VIEW: CPT

## 2024-09-12 PROCEDURE — 6370000000 HC RX 637 (ALT 250 FOR IP): Performed by: ORTHOPAEDIC SURGERY

## 2024-09-12 PROCEDURE — 82947 ASSAY GLUCOSE BLOOD QUANT: CPT

## 2024-09-12 PROCEDURE — 6370000000 HC RX 637 (ALT 250 FOR IP): Performed by: INTERNAL MEDICINE

## 2024-09-12 PROCEDURE — 2580000003 HC RX 258: Performed by: ORTHOPAEDIC SURGERY

## 2024-09-12 PROCEDURE — 6370000000 HC RX 637 (ALT 250 FOR IP): Performed by: NURSE PRACTITIONER

## 2024-09-12 PROCEDURE — 6360000002 HC RX W HCPCS: Performed by: ORTHOPAEDIC SURGERY

## 2024-09-12 PROCEDURE — 97110 THERAPEUTIC EXERCISES: CPT

## 2024-09-12 PROCEDURE — 97530 THERAPEUTIC ACTIVITIES: CPT

## 2024-09-12 RX ORDER — INSULIN LISPRO 100 [IU]/ML
0-16 INJECTION, SOLUTION INTRAVENOUS; SUBCUTANEOUS
Status: DISCONTINUED | OUTPATIENT
Start: 2024-09-12 | End: 2024-09-12 | Stop reason: HOSPADM

## 2024-09-12 RX ORDER — INSULIN LISPRO 100 [IU]/ML
0-4 INJECTION, SOLUTION INTRAVENOUS; SUBCUTANEOUS NIGHTLY
Status: DISCONTINUED | OUTPATIENT
Start: 2024-09-12 | End: 2024-09-12 | Stop reason: HOSPADM

## 2024-09-12 RX ORDER — INSULIN GLARGINE 100 [IU]/ML
55 INJECTION, SOLUTION SUBCUTANEOUS 2 TIMES DAILY
Status: DISCONTINUED | OUTPATIENT
Start: 2024-09-12 | End: 2024-09-12 | Stop reason: HOSPADM

## 2024-09-12 RX ADMIN — INSULIN LISPRO 4 UNITS: 100 INJECTION, SOLUTION INTRAVENOUS; SUBCUTANEOUS at 11:33

## 2024-09-12 RX ADMIN — SODIUM CHLORIDE, PRESERVATIVE FREE 10 ML: 5 INJECTION INTRAVENOUS at 07:25

## 2024-09-12 RX ADMIN — ACETAMINOPHEN 650 MG: 325 TABLET ORAL at 10:44

## 2024-09-12 RX ADMIN — ACETAMINOPHEN 650 MG: 325 TABLET ORAL at 06:12

## 2024-09-12 RX ADMIN — HYDROMORPHONE HYDROCHLORIDE 0.5 MG: 1 INJECTION, SOLUTION INTRAMUSCULAR; INTRAVENOUS; SUBCUTANEOUS at 12:42

## 2024-09-12 RX ADMIN — OXYCODONE HYDROCHLORIDE 5 MG: 5 TABLET ORAL at 15:21

## 2024-09-12 RX ADMIN — OXYCODONE HYDROCHLORIDE 10 MG: 10 TABLET ORAL at 06:23

## 2024-09-12 RX ADMIN — OXYCODONE HYDROCHLORIDE 10 MG: 10 TABLET ORAL at 10:48

## 2024-09-12 RX ADMIN — INSULIN LISPRO 2 UNITS: 100 INJECTION, SOLUTION INTRAVENOUS; SUBCUTANEOUS at 07:25

## 2024-09-12 RX ADMIN — INSULIN LISPRO 4 UNITS: 100 INJECTION, SOLUTION INTRAVENOUS; SUBCUTANEOUS at 17:01

## 2024-09-12 RX ADMIN — ASPIRIN 81 MG: 81 TABLET, COATED ORAL at 07:25

## 2024-09-12 RX ADMIN — CEFDINIR 300 MG: 300 CAPSULE ORAL at 07:25

## 2024-09-12 RX ADMIN — BUMETANIDE 2 MG: 1 TABLET ORAL at 07:25

## 2024-09-12 RX ADMIN — ACETAMINOPHEN 650 MG: 325 TABLET ORAL at 15:20

## 2024-09-12 RX ADMIN — LEVOTHYROXINE SODIUM 112 MCG: 0.11 TABLET ORAL at 06:12

## 2024-09-12 RX ADMIN — INSULIN GLARGINE 52 UNITS: 100 INJECTION, SOLUTION SUBCUTANEOUS at 07:26

## 2024-09-12 RX ADMIN — LIOTHYRONINE SODIUM 5 MCG: 5 TABLET ORAL at 07:25

## 2024-09-12 ASSESSMENT — PAIN DESCRIPTION - LOCATION
LOCATION: KNEE
LOCATION: LEG
LOCATION: KNEE
LOCATION: KNEE
LOCATION: LEG

## 2024-09-12 ASSESSMENT — PAIN DESCRIPTION - ORIENTATION
ORIENTATION: LEFT

## 2024-09-12 ASSESSMENT — PAIN SCALES - WONG BAKER: WONGBAKER_NUMERICALRESPONSE: NO HURT

## 2024-09-12 ASSESSMENT — PAIN SCALES - GENERAL
PAINLEVEL_OUTOF10: 6
PAINLEVEL_OUTOF10: 4
PAINLEVEL_OUTOF10: 7
PAINLEVEL_OUTOF10: 7
PAINLEVEL_OUTOF10: 4

## 2024-09-12 ASSESSMENT — PAIN DESCRIPTION - DESCRIPTORS
DESCRIPTORS: ACHING

## 2024-09-13 ENCOUNTER — TELEPHONE (OUTPATIENT)
Dept: ORTHOPEDIC SURGERY | Age: 64
End: 2024-09-13

## 2024-09-13 ENCOUNTER — CARE COORDINATION (OUTPATIENT)
Dept: CARE COORDINATION | Age: 64
End: 2024-09-13

## 2024-09-17 ENCOUNTER — CARE COORDINATION (OUTPATIENT)
Dept: CARE COORDINATION | Age: 64
End: 2024-09-17

## 2024-09-20 DIAGNOSIS — Z96.651 S/P TOTAL KNEE ARTHROPLASTY, RIGHT: Primary | ICD-10-CM

## 2024-09-20 RX ORDER — OXYCODONE AND ACETAMINOPHEN 5; 325 MG/1; MG/1
1 TABLET ORAL EVERY 6 HOURS PRN
Qty: 28 TABLET | Refills: 0 | Status: SHIPPED | OUTPATIENT
Start: 2024-09-20 | End: 2024-09-27

## 2024-09-23 ENCOUNTER — TELEPHONE (OUTPATIENT)
Dept: FAMILY MEDICINE CLINIC | Age: 64
End: 2024-09-23

## 2024-09-25 ENCOUNTER — OFFICE VISIT (OUTPATIENT)
Dept: ORTHOPEDIC SURGERY | Age: 64
End: 2024-09-25

## 2024-09-25 DIAGNOSIS — M25.562 LEFT KNEE PAIN, UNSPECIFIED CHRONICITY: Primary | ICD-10-CM

## 2024-09-25 DIAGNOSIS — Z96.651 S/P TOTAL KNEE ARTHROPLASTY, RIGHT: Primary | ICD-10-CM

## 2024-09-25 PROCEDURE — 99024 POSTOP FOLLOW-UP VISIT: CPT | Performed by: ORTHOPAEDIC SURGERY

## 2024-09-25 RX ORDER — OXYCODONE AND ACETAMINOPHEN 5; 325 MG/1; MG/1
1 TABLET ORAL EVERY 6 HOURS PRN
Qty: 28 TABLET | Refills: 0 | Status: SHIPPED | OUTPATIENT
Start: 2024-09-25 | End: 2024-10-02

## 2024-09-27 DIAGNOSIS — E55.9 VITAMIN D DEFICIENCY: ICD-10-CM

## 2024-09-27 DIAGNOSIS — M17.0 PRIMARY OSTEOARTHRITIS OF BOTH KNEES: Primary | ICD-10-CM

## 2024-09-28 RX ORDER — CHOLECALCIFEROL (VITAMIN D3) 50 MCG
1 TABLET ORAL DAILY
Qty: 30 TABLET | Refills: 3 | Status: SHIPPED | OUTPATIENT
Start: 2024-09-28

## 2024-10-06 ENCOUNTER — HOSPITAL ENCOUNTER (EMERGENCY)
Age: 64
Discharge: ANOTHER HOSPITAL | End: 2024-10-06
Attending: EMERGENCY MEDICINE
Payer: COMMERCIAL

## 2024-10-06 ENCOUNTER — HOSPITAL ENCOUNTER (INPATIENT)
Age: 64
LOS: 4 days | Discharge: HOME | End: 2024-10-10
Attending: EMERGENCY MEDICINE | Admitting: STUDENT IN AN ORGANIZED HEALTH CARE EDUCATION/TRAINING PROGRAM
Payer: COMMERCIAL

## 2024-10-06 ENCOUNTER — APPOINTMENT (OUTPATIENT)
Dept: GENERAL RADIOLOGY | Age: 64
End: 2024-10-06
Payer: COMMERCIAL

## 2024-10-06 VITALS
OXYGEN SATURATION: 97 % | BODY MASS INDEX: 51.92 KG/M2 | SYSTOLIC BLOOD PRESSURE: 130 MMHG | HEART RATE: 68 BPM | DIASTOLIC BLOOD PRESSURE: 56 MMHG | TEMPERATURE: 98 F | WEIGHT: 275 LBS | HEIGHT: 61 IN | RESPIRATION RATE: 21 BRPM

## 2024-10-06 DIAGNOSIS — M96.89 POSTOPERATIVE SURGICAL COMPLICATION INVOLVING MUSCULOSKELETAL SYSTEM ASSOCIATED WITH MUSCULOSKELETAL PROCEDURE, UNSPECIFIED COMPLICATION: Primary | ICD-10-CM

## 2024-10-06 DIAGNOSIS — T81.31XA DEHISCENCE OF OPERATIVE WOUND, INITIAL ENCOUNTER: Primary | ICD-10-CM

## 2024-10-06 DIAGNOSIS — T81.31XA DEHISCENCE OF OPERATIVE WOUND, INITIAL ENCOUNTER: ICD-10-CM

## 2024-10-06 PROBLEM — M25.562 LEFT KNEE PAIN: Status: RESOLVED | Noted: 2024-10-06 | Resolved: 2024-10-06

## 2024-10-06 PROBLEM — M25.562 LEFT KNEE PAIN: Status: ACTIVE | Noted: 2024-10-06

## 2024-10-06 LAB
ALBUMIN SERPL-MCNC: 3.6 G/DL (ref 3.5–5.2)
ALP SERPL-CCNC: 126 U/L (ref 35–104)
ALT SERPL-CCNC: 13 U/L (ref 5–33)
ANION GAP SERPL CALCULATED.3IONS-SCNC: 11 MMOL/L (ref 9–17)
AST SERPL-CCNC: 16 U/L
BASOPHILS # BLD: 0.1 K/UL (ref 0–0.2)
BASOPHILS NFR BLD: 1 % (ref 0–2)
BILIRUB SERPL-MCNC: 0.3 MG/DL (ref 0.3–1.2)
BUN SERPL-MCNC: 25 MG/DL (ref 8–23)
CALCIUM SERPL-MCNC: 9 MG/DL (ref 8.6–10.4)
CHLORIDE SERPL-SCNC: 101 MMOL/L (ref 98–107)
CO2 SERPL-SCNC: 27 MMOL/L (ref 20–31)
CREAT SERPL-MCNC: 1 MG/DL (ref 0.5–0.9)
EOSINOPHIL # BLD: 0.3 K/UL (ref 0–0.4)
EOSINOPHILS RELATIVE PERCENT: 3 % (ref 0–4)
ERYTHROCYTE [DISTWIDTH] IN BLOOD BY AUTOMATED COUNT: 16.3 % (ref 11.5–14.9)
GFR, ESTIMATED: 63 ML/MIN/1.73M2
GLUCOSE BLD-MCNC: 166 MG/DL (ref 65–105)
GLUCOSE SERPL-MCNC: 125 MG/DL (ref 70–99)
HCT VFR BLD AUTO: 38.8 % (ref 36–46)
HGB BLD-MCNC: 12.7 G/DL (ref 12–16)
LYMPHOCYTES NFR BLD: 2.7 K/UL (ref 1–4.8)
LYMPHOCYTES RELATIVE PERCENT: 24 % (ref 24–44)
MCH RBC QN AUTO: 28.9 PG (ref 26–34)
MCHC RBC AUTO-ENTMCNC: 32.6 G/DL (ref 31–37)
MCV RBC AUTO: 88.7 FL (ref 80–100)
MONOCYTES NFR BLD: 0.5 K/UL (ref 0.1–1.3)
MONOCYTES NFR BLD: 4 % (ref 1–7)
NEUTROPHILS NFR BLD: 68 % (ref 36–66)
NEUTS SEG NFR BLD: 7.8 K/UL (ref 1.3–9.1)
PLATELET # BLD AUTO: 291 K/UL (ref 150–450)
PMV BLD AUTO: 8.5 FL (ref 6–12)
POTASSIUM SERPL-SCNC: 3.4 MMOL/L (ref 3.7–5.3)
PROT SERPL-MCNC: 6.8 G/DL (ref 6.4–8.3)
RBC # BLD AUTO: 4.37 M/UL (ref 4–5.2)
SODIUM SERPL-SCNC: 139 MMOL/L (ref 135–144)
WBC OTHER # BLD: 11.5 K/UL (ref 3.5–11)

## 2024-10-06 PROCEDURE — 99285 EMERGENCY DEPT VISIT HI MDM: CPT

## 2024-10-06 PROCEDURE — 73562 X-RAY EXAM OF KNEE 3: CPT

## 2024-10-06 PROCEDURE — 6370000000 HC RX 637 (ALT 250 FOR IP): Performed by: PHYSICIAN ASSISTANT

## 2024-10-06 PROCEDURE — 36415 COLL VENOUS BLD VENIPUNCTURE: CPT

## 2024-10-06 PROCEDURE — 2580000003 HC RX 258: Performed by: PHYSICIAN ASSISTANT

## 2024-10-06 PROCEDURE — 82947 ASSAY GLUCOSE BLOOD QUANT: CPT

## 2024-10-06 PROCEDURE — 99223 1ST HOSP IP/OBS HIGH 75: CPT | Performed by: PHYSICIAN ASSISTANT

## 2024-10-06 PROCEDURE — 73502 X-RAY EXAM HIP UNI 2-3 VIEWS: CPT

## 2024-10-06 PROCEDURE — 6360000002 HC RX W HCPCS: Performed by: EMERGENCY MEDICINE

## 2024-10-06 PROCEDURE — 80053 COMPREHEN METABOLIC PANEL: CPT

## 2024-10-06 PROCEDURE — 99024 POSTOP FOLLOW-UP VISIT: CPT | Performed by: PHYSICIAN ASSISTANT

## 2024-10-06 PROCEDURE — 96365 THER/PROPH/DIAG IV INF INIT: CPT

## 2024-10-06 PROCEDURE — 85025 COMPLETE CBC W/AUTO DIFF WBC: CPT

## 2024-10-06 PROCEDURE — 72170 X-RAY EXAM OF PELVIS: CPT

## 2024-10-06 PROCEDURE — 96375 TX/PRO/DX INJ NEW DRUG ADDON: CPT

## 2024-10-06 PROCEDURE — 6370000000 HC RX 637 (ALT 250 FOR IP): Performed by: EMERGENCY MEDICINE

## 2024-10-06 PROCEDURE — 1200000000 HC SEMI PRIVATE

## 2024-10-06 PROCEDURE — 6360000002 HC RX W HCPCS

## 2024-10-06 RX ORDER — POTASSIUM CHLORIDE 1500 MG/1
40 TABLET, EXTENDED RELEASE ORAL PRN
Status: DISCONTINUED | OUTPATIENT
Start: 2024-10-06 | End: 2024-10-10 | Stop reason: HOSPADM

## 2024-10-06 RX ORDER — INSULIN LISPRO 100 [IU]/ML
0-16 INJECTION, SOLUTION INTRAVENOUS; SUBCUTANEOUS
Status: DISCONTINUED | OUTPATIENT
Start: 2024-10-06 | End: 2024-10-07

## 2024-10-06 RX ORDER — SODIUM CHLORIDE 9 MG/ML
INJECTION, SOLUTION INTRAVENOUS PRN
Status: DISCONTINUED | OUTPATIENT
Start: 2024-10-06 | End: 2024-10-10 | Stop reason: HOSPADM

## 2024-10-06 RX ORDER — POLYETHYLENE GLYCOL 3350 17 G/17G
17 POWDER, FOR SOLUTION ORAL DAILY PRN
Status: DISCONTINUED | OUTPATIENT
Start: 2024-10-06 | End: 2024-10-10 | Stop reason: HOSPADM

## 2024-10-06 RX ORDER — LIOTHYRONINE SODIUM 5 UG/1
5 TABLET ORAL DAILY
Status: DISCONTINUED | OUTPATIENT
Start: 2024-10-07 | End: 2024-10-10 | Stop reason: HOSPADM

## 2024-10-06 RX ORDER — HYDROCODONE BITARTRATE AND ACETAMINOPHEN 5; 325 MG/1; MG/1
1 TABLET ORAL ONCE
Status: COMPLETED | OUTPATIENT
Start: 2024-10-06 | End: 2024-10-06

## 2024-10-06 RX ORDER — OXYCODONE HYDROCHLORIDE 5 MG/1
5 TABLET ORAL EVERY 4 HOURS PRN
Status: DISCONTINUED | OUTPATIENT
Start: 2024-10-06 | End: 2024-10-10 | Stop reason: HOSPADM

## 2024-10-06 RX ORDER — BUMETANIDE 1 MG/1
2 TABLET ORAL DAILY
Status: DISCONTINUED | OUTPATIENT
Start: 2024-10-07 | End: 2024-10-10 | Stop reason: HOSPADM

## 2024-10-06 RX ORDER — ACETAMINOPHEN 650 MG/1
650 SUPPOSITORY RECTAL EVERY 6 HOURS PRN
Status: DISCONTINUED | OUTPATIENT
Start: 2024-10-06 | End: 2024-10-10 | Stop reason: HOSPADM

## 2024-10-06 RX ORDER — POTASSIUM CHLORIDE 7.45 MG/ML
10 INJECTION INTRAVENOUS PRN
Status: DISCONTINUED | OUTPATIENT
Start: 2024-10-06 | End: 2024-10-10 | Stop reason: HOSPADM

## 2024-10-06 RX ORDER — SODIUM CHLORIDE 0.9 % (FLUSH) 0.9 %
5-40 SYRINGE (ML) INJECTION EVERY 12 HOURS SCHEDULED
Status: DISCONTINUED | OUTPATIENT
Start: 2024-10-06 | End: 2024-10-10 | Stop reason: HOSPADM

## 2024-10-06 RX ORDER — GLUCAGON 1 MG/ML
1 KIT INJECTION PRN
Status: DISCONTINUED | OUTPATIENT
Start: 2024-10-06 | End: 2024-10-10 | Stop reason: HOSPADM

## 2024-10-06 RX ORDER — LEVOTHYROXINE SODIUM 112 UG/1
112 TABLET ORAL DAILY
Status: DISCONTINUED | OUTPATIENT
Start: 2024-10-07 | End: 2024-10-10 | Stop reason: HOSPADM

## 2024-10-06 RX ORDER — PRAMIPEXOLE DIHYDROCHLORIDE 0.25 MG/1
0.5 TABLET ORAL NIGHTLY
Status: DISCONTINUED | OUTPATIENT
Start: 2024-10-06 | End: 2024-10-10 | Stop reason: HOSPADM

## 2024-10-06 RX ORDER — ACETAMINOPHEN 325 MG/1
650 TABLET ORAL EVERY 6 HOURS PRN
Status: DISCONTINUED | OUTPATIENT
Start: 2024-10-06 | End: 2024-10-10 | Stop reason: HOSPADM

## 2024-10-06 RX ORDER — MAGNESIUM SULFATE IN WATER 40 MG/ML
2000 INJECTION, SOLUTION INTRAVENOUS PRN
Status: DISCONTINUED | OUTPATIENT
Start: 2024-10-06 | End: 2024-10-10 | Stop reason: HOSPADM

## 2024-10-06 RX ORDER — POTASSIUM CHLORIDE 1500 MG/1
10 TABLET, EXTENDED RELEASE ORAL EVERY OTHER DAY
Status: DISCONTINUED | OUTPATIENT
Start: 2024-10-06 | End: 2024-10-10 | Stop reason: HOSPADM

## 2024-10-06 RX ORDER — LIOTHYRONINE SODIUM 5 UG/1
5 TABLET ORAL DAILY
Status: CANCELLED | OUTPATIENT
Start: 2024-10-07

## 2024-10-06 RX ORDER — LISINOPRIL 5 MG/1
2.5 TABLET ORAL DAILY
Status: DISCONTINUED | OUTPATIENT
Start: 2024-10-06 | End: 2024-10-10 | Stop reason: HOSPADM

## 2024-10-06 RX ORDER — PANTOPRAZOLE SODIUM 40 MG/1
40 TABLET, DELAYED RELEASE ORAL
Status: DISCONTINUED | OUTPATIENT
Start: 2024-10-07 | End: 2024-10-10 | Stop reason: HOSPADM

## 2024-10-06 RX ORDER — OXYCODONE HYDROCHLORIDE 5 MG/1
10 TABLET ORAL EVERY 4 HOURS PRN
Status: DISCONTINUED | OUTPATIENT
Start: 2024-10-06 | End: 2024-10-10 | Stop reason: HOSPADM

## 2024-10-06 RX ORDER — SODIUM CHLORIDE 0.9 % (FLUSH) 0.9 %
5-40 SYRINGE (ML) INJECTION PRN
Status: DISCONTINUED | OUTPATIENT
Start: 2024-10-06 | End: 2024-10-10 | Stop reason: HOSPADM

## 2024-10-06 RX ORDER — OXYBUTYNIN CHLORIDE 5 MG/1
15 TABLET, EXTENDED RELEASE ORAL DAILY
Status: DISCONTINUED | OUTPATIENT
Start: 2024-10-07 | End: 2024-10-10 | Stop reason: HOSPADM

## 2024-10-06 RX ORDER — DEXTROSE MONOHYDRATE 100 MG/ML
INJECTION, SOLUTION INTRAVENOUS CONTINUOUS PRN
Status: DISCONTINUED | OUTPATIENT
Start: 2024-10-06 | End: 2024-10-10 | Stop reason: HOSPADM

## 2024-10-06 RX ORDER — ONDANSETRON 4 MG/1
4 TABLET, ORALLY DISINTEGRATING ORAL EVERY 8 HOURS PRN
Status: DISCONTINUED | OUTPATIENT
Start: 2024-10-06 | End: 2024-10-10 | Stop reason: HOSPADM

## 2024-10-06 RX ORDER — ONDANSETRON 2 MG/ML
4 INJECTION INTRAMUSCULAR; INTRAVENOUS EVERY 6 HOURS PRN
Status: DISCONTINUED | OUTPATIENT
Start: 2024-10-06 | End: 2024-10-10 | Stop reason: HOSPADM

## 2024-10-06 RX ORDER — MORPHINE SULFATE 4 MG/ML
4 INJECTION, SOLUTION INTRAMUSCULAR; INTRAVENOUS ONCE
Status: COMPLETED | OUTPATIENT
Start: 2024-10-06 | End: 2024-10-06

## 2024-10-06 RX ADMIN — MORPHINE SULFATE 4 MG: 4 INJECTION, SOLUTION INTRAMUSCULAR; INTRAVENOUS at 15:24

## 2024-10-06 RX ADMIN — Medication 2000 MG: at 13:48

## 2024-10-06 RX ADMIN — OXYCODONE 10 MG: 5 TABLET ORAL at 21:27

## 2024-10-06 RX ADMIN — SODIUM CHLORIDE, PRESERVATIVE FREE 10 ML: 5 INJECTION INTRAVENOUS at 21:33

## 2024-10-06 RX ADMIN — HYDROCODONE BITARTRATE AND ACETAMINOPHEN 1 TABLET: 5; 325 TABLET ORAL at 13:03

## 2024-10-06 RX ADMIN — TIZANIDINE 4 MG: 4 TABLET ORAL at 22:57

## 2024-10-06 RX ADMIN — POTASSIUM BICARBONATE 40 MEQ: 782 TABLET, EFFERVESCENT ORAL at 16:39

## 2024-10-06 RX ADMIN — Medication 2000 MG: at 21:34

## 2024-10-06 RX ADMIN — PRAMIPEXOLE DIHYDROCHLORIDE 0.5 MG: 0.25 TABLET ORAL at 21:27

## 2024-10-06 RX ADMIN — POTASSIUM CHLORIDE 10 MEQ: 1500 TABLET, EXTENDED RELEASE ORAL at 21:23

## 2024-10-06 RX ADMIN — LISINOPRIL 2.5 MG: 5 TABLET ORAL at 21:24

## 2024-10-06 ASSESSMENT — PAIN SCALES - GENERAL
PAINLEVEL_OUTOF10: 6
PAINLEVEL_OUTOF10: 8
PAINLEVEL_OUTOF10: 5
PAINLEVEL_OUTOF10: 7
PAINLEVEL_OUTOF10: 6
PAINLEVEL_OUTOF10: 4
PAINLEVEL_OUTOF10: 7
PAINLEVEL_OUTOF10: 7
PAINLEVEL_OUTOF10: 6
PAINLEVEL_OUTOF10: 7
PAINLEVEL_OUTOF10: 7

## 2024-10-06 ASSESSMENT — PAIN DESCRIPTION - DESCRIPTORS
DESCRIPTORS: SHARP
DESCRIPTORS: BURNING
DESCRIPTORS: ACHING;BURNING
DESCRIPTORS: BURNING

## 2024-10-06 ASSESSMENT — PAIN DESCRIPTION - LOCATION
LOCATION: KNEE
LOCATION: NECK
LOCATION: KNEE
LOCATION: KNEE

## 2024-10-06 ASSESSMENT — PAIN DESCRIPTION - ORIENTATION
ORIENTATION: LEFT

## 2024-10-06 ASSESSMENT — PAIN DESCRIPTION - PAIN TYPE
TYPE: ACUTE PAIN
TYPE: ACUTE PAIN

## 2024-10-06 ASSESSMENT — PAIN - FUNCTIONAL ASSESSMENT
PAIN_FUNCTIONAL_ASSESSMENT: ACTIVITIES ARE NOT PREVENTED
PAIN_FUNCTIONAL_ASSESSMENT: 0-10
PAIN_FUNCTIONAL_ASSESSMENT: 0-10

## 2024-10-06 NOTE — H&P
Gastrointestinal:  Negative for abdominal pain, nausea and vomiting.   Genitourinary:  Negative for dysuria and hematuria.   Musculoskeletal:  Negative for myalgias.        Left knee pain   Neurological:  Negative for dizziness, light-headedness and headaches.   Psychiatric/Behavioral:  Negative for agitation and confusion.        Physical Exam:   BP (!) 124/103   Pulse 66   Temp 98 °F (36.7 °C) (Oral)   Resp 17   SpO2 95%   Temp (24hrs), Av °F (36.7 °C), Min:98 °F (36.7 °C), Max:98 °F (36.7 °C)    No results for input(s): \"POCGLU\" in the last 72 hours.  No intake or output data in the 24 hours ending 10/06/24 1911    Physical Exam  Vitals reviewed.   Constitutional:       General: She is not in acute distress.     Appearance: Normal appearance. She is not ill-appearing.   HENT:      Head: Normocephalic and atraumatic.   Cardiovascular:      Rate and Rhythm: Normal rate and regular rhythm.      Pulses: Normal pulses.      Heart sounds: Normal heart sounds. No murmur heard.  Pulmonary:      Effort: Pulmonary effort is normal. No respiratory distress.      Breath sounds: Normal breath sounds. No wheezing.   Abdominal:      General: Abdomen is flat. There is no distension.      Palpations: Abdomen is soft.      Tenderness: There is no abdominal tenderness.   Musculoskeletal:      Comments: Left knee wrapped   Skin:     General: Skin is warm and dry.   Neurological:      General: No focal deficit present.      Mental Status: She is alert.   Psychiatric:         Mood and Affect: Mood normal.         Behavior: Behavior normal.         Investigations:      Laboratory Testing:  Recent Results (from the past 24 hour(s))   CBC with Auto Differential    Collection Time: 10/06/24  2:35 PM   Result Value Ref Range    WBC 11.5 (H) 3.5 - 11.0 k/uL    RBC 4.37 4.0 - 5.2 m/uL    Hemoglobin 12.7 12.0 - 16.0 g/dL    Hematocrit 38.8 36 - 46 %    MCV 88.7 80 - 100 fL    MCH 28.9 26 - 34 pg    MCHC 32.6 31 - 37 g/dL    RDW 16.3

## 2024-10-06 NOTE — ED TRIAGE NOTES
Pt to ED by EMS from [facility] for a fall at about 10am this morning. Pt states her cane gave out and she fell on her knee. Pt has L knee surgery on 9/9. Pt states the wound on her L knee busted open. Pt states the only blood thinner she is on is ASA. Pt was given ancef, 1 norco and 4mg of morphine PTA.

## 2024-10-06 NOTE — ED PROVIDER NOTES
EMERGENCY DEPARTMENT ENCOUNTER    Pt Name: Marcio Veloz  MRN: [id number]  Birthdate 1960  Date of evaluation: 10/6/24  CHIEF COMPLAINT       Chief Complaint   Patient presents with    Fall    Knee Injury     Lost balance walking down a step, fell onto left knee. Had recent left knee replacement (9/9/2024). Incision opened after fall.      HISTORY OF PRESENT ILLNESS   63-year-old female presents for complaints of fall and left knee pain.  Patient had recent left knee replacement, states that she was walking down the stairs today and lost her balance.  States that she fell directly onto her knee, states that she was able to get up and has been able to move the knee but she broke open her incision and presented for evaluation.  She denies any other injuries denies hitting her head or loss of consciousness, denies any neck or back pain, denies any new numbness tingling or weakness to the extremities.  Denies any associated fevers or chills chest pain or shortness of breath.    The history is provided by the patient.           REVIEW OF SYSTEMS     Review of Systems   Constitutional:  Negative for fever.   HENT:  Negative for congestion and ear pain.    Eyes:  Negative for pain.   Respiratory:  Negative for shortness of breath.    Cardiovascular:  Negative for chest pain, palpitations and leg swelling.   Gastrointestinal:  Negative for abdominal pain.   Genitourinary:  Negative for dysuria and flank pain.   Musculoskeletal:  Negative for back pain.        Left Knee Pain   Skin:  Negative for color change.   Neurological:  Negative for numbness and headaches.   Psychiatric/Behavioral:  Negative for confusion.    All other systems reviewed and are negative.    PASTMEDICAL HISTORY     Past Medical History:   Diagnosis Date    Anemia     (iron infusions)    Anginal pain (HCC)     last used Nitro sl 4 yrs 2013  (currently off this medication written: 10/23/2019); no episodes for about a year (written 6/10/21)    Arthritis

## 2024-10-06 NOTE — ED NOTES
mouth Daily    LIOTHYRONINE (CYTOMEL) 5 MCG TABLET    Take 1 tablet by mouth daily    LISINOPRIL (PRINIVIL;ZESTRIL) 2.5 MG TABLET    Take 1 tablet by mouth daily    LORATADINE (CLARITIN) 10 MG TABLET    TAKE ONE TABLET BY MOUTH DAILY    MECLIZINE (ANTIVERT) 25 MG TABLET    TAKE 1 TABLET BY MOUTH THREE TIMES DAILY AS NEEDED FOR DIZZINESS    NOVOLOG RELION 100 UNIT/ML INJECTION VIAL        NYSTATIN 417833 UNIT/GM POWDER    APPLY POWDER TOPICALLY  TO ABDOMINAL FOLDS THREE TIMES DAILY AS NEEDED FOR SKIN IRRITATION    OMEGA-3 1000 MG CAPS    1 capsule    OXYBUTYNIN (DITROPAN XL) 15 MG EXTENDED RELEASE TABLET    Take 1 tablet by mouth daily    PANTOPRAZOLE (PROTONIX) 40 MG TABLET    TAKE 1 TABLET BY MOUTH EVERY MORNING BEFORE BREAKFAST    POTASSIUM CHLORIDE (KLOR-CON M) 10 MEQ EXTENDED RELEASE TABLET    TAKE 1 TABLET BY MOUTH EVERY OTHER DAY    PRAMIPEXOLE (MIRAPEX) 0.5 MG TABLET    Take 1 tablet by mouth nightly    RELION PEN NEEDLE 31G/8MM 31G X 8 MM MISC    USE ONE PEN NEEDLE BY SUBCUTANEOUS ROUTE DAILY    TIZANIDINE (ZANAFLEX) 4 MG TABLET    TAKE 1 TABLET BY MOUTH EVERY 8 HOURS AS NEEDED FOR LEG PAIN    TIZANIDINE (ZANAFLEX) 4 MG TABLET    Take 1 tablet by mouth every 8 hours as needed (as needed)     Orders Placed This Encounter   Medications   • ceFAZolin (ANCEF) 2000 mg in sterile water 20 mL IV syringe     Order Specific Question:   Antimicrobial Indications     Answer:   Surgical Prophylaxis       SURGICAL HISTORY       Past Surgical History:   Procedure Laterality Date   • BREAST BIOPSY Left     negative   • CARDIAC CATHETERIZATION      Patient states approximately  she had the cardiac catheterization that was negative    • CARDIAC CATHETERIZATION  2021   •  SECTION      x 2   • COLONOSCOPY     • DILATION AND CURETTAGE OF UTERUS     • JOINT REPLACEMENT Left 2016    shoulder   • OH COLON CA SCRN NOT HI RSK IND N/A 10/12/2017    COLONOSCOPY performed by Hunter Palencia MD at [facility] OR   • OH EGD 
N/A

## 2024-10-06 NOTE — ED PROVIDER NOTES
[facility] ORTHO/MED SURG  Emergency Department Encounter  Emergency Medicine Resident     Pt Name:Marcio Veloz  MRN: [id number]  Birthdate 1960  Date of evaluation: 10/6/24  PCP:  Calvin Titus APRN - CNP  Note Started: 5:27 PM EDT      CHIEF COMPLAINT       Chief Complaint   Patient presents with    Knee Injury     left       HISTORY OF PRESENT ILLNESS  (Location/Symptom, Timing/Onset, Context/Setting, Quality, Duration, Modifying Factors, Severity.)      Marcio Veloz is a 63 y.o. female who presents as a transfer from [facility] with left knee pain following a fall that occurred this morning.  Patient is 1 month postop from a total left knee arthroplasty.  This morning while walking down the stairs on her porch her cane gave out from under her and she fell to the ground, landing directly on her left knee.  She denies any other injuries, did not hit her head nor lose consciousness.  She did not have any prodromal symptoms, fall was completely mechanical.  She noticed immediate pain and splitting of the skin over her left knee.  She went to an outside hospital at that time, x-ray negative for acute fracture but concern for dehiscence of the wound prompted Ortho consult, who recommended she be transferred for orthopedic evaluation.  She has no other complaints at this time.    PAST MEDICAL / SURGICAL / SOCIAL / FAMILY HISTORY      has a past medical history of Anemia, Anginal pain (HCC), Arthritis, Arthritis of right knee, Asthma, Astigmatism, Back pain, Blurry vision, bilateral, Bursitis, CAD (coronary artery disease), Carpal tunnel syndrome of right wrist, Cataract, Closed displaced fracture of lesser tuberosity of right humerus, Constipation, Depression, Dysmenorrhea, Fatty liver disease, nonalcoholic, GERD (gastroesophageal reflux disease), Gout, Headache, Heart murmur, Heart palpitations, Heart palpitations, History of blood transfusion, History of rib fracture, Hyperlipidemia, Hypertension,

## 2024-10-07 ENCOUNTER — ANESTHESIA (OUTPATIENT)
Dept: OPERATING ROOM | Age: 64
End: 2024-10-07
Payer: COMMERCIAL

## 2024-10-07 ENCOUNTER — APPOINTMENT (OUTPATIENT)
Dept: GENERAL RADIOLOGY | Age: 64
End: 2024-10-07
Payer: COMMERCIAL

## 2024-10-07 ENCOUNTER — ANESTHESIA EVENT (OUTPATIENT)
Dept: OPERATING ROOM | Age: 64
End: 2024-10-07
Payer: COMMERCIAL

## 2024-10-07 PROBLEM — M96.89 POSTOPERATIVE SURGICAL COMPLICATION INVOLVING MUSCULOSKELETAL SYSTEM ASSOCIATED WITH MUSCULOSKELETAL PROCEDURE: Status: ACTIVE | Noted: 2024-10-07

## 2024-10-07 LAB
ANION GAP SERPL CALCULATED.3IONS-SCNC: 16 MMOL/L (ref 9–16)
BASOPHILS # BLD: 0.05 K/UL (ref 0–0.2)
BASOPHILS NFR BLD: 0 % (ref 0–2)
BUN SERPL-MCNC: 19 MG/DL (ref 8–23)
CALCIUM SERPL-MCNC: 9.1 MG/DL (ref 8.6–10.4)
CHLORIDE SERPL-SCNC: 101 MMOL/L (ref 98–107)
CO2 SERPL-SCNC: 23 MMOL/L (ref 20–31)
CREAT SERPL-MCNC: 1 MG/DL (ref 0.5–0.9)
EOSINOPHIL # BLD: 0.18 K/UL (ref 0–0.44)
EOSINOPHILS RELATIVE PERCENT: 1 % (ref 1–4)
ERYTHROCYTE [DISTWIDTH] IN BLOOD BY AUTOMATED COUNT: 15.3 % (ref 11.8–14.4)
GFR, ESTIMATED: 65 ML/MIN/1.73M2
GLUCOSE BLD-MCNC: 235 MG/DL (ref 65–105)
GLUCOSE BLD-MCNC: 237 MG/DL (ref 65–105)
GLUCOSE BLD-MCNC: 254 MG/DL (ref 65–105)
GLUCOSE BLD-MCNC: 314 MG/DL (ref 65–105)
GLUCOSE SERPL-MCNC: 270 MG/DL (ref 74–99)
HCT VFR BLD AUTO: 42.1 % (ref 36.3–47.1)
HGB BLD-MCNC: 13.3 G/DL (ref 11.9–15.1)
IMM GRANULOCYTES # BLD AUTO: 0.17 K/UL (ref 0–0.3)
IMM GRANULOCYTES NFR BLD: 1 %
LYMPHOCYTES NFR BLD: 1.84 K/UL (ref 1.1–3.7)
LYMPHOCYTES RELATIVE PERCENT: 10 % (ref 24–43)
MCH RBC QN AUTO: 28.4 PG (ref 25.2–33.5)
MCHC RBC AUTO-ENTMCNC: 31.6 G/DL (ref 28.4–34.8)
MCV RBC AUTO: 90 FL (ref 82.6–102.9)
MONOCYTES NFR BLD: 0.33 K/UL (ref 0.1–1.2)
MONOCYTES NFR BLD: 2 % (ref 3–12)
NEUTROPHILS NFR BLD: 86 % (ref 36–65)
NEUTS SEG NFR BLD: 15.5 K/UL (ref 1.5–8.1)
NRBC BLD-RTO: 0 PER 100 WBC
PLATELET # BLD AUTO: 300 K/UL (ref 138–453)
PMV BLD AUTO: 11.2 FL (ref 8.1–13.5)
POTASSIUM SERPL-SCNC: 4.5 MMOL/L (ref 3.7–5.3)
RBC # BLD AUTO: 4.68 M/UL (ref 3.95–5.11)
RBC # BLD: ABNORMAL 10*6/UL
SODIUM SERPL-SCNC: 140 MMOL/L (ref 136–145)
WBC OTHER # BLD: 18.1 K/UL (ref 3.5–11.3)

## 2024-10-07 PROCEDURE — 3600000014 HC SURGERY LEVEL 4 ADDTL 15MIN: Performed by: ORTHOPAEDIC SURGERY

## 2024-10-07 PROCEDURE — 2580000003 HC RX 258: Performed by: STUDENT IN AN ORGANIZED HEALTH CARE EDUCATION/TRAINING PROGRAM

## 2024-10-07 PROCEDURE — 6360000002 HC RX W HCPCS: Performed by: ORTHOPAEDIC SURGERY

## 2024-10-07 PROCEDURE — 11044 DBRDMT BONE 1ST 20 SQ CM/<: CPT

## 2024-10-07 PROCEDURE — 7100000000 HC PACU RECOVERY - FIRST 15 MIN: Performed by: ORTHOPAEDIC SURGERY

## 2024-10-07 PROCEDURE — 2W1MX6Z COMPRESSION OF LEFT LOWER EXTREMITY USING PRESSURE DRESSING: ICD-10-PCS | Performed by: ORTHOPAEDIC SURGERY

## 2024-10-07 PROCEDURE — 99221 1ST HOSP IP/OBS SF/LOW 40: CPT | Performed by: SURGERY

## 2024-10-07 PROCEDURE — 99024 POSTOP FOLLOW-UP VISIT: CPT

## 2024-10-07 PROCEDURE — 6370000000 HC RX 637 (ALT 250 FOR IP)

## 2024-10-07 PROCEDURE — 6370000000 HC RX 637 (ALT 250 FOR IP): Performed by: STUDENT IN AN ORGANIZED HEALTH CARE EDUCATION/TRAINING PROGRAM

## 2024-10-07 PROCEDURE — 3E1U38Z IRRIGATION OF JOINTS USING IRRIGATING SUBSTANCE, PERCUTANEOUS APPROACH: ICD-10-PCS | Performed by: ORTHOPAEDIC SURGERY

## 2024-10-07 PROCEDURE — 3700000001 HC ADD 15 MINUTES (ANESTHESIA): Performed by: ORTHOPAEDIC SURGERY

## 2024-10-07 PROCEDURE — 0SUW09Z SUPPLEMENT LEFT KNEE JOINT, TIBIAL SURFACE WITH LINER, OPEN APPROACH: ICD-10-PCS | Performed by: ORTHOPAEDIC SURGERY

## 2024-10-07 PROCEDURE — 27486 REVISE/REPLACE KNEE JOINT: CPT

## 2024-10-07 PROCEDURE — 6360000002 HC RX W HCPCS: Performed by: ANESTHESIOLOGY

## 2024-10-07 PROCEDURE — 97605 NEG PRS WND THER DME<=50SQCM: CPT

## 2024-10-07 PROCEDURE — 11047 DBRDMT BONE EACH ADDL: CPT

## 2024-10-07 PROCEDURE — C1776 JOINT DEVICE (IMPLANTABLE): HCPCS | Performed by: ORTHOPAEDIC SURGERY

## 2024-10-07 PROCEDURE — 3600000004 HC SURGERY LEVEL 4 BASE: Performed by: ORTHOPAEDIC SURGERY

## 2024-10-07 PROCEDURE — 82947 ASSAY GLUCOSE BLOOD QUANT: CPT

## 2024-10-07 PROCEDURE — 0SPD09Z REMOVAL OF LINER FROM LEFT KNEE JOINT, OPEN APPROACH: ICD-10-PCS | Performed by: ORTHOPAEDIC SURGERY

## 2024-10-07 PROCEDURE — 2709999900 HC NON-CHARGEABLE SUPPLY: Performed by: ORTHOPAEDIC SURGERY

## 2024-10-07 PROCEDURE — 0QBH0ZZ EXCISION OF LEFT TIBIA, OPEN APPROACH: ICD-10-PCS | Performed by: ORTHOPAEDIC SURGERY

## 2024-10-07 PROCEDURE — 3E0T3BZ INTRODUCTION OF ANESTHETIC AGENT INTO PERIPHERAL NERVES AND PLEXI, PERCUTANEOUS APPROACH: ICD-10-PCS | Performed by: ANESTHESIOLOGY

## 2024-10-07 PROCEDURE — 6360000002 HC RX W HCPCS

## 2024-10-07 PROCEDURE — 3700000000 HC ANESTHESIA ATTENDED CARE: Performed by: ORTHOPAEDIC SURGERY

## 2024-10-07 PROCEDURE — 36415 COLL VENOUS BLD VENIPUNCTURE: CPT

## 2024-10-07 PROCEDURE — 64450 NJX AA&/STRD OTHER PN/BRANCH: CPT | Performed by: ANESTHESIOLOGY

## 2024-10-07 PROCEDURE — 99232 SBSQ HOSP IP/OBS MODERATE 35: CPT | Performed by: STUDENT IN AN ORGANIZED HEALTH CARE EDUCATION/TRAINING PROGRAM

## 2024-10-07 PROCEDURE — 6370000000 HC RX 637 (ALT 250 FOR IP): Performed by: PHYSICIAN ASSISTANT

## 2024-10-07 PROCEDURE — 2500000003 HC RX 250 WO HCPCS

## 2024-10-07 PROCEDURE — 99222 1ST HOSP IP/OBS MODERATE 55: CPT

## 2024-10-07 PROCEDURE — 80048 BASIC METABOLIC PNL TOTAL CA: CPT

## 2024-10-07 PROCEDURE — 6360000002 HC RX W HCPCS: Performed by: STUDENT IN AN ORGANIZED HEALTH CARE EDUCATION/TRAINING PROGRAM

## 2024-10-07 PROCEDURE — 1200000000 HC SEMI PRIVATE

## 2024-10-07 PROCEDURE — 85025 COMPLETE CBC W/AUTO DIFF WBC: CPT

## 2024-10-07 PROCEDURE — 73562 X-RAY EXAM OF KNEE 3: CPT

## 2024-10-07 PROCEDURE — 2580000003 HC RX 258: Performed by: ORTHOPAEDIC SURGERY

## 2024-10-07 PROCEDURE — 2580000003 HC RX 258

## 2024-10-07 PROCEDURE — 7100000001 HC PACU RECOVERY - ADDTL 15 MIN: Performed by: ORTHOPAEDIC SURGERY

## 2024-10-07 DEVICE — IMPLANTABLE DEVICE
Type: IMPLANTABLE DEVICE | Site: KNEE | Status: FUNCTIONAL
Brand: BIOMET® KNEE SYSTEM

## 2024-10-07 DEVICE — IMPLANTABLE DEVICE
Type: IMPLANTABLE DEVICE | Site: KNEE | Status: FUNCTIONAL
Brand: VANGUARD® VIVACIT-E®

## 2024-10-07 RX ORDER — SUCCINYLCHOLINE CHLORIDE 20 MG/ML
INJECTION INTRAMUSCULAR; INTRAVENOUS
Status: DISCONTINUED | OUTPATIENT
Start: 2024-10-07 | End: 2024-10-07 | Stop reason: SDUPTHER

## 2024-10-07 RX ORDER — LIDOCAINE 4 G/G
1 PATCH TOPICAL DAILY
Status: DISCONTINUED | OUTPATIENT
Start: 2024-10-07 | End: 2024-10-10 | Stop reason: HOSPADM

## 2024-10-07 RX ORDER — FENTANYL CITRATE 50 UG/ML
50 INJECTION, SOLUTION INTRAMUSCULAR; INTRAVENOUS ONCE
Status: COMPLETED | OUTPATIENT
Start: 2024-10-07 | End: 2024-10-07

## 2024-10-07 RX ORDER — MAGNESIUM HYDROXIDE 1200 MG/15ML
LIQUID ORAL CONTINUOUS PRN
Status: COMPLETED | OUTPATIENT
Start: 2024-10-07 | End: 2024-10-07

## 2024-10-07 RX ORDER — MORPHINE SULFATE 2 MG/ML
2 INJECTION, SOLUTION INTRAMUSCULAR; INTRAVENOUS EVERY 4 HOURS PRN
Status: DISCONTINUED | OUTPATIENT
Start: 2024-10-07 | End: 2024-10-10 | Stop reason: HOSPADM

## 2024-10-07 RX ORDER — MAGNESIUM HYDROXIDE 1200 MG/15ML
LIQUID ORAL CONTINUOUS PRN
Status: DISCONTINUED | OUTPATIENT
Start: 2024-10-07 | End: 2024-10-07 | Stop reason: HOSPADM

## 2024-10-07 RX ORDER — SODIUM CHLORIDE 0.9 % (FLUSH) 0.9 %
5-40 SYRINGE (ML) INJECTION EVERY 12 HOURS SCHEDULED
Status: DISCONTINUED | OUTPATIENT
Start: 2024-10-07 | End: 2024-10-07 | Stop reason: HOSPADM

## 2024-10-07 RX ORDER — VANCOMYCIN HYDROCHLORIDE 1 G/20ML
INJECTION, POWDER, LYOPHILIZED, FOR SOLUTION INTRAVENOUS PRN
Status: DISCONTINUED | OUTPATIENT
Start: 2024-10-07 | End: 2024-10-07 | Stop reason: ALTCHOICE

## 2024-10-07 RX ORDER — SODIUM CHLORIDE, SODIUM LACTATE, POTASSIUM CHLORIDE, CALCIUM CHLORIDE 600; 310; 30; 20 MG/100ML; MG/100ML; MG/100ML; MG/100ML
INJECTION, SOLUTION INTRAVENOUS
Status: DISCONTINUED | OUTPATIENT
Start: 2024-10-07 | End: 2024-10-07 | Stop reason: SDUPTHER

## 2024-10-07 RX ORDER — ROCURONIUM BROMIDE 10 MG/ML
INJECTION, SOLUTION INTRAVENOUS
Status: DISCONTINUED | OUTPATIENT
Start: 2024-10-07 | End: 2024-10-07 | Stop reason: SDUPTHER

## 2024-10-07 RX ORDER — NALOXONE HYDROCHLORIDE 0.4 MG/ML
INJECTION, SOLUTION INTRAMUSCULAR; INTRAVENOUS; SUBCUTANEOUS PRN
Status: DISCONTINUED | OUTPATIENT
Start: 2024-10-07 | End: 2024-10-07 | Stop reason: HOSPADM

## 2024-10-07 RX ORDER — PROPOFOL 10 MG/ML
INJECTION, EMULSION INTRAVENOUS
Status: DISCONTINUED | OUTPATIENT
Start: 2024-10-07 | End: 2024-10-07 | Stop reason: SDUPTHER

## 2024-10-07 RX ORDER — SODIUM CHLORIDE 0.9 % (FLUSH) 0.9 %
5-40 SYRINGE (ML) INJECTION PRN
Status: DISCONTINUED | OUTPATIENT
Start: 2024-10-07 | End: 2024-10-07 | Stop reason: HOSPADM

## 2024-10-07 RX ORDER — IPRATROPIUM BROMIDE AND ALBUTEROL SULFATE 2.5; .5 MG/3ML; MG/3ML
1 SOLUTION RESPIRATORY (INHALATION)
Status: DISCONTINUED | OUTPATIENT
Start: 2024-10-07 | End: 2024-10-07 | Stop reason: HOSPADM

## 2024-10-07 RX ORDER — FENTANYL CITRATE 50 UG/ML
INJECTION, SOLUTION INTRAMUSCULAR; INTRAVENOUS
Status: DISCONTINUED | OUTPATIENT
Start: 2024-10-07 | End: 2024-10-07 | Stop reason: SDUPTHER

## 2024-10-07 RX ORDER — ONDANSETRON 2 MG/ML
INJECTION INTRAMUSCULAR; INTRAVENOUS
Status: DISCONTINUED | OUTPATIENT
Start: 2024-10-07 | End: 2024-10-07 | Stop reason: SDUPTHER

## 2024-10-07 RX ORDER — HYDRALAZINE HYDROCHLORIDE 20 MG/ML
10 INJECTION INTRAMUSCULAR; INTRAVENOUS
Status: DISCONTINUED | OUTPATIENT
Start: 2024-10-07 | End: 2024-10-07 | Stop reason: HOSPADM

## 2024-10-07 RX ORDER — SODIUM CHLORIDE 9 MG/ML
INJECTION, SOLUTION INTRAVENOUS PRN
Status: DISCONTINUED | OUTPATIENT
Start: 2024-10-07 | End: 2024-10-07 | Stop reason: HOSPADM

## 2024-10-07 RX ORDER — INSULIN LISPRO 100 [IU]/ML
0-8 INJECTION, SOLUTION INTRAVENOUS; SUBCUTANEOUS
Status: DISCONTINUED | OUTPATIENT
Start: 2024-10-07 | End: 2024-10-08

## 2024-10-07 RX ORDER — INSULIN LISPRO 100 [IU]/ML
0-4 INJECTION, SOLUTION INTRAVENOUS; SUBCUTANEOUS NIGHTLY
Status: DISCONTINUED | OUTPATIENT
Start: 2024-10-07 | End: 2024-10-08

## 2024-10-07 RX ORDER — LABETALOL HYDROCHLORIDE 5 MG/ML
10 INJECTION, SOLUTION INTRAVENOUS
Status: DISCONTINUED | OUTPATIENT
Start: 2024-10-07 | End: 2024-10-07 | Stop reason: HOSPADM

## 2024-10-07 RX ORDER — DEXAMETHASONE SODIUM PHOSPHATE 10 MG/ML
INJECTION, SOLUTION INTRAMUSCULAR; INTRAVENOUS
Status: DISCONTINUED | OUTPATIENT
Start: 2024-10-07 | End: 2024-10-07 | Stop reason: SDUPTHER

## 2024-10-07 RX ORDER — PROCHLORPERAZINE EDISYLATE 5 MG/ML
5 INJECTION INTRAMUSCULAR; INTRAVENOUS
Status: DISCONTINUED | OUTPATIENT
Start: 2024-10-07 | End: 2024-10-07 | Stop reason: HOSPADM

## 2024-10-07 RX ORDER — DIPHENHYDRAMINE HYDROCHLORIDE 50 MG/ML
12.5 INJECTION INTRAMUSCULAR; INTRAVENOUS
Status: DISCONTINUED | OUTPATIENT
Start: 2024-10-07 | End: 2024-10-07 | Stop reason: HOSPADM

## 2024-10-07 RX ORDER — LIDOCAINE HYDROCHLORIDE 10 MG/ML
INJECTION, SOLUTION EPIDURAL; INFILTRATION; INTRACAUDAL; PERINEURAL
Status: DISCONTINUED | OUTPATIENT
Start: 2024-10-07 | End: 2024-10-07 | Stop reason: SDUPTHER

## 2024-10-07 RX ORDER — MORPHINE SULFATE 2 MG/ML
1 INJECTION, SOLUTION INTRAMUSCULAR; INTRAVENOUS EVERY 4 HOURS PRN
Status: DISCONTINUED | OUTPATIENT
Start: 2024-10-07 | End: 2024-10-07

## 2024-10-07 RX ADMIN — FENTANYL CITRATE 50 MCG: 50 INJECTION, SOLUTION INTRAMUSCULAR; INTRAVENOUS at 07:02

## 2024-10-07 RX ADMIN — Medication 2000 MG: at 12:01

## 2024-10-07 RX ADMIN — OXYCODONE 10 MG: 5 TABLET ORAL at 01:59

## 2024-10-07 RX ADMIN — PROPOFOL 200 MG: 10 INJECTION, EMULSION INTRAVENOUS at 08:11

## 2024-10-07 RX ADMIN — INSULIN LISPRO 2 UNITS: 100 INJECTION, SOLUTION INTRAVENOUS; SUBCUTANEOUS at 12:31

## 2024-10-07 RX ADMIN — Medication 3000 MG: at 08:27

## 2024-10-07 RX ADMIN — OXYCODONE 10 MG: 5 TABLET ORAL at 15:38

## 2024-10-07 RX ADMIN — Medication 2000 MG: at 20:31

## 2024-10-07 RX ADMIN — ROCURONIUM BROMIDE 50 MG: 10 INJECTION, SOLUTION INTRAVENOUS at 08:28

## 2024-10-07 RX ADMIN — SODIUM CHLORIDE, PRESERVATIVE FREE 10 ML: 5 INJECTION INTRAVENOUS at 20:34

## 2024-10-07 RX ADMIN — SUCCINYLCHOLINE CHLORIDE 160 MG: 20 INJECTION, SOLUTION INTRAMUSCULAR; INTRAVENOUS at 08:11

## 2024-10-07 RX ADMIN — HYDROMORPHONE HYDROCHLORIDE 0.25 MG: 1 INJECTION, SOLUTION INTRAMUSCULAR; INTRAVENOUS; SUBCUTANEOUS at 10:45

## 2024-10-07 RX ADMIN — HYDROMORPHONE HYDROCHLORIDE 0.5 MG: 1 INJECTION, SOLUTION INTRAMUSCULAR; INTRAVENOUS; SUBCUTANEOUS at 10:17

## 2024-10-07 RX ADMIN — ONDANSETRON 4 MG: 2 INJECTION INTRAMUSCULAR; INTRAVENOUS at 09:43

## 2024-10-07 RX ADMIN — SUGAMMADEX 200 MG: 100 INJECTION, SOLUTION INTRAVENOUS at 10:06

## 2024-10-07 RX ADMIN — DEXAMETHASONE SODIUM PHOSPHATE 5 MG: 10 INJECTION INTRAMUSCULAR; INTRAVENOUS at 08:40

## 2024-10-07 RX ADMIN — INSULIN LISPRO 4 UNITS: 100 INJECTION, SOLUTION INTRAVENOUS; SUBCUTANEOUS at 16:43

## 2024-10-07 RX ADMIN — INSULIN LISPRO 4 UNITS: 100 INJECTION, SOLUTION INTRAVENOUS; SUBCUTANEOUS at 20:31

## 2024-10-07 RX ADMIN — MORPHINE SULFATE 2 MG: 2 INJECTION, SOLUTION INTRAMUSCULAR; INTRAVENOUS at 16:43

## 2024-10-07 RX ADMIN — PRAMIPEXOLE DIHYDROCHLORIDE 0.5 MG: 0.25 TABLET ORAL at 20:34

## 2024-10-07 RX ADMIN — OXYCODONE 10 MG: 5 TABLET ORAL at 20:35

## 2024-10-07 RX ADMIN — MORPHINE SULFATE 1 MG: 2 INJECTION, SOLUTION INTRAMUSCULAR; INTRAVENOUS at 12:31

## 2024-10-07 RX ADMIN — LIDOCAINE HYDROCHLORIDE 50 MG: 10 INJECTION, SOLUTION EPIDURAL; INFILTRATION; INTRACAUDAL; PERINEURAL at 08:11

## 2024-10-07 RX ADMIN — HYDROMORPHONE HYDROCHLORIDE 0.25 MG: 1 INJECTION, SOLUTION INTRAMUSCULAR; INTRAVENOUS; SUBCUTANEOUS at 10:28

## 2024-10-07 RX ADMIN — PROPOFOL 50 MG: 10 INJECTION, EMULSION INTRAVENOUS at 09:35

## 2024-10-07 RX ADMIN — TIZANIDINE 4 MG: 4 TABLET ORAL at 11:32

## 2024-10-07 RX ADMIN — SODIUM CHLORIDE, POTASSIUM CHLORIDE, SODIUM LACTATE AND CALCIUM CHLORIDE: 600; 310; 30; 20 INJECTION, SOLUTION INTRAVENOUS at 08:08

## 2024-10-07 RX ADMIN — OXYCODONE 10 MG: 5 TABLET ORAL at 11:32

## 2024-10-07 RX ADMIN — FENTANYL CITRATE 100 MCG: 50 INJECTION, SOLUTION INTRAMUSCULAR; INTRAVENOUS at 08:11

## 2024-10-07 ASSESSMENT — PAIN SCALES - GENERAL
PAINLEVEL_OUTOF10: 10
PAINLEVEL_OUTOF10: 9
PAINLEVEL_OUTOF10: 6
PAINLEVEL_OUTOF10: 8
PAINLEVEL_OUTOF10: 6
PAINLEVEL_OUTOF10: 10
PAINLEVEL_OUTOF10: 10
PAINLEVEL_OUTOF10: 8
PAINLEVEL_OUTOF10: 10
PAINLEVEL_OUTOF10: 7
PAINLEVEL_OUTOF10: 6
PAINLEVEL_OUTOF10: 7
PAINLEVEL_OUTOF10: 7
PAINLEVEL_OUTOF10: 5
PAINLEVEL_OUTOF10: 9
PAINLEVEL_OUTOF10: 10
PAINLEVEL_OUTOF10: 8
PAINLEVEL_OUTOF10: 8

## 2024-10-07 ASSESSMENT — PAIN DESCRIPTION - ONSET
ONSET: ON-GOING

## 2024-10-07 ASSESSMENT — PAIN DESCRIPTION - ORIENTATION
ORIENTATION: LEFT

## 2024-10-07 ASSESSMENT — PAIN DESCRIPTION - LOCATION
LOCATION: LEG
LOCATION: KNEE
LOCATION: KNEE
LOCATION: LEG
LOCATION: LEG

## 2024-10-07 ASSESSMENT — PAIN DESCRIPTION - FREQUENCY
FREQUENCY: CONTINUOUS

## 2024-10-07 ASSESSMENT — PAIN DESCRIPTION - DESCRIPTORS
DESCRIPTORS: BURNING
DESCRIPTORS: BURNING
DESCRIPTORS: ACHING
DESCRIPTORS: BURNING;SHARP

## 2024-10-07 ASSESSMENT — ENCOUNTER SYMPTOMS
SHORTNESS OF BREATH: 1
BACK PAIN: 1
BLOOD IN STOOL: 0
SORE THROAT: 0
NAUSEA: 0
COUGH: 0
SHORTNESS OF BREATH: 0
BACK PAIN: 0
CONSTIPATION: 0
COLOR CHANGE: 0
TROUBLE SWALLOWING: 0
ABDOMINAL PAIN: 0
DIARRHEA: 0
WHEEZING: 0
VOMITING: 0

## 2024-10-07 ASSESSMENT — PAIN DESCRIPTION - PAIN TYPE
TYPE: SURGICAL PAIN
TYPE: SURGICAL PAIN
TYPE: ACUTE PAIN

## 2024-10-07 ASSESSMENT — PAIN - FUNCTIONAL ASSESSMENT: PAIN_FUNCTIONAL_ASSESSMENT: 0-10

## 2024-10-07 NOTE — BRIEF OP NOTE
Clear 09/10/24 1600   Urine Odor Other (Comment) 09/10/24 0423   Output (mL) 600 mL 09/11/24 2023       [REMOVED] External Urinary Catheter (Removed)   Site Assessment Clean,dry & intact 10/06/24 2200   Placement Replaced 10/06/24 2200   Securement Method Other (Comment) 10/06/24 2200   Catheter Care Catheter/Wick replaced;Suction Canister/Tubing changed 10/06/24 2200   Perineal Care Yes 10/06/24 2200   Suction 40 mmgHg continuous 10/06/24 2200   Urine Color Yellow 10/06/24 2200   Urine Appearance Clear 10/06/24 2200   Output (mL) 200 mL 10/06/24 2200       Findings:  Infection Present At Time Of Surgery (PATOS) (choose all levels that have infection present):  No infection present  Other Findings: Small opening to joint capsule, consistent with traumatic arthrotomy. No gross evidence of infection. Wound able to be closed after irrigation and debridement.     Electronically signed by Bossman Miramontes DO on 10/7/2024 at 10:11 AM

## 2024-10-07 NOTE — ANESTHESIA PRE PROCEDURE
Department of Anesthesiology  Preprocedure Note       Name:  Marcio Veloz   Age:  63 y.o.  :  1960                                          MRN:  [id number]         Date:  10/7/2024      Surgeon: Surgeon(s):  Bossman Miramontes DO    Procedure: Procedure(s):  **ADD ON** KNEE I&D POLY EXCHANGE *3080 WITH RADIOLUCENT EXTENSION, CYSTO TUBING, VANCO POWDER, NIEVES BIOMET TOTAL KNEE KIT (REP NOTIFIED)    Medications prior to admission:   Prior to Admission medications    Medication Sig Start Date End Date Taking? Authorizing Provider   Cholecalciferol (VITAMIN D3) 50 MCG (2000) TABS Take 1 tablet by mouth daily 24  Yes Calvin Titus APRN - CNP   aspirin (ASPIRIN LOW DOSE) 81 MG EC tablet Take 1 tablet by mouth in the morning and 1 tablet in the evening. 24  Yes Matt Zuniga MD   tiZANidine (ZANAFLEX) 4 MG tablet TAKE 1 TABLET BY MOUTH EVERY 8 HOURS AS NEEDED FOR LEG PAIN 24  Yes Matt Zuniga MD   Dulaglutide (TRULICITY) 4.5 MG/0.5ML SOPN Inject 4.5 mg into the skin once a week   Yes Provider, MD Renetta   JARDIANCE 10 MG tablet TAKE 1 TABLET BY MOUTH DAILY 24  Yes Calvin Titus APRN - CNP   pantoprazole (PROTONIX) 40 MG tablet TAKE 1 TABLET BY MOUTH EVERY MORNING BEFORE BREAKFAST 24  Yes Calvin Titus APRN - CNP   potassium chloride (KLOR-CON M) 10 MEQ extended release tablet TAKE 1 TABLET BY MOUTH EVERY OTHER DAY 7/17/24 10/15/24 Yes Calvin Titus APRN - CNP   bumetanide (BUMEX) 2 MG tablet Take 1 tablet by mouth daily 24  Yes Calvin Titus APRN - CNP   meclizine (ANTIVERT) 25 MG tablet TAKE 1 TABLET BY MOUTH THREE TIMES DAILY AS NEEDED FOR DIZZINESS 24  Yes Calvin Titus APRN - CNP   levothyroxine (SYNTHROID) 112 MCG tablet Take 1 tablet by mouth Daily 24  Yes Calvin Titus APRN - CNP   nystatin 225967 UNIT/GM powder APPLY POWDER TOPICALLY  TO ABDOMINAL FOLDS THREE TIMES DAILY AS NEEDED FOR SKIN IRRITATION 24  Yes Calvin Titus, APRN - CNP   albuterol

## 2024-10-07 NOTE — OP NOTE
Operative Note      Patient: Marcio Veloz  YOB: 1960  MRN: [id number]     Date of Procedure: 10/7/2024     Pre-Op Diagnosis:   Left total knee arthroplasty wound dehiscence     Post-Op Diagnosis:   Left total knee arthroplasty wound dehiscence  Left total knee traumatic arthrotomy       Procedure(s):  Left knee irrigation and excisional debridement down to bone 12cm x 5 cm   Revision left total knee arthroplasty, one component   Application of incisional wound VAC to left knee     Surgeon(s):  Bossman Miramontes DO     Assistant:  Resident: Jeanine Ledezma DO     Anesthesia: General     Estimated Blood Loss (mL): 30 mL      Fluids: 800 mL crystalloid      Complications: None     Specimens:   * No specimens in log *     Implants:  Implant Name Type Inv. Item Serial No.  Lot No. LRB No. Used Action   VNGD ANT STAB BRG 13X67 - [id number] Joint Component VNGD ANT STAB BRG 13X67   [employer]- [id number] Left 1 Explanted   BEARING TIB 67X13 MM KNEE ANTR STBL VIVACIT-E VANGUARD - [id number]   BEARING TIB 67X13 MM KNEE ANTR STBL VIVACIT-E VANGUARD   [employer]-WD [id number] Left 1 Implanted   COMPONENT TIB KNEE ELIESER BAR COMPLT SYS VANGUARD - [id number]   COMPONENT TIB KNEE ELIESER BAR COMPLT SYS VANGUARD   [employer]-WD [id number] Left 1 Implanted          Drains:        Negative Pressure Wound Therapy Knee Left;Lateral (Active)   Unit Type Prevena 10/07/24 0948   Dressing Status New dressing applied;Clean, dry & intact 10/07/24 0948   Dressing Changed Changed/New 10/07/24 0948       [REMOVED] External Urinary Catheter (Removed)   Site Assessment Clean,dry & intact 09/12/24 0800   Placement Repositioned 09/11/24 1609   Securement Method Securing device (Describe) 09/10/24 1600   Catheter Care Catheter/Wick replaced 09/11/24 1851   Perineal Care Yes 09/11/24 1851   Suction 40 mmgHg continuous 09/11/24 1851   Urine Color Yellow 09/10/24 1600   Urine

## 2024-10-07 NOTE — ANESTHESIA PROCEDURE NOTES
Peripheral Block    Patient location during procedure: PACU  Reason for block: at surgeon's request  Start time: 10/7/2024 12:33 PM  Staffing  Performed by: Mabel Ireland MD  Authorized by: Mabel Ireland MD    Peripheral Block   Block type: Anterior knee  Laterality: N/A  Injection technique: single-shot  Guidance: Doppler guided      Additional Notes  No block placed, wrappings to high

## 2024-10-07 NOTE — ANESTHESIA PROCEDURE NOTES
Airway  Date/Time: 10/7/2024 8:14 AM  Urgency: elective    Airway not difficult    General Information and Staff    Patient location during procedure: OR  Performed: resident/CRNA/CAA   Performed by: Mabel Ireland MD  Authorized by: Mabel Ireland MD      Indications and Patient Condition  Indications for airway management: airway protection and respiratory failure  Sedation level: minimal  Preoxygenated: yes  Patient position: sniffing  MILS maintained throughout  Mask difficulty assessment: vent by bag mask    Final Airway Details  Final airway type: endotracheal airway      Successful airway: ETT  Cuffed: yes   Successful intubation technique: video laryngoscopy  Endotracheal tube insertion site: oral  Blade: Karen  Blade size: #3  ETT size (mm): 7.5  Cormack-Lehane Classification: grade IIa - partial view of glottis  Placement verified by: chest auscultation and capnometry   Number of attempts at approach: 1  Ventilation between attempts: bag mask  Number of other approaches attempted: 0      Non-anticipated difficult airway: yes

## 2024-10-07 NOTE — ANESTHESIA POSTPROCEDURE EVALUATION
Department of Anesthesiology  Postprocedure Note    Patient: Marcio Veloz  MRN: [id number]  YOB: 1960  Date of evaluation: 10/7/2024    Procedure Summary       Date: 10/07/24 Room / Location: [address]    Anesthesia Start: 0808 Anesthesia Stop: 1016    Procedure: KNEE I&D POLY EXCHANGE (Left) Diagnosis:       Injury of left knee, initial encounter      (Injury of left knee, initial encounter [S89.92XA])    Surgeons: Bossman Miramontes DO Responsible Provider: Mabel Ireland MD    Anesthesia Type: general, regional ASA Status: 3            Anesthesia Type: No value filed.    Ronda Phase I: Ronda Score: 9    Ronda Phase II:      Anesthesia Post Evaluation    Patient location during evaluation: bedside  Patient participation: complete - patient participated  Level of consciousness: awake and awake and alert  Pain score: 2  Airway patency: patent  Nausea & Vomiting: no nausea and no vomiting  Cardiovascular status: blood pressure returned to baseline and hemodynamically stable  Respiratory status: acceptable  Hydration status: euvolemic  Pain management: adequate    No notable events documented.

## 2024-10-07 NOTE — CONSULTS
Cholecalciferol (VITAMIN D3) 50 MCG (2000 UT) TABS Take 1 tablet by mouth daily 9/28/24  Yes Calvin Titus APRN - CNP   aspirin (ASPIRIN LOW DOSE) 81 MG EC tablet Take 1 tablet by mouth in the morning and 1 tablet in the evening. 9/9/24  Yes Matt Zuniga MD   tiZANidine (ZANAFLEX) 4 MG tablet TAKE 1 TABLET BY MOUTH EVERY 8 HOURS AS NEEDED FOR LEG PAIN 9/6/24  Yes Matt Zuniga MD   Dulaglutide (TRULICITY) 4.5 MG/0.5ML SOPN Inject 4.5 mg into the skin once a week   Yes Renetta Jovel MD   JARDIANCE 10 MG tablet TAKE 1 TABLET BY MOUTH DAILY 8/13/24  Yes Calvin Titus APRN - CNP   pantoprazole (PROTONIX) 40 MG tablet TAKE 1 TABLET BY MOUTH EVERY MORNING BEFORE BREAKFAST 8/13/24  Yes Calvin Titus APRN - CNP   potassium chloride (KLOR-CON M) 10 MEQ extended release tablet TAKE 1 TABLET BY MOUTH EVERY OTHER DAY 7/17/24 10/15/24 Yes Calvin Titus APRN - CNP   bumetanide (BUMEX) 2 MG tablet Take 1 tablet by mouth daily 7/6/24  Yes Calvin Titus APRN - CNP   meclizine (ANTIVERT) 25 MG tablet TAKE 1 TABLET BY MOUTH THREE TIMES DAILY AS NEEDED FOR DIZZINESS 6/20/24  Yes Calvin Titus APRN - CNP   levothyroxine (SYNTHROID) 112 MCG tablet Take 1 tablet by mouth Daily 5/17/24  Yes Calvin Titus APRN - CNP   nystatin 783807 UNIT/GM powder APPLY POWDER TOPICALLY  TO ABDOMINAL FOLDS THREE TIMES DAILY AS NEEDED FOR SKIN IRRITATION 5/17/24  Yes Calvin Titus APRN - CNP   albuterol sulfate HFA (VENTOLIN HFA) 108 (90 Base) MCG/ACT inhaler Inhale 2 puffs into the lungs every 4 hours as needed for Wheezing  Patient taking differently: Inhale 2 puffs into the lungs every 4 hours as needed for Wheezing AS needed 5/14/24  Yes Calvin Titus APRN - CNP   allopurinol (ZYLOPRIM) 100 MG tablet Take 1 tablet by mouth daily 5/14/24  Yes Calvin Titus APRN - CNP   ferrous sulfate (IRON 325) 325 (65 Fe) MG tablet Take 1 tablet by mouth daily (with breakfast) 5/14/24  Yes Calvin Titus APRN - CNP   liothyronine (CYTOMEL) 5 MCG 
Resident, PGY-2  [place], [place]

## 2024-10-07 NOTE — DISCHARGE INSTRUCTIONS
Orthopedic Instructions:  -Weight bearing status: As tolerated to left leg.  -Prevena wound vac should remain in place until follow up visit. Do not remove Prevena. If the machine dies, it is OK to cut the tubing and seal off where the tube was cut in order to keep a waterproof barrier over your surgical incision.   -Physical Therapy for strengthening and gait training. Occupational therapy for activities of daily living.  -Ice (20 minutes on and off 1 hour) and elevate (above heart) as needed for swelling/pain.  -Drink plenty of fluids.  -Call the office or come to Emergency Room if signs of infection appear (hot, swollen, red, draining pus, fever).  -Take medications as prescribed.  -Wean off narcotics (Percocet/Norco) as soon as possible. Do not take Tylenol if still taking narcotics.  -No alcoholic beverages or driving/operating while taking narcotics.  -Follow up with Dr. Zuniga in two weeks for a wound check. Call [phone]  to schedule your appointment. If you have any complications with a follow up appointment, you may call Dr. Miramontes's office at [phone] with questions or concerns.

## 2024-10-08 PROBLEM — E11.65 UNCONTROLLED TYPE 2 DIABETES MELLITUS WITH HYPERGLYCEMIA (HCC): Status: ACTIVE | Noted: 2024-10-08

## 2024-10-08 LAB
ANION GAP SERPL CALCULATED.3IONS-SCNC: 13 MMOL/L (ref 9–16)
BASOPHILS # BLD: 0.05 K/UL (ref 0–0.2)
BASOPHILS NFR BLD: 0 % (ref 0–2)
BUN SERPL-MCNC: 22 MG/DL (ref 8–23)
CALCIUM SERPL-MCNC: 8.7 MG/DL (ref 8.6–10.4)
CHLORIDE SERPL-SCNC: 101 MMOL/L (ref 98–107)
CO2 SERPL-SCNC: 21 MMOL/L (ref 20–31)
CREAT SERPL-MCNC: 1 MG/DL (ref 0.5–0.9)
EOSINOPHIL # BLD: 0.09 K/UL (ref 0–0.44)
EOSINOPHILS RELATIVE PERCENT: 1 % (ref 1–4)
ERYTHROCYTE [DISTWIDTH] IN BLOOD BY AUTOMATED COUNT: 15.3 % (ref 11.8–14.4)
GFR, ESTIMATED: 67 ML/MIN/1.73M2
GLUCOSE BLD-MCNC: 260 MG/DL (ref 65–105)
GLUCOSE BLD-MCNC: 300 MG/DL (ref 65–105)
GLUCOSE BLD-MCNC: 309 MG/DL (ref 65–105)
GLUCOSE BLD-MCNC: 354 MG/DL (ref 65–105)
GLUCOSE SERPL-MCNC: 287 MG/DL (ref 74–99)
HCT VFR BLD AUTO: 36.7 % (ref 36.3–47.1)
HGB BLD-MCNC: 11.7 G/DL (ref 11.9–15.1)
IMM GRANULOCYTES # BLD AUTO: 0.08 K/UL (ref 0–0.3)
IMM GRANULOCYTES NFR BLD: 1 %
LYMPHOCYTES NFR BLD: 2.13 K/UL (ref 1.1–3.7)
LYMPHOCYTES RELATIVE PERCENT: 15 % (ref 24–43)
MCH RBC QN AUTO: 28.5 PG (ref 25.2–33.5)
MCHC RBC AUTO-ENTMCNC: 31.9 G/DL (ref 28.4–34.8)
MCV RBC AUTO: 89.5 FL (ref 82.6–102.9)
MONOCYTES NFR BLD: 0.96 K/UL (ref 0.1–1.2)
MONOCYTES NFR BLD: 7 % (ref 3–12)
NEUTROPHILS NFR BLD: 76 % (ref 36–65)
NEUTS SEG NFR BLD: 10.72 K/UL (ref 1.5–8.1)
NRBC BLD-RTO: 0 PER 100 WBC
PLATELET # BLD AUTO: ABNORMAL K/UL (ref 138–453)
PLATELET, FLUORESCENCE: 264 K/UL (ref 138–453)
PLATELETS.RETICULATED NFR BLD AUTO: 1.7 % (ref 1.1–10.3)
POTASSIUM SERPL-SCNC: 4.7 MMOL/L (ref 3.7–5.3)
RBC # BLD AUTO: 4.1 M/UL (ref 3.95–5.11)
RBC # BLD: ABNORMAL 10*6/UL
SODIUM SERPL-SCNC: 135 MMOL/L (ref 136–145)
WBC OTHER # BLD: 14 K/UL (ref 3.5–11.3)

## 2024-10-08 PROCEDURE — 6370000000 HC RX 637 (ALT 250 FOR IP): Performed by: STUDENT IN AN ORGANIZED HEALTH CARE EDUCATION/TRAINING PROGRAM

## 2024-10-08 PROCEDURE — 82947 ASSAY GLUCOSE BLOOD QUANT: CPT

## 2024-10-08 PROCEDURE — 2580000003 HC RX 258: Performed by: STUDENT IN AN ORGANIZED HEALTH CARE EDUCATION/TRAINING PROGRAM

## 2024-10-08 PROCEDURE — 6360000002 HC RX W HCPCS: Performed by: STUDENT IN AN ORGANIZED HEALTH CARE EDUCATION/TRAINING PROGRAM

## 2024-10-08 PROCEDURE — 6370000000 HC RX 637 (ALT 250 FOR IP)

## 2024-10-08 PROCEDURE — 6370000000 HC RX 637 (ALT 250 FOR IP): Performed by: INTERNAL MEDICINE

## 2024-10-08 PROCEDURE — 97535 SELF CARE MNGMENT TRAINING: CPT

## 2024-10-08 PROCEDURE — 80048 BASIC METABOLIC PNL TOTAL CA: CPT

## 2024-10-08 PROCEDURE — 97162 PT EVAL MOD COMPLEX 30 MIN: CPT

## 2024-10-08 PROCEDURE — 85055 RETICULATED PLATELET ASSAY: CPT

## 2024-10-08 PROCEDURE — 97166 OT EVAL MOD COMPLEX 45 MIN: CPT

## 2024-10-08 PROCEDURE — 1200000000 HC SEMI PRIVATE

## 2024-10-08 PROCEDURE — 85025 COMPLETE CBC W/AUTO DIFF WBC: CPT

## 2024-10-08 PROCEDURE — 36415 COLL VENOUS BLD VENIPUNCTURE: CPT

## 2024-10-08 PROCEDURE — 99232 SBSQ HOSP IP/OBS MODERATE 35: CPT | Performed by: INTERNAL MEDICINE

## 2024-10-08 PROCEDURE — 6360000002 HC RX W HCPCS: Performed by: INTERNAL MEDICINE

## 2024-10-08 PROCEDURE — 97530 THERAPEUTIC ACTIVITIES: CPT

## 2024-10-08 RX ORDER — ENOXAPARIN SODIUM 100 MG/ML
30 INJECTION SUBCUTANEOUS 2 TIMES DAILY
Status: DISCONTINUED | OUTPATIENT
Start: 2024-10-08 | End: 2024-10-10 | Stop reason: HOSPADM

## 2024-10-08 RX ORDER — ALBUTEROL SULFATE 90 UG/1
2 INHALANT RESPIRATORY (INHALATION) EVERY 4 HOURS PRN
Status: DISCONTINUED | OUTPATIENT
Start: 2024-10-08 | End: 2024-10-10 | Stop reason: HOSPADM

## 2024-10-08 RX ORDER — INSULIN LISPRO 100 [IU]/ML
0-16 INJECTION, SOLUTION INTRAVENOUS; SUBCUTANEOUS
Status: DISCONTINUED | OUTPATIENT
Start: 2024-10-08 | End: 2024-10-10 | Stop reason: HOSPADM

## 2024-10-08 RX ORDER — ALLOPURINOL 100 MG/1
100 TABLET ORAL DAILY
Status: DISCONTINUED | OUTPATIENT
Start: 2024-10-08 | End: 2024-10-10 | Stop reason: HOSPADM

## 2024-10-08 RX ORDER — INSULIN GLARGINE 100 [IU]/ML
20 INJECTION, SOLUTION SUBCUTANEOUS 2 TIMES DAILY
Status: DISCONTINUED | OUTPATIENT
Start: 2024-10-08 | End: 2024-10-09

## 2024-10-08 RX ORDER — CETIRIZINE HYDROCHLORIDE 10 MG/1
10 TABLET ORAL DAILY
Status: DISCONTINUED | OUTPATIENT
Start: 2024-10-08 | End: 2024-10-10 | Stop reason: HOSPADM

## 2024-10-08 RX ORDER — INSULIN LISPRO 100 [IU]/ML
0-4 INJECTION, SOLUTION INTRAVENOUS; SUBCUTANEOUS NIGHTLY
Status: DISCONTINUED | OUTPATIENT
Start: 2024-10-08 | End: 2024-10-10 | Stop reason: HOSPADM

## 2024-10-08 RX ORDER — ENOXAPARIN SODIUM 100 MG/ML
40 INJECTION SUBCUTANEOUS DAILY
Status: DISCONTINUED | OUTPATIENT
Start: 2024-10-08 | End: 2024-10-08 | Stop reason: DRUGHIGH

## 2024-10-08 RX ADMIN — OXYCODONE 10 MG: 5 TABLET ORAL at 05:35

## 2024-10-08 RX ADMIN — TIZANIDINE 4 MG: 4 TABLET ORAL at 08:09

## 2024-10-08 RX ADMIN — SODIUM CHLORIDE, PRESERVATIVE FREE 10 ML: 5 INJECTION INTRAVENOUS at 20:45

## 2024-10-08 RX ADMIN — LEVOTHYROXINE SODIUM 112 MCG: 112 TABLET ORAL at 05:58

## 2024-10-08 RX ADMIN — INSULIN GLARGINE 20 UNITS: 100 INJECTION, SOLUTION SUBCUTANEOUS at 20:46

## 2024-10-08 RX ADMIN — OXYCODONE 10 MG: 5 TABLET ORAL at 18:54

## 2024-10-08 RX ADMIN — SODIUM CHLORIDE, PRESERVATIVE FREE 10 ML: 5 INJECTION INTRAVENOUS at 08:05

## 2024-10-08 RX ADMIN — PRAMIPEXOLE DIHYDROCHLORIDE 0.5 MG: 0.25 TABLET ORAL at 20:46

## 2024-10-08 RX ADMIN — BUMETANIDE 2 MG: 1 TABLET ORAL at 08:05

## 2024-10-08 RX ADMIN — PANTOPRAZOLE SODIUM 40 MG: 40 TABLET, DELAYED RELEASE ORAL at 05:35

## 2024-10-08 RX ADMIN — OXYCODONE 10 MG: 5 TABLET ORAL at 14:43

## 2024-10-08 RX ADMIN — INSULIN LISPRO 4 UNITS: 100 INJECTION, SOLUTION INTRAVENOUS; SUBCUTANEOUS at 21:00

## 2024-10-08 RX ADMIN — MORPHINE SULFATE 2 MG: 2 INJECTION, SOLUTION INTRAMUSCULAR; INTRAVENOUS at 09:21

## 2024-10-08 RX ADMIN — INSULIN LISPRO 8 UNITS: 100 INJECTION, SOLUTION INTRAVENOUS; SUBCUTANEOUS at 12:04

## 2024-10-08 RX ADMIN — POTASSIUM CHLORIDE 10 MEQ: 1500 TABLET, EXTENDED RELEASE ORAL at 08:08

## 2024-10-08 RX ADMIN — INSULIN GLARGINE 20 UNITS: 100 INJECTION, SOLUTION SUBCUTANEOUS at 12:47

## 2024-10-08 RX ADMIN — EMPAGLIFLOZIN 10 MG: 10 TABLET, FILM COATED ORAL at 12:44

## 2024-10-08 RX ADMIN — OXYBUTYNIN CHLORIDE 15 MG: 5 TABLET, EXTENDED RELEASE ORAL at 08:05

## 2024-10-08 RX ADMIN — OXYCODONE 10 MG: 5 TABLET ORAL at 00:44

## 2024-10-08 RX ADMIN — LISINOPRIL 2.5 MG: 5 TABLET ORAL at 08:05

## 2024-10-08 RX ADMIN — INSULIN LISPRO 4 UNITS: 100 INJECTION, SOLUTION INTRAVENOUS; SUBCUTANEOUS at 08:10

## 2024-10-08 RX ADMIN — LIOTHYRONINE SODIUM 5 MCG: 5 TABLET ORAL at 08:07

## 2024-10-08 RX ADMIN — INSULIN LISPRO 12 UNITS: 100 INJECTION, SOLUTION INTRAVENOUS; SUBCUTANEOUS at 17:37

## 2024-10-08 RX ADMIN — ALLOPURINOL 100 MG: 100 TABLET ORAL at 12:44

## 2024-10-08 RX ADMIN — MORPHINE SULFATE 2 MG: 2 INJECTION, SOLUTION INTRAMUSCULAR; INTRAVENOUS at 01:52

## 2024-10-08 RX ADMIN — ENOXAPARIN SODIUM 30 MG: 100 INJECTION SUBCUTANEOUS at 20:45

## 2024-10-08 RX ADMIN — CETIRIZINE HYDROCHLORIDE 10 MG: 10 TABLET, FILM COATED ORAL at 12:44

## 2024-10-08 RX ADMIN — ENOXAPARIN SODIUM 30 MG: 100 INJECTION SUBCUTANEOUS at 12:44

## 2024-10-08 RX ADMIN — SODIUM CHLORIDE, PRESERVATIVE FREE 10 ML: 5 INJECTION INTRAVENOUS at 01:53

## 2024-10-08 RX ADMIN — OXYCODONE 10 MG: 5 TABLET ORAL at 10:34

## 2024-10-08 ASSESSMENT — PAIN SCALES - GENERAL
PAINLEVEL_OUTOF10: 7
PAINLEVEL_OUTOF10: 7
PAINLEVEL_OUTOF10: 8
PAINLEVEL_OUTOF10: 6
PAINLEVEL_OUTOF10: 6
PAINLEVEL_OUTOF10: 5
PAINLEVEL_OUTOF10: 7
PAINLEVEL_OUTOF10: 6
PAINLEVEL_OUTOF10: 9
PAINLEVEL_OUTOF10: 7
PAINLEVEL_OUTOF10: 9
PAINLEVEL_OUTOF10: 8

## 2024-10-08 ASSESSMENT — PAIN DESCRIPTION - ORIENTATION
ORIENTATION: LEFT

## 2024-10-08 ASSESSMENT — ENCOUNTER SYMPTOMS
NAUSEA: 0
COUGH: 0
SHORTNESS OF BREATH: 0
ABDOMINAL PAIN: 0
VOMITING: 0

## 2024-10-08 ASSESSMENT — PAIN DESCRIPTION - DESCRIPTORS
DESCRIPTORS: DISCOMFORT
DESCRIPTORS: DISCOMFORT
DESCRIPTORS: BURNING;DISCOMFORT;SHARP
DESCRIPTORS: BURNING;DISCOMFORT;SHARP
DESCRIPTORS: BURNING;ACHING

## 2024-10-08 ASSESSMENT — PAIN DESCRIPTION - LOCATION
LOCATION: KNEE

## 2024-10-08 NOTE — PLAN OF CARE
Problem: Chronic Conditions and Co-morbidities  Goal: Patient's chronic conditions and co-morbidity symptoms are monitored and maintained or improved  10/8/2024 1500 by Meagan Cho, RN  Outcome: Progressing     Problem: Discharge Planning  Goal: Discharge to home or other facility with appropriate resources  10/8/2024 1500 by Meagan Cho, RN  Outcome: Progressing     Problem: Pain  Goal: Verbalizes/displays adequate comfort level or baseline comfort level  10/8/2024 1500 by Meagan Cho, RN  Outcome: Progressing

## 2024-10-08 NOTE — PLAN OF CARE
Problem: Chronic Conditions and Co-morbidities  Goal: Patient's chronic conditions and co-morbidity symptoms are monitored and maintained or improved  10/8/2024 0206 by Tiana East RN  Outcome: Progressing  10/7/2024 1602 by Meagan Cho RN  Outcome: Progressing     Problem: Discharge Planning  Goal: Discharge to home or other facility with appropriate resources  10/8/2024 0206 by Tiana East RN  Outcome: Progressing  10/7/2024 1602 by Meagan Cho RN  Outcome: Progressing     Problem: Pain  Goal: Verbalizes/displays adequate comfort level or baseline comfort level  10/8/2024 0206 by Tiana East RN  Outcome: Progressing  10/7/2024 1602 by Meagan Cho RN  Outcome: Progressing     Problem: Skin/Tissue Integrity  Goal: Absence of new skin breakdown  Description: 1.  Monitor for areas of redness and/or skin breakdown  2.  Assess vascular access sites hourly  3.  Every 4-6 hours minimum:  Change oxygen saturation probe site  4.  Every 4-6 hours:  If on nasal continuous positive airway pressure, respiratory therapy assess nares and determine need for appliance change or resting period.  Outcome: Progressing     Problem: Safety - Adult  Goal: Free from fall injury  Outcome: Progressing

## 2024-10-09 LAB
ANION GAP SERPL CALCULATED.3IONS-SCNC: 11 MMOL/L (ref 9–16)
BASOPHILS # BLD: 0.06 K/UL (ref 0–0.2)
BASOPHILS NFR BLD: 1 % (ref 0–2)
BUN SERPL-MCNC: 21 MG/DL (ref 8–23)
CALCIUM SERPL-MCNC: 8.9 MG/DL (ref 8.6–10.4)
CHLORIDE SERPL-SCNC: 100 MMOL/L (ref 98–107)
CO2 SERPL-SCNC: 27 MMOL/L (ref 20–31)
CREAT SERPL-MCNC: 0.9 MG/DL (ref 0.5–0.9)
EOSINOPHIL # BLD: 0.4 K/UL (ref 0–0.44)
EOSINOPHILS RELATIVE PERCENT: 4 % (ref 1–4)
ERYTHROCYTE [DISTWIDTH] IN BLOOD BY AUTOMATED COUNT: 15.4 % (ref 11.8–14.4)
GFR, ESTIMATED: 73 ML/MIN/1.73M2
GLUCOSE BLD-MCNC: 202 MG/DL (ref 65–105)
GLUCOSE BLD-MCNC: 236 MG/DL (ref 65–105)
GLUCOSE BLD-MCNC: 276 MG/DL (ref 65–105)
GLUCOSE BLD-MCNC: 288 MG/DL (ref 65–105)
GLUCOSE SERPL-MCNC: 223 MG/DL (ref 74–99)
HCT VFR BLD AUTO: 39.4 % (ref 36.3–47.1)
HGB BLD-MCNC: 11.8 G/DL (ref 11.9–15.1)
IMM GRANULOCYTES # BLD AUTO: 0.08 K/UL (ref 0–0.3)
IMM GRANULOCYTES NFR BLD: 1 %
LYMPHOCYTES NFR BLD: 2.45 K/UL (ref 1.1–3.7)
LYMPHOCYTES RELATIVE PERCENT: 21 % (ref 24–43)
MCH RBC QN AUTO: 28.2 PG (ref 25.2–33.5)
MCHC RBC AUTO-ENTMCNC: 29.9 G/DL (ref 28.4–34.8)
MCV RBC AUTO: 94.3 FL (ref 82.6–102.9)
MONOCYTES NFR BLD: 0.96 K/UL (ref 0.1–1.2)
MONOCYTES NFR BLD: 8 % (ref 3–12)
NEUTROPHILS NFR BLD: 65 % (ref 36–65)
NEUTS SEG NFR BLD: 7.61 K/UL (ref 1.5–8.1)
NRBC BLD-RTO: 0 PER 100 WBC
PLATELET # BLD AUTO: 285 K/UL (ref 138–453)
PMV BLD AUTO: 10.7 FL (ref 8.1–13.5)
POTASSIUM SERPL-SCNC: 4.1 MMOL/L (ref 3.7–5.3)
RBC # BLD AUTO: 4.18 M/UL (ref 3.95–5.11)
RBC # BLD: ABNORMAL 10*6/UL
SODIUM SERPL-SCNC: 138 MMOL/L (ref 136–145)
WBC OTHER # BLD: 11.6 K/UL (ref 3.5–11.3)

## 2024-10-09 PROCEDURE — 2500000003 HC RX 250 WO HCPCS: Performed by: NURSE PRACTITIONER

## 2024-10-09 PROCEDURE — 80048 BASIC METABOLIC PNL TOTAL CA: CPT

## 2024-10-09 PROCEDURE — 1200000000 HC SEMI PRIVATE

## 2024-10-09 PROCEDURE — 6360000002 HC RX W HCPCS: Performed by: INTERNAL MEDICINE

## 2024-10-09 PROCEDURE — 99232 SBSQ HOSP IP/OBS MODERATE 35: CPT | Performed by: INTERNAL MEDICINE

## 2024-10-09 PROCEDURE — 94761 N-INVAS EAR/PLS OXIMETRY MLT: CPT

## 2024-10-09 PROCEDURE — 6370000000 HC RX 637 (ALT 250 FOR IP): Performed by: STUDENT IN AN ORGANIZED HEALTH CARE EDUCATION/TRAINING PROGRAM

## 2024-10-09 PROCEDURE — 85025 COMPLETE CBC W/AUTO DIFF WBC: CPT

## 2024-10-09 PROCEDURE — 6370000000 HC RX 637 (ALT 250 FOR IP): Performed by: INTERNAL MEDICINE

## 2024-10-09 PROCEDURE — 2580000003 HC RX 258: Performed by: STUDENT IN AN ORGANIZED HEALTH CARE EDUCATION/TRAINING PROGRAM

## 2024-10-09 PROCEDURE — 2700000000 HC OXYGEN THERAPY PER DAY

## 2024-10-09 PROCEDURE — 82947 ASSAY GLUCOSE BLOOD QUANT: CPT

## 2024-10-09 PROCEDURE — 6370000000 HC RX 637 (ALT 250 FOR IP)

## 2024-10-09 PROCEDURE — 36415 COLL VENOUS BLD VENIPUNCTURE: CPT

## 2024-10-09 RX ORDER — LIDOCAINE 4 G/G
1 PATCH TOPICAL DAILY
Qty: 7 EACH | Refills: 1 | Status: SHIPPED | OUTPATIENT
Start: 2024-10-10

## 2024-10-09 RX ORDER — DIPHENHYDRAMINE HCL 25 MG
25 TABLET ORAL EVERY 6 HOURS PRN
Status: DISCONTINUED | OUTPATIENT
Start: 2024-10-09 | End: 2024-10-10 | Stop reason: HOSPADM

## 2024-10-09 RX ORDER — INSULIN GLARGINE 100 [IU]/ML
30 INJECTION, SOLUTION SUBCUTANEOUS 2 TIMES DAILY
Status: DISCONTINUED | OUTPATIENT
Start: 2024-10-09 | End: 2024-10-10

## 2024-10-09 RX ORDER — OXYCODONE HYDROCHLORIDE 5 MG/1
5 TABLET ORAL EVERY 6 HOURS PRN
Qty: 12 TABLET | Refills: 0 | Status: SHIPPED | OUTPATIENT
Start: 2024-10-09 | End: 2024-10-14

## 2024-10-09 RX ADMIN — EMPAGLIFLOZIN 10 MG: 10 TABLET, FILM COATED ORAL at 08:09

## 2024-10-09 RX ADMIN — ENOXAPARIN SODIUM 30 MG: 100 INJECTION SUBCUTANEOUS at 20:54

## 2024-10-09 RX ADMIN — OXYCODONE 5 MG: 5 TABLET ORAL at 16:15

## 2024-10-09 RX ADMIN — INSULIN LISPRO 8 UNITS: 100 INJECTION, SOLUTION INTRAVENOUS; SUBCUTANEOUS at 16:51

## 2024-10-09 RX ADMIN — INSULIN GLARGINE 30 UNITS: 100 INJECTION, SOLUTION SUBCUTANEOUS at 20:54

## 2024-10-09 RX ADMIN — ANTI-FUNGAL POWDER MICONAZOLE NITRATE TALC FREE: 1.42 POWDER TOPICAL at 23:08

## 2024-10-09 RX ADMIN — INSULIN LISPRO 4 UNITS: 100 INJECTION, SOLUTION INTRAVENOUS; SUBCUTANEOUS at 12:08

## 2024-10-09 RX ADMIN — SODIUM CHLORIDE, PRESERVATIVE FREE 10 ML: 5 INJECTION INTRAVENOUS at 20:55

## 2024-10-09 RX ADMIN — CETIRIZINE HYDROCHLORIDE 10 MG: 10 TABLET, FILM COATED ORAL at 08:09

## 2024-10-09 RX ADMIN — INSULIN GLARGINE 20 UNITS: 100 INJECTION, SOLUTION SUBCUTANEOUS at 08:10

## 2024-10-09 RX ADMIN — OXYCODONE 10 MG: 5 TABLET ORAL at 02:08

## 2024-10-09 RX ADMIN — OXYCODONE 10 MG: 5 TABLET ORAL at 20:10

## 2024-10-09 RX ADMIN — LIOTHYRONINE SODIUM 5 MCG: 5 TABLET ORAL at 08:10

## 2024-10-09 RX ADMIN — LEVOTHYROXINE SODIUM 112 MCG: 112 TABLET ORAL at 06:23

## 2024-10-09 RX ADMIN — PANTOPRAZOLE SODIUM 40 MG: 40 TABLET, DELAYED RELEASE ORAL at 06:23

## 2024-10-09 RX ADMIN — DIPHENHYDRAMINE HYDROCHLORIDE 25 MG: 25 TABLET ORAL at 23:20

## 2024-10-09 RX ADMIN — POLYETHYLENE GLYCOL 3350 17 G: 17 POWDER, FOR SOLUTION ORAL at 22:14

## 2024-10-09 RX ADMIN — OXYBUTYNIN CHLORIDE 15 MG: 5 TABLET, EXTENDED RELEASE ORAL at 08:09

## 2024-10-09 RX ADMIN — ENOXAPARIN SODIUM 30 MG: 100 INJECTION SUBCUTANEOUS at 08:10

## 2024-10-09 RX ADMIN — TIZANIDINE 4 MG: 4 TABLET ORAL at 08:09

## 2024-10-09 RX ADMIN — PRAMIPEXOLE DIHYDROCHLORIDE 0.5 MG: 0.25 TABLET ORAL at 20:54

## 2024-10-09 RX ADMIN — INSULIN LISPRO 4 UNITS: 100 INJECTION, SOLUTION INTRAVENOUS; SUBCUTANEOUS at 08:10

## 2024-10-09 RX ADMIN — OXYCODONE 10 MG: 5 TABLET ORAL at 06:23

## 2024-10-09 RX ADMIN — ALLOPURINOL 100 MG: 100 TABLET ORAL at 08:09

## 2024-10-09 RX ADMIN — BUMETANIDE 2 MG: 1 TABLET ORAL at 08:09

## 2024-10-09 RX ADMIN — LISINOPRIL 2.5 MG: 5 TABLET ORAL at 08:10

## 2024-10-09 RX ADMIN — DIPHENHYDRAMINE HYDROCHLORIDE 25 MG: 25 TABLET ORAL at 15:09

## 2024-10-09 RX ADMIN — SODIUM CHLORIDE, PRESERVATIVE FREE 10 ML: 5 INJECTION INTRAVENOUS at 08:11

## 2024-10-09 ASSESSMENT — ENCOUNTER SYMPTOMS
VOMITING: 0
COUGH: 0
ABDOMINAL PAIN: 0
SHORTNESS OF BREATH: 0
NAUSEA: 0

## 2024-10-09 ASSESSMENT — PAIN DESCRIPTION - LOCATION
LOCATION: LEG
LOCATION: KNEE
LOCATION: LEG
LOCATION: KNEE

## 2024-10-09 ASSESSMENT — PAIN DESCRIPTION - ORIENTATION
ORIENTATION: LEFT

## 2024-10-09 ASSESSMENT — PAIN SCALES - GENERAL
PAINLEVEL_OUTOF10: 8
PAINLEVEL_OUTOF10: 8
PAINLEVEL_OUTOF10: 4
PAINLEVEL_OUTOF10: 7
PAINLEVEL_OUTOF10: 7
PAINLEVEL_OUTOF10: 6
PAINLEVEL_OUTOF10: 7

## 2024-10-09 ASSESSMENT — PAIN DESCRIPTION - PAIN TYPE: TYPE: SURGICAL PAIN

## 2024-10-09 ASSESSMENT — PAIN DESCRIPTION - DESCRIPTORS
DESCRIPTORS: ACHING
DESCRIPTORS: ACHING
DESCRIPTORS: BURNING;SHARP

## 2024-10-09 NOTE — CARE COORDINATION
Case Management Assessment  Initial Evaluation    Date/Time of Evaluation: 10/9/2024 7:18 PM  Assessment Completed by: DENISE NGO RN    If patient is discharged prior to next notation, then this note serves as note for discharge by case management.    Patient Name: Marcio Veloz                   YOB: 1960  Diagnosis: Left knee pain [M25.562]  Postoperative surgical complication involving musculoskeletal system associated with musculoskeletal procedure, unspecified complication [M96.89]                   Date / Time: 10/6/2024  5:26 PM    Patient Admission Status: Inpatient   Readmission Risk (Low < 19, Mod (19-27), High > 27): Readmission Risk Score: 20    Current PCP: Calvin Titus APRN - CNP  PCP verified by CM? Yes    Chart Reviewed: Yes      History Provided by: Patient  Patient Orientation: Alert and Oriented    Patient Cognition: Alert    Hospitalization in the last 30 days (Readmission):  Yes    If yes, Readmission Assessment in  Navigator will be completed.    Advance Directives:      Code Status: Full Code   Patient's Primary Decision Maker is: Legal Next of Kin      Discharge Planning:    Patient lives with: Spouse/Significant Other Type of Home: Trailer/Mobile Home  Primary Care Giver: Family  Patient Support Systems include: Family Members   Current Financial resources: Medicaid  Current community resources: None  Current services prior to admission: Meals On Wheels, Oxygen Therapy, Durable Medical Equipment            Current DME: Shower Chair, Walker (scooter)            Type of Home Care services:  None    ADLS  Prior functional level: Assistance with the following:, Housework, Shopping  Current functional level: Assistance with the following:, Housework, Shopping, Mobility    PT AM-PAC: 9 /24  OT AM-PAC: 17 /24    Family can provide assistance at DC: Yes  Would you like Case Management to discuss the discharge plan with any other family members/significant others, and if so,

## 2024-10-09 NOTE — CARE COORDINATION
10/09/24 1928   Readmission Assessment   Number of Days since last admission? 8-30 days   Previous Disposition SNF   Who is being Interviewed Patient   What was the patient's/caregiver's perception as to why they think they needed to return back to the hospital?   (wound dehisence)   Did you visit your Primary Care Physician after you left the hospital, before you returned this time? No   Why weren't you able to visit your PCP? Did not have an appointment   Did you see a specialist, such as Cardiac, Pulmonary, Orthopedic Physician, etc. after you left the hospital? Yes   Who advised the patient to return to the hospital? Self-referral   Does the patient report anything that got in the way of taking their medications? No   In our efforts to provide the best possible care to you and others like you, can you think of anything that we could have done to help you after you left the hospital the first time, so that you might not have needed to return so soon?   (none)

## 2024-10-10 VITALS
OXYGEN SATURATION: 97 % | RESPIRATION RATE: 20 BRPM | WEIGHT: 275 LBS | HEIGHT: 61 IN | SYSTOLIC BLOOD PRESSURE: 166 MMHG | DIASTOLIC BLOOD PRESSURE: 91 MMHG | TEMPERATURE: 98 F | BODY MASS INDEX: 51.92 KG/M2 | HEART RATE: 84 BPM

## 2024-10-10 LAB
GLUCOSE BLD-MCNC: 223 MG/DL (ref 65–105)
GLUCOSE BLD-MCNC: 239 MG/DL (ref 65–105)
GLUCOSE BLD-MCNC: 279 MG/DL (ref 65–105)

## 2024-10-10 PROCEDURE — 99239 HOSP IP/OBS DSCHRG MGMT >30: CPT | Performed by: INTERNAL MEDICINE

## 2024-10-10 PROCEDURE — 82947 ASSAY GLUCOSE BLOOD QUANT: CPT

## 2024-10-10 PROCEDURE — 6370000000 HC RX 637 (ALT 250 FOR IP): Performed by: STUDENT IN AN ORGANIZED HEALTH CARE EDUCATION/TRAINING PROGRAM

## 2024-10-10 PROCEDURE — 2580000003 HC RX 258: Performed by: STUDENT IN AN ORGANIZED HEALTH CARE EDUCATION/TRAINING PROGRAM

## 2024-10-10 PROCEDURE — 6360000002 HC RX W HCPCS: Performed by: INTERNAL MEDICINE

## 2024-10-10 PROCEDURE — 6370000000 HC RX 637 (ALT 250 FOR IP)

## 2024-10-10 PROCEDURE — 6370000000 HC RX 637 (ALT 250 FOR IP): Performed by: INTERNAL MEDICINE

## 2024-10-10 RX ORDER — INSULIN GLARGINE 100 [IU]/ML
40 INJECTION, SOLUTION SUBCUTANEOUS 2 TIMES DAILY
Status: DISCONTINUED | OUTPATIENT
Start: 2024-10-10 | End: 2024-10-10 | Stop reason: HOSPADM

## 2024-10-10 RX ADMIN — CETIRIZINE HYDROCHLORIDE 10 MG: 10 TABLET, FILM COATED ORAL at 08:42

## 2024-10-10 RX ADMIN — ANTI-FUNGAL POWDER MICONAZOLE NITRATE TALC FREE: 1.42 POWDER TOPICAL at 08:44

## 2024-10-10 RX ADMIN — LIOTHYRONINE SODIUM 5 MCG: 5 TABLET ORAL at 08:45

## 2024-10-10 RX ADMIN — DIPHENHYDRAMINE HYDROCHLORIDE 25 MG: 25 TABLET ORAL at 05:11

## 2024-10-10 RX ADMIN — INSULIN LISPRO 4 UNITS: 100 INJECTION, SOLUTION INTRAVENOUS; SUBCUTANEOUS at 16:14

## 2024-10-10 RX ADMIN — BUMETANIDE 2 MG: 1 TABLET ORAL at 08:42

## 2024-10-10 RX ADMIN — LEVOTHYROXINE SODIUM 112 MCG: 112 TABLET ORAL at 06:49

## 2024-10-10 RX ADMIN — POTASSIUM CHLORIDE 10 MEQ: 1500 TABLET, EXTENDED RELEASE ORAL at 08:49

## 2024-10-10 RX ADMIN — INSULIN LISPRO 8 UNITS: 100 INJECTION, SOLUTION INTRAVENOUS; SUBCUTANEOUS at 12:29

## 2024-10-10 RX ADMIN — ALLOPURINOL 100 MG: 100 TABLET ORAL at 08:43

## 2024-10-10 RX ADMIN — EMPAGLIFLOZIN 10 MG: 10 TABLET, FILM COATED ORAL at 08:43

## 2024-10-10 RX ADMIN — OXYCODONE 10 MG: 5 TABLET ORAL at 01:46

## 2024-10-10 RX ADMIN — PANTOPRAZOLE SODIUM 40 MG: 40 TABLET, DELAYED RELEASE ORAL at 06:49

## 2024-10-10 RX ADMIN — OXYBUTYNIN CHLORIDE 15 MG: 5 TABLET, EXTENDED RELEASE ORAL at 08:43

## 2024-10-10 RX ADMIN — INSULIN GLARGINE 30 UNITS: 100 INJECTION, SOLUTION SUBCUTANEOUS at 08:42

## 2024-10-10 RX ADMIN — INSULIN LISPRO 4 UNITS: 100 INJECTION, SOLUTION INTRAVENOUS; SUBCUTANEOUS at 08:41

## 2024-10-10 RX ADMIN — LISINOPRIL 2.5 MG: 5 TABLET ORAL at 08:43

## 2024-10-10 RX ADMIN — ENOXAPARIN SODIUM 30 MG: 100 INJECTION SUBCUTANEOUS at 08:42

## 2024-10-10 RX ADMIN — SODIUM CHLORIDE, PRESERVATIVE FREE 10 ML: 5 INJECTION INTRAVENOUS at 08:43

## 2024-10-10 ASSESSMENT — PAIN SCALES - GENERAL
PAINLEVEL_OUTOF10: 5
PAINLEVEL_OUTOF10: 5
PAINLEVEL_OUTOF10: 7

## 2024-10-10 ASSESSMENT — PAIN DESCRIPTION - LOCATION
LOCATION: LEG
LOCATION: LEG

## 2024-10-10 ASSESSMENT — ENCOUNTER SYMPTOMS
COUGH: 0
VOMITING: 0
ABDOMINAL PAIN: 0
SHORTNESS OF BREATH: 0
NAUSEA: 0

## 2024-10-10 ASSESSMENT — PAIN DESCRIPTION - DESCRIPTORS: DESCRIPTORS: ACHING

## 2024-10-10 ASSESSMENT — PAIN DESCRIPTION - ORIENTATION
ORIENTATION: LEFT
ORIENTATION: LEFT
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
## 2024-10-11 NOTE — DISCHARGE SUMMARY
musculoskeletal procedure [M96.89] 10/07/2024    Surgical wound dehiscence [T81.31XA] 10/06/2024    Morbid obesity with BMI of 50.0-59.9, adult [E66.01, Z68.43] 10/17/2018    Type 2 diabetes mellitus with diabetic neuropathy, with long-term current use of insulin (HCC) [E11.40, Z79.4] 09/10/2018    RLS (restless legs syndrome) [G25.81] 10/09/2017    Gastroesophageal reflux disease without esophagitis [K21.9]     Primary hypertension [I10] 02/17/2016    Hypothyroidism [E03.9] 02/17/2016    DESTINY (obstructive sleep apnea) [G47.33] 08/06/2012    Hypercholesterolemia [E78.00]          Hospital Course:     Brief History  As documented in the medical record:  \" 63 y.o. Non- / non  female who presents with Knee Injury (left) and is admitted to the hospital for the management of Surgical wound dehiscence. Patient has a history significant for hypothyroidism, hypertension, GERD, RLS, chronic diastolic HF, hyperlipidemia. \"     The intitial assessment and plan included:  \"    * (Principal) Surgical wound dehiscence 10/6/2024 Yes     Hypercholesterolemia 10/6/2024 Yes     Primary hypertension 10/6/2024 Yes     Hypothyroidism 10/6/2024 Yes     Gastroesophageal reflux disease without esophagitis 10/6/2024 Yes     RLS (restless legs syndrome) (Chronic) 10/6/2024 Yes     Type 2 diabetes mellitus with diabetic neuropathy, with long-term current use of insulin (HCC) 10/6/2024 Yes      Plan:   Status post left knee irrigation and sharp excisional debridement of the wound, orthopedic team is following appreciate input, continue with pain management, fall precaution  Leukocytosis likely reactive due to above continue to monitor continue with IV antibiotic  Diabetes continue with insulin sliding scale diabetic diet and glucose check  Hypertension continue with lisinopril  Hypothyroidism continue with levothyroxine  GERD continue PPI\"      On 10/7 the patient underwent:  Procedure(s):  Left knee irrigation and excisional

## 2024-10-14 DIAGNOSIS — M17.0 PRIMARY OSTEOARTHRITIS OF BOTH KNEES: Primary | ICD-10-CM

## 2024-10-14 NOTE — TELEPHONE ENCOUNTER
Tizanidine and Oxycodone Pended.  Patient called and requested a refill on Tizanidine and Oxycodone. Patient fell 10/6/24 on left knee, KNEE I&D POLY EXCHANGE procedure on 10/7/24.  F/U appt on 10/18/24.

## 2024-10-15 RX ORDER — OXYCODONE HYDROCHLORIDE 5 MG/1
5 TABLET ORAL EVERY 6 HOURS PRN
Qty: 12 TABLET | Refills: 0 | Status: SHIPPED | OUTPATIENT
Start: 2024-10-15 | End: 2024-10-16 | Stop reason: SDUPTHER

## 2024-10-16 ENCOUNTER — HOSPITAL ENCOUNTER (OUTPATIENT)
Age: 64
Setting detail: SPECIMEN
Discharge: HOME | End: 2024-10-16

## 2024-10-16 ENCOUNTER — OFFICE VISIT (OUTPATIENT)
Dept: FAMILY MEDICINE CLINIC | Age: 64
End: 2024-10-16
Payer: COMMERCIAL

## 2024-10-16 VITALS
HEART RATE: 80 BPM | DIASTOLIC BLOOD PRESSURE: 62 MMHG | HEIGHT: 60 IN | SYSTOLIC BLOOD PRESSURE: 114 MMHG | WEIGHT: 275 LBS | BODY MASS INDEX: 53.99 KG/M2 | OXYGEN SATURATION: 95 %

## 2024-10-16 DIAGNOSIS — Z79.4 TYPE 2 DIABETES MELLITUS WITH DIABETIC NEUROPATHY, WITH LONG-TERM CURRENT USE OF INSULIN (HCC): ICD-10-CM

## 2024-10-16 DIAGNOSIS — Z12.4 PAP SMEAR FOR CERVICAL CANCER SCREENING: ICD-10-CM

## 2024-10-16 DIAGNOSIS — E11.40 TYPE 2 DIABETES MELLITUS WITH DIABETIC NEUROPATHY, WITH LONG-TERM CURRENT USE OF INSULIN (HCC): ICD-10-CM

## 2024-10-16 DIAGNOSIS — N89.8 VAGINAL ITCHING: ICD-10-CM

## 2024-10-16 DIAGNOSIS — I10 PRIMARY HYPERTENSION: ICD-10-CM

## 2024-10-16 DIAGNOSIS — E03.9 HYPOTHYROIDISM, UNSPECIFIED TYPE: ICD-10-CM

## 2024-10-16 DIAGNOSIS — R30.0 DYSURIA: Primary | ICD-10-CM

## 2024-10-16 DIAGNOSIS — M17.0 PRIMARY OSTEOARTHRITIS OF BOTH KNEES: Primary | ICD-10-CM

## 2024-10-16 LAB
BILIRUBIN, POC: NEGATIVE
BLOOD URINE, POC: NEGATIVE
CLARITY, POC: NORMAL
COLOR, POC: NORMAL
GLUCOSE URINE, POC: 500 MG/DL
KETONES, POC: NEGATIVE MG/DL
LEUKOCYTE EST, POC: NORMAL
NITRITE, POC: NEGATIVE
PH, POC: 7
PROTEIN, POC: NEGATIVE MG/DL
SPECIFIC GRAVITY, POC: 1.01
UROBILINOGEN, POC: 1 MG/DL

## 2024-10-16 PROCEDURE — G8417 CALC BMI ABV UP PARAM F/U: HCPCS | Performed by: NURSE PRACTITIONER

## 2024-10-16 PROCEDURE — 3078F DIAST BP <80 MM HG: CPT | Performed by: NURSE PRACTITIONER

## 2024-10-16 PROCEDURE — 81003 URINALYSIS AUTO W/O SCOPE: CPT | Performed by: NURSE PRACTITIONER

## 2024-10-16 PROCEDURE — 2022F DILAT RTA XM EVC RTNOPTHY: CPT | Performed by: NURSE PRACTITIONER

## 2024-10-16 PROCEDURE — G8427 DOCREV CUR MEDS BY ELIG CLIN: HCPCS | Performed by: NURSE PRACTITIONER

## 2024-10-16 PROCEDURE — 99213 OFFICE O/P EST LOW 20 MIN: CPT | Performed by: NURSE PRACTITIONER

## 2024-10-16 PROCEDURE — G8484 FLU IMMUNIZE NO ADMIN: HCPCS | Performed by: NURSE PRACTITIONER

## 2024-10-16 PROCEDURE — 3051F HG A1C>EQUAL 7.0%<8.0%: CPT | Performed by: NURSE PRACTITIONER

## 2024-10-16 PROCEDURE — 1036F TOBACCO NON-USER: CPT | Performed by: NURSE PRACTITIONER

## 2024-10-16 PROCEDURE — 3017F COLORECTAL CA SCREEN DOC REV: CPT | Performed by: NURSE PRACTITIONER

## 2024-10-16 PROCEDURE — 3074F SYST BP LT 130 MM HG: CPT | Performed by: NURSE PRACTITIONER

## 2024-10-16 PROCEDURE — 1111F DSCHRG MED/CURRENT MED MERGE: CPT | Performed by: NURSE PRACTITIONER

## 2024-10-16 RX ORDER — OXYCODONE HYDROCHLORIDE 5 MG/1
5 TABLET ORAL EVERY 6 HOURS PRN
Qty: 12 TABLET | Refills: 0 | Status: SHIPPED | OUTPATIENT
Start: 2024-10-16 | End: 2024-10-21

## 2024-10-16 RX ORDER — OXYCODONE HYDROCHLORIDE 5 MG/1
5 TABLET ORAL EVERY 6 HOURS PRN
Qty: 12 TABLET | Refills: 0 | Status: CANCELLED | OUTPATIENT
Start: 2024-10-16 | End: 2024-10-21

## 2024-10-16 RX ORDER — POTASSIUM CHLORIDE 750 MG/1
10 CAPSULE, EXTENDED RELEASE ORAL DAILY
COMMUNITY
Start: 2024-09-22

## 2024-10-16 ASSESSMENT — ENCOUNTER SYMPTOMS
WHEEZING: 0
ABDOMINAL DISTENTION: 0
DIARRHEA: 0
NAUSEA: 0
BACK PAIN: 0
SHORTNESS OF BREATH: 0
BLOOD IN STOOL: 0
CONSTIPATION: 0
VOMITING: 0
COUGH: 0
ABDOMINAL PAIN: 0
CHEST TIGHTNESS: 0

## 2024-10-16 NOTE — TELEPHONE ENCOUNTER
Muscle relaxer that you prescribed on 10/15/24 was also sent to incorrect pharmacy. Previous script discontinued and new script pended with [facility] pharmacy [place].

## 2024-10-16 NOTE — PROGRESS NOTES
Marcio Veloz (:  1960) is a 63 y.o. female,Established patient, here for evaluation of the following chief complaint(s): Frequent/Recurrent UTI (Ongoing for a few months ), Discuss Medications, and Immunizations (declines)      ASSESSMENT/PLAN:    Marcio received counseling on the following healthy behaviors: nutrition, exercise, and medication adherence  Reviewed prior labs and health maintenance  Discussed use, benefit, and side effects of prescribed medications.   Barriers to medication compliance addressed.   Patient given educational materials - see patient instructions  All patient questions answered.  Patient voiced understanding.  The patient's past medical,surgical, social, and family history as well as her current medications and allergies were reviewed as documented in today's encounter.    Medications, labs, diagnostic studies, consultations and follow-up as documented in this encounter.    Marcio was seen today for frequent/recurrent uti, discuss medications and immunizations.    Diagnoses and all orders for this visit:    Dysuria  -Started few days ago  -History of recurrent urinary tract infections and yeast infections  -Currently on Jardiance, going to discuss further with endocrinology  -Additional testing ordered  -     POCT Urinalysis No Micro (Auto)  -     Culture, Urine; Future  -     Vaginitis DNA Probe; Future    Vaginal itching  -Started few days ago  -History of recurrent urinary tract infections and yeast infections  -Currently on Jardiance, going to discuss further with endocrinology  -Additional testing ordered  -     Vaginitis DNA Probe; Future    Pap smear for cervical cancer screening  -     Karla Guerin, BEATRIZ, Gynecology, [place]    Primary hypertension  -Well-controlled  -No change in plan of care    Hypothyroidism, unspecified type  -According to recent A1c, relatively controlled  -Needs to continue closely following up with endocrinology    Type 2 diabetes mellitus 
08/09/2025    A1C test (Diabetic or Prediabetic)  09/04/2025    GFR test (Diabetes, CKD 3-4, OR last GFR 15-59)  10/09/2025    Hepatitis C screen  Addressed    HIV screen  Addressed    Hepatitis A vaccine  Aged Out    Hepatitis B vaccine  Aged Out    Hib vaccine  Aged Out    Polio vaccine  Aged Out    Meningococcal (ACWY) vaccine  Aged Out    Depression Screen  Discontinued

## 2024-10-17 DIAGNOSIS — N89.8 VAGINAL ITCHING: ICD-10-CM

## 2024-10-17 DIAGNOSIS — B37.31 VAGINAL YEAST INFECTION: Primary | ICD-10-CM

## 2024-10-17 DIAGNOSIS — R30.0 DYSURIA: ICD-10-CM

## 2024-10-17 LAB
CANDIDA SPECIES: POSITIVE
GARDNERELLA VAGINALIS: NEGATIVE
SOURCE: ABNORMAL
TRICHOMONAS: NEGATIVE

## 2024-10-17 RX ORDER — FLUCONAZOLE 150 MG/1
150 TABLET ORAL
Qty: 3 TABLET | Refills: 0 | Status: SHIPPED | OUTPATIENT
Start: 2024-10-17

## 2024-10-18 ENCOUNTER — OFFICE VISIT (OUTPATIENT)
Dept: ORTHOPEDIC SURGERY | Age: 64
End: 2024-10-18

## 2024-10-18 DIAGNOSIS — Z96.652 STATUS POST TOTAL LEFT KNEE REPLACEMENT: Primary | ICD-10-CM

## 2024-10-18 PROCEDURE — 99024 POSTOP FOLLOW-UP VISIT: CPT | Performed by: ORTHOPAEDIC SURGERY

## 2024-10-18 NOTE — PROGRESS NOTES
Marcio returns today.  She is status post left total knee on 9/11/2024 was doing well unfortunate she had a fall and subsequently had a traumatic wound dehiscence.  She was seen by Dr. Miramontes and had a arthrotomy I&D and poly exchange and wound closure on 10 7.  She is here for first follow-up she denies any fever chills says the knee overall feels good    Examination notes that her wound is great is no redness whatsoever no drainage whatsoever I can motion her knee 0 to 110 degrees without difficulty she can actively extend against resistance.  There is no calf tenderness negative Homans    No new x-rays taken today    Impression  Status post left total knee subsequent wound dehiscence with wound closure and I&D antibiotics changed on 10/7/2024 overall looking well    Will see the patient back here in 6 weeks after physical therapy call any problems prior to that time

## 2024-10-19 LAB
MICROORGANISM SPEC CULT: ABNORMAL
MICROORGANISM SPEC CULT: ABNORMAL
SERVICE CMNT-IMP: ABNORMAL
SPECIMEN DESCRIPTION: ABNORMAL

## 2024-10-20 DIAGNOSIS — N39.0 URINARY TRACT INFECTION WITHOUT HEMATURIA, SITE UNSPECIFIED: Primary | ICD-10-CM

## 2024-10-20 RX ORDER — NITROFURANTOIN 25; 75 MG/1; MG/1
100 CAPSULE ORAL 2 TIMES DAILY
Qty: 20 CAPSULE | Refills: 0 | Status: SHIPPED | OUTPATIENT
Start: 2024-10-20 | End: 2024-10-30

## 2024-10-23 RX ORDER — LORATADINE 10 MG/1
TABLET ORAL
Qty: 30 TABLET | Refills: 10 | Status: SHIPPED | OUTPATIENT
Start: 2024-10-23

## 2024-10-24 RX ORDER — PRAMIPEXOLE DIHYDROCHLORIDE 0.5 MG/1
0.5 TABLET ORAL NIGHTLY
Qty: 30 TABLET | Refills: 10 | OUTPATIENT
Start: 2024-10-24

## 2024-10-28 DIAGNOSIS — M17.0 PRIMARY OSTEOARTHRITIS OF BOTH KNEES: ICD-10-CM

## 2024-10-28 RX ORDER — OXYCODONE HYDROCHLORIDE 5 MG/1
5 TABLET ORAL EVERY 6 HOURS PRN
Qty: 12 TABLET | Refills: 0 | Status: SHIPPED | OUTPATIENT
Start: 2024-10-28 | End: 2024-11-02

## 2024-10-28 RX ORDER — BUMETANIDE 2 MG/1
2 TABLET ORAL DAILY
Qty: 90 TABLET | Refills: 3 | Status: SHIPPED | OUTPATIENT
Start: 2024-10-28

## 2024-10-28 NOTE — TELEPHONE ENCOUNTER
Patient requesting medication refill. She is status post left total knee on 9/11/2024     Requesting refill of  oxyCodone 5MG  and tiZANidine 4MG.  Both meds are pended for approval.

## 2024-10-28 NOTE — TELEPHONE ENCOUNTER
Please Approve or Refuse.  Send to Pharmacy per Pt's Request:      Next Visit Date:  1/17/2025   Last Visit Date: 10/16/2024    Hemoglobin A1C (%)   Date Value   09/04/2024 7.3 (H)   01/30/2024 7.9 (H)   09/18/2023 7.5 (H)             ( goal A1C is < 7)   BP Readings from Last 3 Encounters:   10/16/24 114/62   10/10/24 (!) 166/91   10/06/24 (!) 130/56          (goal 120/80)  BUN   Date Value Ref Range Status   10/09/2024 21 8 - 23 mg/dL Final     Creatinine   Date Value Ref Range Status   10/09/2024 0.9 0.50 - 0.90 mg/dL Final     Potassium   Date Value Ref Range Status   10/09/2024 4.1 3.7 - 5.3 mmol/L Final

## 2024-10-31 ENCOUNTER — TELEPHONE (OUTPATIENT)
Dept: FAMILY MEDICINE CLINIC | Age: 64
End: 2024-10-31

## 2024-10-31 DIAGNOSIS — Z87.440 RECENT URINARY TRACT INFECTION: Primary | ICD-10-CM

## 2024-10-31 NOTE — TELEPHONE ENCOUNTER
Patient called in and said she is on her last antibiotic pill but she is still having UTI symptoms and hurting. She wanted to know what else she could do. Thanks!

## 2024-10-31 NOTE — TELEPHONE ENCOUNTER
I called patient and she said she did take the Diflucan and was ok with doing another urine analysis.

## 2024-10-31 NOTE — TELEPHONE ENCOUNTER
Can we find out if she took the Diflucan for the yeast infection?  We can always repeat another urine analysis with culture to see if there is a different bacteria growing.

## 2024-11-02 ENCOUNTER — HOSPITAL ENCOUNTER (OUTPATIENT)
Age: 64
Discharge: HOME | End: 2024-11-02
Payer: COMMERCIAL

## 2024-11-02 DIAGNOSIS — Z87.440 RECENT URINARY TRACT INFECTION: ICD-10-CM

## 2024-11-02 LAB
BACTERIA URNS QL MICRO: ABNORMAL
BILIRUB UR QL STRIP: ABNORMAL
CLARITY UR: ABNORMAL
COLOR UR: ABNORMAL
EPI CELLS #/AREA URNS HPF: ABNORMAL /HPF
GLUCOSE UR STRIP-MCNC: NEGATIVE MG/DL
HGB UR QL STRIP.AUTO: NEGATIVE
KETONES UR STRIP-MCNC: ABNORMAL MG/DL
LEUKOCYTE ESTERASE UR QL STRIP: ABNORMAL
NITRITE UR QL STRIP: NEGATIVE
PH UR STRIP: 5 [PH] (ref 5–8)
PROT UR STRIP-MCNC: ABNORMAL MG/DL
RBC #/AREA URNS HPF: ABNORMAL /HPF
SP GR UR STRIP: 1.03 (ref 1–1.03)
UROBILINOGEN UR STRIP-ACNC: NORMAL EU/DL (ref 0–1)
WBC #/AREA URNS HPF: ABNORMAL /HPF

## 2024-11-02 PROCEDURE — 87086 URINE CULTURE/COLONY COUNT: CPT

## 2024-11-02 PROCEDURE — 81001 URINALYSIS AUTO W/SCOPE: CPT

## 2024-11-03 LAB
MICROORGANISM SPEC CULT: NORMAL
SPECIMEN DESCRIPTION: NORMAL

## 2024-11-06 ENCOUNTER — HOSPITAL ENCOUNTER (OUTPATIENT)
Age: 64
Setting detail: SPECIMEN
Discharge: HOME | End: 2024-11-06

## 2024-11-06 DIAGNOSIS — N89.8 VAGINAL ITCHING: ICD-10-CM

## 2024-11-06 DIAGNOSIS — Z87.440 RECENT URINARY TRACT INFECTION: ICD-10-CM

## 2024-11-06 DIAGNOSIS — R30.0 DYSURIA: ICD-10-CM

## 2024-11-07 LAB
CANDIDA SPECIES: NEGATIVE
GARDNERELLA VAGINALIS: NEGATIVE
SOURCE: NORMAL
TRICHOMONAS: NEGATIVE

## 2024-11-07 NOTE — RESULT ENCOUNTER NOTE
Please notify patient that vaginitis swab was negative, finish up the antibiotic.  But does not look like there is any significant infection at this time.

## 2024-11-08 ENCOUNTER — TELEPHONE (OUTPATIENT)
Dept: FAMILY MEDICINE CLINIC | Age: 64
End: 2024-11-08

## 2024-11-08 DIAGNOSIS — G25.81 RLS (RESTLESS LEGS SYNDROME): ICD-10-CM

## 2024-11-08 DIAGNOSIS — N39.0 RECURRENT UTI: Primary | ICD-10-CM

## 2024-11-08 NOTE — TELEPHONE ENCOUNTER
Patient called in and said she's on her last pill for her yeast infection but she is still hurting so wanted to know if you could call in more? Also was requesting something for acid reflex.

## 2024-11-09 NOTE — TELEPHONE ENCOUNTER
She already takes Protonix for acid reflux. There is no UTI or yeast infection according to testing. Any STD concerns?

## 2024-11-11 DIAGNOSIS — M17.0 PRIMARY OSTEOARTHRITIS OF BOTH KNEES: ICD-10-CM

## 2024-11-11 NOTE — TELEPHONE ENCOUNTER
No STD concerns but she she said when she relaxes the muscles to urinate its very painful. She states she feels like she has to go but when she does its very little.     Would also like a referral for RLS.     RX: [facility] in [place].

## 2024-11-11 NOTE — TELEPHONE ENCOUNTER
Patient is requesting another refill of Oxycodone and Tizanidine    Patient's surgery was 9/9/24 Knee Total Arthroplasty Left    Patient has received 7 refills on the Oxycodone would you still like to refill?       Pended Tizanidine   [facility] [place]

## 2024-11-11 NOTE — TELEPHONE ENCOUNTER
Please have her follow-up with urology.  I am unsure what is causing the symptoms.  There is no active infection at this time.  In regards to the restless leg syndrome, it looks like there was a referral to physical therapy, has patient started this yet?  Would she like to see neurology?

## 2024-11-12 RX ORDER — OXYCODONE HYDROCHLORIDE 5 MG/1
5 TABLET ORAL EVERY 6 HOURS PRN
Qty: 12 TABLET | Refills: 0 | Status: SHIPPED | OUTPATIENT
Start: 2024-11-12 | End: 2024-11-17

## 2024-11-12 NOTE — TELEPHONE ENCOUNTER
Patient was also requesting pain medication refill. Patient requesting oxycodone 5mg.    Last script from 10/28/24 pended. 12 tabs.    Also the zanaflex that you just prescribed was sent to incorrect pharmacy. That script was discontinued and a new script pended for your approval.

## 2024-11-12 NOTE — TELEPHONE ENCOUNTER
Marcio called after missing a phone call today.     Yes, she would like a referral to Urology and Neurology. She stated she does not have a Urologist.     She was contacted to schedule PT but did not at this time due to everything going on.     Please let her know when the referral have been written    Thank you.

## 2024-11-26 DIAGNOSIS — M17.0 PRIMARY OSTEOARTHRITIS OF BOTH KNEES: ICD-10-CM

## 2024-11-26 NOTE — TELEPHONE ENCOUNTER
Patient requesting a refill for zanaflex and oxycodone.    30 tablets of zanaflex pended and 12 tablets of oxycodone pended.     Both scripts last refilled on 11/12/24    Status post total knee 9/11/24 and knee I&D with Dr. Miramontes on 10/7/24    [facility] in [place] confirmed as pharmacy of choice.

## 2024-11-27 RX ORDER — OXYCODONE HYDROCHLORIDE 5 MG/1
5 TABLET ORAL EVERY 6 HOURS PRN
Qty: 12 TABLET | Refills: 0 | Status: SHIPPED | OUTPATIENT
Start: 2024-11-27 | End: 2024-12-02

## 2024-12-06 DIAGNOSIS — Z96.652 STATUS POST TOTAL LEFT KNEE REPLACEMENT: Primary | ICD-10-CM

## 2024-12-06 RX ORDER — OXYCODONE HYDROCHLORIDE 5 MG/1
5 TABLET ORAL EVERY 6 HOURS PRN
Qty: 12 TABLET | Refills: 0 | Status: SHIPPED | OUTPATIENT
Start: 2024-12-06 | End: 2024-12-09

## 2024-12-06 NOTE — TELEPHONE ENCOUNTER
Patient called and is requesting a refill on her oxycodone and tizanidine.  Patient stated she is in a lot of pain.  Patient was told a message would be sent to the provider.

## 2024-12-10 DIAGNOSIS — I10 ESSENTIAL HYPERTENSION: Chronic | ICD-10-CM

## 2024-12-10 DIAGNOSIS — B35.4 TINEA CORPORIS: ICD-10-CM

## 2024-12-10 RX ORDER — LISINOPRIL 2.5 MG/1
2.5 TABLET ORAL DAILY
Qty: 30 TABLET | Refills: 2 | Status: SHIPPED | OUTPATIENT
Start: 2024-12-10 | End: 2025-03-10

## 2024-12-11 RX ORDER — NYSTATIN 100000 [USP'U]/G
POWDER TOPICAL
Qty: 60 G | Refills: 10 | Status: SHIPPED | OUTPATIENT
Start: 2024-12-11

## 2024-12-20 DIAGNOSIS — Z96.652 STATUS POST TOTAL LEFT KNEE REPLACEMENT: ICD-10-CM

## 2024-12-20 RX ORDER — OXYCODONE HYDROCHLORIDE 5 MG/1
5 TABLET ORAL EVERY 6 HOURS PRN
Qty: 12 TABLET | Refills: 0 | OUTPATIENT
Start: 2024-12-20 | End: 2024-12-23

## 2024-12-20 NOTE — TELEPHONE ENCOUNTER
Patient of Dr. Zuniga requesting medication refill.      Sees Dr. Zuniga for Left TKA, DOS on 9/9/24   Knee I&D POLY EXCHANGE (Left) done by Dr. Miramontes on 10/7/24.     Evaluated by Dr. Zuniga on 10/18/24.    Patient requesting refill of Oxycodone and Tizanidine     Patient requests medications be sent to [facility] in [place].       Both Medications pended if approved.

## 2024-12-31 ENCOUNTER — TELEPHONE (OUTPATIENT)
Dept: ORTHOPEDIC SURGERY | Age: 64
End: 2024-12-31

## 2024-12-31 NOTE — TELEPHONE ENCOUNTER
Pt called in requesting refills on the 2 medications    tiZANidine (ZANAFLEX) 4 MG tablet  oxyCODONE (ROXICODONE) 5 MG immediate release tablet      Please send to     [place] [address] - P [phone] - F [phone]       Please call pt with any questions @ [phone]

## 2025-01-03 NOTE — TELEPHONE ENCOUNTER
Patient requesting refills. Informed patient that Dr. Zuniga is out on leave and all requests are being handled by his PA Soniya Martinez.     Patient requesting a message be sent to Soniya again to see if she would be willing to prescribe anything to help with her pain.       Patient also requested to be evaluated for her pain- appointment made for Marcus next available in [place].

## 2025-01-09 ENCOUNTER — OFFICE VISIT (OUTPATIENT)
Dept: ORTHOPEDIC SURGERY | Age: 65
End: 2025-01-09
Payer: COMMERCIAL

## 2025-01-09 VITALS — HEIGHT: 60 IN | RESPIRATION RATE: 14 BRPM | BODY MASS INDEX: 53.71 KG/M2 | WEIGHT: 273.6 LBS

## 2025-01-09 DIAGNOSIS — Z96.652 STATUS POST TOTAL LEFT KNEE REPLACEMENT: Primary | ICD-10-CM

## 2025-01-09 DIAGNOSIS — M62.838 NIGHT MUSCLE SPASMS: ICD-10-CM

## 2025-01-09 PROCEDURE — 99214 OFFICE O/P EST MOD 30 MIN: CPT | Performed by: PHYSICIAN ASSISTANT

## 2025-01-09 PROCEDURE — 3017F COLORECTAL CA SCREEN DOC REV: CPT | Performed by: PHYSICIAN ASSISTANT

## 2025-01-09 PROCEDURE — G8428 CUR MEDS NOT DOCUMENT: HCPCS | Performed by: PHYSICIAN ASSISTANT

## 2025-01-09 PROCEDURE — 1036F TOBACCO NON-USER: CPT | Performed by: PHYSICIAN ASSISTANT

## 2025-01-09 PROCEDURE — G8417 CALC BMI ABV UP PARAM F/U: HCPCS | Performed by: PHYSICIAN ASSISTANT

## 2025-01-09 ASSESSMENT — ENCOUNTER SYMPTOMS
COUGH: 0
SHORTNESS OF BREATH: 0
COLOR CHANGE: 0
VOMITING: 0

## 2025-01-09 NOTE — PROGRESS NOTES
and drainage and poly exchange with wound closure on 10/7/2024 with Bossman Miramontes DO which she is now 3 months postoperative from.    Patient completed formal physical therapy and has excellent range of motion of the left knee.  She does have nighttime muscle cramping within bilateral lower extremities and symptoms of restless leg syndrome which she is following up with a neurology specialist on 1/21/2025.    At the end of the appointment the patient was also discussing some low back discomfort which she may benefit from referral to pain management or a spine specialist.    In regards to the nighttime muscle spasms the patient was given a refill of Zanaflex 4 mg to take twice daily as needed for muscle spasms x 7 days.  We discussed that this will be the last refill of this medication from myself.  We will not be refilling the Percocet today.  She may take over-the-counter analgesic medications that she can tolerate.    If the patient requires further pain medication or muscle relaxers I recommended that she reach out to her primary care physician and start a pain medication contract if deemed appropriate.    The patient is to follow-up 1 year postoperatively to have repeat x-rays of the left knee with Dr. Olivas.     We did discuss antibiotic prophylaxis prior to dental cleanings or procedures. The patient was instructed to call the office 5-7 days prior to their next dental cleaning/procedure for antibiotics. The patient noted her understanding.    The patient was instructed to call our office with any questions or concerns prior to the next appointment.  The patient noted her understanding.     Total time: 30 minutes       Follow up: Return for 1 year post op with dr olivas - sept 2025.    Orders Placed This Encounter   Medications    tiZANidine (ZANAFLEX) 4 MG tablet     Sig: Take 1 tablet by mouth 2 times daily as needed (muscle spasms)     Dispense:  14 tablet     Refill:  0       No orders of the defined types

## 2025-01-17 ENCOUNTER — OFFICE VISIT (OUTPATIENT)
Dept: FAMILY MEDICINE CLINIC | Age: 65
End: 2025-01-17
Payer: COMMERCIAL

## 2025-01-17 VITALS
TEMPERATURE: 98.9 F | BODY MASS INDEX: 53.4 KG/M2 | HEART RATE: 81 BPM | DIASTOLIC BLOOD PRESSURE: 69 MMHG | OXYGEN SATURATION: 96 % | SYSTOLIC BLOOD PRESSURE: 123 MMHG | HEIGHT: 60 IN | WEIGHT: 272 LBS

## 2025-01-17 DIAGNOSIS — K90.9 IRON MALABSORPTION: ICD-10-CM

## 2025-01-17 DIAGNOSIS — E55.9 VITAMIN D DEFICIENCY: ICD-10-CM

## 2025-01-17 DIAGNOSIS — R53.83 FATIGUE, UNSPECIFIED TYPE: ICD-10-CM

## 2025-01-17 DIAGNOSIS — E11.40 TYPE 2 DIABETES MELLITUS WITH DIABETIC NEUROPATHY, WITH LONG-TERM CURRENT USE OF INSULIN (HCC): Primary | ICD-10-CM

## 2025-01-17 DIAGNOSIS — Z79.4 TYPE 2 DIABETES MELLITUS WITH DIABETIC NEUROPATHY, WITH LONG-TERM CURRENT USE OF INSULIN (HCC): Primary | ICD-10-CM

## 2025-01-17 DIAGNOSIS — K21.9 GASTROESOPHAGEAL REFLUX DISEASE, UNSPECIFIED WHETHER ESOPHAGITIS PRESENT: ICD-10-CM

## 2025-01-17 DIAGNOSIS — I10 PRIMARY HYPERTENSION: ICD-10-CM

## 2025-01-17 DIAGNOSIS — J01.90 ACUTE SINUSITIS, RECURRENCE NOT SPECIFIED, UNSPECIFIED LOCATION: ICD-10-CM

## 2025-01-17 DIAGNOSIS — E03.9 HYPOTHYROIDISM, UNSPECIFIED TYPE: ICD-10-CM

## 2025-01-17 DIAGNOSIS — Z12.11 SCREENING FOR COLON CANCER: ICD-10-CM

## 2025-01-17 LAB — HBA1C MFR BLD: 7.3 %

## 2025-01-17 PROCEDURE — 3074F SYST BP LT 130 MM HG: CPT | Performed by: NURSE PRACTITIONER

## 2025-01-17 PROCEDURE — 99214 OFFICE O/P EST MOD 30 MIN: CPT | Performed by: NURSE PRACTITIONER

## 2025-01-17 PROCEDURE — G8417 CALC BMI ABV UP PARAM F/U: HCPCS | Performed by: NURSE PRACTITIONER

## 2025-01-17 PROCEDURE — 3051F HG A1C>EQUAL 7.0%<8.0%: CPT | Performed by: NURSE PRACTITIONER

## 2025-01-17 PROCEDURE — 3017F COLORECTAL CA SCREEN DOC REV: CPT | Performed by: NURSE PRACTITIONER

## 2025-01-17 PROCEDURE — 3078F DIAST BP <80 MM HG: CPT | Performed by: NURSE PRACTITIONER

## 2025-01-17 PROCEDURE — 2022F DILAT RTA XM EVC RTNOPTHY: CPT | Performed by: NURSE PRACTITIONER

## 2025-01-17 PROCEDURE — 1036F TOBACCO NON-USER: CPT | Performed by: NURSE PRACTITIONER

## 2025-01-17 PROCEDURE — 83036 HEMOGLOBIN GLYCOSYLATED A1C: CPT | Performed by: NURSE PRACTITIONER

## 2025-01-17 PROCEDURE — G8427 DOCREV CUR MEDS BY ELIG CLIN: HCPCS | Performed by: NURSE PRACTITIONER

## 2025-01-17 RX ORDER — FAMOTIDINE 20 MG/1
20 TABLET, FILM COATED ORAL 2 TIMES DAILY
Qty: 60 TABLET | Refills: 3 | Status: SHIPPED | OUTPATIENT
Start: 2025-01-17

## 2025-01-17 RX ORDER — DULAGLUTIDE 3 MG/.5ML
INJECTION, SOLUTION SUBCUTANEOUS
COMMUNITY
Start: 2025-01-10

## 2025-01-17 RX ORDER — INSULIN GLARGINE 100 [IU]/ML
INJECTION, SOLUTION SUBCUTANEOUS
COMMUNITY
Start: 2025-01-15

## 2025-01-17 RX ORDER — DOXYCYCLINE HYCLATE 100 MG
100 TABLET ORAL 2 TIMES DAILY
Qty: 14 TABLET | Refills: 0 | Status: SHIPPED | OUTPATIENT
Start: 2025-01-17 | End: 2025-01-24

## 2025-01-17 RX ORDER — FLUTICASONE PROPIONATE 50 MCG
2 SPRAY, SUSPENSION (ML) NASAL DAILY
Qty: 16 G | Refills: 0 | Status: SHIPPED | OUTPATIENT
Start: 2025-01-17 | End: 2025-01-24

## 2025-01-17 RX ORDER — ISOPROPYL ALCOHOL 0.75 G/1
SWAB TOPICAL
COMMUNITY
Start: 2024-12-26

## 2025-01-17 ASSESSMENT — ANXIETY QUESTIONNAIRES
6. BECOMING EASILY ANNOYED OR IRRITABLE: MORE THAN HALF THE DAYS
2. NOT BEING ABLE TO STOP OR CONTROL WORRYING: MORE THAN HALF THE DAYS
3. WORRYING TOO MUCH ABOUT DIFFERENT THINGS: MORE THAN HALF THE DAYS
7. FEELING AFRAID AS IF SOMETHING AWFUL MIGHT HAPPEN: MORE THAN HALF THE DAYS
5. BEING SO RESTLESS THAT IT IS HARD TO SIT STILL: MORE THAN HALF THE DAYS
1. FEELING NERVOUS, ANXIOUS, OR ON EDGE: MORE THAN HALF THE DAYS
4. TROUBLE RELAXING: MORE THAN HALF THE DAYS
GAD7 TOTAL SCORE: 14
IF YOU CHECKED OFF ANY PROBLEMS ON THIS QUESTIONNAIRE, HOW DIFFICULT HAVE THESE PROBLEMS MADE IT FOR YOU TO DO YOUR WORK, TAKE CARE OF THINGS AT HOME, OR GET ALONG WITH OTHER PEOPLE: VERY DIFFICULT

## 2025-01-17 ASSESSMENT — ENCOUNTER SYMPTOMS
DIARRHEA: 0
VOMITING: 0
BLOOD IN STOOL: 0
CHEST TIGHTNESS: 0
WHEEZING: 0
ABDOMINAL PAIN: 0
SHORTNESS OF BREATH: 0
ABDOMINAL DISTENTION: 0
COUGH: 0
SORE THROAT: 1
NAUSEA: 0
BACK PAIN: 1
CONSTIPATION: 0
SINUS PRESSURE: 1

## 2025-01-17 ASSESSMENT — PATIENT HEALTH QUESTIONNAIRE - PHQ9
2. FEELING DOWN, DEPRESSED OR HOPELESS: MORE THAN HALF THE DAYS
4. FEELING TIRED OR HAVING LITTLE ENERGY: SEVERAL DAYS
SUM OF ALL RESPONSES TO PHQ9 QUESTIONS 1 & 2: 4
8. MOVING OR SPEAKING SO SLOWLY THAT OTHER PEOPLE COULD HAVE NOTICED. OR THE OPPOSITE, BEING SO FIGETY OR RESTLESS THAT YOU HAVE BEEN MOVING AROUND A LOT MORE THAN USUAL: NOT AT ALL
SUM OF ALL RESPONSES TO PHQ QUESTIONS 1-9: 7
7. TROUBLE CONCENTRATING ON THINGS, SUCH AS READING THE NEWSPAPER OR WATCHING TELEVISION: NOT AT ALL
9. THOUGHTS THAT YOU WOULD BE BETTER OFF DEAD, OR OF HURTING YOURSELF: NOT AT ALL
SUM OF ALL RESPONSES TO PHQ QUESTIONS 1-9: 7
6. FEELING BAD ABOUT YOURSELF - OR THAT YOU ARE A FAILURE OR HAVE LET YOURSELF OR YOUR FAMILY DOWN: NOT AT ALL
SUM OF ALL RESPONSES TO PHQ QUESTIONS 1-9: 7
SUM OF ALL RESPONSES TO PHQ QUESTIONS 1-9: 7
5. POOR APPETITE OR OVEREATING: NOT AT ALL
3. TROUBLE FALLING OR STAYING ASLEEP: MORE THAN HALF THE DAYS
10. IF YOU CHECKED OFF ANY PROBLEMS, HOW DIFFICULT HAVE THESE PROBLEMS MADE IT FOR YOU TO DO YOUR WORK, TAKE CARE OF THINGS AT HOME, OR GET ALONG WITH OTHER PEOPLE: SOMEWHAT DIFFICULT
1. LITTLE INTEREST OR PLEASURE IN DOING THINGS: MORE THAN HALF THE DAYS

## 2025-01-17 NOTE — PROGRESS NOTES
Visit Information    Have you changed or started any medications since your last visit including any over-the-counter medicines, vitamins, or herbal medicines? yes -    Have you stopped taking any of your medications? Is so, why? -  no  Are you having any side effects from any of your medications? - no    Have you seen any other physician or provider since your last visit?  yes -    Have you had any other diagnostic tests since your last visit?  no   Have you been seen in the emergency room and/or had an admission in a hospital since we last saw you?  no   Have you had your routine dental cleaning in the past 6 months?  no     Do you have an active MyChart account? If no, what is the barrier?  Yes    Patient Care Team:  Calvin Titus APRN - CNP as PCP - General (Nurse Practitioner)  Calvin Titus APRN - CNP as PCP - Empaneled Provider  Shawn Mcgregor MD as Orthopedic Surgeon (Orthopedic Surgery)  Jovany Roblero MD as Surgeon (Cardiology)  Rosi Joya APRN - CNP (Family Medicine)  Hunter Palencia MD as Consulting Physician (Gastroenterology)    Medical History Review  Past Medical, Family, and Social History reviewed and does contribute to the patient presenting condition    Health Maintenance   Topic Date Due    DTaP/Tdap/Td vaccine (1 - Tdap) Never done    Shingles vaccine (1 of 2) Never done    Breast cancer screen  09/16/2018    Respiratory Syncytial Virus (RSV) Pregnant or age 60 yrs+ (1 - Risk 60-74 years 1-dose series) Never done    Cervical cancer screen  04/24/2022    Colorectal Cancer Screen  10/12/2022    Diabetic retinal exam  08/28/2024    Diabetic foot exam  12/04/2024    Lipids  01/30/2025    Flu vaccine (1) 10/16/2025 (Originally 8/1/2024)    COVID-19 Vaccine (1 - 2023-24 season) 10/16/2025 (Originally 9/1/2024)    Pneumococcal 0-64 years Vaccine (1 of 2 - PCV) 03/27/2029 (Originally 12/19/1966)    Depression Monitoring  05/06/2025    Diabetic Alb to Cr ratio (uACR) test  08/09/2025    A1C test 
Calvin Titus APRN - CNP   allopurinol (ZYLOPRIM) 100 MG tablet Take 1 tablet by mouth daily  Calvin Titus APRN - CNP   ferrous sulfate (IRON 325) 325 (65 Fe) MG tablet Take 1 tablet by mouth daily (with breakfast)  Calvin Titus APRN - CNP   liothyronine (CYTOMEL) 5 MCG tablet Take 1 tablet by mouth daily  Calvin Titsu APRN - CNP   Handicap Placard MISC by Does not apply route Can't walk greater than 200 feet. Expires in 5 years.  Calvin Titus APRN - CNP   NOVOLOG RELION 100 UNIT/ML injection vial   Renetta Jovel MD   Insulin Disposable Pump (V-GO 40) 40 UNIT/24HR KIT INJECT 4 CLICKS WITH BREAKFAST, 5 CLICKS WITH LUNCH, AND 4 CLICKS WITH DINNER (UP TO 78 UNITS DAILY)  Renetta Jovel MD   insulin glargine (LANTUS) 100 UNIT/ML injection vial Inject 50 Units into the skin 2 times daily AS needed based on blood sugars  Renetta Jovel MD   oxyBUTYnin (DITROPAN XL) 15 MG extended release tablet Take 1 tablet by mouth daily  Patient not taking: Reported on 1/17/2025  Renetta Jovel MD   ammonium lactate (AMLACTIN) 12 % cream APPLY  CREAM TOPICALLY AS NEEDED  Calvin Titus APRN - CNP   RELION PEN NEEDLE 31G/8MM 31G X 8 MM MISC USE ONE PEN NEEDLE BY SUBCUTANEOUS ROUTE DAILY  Kira Whelan APRN - CNP   pramipexole (MIRAPEX) 0.5 MG tablet Take 1 tablet by mouth nightly  Matt Zuniga MD   insulin lispro (HUMALOG) 100 UNIT/ML SOLN injection vial Inject 0-16 Units into the skin 3 times daily (with meals)  Matt Zuniga MD   Omega-3 1000 MG CAPS 1 capsule  Renetta Jovel MD   Continuous Blood Gluc Sensor (FREESTYLE KRISTIN 14 DAY SENSOR) [facility]   Renetta Jovel MD   Compression Stockings MISC by Does not apply route Needs bilateral knee high compression stockings 20-30 mmHg for leg swelling  Arabella Pedroza MD   blood glucose test strips (FREESTYLE LITE) strip 1 each by Does not apply route 6 times daily  Kira De León APRN - NP   Blood Glucose Monitoring Suppl

## 2025-01-23 ENCOUNTER — TELEPHONE (OUTPATIENT)
Dept: FAMILY MEDICINE CLINIC | Age: 65
End: 2025-01-23

## 2025-01-23 DIAGNOSIS — J01.90 ACUTE SINUSITIS, RECURRENCE NOT SPECIFIED, UNSPECIFIED LOCATION: Primary | ICD-10-CM

## 2025-01-23 NOTE — TELEPHONE ENCOUNTER
Marcio called.    Tomorrow will be her last day of antibiotics from her 1/17/25 OV for sinus problem, ear pain and now it is traveling down to her jaw and she's not feeling better and worried about the weekend and not having anything for treatment.    I offered her a VV on Monday and then to submit an Evisit today and she fought me on both so I let her know I would send a message to you.    She is asking for more antibiotics.    Please advise with treatment plan.    Thank you.

## 2025-01-24 RX ORDER — CEFUROXIME AXETIL 500 MG/1
500 TABLET ORAL 2 TIMES DAILY
Qty: 14 TABLET | Refills: 0 | Status: SHIPPED | OUTPATIENT
Start: 2025-01-24 | End: 2025-01-31

## 2025-01-24 NOTE — TELEPHONE ENCOUNTER
Cefuroxime called in. She has many allergies, including to penicillins, and there can be similar reaction to cefuroxime. Please discuss further with pharmacy. We are limited to what other antibiotics we can use.

## 2025-01-31 DIAGNOSIS — E55.9 VITAMIN D DEFICIENCY: ICD-10-CM

## 2025-01-31 DIAGNOSIS — I10 ESSENTIAL HYPERTENSION: Chronic | ICD-10-CM

## 2025-01-31 RX ORDER — LISINOPRIL 2.5 MG/1
2.5 TABLET ORAL DAILY
Qty: 30 TABLET | Refills: 2 | Status: SHIPPED | OUTPATIENT
Start: 2025-01-31 | End: 2025-05-01

## 2025-01-31 RX ORDER — CHOLECALCIFEROL (VITAMIN D3) 50 MCG
1 TABLET ORAL DAILY
Qty: 30 TABLET | Refills: 0 | Status: SHIPPED | OUTPATIENT
Start: 2025-01-31

## 2025-01-31 NOTE — TELEPHONE ENCOUNTER
Please Approve or Refuse.  Send to Pharmacy per Pt's Request:      Next Visit Date:  Visit date not found   Last Visit Date: 1/17/2025    Hemoglobin A1C (%)   Date Value   01/17/2025 7.3 (H)   09/04/2024 7.3 (H)   01/30/2024 7.9 (H)             ( goal A1C is < 7)   BP Readings from Last 3 Encounters:   01/17/25 123/69   10/16/24 114/62   10/10/24 (!) 166/91          (goal 120/80)  BUN   Date Value Ref Range Status   10/09/2024 21 8 - 23 mg/dL Final     Creatinine   Date Value Ref Range Status   10/09/2024 0.9 0.50 - 0.90 mg/dL Final     Potassium   Date Value Ref Range Status   10/09/2024 4.1 3.7 - 5.3 mmol/L Final

## 2025-02-26 LAB — NONINV COLON CA DNA+OCC BLD SCRN STL QL: NORMAL

## 2025-03-15 DIAGNOSIS — M17.0 PRIMARY OSTEOARTHRITIS OF BOTH KNEES: ICD-10-CM

## 2025-03-17 DIAGNOSIS — E55.9 VITAMIN D DEFICIENCY: ICD-10-CM

## 2025-03-17 RX ORDER — CHOLECALCIFEROL (VITAMIN D3) 50 MCG
1 TABLET ORAL DAILY
Qty: 30 TABLET | Refills: 0 | Status: SHIPPED | OUTPATIENT
Start: 2025-03-17

## 2025-03-17 NOTE — TELEPHONE ENCOUNTER
Omari, this is an Soniya patient. LOV 1/9/25, S/P: L TKA on 9/9/24 with Dr. Zuniga and I&D Poly exchange on 10/7/24 with Dr. Miramontes.  Please advise.

## 2025-03-18 ENCOUNTER — TELEPHONE (OUTPATIENT)
Dept: FAMILY MEDICINE CLINIC | Age: 65
End: 2025-03-18

## 2025-03-18 DIAGNOSIS — N64.4 BREAST PAIN: Primary | ICD-10-CM

## 2025-03-18 NOTE — TELEPHONE ENCOUNTER
Patient called in and wanted to know if you could send a order in for her, she had been having sharp pain in her left breast she said it feels like a ice pick in her breast and a constant ache. She is having a mammogram next week and wanted to see if you could send something for her to get it checked out before her mammogram.

## 2025-03-19 ENCOUNTER — TELEPHONE (OUTPATIENT)
Dept: FAMILY MEDICINE CLINIC | Age: 65
End: 2025-03-19

## 2025-03-19 DIAGNOSIS — N64.4 BREAST PAIN: Primary | ICD-10-CM

## 2025-03-19 NOTE — TELEPHONE ENCOUNTER
[facility] Scheduling called and needs an additional order to go with the US that was ordered yesterday.    Need a Diagnostic Mammogram Bilateral also.    Please order and they will call to schedule once in her chart.    Thank you.

## 2025-03-21 LAB — NONINV COLON CA DNA+OCC BLD SCRN STL QL: NEGATIVE

## 2025-03-24 ENCOUNTER — HOSPITAL ENCOUNTER (OUTPATIENT)
Age: 65
Discharge: HOME | End: 2025-03-24
Payer: COMMERCIAL

## 2025-03-24 DIAGNOSIS — Z79.4 TYPE 2 DIABETES MELLITUS WITH DIABETIC NEUROPATHY, WITH LONG-TERM CURRENT USE OF INSULIN (HCC): ICD-10-CM

## 2025-03-24 DIAGNOSIS — E55.9 VITAMIN D DEFICIENCY: ICD-10-CM

## 2025-03-24 DIAGNOSIS — K90.9 IRON MALABSORPTION: ICD-10-CM

## 2025-03-24 DIAGNOSIS — E03.9 HYPOTHYROIDISM, UNSPECIFIED TYPE: ICD-10-CM

## 2025-03-24 DIAGNOSIS — R53.83 FATIGUE, UNSPECIFIED TYPE: ICD-10-CM

## 2025-03-24 DIAGNOSIS — I10 PRIMARY HYPERTENSION: ICD-10-CM

## 2025-03-24 DIAGNOSIS — E11.40 TYPE 2 DIABETES MELLITUS WITH DIABETIC NEUROPATHY, WITH LONG-TERM CURRENT USE OF INSULIN (HCC): ICD-10-CM

## 2025-03-24 LAB
25(OH)D3 SERPL-MCNC: 32.3 NG/ML (ref 30–100)
ALBUMIN SERPL-MCNC: 3.9 G/DL (ref 3.5–5.2)
ALP SERPL-CCNC: 116 U/L (ref 35–104)
ALT SERPL-CCNC: 15 U/L (ref 10–35)
ANION GAP SERPL CALCULATED.3IONS-SCNC: 12 MMOL/L (ref 9–16)
AST SERPL-CCNC: 16 U/L (ref 10–35)
BACTERIA URNS QL MICRO: ABNORMAL
BASOPHILS # BLD: 0.1 K/UL (ref 0–0.2)
BASOPHILS NFR BLD: 1 % (ref 0–2)
BILIRUB SERPL-MCNC: 0.4 MG/DL (ref 0–1.2)
BILIRUB UR QL STRIP: NEGATIVE
BUN SERPL-MCNC: 18 MG/DL (ref 8–23)
CALCIUM SERPL-MCNC: 8.9 MG/DL (ref 8.6–10.4)
CASTS #/AREA URNS LPF: ABNORMAL /LPF
CHLORIDE SERPL-SCNC: 102 MMOL/L (ref 98–107)
CHOLEST SERPL-MCNC: 219 MG/DL (ref 0–199)
CHOLESTEROL/HDL RATIO: 3.9
CLARITY UR: ABNORMAL
CO2 SERPL-SCNC: 27 MMOL/L (ref 20–31)
COLOR UR: YELLOW
CREAT SERPL-MCNC: 0.9 MG/DL (ref 0.7–1.2)
EOSINOPHIL # BLD: 0.3 K/UL (ref 0–0.4)
EOSINOPHILS RELATIVE PERCENT: 3 % (ref 0–4)
EPI CELLS #/AREA URNS HPF: ABNORMAL /HPF
ERYTHROCYTE [DISTWIDTH] IN BLOOD BY AUTOMATED COUNT: 14.7 % (ref 11.5–14.9)
FERRITIN SERPL-MCNC: 279 NG/ML
FOLATE SERPL-MCNC: 11.6 NG/ML (ref 4.8–24.2)
GFR, ESTIMATED: 71 ML/MIN/1.73M2
GLUCOSE SERPL-MCNC: 207 MG/DL (ref 74–99)
GLUCOSE UR STRIP-MCNC: NEGATIVE MG/DL
HCT VFR BLD AUTO: 40.2 % (ref 36–46)
HDLC SERPL-MCNC: 56 MG/DL
HGB BLD-MCNC: 13.4 G/DL (ref 12–16)
HGB UR QL STRIP.AUTO: NEGATIVE
IRON SATN MFR SERPL: 23 % (ref 20–55)
IRON SERPL-MCNC: 60 UG/DL (ref 37–145)
KETONES UR STRIP-MCNC: ABNORMAL MG/DL
LDLC SERPL CALC-MCNC: 124 MG/DL (ref 0–100)
LEUKOCYTE ESTERASE UR QL STRIP: ABNORMAL
LYMPHOCYTES NFR BLD: 2.3 K/UL (ref 1–4.8)
LYMPHOCYTES RELATIVE PERCENT: 22 % (ref 24–44)
MAGNESIUM SERPL-MCNC: 1.4 MG/DL (ref 1.6–2.4)
MCH RBC QN AUTO: 29 PG (ref 26–34)
MCHC RBC AUTO-ENTMCNC: 33.2 G/DL (ref 31–37)
MCV RBC AUTO: 87.3 FL (ref 80–100)
MONOCYTES NFR BLD: 0.7 K/UL (ref 0.1–1.3)
MONOCYTES NFR BLD: 6 % (ref 1–7)
NEUTROPHILS NFR BLD: 68 % (ref 36–66)
NEUTS SEG NFR BLD: 7.1 K/UL (ref 1.3–9.1)
NITRITE UR QL STRIP: NEGATIVE
PH UR STRIP: 5.5 [PH] (ref 5–8)
PHOSPHATE SERPL-MCNC: 3 MG/DL (ref 2.5–4.5)
PLATELET # BLD AUTO: 309 K/UL (ref 150–450)
PMV BLD AUTO: 8.4 FL (ref 6–12)
POTASSIUM SERPL-SCNC: 3.9 MMOL/L (ref 3.7–5.3)
PROT SERPL-MCNC: 6.7 G/DL (ref 6.6–8.7)
PROT UR STRIP-MCNC: ABNORMAL MG/DL
RBC # BLD AUTO: 4.61 M/UL (ref 4–5.2)
RBC #/AREA URNS HPF: ABNORMAL /HPF
SODIUM SERPL-SCNC: 141 MMOL/L (ref 136–145)
SP GR UR STRIP: 1.03 (ref 1–1.03)
T4 FREE SERPL-MCNC: 1.2 NG/DL (ref 0.9–1.7)
TIBC SERPL-MCNC: 261 UG/DL (ref 250–450)
TRIGL SERPL-MCNC: 197 MG/DL (ref 0–149)
TSH SERPL DL<=0.05 MIU/L-ACNC: 2.44 UIU/ML (ref 0.27–4.2)
UNSATURATED IRON BINDING CAPACITY: 201 UG/DL (ref 112–347)
URATE SERPL-MCNC: 8.7 MG/DL (ref 2.4–5.7)
UROBILINOGEN UR STRIP-ACNC: NORMAL EU/DL (ref 0–1)
VIT B12 SERPL-MCNC: 360 PG/ML (ref 232–1245)
WBC #/AREA URNS HPF: ABNORMAL /HPF
WBC OTHER # BLD: 10.5 K/UL (ref 3.5–11)

## 2025-03-24 PROCEDURE — 85025 COMPLETE CBC W/AUTO DIFF WBC: CPT

## 2025-03-24 PROCEDURE — 82306 VITAMIN D 25 HYDROXY: CPT

## 2025-03-24 PROCEDURE — 81001 URINALYSIS AUTO W/SCOPE: CPT

## 2025-03-24 PROCEDURE — 80053 COMPREHEN METABOLIC PANEL: CPT

## 2025-03-24 PROCEDURE — 84100 ASSAY OF PHOSPHORUS: CPT

## 2025-03-24 PROCEDURE — 83735 ASSAY OF MAGNESIUM: CPT

## 2025-03-24 PROCEDURE — 82746 ASSAY OF FOLIC ACID SERUM: CPT

## 2025-03-24 PROCEDURE — 36415 COLL VENOUS BLD VENIPUNCTURE: CPT

## 2025-03-24 PROCEDURE — 82728 ASSAY OF FERRITIN: CPT

## 2025-03-24 PROCEDURE — 84439 ASSAY OF FREE THYROXINE: CPT

## 2025-03-24 PROCEDURE — 83540 ASSAY OF IRON: CPT

## 2025-03-24 PROCEDURE — 80061 LIPID PANEL: CPT

## 2025-03-24 PROCEDURE — 84550 ASSAY OF BLOOD/URIC ACID: CPT

## 2025-03-24 PROCEDURE — 84443 ASSAY THYROID STIM HORMONE: CPT

## 2025-03-24 PROCEDURE — 83550 IRON BINDING TEST: CPT

## 2025-03-24 PROCEDURE — 82607 VITAMIN B-12: CPT

## 2025-03-25 ENCOUNTER — RESULTS FOLLOW-UP (OUTPATIENT)
Dept: FAMILY MEDICINE CLINIC | Age: 65
End: 2025-03-25

## 2025-03-25 DIAGNOSIS — E78.5 HYPERLIPIDEMIA, UNSPECIFIED HYPERLIPIDEMIA TYPE: ICD-10-CM

## 2025-03-25 DIAGNOSIS — N39.0 URINARY TRACT INFECTION WITHOUT HEMATURIA, SITE UNSPECIFIED: ICD-10-CM

## 2025-03-25 DIAGNOSIS — R79.0 LOW MAGNESIUM LEVEL: Primary | ICD-10-CM

## 2025-03-25 DIAGNOSIS — R79.89 LFT ELEVATION: ICD-10-CM

## 2025-03-25 NOTE — RESULT ENCOUNTER NOTE
Please notify patient that it looks like she may have a urinary tract infection.  Possibly contaminated sample.  If she having symptoms of urinary tract infection?  Any recent antibiotic use?    Uric acid is worsening, which can lead to kidney stones and gout.  Is she taking her allopurinol?  If not she should be and I can [facility] place a refill.  If she is taking it, we will need to increase the dose.    Improving lipid panel overall.  LDL is slightly worse.  Patient should be on a cholesterol-lowering medication, does she tolerate statins?  If not I can order something different.  Please let me know.    Low magnesium, will order 250 mg replacement. Recheck in a few months.     Elevated alk phos. Will add liver US. Previous liver US .     Iron levels improved.     Other labs WNL.     Continue to see specialists,     The 10-year ASCVD risk score (Percy VARGAS, et al., 2019) is: 12.2%    Values used to calculate the score:      Age: 64 years      Sex: Female      Is Non- : No      Diabetic: Yes      Tobacco smoker: No      Systolic Blood Pressure: 123 mmHg      Is BP treated: Yes      HDL Cholesterol: 56 mg/dL      Total Cholesterol: 219 mg/dL

## 2025-03-26 RX ORDER — EZETIMIBE 10 MG/1
10 TABLET ORAL DAILY
Qty: 30 TABLET | Refills: 3 | Status: SHIPPED | OUTPATIENT
Start: 2025-03-26

## 2025-03-26 RX ORDER — NITROFURANTOIN 25; 75 MG/1; MG/1
100 CAPSULE ORAL 2 TIMES DAILY
Qty: 20 CAPSULE | Refills: 0 | Status: SHIPPED | OUTPATIENT
Start: 2025-03-26 | End: 2025-04-05

## 2025-04-09 ENCOUNTER — OFFICE VISIT (OUTPATIENT)
Dept: NEUROLOGY | Age: 65
End: 2025-04-09
Payer: COMMERCIAL

## 2025-04-09 VITALS
DIASTOLIC BLOOD PRESSURE: 71 MMHG | HEIGHT: 60 IN | WEIGHT: 277 LBS | BODY MASS INDEX: 54.38 KG/M2 | HEART RATE: 77 BPM | SYSTOLIC BLOOD PRESSURE: 134 MMHG

## 2025-04-09 DIAGNOSIS — E53.8 B12 DEFICIENCY: ICD-10-CM

## 2025-04-09 DIAGNOSIS — G25.81 RLS (RESTLESS LEGS SYNDROME): Primary | Chronic | ICD-10-CM

## 2025-04-09 DIAGNOSIS — G62.9 NEUROPATHY: ICD-10-CM

## 2025-04-09 PROCEDURE — 3078F DIAST BP <80 MM HG: CPT | Performed by: STUDENT IN AN ORGANIZED HEALTH CARE EDUCATION/TRAINING PROGRAM

## 2025-04-09 PROCEDURE — 3017F COLORECTAL CA SCREEN DOC REV: CPT | Performed by: STUDENT IN AN ORGANIZED HEALTH CARE EDUCATION/TRAINING PROGRAM

## 2025-04-09 PROCEDURE — 3075F SYST BP GE 130 - 139MM HG: CPT | Performed by: STUDENT IN AN ORGANIZED HEALTH CARE EDUCATION/TRAINING PROGRAM

## 2025-04-09 PROCEDURE — 1036F TOBACCO NON-USER: CPT | Performed by: STUDENT IN AN ORGANIZED HEALTH CARE EDUCATION/TRAINING PROGRAM

## 2025-04-09 PROCEDURE — 99205 OFFICE O/P NEW HI 60 MIN: CPT | Performed by: STUDENT IN AN ORGANIZED HEALTH CARE EDUCATION/TRAINING PROGRAM

## 2025-04-09 PROCEDURE — G8427 DOCREV CUR MEDS BY ELIG CLIN: HCPCS | Performed by: STUDENT IN AN ORGANIZED HEALTH CARE EDUCATION/TRAINING PROGRAM

## 2025-04-09 PROCEDURE — G8417 CALC BMI ABV UP PARAM F/U: HCPCS | Performed by: STUDENT IN AN ORGANIZED HEALTH CARE EDUCATION/TRAINING PROGRAM

## 2025-04-09 RX ORDER — PRAMIPEXOLE DIHYDROCHLORIDE 0.5 MG/1
0.5 TABLET ORAL NIGHTLY
Qty: 90 TABLET | Refills: 3 | Status: SHIPPED | OUTPATIENT
Start: 2025-04-09

## 2025-04-09 RX ORDER — TIRZEPATIDE 2.5 MG/.5ML
INJECTION, SOLUTION SUBCUTANEOUS WEEKLY
COMMUNITY

## 2025-04-09 NOTE — PROGRESS NOTES
Initial Neurology Clinic Note    [facility]  Department of Neurology  Date of Service: 4/9/2025 at 11:22 AM      CLINIC NOTE - INITIAL VISIT    Marcio Veloz is a 64 y.o. right-handed female with a long-standing history of bilateral lower extremity discomfort beginning in early childhood, described as aching, fidgety legs with an irresistible urge to move, predominantly worse at rest and in the evenings. She reports needing to rock her legs to fall asleep and experiences daytime restlessness as well. She underwent three knee surgeries and bilateral shoulder replacements, with worsening of symptoms after her second left knee surgery in October, 2024.  She now experiences progressive lower extremity heaviness, numbness, and tingling in both feet and hands for over a year. The symptoms have led to impaired mobility, requiring use of a cane and walker due to leg weakness and fall risk.  She has a history of diabetes (20+ years, intermittently on insulin) and underwent foot surgery 2 years ago. She has trialed ropinirole (Requip) and had a side effect with that.  She was then switched to Mirapex (0.5 mg) which she takes on as needed basis which provides some relief. She also recalls trying gabapentin and Lyrica in the past but discontinued it due to side effects (possibly muscle cramps). She is not currently taking B12 supplements, though her serum B12 level was noted to be low-normal at 360 pg/mL. Iron studies including ferritin(279) and thyroid function were normal. Family history is notable for similar symptoms in a brother during childhood.    PREVIOUS EVALUATION:      Labs:  Lab Results   Component Value Date    LDLDIRECT 119 (H) 10/21/2015      No components found for: \"CHLPL\"   Lab Results   Component Value Date    TRIG 197 (H) 03/24/2025    TRIG 378 (H) 01/30/2024    TRIG 287 (H) 01/06/2023      Lab Results   Component Value Date    HDL 56 03/24/2025    HDL 58 01/30/2024    HDL 62 01/06/2023

## 2025-04-11 RX ORDER — FERROUS SULFATE 325(65) MG
1 TABLET ORAL DAILY
Qty: 90 TABLET | Refills: 11 | Status: SHIPPED | OUTPATIENT
Start: 2025-04-11

## 2025-04-11 RX ORDER — LEVOTHYROXINE SODIUM 112 UG/1
112 TABLET ORAL DAILY
Qty: 90 TABLET | Refills: 11 | Status: SHIPPED | OUTPATIENT
Start: 2025-04-11

## 2025-04-11 RX ORDER — ALLOPURINOL 100 MG/1
100 TABLET ORAL DAILY
Qty: 90 TABLET | Refills: 11 | Status: SHIPPED | OUTPATIENT
Start: 2025-04-11

## 2025-04-11 NOTE — TELEPHONE ENCOUNTER
Please Approve or Refuse.  Send to Pharmacy per Pt's Request:      Next Visit Date:  Visit date not found   Last Visit Date: 1/17/2025    Hemoglobin A1C (%)   Date Value   01/17/2025 7.3 (H)   09/04/2024 7.3 (H)   01/30/2024 7.9 (H)             ( goal A1C is < 7)   BP Readings from Last 3 Encounters:   04/09/25 134/71   01/17/25 123/69   10/16/24 114/62          (goal 120/80)  BUN   Date Value Ref Range Status   03/24/2025 18 8 - 23 mg/dL Final     Creatinine   Date Value Ref Range Status   03/24/2025 0.9 0.7 - 1.2 mg/dL Final     Potassium   Date Value Ref Range Status   03/24/2025 3.9 3.7 - 5.3 mmol/L Final

## 2025-04-28 ENCOUNTER — TELEPHONE (OUTPATIENT)
Dept: FAMILY MEDICINE CLINIC | Age: 65
End: 2025-04-28

## 2025-04-28 DIAGNOSIS — E55.9 VITAMIN D DEFICIENCY: Primary | ICD-10-CM

## 2025-04-28 RX ORDER — CHOLECALCIFEROL (VITAMIN D3) 50 MCG
1 TABLET ORAL DAILY
Qty: 90 TABLET | Refills: 0 | Status: SHIPPED | OUTPATIENT
Start: 2025-04-28 | End: 2025-07-27

## 2025-04-28 NOTE — TELEPHONE ENCOUNTER
Patient called and requested a refill for her Vitamin D3, she wasn't sure of the dosage.  [facility] in  [place].

## 2025-05-09 RX ORDER — ASPIRIN 81 MG/1
81 TABLET, COATED ORAL DAILY
Qty: 30 TABLET | Refills: 11 | Status: SHIPPED | OUTPATIENT
Start: 2025-05-09

## 2025-05-13 RX ORDER — LIOTHYRONINE SODIUM 5 UG/1
5 TABLET ORAL DAILY
Qty: 90 TABLET | Refills: 11 | Status: SHIPPED | OUTPATIENT
Start: 2025-05-13

## 2025-05-14 RX ORDER — LIOTHYRONINE SODIUM 5 UG/1
5 TABLET ORAL DAILY
Qty: 90 TABLET | Refills: 11 | OUTPATIENT
Start: 2025-05-14

## 2025-05-27 DIAGNOSIS — I10 ESSENTIAL HYPERTENSION: Chronic | ICD-10-CM

## 2025-05-27 RX ORDER — LISINOPRIL 2.5 MG/1
2.5 TABLET ORAL DAILY
Qty: 30 TABLET | Refills: 2 | Status: SHIPPED | OUTPATIENT
Start: 2025-05-27

## 2025-05-28 RX ORDER — INSULIN GLARGINE 100 [IU]/ML
INJECTION, SOLUTION SUBCUTANEOUS
Qty: 5 ADJUSTABLE DOSE PRE-FILLED PEN SYRINGE | OUTPATIENT
Start: 2025-05-28

## 2025-06-02 DIAGNOSIS — K21.9 GASTROESOPHAGEAL REFLUX DISEASE, UNSPECIFIED WHETHER ESOPHAGITIS PRESENT: ICD-10-CM

## 2025-06-02 RX ORDER — FAMOTIDINE 20 MG/1
20 TABLET, FILM COATED ORAL 2 TIMES DAILY
Qty: 60 TABLET | Refills: 3 | Status: SHIPPED | OUTPATIENT
Start: 2025-06-02

## 2025-06-09 RX ORDER — BUMETANIDE 2 MG/1
2 TABLET ORAL DAILY
Qty: 90 TABLET | Refills: 0 | Status: SHIPPED | OUTPATIENT
Start: 2025-06-09 | End: 2025-06-11

## 2025-06-11 RX ORDER — BUMETANIDE 2 MG/1
2 TABLET ORAL DAILY
Qty: 90 TABLET | Refills: 0 | Status: SHIPPED | OUTPATIENT
Start: 2025-06-11

## 2025-06-28 RX ORDER — PANTOPRAZOLE SODIUM 40 MG/1
40 TABLET, DELAYED RELEASE ORAL
Qty: 30 TABLET | Refills: 10 | OUTPATIENT
Start: 2025-06-28

## 2025-06-30 DIAGNOSIS — K21.9 GASTROESOPHAGEAL REFLUX DISEASE WITHOUT ESOPHAGITIS: Primary | ICD-10-CM

## 2025-06-30 RX ORDER — PANTOPRAZOLE SODIUM 40 MG/1
40 TABLET, DELAYED RELEASE ORAL
Qty: 90 TABLET | Refills: 1 | Status: SHIPPED | OUTPATIENT
Start: 2025-06-30

## 2025-06-30 RX ORDER — PANTOPRAZOLE SODIUM 40 MG/1
TABLET, DELAYED RELEASE ORAL
Qty: 30 TABLET | Refills: 0 | OUTPATIENT
Start: 2025-06-30

## 2025-06-30 NOTE — TELEPHONE ENCOUNTER
Patient called and said she has been taking this medication and needed it refilled she said she didn't know why is says she's not taking it anymore.

## 2025-07-24 ENCOUNTER — TELEPHONE (OUTPATIENT)
Dept: NEUROLOGY | Age: 65
End: 2025-07-24

## 2025-07-24 DIAGNOSIS — E78.5 HYPERLIPIDEMIA, UNSPECIFIED HYPERLIPIDEMIA TYPE: ICD-10-CM

## 2025-07-26 RX ORDER — EZETIMIBE 10 MG/1
10 TABLET ORAL DAILY
Qty: 30 TABLET | Refills: 3 | Status: SHIPPED | OUTPATIENT
Start: 2025-07-26

## 2025-08-05 ENCOUNTER — HOSPITAL ENCOUNTER (OUTPATIENT)
Dept: LAB | Age: 65
Discharge: HOME | End: 2025-08-05
Payer: COMMERCIAL

## 2025-08-05 ENCOUNTER — HOSPITAL ENCOUNTER (OUTPATIENT)
Dept: LAB | Age: 65
Discharge: HOME | End: 2025-08-05

## 2025-08-05 LAB
ALBUMIN SERPL-MCNC: 3.8 G/DL (ref 3.5–5.2)
ALP SERPL-CCNC: 93 U/L (ref 35–104)
ALT SERPL-CCNC: 13 U/L (ref 10–35)
ANION GAP SERPL CALCULATED.3IONS-SCNC: 12 MMOL/L (ref 9–16)
AST SERPL-CCNC: 16 U/L (ref 10–35)
BILIRUB SERPL-MCNC: 0.4 MG/DL (ref 0–1.2)
BUN SERPL-MCNC: 20 MG/DL (ref 8–23)
CALCIUM SERPL-MCNC: 9.3 MG/DL (ref 8.6–10.4)
CHLORIDE SERPL-SCNC: 104 MMOL/L (ref 98–107)
CO2 SERPL-SCNC: 27 MMOL/L (ref 20–31)
CREAT SERPL-MCNC: 0.9 MG/DL (ref 0.7–1.2)
CREAT UR-MCNC: 196 MG/DL (ref 28–217)
GFR, ESTIMATED: 71 ML/MIN/1.73M2
GLUCOSE SERPL-MCNC: 146 MG/DL (ref 74–99)
MICROALBUMIN UR-MCNC: 21 MG/L (ref 0–20)
MICROALBUMIN/CREAT UR-RTO: 11 MCG/MG CREAT (ref 0–25)
POTASSIUM SERPL-SCNC: 4.5 MMOL/L (ref 3.7–5.3)
PROT SERPL-MCNC: 6.6 G/DL (ref 6.6–8.7)
SODIUM SERPL-SCNC: 143 MMOL/L (ref 136–145)
T3FREE SERPL-MCNC: 2.78 PG/ML (ref 2–4.4)
T4 FREE SERPL-MCNC: 1.2 NG/DL (ref 0.9–1.7)
TSH SERPL DL<=0.05 MIU/L-ACNC: 2.02 UIU/ML (ref 0.27–4.2)

## 2025-08-05 PROCEDURE — 36415 COLL VENOUS BLD VENIPUNCTURE: CPT

## 2025-08-05 PROCEDURE — 84439 ASSAY OF FREE THYROXINE: CPT

## 2025-08-05 PROCEDURE — 80053 COMPREHEN METABOLIC PANEL: CPT

## 2025-08-05 PROCEDURE — 82570 ASSAY OF URINE CREATININE: CPT

## 2025-08-05 PROCEDURE — 82043 UR ALBUMIN QUANTITATIVE: CPT

## 2025-08-05 PROCEDURE — 84443 ASSAY THYROID STIM HORMONE: CPT

## 2025-08-05 PROCEDURE — 84481 FREE ASSAY (FT-3): CPT

## 2025-08-06 DIAGNOSIS — G62.9 NEUROPATHY: Primary | ICD-10-CM

## 2025-08-06 RX ORDER — DULOXETIN HYDROCHLORIDE 30 MG/1
30 CAPSULE, DELAYED RELEASE ORAL DAILY
Qty: 30 CAPSULE | Refills: 3 | Status: SHIPPED | OUTPATIENT
Start: 2025-08-06

## 2025-08-19 ENCOUNTER — PROCEDURE VISIT (OUTPATIENT)
Dept: PHYSICAL MEDICINE AND REHAB | Age: 65
End: 2025-08-19

## 2025-08-19 DIAGNOSIS — R20.2 PARESTHESIA OF BILATERAL LEGS: Primary | ICD-10-CM

## 2025-08-20 DIAGNOSIS — E53.8 B12 DEFICIENCY: ICD-10-CM

## 2025-08-20 DIAGNOSIS — G25.81 RLS (RESTLESS LEGS SYNDROME): Chronic | ICD-10-CM

## 2025-08-20 DIAGNOSIS — G62.9 NEUROPATHY: ICD-10-CM

## (undated) DEVICE — SHEET,DRAPE,70X100,STERILE: Brand: MEDLINE

## (undated) DEVICE — Z CONVERTED USE 2271043 CONTAINER SPEC COLL 4OZ SCR ON LID PEEL PCH

## (undated) DEVICE — TRAY URIN CATH 16FR DRNGE BG STATLOK STBL DEV F SURSTP

## (undated) DEVICE — SYSTEM COMPRESS THERAPY CUFF ONLY LG

## (undated) DEVICE — SUTURE VICRYL + SZ 0 L18IN ABSRB UD L36MM CT-1 1/2 CIR VCP840D

## (undated) DEVICE — 450 ML BOTTLE OF 0.05% CHLORHEXIDINE GLUCONATE IN 99.95% STERILE WATER FOR IRRIGATION, USP AND APPLICATOR.: Brand: IRRISEPT ANTIMICROBIAL WOUND LAVAGE

## (undated) DEVICE — MEDI-VAC SUCTION HANDLE REGULAR CAPACITY: Brand: CARDINAL HEALTH

## (undated) DEVICE — DEFENDO AIR WATER SUCTION AND BIOPSY VALVE KIT FOR  OLYMPUS: Brand: DEFENDO AIR/WATER/SUCTION AND BIOPSY VALVE

## (undated) DEVICE — T-MAX DISPOSABLE FACE MASK 8 PER BOX

## (undated) DEVICE — GLOVE SURG SZ 85 L12IN FNGR THK79MIL GRN LTX FREE

## (undated) DEVICE — GLOVE ORTHO 8   MSG9480

## (undated) DEVICE — HANDPIECE SET WITH COAXIAL HIGH FLOW TIP AND SUCTION TUBE: Brand: INTERPULSE

## (undated) DEVICE — DISCONTINUED USE 397798 BANDAGE TETRA FLEX CLIPS LF 6INX11YD ST

## (undated) DEVICE — SOLUTION IRRIG 1000ML STRL H2O USP PLAS POUR BTL

## (undated) DEVICE — KIT AUTOTRNS APPL AERO 2 SET SYR 2 TIP FOR PLT SEP SYS GPS

## (undated) DEVICE — INTENDED FOR TISSUE SEPARATION, AND OTHER PROCEDURES THAT REQUIRE A SHARP SURGICAL BLADE TO PUNCTURE OR CUT.: Brand: BARD-PARKER ® CARBON RIB-BACK BLADES

## (undated) DEVICE — DUAL CUT SAGITTAL BLADE

## (undated) DEVICE — DRESSING NEG PRSS 20CM PREVENA

## (undated) DEVICE — PROTECTOR EYE PT SELF ADH NS OPT GRD LF

## (undated) DEVICE — CLOSURE SKIN FLX NONINVASIVE PRELOC TECHNOLOGY FOR 24IN

## (undated) DEVICE — BLANKET WRM W29.9XL79.1IN UP BODY FORC AIR MISTRAL-AIR

## (undated) DEVICE — NEEDLE SUT PASS FOR ROT CUF LABRAL REP SUREFIRE SCORPION

## (undated) DEVICE — 3M™ WARMING BLANKET, LOWER BODY, 10 PER CASE, 42568: Brand: BAIR HUGGER™

## (undated) DEVICE — ST. CHARLES TOTAL KNEE: Brand: MEDLINE INDUSTRIES, INC.

## (undated) DEVICE — THE STERILE LIGHT HANDLE COVER IS USED WITH STERIS SURGICAL LIGHTING AND VISUALIZATION SYSTEMS.

## (undated) DEVICE — SUTURE STRATAFIX SPRL MCRYL + SZ 2 0 L27IN ABSRB UD W NDL SXMP1B419

## (undated) DEVICE — DRESSING HYDROCOLLOID BORDER 35X10 IN ALUM PRIMASEAL

## (undated) DEVICE — Device

## (undated) DEVICE — PEN: MARKING STD 100/CS: Brand: MEDICAL ACTION INDUSTRIES

## (undated) DEVICE — INTENDED TO SUPPORT AND MAINTAIN THE POSITION OF AN ANESTHETIZED PATIENT DURING SURGERY: Brand: HERMANTOR XL PINK KNEE POSITIONING PAD

## (undated) DEVICE — ZIPPERED TOGA, 2X LARGE: Brand: FLYTE, SURGICOOL

## (undated) DEVICE — SOLUTION IRRIG 3000ML 0.9% SOD CHL USP UROMATIC PLAS CONT

## (undated) DEVICE — ADHESIVE SKIN CLSR 0.7ML TOP DERMBND ADV

## (undated) DEVICE — MEDI-VAC NON-CONDUCTIVE SUCTION TUBING: Brand: CARDINAL HEALTH

## (undated) DEVICE — Z DISCONTINUED USE 2624852 GLOVE SURG 7 PF TEXT NEOPRNE BRN STRL NEOLON 2G LF

## (undated) DEVICE — SUTURE VICRYL SZ 0 L36IN ABSRB UD CT-1 L36MM 1/2 CIR TAPR PNT VCP946H

## (undated) DEVICE — DISCONTINUED USE 405792 GLOVE SURG SENSICARE ALOE LT LF PF ST GRN SZ 7

## (undated) DEVICE — STOCKINETTE,IMPERVIOUS,12X48,STERILE: Brand: MEDLINE

## (undated) DEVICE — GLOVE SURG SZ 85 L12IN FNGR THK87MIL WHT LTX FREE

## (undated) DEVICE — SUTURE VICRYL SZ 2-0 L18IN ABSRB UD CT-1 L36MM 1/2 CIR J839D

## (undated) DEVICE — CEMENT MIXING SYSTEM WITH FEMORAL BREAKWAY NOZZLE: Brand: REVOLUTION

## (undated) DEVICE — NOZZLE BNE CEM THN FLX PLAS SHLDR DISPOSABLE REVOLUTION

## (undated) DEVICE — HYPODERMIC SAFETY NEEDLE: Brand: MAGELLAN

## (undated) DEVICE — STRAP,POSITIONING,KNEE/BODY,FOAM,4X60": Brand: MEDLINE

## (undated) DEVICE — GAUZE,SPONGE,FLUFF,4"X4",12PLY,STRL,2'S: Brand: MEDLINE

## (undated) DEVICE — 1010 S-DRAPE TOWEL DRAPE 10/BX: Brand: STERI-DRAPE™

## (undated) DEVICE — GUIDE SURG CUT SPEC PT CAPPED

## (undated) DEVICE — BITEBLOCK 54FR W/ DENT RIM BLOX

## (undated) DEVICE — GARMENT COMPR STD FOR 17IN CALF UNIF THER FLOTRN

## (undated) DEVICE — DRESSING TRNSPAR W5XL4.5IN FLM SHT SEMIPERMEABLE WIND

## (undated) DEVICE — KIT SEP W/ BLD DRAW TB SYR NDL TRNQT PD

## (undated) DEVICE — SOLUTION IRRIG 1000ML 0.9% SOD CHL USP POUR PLAS BTL

## (undated) DEVICE — SOLUTION IV 250ML 0.9% SOD CHL PH 5 INJ USP VIAFLX PLAS

## (undated) DEVICE — BIPOLAR SEALER 23-113-1 AQM 2.3 OM NEURO: Brand: AQUAMANTYS ®

## (undated) DEVICE — GLOVE SURG SZ 85 L12IN FNGR THK13MIL WHT ISOLEX POLYISOPRENE

## (undated) DEVICE — 1LYRTR 16FR10ML100%SIL UMS SNP: Brand: MEDLINE INDUSTRIES, INC.

## (undated) DEVICE — SUTURE MCRYL SZ 3-0 L27IN ABSRB UD L26MM SH 1/2 CIR Y416H

## (undated) DEVICE — INTENDED TO AID IN THE PASSING OF SUTURES THROUGH BONE AND SOFT TISSUE DURING ORTHOPEDIC SURGERY: Brand: HOFFEE SUTURE RETRIEVER

## (undated) DEVICE — IMMOBILIZER SHLDR L10.5-17IN D7IN SLNG W/ 15DEG ABD PLLW

## (undated) DEVICE — SUTURE STRATAFIX SYMMETRIC PDS + SZ 1 L18IN ABSRB VLT L48MM SXPP1A400

## (undated) DEVICE — APPLICATOR MEDICATED 26 CC SOLUTION HI LT ORNG CHLORAPREP

## (undated) DEVICE — COOLER THER 20-31IN L CRYO COMB W/ PD KNEE TB GRAV FLO

## (undated) DEVICE — SUTURE MONOCRYL SZ 2-0 L27IN ABSRB UD SH L26MM TAPERPOINT NDL Y417H

## (undated) DEVICE — AIRLIFE™ NASAL OXYGEN CANNULA CURVED, FLARED TIP, WITH 7 FEET (2.1 M) CRUSH RESISTANT TUBING, OVER-THE-EAR STYLE: Brand: AIRLIFE™

## (undated) DEVICE — CYSTO/BLADDER IRRIGATION SET, REGULATING CLAMP

## (undated) DEVICE — SYSTEM COMPRESS THER UNIV COOL UNIT GRAVITY CRYO/CUFF

## (undated) DEVICE — SUTURE MONOCRYL SZ 3-0 L27IN ABSRB UD L24MM PS-1 3/8 CIR PRIM Y936H

## (undated) DEVICE — SYSTEM WND THER 14 DAY 150 CC CANSTR THER UNIT PREVENA + 125

## (undated) DEVICE — SST TWIST DRILL, AO TYPE, 2MM DIA. X 75MM: Brand: MICROAIRE®

## (undated) DEVICE — HOOK LOCK LATEX FREE ELASTIC BANDAGE D/L 6INX10YD

## (undated) DEVICE — SLING ARM L ABV 13IN DE-ROTATION STRP HOOKS PROVIDE IMMOB

## (undated) DEVICE — ENCORE® LATEX ORTHO SIZE 8.5, STERILE LATEX POWDER-FREE SURGICAL GLOVE: Brand: ENCORE

## (undated) DEVICE — DRAPE,REIN 53X77,STERILE: Brand: MEDLINE

## (undated) DEVICE — 4-PORT MANIFOLD: Brand: NEPTUNE 2

## (undated) DEVICE — STERILE PATIENT PROTECTIVE PAD FOR IMP® KNEE POSITIONERS & COHESIVE WRAP (10 / CASE): Brand: DE MAYO KNEE POSITIONER®

## (undated) DEVICE — ELECTRODE ES L3IN S STL BLDE INSUL DISP VALLEYLAB EDGE

## (undated) DEVICE — SUTURE VCRL SZ 0 L36IN ABSRB UD CT-1 L36MM 1/2 CIR TAPR PNT VCP946H

## (undated) DEVICE — ZIMMER® STERILE DISPOSABLE TOURNIQUET CUFF WITH PLC, DUAL PORT, SINGLE BLADDER, 34 IN. (86 CM)

## (undated) DEVICE — MARKER SURG SKIN UTIL BLK REG TIP NONSMEARING W/ 6IN RUL

## (undated) DEVICE — SYRINGE INFL 60ML DISP ALLIANCE II

## (undated) DEVICE — 60-7070-104 TRNQT,DPSB,PLC GREEN: Brand: MEDLINE RENEWAL

## (undated) DEVICE — 2108 SERIES SAGITTAL BLADE FLARED, GROUND  (29.0 X 1.32 X 84.0MM)

## (undated) DEVICE — JELLY,LUBE,STERILE,FLIP TOP,TUBE,2-OZ: Brand: MEDLINE

## (undated) DEVICE — CUFF CRYOTHERAPY LG 20-31 IN KNEE CRYO/CUFF

## (undated) DEVICE — DRSG POSTOP PRMSL AG 3.5X14IN

## (undated) DEVICE — SURGICAL SUCTION CONNECTING TUBE WITH MALE CONNECTOR AND SUCTION CLAMP, 2 FT. LONG (.6 M), 5 MM I.D.: Brand: CONMED

## (undated) DEVICE — GOWN,SIRUS,NONRNF,SETINSLV,2XL,18/CS: Brand: MEDLINE

## (undated) DEVICE — NEEDLE, QUINCKE, 18GX3.5": Brand: MEDLINE

## (undated) DEVICE — FORCEPS BX L240CM WRK CHN 2.8MM STD CAP W/ NDL MIC MESH

## (undated) DEVICE — SUTURE MONOCRYL STRATAFIX SPRL + SZ 2 0 L27IN ABSRB UD W NDL SXMP1B419

## (undated) DEVICE — STRAP ARMBRD W1.5XL32IN FOAM STR YET SFT W/ HK AND LOOP

## (undated) DEVICE — SUTURE FIBERWIRE SZ 5 L38IN NONABSORBABLE BLU L48MM 1/2 AR7211

## (undated) DEVICE — 3M™ COBAN™ NL STERILE NON-LATEX SELF-ADHERENT WRAP, 2084S, 4 IN X 5 YD (10 CM X 4,5 M), 18 ROLLS/CASE: Brand: 3M™ COBAN™

## (undated) DEVICE — BANDAGE COMPR W6INXL15YD WHT BGE POLY COT WV E HK LOOP CLSR

## (undated) DEVICE — BIT DRL L100MM DIA2.7MM QUIK CONN W/O STP N RADLUC REUSE

## (undated) DEVICE — SHOULDER STABILIZATION KIT,                                    DISPOSABLE 12 PER BOX

## (undated) DEVICE — CONVERTED USE 248063 TOWELS OR BL ST